# Patient Record
Sex: FEMALE | Race: WHITE | HISPANIC OR LATINO
[De-identification: names, ages, dates, MRNs, and addresses within clinical notes are randomized per-mention and may not be internally consistent; named-entity substitution may affect disease eponyms.]

---

## 2017-07-19 ENCOUNTER — CHARTING TRANS (OUTPATIENT)
Dept: OTHER | Age: 37
End: 2017-07-19

## 2017-07-22 ENCOUNTER — CHARTING TRANS (OUTPATIENT)
Dept: OTHER | Age: 37
End: 2017-07-22

## 2019-07-14 ENCOUNTER — WALK IN (OUTPATIENT)
Dept: URGENT CARE | Age: 39
End: 2019-07-14

## 2019-07-14 DIAGNOSIS — Z11.1 SCREENING-PULMONARY TB: Primary | ICD-10-CM

## 2019-07-14 PROCEDURE — 86580 TB INTRADERMAL TEST: CPT | Performed by: NURSE PRACTITIONER

## 2019-07-16 ENCOUNTER — WALK IN (OUTPATIENT)
Dept: URGENT CARE | Age: 39
End: 2019-07-16

## 2019-07-16 DIAGNOSIS — Z11.1 ENCOUNTER FOR PPD SKIN TEST READING: Primary | ICD-10-CM

## 2019-07-16 LAB
INDURATION: 0 MM (ref 0–?)
SKIN TEST RESULT: NEGATIVE

## 2019-07-16 PROCEDURE — X1094 NO CHARGE VISIT: HCPCS | Performed by: NURSE PRACTITIONER

## 2019-08-09 ENCOUNTER — APPOINTMENT (OUTPATIENT)
Dept: OTHER | Facility: HOSPITAL | Age: 39
End: 2019-08-09
Attending: PREVENTIVE MEDICINE

## 2019-09-24 ENCOUNTER — WALK IN (OUTPATIENT)
Dept: URGENT CARE | Age: 39
End: 2019-09-24

## 2019-09-24 DIAGNOSIS — Z23 NEED FOR IMMUNIZATION AGAINST INFLUENZA: Primary | ICD-10-CM

## 2019-09-24 PROCEDURE — X0946 INFLUENZA QUADRIVALENT SPLIT PRES FREE 0.5 ML VACC, IM (FLULAVAL,FLUARIX,FLUZONE): HCPCS | Performed by: NURSE PRACTITIONER

## 2020-03-04 ENCOUNTER — HOSPITAL ENCOUNTER (OUTPATIENT)
Age: 40
Discharge: ANOTHER HOSPITAL | End: 2020-03-04
Attending: EMERGENCY MEDICINE
Payer: COMMERCIAL

## 2020-03-04 ENCOUNTER — HOSPITAL ENCOUNTER (EMERGENCY)
Facility: HOSPITAL | Age: 40
Discharge: HOME | End: 2020-03-04
Attending: EMERGENCY MEDICINE
Payer: COMMERCIAL

## 2020-03-04 ENCOUNTER — APPOINTMENT (OUTPATIENT)
Dept: ULTRASOUND IMAGING | Facility: HOSPITAL | Age: 40
End: 2020-03-04
Attending: PHYSICIAN ASSISTANT
Payer: COMMERCIAL

## 2020-03-04 VITALS
DIASTOLIC BLOOD PRESSURE: 89 MMHG | TEMPERATURE: 98 F | SYSTOLIC BLOOD PRESSURE: 128 MMHG | OXYGEN SATURATION: 98 % | HEART RATE: 85 BPM | RESPIRATION RATE: 14 BRPM

## 2020-03-04 VITALS
TEMPERATURE: 98 F | OXYGEN SATURATION: 97 % | HEART RATE: 82 BPM | RESPIRATION RATE: 18 BRPM | SYSTOLIC BLOOD PRESSURE: 115 MMHG | DIASTOLIC BLOOD PRESSURE: 76 MMHG

## 2020-03-04 DIAGNOSIS — L03.115 CELLULITIS OF RIGHT LOWER EXTREMITY: ICD-10-CM

## 2020-03-04 DIAGNOSIS — R20.2 PARESTHESIAS: Primary | ICD-10-CM

## 2020-03-04 DIAGNOSIS — M79.661 RIGHT CALF PAIN: ICD-10-CM

## 2020-03-04 LAB
ALBUMIN SERPL-MCNC: 3.3 G/DL (ref 3.4–5)
ALBUMIN/GLOB SERPL: 0.7 {RATIO} (ref 1–2)
ALP LIVER SERPL-CCNC: 67 U/L (ref 37–98)
ALT SERPL-CCNC: 25 U/L (ref 13–56)
ANION GAP SERPL CALC-SCNC: 4 MMOL/L (ref 0–18)
AST SERPL-CCNC: 19 U/L (ref 15–37)
BASOPHILS # BLD AUTO: 0 X10(3) UL (ref 0–0.2)
BASOPHILS NFR BLD AUTO: 0 %
BILIRUB SERPL-MCNC: 0.2 MG/DL (ref 0.1–2)
BUN BLD-MCNC: 9 MG/DL (ref 7–18)
BUN/CREAT SERPL: 11.7 (ref 10–20)
CALCIUM BLD-MCNC: 8.7 MG/DL (ref 8.5–10.1)
CHLORIDE SERPL-SCNC: 107 MMOL/L (ref 98–112)
CO2 SERPL-SCNC: 27 MMOL/L (ref 21–32)
CREAT BLD-MCNC: 0.77 MG/DL (ref 0.55–1.02)
DEPRECATED RDW RBC AUTO: 40.7 FL (ref 35.1–46.3)
EOSINOPHIL # BLD AUTO: 0.05 X10(3) UL (ref 0–0.7)
EOSINOPHIL NFR BLD AUTO: 1.6 %
ERYTHROCYTE [DISTWIDTH] IN BLOOD BY AUTOMATED COUNT: 13.2 % (ref 11–15)
GLOBULIN PLAS-MCNC: 4.9 G/DL (ref 2.8–4.4)
GLUCOSE BLD-MCNC: 75 MG/DL (ref 70–99)
HCT VFR BLD AUTO: 32.8 % (ref 35–48)
HGB BLD-MCNC: 11.1 G/DL (ref 12–16)
IMM GRANULOCYTES # BLD AUTO: 0.01 X10(3) UL (ref 0–1)
IMM GRANULOCYTES NFR BLD: 0.3 %
LYMPHOCYTES # BLD AUTO: 0.93 X10(3) UL (ref 1–4)
LYMPHOCYTES NFR BLD AUTO: 30.4 %
M PROTEIN MFR SERPL ELPH: 8.2 G/DL (ref 6.4–8.2)
MCH RBC QN AUTO: 29.1 PG (ref 26–34)
MCHC RBC AUTO-ENTMCNC: 33.8 G/DL (ref 31–37)
MCV RBC AUTO: 85.9 FL (ref 80–100)
MONOCYTES # BLD AUTO: 0.28 X10(3) UL (ref 0.1–1)
MONOCYTES NFR BLD AUTO: 9.2 %
NEUTROPHILS # BLD AUTO: 1.79 X10 (3) UL (ref 1.5–7.7)
NEUTROPHILS # BLD AUTO: 1.79 X10(3) UL (ref 1.5–7.7)
NEUTROPHILS NFR BLD AUTO: 58.5 %
OSMOLALITY SERPL CALC.SUM OF ELEC: 283 MOSM/KG (ref 275–295)
PLATELET # BLD AUTO: 303 10(3)UL (ref 150–450)
POTASSIUM SERPL-SCNC: 3.6 MMOL/L (ref 3.5–5.1)
RBC # BLD AUTO: 3.82 X10(6)UL (ref 3.8–5.3)
SODIUM SERPL-SCNC: 138 MMOL/L (ref 136–145)
WBC # BLD AUTO: 3.1 X10(3) UL (ref 4–11)

## 2020-03-04 PROCEDURE — 99212 OFFICE O/P EST SF 10 MIN: CPT

## 2020-03-04 PROCEDURE — 93971 EXTREMITY STUDY: CPT | Performed by: PHYSICIAN ASSISTANT

## 2020-03-04 PROCEDURE — 36415 COLL VENOUS BLD VENIPUNCTURE: CPT

## 2020-03-04 PROCEDURE — 99284 EMERGENCY DEPT VISIT MOD MDM: CPT

## 2020-03-04 PROCEDURE — 85025 COMPLETE CBC W/AUTO DIFF WBC: CPT | Performed by: PHYSICIAN ASSISTANT

## 2020-03-04 PROCEDURE — 80053 COMPREHEN METABOLIC PANEL: CPT | Performed by: PHYSICIAN ASSISTANT

## 2020-03-04 PROCEDURE — 99213 OFFICE O/P EST LOW 20 MIN: CPT

## 2020-03-04 RX ORDER — TRIAMCINOLONE ACETONIDE 0.25 MG/G
CREAM TOPICAL 2 TIMES DAILY
COMMUNITY
End: 2020-08-15

## 2020-03-04 RX ORDER — ZOLPIDEM TARTRATE 10 MG/1
10 TABLET ORAL NIGHTLY PRN
COMMUNITY

## 2020-03-04 RX ORDER — CEPHALEXIN 500 MG/1
500 CAPSULE ORAL 3 TIMES DAILY
COMMUNITY
End: 2020-08-15

## 2020-03-04 RX ORDER — ASCORBIC ACID 500 MG
500 TABLET ORAL DAILY
COMMUNITY

## 2020-03-04 RX ORDER — CLOTRIMAZOLE 1 %
CREAM (GRAM) TOPICAL 2 TIMES DAILY
COMMUNITY
End: 2020-08-15

## 2020-03-04 RX ORDER — OMEPRAZOLE 20 MG/1
20 CAPSULE, DELAYED RELEASE ORAL
COMMUNITY
End: 2020-08-15

## 2020-03-05 NOTE — ED PROVIDER NOTES
Patient Seen in: [facility] Emergency Department      History   Patient presents with:  Numbness Weakness  Pain    Stated Complaint: calf pain/weakness from IC    HPI    40-year-old female who comes in today from [facility] immediate care for rule o Constitutional and vital signs reviewed. All other systems reviewed and negative except as noted above.     Physical Exam     ED Triage Vitals   BP 03/04/20 1939 138/84   Pulse 03/04/20 1939 78   Resp 03/04/20 1939 18   Temp 03/04/20 1939 97.9 °F (36.6 ---------                               -----------         ------                     CBC W/ DIFFERENTIAL[[id number]]          Abnormal            Final result                 Please view results for these tests on the individual orders.          Us Venous The patient is in good condition throughout her visit today and remains so upon discharge.  I discuss the plan of care with the patient, who expresses understanding.  All questions and concerns are addressed to the patient's satisfaction prior to discharge

## 2020-03-05 NOTE — ED PROVIDER NOTES
Patient Seen in: [facility]      History   Patient presents with:  Cellulitis    Stated Complaint: right foot infection     HPI    Patient presents with right leg numbness and right ankle infection.   The patient states that [03/04/20 1843]   /89   Pulse 85   Resp 14   Temp 98 °F (36.7 °C)   Temp src Oral   SpO2 98 %   O2 Device None (Room air)       Current:/89   Pulse 85   Temp 98 °F (36.7 °C) (Oral)   Resp 14   LMP 02/16/2020 (Exact Date)   SpO2 98%         Phys

## 2020-03-05 NOTE — ED INITIAL ASSESSMENT (HPI)
The ptient is here for evaluation of right ankle redness, swelling, numbness to the right ankle  She states she has vasculitis and she had a spot to the right ankle that started to itch and has now gone to what it is today.   She did have some clear drainag

## 2020-07-03 ENCOUNTER — TELEPHONE (OUTPATIENT)
Dept: SCHEDULING | Age: 40
End: 2020-07-03

## 2020-07-08 ENCOUNTER — WALK IN (OUTPATIENT)
Dept: URGENT CARE | Age: 40
End: 2020-07-08

## 2020-07-08 VITALS — TEMPERATURE: 98.7 F

## 2020-07-08 DIAGNOSIS — Z11.1 SCREENING-PULMONARY TB: Primary | ICD-10-CM

## 2020-07-08 PROCEDURE — 86580 TB INTRADERMAL TEST: CPT | Performed by: OBSTETRICS & GYNECOLOGY

## 2020-07-09 ENCOUNTER — TELEPHONE (OUTPATIENT)
Dept: SCHEDULING | Age: 40
End: 2020-07-09

## 2020-07-10 ENCOUNTER — WALK IN (OUTPATIENT)
Dept: URGENT CARE | Age: 40
End: 2020-07-10

## 2020-07-10 VITALS — TEMPERATURE: 99.2 F

## 2020-07-10 DIAGNOSIS — Z11.1 ENCOUNTER FOR PPD SKIN TEST READING: Primary | ICD-10-CM

## 2020-07-10 PROCEDURE — X1094 NO CHARGE VISIT: HCPCS | Performed by: NURSE PRACTITIONER

## 2020-08-15 ENCOUNTER — HOSPITAL ENCOUNTER (OUTPATIENT)
Age: 40
Discharge: HOME | End: 2020-08-15
Payer: COMMERCIAL

## 2020-08-15 VITALS
OXYGEN SATURATION: 99 % | SYSTOLIC BLOOD PRESSURE: 125 MMHG | TEMPERATURE: 97 F | HEIGHT: 64 IN | RESPIRATION RATE: 18 BRPM | DIASTOLIC BLOOD PRESSURE: 88 MMHG | WEIGHT: 140 LBS | HEART RATE: 87 BPM | BODY MASS INDEX: 23.9 KG/M2

## 2020-08-15 DIAGNOSIS — J02.9 ACUTE VIRAL PHARYNGITIS: ICD-10-CM

## 2020-08-15 DIAGNOSIS — J02.9 SORE THROAT: Primary | ICD-10-CM

## 2020-08-15 PROCEDURE — U0003 INFECTIOUS AGENT DETECTION BY NUCLEIC ACID (DNA OR RNA); SEVERE ACUTE RESPIRATORY SYNDROME CORONAVIRUS 2 (SARS-COV-2) (CORONAVIRUS DISEASE [COVID-19]), AMPLIFIED PROBE TECHNIQUE, MAKING USE OF HIGH THROUGHPUT TECHNOLOGIES AS DESCRIBED BY CMS-2020-01-R: HCPCS | Performed by: NURSE PRACTITIONER

## 2020-08-15 PROCEDURE — 99202 OFFICE O/P NEW SF 15 MIN: CPT | Performed by: NURSE PRACTITIONER

## 2020-08-15 NOTE — ED PROVIDER NOTES
Patient Seen in: [facility]      History   Patient presents with:  Testing    Stated Complaint: covid test     42-year-old female presents the immediate care with scratchy throat, concerns for COVID-19.   Patient states her Head: Normocephalic. Nose: Nose normal.      Mouth/Throat:      Mouth: Mucous membranes are moist.   Eyes:      Extraocular Movements: Extraocular movements intact. Cardiovascular:      Rate and Rhythm: Normal rate and regular rhythm.    Pulmonary:

## 2020-08-15 NOTE — ED INITIAL ASSESSMENT (HPI)
Pt c/o mild sore throat \"just feels a little scratchy\", pt would like to get tested for COVID-19, brought daughter in with a sore throat to get tested for COVID-19 also. Denies other s/s.

## 2020-08-17 ENCOUNTER — PATIENT MESSAGE (OUTPATIENT)
Dept: URGENT CARE | Age: 40
End: 2020-08-17

## 2020-08-17 DIAGNOSIS — D63.1 ANEMIA DUE TO CHRONIC KIDNEY DISEASE, UNSPECIFIED CKD STAGE: ICD-10-CM

## 2020-08-17 DIAGNOSIS — E87.6 HYPOKALEMIA: ICD-10-CM

## 2020-08-17 DIAGNOSIS — N18.9 ANEMIA DUE TO CHRONIC KIDNEY DISEASE, UNSPECIFIED CKD STAGE: ICD-10-CM

## 2020-08-17 DIAGNOSIS — I50.20 HFREF (HEART FAILURE WITH REDUCED EJECTION FRACTION) (HCC): ICD-10-CM

## 2020-08-17 DIAGNOSIS — N18.2 CKD (CHRONIC KIDNEY DISEASE) STAGE 2, GFR 60-89 ML/MIN: Primary | ICD-10-CM

## 2020-08-17 LAB — SARS-COV-2 RNA RESP QL NAA+PROBE: NOT DETECTED

## 2020-09-22 ENCOUNTER — HOSPITAL ENCOUNTER (OUTPATIENT)
Age: 40
Discharge: HOME | End: 2020-09-22
Payer: COMMERCIAL

## 2020-09-22 VITALS
OXYGEN SATURATION: 98 % | DIASTOLIC BLOOD PRESSURE: 87 MMHG | TEMPERATURE: 98 F | HEART RATE: 85 BPM | WEIGHT: 136 LBS | RESPIRATION RATE: 20 BRPM | SYSTOLIC BLOOD PRESSURE: 134 MMHG | BODY MASS INDEX: 23 KG/M2

## 2020-09-22 DIAGNOSIS — H69.82 EUSTACHIAN TUBE DYSFUNCTION, LEFT: Primary | ICD-10-CM

## 2020-09-22 PROCEDURE — 99214 OFFICE O/P EST MOD 30 MIN: CPT | Performed by: PHYSICIAN ASSISTANT

## 2020-09-22 RX ORDER — FLUTICASONE PROPIONATE 50 MCG
2 SPRAY, SUSPENSION (ML) NASAL DAILY
Qty: 16 G | Refills: 0 | Status: SHIPPED | OUTPATIENT
Start: 2020-09-22 | End: 2020-10-22

## 2020-09-22 NOTE — ED PROVIDER NOTES
Patient Seen in: [address]      History   Patient presents with:  Ear Problem Pain    Stated Complaint: LEFT EAR PAIN    HPI    29-year-old female with history of Sjogren's and lupus who comes in today complaining of stabb well   Neck: Supple; no anterior or posterior cervical adenopathy, no neck rigidity or meningeal signs  Lungs: Clear to auscultation bilaterally, respirations unlabored. No wheezing, rales or rhonchi. Heart: NSR, S1, S2 present.  No murmurs, rubs or collins

## 2020-11-30 ENCOUNTER — HOSPITAL ENCOUNTER (EMERGENCY)
Age: 40
Discharge: HOME | End: 2020-11-30
Attending: EMERGENCY MEDICINE

## 2020-11-30 ENCOUNTER — APPOINTMENT (OUTPATIENT)
Dept: GENERAL RADIOLOGY | Age: 40
End: 2020-11-30
Attending: EMERGENCY MEDICINE

## 2020-11-30 VITALS
OXYGEN SATURATION: 100 % | SYSTOLIC BLOOD PRESSURE: 149 MMHG | DIASTOLIC BLOOD PRESSURE: 95 MMHG | HEART RATE: 86 BPM | RESPIRATION RATE: 16 BRPM

## 2020-11-30 DIAGNOSIS — R55 VASOVAGAL EPISODE: ICD-10-CM

## 2020-11-30 DIAGNOSIS — R42 LIGHTHEADEDNESS: Primary | ICD-10-CM

## 2020-11-30 LAB
ALBUMIN SERPL-MCNC: 3.7 G/DL (ref 3.6–5.1)
ALBUMIN/GLOB SERPL: 0.7 {RATIO} (ref 1–2.4)
ALP SERPL-CCNC: 72 UNITS/L (ref 45–117)
ALT SERPL-CCNC: 18 UNITS/L
ANION GAP SERPL CALC-SCNC: 10 MMOL/L (ref 10–20)
AST SERPL-CCNC: 14 UNITS/L
ATRIAL RATE (BPM): 82
BASOPHILS # BLD: 0 K/MCL (ref 0–0.3)
BASOPHILS NFR BLD: 0 %
BILIRUB SERPL-MCNC: 0.2 MG/DL (ref 0.2–1)
BUN SERPL-MCNC: 13 MG/DL (ref 6–20)
BUN/CREAT SERPL: 21 (ref 7–25)
CALCIUM SERPL-MCNC: 9.1 MG/DL (ref 8.4–10.2)
CHLORIDE SERPL-SCNC: 106 MMOL/L (ref 98–107)
CO2 SERPL-SCNC: 26 MMOL/L (ref 21–32)
CREAT SERPL-MCNC: 0.62 MG/DL (ref 0.51–0.95)
DIFFERENTIAL METHOD BLD: ABNORMAL
EOSINOPHIL # BLD: 0 K/MCL (ref 0.1–0.5)
EOSINOPHIL NFR BLD: 1 %
ERYTHROCYTE [DISTWIDTH] IN BLOOD: 12.8 % (ref 11–15)
GLOBULIN SER-MCNC: 5 G/DL (ref 2–4)
GLUCOSE BLDC GLUCOMTR-MCNC: 85 MG/DL (ref 70–99)
GLUCOSE SERPL-MCNC: 96 MG/DL (ref 65–99)
HCT VFR BLD CALC: 35.5 % (ref 36–46.5)
HGB BLD-MCNC: 11.8 G/DL (ref 12–15.5)
IMM GRANULOCYTES # BLD AUTO: 0 K/MCL (ref 0–0.2)
IMM GRANULOCYTES NFR BLD: 0 %
LYMPHOCYTES # BLD: 0.6 K/MCL (ref 1–4.8)
LYMPHOCYTES NFR BLD: 9 %
MCH RBC QN AUTO: 29.3 PG (ref 26–34)
MCHC RBC AUTO-ENTMCNC: 33.2 G/DL (ref 32–36.5)
MCV RBC AUTO: 88.1 FL (ref 78–100)
MONOCYTES # BLD: 0.4 K/MCL (ref 0.3–0.9)
MONOCYTES NFR BLD: 6 %
NEUTROPHILS # BLD: 5.5 K/MCL (ref 1.8–7.7)
NEUTROPHILS NFR BLD: 84 %
NRBC BLD MANUAL-RTO: 0 /100 WBC
P AXIS (DEGREES): 64
PLATELET # BLD: 324 K/MCL (ref 140–450)
POTASSIUM SERPL-SCNC: 4 MMOL/L (ref 3.4–5.1)
PR-INTERVAL (MSEC): 172
PROT SERPL-MCNC: 8.7 G/DL (ref 6.4–8.2)
QRS-INTERVAL (MSEC): 84
QT-INTERVAL (MSEC): 410
QTC: 479
R AXIS (DEGREES): 25
RBC # BLD: 4.03 MIL/MCL (ref 4–5.2)
REPORT TEXT: NORMAL
SODIUM SERPL-SCNC: 138 MMOL/L (ref 135–145)
T AXIS (DEGREES): 12
TROPONIN I SERPL HS-MCNC: <0.02 NG/ML
TROPONIN I SERPL HS-MCNC: <0.02 NG/ML
VENTRICULAR RATE EKG/MIN (BPM): 82
WBC # BLD: 6.5 K/MCL (ref 4.2–11)

## 2020-11-30 PROCEDURE — 84484 ASSAY OF TROPONIN QUANT: CPT

## 2020-11-30 PROCEDURE — 99284 EMERGENCY DEPT VISIT MOD MDM: CPT

## 2020-11-30 PROCEDURE — 85025 COMPLETE CBC W/AUTO DIFF WBC: CPT

## 2020-11-30 PROCEDURE — 80053 COMPREHEN METABOLIC PANEL: CPT

## 2020-11-30 PROCEDURE — 93005 ELECTROCARDIOGRAM TRACING: CPT | Performed by: EMERGENCY MEDICINE

## 2020-11-30 PROCEDURE — 71046 X-RAY EXAM CHEST 2 VIEWS: CPT

## 2020-11-30 PROCEDURE — 96360 HYDRATION IV INFUSION INIT: CPT

## 2020-11-30 PROCEDURE — 10002807 HB RX 258: Performed by: STUDENT IN AN ORGANIZED HEALTH CARE EDUCATION/TRAINING PROGRAM

## 2020-11-30 PROCEDURE — 82962 GLUCOSE BLOOD TEST: CPT

## 2020-11-30 PROCEDURE — 93010 ELECTROCARDIOGRAM REPORT: CPT | Performed by: INTERNAL MEDICINE

## 2020-11-30 PROCEDURE — 99284 EMERGENCY DEPT VISIT MOD MDM: CPT | Performed by: STUDENT IN AN ORGANIZED HEALTH CARE EDUCATION/TRAINING PROGRAM

## 2020-11-30 RX ADMIN — SODIUM CHLORIDE 1000 ML: 9 INJECTION, SOLUTION INTRAVENOUS at 21:15

## 2020-11-30 ASSESSMENT — ENCOUNTER SYMPTOMS
EYE PAIN: 0
DIAPHORESIS: 1
POLYDIPSIA: 0
WEAKNESS: 0
DIARRHEA: 0
HEADACHES: 0
DIZZINESS: 0
CONFUSION: 0
FEVER: 0
EYE REDNESS: 0
COUGH: 0
NAUSEA: 0
ABDOMINAL PAIN: 0
LIGHT-HEADEDNESS: 1
CHILLS: 0
NUMBNESS: 0
SHORTNESS OF BREATH: 0
VOMITING: 0

## 2020-11-30 ASSESSMENT — PAIN SCALES - GENERAL: PAINLEVEL_OUTOF10: 5

## 2021-01-29 ENCOUNTER — EXTERNAL RECORD (OUTPATIENT)
Dept: HEALTH INFORMATION MANAGEMENT | Facility: OTHER | Age: 41
End: 2021-01-29

## 2021-07-27 ENCOUNTER — WALK IN (OUTPATIENT)
Dept: URGENT CARE | Age: 41
End: 2021-07-27

## 2021-07-27 DIAGNOSIS — Z11.1 SCREENING-PULMONARY TB: Primary | ICD-10-CM

## 2021-07-27 PROCEDURE — 86580 TB INTRADERMAL TEST: CPT | Performed by: OBSTETRICS & GYNECOLOGY

## 2021-07-30 ENCOUNTER — WALK IN (OUTPATIENT)
Dept: URGENT CARE | Age: 41
End: 2021-07-30

## 2021-07-30 DIAGNOSIS — Z11.1 ENCOUNTER FOR PPD SKIN TEST READING: Primary | ICD-10-CM

## 2021-07-30 LAB
INDURATION: 0 MM (ref 0–?)
SKIN TEST RESULT: NEGATIVE

## 2021-07-30 PROCEDURE — X1094 NO CHARGE VISIT: HCPCS | Performed by: OBSTETRICS & GYNECOLOGY

## 2021-10-01 ENCOUNTER — ORDER TRANSCRIPTION (OUTPATIENT)
Dept: ADMINISTRATIVE | Facility: HOSPITAL | Age: 41
End: 2021-10-01

## 2021-10-01 DIAGNOSIS — G56.22 CUBITAL TUNNEL SYNDROME ON LEFT: ICD-10-CM

## 2021-10-01 DIAGNOSIS — M25.532 ARTHRALGIA OF LEFT FOREARM: Primary | ICD-10-CM

## 2021-12-07 ENCOUNTER — OFFICE VISIT (OUTPATIENT)
Dept: ELECTROPHYSIOLOGY | Facility: HOSPITAL | Age: 41
End: 2021-12-07
Attending: SPECIALIST
Payer: COMMERCIAL

## 2021-12-07 DIAGNOSIS — M25.532 ARTHRALGIA OF LEFT FOREARM: ICD-10-CM

## 2021-12-07 DIAGNOSIS — G56.22 CUBITAL TUNNEL SYNDROME ON LEFT: ICD-10-CM

## 2021-12-07 PROCEDURE — 95911 NRV CNDJ TEST 9-10 STUDIES: CPT | Performed by: OTHER

## 2021-12-07 PROCEDURE — 95886 MUSC TEST DONE W/N TEST COMP: CPT | Performed by: OTHER

## 2021-12-07 NOTE — PROCEDURES
[facility]  Nerve Conduction & EMG Report                      Abdulkadir Kawasaki, [address]   EMG department   [address] [facility], [address] [place] [address] Rd  [phone]          Patient name: Bushra Zayas  Date of birth: [place]

## 2021-12-16 ENCOUNTER — HOSPITAL ENCOUNTER (OUTPATIENT)
Age: 41
Discharge: HOME | End: 2021-12-16
Payer: COMMERCIAL

## 2021-12-16 VITALS
SYSTOLIC BLOOD PRESSURE: 116 MMHG | RESPIRATION RATE: 16 BRPM | BODY MASS INDEX: 23.05 KG/M2 | OXYGEN SATURATION: 98 % | TEMPERATURE: 99 F | WEIGHT: 135 LBS | HEART RATE: 97 BPM | DIASTOLIC BLOOD PRESSURE: 74 MMHG | HEIGHT: 64 IN

## 2021-12-16 DIAGNOSIS — Z20.822 ENCOUNTER FOR LABORATORY TESTING FOR COVID-19 VIRUS: ICD-10-CM

## 2021-12-16 DIAGNOSIS — Z20.822 LAB TEST NEGATIVE FOR COVID-19 VIRUS: Primary | ICD-10-CM

## 2021-12-16 PROCEDURE — U0002 COVID-19 LAB TEST NON-CDC: HCPCS | Performed by: PHYSICIAN ASSISTANT

## 2021-12-16 PROCEDURE — 99212 OFFICE O/P EST SF 10 MIN: CPT | Performed by: PHYSICIAN ASSISTANT

## 2021-12-16 RX ORDER — FERROUS SULFATE 325(65) MG
325 TABLET ORAL AS DIRECTED
COMMUNITY

## 2021-12-16 RX ORDER — ALPRAZOLAM 0.25 MG/1
0.25 TABLET ORAL
COMMUNITY
Start: 2021-03-30

## 2021-12-16 NOTE — ED PROVIDER NOTES
Patient Seen in: [address]      History   Patient presents with:  Covid-19 Test    Stated Complaint: covid test     Subjective:   HPI    51-year-old female presents for Covid testing. Daughter was exposed to Covid.   Both of them ar Skin:     General: Skin is warm and dry. Capillary Refill: Capillary refill takes less than 2 seconds. Neurological:      Mental Status: She is alert.    Psychiatric:         Mood and Affect: Mood normal.             ED Course     Labs Reviewed   R

## 2022-05-27 ENCOUNTER — HOSPITAL ENCOUNTER (EMERGENCY)
Facility: HOSPITAL | Age: 42
Discharge: HOME | End: 2022-05-27
Attending: EMERGENCY MEDICINE
Payer: COMMERCIAL

## 2022-05-27 ENCOUNTER — APPOINTMENT (OUTPATIENT)
Dept: GENERAL RADIOLOGY | Facility: HOSPITAL | Age: 42
End: 2022-05-27
Attending: EMERGENCY MEDICINE
Payer: COMMERCIAL

## 2022-05-27 VITALS
SYSTOLIC BLOOD PRESSURE: 130 MMHG | HEART RATE: 97 BPM | OXYGEN SATURATION: 100 % | DIASTOLIC BLOOD PRESSURE: 86 MMHG | RESPIRATION RATE: 21 BRPM

## 2022-05-27 DIAGNOSIS — U07.1 COVID-19: Primary | ICD-10-CM

## 2022-05-27 LAB
ALBUMIN SERPL-MCNC: 2.9 G/DL (ref 3.4–5)
ALBUMIN/GLOB SERPL: 0.6 {RATIO} (ref 1–2)
ALP LIVER SERPL-CCNC: 70 U/L
ALT SERPL-CCNC: 16 U/L
ANION GAP SERPL CALC-SCNC: 7 MMOL/L (ref 0–18)
AST SERPL-CCNC: 15 U/L (ref 15–37)
ATRIAL RATE: 81 BPM
BASOPHILS # BLD AUTO: 0 X10(3) UL (ref 0–0.2)
BASOPHILS NFR BLD AUTO: 0 %
BILIRUB SERPL-MCNC: 0.2 MG/DL (ref 0.1–2)
BILIRUB UR QL STRIP.AUTO: NEGATIVE
BUN BLD-MCNC: 12 MG/DL (ref 7–18)
CALCIUM BLD-MCNC: 8.6 MG/DL (ref 8.5–10.1)
CHLORIDE SERPL-SCNC: 109 MMOL/L (ref 98–112)
CLARITY UR REFRACT.AUTO: CLEAR
CO2 SERPL-SCNC: 22 MMOL/L (ref 21–32)
CREAT BLD-MCNC: 0.78 MG/DL
EOSINOPHIL # BLD AUTO: 0.08 X10(3) UL (ref 0–0.7)
EOSINOPHIL NFR BLD AUTO: 1.6 %
ERYTHROCYTE [DISTWIDTH] IN BLOOD BY AUTOMATED COUNT: 13.4 %
GLOBULIN PLAS-MCNC: 4.7 G/DL (ref 2.8–4.4)
GLUCOSE BLD-MCNC: 87 MG/DL (ref 70–99)
GLUCOSE UR STRIP.AUTO-MCNC: NEGATIVE MG/DL
HCT VFR BLD AUTO: 30.5 %
HGB BLD-MCNC: 10.1 G/DL
IMM GRANULOCYTES # BLD AUTO: 0.02 X10(3) UL (ref 0–1)
IMM GRANULOCYTES NFR BLD: 0.4 %
KETONES UR STRIP.AUTO-MCNC: NEGATIVE MG/DL
LEUKOCYTE ESTERASE UR QL STRIP.AUTO: NEGATIVE
LYMPHOCYTES # BLD AUTO: 0.69 X10(3) UL (ref 1–4)
LYMPHOCYTES NFR BLD AUTO: 13.7 %
MCH RBC QN AUTO: 28.4 PG (ref 26–34)
MCHC RBC AUTO-ENTMCNC: 33.1 G/DL (ref 31–37)
MCV RBC AUTO: 85.7 FL
MONOCYTES # BLD AUTO: 0.51 X10(3) UL (ref 0.1–1)
MONOCYTES NFR BLD AUTO: 10.1 %
NEUTROPHILS # BLD AUTO: 3.75 X10 (3) UL (ref 1.5–7.7)
NEUTROPHILS # BLD AUTO: 3.75 X10(3) UL (ref 1.5–7.7)
NEUTROPHILS NFR BLD AUTO: 74.2 %
NITRITE UR QL STRIP.AUTO: NEGATIVE
OSMOLALITY SERPL CALC.SUM OF ELEC: 285 MOSM/KG (ref 275–295)
P AXIS: 54 DEGREES
P-R INTERVAL: 164 MS
PH UR STRIP.AUTO: 7 [PH] (ref 5–8)
PLATELET # BLD AUTO: 302 10(3)UL (ref 150–450)
POTASSIUM SERPL-SCNC: 3.6 MMOL/L (ref 3.5–5.1)
PROT SERPL-MCNC: 7.6 G/DL (ref 6.4–8.2)
PROT UR STRIP.AUTO-MCNC: NEGATIVE MG/DL
Q-T INTERVAL: 392 MS
QRS DURATION: 96 MS
QTC CALCULATION (BEZET): 455 MS
R AXIS: 32 DEGREES
RBC # BLD AUTO: 3.56 X10(6)UL
RBC UR QL AUTO: NEGATIVE
SARS-COV-2 RNA RESP QL NAA+PROBE: DETECTED
SODIUM SERPL-SCNC: 138 MMOL/L (ref 136–145)
SP GR UR STRIP.AUTO: 1.01 (ref 1–1.03)
T AXIS: 32 DEGREES
TROPONIN I HIGH SENSITIVITY: <3 NG/L
UROBILINOGEN UR STRIP.AUTO-MCNC: <2 MG/DL
VENTRICULAR RATE: 81 BPM
WBC # BLD AUTO: 5.1 X10(3) UL (ref 4–11)

## 2022-05-27 PROCEDURE — 99284 EMERGENCY DEPT VISIT MOD MDM: CPT

## 2022-05-27 PROCEDURE — 93005 ELECTROCARDIOGRAM TRACING: CPT

## 2022-05-27 PROCEDURE — 93010 ELECTROCARDIOGRAM REPORT: CPT

## 2022-05-27 PROCEDURE — 84484 ASSAY OF TROPONIN QUANT: CPT | Performed by: EMERGENCY MEDICINE

## 2022-05-27 PROCEDURE — 96360 HYDRATION IV INFUSION INIT: CPT

## 2022-05-27 PROCEDURE — 85025 COMPLETE CBC W/AUTO DIFF WBC: CPT | Performed by: EMERGENCY MEDICINE

## 2022-05-27 PROCEDURE — 81003 URINALYSIS AUTO W/O SCOPE: CPT | Performed by: EMERGENCY MEDICINE

## 2022-05-27 PROCEDURE — 80053 COMPREHEN METABOLIC PANEL: CPT | Performed by: EMERGENCY MEDICINE

## 2022-05-27 RX ORDER — BEBTELOVIMAB 87.5 MG/ML
175 INJECTION, SOLUTION INTRAVENOUS ONCE
Status: COMPLETED | OUTPATIENT
Start: 2022-05-27 | End: 2022-05-27

## 2022-05-27 NOTE — ED INITIAL ASSESSMENT (HPI)
Went to [address] [place] [address] today for a COVID test, and mentioned that last Sunday she had a near syncopal episode. They did a CXR and EKG along with orthostatic bps. The CXR and orthostatics were negative but she was found to have an abnormal EKG that showed an incomplete R BBB. She was referred here to the ER.   Denies CP or SOB at this time

## 2022-07-30 ENCOUNTER — WALK IN (OUTPATIENT)
Dept: URGENT CARE | Age: 42
End: 2022-07-30

## 2022-07-30 DIAGNOSIS — Z11.1 SCREENING-PULMONARY TB: Primary | ICD-10-CM

## 2022-07-30 PROCEDURE — 86580 TB INTRADERMAL TEST: CPT | Performed by: NURSE PRACTITIONER

## 2022-08-01 ENCOUNTER — WALK IN (OUTPATIENT)
Dept: URGENT CARE | Age: 42
End: 2022-08-01

## 2022-08-01 DIAGNOSIS — Z11.1 ENCOUNTER FOR PPD SKIN TEST READING: Primary | ICD-10-CM

## 2022-08-01 LAB
INDURATION: 0 MM (ref 0–?)
SKIN TEST RESULT: NEGATIVE

## 2023-01-20 PROCEDURE — 88348 ELECTRON MICROSCOPY DX: CPT | Performed by: CLINICAL MEDICAL LABORATORY

## 2023-01-24 ENCOUNTER — LAB REQUISITION (OUTPATIENT)
Dept: LAB | Age: 43
End: 2023-01-24

## 2023-02-03 LAB
ASR DISCLAIMER: NORMAL
CASE RPRT: NORMAL
CLINICAL INFO: NORMAL
PATH REPORT.FINAL DX SPEC: NORMAL
PATH REPORT.GROSS SPEC: NORMAL

## 2023-04-03 ENCOUNTER — EXTERNAL RECORD (OUTPATIENT)
Dept: HEALTH INFORMATION MANAGEMENT | Facility: OTHER | Age: 43
End: 2023-04-03

## 2023-04-03 LAB — LAB RESULT: NORMAL

## 2023-04-05 ENCOUNTER — EXTERNAL RECORD (OUTPATIENT)
Dept: HEALTH INFORMATION MANAGEMENT | Facility: OTHER | Age: 43
End: 2023-04-05

## 2023-04-11 DIAGNOSIS — C50.412 MALIGNANT NEOPLASM OF UPPER-OUTER QUADRANT OF LEFT BREAST IN FEMALE, ESTROGEN RECEPTOR NEGATIVE (CMD): Primary | ICD-10-CM

## 2023-04-11 DIAGNOSIS — Z17.1 MALIGNANT NEOPLASM OF UPPER-OUTER QUADRANT OF LEFT BREAST IN FEMALE, ESTROGEN RECEPTOR NEGATIVE (CMD): Primary | ICD-10-CM

## 2023-04-13 ENCOUNTER — EXTERNAL RECORD (OUTPATIENT)
Dept: SURGERY | Age: 43
End: 2023-04-13

## 2023-04-13 ENCOUNTER — TELEPHONE (OUTPATIENT)
Dept: SURGERY | Age: 43
End: 2023-04-13

## 2023-04-13 ENCOUNTER — CLINICAL DOCUMENTATION (OUTPATIENT)
Dept: SURGERY | Age: 43
End: 2023-04-13

## 2023-04-13 DIAGNOSIS — C50.912 INFILTRATING DUCTAL CARCINOMA OF LEFT BREAST (CMD): Primary | ICD-10-CM

## 2023-04-18 ENCOUNTER — TELEPHONE (OUTPATIENT)
Dept: SURGERY | Age: 43
End: 2023-04-18

## 2023-04-24 ENCOUNTER — EXTERNAL RECORD (OUTPATIENT)
Dept: SURGERY | Age: 43
End: 2023-04-24

## 2023-05-04 ENCOUNTER — GENETICS ENCOUNTER (OUTPATIENT)
Dept: GENETICS | Facility: HOSPITAL | Age: 43
End: 2023-05-04
Attending: GENETIC COUNSELOR, MS
Payer: COMMERCIAL

## 2023-05-04 ENCOUNTER — NURSE ONLY (OUTPATIENT)
Dept: HEMATOLOGY/ONCOLOGY | Facility: HOSPITAL | Age: 43
End: 2023-05-04
Attending: GENETIC COUNSELOR, MS
Payer: COMMERCIAL

## 2023-05-04 DIAGNOSIS — C50.912 MALIGNANT NEOPLASM OF LEFT BREAST IN FEMALE, ESTROGEN RECEPTOR NEGATIVE, UNSPECIFIED SITE OF BREAST (HCC): Primary | ICD-10-CM

## 2023-05-04 DIAGNOSIS — Z17.1 MALIGNANT NEOPLASM OF LEFT BREAST IN FEMALE, ESTROGEN RECEPTOR NEGATIVE, UNSPECIFIED SITE OF BREAST (HCC): Primary | ICD-10-CM

## 2023-05-04 PROCEDURE — 96040 HC GENETIC COUNSELING EA 30 MIN: CPT | Performed by: GENETIC COUNSELOR, MS

## 2023-05-04 PROCEDURE — 36415 COLL VENOUS BLD VENIPUNCTURE: CPT

## 2023-05-23 ENCOUNTER — GENETICS ENCOUNTER (OUTPATIENT)
Dept: HEMATOLOGY/ONCOLOGY | Facility: HOSPITAL | Age: 43
End: 2023-05-23

## 2023-05-23 DIAGNOSIS — Z13.71 BRCA GENE MUTATION NEGATIVE IN FEMALE: Primary | ICD-10-CM

## 2023-05-23 NOTE — PROGRESS NOTES
Patient Name: Lacie Aiken  YOB: 1980    Referring Provider:  Zonia Keys MD; Lavetta Cranker, MD        Reason for Referral:  Ms. Yogesh Lara had genetic testing performed on 5/4/2023 because of a diagnosis of triple negative breast cancer at age 41y. Genetic Testing Result:  Negative. No pathogenic variant was found in the following 84 genes on the [facility] BRAC1/2 panel and multi-cancer + RNA panel: AIP, ALK, APC*, SAL*, AXIN2, BAP1, BARD1, BLM, BMPR1A, BRCA1, BRCA2, BRIP1, CASR, CDC73, CDH1, CDK4, CDKN1B, CDKN1C, CDKN2A (p14ARF), CDKN2A ([id number]), CEBPA, CHEK2, CTNNA1, DICER1*, DIS3L2, EGFR, EPCAM*, FH*, FLCN, GATA2, GPC3*, GREM1*, HOXB13, HRAS, KIT, MAX*, MEN1*, MET*, MITF*, MLH1*, MSH2*, MSH3*, MSH6*, MUTYH, NBN, NF1*, NF2, NTHL1, PALB2, PDGFRA, PHOX2B*, PMS2*, POLD1*, POLE, POT1, UKVHM5U, PTCH1, PTEN*, RAD50, RAD51C, RAD51D, RB1*, RECQL4*, RET, RUNX1, SDHA*, SDHAF2, SDHB, SDHC*, SDHD, SMAD4, SMARCA4, SMARCB1, SMARCE1, STK11, SUFU, TERC, TERT, RBSF245, TP53, TSC1*, TSC2, VHL, WRN*, WT1. Please refer to the report from [facility] for additional testing information. These results were discussed with Ms. Yogesh Lara via telephone on 5/23/2023. Summary and Plan:   These results indicate it is unlikely that Ms. Yogesh Lara has a pathogenic variant (harmful genetic mutation) in any of the genes listed above. No pathogenic variants associated with hereditary cancer syndromes were identified. The etiology Ms. Nunezs personal history of cancer remains genetically unexplained. Medical management and surveillance for Ms. Yogesh Lara and other family members should be based on their personal and family history. All medical management decisions should be made with a physician. The limitations of the testing were discussed with Ms. Yogesh Lara including the chance that a pathogenic variant in a gene other than those included in this analysis might be the cause of cancer in Ms. Yogesh Lara or in relatives. I encouraged Ms.  Yogesh Lara to share the genetic test results with her children and other relatives so that they may discuss the implications of this information with their health providers. Ms. Kamaraangely Farr is also encouraged to contact me on an annual basis to learn if there have been any updates in genetic testing that would apply or if there are changes in the personal and/or family history. Please do not hesitate to contact my office if you have any questions or concerns, [phone].      Amanuel Archer MS, CGC

## 2023-09-08 ENCOUNTER — WALK IN (OUTPATIENT)
Dept: URGENT CARE | Age: 43
End: 2023-09-08

## 2023-09-08 DIAGNOSIS — Z11.1 SCREENING EXAMINATION FOR PULMONARY TUBERCULOSIS: Primary | ICD-10-CM

## 2023-09-08 PROCEDURE — 86580 TB INTRADERMAL TEST: CPT | Performed by: NURSE PRACTITIONER

## 2023-09-10 ENCOUNTER — WALK IN (OUTPATIENT)
Dept: URGENT CARE | Age: 43
End: 2023-09-10

## 2023-09-10 DIAGNOSIS — Z11.1 ENCOUNTER FOR PPD SKIN TEST READING: Primary | ICD-10-CM

## 2023-09-10 LAB
INDURATION: 0 MM (ref 0–?)
SKIN TEST RESULT: NEGATIVE

## 2023-09-30 ENCOUNTER — IMMUNIZATION (OUTPATIENT)
Dept: LAB | Age: 43
End: 2023-09-30
Attending: EMERGENCY MEDICINE
Payer: COMMERCIAL

## 2023-09-30 DIAGNOSIS — Z23 NEED FOR VACCINATION: Primary | ICD-10-CM

## 2023-09-30 PROCEDURE — 90686 IIV4 VACC NO PRSV 0.5 ML IM: CPT

## 2023-09-30 PROCEDURE — 90471 IMMUNIZATION ADMIN: CPT

## 2023-11-13 ENCOUNTER — HOSPITAL ENCOUNTER (EMERGENCY)
Facility: HOSPITAL | Age: 43
Discharge: HOME | End: 2023-11-14
Attending: EMERGENCY MEDICINE
Payer: COMMERCIAL

## 2023-11-13 ENCOUNTER — APPOINTMENT (OUTPATIENT)
Dept: CT IMAGING | Facility: HOSPITAL | Age: 43
End: 2023-11-13
Attending: EMERGENCY MEDICINE
Payer: COMMERCIAL

## 2023-11-13 ENCOUNTER — APPOINTMENT (OUTPATIENT)
Dept: GENERAL RADIOLOGY | Facility: HOSPITAL | Age: 43
End: 2023-11-13
Payer: COMMERCIAL

## 2023-11-13 DIAGNOSIS — R07.9 CHEST PAIN, UNSPECIFIED TYPE: Primary | ICD-10-CM

## 2023-11-13 LAB
ALBUMIN SERPL-MCNC: 3.3 G/DL (ref 3.4–5)
ALBUMIN/GLOB SERPL: 0.8 {RATIO} (ref 1–2)
ALP LIVER SERPL-CCNC: 105 U/L
ALT SERPL-CCNC: 32 U/L
ANION GAP SERPL CALC-SCNC: 6 MMOL/L (ref 0–18)
AST SERPL-CCNC: 25 U/L (ref 15–37)
ATRIAL RATE: 102 BPM
BASOPHILS # BLD AUTO: 0.01 X10(3) UL (ref 0–0.2)
BASOPHILS NFR BLD AUTO: 0.2 %
BILIRUB SERPL-MCNC: 0.4 MG/DL (ref 0.1–2)
BUN BLD-MCNC: 15 MG/DL (ref 9–23)
CALCIUM BLD-MCNC: 9.3 MG/DL (ref 8.5–10.1)
CHLORIDE SERPL-SCNC: 108 MMOL/L (ref 98–112)
CO2 SERPL-SCNC: 24 MMOL/L (ref 21–32)
CREAT BLD-MCNC: 0.84 MG/DL
EGFRCR SERPLBLD CKD-EPI 2021: 88 ML/MIN/1.73M2 (ref 60–?)
EOSINOPHIL # BLD AUTO: 0.06 X10(3) UL (ref 0–0.7)
EOSINOPHIL NFR BLD AUTO: 1.3 %
ERYTHROCYTE [DISTWIDTH] IN BLOOD BY AUTOMATED COUNT: 12.7 %
GLOBULIN PLAS-MCNC: 4.2 G/DL (ref 2.8–4.4)
GLUCOSE BLD-MCNC: 105 MG/DL (ref 70–99)
HCT VFR BLD AUTO: 29 %
HGB BLD-MCNC: 9.6 G/DL
IMM GRANULOCYTES # BLD AUTO: 0.01 X10(3) UL (ref 0–1)
IMM GRANULOCYTES NFR BLD: 0.2 %
LYMPHOCYTES # BLD AUTO: 0.67 X10(3) UL (ref 1–4)
LYMPHOCYTES NFR BLD AUTO: 14.5 %
MCH RBC QN AUTO: 29.1 PG (ref 26–34)
MCHC RBC AUTO-ENTMCNC: 33.1 G/DL (ref 31–37)
MCV RBC AUTO: 87.9 FL
MONOCYTES # BLD AUTO: 0.5 X10(3) UL (ref 0.1–1)
MONOCYTES NFR BLD AUTO: 10.8 %
NEUTROPHILS # BLD AUTO: 3.36 X10 (3) UL (ref 1.5–7.7)
NEUTROPHILS # BLD AUTO: 3.36 X10(3) UL (ref 1.5–7.7)
NEUTROPHILS NFR BLD AUTO: 73 %
OSMOLALITY SERPL CALC.SUM OF ELEC: 287 MOSM/KG (ref 275–295)
P AXIS: 72 DEGREES
P-R INTERVAL: 162 MS
PLATELET # BLD AUTO: 241 10(3)UL (ref 150–450)
POTASSIUM SERPL-SCNC: 3.4 MMOL/L (ref 3.5–5.1)
PROT SERPL-MCNC: 7.5 G/DL (ref 6.4–8.2)
Q-T INTERVAL: 384 MS
QRS DURATION: 86 MS
QTC CALCULATION (BEZET): 500 MS
R AXIS: 49 DEGREES
RBC # BLD AUTO: 3.3 X10(6)UL
SODIUM SERPL-SCNC: 138 MMOL/L (ref 136–145)
T AXIS: 51 DEGREES
TROPONIN I SERPL HS-MCNC: 26 NG/L
VENTRICULAR RATE: 102 BPM
WBC # BLD AUTO: 4.6 X10(3) UL (ref 4–11)

## 2023-11-13 PROCEDURE — 80053 COMPREHEN METABOLIC PANEL: CPT

## 2023-11-13 PROCEDURE — 80053 COMPREHEN METABOLIC PANEL: CPT | Performed by: EMERGENCY MEDICINE

## 2023-11-13 PROCEDURE — 71045 X-RAY EXAM CHEST 1 VIEW: CPT

## 2023-11-13 PROCEDURE — 84484 ASSAY OF TROPONIN QUANT: CPT | Performed by: EMERGENCY MEDICINE

## 2023-11-13 PROCEDURE — 84484 ASSAY OF TROPONIN QUANT: CPT

## 2023-11-13 PROCEDURE — 85025 COMPLETE CBC W/AUTO DIFF WBC: CPT

## 2023-11-13 PROCEDURE — 99285 EMERGENCY DEPT VISIT HI MDM: CPT

## 2023-11-13 PROCEDURE — 71260 CT THORAX DX C+: CPT | Performed by: EMERGENCY MEDICINE

## 2023-11-13 PROCEDURE — 93005 ELECTROCARDIOGRAM TRACING: CPT

## 2023-11-13 PROCEDURE — 93010 ELECTROCARDIOGRAM REPORT: CPT

## 2023-11-13 PROCEDURE — 85025 COMPLETE CBC W/AUTO DIFF WBC: CPT | Performed by: EMERGENCY MEDICINE

## 2023-11-13 PROCEDURE — 99284 EMERGENCY DEPT VISIT MOD MDM: CPT

## 2023-11-13 PROCEDURE — 96374 THER/PROPH/DIAG INJ IV PUSH: CPT

## 2023-11-13 RX ORDER — POTASSIUM CHLORIDE 1500 MG/1
1 TABLET, EXTENDED RELEASE ORAL DAILY
COMMUNITY
Start: 2023-08-24

## 2023-11-13 RX ORDER — HYDROCODONE BITARTRATE AND ACETAMINOPHEN 5; 325 MG/1; MG/1
1 TABLET ORAL EVERY 8 HOURS PRN
COMMUNITY
Start: 2023-09-27

## 2023-11-13 RX ORDER — POTASSIUM CHLORIDE 20 MEQ/1
40 TABLET, EXTENDED RELEASE ORAL ONCE
Status: COMPLETED | OUTPATIENT
Start: 2023-11-13 | End: 2023-11-14

## 2023-11-13 RX ORDER — LIDOCAINE AND PRILOCAINE 25; 25 MG/G; MG/G
CREAM TOPICAL
COMMUNITY
Start: 2023-05-12

## 2023-11-13 RX ORDER — MORPHINE SULFATE 4 MG/ML
4 INJECTION, SOLUTION INTRAMUSCULAR; INTRAVENOUS ONCE
Status: COMPLETED | OUTPATIENT
Start: 2023-11-13 | End: 2023-11-13

## 2023-11-13 RX ORDER — ONDANSETRON HYDROCHLORIDE 8 MG/1
8 TABLET, FILM COATED ORAL AS NEEDED
COMMUNITY
Start: 2023-06-13

## 2023-11-13 RX ORDER — DULOXETIN HYDROCHLORIDE 30 MG/1
30 CAPSULE, DELAYED RELEASE ORAL DAILY
COMMUNITY
Start: 2023-09-21

## 2023-11-14 VITALS
WEIGHT: 120 LBS | SYSTOLIC BLOOD PRESSURE: 127 MMHG | DIASTOLIC BLOOD PRESSURE: 88 MMHG | HEART RATE: 92 BPM | TEMPERATURE: 98 F | OXYGEN SATURATION: 99 % | RESPIRATION RATE: 17 BRPM | HEIGHT: 64 IN | BODY MASS INDEX: 20.49 KG/M2

## 2023-11-14 NOTE — DISCHARGE INSTRUCTIONS
Follow-up with your primary care doctor or oncologist within the next week for reassessment. Return for new or worsening pain, difficulty breathing or any other concerning symptoms.

## 2023-11-14 NOTE — ED QUICK NOTES
Patient has a history of breast cancer, is supposed to have lumpectomy on Thursday. Last chemotherapy was October 5th. Patient states on Friday she developed some left scapular pain. Is able to pinpoint the area that is painful. Tried some Icy Hot without relief. States the pain has since spread to her left upper chest, above her left breast and down her left arm. Not painful to move. No shortness of breath. Patient states she has been told Amy Boles has an inflamed lymph node\" in left upper breast area.

## 2023-11-14 NOTE — ED QUICK NOTES
Rounding Completed. Report from [facility]. Pt resting in bed. States pain down to 2/10. She denies FLORES    Plan of Care reviewed. Waiting for dispo. Elimination needs assessed. Provided updates. Bed is locked and in lowest position. Call light within reach.

## 2023-11-14 NOTE — ED INITIAL ASSESSMENT (HPI)
Pateint with left arm pain, left shoulder and left upper back pain. Started Friday evening.  Took Norco at 5 am.

## 2023-12-13 ENCOUNTER — APPOINTMENT (OUTPATIENT)
Dept: GENERAL RADIOLOGY | Facility: HOSPITAL | Age: 43
DRG: 987 | End: 2023-12-13
Attending: EMERGENCY MEDICINE
Payer: COMMERCIAL

## 2023-12-13 ENCOUNTER — HOSPITAL ENCOUNTER (INPATIENT)
Facility: HOSPITAL | Age: 43
LOS: 20 days | Discharge: HOME HEALTH | DRG: 987 | End: 2024-01-02
Attending: EMERGENCY MEDICINE | Admitting: INTERNAL MEDICINE
Payer: COMMERCIAL

## 2023-12-13 DIAGNOSIS — D64.9 ANEMIA, UNSPECIFIED TYPE: ICD-10-CM

## 2023-12-13 DIAGNOSIS — J18.9 COMMUNITY ACQUIRED PNEUMONIA, UNSPECIFIED LATERALITY: Primary | ICD-10-CM

## 2023-12-13 DIAGNOSIS — E87.1 HYPONATREMIA: ICD-10-CM

## 2023-12-13 DIAGNOSIS — K81.9 CHOLECYSTITIS: ICD-10-CM

## 2023-12-13 DIAGNOSIS — R53.1 WEAKNESS GENERALIZED: ICD-10-CM

## 2023-12-13 DIAGNOSIS — R79.89 ELEVATED LIVER FUNCTION TESTS: ICD-10-CM

## 2023-12-13 PROBLEM — E87.20 METABOLIC ACIDOSIS: Status: ACTIVE | Noted: 2023-12-13

## 2023-12-13 LAB
ALBUMIN SERPL-MCNC: 3.1 G/DL (ref 3.4–5)
ALBUMIN/GLOB SERPL: 0.9 {RATIO} (ref 1–2)
ALP LIVER SERPL-CCNC: 97 U/L
ALT SERPL-CCNC: 36 U/L
ANION GAP SERPL CALC-SCNC: 16 MMOL/L (ref 0–18)
ANTIBODY SCREEN: NEGATIVE
AST SERPL-CCNC: 40 U/L (ref 15–37)
BASOPHILS # BLD AUTO: 0 X10(3) UL (ref 0–0.2)
BASOPHILS NFR BLD AUTO: 0 %
BILIRUB SERPL-MCNC: 0.9 MG/DL (ref 0.1–2)
BUN BLD-MCNC: 28 MG/DL (ref 9–23)
CALCIUM BLD-MCNC: 8.2 MG/DL (ref 8.5–10.1)
CHLORIDE SERPL-SCNC: 89 MMOL/L (ref 98–112)
CO2 SERPL-SCNC: 15 MMOL/L (ref 21–32)
CREAT BLD-MCNC: 1.36 MG/DL
EGFRCR SERPLBLD CKD-EPI 2021: 50 ML/MIN/1.73M2 (ref 60–?)
EOSINOPHIL # BLD AUTO: 0 X10(3) UL (ref 0–0.7)
EOSINOPHIL NFR BLD AUTO: 0 %
ERYTHROCYTE [DISTWIDTH] IN BLOOD BY AUTOMATED COUNT: 12.8 %
GLOBULIN PLAS-MCNC: 3.5 G/DL (ref 2.8–4.4)
GLUCOSE BLD-MCNC: 106 MG/DL (ref 70–99)
HCT VFR BLD AUTO: 22.8 %
HGB BLD-MCNC: 7.9 G/DL
IMM GRANULOCYTES # BLD AUTO: 0.05 X10(3) UL (ref 0–1)
IMM GRANULOCYTES NFR BLD: 0.8 %
LACTATE SERPL-SCNC: 1.5 MMOL/L (ref 0.4–2)
LIPASE SERPL-CCNC: 15 U/L (ref 13–75)
LYMPHOCYTES # BLD AUTO: 0.35 X10(3) UL (ref 1–4)
LYMPHOCYTES NFR BLD AUTO: 5.6 %
MCH RBC QN AUTO: 29 PG (ref 26–34)
MCHC RBC AUTO-ENTMCNC: 34.6 G/DL (ref 31–37)
MCV RBC AUTO: 83.8 FL
MONOCYTES # BLD AUTO: 0.33 X10(3) UL (ref 0.1–1)
MONOCYTES NFR BLD AUTO: 5.3 %
NEUTROPHILS # BLD AUTO: 5.52 X10 (3) UL (ref 1.5–7.7)
NEUTROPHILS # BLD AUTO: 5.52 X10(3) UL (ref 1.5–7.7)
NEUTROPHILS NFR BLD AUTO: 88.3 %
OSMOLALITY SERPL CALC.SUM OF ELEC: 256 MOSM/KG (ref 275–295)
PLATELET # BLD AUTO: 192 10(3)UL (ref 150–450)
POTASSIUM SERPL-SCNC: 3.9 MMOL/L (ref 3.5–5.1)
PROCALCITONIN SERPL-MCNC: 0.13 NG/ML (ref ?–0.16)
PROT SERPL-MCNC: 6.6 G/DL (ref 6.4–8.2)
RBC # BLD AUTO: 2.72 X10(6)UL
RH BLOOD TYPE: POSITIVE
SODIUM SERPL-SCNC: 120 MMOL/L (ref 136–145)
WBC # BLD AUTO: 6.3 X10(3) UL (ref 4–11)

## 2023-12-13 PROCEDURE — 71045 X-RAY EXAM CHEST 1 VIEW: CPT | Performed by: EMERGENCY MEDICINE

## 2023-12-13 PROCEDURE — 30233N1 TRANSFUSION OF NONAUTOLOGOUS RED BLOOD CELLS INTO PERIPHERAL VEIN, PERCUTANEOUS APPROACH: ICD-10-PCS | Performed by: INTERNAL MEDICINE

## 2023-12-13 PROCEDURE — 99223 1ST HOSP IP/OBS HIGH 75: CPT | Performed by: INTERNAL MEDICINE

## 2023-12-13 RX ORDER — POTASSIUM CITRATE 15 MEQ/1
1 TABLET, EXTENDED RELEASE ORAL 3 TIMES DAILY
COMMUNITY

## 2023-12-13 RX ORDER — ACETAMINOPHEN 500 MG
1000 TABLET ORAL EVERY 8 HOURS PRN
Status: DISCONTINUED | OUTPATIENT
Start: 2023-12-13 | End: 2023-12-19

## 2023-12-13 RX ORDER — HYDROMORPHONE HYDROCHLORIDE 1 MG/ML
0.2 INJECTION, SOLUTION INTRAMUSCULAR; INTRAVENOUS; SUBCUTANEOUS EVERY 2 HOUR PRN
Status: DISCONTINUED | OUTPATIENT
Start: 2023-12-13 | End: 2023-12-28

## 2023-12-13 RX ORDER — SENNOSIDES 8.6 MG
17.2 TABLET ORAL NIGHTLY PRN
Status: DISCONTINUED | OUTPATIENT
Start: 2023-12-13 | End: 2024-01-02

## 2023-12-13 RX ORDER — HYDROMORPHONE HYDROCHLORIDE 1 MG/ML
0.5 INJECTION, SOLUTION INTRAMUSCULAR; INTRAVENOUS; SUBCUTANEOUS EVERY 30 MIN PRN
Status: DISCONTINUED | OUTPATIENT
Start: 2023-12-13 | End: 2023-12-19

## 2023-12-13 RX ORDER — DULOXETIN HYDROCHLORIDE 30 MG/1
30 CAPSULE, DELAYED RELEASE ORAL DAILY
Status: DISCONTINUED | OUTPATIENT
Start: 2023-12-14 | End: 2023-12-19

## 2023-12-13 RX ORDER — BISACODYL 10 MG
10 SUPPOSITORY, RECTAL RECTAL
Status: DISCONTINUED | OUTPATIENT
Start: 2023-12-13 | End: 2024-01-02

## 2023-12-13 RX ORDER — HYDROMORPHONE HYDROCHLORIDE 1 MG/ML
0.5 INJECTION, SOLUTION INTRAMUSCULAR; INTRAVENOUS; SUBCUTANEOUS EVERY 30 MIN PRN
Status: DISCONTINUED | OUTPATIENT
Start: 2023-12-13 | End: 2023-12-13

## 2023-12-13 RX ORDER — HYDROMORPHONE HYDROCHLORIDE 1 MG/ML
0.4 INJECTION, SOLUTION INTRAMUSCULAR; INTRAVENOUS; SUBCUTANEOUS EVERY 2 HOUR PRN
Status: DISCONTINUED | OUTPATIENT
Start: 2023-12-13 | End: 2023-12-19

## 2023-12-13 RX ORDER — MELATONIN
3 NIGHTLY PRN
Status: DISCONTINUED | OUTPATIENT
Start: 2023-12-13 | End: 2024-01-02

## 2023-12-13 RX ORDER — ENEMA 19; 7 G/133ML; G/133ML
1 ENEMA RECTAL ONCE AS NEEDED
Status: DISCONTINUED | OUTPATIENT
Start: 2023-12-13 | End: 2024-01-02

## 2023-12-13 RX ORDER — HEPARIN SODIUM 5000 [USP'U]/ML
5000 INJECTION, SOLUTION INTRAVENOUS; SUBCUTANEOUS EVERY 8 HOURS SCHEDULED
Status: DISCONTINUED | OUTPATIENT
Start: 2023-12-13 | End: 2023-12-14

## 2023-12-13 RX ORDER — ONDANSETRON 2 MG/ML
4 INJECTION INTRAMUSCULAR; INTRAVENOUS EVERY 6 HOURS PRN
Status: DISCONTINUED | OUTPATIENT
Start: 2023-12-13 | End: 2023-12-13

## 2023-12-13 RX ORDER — HYDROMORPHONE HYDROCHLORIDE 1 MG/ML
0.8 INJECTION, SOLUTION INTRAMUSCULAR; INTRAVENOUS; SUBCUTANEOUS EVERY 2 HOUR PRN
Status: DISCONTINUED | OUTPATIENT
Start: 2023-12-13 | End: 2023-12-19

## 2023-12-13 RX ORDER — POLYETHYLENE GLYCOL 3350 17 G/17G
17 POWDER, FOR SOLUTION ORAL DAILY PRN
Status: DISCONTINUED | OUTPATIENT
Start: 2023-12-13 | End: 2024-01-02

## 2023-12-13 RX ORDER — SODIUM CHLORIDE 9 MG/ML
INJECTION, SOLUTION INTRAVENOUS CONTINUOUS
Status: DISCONTINUED | OUTPATIENT
Start: 2023-12-13 | End: 2023-12-14

## 2023-12-13 RX ORDER — PROCHLORPERAZINE EDISYLATE 5 MG/ML
5 INJECTION INTRAMUSCULAR; INTRAVENOUS EVERY 8 HOURS PRN
Status: DISCONTINUED | OUTPATIENT
Start: 2023-12-13 | End: 2024-01-02

## 2023-12-13 RX ORDER — ONDANSETRON 2 MG/ML
4 INJECTION INTRAMUSCULAR; INTRAVENOUS ONCE
Status: COMPLETED | OUTPATIENT
Start: 2023-12-13 | End: 2023-12-13

## 2023-12-13 RX ORDER — ONDANSETRON 2 MG/ML
4 INJECTION INTRAMUSCULAR; INTRAVENOUS EVERY 4 HOURS PRN
Status: DISCONTINUED | OUTPATIENT
Start: 2023-12-13 | End: 2023-12-13

## 2023-12-13 RX ORDER — METOCLOPRAMIDE HYDROCHLORIDE 5 MG/ML
10 INJECTION INTRAMUSCULAR; INTRAVENOUS ONCE
Status: COMPLETED | OUTPATIENT
Start: 2023-12-13 | End: 2023-12-13

## 2023-12-14 ENCOUNTER — APPOINTMENT (OUTPATIENT)
Dept: CT IMAGING | Facility: HOSPITAL | Age: 43
DRG: 987 | End: 2023-12-14
Attending: INTERNAL MEDICINE
Payer: COMMERCIAL

## 2023-12-14 PROBLEM — C50.919 MALIGNANT NEOPLASM OF FEMALE BREAST (HCC): Status: ACTIVE | Noted: 2023-12-14

## 2023-12-14 PROBLEM — M35.00 SJOGREN'S SYNDROME (HCC): Status: ACTIVE | Noted: 2023-12-14

## 2023-12-14 PROBLEM — J90 BILATERAL PLEURAL EFFUSION: Status: ACTIVE | Noted: 2023-12-14

## 2023-12-14 PROBLEM — M32.9 SLE (SYSTEMIC LUPUS ERYTHEMATOSUS RELATED SYNDROME) (HCC): Status: ACTIVE | Noted: 2023-12-14

## 2023-12-14 PROBLEM — G62.9 NEUROPATHY: Status: ACTIVE | Noted: 2023-12-14

## 2023-12-14 LAB
ADENOVIRUS PCR:: NOT DETECTED
ANION GAP SERPL CALC-SCNC: 12 MMOL/L (ref 0–18)
ANION GAP SERPL CALC-SCNC: 13 MMOL/L (ref 0–18)
ANION GAP SERPL CALC-SCNC: 15 MMOL/L (ref 0–18)
ANION GAP SERPL CALC-SCNC: 16 MMOL/L (ref 0–18)
ATRIAL RATE: 112 BPM
B PARAPERT DNA SPEC QL NAA+PROBE: NOT DETECTED
B PERT DNA SPEC QL NAA+PROBE: NOT DETECTED
BASOPHILS # BLD AUTO: 0.01 X10(3) UL (ref 0–0.2)
BASOPHILS NFR BLD AUTO: 0.1 %
BILIRUB UR QL STRIP.AUTO: NEGATIVE
BUN BLD-MCNC: 28 MG/DL (ref 9–23)
BUN BLD-MCNC: 29 MG/DL (ref 9–23)
BUN BLD-MCNC: 30 MG/DL (ref 9–23)
BUN BLD-MCNC: 30 MG/DL (ref 9–23)
C PNEUM DNA SPEC QL NAA+PROBE: NOT DETECTED
CALCIUM BLD-MCNC: 7.6 MG/DL (ref 8.5–10.1)
CALCIUM BLD-MCNC: 7.6 MG/DL (ref 8.5–10.1)
CALCIUM BLD-MCNC: 7.8 MG/DL (ref 8.5–10.1)
CALCIUM BLD-MCNC: 8 MG/DL (ref 8.5–10.1)
CHLORIDE SERPL-SCNC: 90 MMOL/L (ref 98–112)
CHLORIDE SERPL-SCNC: 92 MMOL/L (ref 98–112)
CLARITY UR REFRACT.AUTO: CLEAR
CO2 SERPL-SCNC: 11 MMOL/L (ref 21–32)
CO2 SERPL-SCNC: 12 MMOL/L (ref 21–32)
CO2 SERPL-SCNC: 16 MMOL/L (ref 21–32)
CO2 SERPL-SCNC: 17 MMOL/L (ref 21–32)
CORONAVIRUS 229E PCR:: NOT DETECTED
CORONAVIRUS HKU1 PCR:: NOT DETECTED
CORONAVIRUS NL63 PCR:: NOT DETECTED
CORONAVIRUS OC43 PCR:: NOT DETECTED
CREAT BLD-MCNC: 1.2 MG/DL
CREAT BLD-MCNC: 1.21 MG/DL
CREAT BLD-MCNC: 1.22 MG/DL
CREAT BLD-MCNC: 1.34 MG/DL
CRP SERPL-MCNC: 7.96 MG/DL (ref ?–0.3)
EGFRCR SERPLBLD CKD-EPI 2021: 50 ML/MIN/1.73M2 (ref 60–?)
EGFRCR SERPLBLD CKD-EPI 2021: 56 ML/MIN/1.73M2 (ref 60–?)
EGFRCR SERPLBLD CKD-EPI 2021: 57 ML/MIN/1.73M2 (ref 60–?)
EGFRCR SERPLBLD CKD-EPI 2021: 58 ML/MIN/1.73M2 (ref 60–?)
EOSINOPHIL # BLD AUTO: 0 X10(3) UL (ref 0–0.7)
EOSINOPHIL NFR BLD AUTO: 0 %
ERYTHROCYTE [DISTWIDTH] IN BLOOD BY AUTOMATED COUNT: 12.7 %
ERYTHROCYTE [SEDIMENTATION RATE] IN BLOOD: 23 MM/HR
FLUAV RNA SPEC QL NAA+PROBE: NOT DETECTED
FLUBV RNA SPEC QL NAA+PROBE: NOT DETECTED
GLUCOSE BLD-MCNC: 114 MG/DL (ref 70–99)
GLUCOSE BLD-MCNC: 124 MG/DL (ref 70–99)
GLUCOSE BLD-MCNC: 148 MG/DL (ref 70–99)
GLUCOSE BLD-MCNC: 99 MG/DL (ref 70–99)
GLUCOSE UR STRIP.AUTO-MCNC: NORMAL MG/DL
HCT VFR BLD AUTO: 24.6 %
HGB BLD-MCNC: 8.5 G/DL
IMM GRANULOCYTES # BLD AUTO: 0.08 X10(3) UL (ref 0–1)
IMM GRANULOCYTES NFR BLD: 1 %
KETONES UR STRIP.AUTO-MCNC: NEGATIVE MG/DL
LEUKOCYTE ESTERASE UR QL STRIP.AUTO: 250
LYMPHOCYTES # BLD AUTO: 0.55 X10(3) UL (ref 1–4)
LYMPHOCYTES NFR BLD AUTO: 6.7 %
MCH RBC QN AUTO: 28.9 PG (ref 26–34)
MCHC RBC AUTO-ENTMCNC: 34.6 G/DL (ref 31–37)
MCV RBC AUTO: 83.7 FL
METAPNEUMOVIRUS PCR:: NOT DETECTED
MONOCYTES # BLD AUTO: 0.46 X10(3) UL (ref 0.1–1)
MONOCYTES NFR BLD AUTO: 5.6 %
MRSA DNA SPEC QL NAA+PROBE: POSITIVE
MYCOPLASMA PNEUMONIA PCR:: NOT DETECTED
NEUTROPHILS # BLD AUTO: 7.17 X10 (3) UL (ref 1.5–7.7)
NEUTROPHILS # BLD AUTO: 7.17 X10(3) UL (ref 1.5–7.7)
NEUTROPHILS NFR BLD AUTO: 86.6 %
NITRITE UR QL STRIP.AUTO: NEGATIVE
OSMOLALITY SERPL CALC.SUM OF ELEC: 254 MOSM/KG (ref 275–295)
OSMOLALITY SERPL CALC.SUM OF ELEC: 256 MOSM/KG (ref 275–295)
OSMOLALITY SERPL CALC.SUM OF ELEC: 256 MOSM/KG (ref 275–295)
OSMOLALITY SERPL CALC.SUM OF ELEC: 259 MOSM/KG (ref 275–295)
P AXIS: 59 DEGREES
P-R INTERVAL: 164 MS
PARAINFLUENZA 1 PCR:: NOT DETECTED
PARAINFLUENZA 2 PCR:: NOT DETECTED
PARAINFLUENZA 3 PCR:: NOT DETECTED
PARAINFLUENZA 4 PCR:: NOT DETECTED
PH UR STRIP.AUTO: 6.5 [PH] (ref 5–8)
PLATELET # BLD AUTO: 188 10(3)UL (ref 150–450)
POTASSIUM SERPL-SCNC: 3.1 MMOL/L (ref 3.5–5.1)
POTASSIUM SERPL-SCNC: 3.4 MMOL/L (ref 3.5–5.1)
POTASSIUM SERPL-SCNC: 3.6 MMOL/L (ref 3.5–5.1)
POTASSIUM SERPL-SCNC: 4.2 MMOL/L (ref 3.5–5.1)
PROT UR STRIP.AUTO-MCNC: 100 MG/DL
Q-T INTERVAL: 374 MS
QRS DURATION: 82 MS
QTC CALCULATION (BEZET): 510 MS
R AXIS: 24 DEGREES
RBC # BLD AUTO: 2.94 X10(6)UL
RBC #/AREA URNS AUTO: >10 /HPF
RHINOVIRUS/ENTERO PCR:: NOT DETECTED
RSV RNA SPEC QL NAA+PROBE: NOT DETECTED
SARS-COV-2 RNA NPH QL NAA+NON-PROBE: NOT DETECTED
SODIUM SERPL-SCNC: 119 MMOL/L (ref 136–145)
SODIUM SERPL-SCNC: 119 MMOL/L (ref 136–145)
SODIUM SERPL-SCNC: 120 MMOL/L (ref 136–145)
SODIUM SERPL-SCNC: 120 MMOL/L (ref 136–145)
SP GR UR STRIP.AUTO: >1.03 (ref 1–1.03)
T AXIS: 28 DEGREES
URATE SERPL-MCNC: 6.5 MG/DL
UROBILINOGEN UR STRIP.AUTO-MCNC: NORMAL MG/DL
VENTRICULAR RATE: 112 BPM
WBC # BLD AUTO: 8.3 X10(3) UL (ref 4–11)

## 2023-12-14 PROCEDURE — 99223 1ST HOSP IP/OBS HIGH 75: CPT | Performed by: INTERNAL MEDICINE

## 2023-12-14 PROCEDURE — 99254 IP/OBS CNSLTJ NEW/EST MOD 60: CPT | Performed by: INTERNAL MEDICINE

## 2023-12-14 PROCEDURE — 99233 SBSQ HOSP IP/OBS HIGH 50: CPT | Performed by: INTERNAL MEDICINE

## 2023-12-14 PROCEDURE — 71275 CT ANGIOGRAPHY CHEST: CPT | Performed by: INTERNAL MEDICINE

## 2023-12-14 RX ORDER — POTASSIUM CHLORIDE 20 MEQ/1
40 TABLET, EXTENDED RELEASE ORAL ONCE
Status: DISCONTINUED | OUTPATIENT
Start: 2023-12-14 | End: 2023-12-14

## 2023-12-14 RX ORDER — ALPRAZOLAM 0.25 MG/1
0.25 TABLET ORAL 3 TIMES DAILY PRN
Status: DISCONTINUED | OUTPATIENT
Start: 2023-12-14 | End: 2024-01-02

## 2023-12-14 RX ORDER — METOPROLOL TARTRATE 1 MG/ML
5 INJECTION, SOLUTION INTRAVENOUS EVERY 6 HOURS PRN
Status: DISCONTINUED | OUTPATIENT
Start: 2023-12-14 | End: 2024-01-02

## 2023-12-14 RX ORDER — LORAZEPAM 2 MG/ML
1 INJECTION INTRAMUSCULAR ONCE
Status: COMPLETED | OUTPATIENT
Start: 2023-12-14 | End: 2023-12-14

## 2023-12-14 RX ORDER — FUROSEMIDE 10 MG/ML
20 INJECTION INTRAMUSCULAR; INTRAVENOUS ONCE
Status: COMPLETED | OUTPATIENT
Start: 2023-12-14 | End: 2023-12-14

## 2023-12-14 RX ORDER — PANTOPRAZOLE SODIUM 40 MG/1
40 TABLET, DELAYED RELEASE ORAL
Status: DISCONTINUED | OUTPATIENT
Start: 2023-12-14 | End: 2024-01-02

## 2023-12-14 RX ORDER — HALOPERIDOL 5 MG/ML
2 INJECTION INTRAMUSCULAR ONCE
Status: COMPLETED | OUTPATIENT
Start: 2023-12-14 | End: 2023-12-14

## 2023-12-14 RX ORDER — LORAZEPAM 0.5 MG/1
0.5 TABLET ORAL ONCE
Status: COMPLETED | OUTPATIENT
Start: 2023-12-14 | End: 2023-12-14

## 2023-12-14 RX ORDER — IPRATROPIUM BROMIDE AND ALBUTEROL SULFATE 2.5; .5 MG/3ML; MG/3ML
3 SOLUTION RESPIRATORY (INHALATION) EVERY 4 HOURS PRN
Status: DISCONTINUED | OUTPATIENT
Start: 2023-12-14 | End: 2024-01-02

## 2023-12-14 RX ORDER — IOHEXOL 350 MG/ML
100 INJECTION, SOLUTION INTRAVENOUS
Status: COMPLETED | OUTPATIENT
Start: 2023-12-14 | End: 2023-12-14

## 2023-12-14 RX ORDER — LORAZEPAM 2 MG/ML
0.5 INJECTION INTRAMUSCULAR EVERY 6 HOURS PRN
Status: DISCONTINUED | OUTPATIENT
Start: 2023-12-14 | End: 2024-01-02

## 2023-12-14 NOTE — PROGRESS NOTES
[facility]     Hospitalist Progress Note     Alina Aceves Patient Status:  Inpatient    1980 MRN [id number]   Location [facility] 4NW-A Attending Tosha Waite MD   Hosp Day # 1 PCP HOLLY IBARRA     Chief Complaint: Intractable nausea vomiting, diarrhea, generalized weakness.  Recent pneumonia.     Subjective:     Patient complains of shortness of breath.  Afebrile.  Tachycardic.  Denies any chest pain.  Complains of anxiety.  Last episode of nausea vomiting yesterday according to patient.  Last episode of diarrhea 2 days back according to patient.    Objective:    Review of Systems:   A comprehensive review of systems was completed; pertinent positive and negatives stated in subjective.    Vital signs:  Temp:  [96.9 °F (36.1 °C)-98.1 °F (36.7 °C)] 97.4 °F (36.3 °C)  Pulse:  [108-130] 124  Resp:  [22-34] 24  BP: (138-152)/() 146/107  SpO2:  [90 %-99 %] 94 %    Physical Exam:    BP (!) 146/107 (BP Location: Left arm)   Pulse (!) 124   Temp 97.4 °F (36.3 °C) (Oral)   Resp 24   Ht 5' 4\" (1.626 m)   Wt 124 lb (56.2 kg)   LMP 2023 (Approximate)   SpO2 94%   BMI 21.28 kg/m²     General: No acute distress  Respiratory: Bronchial breath sounds on the right, decreased breath sounds at bases bilateral  Cardiovascular: S1, S2, regular rate and rhythm, tachycardic  Abdomen: Soft, Non-tender, non-distended, positive bowel sounds  Neuro: Awake alert, no new focal deficits.   Extremities: no pedal edema or calf tenderness      Diagnostic Data:    Labs:  Recent Labs   Lab 23  0631   WBC 6.3 8.3   HGB 7.9* 8.5*   MCV 83.8 83.7   .0 188.0       Recent Labs   Lab 23  0631 23  1032   * 99 114*   BUN 28* 29* 28*   CREATSERUM 1.36* 1.21* 1.34*   CA 8.2* 7.6* 7.6*   ALB 3.1*  --   --    * 120* 119*   K 3.9 3.4* 4.2   CL 89* 92* 92*   CO2 15.0* 16.0* 11.0*   ALKPHO 97  --   --    AST 40*  --   --    ALT 36  --   --    BILT 0.9  --    --    TP 6.6  --   --        Estimated Creatinine Clearance: 46.7 mL/min (A) (based on SCr of 1.34 mg/dL (H)).    Microbiology    No results found for this visit on 12/13/23.  MRSA PCR nares positive on 12/14/2023    Imaging: Reviewed in Epic.  Chest x-ray done on 12/13/2023 with dense patchy airspace consolidation opacities present within the lungs suspicious for areas of pneumonia  CTA chest done on 12/14/2023 early a.m. shows moderate to large bilateral pleural effusion along with marked consolidation scattered throughout the lungs suggestive of pulmonary edema and fluid overload.  No evidence of pulm embolus.  Moderate large left axillary and left breast fluid collections noted.  Minimal gas in the left most central breast fluid collection, possibility of infection is a consideration.  Sequelae of seroma chronic hematoma is a consideration as well.    Medications:    potassium chloride  40 mEq Intravenous Once    cefTRIAXone  1 g Intravenous Q24H    DULoxetine  30 mg Oral Daily    doxycycline  100 mg Intravenous Q12H       Assessment & Plan:      # Multifocal pneumonia with bilateral large bilateral pleural effusion with tachycardia, tachypnea, also rule out malignancy due to patient's locally advanced stage IIIb breast cancer   Pulmonary and ID on consult   May need thoracentesis   IV antibiotics-currently on IV Rocephin and IV doxycycline   Status post IV Lasix x 1 dose for the bilateral pleural effusion   MRSA nares came back positive,   ID on consult    # Acute hypoxic respiratory failure due to above   -On oxygen via nasal cannula   -Pulmonary on consult    # Locally advanced stage IIIb breast cancer status post outpatient neoadjuvant chemo and history of left breast lumpectomy and lymph node dissection on 11/16/2023   Oncology, ID and pulmonary on consult    # Hyponatremia due to pneumonia and also hypovolemic due to nausea vomiting and diarrhea at home and may also due to malignancy, rule out SIADH also  due to malignancy   Nephrology consult, on bicarb drip per nephrology   Follow BMP    # Hypokalemia   Being replaced with electrolyte protocol    # Metabolic acidosis, acute kidney injury   On IV fluids bicarb drip and IV antibiotics   Lactic acid normal on 12/13/2023 at 1.5   Nephrology consult appreciated   On chart review patient's creatinine was 0.84 on 11/13/2023, 1.36 on 12/13/2023 on admission.    # History of lupus, history of Sjogren's disease  -Takes azathioprine at home which was held at this time due to multifocal pneumonia    # Gastritis  # Nausea vomiting and diarrhea at home possibly due to effect of chemo  # Anxiety  -Nausea vomiting and diarrhea improved.  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  Patient's daughter also reported that in 2015 patient had a similar episode where patient had fluid buildup in her lungs but that was reportedly due to lupus at that time, will also consult rheumatologist    PARISAREGULO Daughter   [phone]     Discussed with RN, will have low threshold to transfer to ICU if continues to deteriorate    Tosha Waite MD    Supplementary Documentation:     Quality:  DVT Mechanical Prophylaxis:     Early ambuation  DVT Pharmacologic Prophylaxis   Medication   None                Code Status: Prior  Peacock: No urinary catheter in place  Peacock Duration (in days):   Central line:    CHRISTELLE:     Discharge is dependent on: Clinical progress  At this point Ms. Aceves is expected to be discharge to: To be decided    The 21st Century Cures Act  makes medical notes like these available to patients in the interest of transparency. Please be advised this is a medical document. Medical documents are intended to carry relevant information, facts as evident, and the clinical opinion of the practitioner. The medical note is intended as peer to peer communication and may appear blunt or direct. It is written in medical language and may contain abbreviations or verbiage that are unfamiliar.

## 2023-12-14 NOTE — PLAN OF CARE
Assumed pt care at 2300.  Pt is A&Ox4, following commands.   Pt on 4 L nasal canula, VSS.  ST on tele  Pt denies pain.  Pt max assist.   Pt on regular diet. Tolerating diet.   R forearm proximal saline locked  Rt forearm distal NS at 50 ml/hr  Patient resting comfortably, all needs are met at this time  Bed in lowest position, call light in reach.

## 2023-12-14 NOTE — CDS QUERY
CLINICAL DOCUMENTATION CLARIFICATION      Dear Dr. Waite   Clinical information (provided below) indicates serum creatinine level changes and documentation of REJI. Based on your judgment and the clinical indicators below, please clarify the following:   (   ) Acute Kidney Injury is ruled out   (  * ) Acute Kidney Injury is confirmed (Please provide additional information supportive of the diagnosis):_______ refer to my note and nephrology note from today _______________________________________  (   ) Other: _________________________________________________  _________________________________________________________________________________  Risk Factors  history of lupus, Sjogren's, connective tissue disease diagnosed with invasive ductal breast cancer who is undergoing chemotherapy  Clinical indicators:   ER note - diagnosed with invasive ductal breast cancer earlier this year, underwent neoadjuvant chemo May through October, left mastectomy lymph node November 16, awaiting  restart of chemo late December. Now with loose stools, nausea, vomiting, diagnosed with pneumonia 2 days ago, unable to hold on antibiotics, worsening neuropathies, feeling weak and comes in for help.  H&P - REJI. NAGMA. IVF   Treatment:   sepsis bolus in ED, NS at 50/hr       The current consensus-based authoritative criteria for acute kidney injury (REJI) comes from the National Kidney Foundation KDIGO conference definition.   KDIGO defines REJI (applicable to both adult or pediatric patients) as any of the following:    Increase in creatinine level to ? 1.5x baseline (historical or measured), which is known or presumed to have occurred within the prior 7 days; or  Increase (not a decrease) in creatinine of ? 0.3 mg/dL comparing two separate levels, the second done within 48 hours or less of the first; or  Urine output < 0.5 ml/kg/hr for 6 hours    The KDIGO criteria apply to patients with and without CKD.     When baseline creatinine is unknown,  KDIGO advises: “The lowest SCr [Creatinine level] obtained during a hospitalization is usually equal to or greater than the baseline. This SCr should be used to diagnose (and stage) REJI.”    For questions regarding this query, please contact Clinical Documentation :  Sonam Staton RN/ [phone] or email [email or web address]  Thank you

## 2023-12-14 NOTE — CONSULTS
[facility]  Report of Consultation    Alina Aceves Patient Status:  Inpatient    1980 MRN [id number]   Location [facility] 4NW-A Attending Tosha Waite MD   Hosp Day # 1 PCP HOLLY IBARRA     Reason for Consultation:  Hyponatremia      History of Present Illness:  Alina Aceves is a a(n) 43 year old female with hx of breast Ca, lupus, who presented to hospital with n/v/d/weakness.  Symptoms ongoing since past Monday  Denies any f/c  +sob   + cough  Not eating much    On admit labs noted to have Na 120; CO2 15; Cr 1.36  Has been saline @ 50/hr    History:  Past Medical History:   Diagnosis Date    Breast cancer (HCC)     Gastritis     Lupus (HCC)     Sjogren's disease (HCC)      Past Surgical History:   Procedure Laterality Date    BREAST SURGERY Left      DELIVERY ONLY       History reviewed. No pertinent family history.   reports that she quit smoking about 13 years ago. Her smoking use included cigarettes. She has never used smokeless tobacco. She reports that she does not currently use alcohol. She reports that she does not use drugs.    Allergies:  Allergies   Allergen Reactions    Bactrim [Sulfamethoxazole W/Trimethoprim] RASH       Current Medications:    Current Facility-Administered Medications:     potassium chloride 40 mEq in 250mL sodium chloride 0.9% IVPB premix, 40 mEq, Intravenous, Once    HYDROmorphone (Dilaudid) 1 MG/ML injection 0.5 mg, 0.5 mg, Intravenous, Q30 Min PRN    cefTRIAXone (Rocephin) 1 g in D5W 100 mL IVPB-ADD, 1 g, Intravenous, Q24H    DULoxetine (Cymbalta) DR cap 30 mg, 30 mg, Oral, Daily    acetaminophen (Tylenol Extra Strength) tab 1,000 mg, 1,000 mg, Oral, Q8H PRN    melatonin tab 3 mg, 3 mg, Oral, Nightly PRN    sodium chloride 0.9% infusion, , Intravenous, Continuous    heparin (Porcine) 5000 UNIT/ML injection 5,000 Units, 5,000 Units, Subcutaneous, Q8H KHALIDA    prochlorperazine (Compazine) 10 MG/2ML injection 5 mg, 5 mg, Intravenous, Q8H PRN     polyethylene glycol (PEG 3350) (Miralax) 17 g oral packet 17 g, 17 g, Oral, Daily PRN    sennosides (Senokot) tab 17.2 mg, 17.2 mg, Oral, Nightly PRN    bisacodyl (Dulcolax) 10 MG rectal suppository 10 mg, 10 mg, Rectal, Daily PRN    fleet enema (Fleet) 7-19 GM/118ML rectal enema 133 mL, 1 enema, Rectal, Once PRN    HYDROmorphone (Dilaudid) 1 MG/ML injection 0.2 mg, 0.2 mg, Intravenous, Q2H PRN **OR** HYDROmorphone (Dilaudid) 1 MG/ML injection 0.4 mg, 0.4 mg, Intravenous, Q2H PRN **OR** HYDROmorphone (Dilaudid) 1 MG/ML injection 0.8 mg, 0.8 mg, Intravenous, Q2H PRN    HYDROmorphone (Dilaudid) 1 MG/ML injection 0.5 mg, 0.5 mg, Intravenous, Q30 Min PRN    doxycycline hyclate (Vibramycin) 100 mg in sodium chloride 0.9% 100 mL IVPB, 100 mg, Intravenous, Q12H  Home Medications:  No current outpatient medications on file.       Review of Systems:  See HPI; A total of 12 systems reviewed and otherwise unremarkable.    Physical Exam:  Vital signs: Blood pressure (!) 150/103, pulse 118, temperature 96.9 °F (36.1 °C), temperature source Temporal, resp. rate 24, height 5' 4\" (1.626 m), weight 124 lb (56.2 kg), last menstrual period 08/28/2023, SpO2 96%, currently breastfeeding.  General: No acute distress. Alert and oriented x 3.  HEENT: Moist mucous membranes. EOM-I. PERRL  Neck: No lymphadenopathy.  No JVD. No carotid bruits.  Respiratory: decreased breath sounds B  Cardiovascular: S1, S2.  Regular rate and rhythm.  No murmurs. Equal pulses   Abdomen: Soft, nontender, nondistended.  Positive bowel sounds. No rebound tenderness   Neurologic: No focal neurological deficits.   Musculoskeletal: Full range of motion of all extremities. No edema   Integument: No lesions. No erythema.  Psychiatric: Appropriate mood and affect.    Laboratory:  Lab Results   Component Value Date    WBC 8.3 12/14/2023    HGB 8.5 12/14/2023    HCT 24.6 12/14/2023    .0 12/14/2023    CREATSERUM 1.21 12/14/2023    BUN 29 12/14/2023      12/14/2023    K 3.4 12/14/2023    CL 92 12/14/2023    CO2 16.0 12/14/2023    GLU 99 12/14/2023    CA 7.6 12/14/2023    ALB 3.1 12/13/2023    ALKPHO 97 12/13/2023    BILT 0.9 12/13/2023    TP 6.6 12/13/2023    AST 40 12/13/2023    ALT 36 12/13/2023    LIP 15 12/13/2023     Lab Results   Component Value Date    COLORUR Light-Yellow 12/14/2023    CLARITY Clear 12/14/2023    SPECGRAVITY >1.030 12/14/2023    GLUUR Normal 12/14/2023    BILUR Negative 12/14/2023    KETUR Negative 12/14/2023    BLOODURINE 3+ 12/14/2023    PHURINE 6.5 12/14/2023    PROUR 100 12/14/2023    UROBILINOGEN Normal 12/14/2023    NITRITE Negative 12/14/2023    LEUUR 250 12/14/2023       BUN (mg/dL)   Date Value   12/14/2023 29 (H)   12/13/2023 28 (H)   11/13/2023 15     Creatinine (mg/dL)   Date Value   12/14/2023 1.21 (H)   12/13/2023 1.36 (H)   11/13/2023 0.84         Imaging:  Reviewed  CTA - moderate to large bilateral pleural effusions ; no PE; mod to large L axillary and L breast fluid collections     Impression:  REJI- related to pre-renal azotemia most likely due to decreased renal perfusion.   - monitor labs; check urine studies  - on saline currently  Hyponatremia- patient appears volume replete for the most part. She does have poor solute intake over the past few days as noted. Concern for SIADH given her hx of breast cancer +/-  PNA. Additionally, patient is on SSRI  - fluid restrict  - encourage solute intake  - will give dose of lasix IV to help excrete free water; will consider tolvaptan pending further lab review   - check bmp q 6 hrs  Breast Ca  N/V/D- unclear etiology- possibly related to the chemo; symptomatic management  Acidosis- pt reports loose stools; will adjust fluids   Hx of SLE/Sjogrens      Thank you for allowing me to participate in this patient's care.  Please feel free to call me with any questions or concerns.    Darryn Potter MD  12/14/2023

## 2023-12-14 NOTE — ED INITIAL ASSESSMENT (HPI)
Patient went to UC Monday, dx with pnu started on abx. Having increased n/v, weakness, and worsening neuropathy in bilateral legs. -fever, +diarrhea since Saturday. Hx of breast CA, last round of chemo Oct 5.

## 2023-12-14 NOTE — CONSULTS
HISTORY OF PRESENT ILLNESS     Alina Aceves is a 43 year old female with a history of breast cancer and SLE who started feeling sick around  with chills, headache, sore throat, cough, dyspnea. She presented to [facility] urgent [facility] on  with these symptoms; she tested negative for COVID, flu, and RSV but was diagnosed with left lower lobe pneumonia and was discharged home on po augmentin and azithromycin. She continued to feel ill so came to the ER last night. CTA chest was done and was negative for PE but showed bilateral consolidation and bilateral effusions. She was admitted and we've been consulted to assist in her care.     PAST MEDICAL HISTORY     Past Medical History:   Diagnosis Date    Breast cancer (HCC)     Gastritis     Lupus (HCC)     Sjogren's disease (HCC)        PAST SURGICAL HISTORY      Past Surgical History:   Procedure Laterality Date    BREAST SURGERY Left      DELIVERY ONLY         HOME MEDICATIONS      Prior to Admission Medications   Prescriptions Last Dose Informant Patient Reported? Taking?   DULoxetine 30 MG Oral Cap DR Particles Past Week  Yes Yes   Sig: Take 1 capsule (30 mg total) by mouth daily.   HYDROcodone-acetaminophen 5-325 MG Oral Tab Not Taking  Yes No   Sig: Take 1 tablet by mouth every 8 (eight) hours as needed.   Patient not taking: Reported on 2023   Potassium Citrate ER 15 MEQ (1620 MG) Oral Tab CR Past Week  Yes Yes   Sig: Take 1 tablet by mouth in the morning, at noon, and at bedtime.   Prenatal Vit-DSS-Fe Cbn-FA (PRENATAL AD OR)   Yes No   Sig: Take 1 tablet by mouth daily.   azaTHIOprine (IMURAN OR)   Yes No   Sig: Take by mouth.   Patient not taking: No sig reported   lidocaine-prilocaine 2.5-2.5 % External Cream More than a month  Yes No   Sig: APPLY SMALL AMOUNT OVER PORT PRIOR TO ACCESS   ondansetron (ZOFRAN) 8 MG tablet Past Month  Yes Yes   Sig: Take 1 tablet (8 mg total) by mouth as needed.   zolpidem 10 MG Oral Tab Past Week  Yes Yes    Sig: Take 1 tablet (10 mg total) by mouth nightly as needed for Sleep.      Facility-Administered Medications: None       CURRENT MEDICATIONS       potassium chloride  40 mEq Intravenous Once    cefTRIAXone  1 g Intravenous Q24H    DULoxetine  30 mg Oral Daily    heparin  5,000 Units Subcutaneous Q8H KHALIDA    doxycycline  100 mg Intravenous Q12H       PRN meds:  HYDROmorphone, acetaminophen, melatonin, prochlorperazine, polyethylene glycol (PEG 3350), sennosides, bisacodyl, fleet enema, HYDROmorphone **OR** HYDROmorphone **OR** HYDROmorphone, HYDROmorphone    Infusions:   sodium chloride 50 mL/hr at 23 0148         ALLERGIES      Allergies   Allergen Reactions    Bactrim [Sulfamethoxazole W/Trimethoprim] RASH       SOCIAL HISTORY        Social History     Socioeconomic History    Marital status:      Spouse name: Not on file    Number of children: Not on file    Years of education: Not on file    Highest education level: Not on file   Occupational History    Not on file   Tobacco Use    Smoking status: Former     Types: Cigarettes     Quit date:      Years since quittin.9    Smokeless tobacco: Never    Tobacco comments:     social smoker   Vaping Use    Vaping Use: Never used   Substance and Sexual Activity    Alcohol use: Not Currently     Comment: occassional    Drug use: Never    Sexual activity: Not on file   Other Topics Concern    Not on file   Social History Narrative    Not on file     Social Determinants of Health     Financial Resource Strain: Not on file   Food Insecurity: No Food Insecurity (2023)    Food Insecurity     Food Insecurity: Never true   Transportation Needs: No Transportation Needs (2023)    Transportation Needs     Lack of Transportation: No   Physical Activity: Not on file   Stress: Not on file   Social Connections: Not on file   Housing Stability: Medium Risk (2023)    Housing Stability     Housing Instability: Yes     Housing Instability  Emergency: Not on file       FAMILY HISTORY      History reviewed. No pertinent family history.    REVIEW OF SYSTEMS      10 pt ROS negative except what is mentioned in HPI    OBJECTIVE      Vitals:    12/14/23 0108 12/14/23 0114 12/14/23 0600 12/14/23 0640   BP: (!) 147/112 (!) 143/98 (!) 150/103    BP Location: Right arm Right arm Right arm    Pulse: 120 (!) 124 118    Resp: 22 22 24    Temp: 97.8 °F (36.6 °C)   96.9 °F (36.1 °C)   TempSrc: Oral   Temporal   SpO2: 93% 90% 96%    Weight:       Height:         O2: 4LPM    Gen - Alert, oriented x 3, in no apparent distress  HEENT - head normocephalic, atraumatic. Eyes-no scleral icterus or injection  Neck - supple, no palpable lymphadenopathy.   Lungs - diminished breath sounds b/l lung bases.   CV - tachycardic, no murmurs  Abdomen - soft, nontender to palpation. No organomegaly appreciated   Extremities - No cyanosis, clubbing, edema appreciated.      Labs:    Recent Labs   Lab 12/13/23 1937 12/14/23  0631   WBC 6.3 8.3   HGB 7.9* 8.5*   .0 188.0     Recent Labs   Lab 12/13/23 1938 12/13/23 2010 12/14/23  0631   *  --  120*   K 3.9  --  3.4*   CL 89*  --  92*   CO2 15.0*  --  16.0*   BUN 28*  --  29*   CREATSERUM 1.36*  --  1.21*   *  --  99   ANIONGAP 16  --  12   ALB 3.1*  --   --    CA 8.2*  --  7.6*   ALKPHO 97  --   --    AST 40*  --   --    ALT 36  --   --    BILT 0.9  --   --    TP 6.6  --   --    LIP 15  --   --    LACTI  --  1.5  --      Recent Labs   Lab 12/13/23 1938   PCT 0.13     Recent Labs   Lab 12/14/23  0032   COVID19 Not Detected   INFAPCR Not Detected   INFBPCR Not Detected       Urinalysis:  Recent Labs   Lab 12/14/23  0358   COLORUR Light-Yellow   CLARITY Clear   SPECGRAVITY >1.030*   PHURINE 6.5   PROUR 100*   KETUR Negative   GLUUR Normal   BILUR Negative   BLOODURINE 3+*   NITRITE Negative   UROBILINOGEN Normal   LEUUR 250*   EPIUR Few*   WBCUR 6-10*   BACUR Rare*        Imaging reviewed.    ASSESSMENT AND PLAN      Acute hypoxemic respiratory failure - secondary to pneumonia vs pneumonitis as well as bilateral pleural effusions. CTA chest was negative for PE but shows bilateral infiltrates and bilateral effusions  -continue supplemental oxygen, wean as able  -antibiotics as below  -workup of effusions as below  Pneumonia - viral respiratory panel was negative. PCT was negative, but cannot r/o atypical pathogens  -continue empiric antibiotics : ceftriaxone, doxycycline  -follow up cultures  -check strep and legionella urine ag   -if no improvement, could consider bronchoscopy and/or empiric steroids  -will need follow up imaging in 4-6 weeks to ensure improvement in imaging abnormaltieis.  Pleural effusions - bilateral, ddx includes parapneumonic effusions, CHF, effusions related to SLE, and malignant pleural effusions  -ultrasound guided thoracentesis for diagnostic/therapeutic purposes - send for culture, cytology, cell count/diff, LDH, protein, glucose, cholesterol  SLE  -per hospitalist and outpatient rheumatology  Hyponatremia, non-anion gap acidosis  -renal consulted  Breast cancer  -per heme/onc  Proph   -hold subcutaneous heparin for thoracentesis.  Dispo   -full code  -inpatient     We will continue to follow with you       Johan Zamora M.D.  Pulmonary/Critical Care

## 2023-12-14 NOTE — ED PROVIDER NOTES
Patient Seen in: [facility] Emergency Department      History     Chief Complaint   Patient presents with    Nausea/Vomiting/Diarrhea     Stated Complaint: has pneumonia, has been vomiting unable to keep medications or fluids down    Subjective:   HPI    Patient with history of lupus, Sjogren's, connective tissue disease diagnosed with invasive ductal breast cancer earlier this year, underwent neoadjuvant chemo May through October, left mastectomy lymph node , awaiting restart of chemo late December.  Now over the past few days started to feel nauseated, started to have vomiting, weakness, has neuropathy in her legs ever since chemo, but this seems to be worse over the last few days as well.  Worse vomiting in the last 2 days, had loose stools over the weekend.  She was diagnosed with pneumonia 2 days ago but has not been able to hold down the antibiotics.  Patient's blood pressure has also been somewhat labile here, she says that when she does not sleep her blood pressure tends to be high.  It generally normalizes on its own.  She is also in significant pain from the neuropathy          Objective:   Past Medical History:   Diagnosis Date    BRCA gene mutation negative in female 2023    Negative 84 gene hereditary cancer panel, report in media tab    Breast cancer (HCC)     Connective tissue disease (HCC)     Gastritis     Lupus (HCC)     Sjogren's disease (HCC)     Vasculitis (HCC)               Past Surgical History:   Procedure Laterality Date    BREAST SURGERY Left      DELIVERY ONLY                  Social History     Socioeconomic History    Marital status:    Tobacco Use    Smoking status: Former     Types: Cigarettes     Quit date:      Years since quittin.9    Smokeless tobacco: Never    Tobacco comments:     social smoker   Vaping Use    Vaping Use: Never used   Substance and Sexual Activity    Alcohol use: Not Currently     Comment: occassional    Drug use:  Never              Review of Systems    Positive for stated complaint: has pneumonia, has been vomiting unable to keep medications or fluids down  Other systems are as noted in HPI.  Constitutional and vital signs reviewed.      All other systems reviewed and negative except as noted above.    Physical Exam     ED Triage Vitals [12/13/23 1823]   BP (!) 148/95   Pulse 119   Resp (!) 30   Temp 98.1 °F (36.7 °C)   Temp src Oral   SpO2 91 %   O2 Device None (Room air)       Current:BP (!) 149/110   Pulse 114   Temp 97.3 °F (36.3 °C) (Temporal)   Resp 22   Ht 162.6 cm (5' 4\")   Wt 56.7 kg   LMP 08/28/2023 (Approximate)   SpO2 95%   BMI 21.46 kg/m²         Physical Exam    General: Well-developed, well-nourished, comfortable, no acute distress except tired appearing  Head and neck: Normocephalic, atraumatic, oropharynx dry; patient has a facial rash that she has had over the past several weeks ever since chemo  Cardiovascular: Regular rate and rhythm, normal S1 and S2, no obvious murmurs, rubs, or gallops   Lungs: Clear to auscultation bilaterally with equal breath sounds, no wheezing, no rhonchi   Abdomen: Positive bowel sounds,  soft, no tenderness, no distension, no rebound, no guarding   Extremities: No cyanosis, no clubbing, no edema   Musculoskeletal: No tenderness, swelling, deformity   Skin: Left axilla a seroma in place, no sign of infection  Neuro: Grossly intact to patient's baseline  ED Course     Labs Reviewed   COMP METABOLIC PANEL (14) - Abnormal; Notable for the following components:       Result Value    Glucose 106 (*)     Sodium 120 (*)     Chloride 89 (*)     CO2 15.0 (*)     BUN 28 (*)     Creatinine 1.36 (*)     Calcium, Total 8.2 (*)     Calculated Osmolality 256 (*)     eGFR-Cr 50 (*)     AST 40 (*)     Albumin 3.1 (*)     A/G Ratio 0.9 (*)     All other components within normal limits   CBC W/ DIFFERENTIAL - Abnormal; Notable for the following components:    RBC 2.72 (*)     HGB 7.9 (*)      HCT 22.8 (*)     Lymphocyte Absolute 0.35 (*)     All other components within normal limits   LIPASE - Normal   LACTIC ACID, PLASMA - Normal   CBC WITH DIFFERENTIAL WITH PLATELET    Narrative:     The following orders were created for panel order CBC With Differential With Platelet.  Procedure                               Abnormality         Status                     ---------                               -----------         ------                     CBC W/ DIFFERENTIAL[[id number]]          Abnormal            Final result                 Please view results for these tests on the individual orders.   URINALYSIS WITH CULTURE REFLEX   PROCALCITONIN   ABORH (BLOOD TYPE)   PREPARE RBC   ANTIBODY SCREEN   RAINBOW DRAW LAVENDER   RAINBOW DRAW LIGHT GREEN   RAINBOW DRAW BLUE   BLOOD CULTURE   BLOOD CULTURE     EKG    Rate, intervals and axes as noted on EKG Report.  Rate: 112  Rhythm: Sinus Rhythm  Reading: Sinus tachycardia, no acute process         Differential diagnosis includes pneumonia, sepsis, dehydration, electrolyte abnormality, among other processes        Chest x-ray images reviewed by myself show bilateral infiltrates.  Radiology read concurs, mentions dense patchy airspace opacities bilaterally suspicious for pneumonia         MDM      Patient with history of lupus, Sjogren's, connective tissue disease diagnosed with invasive ductal breast cancer earlier this year, underwent neoadjuvant chemo May through October, left mastectomy lymph node November 16, awaiting restart of chemo late December.  Now with loose stools, nausea, vomiting, diagnosed with pneumonia 2 days ago, unable to hold on antibiotics, worsening neuropathies, feeling weak and comes in for help.  Chest x-ray confirms bilateral pneumonia.  Patient given IV Rocephin and initially Zithromax ordered but then switched to Doxy because of patient's QT  Blood work shows sodium of 120, chloride 89, CO2 15, BUN/creatinine 28 and 1.36, GFR 50 down  from 88 in mid November.  Patient's hemoglobin has also dropped to 7.9 from 9.6 in mid November.  Type and screen done, 1 unit ordered for type and cross and should be started shortly.  Patient given saline bolus to help with dehydration and hyponatremia.  Her lactic acid is normal.  Blood cultures sent.  Patient's D-dimer is still pending.  Patient was given IV fluids, Dilaudid, antibiotics, after which heart rate has decreased from 120s 130s range down to the 110s.  Blood pressure down to 140s over 90s.    Patient will be admitted.  Case discussed with Dr. Carroll    Admission disposition: 12/13/2023  9:04 PM                                           Medical Decision Making      Disposition and Plan     Clinical Impression:  1. Community acquired pneumonia, unspecified laterality    2. Hyponatremia    3. Weakness generalized    4. Anemia, unspecified type         Disposition:  Admit  12/13/2023  9:04 pm    Follow-up:  No follow-up provider specified.        Medications Prescribed:  Current Discharge Medication List                            Hospital Problems       Present on Admission  Date Reviewed: 12/16/2021            ICD-10-CM Noted POA    * (Principal) Community acquired pneumonia, unspecified laterality J18.9 12/13/2023 Unknown    Anemia D64.9 12/13/2023 Yes    Metabolic acidosis E87.20 12/13/2023 Yes

## 2023-12-14 NOTE — PHYSICAL THERAPY NOTE
PHYSICAL THERAPY EVALUATION - INPATIENT     Room Number: 431/431-A  Evaluation Date: 12/14/2023  Type of Evaluation: Initial  Physician Order: PT Eval and Treat    Presenting Problem: PNA  Co-Morbidities : breast cancer who is undergoing chemotherapy  Reason for Therapy: Mobility Dysfunction and Discharge Planning    History related to current admission: Patient is a 43 year old female admitted on 12/13/2023 : Presented with SOB and possible PNA, fall noted 12/14 described as BLE buckling       ASSESSMENT   In this PT evaluation, the patient presents with the following impairments 1) HR elevated in 130s at rest did not significantly elevate during therapy and remained ~ 134 throughout session, 2) Reporting SOB however O2 sats remained >90% on 4L .3) Significant decline in activity tolerance and strength - pt normally indep with ADLs and ambulation - requiring min - mod A for all functional mobility and only able to tolerate 3ft x2 with RW.   These impairments and comorbidities manifest themselves as functional limitations in independent bed mobility, transfers, and gait.  The patient is below baseline and would benefit from skilled inpatient PT to address the above deficits to assist patient in returning to prior to level of function.   Functional outcome measures completed include [facility].  The AM-PAC '6-Clicks' Inpatient Basic Mobility Short Form was completed and this patient is demonstrating a Approx Degree of Impairment: 54.16%  degree of impairment in mobility.  Secure messaged RN - recommend to commode and chair for mobility with RW and gait belt   DISCHARGE RECOMMENDATIONS  PT Discharge Recommendations: Undetermined    PLAN  PT Treatment Plan: Bed mobility;Body mechanics;Coordination;Endurance;Energy conservation;Patient education;Family education;Gait training;Neuromuscular re-educate;Range of motion;Strengthening;Stoop training;Stair training;Transfer training;Balance training  Rehab Potential :  Good  Frequency (Obs): 5x/week  Number of Visits to Meet Established Goals: 5      CURRENT GOALS    Goal #1 Patient is able to demonstrate supine - sit EOB @ level: supervision     Goal #2 Patient is able to demonstrate transfers EOB to/from Chair/Wheelchair at assistance level: supervision     Goal #3 Patient is able to ambulate 150 feet with assist device: walker - rolling at assistance level: supervision     Goal #4    Goal #5    Goal #6    Goal Comments: Goals established on 2023    HOME SITUATION  Type of Home: House   Home Layout: Two level                Lives With: Daughter (8 and 20(in college))  Drives: Yes  Patient Owned Equipment: None       Prior Level of Jack: normally indep with ADLs and ambulation - no additional falls in the past 6 months aside from one in house     SUBJECTIVE  \"This is all new\" - referring to the weakness      OBJECTIVE  Precautions: Bed/chair alarm (Chronic R foot drop per pt)  Fall Risk: High fall risk    WEIGHT BEARING RESTRICTION  Weight Bearing Restriction: None                PAIN ASSESSMENT  Ratin          COGNITION  Overall Cognitive Status:  WFL - within functional limits    RANGE OF MOTION AND STRENGTH ASSESSMENT  Upper extremity ROM and strength are within functional limits     Lower extremity ROM is within functional limits     Lower extremity strength is within functional limits       BALANCE  Static Sitting: Good  Dynamic Sitting: Good  Static Standing: Fair -  Dynamic Standing: Poor +    ADDITIONAL TESTS                                    ACTIVITY TOLERANCE                         O2 WALK       NEUROLOGICAL FINDINGS                        AM-PAC '6-Clicks' INPATIENT SHORT FORM - BASIC MOBILITY  How much difficulty does the patient currently have...  Patient Difficulty: Turning over in bed (including adjusting bedclothes, sheets and blankets)?: None   Patient Difficulty: Sitting down on and standing up from a chair with arms (e.g., wheelchair,  bedside commode, etc.): A Little   Patient Difficulty: Moving from lying on back to sitting on the side of the bed?: A Little   How much help from another person does the patient currently need...   Help from Another: Moving to and from a bed to a chair (including a wheelchair)?: A Little   Help from Another: Need to walk in hospital room?: A Lot   Help from Another: Climbing 3-5 steps with a railing?: Total       AM-PAC Score:  Raw Score: 16   Approx Degree of Impairment: 54.16%   Standardized Score (AM-PAC Scale): 40.78   CMS Modifier (G-Code): CK    FUNCTIONAL ABILITY STATUS  Gait Assessment   Functional Mobility/Gait Assessment  Gait Assistance: Minimum assistance  Distance (ft): 3,3  Assistive Device: Rolling walker  Pattern: Shuffle    Skilled Therapy Provided   Transfer Mobility:  Sit to stand: min A performed x2 trials    Stand to sit: min A   Gait = min A seated rest break between sets - demonstrating fatiguing weakness of BLE with lateral stepping on 2nd trial, heavily relying on UE on RW for offloading    Exercise/Education Provided:  Body mechanics  Functional activity tolerated  Gait training  Transfer training    Patient End of Session: Up in chair;With  staff;Needs met;Call light within reach;RN aware of session/findings;All patient questions and concerns addressed      Patient Evaluation Complexity Level:  History Moderate - 1 or 2 personal factors and/or co-morbidities   Examination of body systems Moderate - addressing a total of 3 or more elements   Clinical Presentation Moderate - Evolving   Clinical Decision Making Moderate - Evolving       PT Session Time: 25 minutes  Gait Training:  minutes  Therapeutic Activity: 15 minutes  Neuromuscular Re-education:  minutes  Therapeutic Exercise:  minutes

## 2023-12-14 NOTE — PROGRESS NOTES
Significant Event - Fall Note    Date/Time of Fall: December 14, 2023 at 0110    Fall huddle completed: Yes    Description of patient fall:      Patient fell from: Standing     Activity when fall occurred: Ambulating with assistance or assistive devices     Where did fall occur: Patient room     Was the fall assisted: Assisted to floor    Who witnessed the fall: Staff    Patient narrative of fall: Patient wanted to try to walk to the bathroom.     Staff narrative of fall: I held onto patient, we took a step and she said her knees felt weak, she then went to her knees. Patient did not hit head, did not hit anything, patient knees clean dry and intact    Name of Provider notified of fall: dr. Juarez    Family notification: Patient declined    Factors contributing to fall:     Physical: Weakness/fatigue     Psychological: Alert     Environmental: Poor lighting  None   Medications received in the past 8 hours:   Medication(s) Administered in past 8 Hours from 12/13/2023 1722 to 12/14/2023 0122       Date/Time Order Dose Route Action Action by Comments    12/13/2023 2059 CST cefTRIAXone (Rocephin) 1 g in D5W 100 mL IVPB-ADD 1 g Intravenous New Bag Sunil Cárdenas RN --    12/13/2023 2114 CST doxycycline hyclate (Vibramycin) 100 mg in sodium chloride 0.9% 100 mL IVPB 100 mg Intravenous New Bag Sunil Cárdenas RN --    12/14/2023 0009 CST HYDROmorphone (Dilaudid) 1 MG/ML injection 0.2 mg 0.2 mg Intravenous Given Vianney Campo RN --    12/13/2023 1953 CST HYDROmorphone (Dilaudid) 1 MG/ML injection 0.5 mg 0.5 mg Intravenous Given Sunil Cárdenas RN --    12/13/2023 2111 CST HYDROmorphone (Dilaudid) 1 MG/ML injection 0.5 mg 0.5 mg Intravenous Given Sunil Cárdenas RN --    12/13/2023 2257 CST metoclopramide (Reglan) 5 mg/mL injection 10 mg 10 mg Intravenous Given Sunil Cárdenas RN --    12/13/2023 1942 CST ondansetron (Zofran) 4 MG/2ML injection 4 mg 4 mg Intravenous Given Sunil Cárdenas RN --    12/13/2023 4431 CST  prochlorperazine (Compazine) 10 MG/2ML injection 5 mg 5 mg Intravenous Given Vianney Campo, RN --    12/13/2023 2049 CST sodium chloride 0.9 % IV bolus 1,701 mL 1,701 mL Intravenous New Sunil Walls RN --    12/13/2023 2353 CST sodium chloride 0.9% infusion -- Intravenous New Vianney Doll, RN --            Was patient identified as high fall risk prior to fall:    No                  What interventions were in place prior to fall: Bed in lowest position, call light in reach    Interventions post fall:  Bed alarm,Call light within reach    Additional comments: She will be done with walking.All precautions are in place: bed alarm, call light in reach, non skid socks and telling the patient to call when she needs to get up.

## 2023-12-14 NOTE — CONSULTS
[facility] HEMATOLOGY ONCOLOGY  Report of Consultation    Alina Aceves Patient Status:  Inpatient    1980 MRN [id number]   Location [facility] 4NW-A Attending Tosha Waite MD   Hosp Day # 1 PCP HOLLY IBARRA     ADMIT DATE AND TIME: 2023  7:15 PM    ADMIT DIAGNOSIS: Hyponatremia [E87.1]  Weakness generalized [R53.1]  Anemia, unspecified type [D64.9]  Community acquired pneumonia, unspecified laterality [J18.9]          REASON FOR CONSULTATION:     Breast cancer  Pneumonia  Effusion      HISTORY OF PRESENTING ILLNESS     Alina Aceves is a 43 year old female with history of locally advanced breast cancer.  Diagnosed in   Pathology showed triple negative stage IIIb invasive ductal carcinoma of the left breast.  She was given neoadjuvant dose dense AC with Taxol between April and 2023.  She had left mastectomy in 2023 pathology showed residual T1 a N0.  She she was recently recommended to start oral capecitabine adjuvant chemotherapy.  She has not started it yet.    She was seen in urgent care for shortness of breath and was diagnosed with pneumonia.  She was given oral antibiotics however her symptoms progressed and she was seen in the ER and was admitted.  CT showed worsening lung infiltrate and bilateral pleural effusions.       PERTINENT HISTORY:     History:  Past Medical History:   Diagnosis Date    BRCA gene mutation negative in female 2023    Negative 84 gene hereditary cancer panel, report in media tab    Breast cancer (HCC)     Connective tissue disease (HCC)     Gastritis     Lupus (HCC)     Sjogren's disease (HCC)     Vasculitis (HCC)      Past Surgical History:   Procedure Laterality Date    BREAST SURGERY Left      DELIVERY ONLY       History reviewed. No pertinent family history.    SOCIAL HX   reports that she quit smoking about 13 years ago. Her smoking use included cigarettes. She has never used smokeless tobacco. She  reports that she does not currently use alcohol. She reports that she does not use drugs.    Allergies:  Allergies   Allergen Reactions    Bactrim [Sulfamethoxazole W/Trimethoprim] RASH       Medications:    Pre-Admission Meds:  Current Outpatient Medications   Medication Instructions    azaTHIOprine (IMURAN OR) Take by mouth.    DULoxetine (CYMBALTA) 30 mg, Oral, Daily    HYDROcodone-acetaminophen 5-325 MG Oral Tab 1 tablet, Every 8 hours PRN    lidocaine-prilocaine 2.5-2.5 % External Cream APPLY SMALL AMOUNT OVER PORT PRIOR TO ACCESS    ondansetron (ZOFRAN) 8 mg, Oral, As needed    Potassium Citrate ER 15 MEQ (1620 MG) Oral Tab CR 1 tablet, Oral, 3 times daily    Prenatal Vit-DSS-Fe Cbn-FA (PRENATAL AD OR) 1 tablet, Oral, Daily    zolpidem (AMBIEN) 10 mg, Oral, Nightly PRN       Current Inpatient Meds:   cefTRIAXone  1 g Intravenous Q24H    DULoxetine  30 mg Oral Daily    heparin  5,000 Units Subcutaneous Q8H KHALIDA    doxycycline  100 mg Intravenous Q12H       Infusion   sodium chloride 50 mL/hr at 12/14/23 0148       PRN  HYDROmorphone, acetaminophen, melatonin, prochlorperazine, polyethylene glycol (PEG 3350), sennosides, bisacodyl, fleet enema, HYDROmorphone **OR** HYDROmorphone **OR** HYDROmorphone, HYDROmorphone    Review of Systems:  A 12-point review of systems was done with pertinent positives and negatives per the HPI.  Unable to lie down flat      Physical Exam:   Blood pressure (!) 150/103, pulse 118, temperature 96.9 °F (36.1 °C), temperature source Temporal, resp. rate 24, height 5' 4\" (1.626 m), weight 124 lb (56.2 kg), last menstrual period 08/28/2023, SpO2 96%, currently breastfeeding.    Performance Status: 2   General: Patient is alert and oriented x 3, dyspnea on lying down.  Appears weak   HEENT: No Icterus. Oropharynx is dry   Neck:  No palpable lymphadenopathy. Neck is supple.   Chest: Clear to auscultation.   Heart: Regular rate and rhythm.    Abdomen: Soft, non tender with good bowel  sounds.   Extremities: Pedal pulses are present. No edema.   Neurological: Grossly intact.    Lymphatics: There is no palpable lymphadenopathy           DIAGNOSTIC WORK UP:     Laboratory Data:      Recent Results (from the past 24 hour(s))   RAINBOW DRAW LAVENDER    Collection Time: 12/13/23  7:37 PM   Result Value Ref Range    Hold Lavender Auto Resulted    CBC W/ DIFFERENTIAL    Collection Time: 12/13/23  7:37 PM   Result Value Ref Range    WBC 6.3 4.0 - 11.0 x10(3) uL    RBC 2.72 (L) 3.80 - 5.30 x10(6)uL    HGB 7.9 (L) 12.0 - 16.0 g/dL    HCT 22.8 (L) 35.0 - 48.0 %    .0 150.0 - 450.0 10(3)uL    MCV 83.8 80.0 - 100.0 fL    MCH 29.0 26.0 - 34.0 pg    MCHC 34.6 31.0 - 37.0 g/dL    RDW 12.8 %    Neutrophil Absolute Prelim 5.52 1.50 - 7.70 x10 (3) uL    Neutrophil Absolute 5.52 1.50 - 7.70 x10(3) uL    Lymphocyte Absolute 0.35 (L) 1.00 - 4.00 x10(3) uL    Monocyte Absolute 0.33 0.10 - 1.00 x10(3) uL    Eosinophil Absolute 0.00 0.00 - 0.70 x10(3) uL    Basophil Absolute 0.00 0.00 - 0.20 x10(3) uL    Immature Granulocyte Absolute 0.05 0.00 - 1.00 x10(3) uL    Neutrophil % 88.3 %    Lymphocyte % 5.6 %    Monocyte % 5.3 %    Eosinophil % 0.0 %    Basophil % 0.0 %    Immature Granulocyte % 0.8 %   RAINBOW DRAW LIGHT GREEN    Collection Time: 12/13/23  7:38 PM   Result Value Ref Range    Hold Lt Green Auto Resulted    RAINBOW DRAW BLUE    Collection Time: 12/13/23  7:38 PM   Result Value Ref Range    Hold Blue Auto Resulted    Comp Metabolic Panel (14)    Collection Time: 12/13/23  7:38 PM   Result Value Ref Range    Glucose 106 (H) 70 - 99 mg/dL    Sodium 120 (LL) 136 - 145 mmol/L    Potassium 3.9 3.5 - 5.1 mmol/L    Chloride 89 (L) 98 - 112 mmol/L    CO2 15.0 (L) 21.0 - 32.0 mmol/L    Anion Gap 16 0 - 18 mmol/L    BUN 28 (H) 9 - 23 mg/dL    Creatinine 1.36 (H) 0.55 - 1.02 mg/dL    Calcium, Total 8.2 (L) 8.5 - 10.1 mg/dL    Calculated Osmolality 256 (L) 275 - 295 mOsm/kg    eGFR-Cr 50 (L) >=60 mL/min/1.73m2    AST  40 (H) 15 - 37 U/L    ALT 36 13 - 56 U/L    Alkaline Phosphatase 97 37 - 98 U/L    Bilirubin, Total 0.9 0.1 - 2.0 mg/dL    Total Protein 6.6 6.4 - 8.2 g/dL    Albumin 3.1 (L) 3.4 - 5.0 g/dL    Globulin  3.5 2.8 - 4.4 g/dL    A/G Ratio 0.9 (L) 1.0 - 2.0   Lipase    Collection Time: 12/13/23  7:38 PM   Result Value Ref Range    Lipase 15 13 - 75 U/L   Procalcitonin    Collection Time: 12/13/23  7:38 PM   Result Value Ref Range    Procalcitonin 0.13 <0.16 ng/mL   Lactic Acid, Plasma    Collection Time: 12/13/23  8:10 PM   Result Value Ref Range    Lactic Acid 1.5 0.4 - 2.0 mmol/L   EKG    Collection Time: 12/13/23  8:22 PM   Result Value Ref Range    Ventricular rate 112 BPM    Atrial rate 112 BPM    P-R Interval 164 ms    QRS Duration 82 ms    Q-T Interval 374 ms    QTC Calculation (Bezet) 510 ms    P Axis 59 degrees    R Axis 24 degrees    T Axis 28 degrees   ABORH (Blood Type)    Collection Time: 12/13/23  8:30 PM   Result Value Ref Range    ABO BLOOD TYPE A     RH BLOOD TYPE Positive    Antibody Screen    Collection Time: 12/13/23  8:30 PM   Result Value Ref Range    Antibody Screen Negative    Prepare RBC Once    Collection Time: 12/13/23  9:58 PM   Result Value Ref Range    Blood Product [id number]     Unit Number [id number]     UNIT ABO A     UNIT RH POS     Product Status Issued     Expiration Date 940370120850     Blood Type Barcode 6200     Unit Volume 284 ml   Emergency MRSA Screen by PCR    Collection Time: 12/14/23 12:26 AM   Result Value Ref Range    MRSA Screen By PCR Positive (A) Negative   $$$$Respiratory Flu Expanded Panel + Covid-19$$$$    Collection Time: 12/14/23 12:32 AM    Specimen: Nasopharyngeal swab; Other   Result Value Ref Range    SARS-CoV-2 PCR: Not Detected Not Detected    Adenovirus PCR: Not Detected Not Detected    Coronavirus 229E PCR: Not Detected Not Detected    Coronavirus Hku1 PCR: Not Detected Not Detected    Coronavirus Nl63 PCR: Not Detected Not Detected    Coronavirus Oc43  PCR: Not Detected Not Detected    Metapneumovirus PCR: Not Detected Not Detected    Rhinovirus/Entero PCR: Not Detected Not Detected    Influenza A PCR: Not Detected Not Detected    Influenza B PCR: Not Detected Not Detected    Parainfluenza 1 PCR: Not Detected Not Detected    Parainfluenza 2 PCR Not Detected Not Detected    Parainfluenza 3 PCR Not Detected Not Detected    Parainfluenza 4 PCR Not Detected Not Detected    Resp Syncytial Virus PCR Not Detected Not Detected    Bordetella Pertussis PCR Not Detected Not Detected    Bordetella Parapertussis PCR Not Detected Not Detected    Chlamydia pneumonia PCR: Not Detected Not Detected    Mycoplasma pneumonia PCR: Not Detected Not Detected   Urinalysis with Culture Reflex    Collection Time: 12/14/23  3:58 AM    Specimen: Urine, clean catch   Result Value Ref Range    Urine Color Light-Yellow Yellow    Clarity Urine Clear Clear    Spec Gravity >1.030 (H) 1.005 - 1.030    Glucose Urine Normal Normal mg/dL    Bilirubin Urine Negative Negative    Ketones Urine Negative Negative mg/dL    Blood Urine 3+ (A) Negative    pH Urine 6.5 5.0 - 8.0    Protein Urine 100 (A) Negative mg/dL    Urobilinogen Urine Normal Normal mg/dL    Nitrite Urine Negative Negative    Leukocyte Esterase Urine 250 (A) Negative    WBC Urine 6-10 (A) 0 - 5 /HPF    RBC Urine >10 (A) 0 - 2 /HPF    Bacteria Urine Rare (A) None Seen /HPF    Squamous Epi. Cells Few (A) None Seen /HPF    Renal Tubular Epithelial Cells None Seen None Seen /HPF    Transitional Cells None Seen None Seen /HPF    Yeast Urine None Seen None Seen /HPF   Basic Metabolic Panel (8)    Collection Time: 12/14/23  6:31 AM   Result Value Ref Range    Glucose 99 70 - 99 mg/dL    Sodium 120 (LL) 136 - 145 mmol/L    Potassium 3.4 (L) 3.5 - 5.1 mmol/L    Chloride 92 (L) 98 - 112 mmol/L    CO2 16.0 (L) 21.0 - 32.0 mmol/L    Anion Gap 12 0 - 18 mmol/L    BUN 29 (H) 9 - 23 mg/dL    Creatinine 1.21 (H) 0.55 - 1.02 mg/dL    Calcium, Total 7.6 (L)  8.5 - 10.1 mg/dL    Calculated Osmolality 256 (L) 275 - 295 mOsm/kg    eGFR-Cr 57 (L) >=60 mL/min/1.73m2   CBC W/ DIFFERENTIAL    Collection Time: 12/14/23  6:31 AM   Result Value Ref Range    WBC 8.3 4.0 - 11.0 x10(3) uL    RBC 2.94 (L) 3.80 - 5.30 x10(6)uL    HGB 8.5 (L) 12.0 - 16.0 g/dL    HCT 24.6 (L) 35.0 - 48.0 %    .0 150.0 - 450.0 10(3)uL    MCV 83.7 80.0 - 100.0 fL    MCH 28.9 26.0 - 34.0 pg    MCHC 34.6 31.0 - 37.0 g/dL    RDW 12.7 %    Neutrophil Absolute Prelim 7.17 1.50 - 7.70 x10 (3) uL    Neutrophil Absolute 7.17 1.50 - 7.70 x10(3) uL    Lymphocyte Absolute 0.55 (L) 1.00 - 4.00 x10(3) uL    Monocyte Absolute 0.46 0.10 - 1.00 x10(3) uL    Eosinophil Absolute 0.00 0.00 - 0.70 x10(3) uL    Basophil Absolute 0.01 0.00 - 0.20 x10(3) uL    Immature Granulocyte Absolute 0.08 0.00 - 1.00 x10(3) uL    Neutrophil % 86.6 %    Lymphocyte % 6.7 %    Monocyte % 5.6 %    Eosinophil % 0.0 %    Basophil % 0.1 %    Immature Granulocyte % 1.0 %       Recent Labs   Lab 12/13/23  1937 12/14/23  0631   RBC 2.72* 2.94*   HGB 7.9* 8.5*   HCT 22.8* 24.6*   MCV 83.8 83.7   MCH 29.0 28.9   MCHC 34.6 34.6   RDW 12.8 12.7   NEPRELIM 5.52 7.17   WBC 6.3 8.3   .0 188.0       CULTURE RESULTS  No results found for this visit on 12/13/23.      Imaging:    CT ANGIOGRAPHY, CHEST (CPT=71275)    Result Date: 12/14/2023  CONCLUSION:   1. Moderate to large bilateral pleural effusions along with marked solid a shin lungs are suggestive pulmonary edema and fluid overload.  2. No evidence of pulmonary embolus.  3. Moderate to large left axillary and left breast fluid collections are noted.  Minimal gas in the left more central breast fluid collection is noted.  Possibility of infection is of consideration.  Sequelae of seroma/chronic hematoma is of consideration as well.  Agree with preliminary radiology report from [facility].    LOCATION:  [place]   Dictated by (CST): Gustavo Yuan MD on 12/14/2023 at 6:49 AM      Finalized by (CST): Gustavo Yuan MD on 12/14/2023 at 6:52 AM       XR CHEST AP PORTABLE  (CPT=71045)    Result Date: 12/13/2023  CONCLUSION:  Dense patchy airspace opacities are present within the lungs suspicious for areas of pneumonia.  Follow-up is recommended to ensure resolution.  If clinical symptoms persist then consider CT.   LOCATION:  [place] [id number]      Dictated by (CST): Cedrick Tovar MD on 12/13/2023 at 8:13 PM     Finalized by (CST): Cedrick Tovar MD on 12/13/2023 at 8:13 PM          PROBLEM LIST:     Principal Problem:    Community acquired pneumonia, unspecified laterality  Active Problems:    Hyponatremia    Anemia    Metabolic acidosis    Weakness generalized    Anemia, unspecified type          ASSESSMENT AND PLAN:     Alina Aceves is a 43 year old female with stage IIIb breast cancer status postmastectomy now with bilateral pneumonia and pleural effusions.    Breast cancer  Locally advanced stage IIIb triple negative breast cancer  Status post no adjuvant chemotherapy followed by mastectomy in November 2023  Residual T1a disease and has been recommended to start oral capecitabine  No treatment while inpatient    Pneumonia/effusion  Reviewed CT scan she had bilateral lung consolidation/infiltrate along with bilateral effusion  Not related to underlying breast cancer  Would need pulmonary consult for bronchoscopy and thoracentesis  Would also involve infectious disease as she is post chemo and immunocompromised    Anemia  Moderate anemia secondary to previous chemotherapy  Will monitor hemoglobin and transfuse for hemoglobin less than 7    We will follow      Thank you for allowing me to participate in the care of your patient.    Sree Leavitt MD      This note was prepared using Dragon [employer] voice recognition dictation software. As a result errors may occur. When identified these errors have been corrected. While every attempt is made to correct errors during dictation discrepancies may still  exist. Please call me to clarify any errors.

## 2023-12-14 NOTE — PAYOR COMM NOTE
--------------  ADMISSION REVIEW     Payor: [employer]  Subscriber #:  [id number]  Authorization Number: [id number]    Admit date: 12/13/23  Admit time: 11:14 PM       REVIEW DOCUMENTATION:     ED Provider Notes          History   Stated Complaint: has pneumonia, has been vomiting unable to keep medications or fluids down    Subjective:   HPI    Patient with history of lupus, Sjogren's, connective tissue disease diagnosed with invasive ductal breast cancer earlier this year, underwent neoadjuvant chemo May through October, left mastectomy lymph node November 16, awaiting restart of chemo late December.  Now over the past few days started to feel nauseated, started to have vomiting, weakness, has neuropathy in her legs ever since chemo, but this seems to be worse over the last few days as well.  Worse vomiting in the last 2 days, had loose stools over the weekend.  She was diagnosed with pneumonia 2 days ago but has not been able to hold down the antibiotics.  Patient's blood pressure has also been somewhat labile here, she says that when she does not sleep her blood pressure tends to be high.  It generally normalizes on its own.  She is also in significant pain from the neuropathy      Physical Exam     ED Triage Vitals [12/13/23 1823]   BP (!) 148/95   Pulse 119   Resp (!) 30   Temp 98.1 °F (36.7 °C)   Temp src Oral   SpO2 91 %   O2 Device None (Room air)       Current:BP (!) 149/110   Pulse 114   Temp 97.3 °F (36.3 °C) (Temporal)   Resp 22   Ht 162.6 cm (5' 4\")   Wt 56.7 kg   LMP 08/28/2023 (Approximate)   SpO2 95%   BMI 21.46 kg/m²         Physical Exam    General: Well-developed, well-nourished, comfortable, no acute distress except tired appearing  Head and neck: Normocephalic, atraumatic, oropharynx dry; patient has a facial rash that she has had over the past several weeks ever since chemo  Cardiovascular: Regular rate and rhythm, normal S1 and S2, no obvious murmurs, rubs, or gallops   Lungs:  Clear to auscultation bilaterally with equal breath sounds, no wheezing, no rhonchi   Abdomen: Positive bowel sounds,  soft, no tenderness, no distension, no rebound, no guarding   Extremities: No cyanosis, no clubbing, no edema   Musculoskeletal: No tenderness, swelling, deformity   Skin: Left axilla a seroma in place, no sign of infection  Neuro: Grossly intact to patient's baseline  ED Course     Labs Reviewed   COMP METABOLIC PANEL (14) - Abnormal; Notable for the following components:       Result Value    Glucose 106 (*)     Sodium 120 (*)     Chloride 89 (*)     CO2 15.0 (*)     BUN 28 (*)     Creatinine 1.36 (*)     Calcium, Total 8.2 (*)     Calculated Osmolality 256 (*)     eGFR-Cr 50 (*)     AST 40 (*)     Albumin 3.1 (*)     A/G Ratio 0.9 (*)     All other components within normal limits   CBC W/ DIFFERENTIAL - Abnormal; Notable for the following components:    RBC 2.72 (*)     HGB 7.9 (*)     HCT 22.8 (*)     Lymphocyte Absolute 0.35 (*)     All other components within normal limits   LIPASE - Normal   LACTIC ACID, PLASMA - Normal   CBC WITH DIFFERENTIAL WITH PLATELET   EKG    Rate, intervals and axes as noted on EKG Report.  Rate: 112  Rhythm: Sinus Rhythm  Reading: Sinus tachycardia, no acute process         Differential diagnosis includes pneumonia, sepsis, dehydration, electrolyte abnormality, among other processes  Chest x-ray images reviewed by myself show bilateral infiltrates.  Radiology read concurs, mentions dense patchy airspace opacities bilaterally suspicious for pneumonia        MDM      Patient with history of lupus, Sjogren's, connective tissue disease diagnosed with invasive ductal breast cancer earlier this year, underwent neoadjuvant chemo May through October, left mastectomy lymph node November 16, awaiting restart of chemo late December.  Now with loose stools, nausea, vomiting, diagnosed with pneumonia 2 days ago, unable to hold on antibiotics, worsening neuropathies, feeling weak  and comes in for help.  Chest x-ray confirms bilateral pneumonia.  Patient given IV Rocephin and initially Zithromax ordered but then switched to Doxy because of patient's QT  Blood work shows sodium of 120, chloride 89, CO2 15, BUN/creatinine 28 and 1.36, GFR 50 down from 88 in mid November.  Patient's hemoglobin has also dropped to 7.9 from 9.6 in mid November.  Type and screen done, 1 unit ordered for type and cross and should be started shortly.  Patient given saline bolus to help with dehydration and hyponatremia.  Her lactic acid is normal.  Blood cultures sent.  Patient's D-dimer is still pending.  Patient was given IV fluids, Dilaudid, antibiotics, after which heart rate has decreased from 120s 130s range down to the 110s.  Blood pressure down to 140s over 90s.    Patient will be admitted.  Case discussed with Dr. Carroll    Admission disposition: 2023  9:04 PM      Disposition and Plan     Clinical Impression:  1. Community acquired pneumonia, unspecified laterality    2. Hyponatremia    3. Weakness generalized    4. Anemia, unspecified type         Disposition:  Admit  2023  9:04 pm       H&P - H&P Note            [facility]  [facility]     Alina Aceves Patient Status:  Emergency    1980 MRN [id number]   Location [facility] EM[facility]PARTMENT Attending Jennifer Edmonds MD   Hosp Day # 0 PCP HOLLY IBARRA     Chief Complaint: N/V, Weakness    Subjective:    History of Present Illness:     Alina Aceves is a 43 year old female with past medical history of breast cancer who is undergoing chemotherapy    Patient has been feeling ill since Monday, was at urgent care, diagnosed with pneumonia, she is feeling short of breath, is denying pain with breathing but endorses trouble catching her breath.  Has been nauseous with emesis and not eating much.  No significant abdominal pain.  No reported fevers.  Feels generally weak.    Results:    Labs:      Labs Last 24  Hours:    Recent Labs   Lab 12/13/23 1937   RBC 2.72*   HGB 7.9*   HCT 22.8*   MCV 83.8   MCH 29.0   MCHC 34.6   RDW 12.8   NEPRELIM 5.52   WBC 6.3   .0       Recent Labs   Lab 12/13/23 1938   *   BUN 28*   CREATSERUM 1.36*   EGFRCR 50*   CA 8.2*   ALB 3.1*   *   K 3.9   CL 89*   CO2 15.0*   ALKPHO 97   AST 40*   ALT 36   BILT 0.9   TP 6.6     Assessment & Plan:      #Shortness of breath, possible PNA  -check procal  -Stat CTA chest  -Empiric Antibx     #Nausea and emesis, dehydration, presumably chemo SE  #REJI  #NAGMA  -IVF  -antiemetics     #Hyponatremia  -Did receive sepsis bolus in ED, repeat BMP   -NS at 50/hr for now     #Invasive ductal carcinoma of the left breast on chemotherapy  -Oncology consult    #Neuropathy - chemo SE  -Cymbalta     #Normocytic Anemia  -no reported bleeding  -monitor     #SLE  #Sjogren's Syndrome       Deep Fine, DO    12/14 PULMONARY:        HISTORY OF PRESENT ILLNESS      Alina Aceves is a 43 year old female with a history of breast cancer and SLE who started feeling sick around 12/9 with chills, headache, sore throat, cough, dyspnea. She presented to [facility] urgent care on 12/11 with these symptoms; she tested negative for COVID, flu, and RSV but was diagnosed with left lower lobe pneumonia and was discharged home on po augmentin and azithromycin. She continued to feel ill so came to the ER last night. CTA chest was done and was negative for PE but showed bilateral consolidation and bilateral effusions. She was admitted and we've been consulted to assist in her care.         ASSESSMENT AND PLAN      Acute hypoxemic respiratory failure - secondary to pneumonia vs pneumonitis as well as bilateral pleural effusions. CTA chest was negative for PE but shows bilateral infiltrates and bilateral effusions  -continue supplemental oxygen, wean as able  -antibiotics as below  -workup of effusions as below  Pneumonia - viral respiratory panel was negative. PCT was  negative, but cannot r/o atypical pathogens  -continue empiric antibiotics : ceftriaxone, doxycycline  -follow up cultures  -check strep and legionella urine ag   -if no improvement, could consider bronchoscopy and/or empiric steroids  -will need follow up imaging in 4-6 weeks to ensure improvement in imaging abnormaltieis.  Pleural effusions - bilateral, ddx includes parapneumonic effusions, CHF, effusions related to SLE, and malignant pleural effusions  -ultrasound guided thoracentesis for diagnostic/therapeutic purposes - send for culture, cytology, cell count/diff, LDH, protein, glucose, cholesterol  SLE  -per hospitalist and outpatient rheumatology  Hyponatremia, non-anion gap acidosis  -renal consulted  Breast cancer  -per heme/onc  Proph   -hold subcutaneous heparin for thoracentesis.  Dispo   -full code  -inpatient     We will continue to follow with you       ONCOLOGY:    ASSESSMENT AND PLAN:      Alina Aceves is a 43 year old female with stage IIIb breast cancer status postmastectomy now with bilateral pneumonia and pleural effusions.     Breast cancer  Locally advanced stage IIIb triple negative breast cancer  Status post no adjuvant chemotherapy followed by mastectomy in November 2023  Residual T1a disease and has been recommended to start oral capecitabine  No treatment while inpatient     Pneumonia/effusion  Reviewed CT scan she had bilateral lung consolidation/infiltrate along with bilateral effusion  Not related to underlying breast cancer  Would need pulmonary consult for bronchoscopy and thoracentesis  Would also involve infectious disease as she is post chemo and immunocompromised     Anemia  Moderate anemia secondary to previous chemotherapy  Will monitor hemoglobin and transfuse for hemoglobin less than 7     We will follow        Thank you for allowing me to participate in the care of your patient.     Sree Leavitt MD           MEDICATIONS ADMINISTERED IN LAST 1 DAY:  Transfuse RBC        Date Action Dose Route User    12/13/2023 2205 New Bag (none) Intravenous Sunil Cárdenas RN          ALPRAZolam (Xanax) tab 0.25 mg       Date Action Dose Route User    12/14/2023 1256 Given 0.25 mg Oral Amber Trinidad RN          cefTRIAXone (Rocephin) 1 g in D5W 100 mL IVPB-ADD       Date Action Dose Route User    12/13/2023 2059 New Bag 1 g Intravenous Sunil Cárdenas RN          doxycycline hyclate (Vibramycin) 100 mg in sodium chloride 0.9% 100 mL IVPB       Date Action Dose Route User    12/13/2023 2114 New Bag 100 mg Intravenous Sunil Cárdenas RN          doxycycline hyclate (Vibramycin) 100 mg in sodium chloride 0.9% 100 mL IVPB       Date Action Dose Route User    12/14/2023 0942 New Bag 100 mg Intravenous Amber Trinidad RN          DULoxetine (Cymbalta) DR cap 30 mg       Date Action Dose Route User    12/14/2023 0943 Given 30 mg Oral Amber Trinidad RN          furosemide (Lasix) 10 mg/mL injection 20 mg       Date Action Dose Route User    12/14/2023 1138 Given 20 mg Intravenous Amber Trinidad RN          haloperidol lactate (Haldol) 5 MG/ML injection 2 mg       Date Action Dose Route User    12/14/2023 0450 Given 2 mg Intravenous Vianney Campo RN          heparin (Porcine) 5000 UNIT/ML injection 5,000 Units       Date Action Dose Route User    12/14/2023 0458 Given 5,000 Units Subcutaneous (Right Lower Abdomen) Vianney Campo RN          HYDROmorphone (Dilaudid) 1 MG/ML injection 0.5 mg       Date Action Dose Route User    12/13/2023 2111 Given 0.5 mg Intravenous Sunil Cárdenas RN    12/13/2023 1953 Given 0.5 mg Intravenous Sunil Cárdenas RN          HYDROmorphone (Dilaudid) 1 MG/ML injection 0.2 mg       Date Action Dose Route User    12/14/2023 0009 Given 0.2 mg Intravenous Vianney Campo RN          iohexol (Omnipaque) 350 MG/ML injection via power injector 100 mL       Date Action Dose Route User    12/14/2023 0132 Given 100 mL Intravenous Carranza, Crystal          LORazepam  (Ativan) tab 0.5 mg       Date Action Dose Route User    12/14/2023 0151 Given 0.5 mg Oral Vianney Campo RN          LORazepam (Ativan) 2 mg/mL injection 1 mg       Date Action Dose Route User    12/14/2023 0212 Given 1 mg Intravenous Vianney Campo RN          melatonin tab 3 mg       Date Action Dose Route User    12/14/2023 0206 Given 3 mg Oral Vianney Campo RN          metoclopramide (Reglan) 5 mg/mL injection 10 mg       Date Action Dose Route User    12/13/2023 2257 Given 10 mg Intravenous Sunil Cárdenas RN          ondansetron (Zofran) 4 MG/2ML injection 4 mg       Date Action Dose Route User    12/13/2023 1942 Given 4 mg Intravenous Sunil Cárdenas RN          prochlorperazine (Compazine) 10 MG/2ML injection 5 mg       Date Action Dose Route User    12/13/2023 2350 Given 5 mg Intravenous Vianney Campo RN          sodium bicarbonate 75 mEq in dextrose 5%-sodium chloride 0.45% 1,075 mL infusion       Date Action Dose Route User    12/14/2023 1139 New Bag 75 mEq Intravenous Amber Trinidad RN          sodium chloride 0.9% infusion       Date Action Dose Route User    12/14/2023 0148 New Bag (none) Intravenous Vianney Campo RN    12/13/2023 2353 New Bag (none) Intravenous Vianney Campo RN          sodium chloride 0.9 % IV bolus 1,701 mL       Date Action Dose Route User    12/13/2023 2049 New Bag 1,701 mL Intravenous Sunil Cárdenas RN            Vitals (last day)       Date/Time Temp Pulse Resp BP SpO2 Weight O2 Device O2 Flow Rate (L/min) [facility]    12/14/23 1248 97.4 °F (36.3 °C) 124 24 146/107 94 % -- -- -- LV    12/14/23 1138 -- 130 -- -- -- -- -- -- AM    12/14/23 0640 96.9 °F (36.1 °C) -- -- -- -- -- -- -- AK    12/14/23 0600 -- 118 24 150/103 96 % -- Nasal cannula 4 L/min AK    12/14/23 0114 -- 124 22 143/98 90 % -- Nasal cannula 3 L/min KS    12/14/23 0108 97.8 °F (36.6 °C) 120 22 147/112 93 % -- Nasal cannula 3 L/min KS    12/14/23 0035 -- -- -- -- 92 % -- Nasal cannula 3  L/min TK    12/14/23 0001 97.7 °F (36.5 °C) 109 22 138/94 92 % -- Nasal cannula 3 L/min KS    12/13/23 2326 -- -- -- -- -- 124 lb -- -- SG    12/13/23 2221 97.3 °F (36.3 °C) 114 22 149/110 95 % -- Nasal cannula 3 L/min JENNIFER    12/13/23 2205 97.6 °F (36.4 °C) -- -- -- -- -- -- -- JENNIFER    12/13/23 2200 -- 110 32 141/106 93 % -- -- 3 L/min JENNIFER    12/13/23 2130 -- 109 22 146/103 99 % -- Nasal cannula 3 L/min JENNIFER    12/13/23 2123 97.6 °F (36.4 °C) -- -- -- -- -- -- -- JENNIFER    12/13/23 2030 -- 108 30 145/109 92 % -- Nasal cannula 3 L/min JENNIFER    12/13/23 1956 -- -- -- -- -- -- Nasal cannula 2 L/min JENNIFER    12/13/23 1930 -- 115 34 152/111 96 % -- Nasal cannula 2 L/min JENNIFER    12/13/23 1924 -- 120 24 152/11 -- -- -- -- JENNIFER    12/13/23 1823 98.1 °F (36.7 °C) 119 30 148/95 91 % 125 lb None (Room air) -- ET          Product Unit Status Volume Start End            Transfuse RBC       23  [id number]  [id number] Stopped 350 mL 12/13/23 2205 12/14/23 0108

## 2023-12-14 NOTE — H&P
[facility]     Alina Aceves Patient Status:  Emergency    1980 MRN [id number]   Location [facility] EMERGENCY DEPARTMENT Attending Jennifer Edmonds MD   Hosp Day # 0 PCP HOLLY IBARRA     Chief Complaint: N/V, Weakness    Subjective:    History of Present Illness:     Alina Aceves is a 43 year old female with past medical history of breast cancer who is undergoing chemotherapy    Patient has been feeling ill since Monday, was at urgent care, diagnosed with pneumonia, she is feeling short of breath, is denying pain with breathing but endorses trouble catching her breath.  Has been nauseous with emesis and not eating much.  No significant abdominal pain.  No reported fevers.  Feels generally weak.    History/Other:    Past Medical History:  Past Medical History:   Diagnosis Date    BRCA gene mutation negative in female 2023    Negative 84 gene hereditary cancer panel, report in media tab    Breast cancer (HCC)     Connective tissue disease (HCC)     Gastritis     Lupus (HCC)     Sjogren's disease (HCC)     Vasculitis (HCC)      Past Surgical History:   Past Surgical History:   Procedure Laterality Date    BREAST SURGERY Left      DELIVERY ONLY        Family History:   No family history on file.  Social History:    reports that she quit smoking about 13 years ago. Her smoking use included cigarettes. She has never used smokeless tobacco. She reports that she does not currently use alcohol. She reports that she does not use drugs.     Allergies:   Allergies   Allergen Reactions    Bactrim [Sulfamethoxazole W/Trimethoprim] RASH       Medications:    Current Facility-Administered Medications on File Prior to Encounter   Medication Dose Route Frequency Provider Last Rate Last Admin    [COMPLETED] morphINE PF 4 MG/ML injection 4 mg  4 mg Intravenous Once Jesus Manuel Hatfield MD   4 mg at 23 8440    [COMPLETED] iopamidol 76% (ISOVUE-370) injection for  power injector  100 mL Intravenous ONCE PRN Jesus Manuel Hatfield MD   100 mL at 11/13/23 7457    [COMPLETED] potassium chloride (K-Dur) tab 40 mEq  40 mEq Oral Once Jesus Manuel Hatfield MD   40 mEq at 11/14/23 0010     Current Outpatient Medications on File Prior to Encounter   Medication Sig Dispense Refill    DULoxetine 30 MG Oral Cap DR Particles Take 1 capsule (30 mg total) by mouth daily.      HYDROcodone-acetaminophen 5-325 MG Oral Tab Take 1 tablet by mouth every 8 (eight) hours as needed.      lidocaine-prilocaine 2.5-2.5 % External Cream APPLY SMALL AMOUNT OVER PORT PRIOR TO ACCESS      ondansetron (ZOFRAN) 8 MG tablet Take 1 tablet (8 mg total) by mouth as needed.      Potassium Chloride ER 20 MEQ Oral Tab CR Take 1 tablet by mouth daily.      azaTHIOprine (IMURAN OR) Take by mouth. (Patient not taking: No sig reported)      Prenatal Vit-DSS-Fe Cbn-FA (PRENATAL AD OR) Take 1 tablet by mouth daily.      Etonogestrel-Ethinyl Estradiol ([facility]) Place vaginally.      zolpidem 10 MG Oral Tab Take 10 mg by mouth nightly as needed for Sleep.         Review of Systems:   A comprehensive review of systems was completed.    Pertinent positives and negatives noted in the HPI.    Objective:   Physical Exam:    BP (!) 146/103   Pulse 109   Temp 97.6 °F (36.4 °C) (Temporal)   Resp 22   Ht 5' 4\" (1.626 m)   Wt 125 lb (56.7 kg)   LMP 08/28/2023 (Approximate)   SpO2 99%   BMI 21.46 kg/m²   General: No acute distress, Alert  Respiratory: No rhonchi, no wheezes, shallow   Cardiovascular: S1, S2. Regular rate and rhythm  Abdomen: Soft, Non-tender, non-distended, positive bowel sounds  Neuro: No new focal deficits  Extremities: No edema      Results:    Labs:      Labs Last 24 Hours:    Recent Labs   Lab 12/13/23 1937   RBC 2.72*   HGB 7.9*   HCT 22.8*   MCV 83.8   MCH 29.0   MCHC 34.6   RDW 12.8   NEPRELIM 5.52   WBC 6.3   .0       Recent Labs   Lab 12/13/23 1938   *   BUN 28*    CREATSERUM 1.36*   EGFRCR 50*   CA 8.2*   ALB 3.1*   *   K 3.9   CL 89*   CO2 15.0*   ALKPHO 97   AST 40*   ALT 36   BILT 0.9   TP 6.6       No results found for: \"PT\", \"INR\"    No results for input(s): \"TROP\", \"TROPHS\", \"CK\" in the last 168 hours.    No results for input(s): \"TROP\", \"PBNP\" in the last 168 hours.    No results for input(s): \"PCT\" in the last 168 hours.    Imaging: Imaging data reviewed in Epic.    Assessment & Plan:      #Shortness of breath, possible PNA  -check procal  -Stat CTA chest  -Empiric Antibx     #Nausea and emesis, dehydration, presumably chemo SE  #REJI  #NAGMA  -IVF  -antiemetics     #Hyponatremia  -Did receive sepsis bolus in ED, repeat BMP   -NS at 50/hr for now     #Invasive ductal carcinoma of the left breast on chemotherapy  -Oncology consult    #Neuropathy - chemo SE  -Cymbalta     #Normocytic Anemia  -no reported bleeding  -monitor     #SLE  #Sjogren's Syndrome         Plan of care discussed with Dr. Kendal Fine, DO    Supplementary Documentation:     The 21st Century Cures Act makes medical notes like these available to patients in the interest of transparency. Please be advised this is a medical document. Medical documents are intended to carry relevant information, facts as evident, and the clinical opinion of the practitioner. The medical note is intended as peer to peer communication and may appear blunt or direct. It is written in medical language and may contain abbreviations or verbiage that are unfamiliar.

## 2023-12-14 NOTE — PROGRESS NOTES
NURSING ADMISSION NOTE      Patient admitted via Cart  Oriented to room.  Safety precautions initiated.  Bed in low position.  Call light in reach.

## 2023-12-14 NOTE — CONSULTS
Infectious Disease Initial Consultation      Date of admission: 12/13/2023  7:15 PM     Date of service: 12/14/23 11:51 AM    Consult requested by: Tosha Waite MD     Reason for consult: Pneumonia    Chief complaint: Acute hypoxic respiratory failure    History of present illness: Alina Aceves is a 43 year old female with history of stage IIIb breast cancer, status postmastectomy on oral capecitabine, who presents here with acute hypoxic respiratory failure in the setting of pneumonia.  Patient reports feeling ill since Monday.  Went to immediate care and was diagnosed with pneumonia.  At that point, she was feeling out of breath but without any chest pain.  She also reported some nausea with vomiting.  No abdominal pain.  No reported fevers.    In the emergency room, she was afebrile, tachypneic, on requiring oxygen.  Labs revealed hyponatremia with a sodium 120, and a bicarb of 15.  Creatinine was 1.36.  LFTs were unremarkable.  CBC was unremarkable.  UA with 6-10 white blood cells.  CT angiogram done of the chest did not show any evidence of PEs.  However, it was consistent with volume overload large bilateral pleural effusions was also moderate to large left axillary left breast fluid collections.  Minimal gas in the left more central breast fluid collection.  Postoperative changes versus an infection. RVP was negative.  MRSA screen was positive.  2 sets of blood cultures obtained, negative to date.  C. difficile is currently pending.  Legionella and strep pneumo antigens are pending.  Patient is currently on ceftriaxone and doxycycline for the possibility of overlying pneumonia.  That being said, procalcitonin was negative.    Review of systems:  All other components of the review of systems are negative, except those described in the history of present illness.     Past Medical History:   Diagnosis Date    Breast cancer (HCC)     Gastritis     Lupus (HCC)     Sjogren's disease (HCC)      Past Surgical  History:   Procedure Laterality Date    BREAST SURGERY Left      DELIVERY ONLY       Social History     Socioeconomic History    Marital status:    Tobacco Use    Smoking status: Former     Types: Cigarettes     Quit date: 2010     Years since quittin.9    Smokeless tobacco: Never    Tobacco comments:     social smoker   Vaping Use    Vaping Use: Never used   Substance and Sexual Activity    Alcohol use: Not Currently     Comment: occassional    Drug use: Never     Social Determinants of Health     Food Insecurity: No Food Insecurity (2023)    Food Insecurity     Food Insecurity: Never true   Transportation Needs: No Transportation Needs (2023)    Transportation Needs     Lack of Transportation: No   Housing Stability: Medium Risk (2023)    Housing Stability     Housing Instability: Yes     History reviewed. No pertinent family history.  Reviewed, see above    Medications:    potassium chloride    sodium bicarbonate in D5 0.45% NS infusion    HYDROmorphone    cefTRIAXone    DULoxetine    acetaminophen    melatonin    prochlorperazine    polyethylene glycol (PEG 3350)    sennosides    bisacodyl    fleet enema    HYDROmorphone **OR** HYDROmorphone **OR** HYDROmorphone    HYDROmorphone    doxycycline     Allergies:  Allergies   Allergen Reactions    Bactrim [Sulfamethoxazole W/Trimethoprim] RASH       Physical Exam:  Vitals:    23 1138   BP:    Pulse: (!) 130   Resp:    Temp:      Vitals signs and nursing note reviewed.   Constitutional:       Appearance: Normal appearance.   HENT:      Head: Normocephalic and atraumatic.      Mouth/Throat: Normal dentition     Mouth: Mucous membranes are moist.   Neck:      Musculoskeletal: Neck supple.   Cardiovascular:      Rate and Rhythm: Normal rate.      Heart sounds: Normal heart sounds. No murmur. No friction rub. No gallop.    Pulmonary:      Effort: Pulmonary effort is normal.  In respiratory distress.      Breath sounds:  Diminished breath sounds. No stridor. No wheezing, rhonchi or rales.   Chest:      Chest wall: No tenderness.   Abdominal:      General: Abdomen is flat. There is no distension.      Palpations: Abdomen is soft. There is no mass.      Tenderness: There is no tenderness. There is no guarding or rebound.      Hernia: No hernia is present.   Musculoskeletal:      Right lower leg: No edema.      Left lower leg: No edema.   Skin:     General: Skin is warm and dry.   Neurological:      General: No focal deficit present.      Mental Status: Alert and oriented to person, place, and time.     Laboratory data:  I have independently reviewed all lab results; including old microbiological results.  Lab Results   Component Value Date    WBC 8.3 12/14/2023    HGB 8.5 12/14/2023    HCT 24.6 12/14/2023    .0 12/14/2023    CREATSERUM 1.34 12/14/2023    BUN 28 12/14/2023     12/14/2023    K 4.2 12/14/2023    CL 92 12/14/2023    CO2 11.0 12/14/2023     12/14/2023    CA 7.6 12/14/2023    ALB 3.1 12/13/2023    ALKPHO 97 12/13/2023    BILT 0.9 12/13/2023    TP 6.6 12/13/2023    AST 40 12/13/2023    ALT 36 12/13/2023    LIP 15 12/13/2023    ESRML 23 12/14/2023    CRP 7.96 12/14/2023        Recent Labs   Lab 12/14/23  0631   RBC 2.94*   HGB 8.5*   HCT 24.6*   MCV 83.7   MCH 28.9   MCHC 34.6   RDW 12.7   NEPRELIM 7.17   WBC 8.3   .0       Microbiology data:  No results found for this visit on 12/13/23.      Radiology:  I have reviewed all imaging data available independently.   CT chest:  Pulmonary edema with bilateral large pleural effusions    Impression:  Alina Aceves is a 43 year old female with     Acute hypoxic respiratory failure  CT chest is consistent with acute pulmonary edema with pleural effusions rather than pneumonia.    There is also concern for atypical infection  History of breast cancer  Was on chemotherapy until September 2023  Immunosuppressed  History of SLE  Not on any  immunosuppression    Recommendations:     Her presentation is not consistent with a typical bacterial infection.  Atypical infections could be playing a role in her infection; however, I think most of her presentation is related to volume overload and this large pleural effusion  Follow-up on urine strep pneumo and Legionella antigens  Continue ceftriaxone and doxycycline for now  I agree with drainage of her pleural fluid, send for pH, cell count with differential, Gram stain with culture, LDH and protein, fungal stains with cultures, AFB stains and cultures  Continue to monitor daily labs for antibiotic toxicity  Further recommendations will depend on the above work-up and clinical progress     The plan of care was discussed with the primary hospital team, Tosha Waite MD     Recommendations were also discussed with the patient; all questions were answered.     Thank you for this consultation. Please don't hesitate to call the ID team for questions or any acute changes in patient's clinical condition.    Please note that this report has been produced using speech recognition software and may contain errors related to that system including, but not limited to, errors in grammar, punctuation, and spelling, as well as words and phrases that possibly may have been recognized inappropriately.  If there are any questions or concerns, contact the dictating provider for clarification.    The 21st Century Cures Act makes medical notes like these available to patients in the interest of transparency. Please be advised this is a medical document. Medical documents are intended to carry relevant information, facts as evident, and the clinical opinion of the practitioner. The medical note is intended as peer to peer communication and may appear blunt or direct. It is written in medical language and may contain abbreviations or verbiage that are unfamiliar.     Chelsi Ramon MD  [facility]. Tel: [phone].  Fax: [phone].     Alina Aceves : 1980 MRN: [id number] CSN: [id number]

## 2023-12-14 NOTE — ED QUICK NOTES
Orders for admission, patient is aware of plan and ready to go upstairs. Any questions, please call ED RN Sunil at extension [phone].     Patient Covid vaccination status: Fully vaccinated     COVID Test Ordered in ED: None    COVID Suspicion at Admission: N/A    Running Infusions:    sodium chloride Stopped (12/13/23 9372)    Blood at 100    Mental Status/LOC at time of transport: a/0/4    Other pertinent information:   CIWA score: N/A   NIH score:  N/A

## 2023-12-14 NOTE — IMAGING NOTE
Order received for inpt thoracentesis R side.  Call to inpt RN.  Advised pt will need to be NPO 6 hours prior to procedure, PT/INR within 24 hours of procedure.  Per Dr. DELVIN Rosa, if pt respiratory status stable, would prefer to wait for thoracentesis until sodium level corrected.  Requested pulmonology to discuss with Dr. Rosa if need to proceed today, and to call rad RN ([phone]) after discussion with care team.      1323: tentative time for procedure 12/18 at 1300. NPO 6 hours, PT/INR in am.

## 2023-12-14 NOTE — OCCUPATIONAL THERAPY NOTE
OCCUPATIONAL THERAPY EVALUATION - INPATIENT     Room Number: 431/431-A  Evaluation Date: 12/14/2023  Type of Evaluation: Initial  Presenting Problem: PNA    Physician Order: IP Consult to Occupational Therapy  Reason for Therapy: ADL/IADL Dysfunction and Discharge Planning    History: Patient is a 43 year old female admitted on 12/13/2023 with Presenting Problem: PNA. Co-Morbidities : breast cancer who is undergoing chemotherapy      ASSESSMENT   Patient presents with the following performance deficits: increased weakness, impaired activity tolerance, BUE neuropathy. These deficits impact the patient’s ability to participate in ADL, transfers, instrumental activities of daily living, rest and sleep, leisure and social participation.     The patient is functioning below her previous functional level and would benefit from skilled inpatient OT to address the above deficits, maximizing patient’s ability to return safely to her prior level of function.    The AM-PAC ' '6-Clicks' Inpatient Daily Activity Short Form was completed and this patient is demonstrating an Approx Degree of Impairment: 32.79% in activities of daily living. Research supports that patients with this level of impairment often benefit from HHOT.       OT Discharge Recommendations: Home with home health PT/OT  OT Device Recommendations: Reacher;Shower chair    WEIGHT BEARING RESTRICTION  Weight Bearing Restriction: None                Recommendations for nursing staff:   Transfers: 1p min A c. FWW  Toileting location:  commode    EVALUATION SESSION:  Patient Start of Session: Seated EOB with PT  FUNCTIONAL TRANSFER ASSESSMENT  Sit to Stand: Edge of Bed    BED MOBILITY     BALANCE ASSESSMENT  Static Sitting: Modified Independent  Sitting Bilateral: Modified Independent  Static Standing: Supervision  Standing Bilateral: Contact Guard Assist (2/2 weakness)    FUNCTIONAL ADL ASSESSMENT  UB Dressing Seated: Not Tested (simulated BUE movement for UB  dressing, pt reports slight difficulty d/t neuropathy BUE but can manage with extra time)  LB Dressing Seated: Modified Independent (seated on recliner with legs elevated to adjust socks)      ACTIVITY TOLERANCE: Impaired-pt able to transfer EOB>chair  Pulse: (!) 130  Heart Rate Source: Monitor                   O2 SATURATIONS       COGNITION  Overall Cognitive Status:  WFL - within functional limits    Upper Extremity   ROM: within functional limits   Strength: within functional limits   Coordination  Gross motor: WFL  Fine motor: WFL  Sensation: Light touch:  impaired dx neuropathy BUE, R<L    EDUCATION PROVIDED  Patient : Role of Occupational Therapy; Plan of Care; Discharge Recommendations; Adaptive Equipment Recommendations; DME Recommendations; Functional Transfer Techniques; Posture/Positioning; Energy Conservation; Compensatory ADL Techniques; Proper Body Mechanics  Patient's Response to Education: Verbalized Understanding; Returned Demonstration    Equipment used: FWW  Demonstrates functional use, Would benefit from additional trial      Therapist comments: Pt presented to therapy seated EOB working with PT to transfer to chair. Chair was brought around the bed to transfer from L side of bed approx 5 ft c. FWW. HR remained in the 130s for majority of session, slowly lowering with rest. Pt reported feeling winded but no pain. Educated pt on energy conservation and breathing techniques. Pt reported she was typically able to shower standing. Recommended DME for energy conservation. Pt reports BUE neuropathy with the IV lines in RUE makes using her arms difficult. Educated on adaptive equipment.     Patient End of Session: Up in chair;With  staff;Needs met;Call light within reach;All patient questions and concerns addressed;Alarm set    OCCUPATIONAL PROFILE    HOME SITUATION  Type of Home: House  Home Layout: Two level  Lives With: Daughter (8 and 20(in college))    Toilet and Equipment: Standard height  toilet  Shower/Tub and Equipment: Walk-in shower  Other Equipment: None       Hand Dominance: Right  Drives: Yes       Prior Level of Function: IND with I/ADLs    SUBJECTIVE   \"Just tired\"    PAIN ASSESSMENT  Ratin  Location: None reported or observed       OBJECTIVE  Precautions: Bed/chair alarm (Chronic R foot drop per pt)  Fall Risk: High fall risk      ASSESSMENTS    AM-PAC ‘6-Clicks’ Inpatient Daily Activity Short Form  -   Putting on and taking off regular lower body clothing?: A Little  -   Bathing (including washing, rinsing, drying)?: A Little  -   Toileting, which includes using toilet, bedpan or urinal? : None  -   Putting on and taking off regular upper body clothing?: A Little  -   Taking care of personal grooming such as brushing teeth?: None  -   Eating meals?: None    AM-PAC Score:  Score: 21  Approx Degree of Impairment: 32.79%  Standardized Score (AM-PAC Scale): 44.27    ADDITIONAL TESTS     NEUROLOGICAL FINDINGS      COGNITION ASSESSMENTS       PLAN  OT Treatment Plan: Balance activities;Energy conservation/work simplification techniques;ADL training;IADL training;Functional transfer training;UE strengthening/ROM;Endurance training;Patient/Family education;Patient/Family training;Equipment eval/education;Fine motor coordination activities;Compensatory technique education;Continued evaluation  Rehab Potential : Good  Frequency: 3-5x/week  Number of Visits to Meet Established Goals: 5    ADL Goals   Patient will perform upper body dressing:  with modified independent and with adaptive equipment PRN  Patient will perform toileting: with supervision and with bedside commode    Functional Transfer Goals  Patient will perform all functional transfers:  with supervision and with good judgement/safety    UE Exercise Program Goal  Patient will be modified independent with bilateral AROM HEP (home exercise program).    Additional Goals  Pt will implement energy conservation techniques during functional  mobility and transfers 3/3 times PRN    Patient Evaluation Complexity Level:   Occupational Profile/Medical History LOW - Brief history including review of medical or therapy records    Specific performance deficits impacting engagement in ADL/IADL LOW  1 - 3 performance deficits    Client Assessment/Performance Deficits LOW - No comorbidities nor modifications of tasks    Clinical Decision Making LOW - Analysis of occupational profile, problem-focused assessments, limited treatment options    Overall Complexity LOW     OT Session Time: 20 minutes  Self-Care Home Management: 15 minutes  Therapeutic Activity: 0 minutes  Neuromuscular Re-education: 0 minutes  Therapeutic Exercise: 0 minutes  Cognitive Skills: 0 minutes  Sensory Integrative: 0 minutes  Orthotic Management and Trainin minutes  Can add/delete any of these

## 2023-12-15 ENCOUNTER — APPOINTMENT (OUTPATIENT)
Dept: ULTRASOUND IMAGING | Facility: HOSPITAL | Age: 43
DRG: 987 | End: 2023-12-15
Attending: INTERNAL MEDICINE
Payer: COMMERCIAL

## 2023-12-15 ENCOUNTER — APPOINTMENT (OUTPATIENT)
Dept: GENERAL RADIOLOGY | Facility: HOSPITAL | Age: 43
DRG: 987 | End: 2023-12-15
Attending: INTERNAL MEDICINE
Payer: COMMERCIAL

## 2023-12-15 LAB
ANION GAP SERPL CALC-SCNC: 12 MMOL/L (ref 0–18)
ANION GAP SERPL CALC-SCNC: 13 MMOL/L (ref 0–18)
ANION GAP SERPL CALC-SCNC: 14 MMOL/L (ref 0–18)
ANION GAP SERPL CALC-SCNC: 14 MMOL/L (ref 0–18)
BASOPHILS NFR PLR: 0 %
BLOOD TYPE BARCODE: 6200
BUN BLD-MCNC: 31 MG/DL (ref 9–23)
BUN BLD-MCNC: 33 MG/DL (ref 9–23)
BUN BLD-MCNC: 34 MG/DL (ref 9–23)
BUN BLD-MCNC: 38 MG/DL (ref 9–23)
C3 SERPL-MCNC: 36.1 MG/DL (ref 90–180)
C4 SERPL-MCNC: 2.8 MG/DL (ref 10–40)
CALCIUM BLD-MCNC: 7.7 MG/DL (ref 8.5–10.1)
CALCIUM BLD-MCNC: 7.8 MG/DL (ref 8.5–10.1)
CALCIUM BLD-MCNC: 7.8 MG/DL (ref 8.5–10.1)
CALCIUM BLD-MCNC: 7.9 MG/DL (ref 8.5–10.1)
CHLORIDE SERPL-SCNC: 89 MMOL/L (ref 98–112)
CHLORIDE SERPL-SCNC: 90 MMOL/L (ref 98–112)
CHLORIDE SERPL-SCNC: 90 MMOL/L (ref 98–112)
CHLORIDE SERPL-SCNC: 91 MMOL/L (ref 98–112)
CHOLEST PLR-MCNC: <50 MG/DL
CO2 SERPL-SCNC: 16 MMOL/L (ref 21–32)
CO2 SERPL-SCNC: 16 MMOL/L (ref 21–32)
CO2 SERPL-SCNC: 18 MMOL/L (ref 21–32)
CO2 SERPL-SCNC: 18 MMOL/L (ref 21–32)
COLOR FLD: YELLOW
CREAT BLD-MCNC: 1.15 MG/DL
CREAT BLD-MCNC: 1.19 MG/DL
CREAT BLD-MCNC: 1.19 MG/DL
CREAT BLD-MCNC: 1.23 MG/DL
CREAT UR-SCNC: 82.4 MG/DL
EGFRCR SERPLBLD CKD-EPI 2021: 56 ML/MIN/1.73M2 (ref 60–?)
EGFRCR SERPLBLD CKD-EPI 2021: 58 ML/MIN/1.73M2 (ref 60–?)
EGFRCR SERPLBLD CKD-EPI 2021: 58 ML/MIN/1.73M2 (ref 60–?)
EGFRCR SERPLBLD CKD-EPI 2021: 61 ML/MIN/1.73M2 (ref 60–?)
EOSINOPHIL NFR PLR: 0 %
FOLATE SERPL-MCNC: 13.4 NG/ML (ref 8.7–?)
GLUCOSE BLD-MCNC: 118 MG/DL (ref 70–99)
GLUCOSE BLD-MCNC: 120 MG/DL (ref 70–99)
GLUCOSE BLD-MCNC: 142 MG/DL (ref 70–99)
GLUCOSE BLD-MCNC: 144 MG/DL (ref 70–99)
GLUCOSE PLR-MCNC: 122 MG/DL
INR BLD: 1.27 (ref 0.8–1.2)
L PNEUMO AG UR QL: NEGATIVE
LDH FLD L TO P-CCNC: 132 U/L
LYMPHOCYTES NFR PLR: 4 %
MONOS+MACROS NFR PLR: 32 %
NEUTROPHILS NFR PLR: 64 %
OSMOLALITY SERPL CALC.SUM OF ELEC: 259 MOSM/KG (ref 275–295)
OSMOLALITY SERPL CALC.SUM OF ELEC: 260 MOSM/KG (ref 275–295)
OSMOLALITY UR: 371 MOSM/KG (ref 300–1300)
POTASSIUM SERPL-SCNC: 3.4 MMOL/L (ref 3.5–5.1)
POTASSIUM SERPL-SCNC: 3.4 MMOL/L (ref 3.5–5.1)
POTASSIUM SERPL-SCNC: 3.5 MMOL/L (ref 3.5–5.1)
POTASSIUM SERPL-SCNC: 3.6 MMOL/L (ref 3.5–5.1)
POTASSIUM SERPL-SCNC: 4.1 MMOL/L (ref 3.5–5.1)
PROT PLR-MCNC: 2.2 G/DL
PROT UR-MCNC: 241.1 MG/DL
PROT/CREAT UR-RTO: 2.93
PROTHROMBIN TIME: 16 SECONDS (ref 11.6–14.8)
RBC PLEURAL FLUID: <3000 /MM3
SODIUM SERPL-SCNC: 10 MMOL/L
SODIUM SERPL-SCNC: 120 MMOL/L (ref 136–145)
SODIUM SERPL-SCNC: 121 MMOL/L (ref 136–145)
STREP PNEUMO ANTIGEN, URINE: NEGATIVE
TOTAL CELLS COUNTED FLD: 100
TSI SER-ACNC: 0.63 MIU/ML (ref 0.36–3.74)
UNIT VOLUME: 284 ML
VIT B12 SERPL-MCNC: 1931 PG/ML (ref 193–986)
WBC # PLR: 815 /MM3

## 2023-12-15 PROCEDURE — 32555 ASPIRATE PLEURA W/ IMAGING: CPT | Performed by: INTERNAL MEDICINE

## 2023-12-15 PROCEDURE — 99233 SBSQ HOSP IP/OBS HIGH 50: CPT | Performed by: INTERNAL MEDICINE

## 2023-12-15 PROCEDURE — 0W9B3ZX DRAINAGE OF LEFT PLEURAL CAVITY, PERCUTANEOUS APPROACH, DIAGNOSTIC: ICD-10-PCS | Performed by: INTERNAL MEDICINE

## 2023-12-15 RX ORDER — SODIUM CHLORIDE 1 G/1
2 TABLET ORAL
Status: DISCONTINUED | OUTPATIENT
Start: 2023-12-16 | End: 2023-12-19

## 2023-12-15 RX ORDER — FUROSEMIDE 10 MG/ML
40 INJECTION INTRAMUSCULAR; INTRAVENOUS ONCE
Status: COMPLETED | OUTPATIENT
Start: 2023-12-15 | End: 2023-12-15

## 2023-12-15 RX ORDER — TOLVAPTAN 15 MG/1
15 TABLET ORAL ONCE
Status: COMPLETED | OUTPATIENT
Start: 2023-12-15 | End: 2023-12-15

## 2023-12-15 RX ORDER — TOLVAPTAN 30 MG/1
30 TABLET ORAL ONCE
Status: COMPLETED | OUTPATIENT
Start: 2023-12-15 | End: 2023-12-15

## 2023-12-15 RX ORDER — SODIUM CHLORIDE 1 G/1
1 TABLET ORAL
Status: DISCONTINUED | OUTPATIENT
Start: 2023-12-15 | End: 2023-12-15

## 2023-12-15 NOTE — PROGRESS NOTES
[facility] HEMATOLOGY ONCOLOGY  Report of Consultation- PROGRESS NOTE    Alina Aceves Patient Status:  Inpatient    1980 MRN [id number]   Location [facility] 4NW-A Attending Tosha Waite MD   Hosp Day # 2 PCP HOLLY IBARRA     ADMIT DATE AND TIME: 2023  7:15 PM    ADMIT DIAGNOSIS: Hyponatremia [E87.1]  Weakness generalized [R53.1]  Anemia, unspecified type [D64.9]  Community acquired pneumonia, unspecified laterality [J18.9]          REASON FOR CONSULTATION:     Breast cancer  Pneumonia  Effusion      HISTORY OF PRESENTING ILLNESS     Alina Aceves is a 43 year old female with history of locally advanced breast cancer.  Diagnosed in   Pathology showed triple negative stage IIIb invasive ductal carcinoma of the left breast.  She was given neoadjuvant dose dense AC with Taxol between April and 2023.  She had left mastectomy in 2023 pathology showed residual T1 a N0.  She she was recently recommended to start oral capecitabine adjuvant chemotherapy.  She has not started it yet.    She was seen in urgent care for shortness of breath and was diagnosed with pneumonia.  She was given oral antibiotics however her symptoms progressed and she was seen in the ER and was admitted.  CT showed worsening lung infiltrate and bilateral pleural effusions.    HOSPITAL COURSE   ID and Pulm Consult appreciated   She feels well       PERTINENT HISTORY:     History:  Past Medical History:   Diagnosis Date    Breast cancer (HCC)     Gastritis     Lupus (HCC)     Sjogren's disease (HCC)      Past Surgical History:   Procedure Laterality Date    BREAST SURGERY Left      DELIVERY ONLY       History reviewed. No pertinent family history.    SOCIAL HX   reports that she quit smoking about 13 years ago. Her smoking use included cigarettes. She has never used smokeless tobacco. She reports that she does not currently use alcohol. She reports that she does not use  drugs.    Allergies:  Allergies   Allergen Reactions    Bactrim [Sulfamethoxazole W/Trimethoprim] RASH       Medications:    Pre-Admission Meds:  Current Outpatient Medications   Medication Instructions    azaTHIOprine (IMURAN OR) Take by mouth.    DULoxetine (CYMBALTA) 30 mg, Oral, Daily    HYDROcodone-acetaminophen 5-325 MG Oral Tab 1 tablet, Every 8 hours PRN    lidocaine-prilocaine 2.5-2.5 % External Cream APPLY SMALL AMOUNT OVER PORT PRIOR TO ACCESS    ondansetron (ZOFRAN) 8 mg, Oral, As needed    Potassium Citrate ER 15 MEQ (1620 MG) Oral Tab CR 1 tablet, Oral, 3 times daily    Prenatal Vit-DSS-Fe Cbn-FA (PRENATAL AD OR) 1 tablet, Oral, Daily    zolpidem (AMBIEN) 10 mg, Oral, Nightly PRN       Current Inpatient Meds:   tolvaptan  30 mg Oral Once    potassium chloride  40 mEq Intravenous Once    pantoprazole  40 mg Intravenous QAM AC    Or    pantoprazole  40 mg Oral QAM AC    cefTRIAXone  1 g Intravenous Q24H    DULoxetine  30 mg Oral Daily    doxycycline  100 mg Intravenous Q12H       Infusion   sodium bicarbonate in D5 0.45% NS infusion 75 mEq (12/14/23 1139)       PRN  ALPRAZolam, LORazepam, metoprolol, ipratropium-albuterol, HYDROmorphone, acetaminophen, melatonin, prochlorperazine, polyethylene glycol (PEG 3350), sennosides, bisacodyl, fleet enema, HYDROmorphone **OR** HYDROmorphone **OR** HYDROmorphone, HYDROmorphone    Review of Systems:  A 12-point review of systems was done with pertinent positives and negatives per the HPI.  Unable to lie down flat      Physical Exam:   Blood pressure (!) 142/93, pulse 110, temperature 96.8 °F (36 °C), temperature source Axillary, resp. rate 24, height 5' 4\" (1.626 m), weight 124 lb (56.2 kg), last menstrual period 08/28/2023, SpO2 95%, currently breastfeeding.    Performance Status: 2   General: Patient is alert and oriented x 3, dyspnea on lying down.  Appears better this am   HEENT: No Icterus. Oropharynx is dry   Neck:  No palpable lymphadenopathy. Neck is  supple.   Chest: Clear to auscultation.   Heart: Regular rate and rhythm.    Abdomen: Soft, non tender with good bowel sounds.   Extremities: Pedal pulses are present. No edema.   Neurological: Grossly intact.    Lymphatics: There is no palpable lymphadenopathy           DIAGNOSTIC WORK UP:     Laboratory Data:      Recent Results (from the past 24 hour(s))   Basic Metabolic Panel (8)    Collection Time: 12/14/23 10:32 AM   Result Value Ref Range    Glucose 114 (H) 70 - 99 mg/dL    Sodium 119 (LL) 136 - 145 mmol/L    Potassium 4.2 3.5 - 5.1 mmol/L    Chloride 92 (L) 98 - 112 mmol/L    CO2 11.0 (L) 21.0 - 32.0 mmol/L    Anion Gap 16 0 - 18 mmol/L    BUN 28 (H) 9 - 23 mg/dL    Creatinine 1.34 (H) 0.55 - 1.02 mg/dL    Calcium, Total 7.6 (L) 8.5 - 10.1 mg/dL    Calculated Osmolality 254 (L) 275 - 295 mOsm/kg    eGFR-Cr 50 (L) >=60 mL/min/1.73m2   Basic Metabolic Panel (8)    Collection Time: 12/14/23 12:02 PM   Result Value Ref Range    Glucose 124 (H) 70 - 99 mg/dL    Sodium 119 (LL) 136 - 145 mmol/L    Potassium 3.1 (L) 3.5 - 5.1 mmol/L    Chloride 92 (L) 98 - 112 mmol/L    CO2 12.0 (L) 21.0 - 32.0 mmol/L    Anion Gap 15 0 - 18 mmol/L    BUN 30 (H) 9 - 23 mg/dL    Creatinine 1.20 (H) 0.55 - 1.02 mg/dL    Calcium, Total 7.8 (L) 8.5 - 10.1 mg/dL    Calculated Osmolality 256 (L) 275 - 295 mOsm/kg    eGFR-Cr 58 (L) >=60 mL/min/1.73m2   Basic Metabolic Panel (8)    Collection Time: 12/14/23  6:27 PM   Result Value Ref Range    Glucose 148 (H) 70 - 99 mg/dL    Sodium 120 (LL) 136 - 145 mmol/L    Potassium 3.6 3.5 - 5.1 mmol/L    Chloride 90 (L) 98 - 112 mmol/L    CO2 17.0 (L) 21.0 - 32.0 mmol/L    Anion Gap 13 0 - 18 mmol/L    BUN 30 (H) 9 - 23 mg/dL    Creatinine 1.22 (H) 0.55 - 1.02 mg/dL    Calcium, Total 8.0 (L) 8.5 - 10.1 mg/dL    Calculated Osmolality 259 (L) 275 - 295 mOsm/kg    eGFR-Cr 56 (L) >=60 mL/min/1.73m2   Prepare RBC Once    Collection Time: 12/15/23 12:30 AM   Result Value Ref Range    Blood Product  [id number]     Unit Number [id number]     UNIT ABO A     UNIT RH POS     Product Status Presumed Transfused     Expiration Date [id number]     Blood Type Barcode 6200     Unit Volume 284 ml   Basic Metabolic Panel (8)    Collection Time: 12/15/23 12:37 AM   Result Value Ref Range    Glucose 144 (H) 70 - 99 mg/dL    Sodium 120 (LL) 136 - 145 mmol/L    Potassium 3.6 3.5 - 5.1 mmol/L    Chloride 89 (L) 98 - 112 mmol/L    CO2 18.0 (L) 21.0 - 32.0 mmol/L    Anion Gap 13 0 - 18 mmol/L    BUN 31 (H) 9 - 23 mg/dL    Creatinine 1.19 (H) 0.55 - 1.02 mg/dL    Calcium, Total 7.7 (L) 8.5 - 10.1 mg/dL    Calculated Osmolality 259 (L) 275 - 295 mOsm/kg    eGFR-Cr 58 (L) >=60 mL/min/1.73m2   Sodium, Urine, Random    Collection Time: 12/15/23  4:14 AM   Result Value Ref Range    Na Urine Random 10 mmol/L   Protein/Creatinine Ratio, Urine Random    Collection Time: 12/15/23  4:14 AM   Result Value Ref Range    Total Protein Urine Random 241.1 mg/dL    Creatinine Ur Random 82.40 mg/dL    Urine Protein/Creatinine Ratio, Random 2.93    Complement C4, Serum    Collection Time: 12/15/23  5:13 AM   Result Value Ref Range    Complement C4 2.8 (L) 10.0 - 40.0 mg/dL   Complement C3, Serum    Collection Time: 12/15/23  5:13 AM   Result Value Ref Range    Complement C3 36.1 (L) 90.0 - 180.0 mg/dL   TSH W Reflex To Free T4    Collection Time: 12/15/23  5:13 AM   Result Value Ref Range    TSH 0.631 0.358 - 3.740 mIU/mL   Potassium    Collection Time: 12/15/23  5:13 AM   Result Value Ref Range    Potassium 3.4 (L) 3.5 - 5.1 mmol/L   Basic Metabolic Panel (8)    Collection Time: 12/15/23  5:13 AM   Result Value Ref Range    Glucose 142 (H) 70 - 99 mg/dL    Sodium 120 (LL) 136 - 145 mmol/L    Potassium 3.4 (L) 3.5 - 5.1 mmol/L    Chloride 90 (L) 98 - 112 mmol/L    CO2 18.0 (L) 21.0 - 32.0 mmol/L    Anion Gap 12 0 - 18 mmol/L    BUN 34 (H) 9 - 23 mg/dL    Creatinine 1.15 (H) 0.55 - 1.02 mg/dL    Calcium, Total 7.8 (L) 8.5 - 10.1 mg/dL     Calculated Osmolality 260 (L) 275 - 295 mOsm/kg    eGFR-Cr 61 >=60 mL/min/1.73m2   Prothrombin Time (PT)    Collection Time: 12/15/23  5:13 AM   Result Value Ref Range    PT 16.0 (H) 11.6 - 14.8 seconds    INR 1.27 (H) 0.80 - 1.20   Vitamin B12    Collection Time: 12/15/23  5:14 AM   Result Value Ref Range    Vitamin B12 1,931 (H) 193 - 986 pg/mL   Folic Acid Serum (Folate)    Collection Time: 12/15/23  5:14 AM   Result Value Ref Range    Folate (Folic Acid) 13.4 >=8.7 ng/mL       Recent Labs   Lab 12/13/23 1937 12/14/23  0631   RBC 2.72* 2.94*   HGB 7.9* 8.5*   HCT 22.8* 24.6*   MCV 83.8 83.7   MCH 29.0 28.9   MCHC 34.6 34.6   RDW 12.8 12.7   NEPRELIM 5.52 7.17   WBC 6.3 8.3   .0 188.0       CULTURE RESULTS  Hospital Encounter on 12/13/23   1. Urine Culture, Routine     Status: None    Collection Time: 12/14/23  3:58 AM    Specimen: Urine, clean catch   Result Value Ref Range    Urine Culture No Growth at 18-24 hrs. N/A   2. Blood Culture     Status: None (Preliminary result)    Collection Time: 12/13/23  8:10 PM    Specimen: Blood,peripheral   Result Value Ref Range    Blood Culture Result No Growth 1 Day N/A         Imaging:    CT ANGIOGRAPHY, CHEST (CPT=71275)    Result Date: 12/14/2023  CONCLUSION:   1. Moderate to large bilateral pleural effusions along with marked solid a shin lungs are suggestive pulmonary edema and fluid overload.  2. No evidence of pulmonary embolus.  3. Moderate to large left axillary and left breast fluid collections are noted.  Minimal gas in the left more central breast fluid collection is noted.  Possibility of infection is of consideration.  Sequelae of seroma/chronic hematoma is of consideration as well.  Agree with preliminary radiology report from [facility].    LOCATION:  [place]   Dictated by (CST): Gustavo Yuan MD on 12/14/2023 at 6:49 AM     Finalized by (CST): Gustavo Yuan MD on 12/14/2023 at 6:52 AM       XR CHEST AP PORTABLE  (CPT=71045)    Result  Date: 12/13/2023  CONCLUSION:  Dense patchy airspace opacities are present within the lungs suspicious for areas of pneumonia.  Follow-up is recommended to ensure resolution.  If clinical symptoms persist then consider CT.   LOCATION:  [id number]      Dictated by (CST): Cedrick Tovar MD on 12/13/2023 at 8:13 PM     Finalized by (CST): Cedrick Tovar MD on 12/13/2023 at 8:13 PM          PROBLEM LIST:     Principal Problem:    Community acquired pneumonia, unspecified laterality  Active Problems:    Hyponatremia    Anemia    Metabolic acidosis    Weakness generalized    Anemia, unspecified type    Malignant neoplasm of female breast (HCC)    Bilateral pleural effusion    SLE (systemic lupus erythematosus related syndrome) (HCC)    Neuropathy    Sjogren's syndrome (HCC)          ASSESSMENT AND PLAN:     Alina Aceves is a 43 year old female with stage IIIb breast cancer status postmastectomy now with bilateral pneumonia and pleural effusions.    Breast cancer  Locally advanced stage IIIb triple negative breast cancer  Status post no adjuvant chemotherapy followed by mastectomy in November 2023  Residual T1a disease and has been recommended to start oral capecitabine  No treatment while inpatient    Pneumonia/effusion  Reviewed CT scan she had bilateral lung consolidation/infiltrate along with bilateral effusion  Not related to underlying breast cancer  Pulmonary consult for bronchoscopy and thoracentesis - appreciated  Thora today   ID consult noted. No bacterial PNA  Monitor     Anemia  Moderate anemia secondary to previous chemotherapy  Will monitor hemoglobin and transfuse for hemoglobin less than 7    We will follow      Thank you for allowing me to participate in the care of your patient.    Sree Leavitt MD      This note was prepared using Dragon Medical voice recognition dictation software. As a result errors may occur. When identified these errors have been corrected. While every attempt is made to correct errors  during dictation discrepancies may still exist. Please call me to clarify any errors.

## 2023-12-15 NOTE — CONSULTS
Rheumatology Inpatient Consult  Note  =====================================================================================================      Date of admission: 12/13/2023  ?  PCP  HOLLY IBARRA  Fax: [phone]  Phone: [phone]    =====================================================================================================  HPI    Alina Aceves is a 43 year old female     Reason for consult: Lupus    History of stage IIIb breast cancer s/p mastectomy s/p chemo.  Presents with worsening shortness of breath in the setting of pneumonia.  Recently diagnosed with pneumonia at urgent care.  Given antibiotics.    CT chest during this hospitalization indicated bilateral large pleural effusions and left axillary and left breast fluid collection.  Sodium low at 120.  Started on ceftriaxone and doxycycline.    In terms of her UCTD with Sjogren's features versus SLE  --Relevant Clinical Manifestations: Malar rash, lower extremity rash (biopsy consistent with leukocytoclastic vasculitis), Raynaud's, dry eyes/mouth, pleurisy (around 2016, treated with ibuprofen), interstitial nephritis  --Serologies/Laboratory findings: Leukopenia, positive NURIA, positive SSA  --Prior medications: Hydroxychloroquine (no improvement).  Azathioprine stopped due to recurrent cellulitis    Patient notes significant neuropathy that has been worsening in the past few days.  Now above the knee.  In the bilateral lower extremities  -Neuropathy noted immediately after Taxol in July 2023.  Also with dropfoot.  To begin PT but did not start yet.  Taking Cymbalta which has been helping since then.      14 point ROS negative except noted above    Medications:   potassium chloride  40 mEq Intravenous Once    pantoprazole  40 mg Intravenous QAM AC    Or    pantoprazole  40 mg Oral QAM AC    cefTRIAXone  1 g Intravenous Q24H    DULoxetine  30 mg Oral Daily    doxycycline  100 mg Intravenous Q12H       Current Outpatient Medications    Medication Instructions    azaTHIOprine (IMURAN OR) Take by mouth.    DULoxetine (CYMBALTA) 30 mg, Oral, Daily    HYDROcodone-acetaminophen 5-325 MG Oral Tab 1 tablet, Every 8 hours PRN    lidocaine-prilocaine 2.5-2.5 % External Cream APPLY SMALL AMOUNT OVER PORT PRIOR TO ACCESS    ondansetron (ZOFRAN) 8 mg, Oral, As needed    Potassium Citrate ER 15 MEQ (1620 MG) Oral Tab CR 1 tablet, Oral, 3 times daily    Prenatal Vit-DSS-Fe Cbn-FA (PRENATAL AD OR) 1 tablet, Oral, Daily    zolpidem (AMBIEN) 10 mg, Oral, Nightly PRN     Past Medical History:  Past Medical History:   Diagnosis Date    Breast cancer (HCC)     Gastritis     Lupus (HCC)     Sjogren's disease (HCC)      Past Surgical History:  Past Surgical History:   Procedure Laterality Date    BREAST SURGERY Left      DELIVERY ONLY       Family History:  History reviewed. No pertinent family history.  Social History:  Social History     Socioeconomic History    Marital status:    Tobacco Use    Smoking status: Former     Types: Cigarettes     Quit date:      Years since quittin.9    Smokeless tobacco: Never    Tobacco comments:     social smoker   Vaping Use    Vaping Use: Never used   Substance and Sexual Activity    Alcohol use: Not Currently     Comment: occassional    Drug use: Never     Social Determinants of Health     Food Insecurity: No Food Insecurity (2023)    Food Insecurity     Food Insecurity: Never true   Transportation Needs: No Transportation Needs (2023)    Transportation Needs     Lack of Transportation: No   Housing Stability: Medium Risk (2023)    Housing Stability     Housing Instability: Yes     ?  Allergies:  Allergies   Allergen Reactions    Bactrim [Sulfamethoxazole W/Trimethoprim] RASH         Objective    Vitals:    23 1138 23 1248 23 1719 23 1942   BP:  (!) 146/107 (!) 145/105 (!) 145/102   BP Location:  Left arm Left arm Left arm   Pulse: (!) 130 (!) 124 120 (!) 125    Resp:  24 22 24   Temp:  97.4 °F (36.3 °C) 97.3 °F (36.3 °C) 96.8 °F (36 °C)   TempSrc:  Oral Temporal Temporal   SpO2:  94% 97% 95%   Weight:       Height:           GEN: NAD, well-nourished.   HEENT: Head: NCAT. Face: No lesions. Eyes: Conjunctiva clear. Sclera are anicteric. PERRLA. EOMs are full. Ears: The right and left ear canals are clear.  Nose: No external or internal nasal deformities. Nasal septum is midline. Mouth: The lips are within normal limits.  No oral ulcers Tongue is midline with no lesions. The oral cavity is clear.   Neck: Supple. No neck masses. No thyromegaly. No LAD, parotid or submandicular gland palpated.   CV: Tachycardic, no mrg, S1/S2  PULM: Decreased breath sounds bilaterally.  Bibasilar crackles.  Abd: Soft  Extremities: No cyanosis, edema or deformities.   Neurologic: Strength, CN2-12 grossly intact     -Bilateral foot drop, 0/5 dorsiflexion on the right  -Decrease sensation from toes to above thighs    Psych: normal affect.   Skin: Diffuse nonscarring alopecia  MSK: 28 joint count performed. No evidence of synovitis in mcp, pip, dip, wrist, elbows, shoulders, hips, knees, ankles, mtp unless otherwise noted. Full ROM of elbows, wrists, knees.    Hands- No ulceration/lesions noted. No swelling in IPJs, no TTP or synovitis noted in joints of hand   Wrists- No pain with wrist flexion and extension. No swelling, erythema, or increased warmth.   Elbows- No swelling, erythema, or increased warmth.   Shoulders- FROM, abduction ~180 degrees bilaterally.    Knees- No swelling, erythema, or increased warmth. AROM flexion/extension ~0-180 degrees.    No valgus/varus laxities appreciated.   Ankles/Feet- No swelling, erythema, or increased warmth.      ?  Labs:  Lab Results   Component Value Date    WBC 8.3 12/14/2023    RBC 2.94 (L) 12/14/2023    HGB 8.5 (L) 12/14/2023    HCT 24.6 (L) 12/14/2023    .0 12/14/2023    MCV 83.7 12/14/2023    MCH 28.9 12/14/2023    MCHC 34.6 12/14/2023     RDW 12.7 12/14/2023    NEPRELIM 7.17 12/14/2023    NEPERCENT 86.6 12/14/2023    LYPERCENT 6.7 12/14/2023    MOPERCENT 5.6 12/14/2023    EOPERCENT 0.0 12/14/2023    BAPERCENT 0.1 12/14/2023    NE 7.17 12/14/2023    LYMABS 0.55 (L) 12/14/2023    MOABSO 0.46 12/14/2023    EOABSO 0.00 12/14/2023    BAABSO 0.01 12/14/2023     Lab Results   Component Value Date     (H) 12/14/2023    BUN 30 (H) 12/14/2023    BUNCREA 11.7 03/04/2020    CREATSERUM 1.22 (H) 12/14/2023    ANIONGAP 13 12/14/2023     11/15/2015    GFRNAA 95 05/27/2022    GFRAA 109 05/27/2022    CA 8.0 (L) 12/14/2023    OSMOCALC 259 (L) 12/14/2023    ALKPHO 97 12/13/2023    AST 40 (H) 12/13/2023    ALT 36 12/13/2023    BILT 0.9 12/13/2023    TP 6.6 12/13/2023    ALB 3.1 (L) 12/13/2023    GLOBULIN 3.5 12/13/2023     (LL) 12/14/2023    K 3.6 12/14/2023    CL 90 (L) 12/14/2023    CO2 17.0 (L) 12/14/2023         No results found for: \"ANATI\", \"NURIA\", \"ANAS\", \"ANASCRN\", \"ANASCRNRFLX\", \"BEST\"  No results found for: \"SSA\", \"SSAUR\", \"ANTISSA\", \"SSA52\", \"SSA60\", \"SSADD\", \"SSB\", \"ANTISSB\"  No results found for: \"DSDNA\", \"ANTIDSDNA\", \"SMUD\", \"ANTISM\", \"SM\", \"RNP\", \"ANTIRNP\", \"SMITHRNP\"  No results found for: \"SCL70\", \"SCL\", \"EKEXGCA07\"  No results found for: \"C3\", \"C4\"  No results found for: \"DRVVT\", \"LAINT\", \"PTTLUPUS\", \"LUPUSINTERP\", \"LA\", \"C4DF1QZLAY\", \"T6FN7HUZIG\", \"U7RTFLLQNQ\", \"A4VCAIJHPE\"  No results found for: \"CARDIOLIPIGG\", \"CARDIOLIPIGM\", \"CARDIOLIPIGA\", \"CARDIOIGA\", \"CARLIP\"      Additional Labs:    Radiology:    Radiology review:  CT ANGIOGRAPHY, CHEST (CPT=71275)    Result Date: 12/14/2023  CONCLUSION:   1. Moderate to large bilateral pleural effusions along with marked solid a shin lungs are suggestive pulmonary edema and fluid overload.  2. No evidence of pulmonary embolus.  3. Moderate to large left axillary and left breast fluid collections are noted.  Minimal gas in the left more central breast fluid collection is noted.  Possibility of  infection is of consideration.  Sequelae of seroma/chronic hematoma is of consideration as well.  Agree with preliminary radiology report from [facility].    LOCATION:  [place]   Dictated by (CST): Gustavo Yuan MD on 12/14/2023 at 6:49 AM     Finalized by (CST): Gustavo Yuan MD on 12/14/2023 at 6:52 AM       XR CHEST AP PORTABLE  (CPT=71045)    Result Date: 12/13/2023  CONCLUSION:  Dense patchy airspace opacities are present within the lungs suspicious for areas of pneumonia.  Follow-up is recommended to ensure resolution.  If clinical symptoms persist then consider CT.   LOCATION:  [id number]      Dictated by (CST): Cedrick Tovar MD on 12/13/2023 at 8:13 PM     Finalized by (CST): Cedrick Tovar MD on 12/13/2023 at 8:13 PM      =====================================================================================================  Assessment and Plan    Assessment:  1. Community acquired pneumonia, unspecified laterality    2. Hyponatremia    3. Weakness generalized    4. Anemia, unspecified type      #Acute hypoxemic respiratory failure: Bilateral pulmonary infiltrates and bilateral pleural effusions noted  -Differential remains broad.  -Patient with a history of pleurisy in the past, however no other evidence of active SLE/Sjogren's otherwise    #Breast cancer s/p mastectomy s/p chemotherapy  -Patient reports bilateral peripheral neuropathy with right foot drop  #UCTD with Sjogren's features versus SLE  --Relevant Clinical Manifestations: Malar rash, lower extremity rash (biopsy consistent with leukocytoclastic vasculitis), Raynaud's, dry eyes/mouth, pleurisy (around 2016, treated with ibuprofen), interstitial nephritis (2023)  --Serologies/Laboratory findings: Leukopenia, positive NURIA, positive SSA, low C3/C4  --Prior medications: Hydroxychloroquine (no improvement).  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  -Responded to short course of  prednisone in early 2023.    #Hyponatremia:  -per renal, suspect SIADH  ?  Plan:  -Obtain SLE/Sjogren's/neuropathy labs and urine test  -Given lack of definitive evidence of active systemic autoimmune disease:  no role for any immunomodulatory therapy for her underlying autoimmune disease at this juncture.  -Pending thoracentesis to be completed tomorrow.    No follow-ups on file.      The above plan of care, diagnosis, orders, and follow-up were discussed with the patient and primary team. Questions related to this recommended plan of care were answered.    This report was performed utilizing speech recognition software technology. Despite proofreading, speech recognition errors could escape detection. If a word or phrase is confusing or out of context, please do not hesitate to call for   clarification.       Kind regards      Rakesh Alberto MD  [facility]

## 2023-12-15 NOTE — PROGRESS NOTES
Infectious Disease Progress Note      Date of admission: 12/13/2023  7:15 PM     Reason for consult: Pneumonia    Subjective: Feels about the same.  Continues to be out of breath.  No nausea or vomiting.  No diarrhea.  No shortness of breath.  No cough or sputum production.    The rest of the systems were reviewed and found to be negative except was mentioned above    Interval events: This is a 43-year-old female patient with history of stage IIIb breast cancer, status post mastectomy and capecitabine, currently not on any therapy.  She also has a history of lupus, not on any therapy, presents here with acute hypoxic respiratory failure with large pleural effusions noted on her CT along with severe pulmonary edema, question of atypical pneumonitis.    Medications:    sodium chloride    mupirocin    potassium chloride    sodium bicarbonate in D5 0.45% NS infusion    ALPRAZolam    pantoprazole **OR** pantoprazole    LORazepam    metoprolol    ipratropium-albuterol    HYDROmorphone    cefTRIAXone    DULoxetine    acetaminophen    melatonin    prochlorperazine    polyethylene glycol (PEG 3350)    sennosides    bisacodyl    fleet enema    HYDROmorphone **OR** HYDROmorphone **OR** HYDROmorphone    HYDROmorphone    doxycycline     Allergies:  Allergies   Allergen Reactions    Bactrim [Sulfamethoxazole W/Trimethoprim] RASH       Physical Exam:  Vitals:    12/15/23 1246   BP: (!) 144/104   Pulse: (!) 123   Resp: 20   Temp: 96.6 °F (35.9 °C)     Vitals signs and nursing note reviewed.   Constitutional:       Appearance: Normal appearance.   HENT:      Head: Normocephalic and atraumatic.      Mouth: Mucous membranes are moist.   Neck:      Musculoskeletal: Neck supple.   Cardiovascular:      Rate and Rhythm: Normal rate.   Pulmonary:      Effort: Pulmonary effort is normal.  Moderate respiratory distress.   Abdominal:      General: Abdomen is flat. There is no distension.      Palpations: Abdomen is soft. There is no mass.       Tenderness: There is no tenderness. There is no guarding or rebound.      Hernia: No hernia is present.   Musculoskeletal:      Right lower leg: No edema.      Left lower leg: No edema.   Skin:     General: Skin is warm and dry.   Neurological:      General: No focal deficit present.      Mental Status: Alert and oriented to person, place, and time.       Laboratory data:  I have reviewed all the lab results independently.  Lab Results   Component Value Date    CREATSERUM 1.19 12/15/2023    BUN 33 12/15/2023     12/15/2023    K 3.5 12/15/2023    CL 91 12/15/2023    CO2 16.0 12/15/2023     12/15/2023    CA 7.9 12/15/2023    INR 1.27 12/15/2023    TSH 0.631 12/15/2023    B12 1,931 12/15/2023      Recent Labs   Lab 12/14/23  0631   RBC 2.94*   HGB 8.5*   HCT 24.6*   MCV 83.7   MCH 28.9   MCHC 34.6   RDW 12.7   NEPRELIM 7.17   WBC 8.3   .0      Microbiology data:  Hospital Encounter on 12/13/23   1. Urine Culture, Routine     Status: None    Collection Time: 12/14/23  3:58 AM    Specimen: Urine, clean catch   Result Value Ref Range    Urine Culture No Growth at 18-24 hrs. N/A   2. Blood Culture     Status: None (Preliminary result)    Collection Time: 12/13/23  8:10 PM    Specimen: Blood,peripheral   Result Value Ref Range    Blood Culture Result No Growth 1 Day N/A      Impression:  Alina Aceves is a 43 year old female with    Acute hypoxic respiratory failure  CT chest is consistent with acute pulmonary edema with pleural effusions rather than pneumonia.    There is also concern for atypical infection  Currently on ceftriaxone and doxycycline  Hyponatremia with pulmonary edema  Management as per renal medicine  History of breast cancer  Was on chemotherapy until September 2023  Immunosuppressed  History of SLE  Not on any immunosuppression    Recommendations:    Agree with draining her pleural fluid, send for pH, cell count with differential, Gram stain and culture, LDH, protein, fungal  stains with cultures, AFB stains and cultures  Continue ceftriaxone and doxycycline for now  If the patient fails to improve, the patient would require bronchoscopy  Management of hyponatremia and volume as per renal medicine  Continue to monitor daily labs for antibiotic toxicities  Further recommendations will depend on the above work-up and clinical progress     The plan of care was discussed with the primary hospital team, Tosha Waite MD     Recommendations were also discussed with the patient; all questions were answered.     Thank you for this consultation. Please don't hesitate to call the ID team for questions or any acute changes in patient's clinical condition.    Please note that this report has been produced using speech recognition software and may contain errors related to that system including, but not limited to, errors in grammar, punctuation, and spelling, as well as words and phrases that possibly may have been recognized inappropriately.  If there are any questions or concerns, contact the dictating provider for clarification.    The  [employer] C[employer] Act makes medical notes like these available to patients in the interest of transparency. Please be advised this is a medical document. Medical documents are intended to carry relevant information, facts as evident, and the clinical opinion of the practitioner. The medical note is intended as peer to peer communication and may appear blunt or direct. It is written in medical language and may contain abbreviations or verbiage that are unfamiliar.     Chelsi Ramon MD  [facility]. Tel: [phone]. Fax: [phone].     Alina Aceves : 1980 MRN: [id number] [facility]: [id number]

## 2023-12-15 NOTE — OCCUPATIONAL THERAPY NOTE
OCCUPATIONAL THERAPY TREATMENT NOTE - INPATIENT     Room Number: 431/431-A  Session: 1   Number of Visits to Meet Established Goals: 5    Presenting Problem: PNA    History: Patient is a 43 year old female admitted on 12/13/2023 with Presenting Problem: PNA. Co-Morbidities : breast cancer who is undergoing chemotherapy     ASSESSMENT   Patient presents with the following performance deficits: endurance, safety, pain, activity tolerance, mobility, self-care. These deficits impact the patient’s ability to participate in ADL, transfers, instrumental activities of daily living, rest and sleep, leisure and social participation. Pt with fatigue and weakness limiting function at this time    The patient is functioning below her previous functional level and would benefit from skilled inpatient OT to address the above deficits, maximizing patient’s ability to return safely to her prior level of function.    OT Discharge Recommendations: Home with home health PT/OT  OT Device Recommendations: Reacher;Shower chair    WEIGHT BEARING RESTRICTION  Weight Bearing Restriction: None                Recommendations for nursing staff:   Transfers: Min A  Toileting location: commode    TREATMENT SESSION:  Patient Start of Session: supine in bed for session  FUNCTIONAL TRANSFER ASSESSMENT  Sit to Stand: Chair; Edge of Bed  Edge of Bed: Minimal Assist  Chair: Minimal Assist    BED MOBILITY  Rolling: Minimal Assist  Supine to Sit : Minimal Assist    BALANCE ASSESSMENT  Static Sitting: Contact Guard Assist  Sitting Bilateral: Contact Guard Assist  Static Standing: Minimal Assist  Standing Bilateral: Minimal Assist (to amb in room adn into hallway with frequent rest breaks)    FUNCTIONAL ADL ASSESSMENT  UB Dressing Seated: Not Tested  LB Dressing Seated: Supervision (to monique socks)      ACTIVITY TOLERANCE: vitals stable but required frequent rest breaks, did require 3L O2 with activity                         O2 SATURATIONS       EDUCATION  PROVIDED  Patient : Role of Occupational Therapy; Plan of Care; Discharge Recommendations; Adaptive Equipment Recommendations; DME Recommendations; Functional Transfer Techniques; Posture/Positioning; Energy Conservation; Compensatory ADL Techniques; Proper Body Mechanics  Patient's Response to Education: Verbalized Understanding; Returned Demonstration      Equipment used: RW  Demonstrates functional use, Would benefit from additional trial      Exercises:    Exercises Repetitions Comments   Scapular elevation     Scapular retraction     Shoulder rolls     Shoulder flexion     Shoulder abduction     Shoulder internal/external rotation     Forward punch     Elbow flexion     Elbow extension     Forearm pronation/supination     Wrist flexion/extension     Gross grasp/fist pumps     Ankle pumps     Knee extension     Marching       Therapist comments: Pt required frequent rest breaks. Pt educated on pursed lip breathing and therex for pulm function to assist with deep breaths and opening lungs.    Patient End of Session: Up in chair;Needs met;Call light within reach;All patient questions and concerns addressed;SCDs in place;Alarm set    SUBJECTIVE  Pt stated, \"I am doing well.\"    PAIN ASSESSMENT  Ratin  Location: no pain at this time        OBJECTIVE  Precautions: Bed/chair alarm (Chronic R foot drop per pt)    AM-PAC ‘6-Clicks’ Inpatient Daily Activity Short Form  -   Putting on and taking off regular lower body clothing?: A Little  -   Bathing (including washing, rinsing, drying)?: A Little  -   Toileting, which includes using toilet, bedpan or urinal? : A Little  -   Putting on and taking off regular upper body clothing?: A Little  -   Taking care of personal grooming such as brushing teeth?: None  -   Eating meals?: None    AM-PAC Score:  Score: 20  Approx Degree of Impairment: 38.32%  Standardized Score (AM-PAC Scale): 42.03    PLAN  OT Treatment Plan: Balance activities;Energy conservation/work  simplification techniques;ADL training;IADL training;Functional transfer training;UE strengthening/ROM;Endurance training;Patient/Family education;Patient/Family training;Equipment eval/education;Fine motor coordination activities;Compensatory technique education;Continued evaluation  Rehab Potential : Good  Frequency: 3-5x/week    OT Goals:   ADL Goals   Patient will perform upper body dressing:  with modified independent and with adaptive equipment PRN  Patient will perform toileting: with supervision and with bedside commode     Functional Transfer Goals  Patient will perform all functional transfers:  with supervision and with good judgement/safety     UE Exercise Program Goal  Patient will be modified independent with bilateral AROM HEP (home exercise program).     Additional Goals  Pt will implement energy conservation techniques during functional mobility and transfers 3/3 times PRN    OT Session Time: 30 minutes  Self-Care Home Management: 15 minutes  Therapeutic Activity: 10 minutes  Neuromuscular Re-education: 0 minutes  Therapeutic Exercise: 0 minutes  Cognitive Skills: 0 minutes  Sensory Integrative: 0 minutes  Orthotic Management and Trainin minutes  Can add/delete any of these

## 2023-12-15 NOTE — PROGRESS NOTES
Pulmonary Progress Note        NAME: Alina Aceves - ROOM: 431/431-A - MRN: [id number] - Age: 43 year old - : 1980        Last 24hrs: No events overnight, feels a little better today, wants to go home ASAP, didn't sleep well last night    OBJECTIVE:  Vitals:    23 1942 12/15/23 0040 12/15/23 0214 12/15/23 0417   BP: (!) 145/102 (!) 146/108  (!) 142/93   BP Location: Left arm Left arm     Pulse: (!) 125 117 112 110   Resp:    Temp: 96.8 °F (36 °C) 96.8 °F (36 °C)  96.8 °F (36 °C)   TempSrc: Temporal Temporal  Axillary   SpO2: 95% 98% 99% 95%   Weight:       Height:           Oxygen Therapy  SpO2: 95 %  O2 Device: Nasal cannula  O2 Flow Rate (L/min): 3 L/min  Pulse Oximetry Type: Continuous  Oximetry Probe Site Changed: No                No intake or output data in the 24 hours ending 12/15/23 0859    Scheduled Medication:   tolvaptan  30 mg Oral Once    potassium chloride  40 mEq Intravenous Once    potassium chloride  40 mEq Intravenous Once    pantoprazole  40 mg Intravenous QAM AC    Or    pantoprazole  40 mg Oral QAM AC    cefTRIAXone  1 g Intravenous Q24H    DULoxetine  30 mg Oral Daily    doxycycline  100 mg Intravenous Q12H     Continuous Infusing Medication:   sodium bicarbonate in D5 0.45% NS infusion 75 mEq (23 1139)       Lungs: rales posterior - upper lung fields los, diminished BS in los bases  Heart: tachy s1 s2  Abdomen: soft, non-tender; bowel sounds normal; no masses,  no organomegaly  Extremities: extremities normal, atraumatic, no cyanosis or edema    Labs reviewed as noted below      Imaging: ct chest reviewed- los infiltrates noted more anteriorly, los effusions- moderate in size    ASSESSMENT/PLAN:    Acute hypoxemic respiratory failure - secondary to pneumonia vs pneumonitis as well as bilateral pleural effusions.  -continue supplemental oxygen, wean as able, down to 2L today  Pneumonia - viral respiratory panel was negative. PCT was negative, but cannot r/o  atypical pathogens  -continue empiric antibiotics : ceftriaxone, doxycycline (12/13-   -follow up cultures  -check strep and legionella urine ag- in process  -if no improvement, could consider bronchoscopy and/or empiric steroids  -will need follow up imaging in 4-6 weeks to ensure improvement in imaging abnormaltieis.  Pleural effusions - bilateral, ddx includes parapneumonic effusions, CHF, effusions related to SLE, and malignant pleural effusions  -ultrasound guided thoracentesis for diagnostic/therapeutic purposes - send for culture, cytology, cell count/diff, LDH, protein, glucose, cholesterol  -diuresis as tolerated per renal in the interim  SLE  -per hospitalist and outpatient rheumatology  Hyponatremia, non-anion gap acidosis  -renal following  Breast cancer  -per heme/onc  Proph   -hold subcutaneous heparin for thoracentesis this afternoon  Dispo   -full code  -inpatient       Jake Thorpe  [facility]  Pulmonary and Critical Care

## 2023-12-15 NOTE — PROGRESS NOTES
Pt A&Ox4 on 3L nasal canula, SOB with exertion. Complaints of pain to her leg bilaterally. Prn pain medication given as needed. Has high anxiety, prn meds given as needed. Tolerating medications well per mar. Pt was complaining of wheezing, paged pulm got an order for prn nebs, resp saw pt and administered. Pt very weak, and at great risk of falls, fall precautions in place. Call light within reach, frequent checks made, needs met.

## 2023-12-15 NOTE — PLAN OF CARE
Pt AOx4 on 3L nasal canula. C/o SOB with exertion, generalized weakness. PRN pain, nausea and anxiety medications administered and tolerated without complication. PRN nebs administered by RT. BP and HR elevated, IV metoprolol administered. Pt changed from NPO to regular diet but has poor appetite. Consult placed to dietician. Pt and family updated on an in agreement with POC.  Call light within reach, fall and safety precautions in place.     Thoracentesis completed at bedside at 1430 today. Specimens sent to lab. Radiologist suspects a small pneumothorax. Pulmonologist informed, order additional scan and increase O2 to 6L. Nasal cannula changed to high flow.      Problem: PAIN - ADULT  Goal: Verbalizes/displays adequate comfort level or patient's stated pain goal  Description: INTERVENTIONS:  - Encourage pt to monitor pain and request assistance  - Assess pain using appropriate pain scale  - Administer analgesics based on type and severity of pain and evaluate response  - Implement non-pharmacological measures as appropriate and evaluate response  - Consider cultural and social influences on pain and pain management  - Manage/alleviate anxiety  - Utilize distraction and/or relaxation techniques  - Monitor for opioid side effects  - Notify MD/LIP if interventions unsuccessful or patient reports new pain  - Anticipate increased pain with activity and pre-medicate as appropriate  Outcome: Progressing     Problem: SAFETY ADULT - FALL  Goal: Free from fall injury  Description: INTERVENTIONS:  - Assess pt frequently for physical needs  - Identify cognitive and physical deficits and behaviors that affect risk of falls.  - [place] fall precautions as indicated by assessment.  - Educate pt/family on patient safety including physical limitations  - Instruct pt to call for assistance with activity based on assessment  - Modify environment to reduce risk of injury  - Provide assistive devices as appropriate  - Consider OT/PT  consult to assist with strengthening/mobility  - Encourage toileting schedule  Outcome: Progressing     Problem: RESPIRATORY - ADULT  Goal: Achieves optimal ventilation and oxygenation  Description: INTERVENTIONS:  - Assess for changes in respiratory status  - Assess for changes in mentation and behavior  - Position to facilitate oxygenation and minimize respiratory effort  - Oxygen supplementation based on oxygen saturation or ABGs  - Provide Smoking Cessation handout, if applicable  - Encourage broncho-pulmonary hygiene including cough, deep breathe, Incentive Spirometry  - Assess the need for suctioning and perform as needed  - Assess and instruct to report SOB or any respiratory difficulty  - Respiratory Therapy support as indicated  - Manage/alleviate anxiety  - Monitor for signs/symptoms of CO2 retention  Outcome: Not Progressing

## 2023-12-15 NOTE — PROCEDURES
Procedure note - Thoracentesis    Procedure:  Thoracentesis  Indication:  Dyspnea, pleural effusion  Performed by:  myself    Description of procedure: After informed consent obtained, area prepped and draped in sterile fashion.  Local anesthetic with 1 % lidocaine was applied. Ultrasound marking was used for guidance.  left pleural space was accessed with the needle in the appropriate rib space and pleural fluid was obtained.  Drainage catheter was advanced into the space over the needle.   Left side was chosen over right side given patient positioning.    Findings: 700 cc of pleural fluid was removed which was Clear in nature.  Catheter was then withdrawn and a bandage applied to insertion site.     EBL:   <5cc  Complications:  none    A chest xray was ordered to rule out a complication. Pleural fluid was sent for studies including cell count, protein, LDH, culture, and cytology.

## 2023-12-15 NOTE — PROGRESS NOTES
[facility]     Hospitalist Progress Note     Alina Aceves Patient Status:  Inpatient    1980 MRN [id number]   Location [facility] 4NW-A Attending Tosha Waite MD   Hosp Day # 2 PCP HOLLY IBARRA     Chief Complaint: Intractable nausea vomiting, diarrhea, generalized weakness.  Recent pneumonia.     Subjective:     Patient seen with patient's daughter at bedside.  Shortness of breath and tachycardia persist.  No more nausea vomiting or diarrhea according to patient.  No pain or redness or tenderness or swelling and the recent left breast lumpectomy of left axillary lymph node resection site according to patient-had this done in November according to patient.    Objective:    Review of Systems:   A comprehensive review of systems was completed; pertinent positive and negatives stated in subjective.    Vital signs:  Temp:  [96.6 °F (35.9 °C)-97.6 °F (36.4 °C)] 96.6 °F (35.9 °C)  Pulse:  [110-125] 123  Resp:  [18-24] 20  BP: (128-146)/() 144/104  SpO2:  [95 %-99 %] 96 %    Physical Exam:    BP (!) 144/104 (BP Location: Left arm)   Pulse (!) 123   Temp 96.6 °F (35.9 °C) (Axillary)   Resp 20   Ht 5' 4\" (1.626 m)   Wt 124 lb (56.2 kg)   LMP 2023 (Approximate)   SpO2 96%   BMI 21.28 kg/m²     General: No acute distress  Respiratory: Bronchial breath sounds on the right, decreased breath sounds at bases bilateral  Cardiovascular: S1, S2, regular rate and rhythm, tachycardic  Abdomen: Soft, Non-tender, non-distended, positive bowel sounds  Neuro: Awake alert, no new focal deficits.   Extremities: no pedal edema or calf tenderness  No pain or redness or tenderness or swelling and the recent left breast lumpectomy of left axillary lymph node resection site according to patient-had this done in November according to patient.  Incision looks healed with no erythema or drainage or tenderness or fluctuance on palpation    Diagnostic Data:    Labs:  Recent Labs   Lab 23  12/14/23  0631 12/15/23  0513   WBC 6.3 8.3  --    HGB 7.9* 8.5*  --    MCV 83.8 83.7  --    .0 188.0  --    INR  --   --  1.27*       Recent Labs   Lab 12/13/23 1938 12/14/23  0631 12/15/23  0037 12/15/23  0513 12/15/23  1159   *   < > 144* 142* 120*   BUN 28*   < > 31* 34* 33*   CREATSERUM 1.36*   < > 1.19* 1.15* 1.19*   CA 8.2*   < > 7.7* 7.8* 7.9*   ALB 3.1*  --   --   --   --    *   < > 120* 120* 121*   K 3.9   < > 3.6 3.4*  3.4* 3.5   CL 89*   < > 89* 90* 91*   CO2 15.0*   < > 18.0* 18.0* 16.0*   ALKPHO 97  --   --   --   --    AST 40*  --   --   --   --    ALT 36  --   --   --   --    BILT 0.9  --   --   --   --    TP 6.6  --   --   --   --     < > = values in this interval not displayed.       Estimated Creatinine Clearance: 52.6 mL/min (A) (based on SCr of 1.19 mg/dL (H)).    Microbiology    Hospital Encounter on 12/13/23   1. Urine Culture, Routine     Status: None    Collection Time: 12/14/23  3:58 AM    Specimen: Urine, clean catch   Result Value Ref Range    Urine Culture No Growth at 18-24 hrs. N/A   2. Blood Culture     Status: None (Preliminary result)    Collection Time: 12/13/23  8:10 PM    Specimen: Blood,peripheral   Result Value Ref Range    Blood Culture Result No Growth 1 Day N/A     MRSA PCR nares positive on 12/14/2023    Imaging: Reviewed in Epic.  Chest x-ray done on 12/13/2023 with dense patchy airspace consolidation opacities present within the lungs suspicious for areas of pneumonia  CTA chest done on 12/14/2023 early a.m. shows moderate to large bilateral pleural effusion along with marked consolidation scattered throughout the lungs suggestive of pulmonary edema and fluid overload.  No evidence of pulm embolus.  Moderate large left axillary and left breast fluid collections noted.  Minimal gas in the left most central breast fluid collection, possibility of infection is a consideration.  Sequelae of seroma chronic hematoma is a consideration as  well.    Medications:    potassium chloride  40 mEq Intravenous Once    sodium chloride  1 g Oral TID CC    potassium chloride  40 mEq Intravenous Once    pantoprazole  40 mg Intravenous QAM AC    Or    pantoprazole  40 mg Oral QAM AC    cefTRIAXone  1 g Intravenous Q24H    DULoxetine  30 mg Oral Daily    doxycycline  100 mg Intravenous Q12H       Assessment & Plan:      # Multifocal pneumonia with bilateral large bilateral pleural effusion with tachycardia, tachypnea, also rule out malignancy due to patient's locally advanced stage IIIb breast cancer   Pulmonary and ID on consult   May need thoracentesis, pulmonary following   IV antibiotics-currently on IV Rocephin and IV doxycycline   Status post IV Lasix x 1 dose for the bilateral pleural effusion   MRSA nares came back positive, will give Bactroban application twice daily for 5 days.  Seen by ID on consult who advised to continue IV Rocephin and IV doxycycline at this time    # Acute hypoxic respiratory failure due to above   -On oxygen via nasal cannula   -Pulmonary on consult    # Locally advanced stage IIIb breast cancer status post outpatient neoadjuvant chemo and history of left breast lumpectomy and left lymph node resection on 11/16/2023   Oncology, ID and pulmonary on consult    # Hyponatremia due to pneumonia and also hypovolemic due to nausea vomiting and diarrhea at home and may also due to malignancy, rule out SIADH also due to malignancy   Nephrology consult, on bicarb drip per nephrology.  Status post tolvaptan   Follow BMP    # Hypokalemia   Being replaced with electrolyte protocol    # Metabolic acidosis, acute kidney injury   On IV fluids bicarb drip and IV antibiotics   Lactic acid normal on 12/13/2023 at 1.5   Nephrology consult appreciated   On chart review patient's creatinine was 0.84 on 11/13/2023, 1.36 on 12/13/2023 on admission.    # History of lupus, history of Sjogren's disease  -Takes azathioprine at home which was held at this time  due to multifocal pneumonia    # Gastritis  # Nausea vomiting and diarrhea at home possibly due to effect of chemo  # Anxiety  -Nausea vomiting and diarrhea improved.  -Will give IV PPI  -Xanax as needed  -IV Zofran as needed nausea vomiting    # Anemia due to malignancy and chemo   Follow CBC   Hematology oncology following    Discussed with patient's daughter at bedside.  Discussed with RN, pulmonary plans thoracentesis today at bedside    Tosha Waite MD    Supplementary Documentation:     Quality:  DVT Mechanical Prophylaxis:     Early ambuation  DVT Pharmacologic Prophylaxis   Medication   None                Code Status: Prior  Peacock: No urinary catheter in place  Peacock Duration (in days):   Central line:    CHRISTELLE:     Discharge is dependent on: Clinical progress  At this point Ms. Aceves is expected to be discharge to: To be decided    The 21st Century Cures Act makes medical notes like these available to patients in the interest of transparency. Please be advised this is a medical document. Medical documents are intended to carry relevant information, facts as evident, and the clinical opinion of the practitioner. The medical note is intended as peer to peer communication and may appear blunt or direct. It is written in medical language and may contain abbreviations or verbiage that are unfamiliar.

## 2023-12-15 NOTE — PROGRESS NOTES
[facility]  Progress Note    Alina Aceves Patient Status:  Inpatient    1980 MRN [id number]   [facility] 4NW-A Attending Tosha Waite MD   Hosp Day # 2 PCP HOLLY IBARRA     No acute events  Feels Ok      Current Facility-Administered Medications:     potassium chloride 40 mEq in 250mL sodium chloride 0.9% IVPB premix, 40 mEq, Intravenous, Once    potassium chloride 40 mEq in 250mL sodium chloride 0.9% IVPB premix, 40 mEq, Intravenous, Once    sodium bicarbonate 75 mEq in dextrose 5%-sodium chloride 0.45% 1,075 mL infusion, 75 mEq, Intravenous, Continuous    ALPRAZolam (Xanax) tab 0.25 mg, 0.25 mg, Oral, TID PRN    pantoprazole (Protonix) 40 mg in sodium chloride 0.9% PF 10 mL IV push, 40 mg, Intravenous, QAM AC **OR** pantoprazole (Protonix) DR tab 40 mg, 40 mg, Oral, QAM AC    LORazepam (Ativan) 2 mg/mL injection 0.5 mg, 0.5 mg, Intravenous, Q6H PRN    metoprolol (Lopressor) 5 mg/5mL injection 5 mg, 5 mg, Intravenous, Q6H PRN    ipratropium-albuterol (Duoneb) 0.5-2.5 (3) MG/3ML inhalation solution 3 mL, 3 mL, Nebulization, Q4H PRN    HYDROmorphone (Dilaudid) 1 MG/ML injection 0.5 mg, 0.5 mg, Intravenous, Q30 Min PRN    cefTRIAXone (Rocephin) 1 g in D5W 100 mL IVPB-ADD, 1 g, Intravenous, Q24H    DULoxetine (Cymbalta) DR cap 30 mg, 30 mg, Oral, Daily    acetaminophen (Tylenol Extra Strength) tab 1,000 mg, 1,000 mg, Oral, Q8H PRN    melatonin tab 3 mg, 3 mg, Oral, Nightly PRN    prochlorperazine (Compazine) 10 MG/2ML injection 5 mg, 5 mg, Intravenous, Q8H PRN    polyethylene glycol (PEG 3350) (Miralax) 17 g oral packet 17 g, 17 g, Oral, Daily PRN    sennosides (Senokot) tab 17.2 mg, 17.2 mg, Oral, Nightly PRN    bisacodyl (Dulcolax) 10 MG rectal suppository 10 mg, 10 mg, Rectal, Daily PRN    fleet enema (Fleet) 7-19 GM/118ML rectal enema 133 mL, 1 enema, Rectal, Once PRN    HYDROmorphone (Dilaudid) 1 MG/ML injection 0.2 mg, 0.2 mg, Intravenous, Q2H PRN **OR** HYDROmorphone (Dilaudid) 1  MG/ML injection 0.4 mg, 0.4 mg, Intravenous, Q2H PRN **OR** HYDROmorphone (Dilaudid) 1 MG/ML injection 0.8 mg, 0.8 mg, Intravenous, Q2H PRN    HYDROmorphone (Dilaudid) 1 MG/ML injection 0.5 mg, 0.5 mg, Intravenous, Q30 Min PRN    doxycycline hyclate (Vibramycin) 100 mg in sodium chloride 0.9% 100 mL IVPB, 100 mg, Intravenous, Q12H         Physical Exam:  Vital signs: Blood pressure (!) 131/105, pulse 114, temperature 97.6 °F (36.4 °C), temperature source Oral, resp. rate 18, height 5' 4\" (1.626 m), weight 124 lb (56.2 kg), last menstrual period 08/28/2023, SpO2 98%, currently breastfeeding.  General: No acute distress. Alert and oriented x 3.  HEENT: Moist mucous membranes. EOM-I. PERRL  Neck: No lymphadenopathy.  No JVD. No carotid bruits.  Respiratory: decreased breath sounds B  Cardiovascular: S1, S2.  Regular rate and rhythm.  No murmurs. Equal pulses   Abdomen: Soft, nontender, nondistended.  Positive bowel sounds. No rebound tenderness   Neurologic: No focal neurological deficits.   Musculoskeletal: Full range of motion of all extremities. No edema   Integument: No lesions. No erythema.  Psychiatric: Appropriate mood and affect.     Recent Labs     12/13/23  1937 12/14/23  0631 12/15/23  0513   WBC 6.3 8.3  --    HGB 7.9* 8.5*  --    MCV 83.8 83.7  --    .0 188.0  --    INR  --   --  1.27*       Recent Labs     12/14/23  1032 12/14/23  1202 12/14/23  1827 12/15/23  0037 12/15/23  0513   * 119* 120* 120* 120*   K 4.2 3.1* 3.6 3.6 3.4*  3.4*   CL 92* 92* 90* 89* 90*   CO2 11.0* 12.0* 17.0* 18.0* 18.0*   BUN 28* 30* 30* 31* 34*   CREATSERUM 1.34* 1.20* 1.22* 1.19* 1.15*   CA 7.6* 7.8* 8.0* 7.7* 7.8*       Recent Labs     12/13/23 1938   ALT 36   AST 40*   ALB 3.1*       Urine osm 371  Urine na 10  Uric acid 6.5        Imaging:  Reviewed  CTA - moderate to large bilateral pleural effusions ; no PE; mod to large L axillary and L breast fluid collections     Impression:  REJI- related to pre-renal  azotemia most likely due to decreased renal perfusion.   - monitor labs;improving   Hyponatremia- patient appears volume replete for the most part. She does have poor solute intake over the past few days as noted. Concern for SIADH given her hx of breast cancer +/-  PNA. Additionally, patient is on SSRI  Urine chem c/w with combination of low solute/SIADH  - fluid restrict; s/p tolvaptan - await labs this afternoon- on gentle isotonic fluids   - encourage solute intake  - check bmp q 6 hrs  - sodium correction has been resistant - will continue further titration of tx based on afternoon labs today   Breast Ca  Effusion- per pulm; on O2; plan for  tap  N/V/D- unclear etiology- possibly related to the chemo; symptomatic management  Acidosis- pt reports loose stools; improving  Hypokelamia- replace per protocol  Hx of SLE/Sjogrens      Thank you for allowing me to participate in this patient's care.  Please feel free to call me with any questions or concerns.    Darryn Potter MD  12/15/2023

## 2023-12-15 NOTE — IMAGING NOTE
Per Dr. Lundberg US thoracentesis can be done when sodium level corrected. Call to Gladis SEGOVIA to notify.

## 2023-12-16 ENCOUNTER — APPOINTMENT (OUTPATIENT)
Dept: GENERAL RADIOLOGY | Facility: HOSPITAL | Age: 43
DRG: 987 | End: 2023-12-16
Attending: INTERNAL MEDICINE
Payer: COMMERCIAL

## 2023-12-16 ENCOUNTER — APPOINTMENT (OUTPATIENT)
Dept: ULTRASOUND IMAGING | Facility: HOSPITAL | Age: 43
DRG: 987 | End: 2023-12-16
Attending: INTERNAL MEDICINE
Payer: COMMERCIAL

## 2023-12-16 LAB
ANION GAP SERPL CALC-SCNC: 10 MMOL/L (ref 0–18)
ANION GAP SERPL CALC-SCNC: 11 MMOL/L (ref 0–18)
ANION GAP SERPL CALC-SCNC: 13 MMOL/L (ref 0–18)
ANION GAP SERPL CALC-SCNC: 15 MMOL/L (ref 0–18)
BUN BLD-MCNC: 35 MG/DL (ref 9–23)
BUN BLD-MCNC: 35 MG/DL (ref 9–23)
BUN BLD-MCNC: 36 MG/DL (ref 9–23)
BUN BLD-MCNC: 38 MG/DL (ref 9–23)
C DIFF TOX B STL QL: NEGATIVE
CALCIUM BLD-MCNC: 7.5 MG/DL (ref 8.5–10.1)
CALCIUM BLD-MCNC: 7.6 MG/DL (ref 8.5–10.1)
CALCIUM BLD-MCNC: 7.7 MG/DL (ref 8.5–10.1)
CALCIUM BLD-MCNC: 7.9 MG/DL (ref 8.5–10.1)
CHLORIDE SERPL-SCNC: 91 MMOL/L (ref 98–112)
CHLORIDE SERPL-SCNC: 92 MMOL/L (ref 98–112)
CHLORIDE SERPL-SCNC: 92 MMOL/L (ref 98–112)
CHLORIDE SERPL-SCNC: 93 MMOL/L (ref 98–112)
CO2 SERPL-SCNC: 13 MMOL/L (ref 21–32)
CO2 SERPL-SCNC: 20 MMOL/L (ref 21–32)
CO2 SERPL-SCNC: 20 MMOL/L (ref 21–32)
CO2 SERPL-SCNC: 21 MMOL/L (ref 21–32)
CREAT BLD-MCNC: 1.07 MG/DL
CREAT BLD-MCNC: 1.07 MG/DL
CREAT BLD-MCNC: 1.08 MG/DL
CREAT BLD-MCNC: 1.09 MG/DL
EGFRCR SERPLBLD CKD-EPI 2021: 65 ML/MIN/1.73M2 (ref 60–?)
EGFRCR SERPLBLD CKD-EPI 2021: 65 ML/MIN/1.73M2 (ref 60–?)
EGFRCR SERPLBLD CKD-EPI 2021: 66 ML/MIN/1.73M2 (ref 60–?)
EGFRCR SERPLBLD CKD-EPI 2021: 66 ML/MIN/1.73M2 (ref 60–?)
GLUCOSE BLD-MCNC: 102 MG/DL (ref 70–99)
GLUCOSE BLD-MCNC: 117 MG/DL (ref 70–99)
GLUCOSE BLD-MCNC: 121 MG/DL (ref 70–99)
GLUCOSE BLD-MCNC: 127 MG/DL (ref 70–99)
LDH SERPL L TO P-CCNC: 469 U/L
OSMOLALITY SERPL CALC.SUM OF ELEC: 260 MOSM/KG (ref 275–295)
OSMOLALITY SERPL CALC.SUM OF ELEC: 264 MOSM/KG (ref 275–295)
OSMOLALITY SERPL CALC.SUM OF ELEC: 265 MOSM/KG (ref 275–295)
OSMOLALITY SERPL CALC.SUM OF ELEC: 271 MOSM/KG (ref 275–295)
POTASSIUM SERPL-SCNC: 3.1 MMOL/L (ref 3.5–5.1)
POTASSIUM SERPL-SCNC: 3.5 MMOL/L (ref 3.5–5.1)
POTASSIUM SERPL-SCNC: 3.5 MMOL/L (ref 3.5–5.1)
POTASSIUM SERPL-SCNC: 3.7 MMOL/L (ref 3.5–5.1)
POTASSIUM SERPL-SCNC: 3.7 MMOL/L (ref 3.5–5.1)
PROT SERPL-MCNC: 5.5 G/DL (ref 6.4–8.2)
SODIUM SERPL-SCNC: 120 MMOL/L (ref 136–145)
SODIUM SERPL-SCNC: 122 MMOL/L (ref 136–145)
SODIUM SERPL-SCNC: 123 MMOL/L (ref 136–145)
SODIUM SERPL-SCNC: 126 MMOL/L (ref 136–145)

## 2023-12-16 PROCEDURE — 32555 ASPIRATE PLEURA W/ IMAGING: CPT | Performed by: INTERNAL MEDICINE

## 2023-12-16 PROCEDURE — 0W993ZX DRAINAGE OF RIGHT PLEURAL CAVITY, PERCUTANEOUS APPROACH, DIAGNOSTIC: ICD-10-PCS | Performed by: INTERNAL MEDICINE

## 2023-12-16 PROCEDURE — 99232 SBSQ HOSP IP/OBS MODERATE 35: CPT | Performed by: INTERNAL MEDICINE

## 2023-12-16 PROCEDURE — 99233 SBSQ HOSP IP/OBS HIGH 50: CPT | Performed by: INTERNAL MEDICINE

## 2023-12-16 RX ORDER — TOLVAPTAN 15 MG/1
15 TABLET ORAL ONCE
Qty: 1 TABLET | Refills: 0 | Status: COMPLETED | OUTPATIENT
Start: 2023-12-16 | End: 2023-12-16

## 2023-12-16 RX ORDER — ENOXAPARIN SODIUM 100 MG/ML
30 INJECTION SUBCUTANEOUS DAILY
Status: DISCONTINUED | OUTPATIENT
Start: 2023-12-16 | End: 2023-12-16

## 2023-12-16 RX ORDER — TOLVAPTAN 15 MG/1
15 TABLET ORAL ONCE
Status: COMPLETED | OUTPATIENT
Start: 2023-12-16 | End: 2023-12-16

## 2023-12-16 RX ORDER — POTASSIUM CHLORIDE 20 MEQ/1
40 TABLET, EXTENDED RELEASE ORAL EVERY 4 HOURS
Status: COMPLETED | OUTPATIENT
Start: 2023-12-16 | End: 2023-12-16

## 2023-12-16 NOTE — PHYSICAL THERAPY NOTE
PHYSICAL THERAPY TREATMENT NOTE - INPATIENT    Room Number: 431/431-A       Session: 2    Number of Visits to Meet Established Goals: 5    Presenting Problem: PNA  Co-Morbidities : breast cancer who is undergoing chemotherapy  ASSESSMENT     Pt reported increased neuropathy in her hands today - difficulty manipulating toliet paper.  Pt able to take a few steps to bs commode, then up to bs recliner.  Pt reported she plans to dc home today - discussed having additional assist or bedside commode d/t distance from chair to bathroom.     At this time, Pt. presents with decreased balance, impaired strength, difficulty with gait/transfers resulting in downgrade of overall functional mobility.  Due to above deficits, Pt will benefit from continued IP PT, so that patient may achieve highest functional independence/return to baseline.          DISCHARGE RECOMMENDATIONS  PT Discharge Recommendations: Home with home health PT/OT;Intermittent Supervision (RW and w/c)     PLAN  PT Treatment Plan: Bed mobility;Body mechanics;Coordination;Endurance;Energy conservation;Patient education;Family education;Gait training;Neuromuscular re-educate;Range of motion;Strengthening;Stoop training;Stair training;Transfer training;Balance training  Rehab Potential : Good  Frequency (Obs): 5x/week    CURRENT GOALS     Goal #1 Patient is able to demonstrate supine - sit EOB @ level: supervision      Goal #2 Patient is able to demonstrate transfers EOB to/from Chair/Wheelchair at assistance level: supervision      Goal #3 Patient is able to ambulate 150 feet with assist device: walker - rolling at assistance level: supervision      Goal #4     Goal #5     Goal #6     Goal Comments: Goals established on 12/14/2023 12/16/2023 all goals ongoing      SUBJECTIVE  \"I plan to leave today, i just need to be home\"     OBJECTIVE  Precautions: Bed/chair alarm (Chronic R foot drop per pt)    WEIGHT BEARING RESTRICTION  Weight Bearing Restriction:  None                PAIN ASSESSMENT   Ratin          BALANCE                                                                                                                       Static Sitting: Good  Dynamic Sitting: Good           Static Standing: Fair -  Dynamic Standing: Fair -    ACTIVITY TOLERANCE                         O2 WALK         AM-PAC '6-Clicks' INPATIENT SHORT FORM - BASIC MOBILITY  How much difficulty does the patient currently have...  Patient Difficulty: Turning over in bed (including adjusting bedclothes, sheets and blankets)?: None   Patient Difficulty: Sitting down on and standing up from a chair with arms (e.g., wheelchair, bedside commode, etc.): A Little   Patient Difficulty: Moving from lying on back to sitting on the side of the bed?: A Little   How much help from another person does the patient currently need...   Help from Another: Moving to and from a bed to a chair (including a wheelchair)?: A Little   Help from Another: Need to walk in hospital room?: A Little   Help from Another: Climbing 3-5 steps with a railing?: A Lot       AM-PAC Score:  Raw Score: 18   Approx Degree of Impairment: 46.58%   Standardized Score (AM-PAC Scale): 43.63   CMS Modifier (G-Code): CK    FUNCTIONAL ABILITY STATUS  Gait Assessment   Functional Mobility/Gait Assessment  Gait Assistance: Minimum assistance  Distance (ft): 3 x 2  Assistive Device: Rolling walker  Pattern: Shuffle    Skilled Therapy Provided        THERAPEUTIC EXERCISES  Lower Extremity Alternating marching  Ankle pumps  LAQ     Upper Extremity      Position Sitting     Repetitions   10   Sets   1        Skilled Therapy Provided      Transfer Mobility:  Sit to stand: Mod A (1st trial), and Min A (2nd and 3rd trial) - inc knee flexion on L  Stand to sit: CGA   Gait = min A with RW, required therapeutic seated rest breaks between gait trials, cont to heavily rely in UE for navigation, discussed EC strategies including pursed lip breathing      Therapist's Comments: remains on 2lpm NC    Exercise/Education Provided:  Body mechanics  Energy conservation  Functional activity tolerated  Gait training  Transfer training    Patient End of Session: Up in chair;Needs met;Call light within reach;RN aware of session/findings;All patient questions and concerns addressed;Alarm set        PT Session Time: 15 minutes  Gait Trainin minutes  Therapeutic Activity: 15 minutes  Neuromuscular Re-education:  minutes  Therapeutic Exercise:  minutes

## 2023-12-16 NOTE — PROGRESS NOTES
Infectious Disease Progress Note      Date of admission: 12/13/2023  7:15 PM     Reason for consult: Pneumonia    Subjective: Feels slightly better today.  Shortness of breath has improved since the pleural tap on the left side.  Now is planned for a right pleural tap.  Patient wants to go home right after the pleural tap.    The rest of the systems were reviewed and found to be negative except was mentioned above    Interval events: This is a 43-year-old female patient with history of stage IIIb breast cancer, status post mastectomy and capecitabine, currently not on any therapy.  She also has a history of lupus, not on any therapy, presents here with acute hypoxic respiratory failure with large pleural effusions noted on her CT along with severe pulmonary edema, question of atypical pneumonitis.  Status post pleural tap on the left on 12/15.  Fluid appears to be transudative    Medications:    sodium bicarbonate in D5W infusion    mupirocin    sodium chloride    potassium chloride    ALPRAZolam    pantoprazole **OR** pantoprazole    LORazepam    metoprolol    ipratropium-albuterol    HYDROmorphone    cefTRIAXone    [Held by provider] DULoxetine    acetaminophen    melatonin    prochlorperazine    polyethylene glycol (PEG 3350)    sennosides    bisacodyl    fleet enema    HYDROmorphone **OR** HYDROmorphone **OR** HYDROmorphone    HYDROmorphone    doxycycline     Allergies:  Allergies   Allergen Reactions    Bactrim [Sulfamethoxazole W/Trimethoprim] RASH       Physical Exam:  Vitals:    12/16/23 0831   BP: (!) 131/94   Pulse: 110   Resp: 22   Temp: 97.5 °F (36.4 °C)     Vitals signs and nursing note reviewed.   Constitutional:       Appearance: Normal appearance.   HENT:      Head: Normocephalic and atraumatic.      Mouth: Mucous membranes are moist.   Neck:      Musculoskeletal: Neck supple.   Cardiovascular:      Rate and Rhythm: Normal rate.   Pulmonary:      Effort: Pulmonary effort is normal.  Moderate  respiratory distress.   Abdominal:      General: Abdomen is flat. There is no distension.      Palpations: Abdomen is soft. There is no mass.      Tenderness: There is no tenderness. There is no guarding or rebound.      Hernia: No hernia is present.   Musculoskeletal:      Right lower leg: No edema.      Left lower leg: No edema.   Skin:     General: Skin is warm and dry.   Neurological:      General: No focal deficit present.      Mental Status: Alert and oriented to person, place, and time.       Laboratory data:  I have reviewed all the lab results independently.  Lab Results   Component Value Date    CREATSERUM 1.08 12/16/2023    BUN 35 12/16/2023     12/16/2023    K 3.5 12/16/2023    K 3.5 12/16/2023    CL 91 12/16/2023    CO2 20.0 12/16/2023     12/16/2023    CA 7.7 12/16/2023    TP 5.5 12/16/2023      Recent Labs   Lab 12/14/23  0631   RBC 2.94*   HGB 8.5*   HCT 24.6*   MCV 83.7   MCH 28.9   MCHC 34.6   RDW 12.7   NEPRELIM 7.17   WBC 8.3   .0      Microbiology data:  Hospital Encounter on 12/13/23   1. Body Fluid Cult Aerobic and Anaerobic     Status: None (Preliminary result)    Collection Time: 12/15/23  3:26 PM    Specimen: Pleural Fluid, Right; Body fluid, unspecified   Result Value Ref Range    Body Fld Smear 4+ Neutrophils seen N/A    Body Fld Smear No organisms seen N/A    Body Fld Smear This is a cytocentrifuged smear. N/A   2. Urine Culture, Routine     Status: None    Collection Time: 12/14/23  3:58 AM    Specimen: Urine, clean catch   Result Value Ref Range    Urine Culture No Growth at 18-24 hrs. N/A   3. Blood Culture     Status: None (Preliminary result)    Collection Time: 12/13/23  8:10 PM    Specimen: Blood,peripheral   Result Value Ref Range    Blood Culture Result No Growth 2 Days N/A      Impression:  Alina Aceves is a 43 year old female with    Acute hypoxic respiratory failure  CT chest is consistent with acute pulmonary edema with pleural effusions rather than  pneumonia.    There is also concern for atypical infection  Currently on ceftriaxone and doxycycline  Hyponatremia with pulmonary edema  Management as per renal medicine  History of breast cancer  Was on chemotherapy until September 2023  Immunosuppressed  History of SLE  Not on any immunosuppression    Recommendations:    Follow-up on pleural fluid studies  I discussed in detail the risks of going home today including worsening respiratory failure and death.  The patient understands and insists on going home.  Further care as per primary team  I do not see any indication for antibiotics at discharge at this point given lack of evidence of an infection  Continue to monitor daily labs for antibiotic toxicities  Further recommendations will depend on the above work-up and clinical progress     The plan of care was discussed with the primary hospital team, Tosha Waite MD     Recommendations were also discussed with the patient; all questions were answered.     Thank you for this consultation. Please don't hesitate to call the ID team for questions or any acute changes in patient's clinical condition.    Please note that this report has been produced using speech recognition software and may contain errors related to that system including, but not limited to, errors in grammar, punctuation, and spelling, as well as words and phrases that possibly may have been recognized inappropriately.  If there are any questions or concerns, contact the dictating provider for clarification.    The 21st Century Cures Act makes medical notes like these available to patients in the interest of transparency. Please be advised this is a medical document. Medical documents are intended to carry relevant information, facts as evident, and the clinical opinion of the practitioner. The medical note is intended as peer to peer communication and may appear blunt or direct. It is written in medical language and may contain abbreviations or  verbiage that are unfamiliar.     MD [facility]. Tel: [phone]. Fax: [phone].     Alina Aceves : 1980 MRN: [id number] CSN: [id number]

## 2023-12-16 NOTE — PLAN OF CARE
Patient alert and oriented x4. VSS - HR tachy. Afebrile. 6L HF nasal cannula satting in the high 90s-100. C/o generalized pain + nausea. PRN dilaudid and compazine administered. Medications administered per MAR. Potassium replaced per nephrology. 0000 Na - 120. Safety precautions continued. Call light within reach.  0655: HR sustaining above 120. PRN metoprolol administered  Problem: SAFETY ADULT - FALL  Goal: Free from fall injury  Description: INTERVENTIONS:  - Assess pt frequently for physical needs  - Identify cognitive and physical deficits and behaviors that affect risk of falls.  - [place] fall precautions as indicated by assessment.  - Educate pt/family on patient safety including physical limitations  - Instruct pt to call for assistance with activity based on assessment  - Modify environment to reduce risk of injury  - Provide assistive devices as appropriate  - Consider OT/PT consult to assist with strengthening/mobility  - Encourage toileting schedule  Outcome: Progressing     Problem: PAIN - ADULT  Goal: Verbalizes/displays adequate comfort level or patient's stated pain goal  Description: INTERVENTIONS:  - Encourage pt to monitor pain and request assistance  - Assess pain using appropriate pain scale  - Administer analgesics based on type and severity of pain and evaluate response  - Implement non-pharmacological measures as appropriate and evaluate response  - Consider cultural and social influences on pain and pain management  - Manage/alleviate anxiety  - Utilize distraction and/or relaxation techniques  - Monitor for opioid side effects  - Notify MD/LIP if interventions unsuccessful or patient reports new pain  - Anticipate increased pain with activity and pre-medicate as appropriate  Outcome: Not Progressing     Problem: RESPIRATORY - ADULT  Goal: Achieves optimal ventilation and oxygenation  Description: INTERVENTIONS:  - Assess for changes in respiratory status  - Assess for changes in  mentation and behavior  - Position to facilitate oxygenation and minimize respiratory effort  - Oxygen supplementation based on oxygen saturation or ABGs  - Provide Smoking Cessation handout, if applicable  - Encourage broncho-pulmonary hygiene including cough, deep breathe, Incentive Spirometry  - Assess the need for suctioning and perform as needed  - Assess and instruct to report SOB or any respiratory difficulty  - Respiratory Therapy support as indicated  - Manage/alleviate anxiety  - Monitor for signs/symptoms of CO2 retention  Outcome: Not Progressing

## 2023-12-16 NOTE — PROGRESS NOTES
[facility]  Progress Note    Alina Aceves Patient Status:  Inpatient    1980 MRN [id number]   formerly [facility] 4NW-A Attending Tosha Waite MD   Hosp Day # 3 PCP HOLLY IBARRA     No acute events  Not eating much  + nausea; able to tolerate pills however  Denies any cp  S/p thoracentesis      Current Facility-Administered Medications:     sodium bicarbonate 150 mEq in dextrose 5% 1,000 mL infusion, 150 mEq, Intravenous, Continuous    mupirocin (Bactroban) 2% nasal ointment 1 Application, 1 Application, Nasal, Q12H    sodium chloride tab 2 g, 2 g, Oral, TID CC    potassium chloride 40 mEq in 250mL sodium chloride 0.9% IVPB premix, 40 mEq, Intravenous, Once    ALPRAZolam (Xanax) tab 0.25 mg, 0.25 mg, Oral, TID PRN    pantoprazole (Protonix) 40 mg in sodium chloride 0.9% PF 10 mL IV push, 40 mg, Intravenous, QAM AC **OR** pantoprazole (Protonix) DR tab 40 mg, 40 mg, Oral, QAM AC    LORazepam (Ativan) 2 mg/mL injection 0.5 mg, 0.5 mg, Intravenous, Q6H PRN    metoprolol (Lopressor) 5 mg/5mL injection 5 mg, 5 mg, Intravenous, Q6H PRN    ipratropium-albuterol (Duoneb) 0.5-2.5 (3) MG/3ML inhalation solution 3 mL, 3 mL, Nebulization, Q4H PRN    HYDROmorphone (Dilaudid) 1 MG/ML injection 0.5 mg, 0.5 mg, Intravenous, Q30 Min PRN    cefTRIAXone (Rocephin) 1 g in D5W 100 mL IVPB-ADD, 1 g, Intravenous, Q24H    [Held by provider] DULoxetine (Cymbalta) DR cap 30 mg, 30 mg, Oral, Daily    acetaminophen (Tylenol Extra Strength) tab 1,000 mg, 1,000 mg, Oral, Q8H PRN    melatonin tab 3 mg, 3 mg, Oral, Nightly PRN    prochlorperazine (Compazine) 10 MG/2ML injection 5 mg, 5 mg, Intravenous, Q8H PRN    polyethylene glycol (PEG 3350) (Miralax) 17 g oral packet 17 g, 17 g, Oral, Daily PRN    sennosides (Senokot) tab 17.2 mg, 17.2 mg, Oral, Nightly PRN    bisacodyl (Dulcolax) 10 MG rectal suppository 10 mg, 10 mg, Rectal, Daily PRN    fleet enema (Fleet) 7-19 GM/118ML rectal enema 133 mL, 1 enema, Rectal, Once PRN     HYDROmorphone (Dilaudid) 1 MG/ML injection 0.2 mg, 0.2 mg, Intravenous, Q2H PRN **OR** HYDROmorphone (Dilaudid) 1 MG/ML injection 0.4 mg, 0.4 mg, Intravenous, Q2H PRN **OR** HYDROmorphone (Dilaudid) 1 MG/ML injection 0.8 mg, 0.8 mg, Intravenous, Q2H PRN    HYDROmorphone (Dilaudid) 1 MG/ML injection 0.5 mg, 0.5 mg, Intravenous, Q30 Min PRN    doxycycline hyclate (Vibramycin) 100 mg in sodium chloride 0.9% 100 mL IVPB, 100 mg, Intravenous, Q12H         Physical Exam:  Vital signs: Blood pressure 117/83, pulse 112, temperature 97.5 °F (36.4 °C), temperature source Oral, resp. rate 20, height 5' 4\" (1.626 m), weight 124 lb (56.2 kg), last menstrual period 08/28/2023, SpO2 99%, currently breastfeeding.  General: No acute distress. Alert and oriented x 3.  HEENT: Moist mucous membranes. EOM-I. PERRL  Neck: No lymphadenopathy.  No JVD. No carotid bruits.  Respiratory: decreased breath sounds B  Cardiovascular: S1, S2.  Regular rate and rhythm.  No murmurs. Equal pulses   Abdomen: Soft, nontender, nondistended.  Positive bowel sounds. No rebound tenderness   Neurologic: No focal neurological deficits.   Musculoskeletal: Full range of motion of all extremities. No edema   Integument: No lesions. No erythema.  Psychiatric: Appropriate mood and affect.      Recent Labs     12/13/23  1937 12/14/23  0631 12/15/23  0513   WBC 6.3 8.3  --    HGB 7.9* 8.5*  --    MCV 83.8 83.7  --    .0 188.0  --    INR  --   --  1.27*       Recent Labs     12/15/23  1159 12/15/23  1800 12/16/23  0058 12/16/23  0650 12/16/23  1137   * 120* 120* 122* 123*   K 3.5 4.1 3.1* 3.5  3.5 3.7   CL 91* 90* 92* 91* 92*   CO2 16.0* 16.0* 13.0* 20.0* 21.0   BUN 33* 38* 38* 35* 35*   CREATSERUM 1.19* 1.23* 1.07* 1.08* 1.07*   CA 7.9* 7.8* 7.6* 7.7* 7.5*       Recent Labs     12/13/23 1938 12/16/23  0650   ALT 36  --    AST 40*  --    ALB 3.1*  --    LDH  --  469*           Urine osm 371  Urine na 10  Uric acid 6.5        Imaging:  Reviewed  CTA -  moderate to large bilateral pleural effusions ; no PE; mod to large L axillary and L breast fluid collections     Impression:  REJI- related to pre-renal azotemia most likely due to decreased renal perfusion.   - monitor labs;improving   Hyponatremia- patient appears volume replete for the most part. She does have poor solute intake over the past few days as noted. Concern for SIADH given her hx of breast cancer +/-  PNA. Additionally, patient is on SSRI  Urine chem c/w with combination of low solute/SIADH  Na slowly improving  - continue salt tabs/prn loop diuretic/tolvaptan/fluid restriction  - check bmp q 6 hrs  Breast Ca  Effusion- per pulm; on O2; s/p thoracentesis today   N/V/D- unclear etiology- possibly related to the chemo; symptomatic management  Acidosis- pt reports loose stools; improving  Hypokelamia- replace per protocol  Hx of SLE/Sjogrens      Thank you for allowing me to participate in this patient's care.  Please feel free to call me with any questions or concerns.    Darryn Potter MD  12/16/2023

## 2023-12-16 NOTE — PROGRESS NOTES
[facility]     Hospitalist Progress Note     Alina Aceves Patient Status:  Inpatient    1980 MRN [id number]   Location [facility] 4NW-A Attending Tosha Waite MD   Hosp Day # 3 PCP HOLLY IBARRA     Chief Complaint: Intractable nausea vomiting, diarrhea, generalized weakness.  Recent pneumonia.     Subjective:     Had left thoracentesis yesterday and had 700 cc of clear pleural fluid removed, had right thoracentesis today morning and drained 1450 cc of clear pleural fluid removed.  Shortness of breath starting to improve since this, patient looks more comfortable today    Objective:    Review of Systems:   A comprehensive review of systems was completed; pertinent positive and negatives stated in subjective.    Vital signs:  Temp:  [96.4 °F (35.8 °C)-98 °F (36.7 °C)] 98 °F (36.7 °C)  Pulse:  [110-121] 121  Resp:  [18-22] 22  BP: (117-135)/(83-98) 131/96  SpO2:  [98 %-100 %] 98 %    Physical Exam:    BP (!) 131/96 (BP Location: Left arm)   Pulse (!) 121   Temp 98 °F (36.7 °C) (Oral)   Resp 22   Ht 5' 4\" (1.626 m)   Wt 124 lb (56.2 kg)   LMP 2023 (Approximate)   SpO2 98%   BMI 21.28 kg/m²     General: No acute distress  Respiratory: Clear to auscultation bilateral except decreased breath sounds at bases  Cardiovascular: S1, S2, regular rate and rhythm, tachycardic  Abdomen: Soft, Non-tender, non-distended, positive bowel sounds  Neuro: Awake alert, no new focal deficits.   Extremities: no pedal edema or calf tenderness  No pain or redness or tenderness or swelling and the recent left breast lumpectomy of left axillary lymph node resection site according to patient-had this done in November according to patient.  Incision looks healed with no erythema or drainage or tenderness or fluctuance on palpation    Diagnostic Data:    Labs:  Recent Labs   Lab 23  0631 12/15/23  0513   WBC 6.3 8.3  --    HGB 7.9* 8.5*  --    MCV 83.8 83.7  --    .0 188.0  --    INR   --   --  1.27*       Recent Labs   Lab 12/13/23  1938 12/14/23  0631 12/16/23  0058 12/16/23  0650 12/16/23  1137   *   < > 117* 127* 121*   BUN 28*   < > 38* 35* 35*   CREATSERUM 1.36*   < > 1.07* 1.08* 1.07*   CA 8.2*   < > 7.6* 7.7* 7.5*   ALB 3.1*  --   --   --   --    *   < > 120* 122* 123*   K 3.9   < > 3.1* 3.5  3.5 3.7   CL 89*   < > 92* 91* 92*   CO2 15.0*   < > 13.0* 20.0* 21.0   ALKPHO 97  --   --   --   --    AST 40*  --   --   --   --    ALT 36  --   --   --   --    BILT 0.9  --   --   --   --    TP 6.6  --   --  5.5*  --     < > = values in this interval not displayed.       Estimated Creatinine Clearance: 58.5 mL/min (A) (based on SCr of 1.07 mg/dL (H)).    Microbiology    Hospital Encounter on 12/13/23   1. Body Fluid Cult Aerobic and Anaerobic     Status: None (Preliminary result)    Collection Time: 12/15/23  3:26 PM    Specimen: Pleural Fluid, Right; Body fluid, unspecified   Result Value Ref Range    Body Fld Smear 4+ Neutrophils seen N/A    Body Fld Smear No organisms seen N/A    Body Fld Smear This is a cytocentrifuged smear. N/A   2. Urine Culture, Routine     Status: None    Collection Time: 12/14/23  3:58 AM    Specimen: Urine, clean catch   Result Value Ref Range    Urine Culture No Growth at 18-24 hrs. N/A   3. Blood Culture     Status: None (Preliminary result)    Collection Time: 12/13/23  8:10 PM    Specimen: Blood,peripheral   Result Value Ref Range    Blood Culture Result No Growth 2 Days N/A     MRSA PCR nares positive on 12/14/2023    Imaging: Reviewed in Epic.  Chest x-ray done on 12/13/2023 with dense patchy airspace consolidation opacities present within the lungs suspicious for areas of pneumonia  CTA chest done on 12/14/2023 early a.m. shows moderate to large bilateral pleural effusion along with marked consolidation scattered throughout the lungs suggestive of pulmonary edema and fluid overload.  No evidence of pulm embolus.  Moderate large left axillary and left  breast fluid collections noted.  Minimal gas in the left most central breast fluid collection, possibility of infection is a consideration.  Sequelae of seroma chronic hematoma is a consideration as well.    Medications:    tolvaptan  15 mg Oral Once    mupirocin  1 Application Nasal Q12H    sodium chloride  2 g Oral TID CC    potassium chloride  40 mEq Intravenous Once    pantoprazole  40 mg Intravenous QAM AC    Or    pantoprazole  40 mg Oral QAM AC    cefTRIAXone  1 g Intravenous Q24H    [Held by provider] DULoxetine  30 mg Oral Daily    doxycycline  100 mg Intravenous Q12H       Assessment & Plan:      # Multifocal pneumonia with bilateral large bilateral pleural effusion with tachycardia, tachypnea, also rule out malignancy due to patient's locally advanced stage IIIb breast cancer   Pulmonary and ID on consult   Status post left thoracentesis on 12/15/2023 and right thoracentesis on 12/16/2023, pulmonary following   IV antibiotics-currently on IV Rocephin and IV doxycycline   Status post IV Lasix x 1 dose for the bilateral pleural effusion   MRSA nares came back positive, will give Bactroban application twice daily for 5 days.  Seen by ID on consult who advised to continue IV Rocephin and IV doxycycline at this time    # Acute hypoxic respiratory failure due to above   -On oxygen via nasal cannula   -Pulmonary on consult    # Locally advanced stage IIIb breast cancer status post outpatient neoadjuvant chemo and history of left breast lumpectomy and left lymph node resection on 11/16/2023   Oncology, ID and pulmonary on consult    # Hyponatremia due to pneumonia and also hypovolemic due to nausea vomiting and diarrhea at home and may also due to malignancy, rule out SIADH also due to malignancy   Nephrology consult, on bicarb drip per nephrology.  Status post tolvaptan   Sodium slowly improving   Follow BMP    # Hypokalemia   Being replaced with electrolyte protocol    # Metabolic acidosis, acute kidney  injury   On IV fluids bicarb drip and IV antibiotics   Lactic acid normal on 12/13/2023 at 1.5   Nephrology consult appreciated   On chart review patient's creatinine was 0.84 on 11/13/2023, 1.36 on 12/13/2023 on admission.    # History of lupus, history of Sjogren's disease  -Takes azathioprine at home which was held at this time due to multifocal pneumonia    # Gastritis  # Nausea vomiting and diarrhea at home possibly due to effect of chemo  # Anxiety  -Nausea vomiting and diarrhea improved.  -Will give IV PPI  -Xanax as needed  -IV Zofran as needed nausea vomiting    # Anemia due to malignancy and chemo   Follow CBC   Hematology oncology following    # Small left apical pneumothorax on 12/15/2023 status post left thoracentesis  -Pulmonary following, on conservative management, repeat chest x-ray done on 12/15/2023 showed left pneumothorax resolved        Tosha Waite MD    Supplementary Documentation:     Quality:  DVT Mechanical Prophylaxis:     Early ambuation  DVT Pharmacologic Prophylaxis   Medication   None                Code Status: Prior  Peacock: External urinary catheter in place  Peacock Duration (in days):   Central line:    CHRISTELLE:     Discharge is dependent on: Clinical progress  At this point Ms. Aceves is expected to be discharge to: To be decided    The 21st Century Cures Act makes medical notes like these available to patients in the interest of transparency. Please be advised this is a medical document. Medical documents are intended to carry relevant information, facts as evident, and the clinical opinion of the practitioner. The medical note is intended as peer to peer communication and may appear blunt or direct. It is written in medical language and may contain abbreviations or verbiage that are unfamiliar.

## 2023-12-16 NOTE — PROGRESS NOTES
[facility]  Hematology Oncology Progress Note  2023    Alina Aceves Patient Status:  Inpatient    1980 MRN [id number]   Location [facility] 4NW-A Attending Tosha Waite MD   Hosp Day # 3 PCP HOLLY IBARRA     Subjective:  L thoracentesis done yesterday with 700 mL clear fluid removed.  Post-procedure CXR with small 9 mm L apical pneumothorax.  No L pleural effusion.  PTX resolved on CXR 3 hours later after increasing O2 to HiFlo 6 L.  R thora done this AM, 1450 mL removed.    Pt states sob is better, O2 weaned down to 2L, wants to go home due to anxiety but understands this would be AMA per pulmonary.    Meds:    sodium bicarbonate in D5W infusion    mupirocin    sodium chloride    potassium chloride    ALPRAZolam    pantoprazole **OR** pantoprazole    LORazepam    metoprolol    ipratropium-albuterol    HYDROmorphone    cefTRIAXone    [Held by provider] DULoxetine    acetaminophen    melatonin    prochlorperazine    polyethylene glycol (PEG 3350)    sennosides    bisacodyl    fleet enema    HYDROmorphone **OR** HYDROmorphone **OR** HYDROmorphone    HYDROmorphone    doxycycline    Objective:  Blood pressure (!) 131/94, pulse 110, temperature 97.5 °F (36.4 °C), temperature source Oral, resp. rate 22, height 5' 4\" (1.626 m), weight 124 lb (56.2 kg), last menstrual period 2023, SpO2 99%, currently breastfeeding. On HiFlo 6L NC, up from 3L --> now back to 2L  Gen: NAD, alert  CV: RRR, no m/r/g  Lungs: dec BS bilaterally in based  Abd: Normoactive bs, soft, nt/nd  Extrem: WWP, no edema  Skin: No acute changes    Labs:     Recent Labs   Lab 23  1937 23  0631   RBC 2.72* 2.94*   HGB 7.9* 8.5*   HCT 22.8* 24.6*   MCV 83.8 83.7   MCH 29.0 28.9   MCHC 34.6 34.6   RDW 12.8 12.7   NEPRELIM 5.52 7.17   WBC 6.3 8.3   .0 188.0         Recent Labs   Lab 23  1938 23  0631 12/15/23  1800 23  0058 23  0650   *   < > 118* 117* 127*   BUN 28*   < > 38* 38*  35*   CREATSERUM 1.36*   < > 1.23* 1.07* 1.08*   EGFRCR 50*   < > 56* 66 65   CA 8.2*   < > 7.8* 7.6* 7.7*   ALB 3.1*  --   --   --   --    *   < > 120* 120* 122*   K 3.9   < > 4.1 3.1* 3.5  3.5   CL 89*   < > 90* 92* 91*   CO2 15.0*   < > 16.0* 13.0* 20.0*   ALKPHO 97  --   --   --   --    AST 40*  --   --   --   --    ALT 36  --   --   --   --    BILT 0.9  --   --   --   --    TP 6.6  --   --   --  5.5*    < > = values in this interval not displayed.       Imaging:  Reviewed    Assessment and Plan:    Alina Aceves is a 43 year old female with stage IIIb breast cancer status postmastectomy now with bilateral pneumonia and pleural effusions.     Breast cancer  Locally advanced stage IIIb triple negative breast cancer  Status post no adjuvant chemotherapy followed by mastectomy in November 2023  Residual T1a disease and has been recommended to start oral capecitabine  No treatment while inpatient     Pneumonia/effusion  Reviewed CT scan she had bilateral lung consolidation/infiltrate along with bilateral effusion  Not related to underlying breast cancer    Pulmonary consult - LEFT thoracentesis done on 12/15 with 700 mL removed, await studies on pleural fluid.  Tiny pneumothorax on initial CXR, resolved on repeat.  Fluid appears transudative.  RIGHT thora done this AM 12/15 with 1400 mL removed.  Wean O2 as tolerated.    ID consult and recs noted. No bacterial PNA, feels CT chest findings more consistent with acute pulmonary edema with pleural effusions rather than pneumonia.  Remains on ceftriaxone and doxycycline.  ID has recommended bronchoscopy if pt fails to improve.     Anemia  Moderate anemia secondary to previous chemotherapy  Will monitor hemoglobin and transfuse for hemoglobin less than 7.  Check CBC in AM.    Hyponatremia, likely due to SIADH.  Renal following.  Na only a little better at 122.       We will follow.    Please do not hesitate to contact me with any specific questions or  concerns.    Dianna Begum MD  [facility]  Department of Oncology and Hematology

## 2023-12-16 NOTE — DIETARY MALNUTRITION NOTE
[facility]    NUTRITION ASSESSMENT    Pt meets moderate malnutrition criteria at this time.    CRITERIA FOR MALNUTRITION DIAGNOSIS:  Criteria for non-severe malnutrition diagnosis: acute illness/injury related to energy intake less than 75% for greater than 7 days, body fat mild depletion, muscle mass mild depletion, and fluid accumulation mild      NUTRITION INTERVENTION:    RD nutrition Care Plan- Initiated ONS (oral nutritional supplements), Ordered multivitamin, and See RD nutrition assessment for additional recommendations  Meal and Snacks - Monitor and encourage adequate PO intake.  Medical Food Supplements -Chocolate Ensure Plus High Protein BID. Rationale/use for oral supplements discussed.   Vitamin and Mineral Supplements - adding Multivitamin with minerals      PATIENT STATUS: 12/16/23 43 year old female admit d/t pneumonia. Pt with breast cancer, undergoing chemotherapy. Pt presented d/t nausea with minimal PO intake. Pt with pleural effusion, underwent thoracentesis today with 1450mL fluid removal.   Spoke with Nephrology, Na trending up slowly, recommended 1500mL fluid restriction. Receiving 2gNaCl tablets.  Discussed with pt fluid restriction and reasoning, pt states understanding.   Pt seen d/t consult for poor PO intake. Pt agreeable to taking Chocolate Ensure BID to supplement PO intake.         ANTHROPOMETRICS:  Ht: 162.6 cm (5' 4\")  Wt: 56.2 kg (124 lb).   BMI: Body mass index is 21.28 kg/m².  IBW: 54.5 kg      WEIGHT HISTORY:   Weight loss: No    Wt Readings from Last 10 Encounters:   12/13/23 56.2 kg (124 lb)   11/13/23 54.4 kg (120 lb)   12/16/21 61.2 kg (135 lb)   09/22/20 61.7 kg (136 lb)   08/15/20 63.5 kg (140 lb)   11/14/15 61.7 kg (136 lb)        NUTRITION:  Diet:       Procedures    Regular/General diet Fluid Restriction: 1500 ml; Is Patient on Accuchecks? No      Food Allergies: No  Cultural/Ethnic/Nondenominational Preferences Addressed: Yes    Percent Meals Eaten (last 3 days)        Date/Time Percent Meals Eaten (%)    12/15/23 0905 0 %    12/15/23 1246 20 %            GI system review: WNL Last BM: 12/15  Skin and wounds: WNL    NUTRITION RELATED PHYSICAL FINDINGS:     1. Body Fat/Muscle Mass: mild depletion body fat Orbital fat pad and Buccal fat pad and mild muscle depletion Temple region Unable to observe rest of body d/t pt falling asleep with blankets covering      2. Fluid Accumulation:  Non-pitting edema to bilateral lower extremities      NUTRITION PRESCRIPTION: 56.2kg  Calories: 8741-0087 calories/day (30-35 kcal/kg)  Protein: 67-84 grams protein/day (1.2-1.5 grams protein per kg)  Fluid: ~1 ml/kcal or per MD discretion    NUTRITION DIAGNOSIS/PROBLEM:  Malnutrition related to decreased intake as evidenced by documented/reported insufficient oral intake, documented/reported unintentional weight loss, loss of fat mass, and loss of muscle mass      MONITOR AND EVALUATE/NUTRITION GOALS:  PO intake of 75% of meals TID - New  PO intake of 75% of oral nutrition supplement/s - New  Weight stable within 1 to 2 lbs during admission - New      MEDICATIONS:  NaCl 2g    LABS:  Na 123    Pt is at High nutrition risk      Garry Dhillon, MS, RDN, LDN  Clinical Dietitian  [id number]

## 2023-12-16 NOTE — PROGRESS NOTES
Pulmonary Progress Note        NAME: Alina Aceves - ROOM: 431/431-A - MRN: [id number] - Age: 43 year old - : 1980        Last 24hrs: No events overnight, some question of post procedural pneumothorax but resolved/was not seen on repeat film, pt would still like to go home    OBJECTIVE:  Vitals:    12/15/23 2045 12/15/23 2115 12/15/23 2315 23 0345   BP: (!) 129/92  (!) 135/98 (!) 134/95   BP Location: Left arm  Left arm Left arm   Pulse: 111 117 118 113   Resp: 18  20 18   Temp: 97.5 °F (36.4 °C)  97.5 °F (36.4 °C) 97.1 °F (36.2 °C)   TempSrc: Oral  Oral Oral   SpO2: 100% 100% 100% 100%   Weight:       Height:           Oxygen Therapy  SpO2: 100 %  O2 Device: Nasal cannula  O2 Flow Rate (L/min): 3 L/min  Pulse Oximetry Type: Continuous  Oximetry Probe Site Changed: No  Pulse Ox Probe Location: Left hand                  Intake/Output Summary (Last 24 hours) at 2023 0822  Last data filed at 2023 0604  Gross per 24 hour   Intake 3634 ml   Output --   Net 3634 ml       Scheduled Medication:   mupirocin  1 Application Nasal Q12H    sodium chloride  2 g Oral TID CC    potassium chloride  40 mEq Intravenous Once    pantoprazole  40 mg Intravenous QAM AC    Or    pantoprazole  40 mg Oral QAM AC    cefTRIAXone  1 g Intravenous Q24H    [Held by provider] DULoxetine  30 mg Oral Daily    doxycycline  100 mg Intravenous Q12H     Continuous Infusing Medication:   sodium bicarbonate in D5W infusion 150 mEq (23 0254)       Lungs: rales posterior - anteriorly and in left base, diminished BS in right base  Heart: RRR no m/r/g s1 s2  Abdomen: soft, non-tender; bowel sounds normal; no masses,  no organomegaly  Extremities: extremities normal, atraumatic, no cyanosis or edema    Labs reviewed as noted below      Imaging: chest x-ray reviewed- improved aeration of left base    ASSESSMENT/PLAN:    Acute hypoxemic respiratory failure - secondary to pneumonia vs pneumonitis as well as bilateral pleural  effusions.  -continue supplemental oxygen, wean as able, down to 2L today  Pneumonia - viral respiratory panel was negative. PCT was negative, but cannot r/o atypical pathogens  -continue empiric antibiotics : ceftriaxone, doxycycline (12/13-   -follow up cultures  -strep and legionella urine ag- negative  -if no improvement, could consider bronchoscopy and/or empiric steroids  -will need follow up imaging in 4-6 weeks to ensure improvement in imaging abnormaltieis.  Pleural effusions - bilateral, ddx includes parapneumonic effusions, CHF, effusions related to SLE, and malignant pleural effusions  -s/p left sided thoracentesis, check serum LDH today but effusion appears to be transudative  -await culture and cytology  -right sided tap today  SLE  -per hospitalist and outpatient rheumatology  Hyponatremia, non-anion gap acidosis  -renal following  Breast cancer  -per heme/onc  Proph   -lmwh  Dispo   -full code  -inpatient   -dsiscussed w/ pt that she would need to sign out AMA if she left      [facility]  Pulmonary and Critical Care

## 2023-12-16 NOTE — PROCEDURES
Procedure note - Thoracentesis    Procedure:  Thoracentesis  Indication:  Dyspnea, pleural effusion  Performed by:  myself    Description of procedure: After informed consent obtained, area prepped and draped in sterile fashion.  Local anesthetic with 1 % lidocaine was applied. Ultrasound marking was used for guidance.  right pleural space was accessed with the needle in the appropriate rib space and pleural fluid was obtained.  Drainage catheter was advanced into the space over the needle.     Findings: 1450 cc of pleural fluid was removed which was Clear in nature.  Catheter was then withdrawn and a bandage applied to insertion site.     EBL:   <5cc  Complications:  none    A chest xray was ordered to rule out a complication. Pleural fluid was sent for studies including cytology.

## 2023-12-16 NOTE — PROGRESS NOTES
Rheumatology Inpatient Progress Note    Alina Aceves Patient Status:  Inpatient    1980 MRN [id number]   [facility] 4NW-A Attending Tosha Waite MD   Hosp Day # 3 PCP HOLLY SPICER:    Completed bilateral thoracentesis in the past 24 hours.  Transudate effusion noted.    Breathing still an issue.  Current Facility-Administered Medications   Medication Dose Route Frequency    sodium bicarbonate 150 mEq in dextrose 5% 1,000 mL infusion  150 mEq Intravenous Continuous    tolvaptan (Samsca) tab 15 mg  15 mg Oral Once    mupirocin (Bactroban) 2% nasal ointment 1 Application  1 Application Nasal Q12H    sodium chloride tab 2 g  2 g Oral TID CC    potassium chloride 40 mEq in 250mL sodium chloride 0.9% IVPB premix  40 mEq Intravenous Once    ALPRAZolam (Xanax) tab 0.25 mg  0.25 mg Oral TID PRN    pantoprazole (Protonix) 40 mg in sodium chloride 0.9% PF 10 mL IV push  40 mg Intravenous QAM AC    Or    pantoprazole (Protonix) DR tab 40 mg  40 mg Oral QAM AC    LORazepam (Ativan) 2 mg/mL injection 0.5 mg  0.5 mg Intravenous Q6H PRN    metoprolol (Lopressor) 5 mg/5mL injection 5 mg  5 mg Intravenous Q6H PRN    ipratropium-albuterol (Duoneb) 0.5-2.5 (3) MG/3ML inhalation solution 3 mL  3 mL Nebulization Q4H PRN    HYDROmorphone (Dilaudid) 1 MG/ML injection 0.5 mg  0.5 mg Intravenous Q30 Min PRN    cefTRIAXone (Rocephin) 1 g in D5W 100 mL IVPB-ADD  1 g Intravenous Q24H    [Held by provider] DULoxetine (Cymbalta) DR cap 30 mg  30 mg Oral Daily    acetaminophen (Tylenol Extra Strength) tab 1,000 mg  1,000 mg Oral Q8H PRN    melatonin tab 3 mg  3 mg Oral Nightly PRN    prochlorperazine (Compazine) 10 MG/2ML injection 5 mg  5 mg Intravenous Q8H PRN    polyethylene glycol (PEG 3350) (Miralax) 17 g oral packet 17 g  17 g Oral Daily PRN    sennosides (Senokot) tab 17.2 mg  17.2 mg Oral Nightly PRN    bisacodyl (Dulcolax) 10 MG rectal suppository 10 mg  10 mg Rectal Daily PRN    fleet enema (Fleet) 7-19  GM/118ML rectal enema 133 mL  1 enema Rectal Once PRN    HYDROmorphone (Dilaudid) 1 MG/ML injection 0.2 mg  0.2 mg Intravenous Q2H PRN    Or    HYDROmorphone (Dilaudid) 1 MG/ML injection 0.4 mg  0.4 mg Intravenous Q2H PRN    Or    HYDROmorphone (Dilaudid) 1 MG/ML injection 0.8 mg  0.8 mg Intravenous Q2H PRN    HYDROmorphone (Dilaudid) 1 MG/ML injection 0.5 mg  0.5 mg Intravenous Q30 Min PRN    doxycycline hyclate (Vibramycin) 100 mg in sodium chloride 0.9% 100 mL IVPB  100 mg Intravenous Q12H     ?    O:  Vitals:    12/16/23 0831 12/16/23 1145 12/16/23 1518 12/16/23 1530   BP: (!) 131/94 117/83 (!) 131/96    BP Location: Left arm Left arm Left arm    Pulse: 110 112 (!) 121    Resp: 22 20 22    Temp: 97.5 °F (36.4 °C) 97.5 °F (36.4 °C) (!) 96.4 °F (35.8 °C) 98 °F (36.7 °C)   TempSrc: Oral Oral Axillary Oral   SpO2: 99% 99% 98%    Weight:       Height:           GEN: NAD, well-nourished.   HEENT: Head: NCAT. Face: No lesions. Eyes: Conjunctiva clear. Sclera are anicteric. PERRLA. EOMs are full.   CV: Tachycardic  PULM: Bibasilar crackles, decreased breath sounds at the bases  Extremities: No cyanosis, edema or deformities.   Neurologic: Strength, CN2-12 grossly intact   Psych: normal affect.   Skin: No lesions or rashes.  MSK: No synovitis noted.      Labs:    12/2023  UPCr: 2.93     Latest Reference Range & Units 12/15/23 05:13   COMPLEMENT C3 90.0 - 180.0 mg/dL 36.1 (L)   COMPLEMENT C4 10.0 - 40.0 mg/dL 2.8 (L)   (L): Data is abnormally low      Radiology:    ==============================================================================================================  A/P:    #Acute hypoxemic respiratory failure: Bilateral pulmonary infiltrates and bilateral pleural effusions noted  -transduative effusion noted on b/l thoracentesis, pointing away from SLE/Sjogren's pleurisy       #Breast cancer s/p mastectomy s/p chemotherapy  -Patient reports bilateral peripheral neuropathy with right foot drop  #UCTD with  Sjogren's features versus SLE  --Relevant Clinical Manifestations: Malar rash, lower extremity rash (biopsy consistent with leukocytoclastic vasculitis), Raynaud's, dry eyes/mouth, pleurisy (around 2016, treated with ibuprofen), interstitial nephritis (2023)  --Serologies/Laboratory findings: Leukopenia, positive NURIA, positive SSA, low C3/C4  --Prior medications: Hydroxychloroquine (no improvement).  Azathioprine stopped due to recurrent cellulitis     #Interstitial nephritis, FSGS: Due to Sjogren's.  -Responded to short course of prednisone in early 2023.     #Hyponatremia:  -per renal, suspect SIADH  ?  Plan:  -TTE given transudative effusion and pulmonary edema.  -Given recurrence of significant proteinuria (UPCR 2.93), lower C3/C4, will discuss with nephrology regarding disease activity of biopsy-proven interstitial nephritis  -Given lack of definitive evidence of active systemic autoimmune disease:  no role for any immunomodulatory therapy for her any extra-renal autoimmune disease at this juncture.      Rakesh Alberto MD  [facility]  12/16/2023

## 2023-12-16 NOTE — PLAN OF CARE
Pt Aox4. Tachycardic. Metoprolol PRN administered.  Pt anxious in the am. Xanax 0.25 mg administered. Relief achieved.   Pt complained of being cold. Low axillary temp recorded. Sepsis protocol fired up. MD notified. Orders received. Additional blankets and hot packs provided. Pt temp. Improved.   Pt up to the bedside commode. Very week legs. Needs 1 assist.  Reported legs pain. Dilaudid 0.4 mg PRN administered.  Poor appetite.    R bedside thoracentesis done bedside. 1450 ml fluid removed.   Will continue plan of care.       Problem: SAFETY ADULT - FALL  Goal: Free from fall injury  Description: INTERVENTIONS:  - Assess pt frequently for physical needs  - Identify cognitive and physical deficits and behaviors that affect risk of falls.  - [place] fall precautions as indicated by assessment.  - Educate pt/family on patient safety including physical limitations  - Instruct pt to call for assistance with activity based on assessment  - Modify environment to reduce risk of injury  - Provide assistive devices as appropriate  - Consider OT/PT consult to assist with strengthening/mobility  - Encourage toileting schedule  Outcome: Progressing     Problem: RESPIRATORY - ADULT  Goal: Achieves optimal ventilation and oxygenation  Description: INTERVENTIONS:  - Assess for changes in respiratory status  - Assess for changes in mentation and behavior  - Position to facilitate oxygenation and minimize respiratory effort  - Oxygen supplementation based on oxygen saturation or ABGs  - Provide Smoking Cessation handout, if applicable  - Encourage broncho-pulmonary hygiene including cough, deep breathe, Incentive Spirometry  - Assess the need for suctioning and perform as needed  - Assess and instruct to report SOB or any respiratory difficulty  - Respiratory Therapy support as indicated  - Manage/alleviate anxiety  - Monitor for signs/symptoms of CO2 retention  Outcome: Progressing

## 2023-12-17 ENCOUNTER — APPOINTMENT (OUTPATIENT)
Dept: CV DIAGNOSTICS | Facility: HOSPITAL | Age: 43
DRG: 987 | End: 2023-12-17
Attending: INTERNAL MEDICINE
Payer: COMMERCIAL

## 2023-12-17 PROBLEM — R80.1 PERSISTENT PROTEINURIA: Status: ACTIVE | Noted: 2023-12-17

## 2023-12-17 LAB
ANION GAP SERPL CALC-SCNC: 13 MMOL/L (ref 0–18)
ANION GAP SERPL CALC-SCNC: 14 MMOL/L (ref 0–18)
ANION GAP SERPL CALC-SCNC: 15 MMOL/L (ref 0–18)
BASOPHILS # BLD AUTO: 0 X10(3) UL (ref 0–0.2)
BASOPHILS NFR BLD AUTO: 0 %
BUN BLD-MCNC: 36 MG/DL (ref 9–23)
BUN BLD-MCNC: 37 MG/DL (ref 9–23)
BUN BLD-MCNC: 38 MG/DL (ref 9–23)
CALCIUM BLD-MCNC: 7.7 MG/DL (ref 8.5–10.1)
CALCIUM BLD-MCNC: 7.8 MG/DL (ref 8.5–10.1)
CALCIUM BLD-MCNC: 7.9 MG/DL (ref 8.5–10.1)
CHLORIDE SERPL-SCNC: 91 MMOL/L (ref 98–112)
CHLORIDE SERPL-SCNC: 91 MMOL/L (ref 98–112)
CHLORIDE SERPL-SCNC: 92 MMOL/L (ref 98–112)
CHOLEST SERPL-MCNC: 121 MG/DL (ref ?–200)
CO2 SERPL-SCNC: 21 MMOL/L (ref 21–32)
CREAT BLD-MCNC: 1.13 MG/DL
CREAT BLD-MCNC: 1.16 MG/DL
CREAT BLD-MCNC: 1.19 MG/DL
EGFRCR SERPLBLD CKD-EPI 2021: 58 ML/MIN/1.73M2 (ref 60–?)
EGFRCR SERPLBLD CKD-EPI 2021: 60 ML/MIN/1.73M2 (ref 60–?)
EGFRCR SERPLBLD CKD-EPI 2021: 62 ML/MIN/1.73M2 (ref 60–?)
EOSINOPHIL # BLD AUTO: 0 X10(3) UL (ref 0–0.7)
EOSINOPHIL NFR BLD AUTO: 0 %
ERYTHROCYTE [DISTWIDTH] IN BLOOD BY AUTOMATED COUNT: 12.9 %
GLUCOSE BLD-MCNC: 106 MG/DL (ref 70–99)
GLUCOSE BLD-MCNC: 109 MG/DL (ref 70–99)
GLUCOSE BLD-MCNC: 121 MG/DL (ref 70–99)
HBV SURFACE AG SER-ACNC: <0.1 [IU]/L
HBV SURFACE AG SERPL QL IA: NONREACTIVE
HCT VFR BLD AUTO: 22.3 %
HCV AB SERPL QL IA: NONREACTIVE
HDLC SERPL-MCNC: 36 MG/DL (ref 40–59)
HGB BLD-MCNC: 7.9 G/DL
IMM GRANULOCYTES # BLD AUTO: 0.13 X10(3) UL (ref 0–1)
IMM GRANULOCYTES NFR BLD: 1.6 %
LDLC SERPL CALC-MCNC: 71 MG/DL (ref ?–100)
LYMPHOCYTES # BLD AUTO: 0.42 X10(3) UL (ref 1–4)
LYMPHOCYTES NFR BLD AUTO: 5.1 %
MCH RBC QN AUTO: 28.8 PG (ref 26–34)
MCHC RBC AUTO-ENTMCNC: 35.4 G/DL (ref 31–37)
MCV RBC AUTO: 81.4 FL
MONOCYTES # BLD AUTO: 0.53 X10(3) UL (ref 0.1–1)
MONOCYTES NFR BLD AUTO: 6.4 %
NEUTROPHILS # BLD AUTO: 7.2 X10 (3) UL (ref 1.5–7.7)
NEUTROPHILS # BLD AUTO: 7.2 X10(3) UL (ref 1.5–7.7)
NEUTROPHILS NFR BLD AUTO: 86.9 %
NONHDLC SERPL-MCNC: 85 MG/DL (ref ?–130)
NT-PROBNP SERPL-MCNC: ABNORMAL PG/ML (ref ?–125)
OSMOLALITY SERPL CALC.SUM OF ELEC: 271 MOSM/KG (ref 275–295)
OSMOLALITY SERPL CALC.SUM OF ELEC: 271 MOSM/KG (ref 275–295)
OSMOLALITY SERPL CALC.SUM OF ELEC: 274 MOSM/KG (ref 275–295)
PLATELET # BLD AUTO: 157 10(3)UL (ref 150–450)
POTASSIUM SERPL-SCNC: 3.3 MMOL/L (ref 3.5–5.1)
POTASSIUM SERPL-SCNC: 3.4 MMOL/L (ref 3.5–5.1)
POTASSIUM SERPL-SCNC: 3.4 MMOL/L (ref 3.5–5.1)
POTASSIUM SERPL-SCNC: 3.5 MMOL/L (ref 3.5–5.1)
RBC # BLD AUTO: 2.74 X10(6)UL
SODIUM SERPL-SCNC: 126 MMOL/L (ref 136–145)
SODIUM SERPL-SCNC: 126 MMOL/L (ref 136–145)
SODIUM SERPL-SCNC: 127 MMOL/L (ref 136–145)
TRIGL SERPL-MCNC: 66 MG/DL (ref 30–149)
TROPONIN I SERPL HS-MCNC: 81 NG/L
TSI SER-ACNC: 0.59 MIU/ML (ref 0.36–3.74)
VLDLC SERPL CALC-MCNC: 10 MG/DL (ref 0–30)
WBC # BLD AUTO: 8.3 X10(3) UL (ref 4–11)

## 2023-12-17 PROCEDURE — 93306 TTE W/DOPPLER COMPLETE: CPT | Performed by: INTERNAL MEDICINE

## 2023-12-17 PROCEDURE — 99232 SBSQ HOSP IP/OBS MODERATE 35: CPT | Performed by: INTERNAL MEDICINE

## 2023-12-17 PROCEDURE — 99233 SBSQ HOSP IP/OBS HIGH 50: CPT | Performed by: INTERNAL MEDICINE

## 2023-12-17 RX ORDER — POTASSIUM CHLORIDE 1.5 G/1.58G
40 POWDER, FOR SOLUTION ORAL EVERY 4 HOURS
Status: DISCONTINUED | OUTPATIENT
Start: 2023-12-17 | End: 2023-12-17

## 2023-12-17 RX ORDER — POTASSIUM CHLORIDE 20 MEQ/1
40 TABLET, EXTENDED RELEASE ORAL EVERY 4 HOURS
Status: COMPLETED | OUTPATIENT
Start: 2023-12-17 | End: 2023-12-17

## 2023-12-17 RX ORDER — FUROSEMIDE 10 MG/ML
40 INJECTION INTRAMUSCULAR; INTRAVENOUS ONCE
Status: COMPLETED | OUTPATIENT
Start: 2023-12-17 | End: 2023-12-17

## 2023-12-17 RX ORDER — FUROSEMIDE 10 MG/ML
40 INJECTION INTRAMUSCULAR; INTRAVENOUS
Status: DISCONTINUED | OUTPATIENT
Start: 2023-12-18 | End: 2023-12-18

## 2023-12-17 NOTE — PROGRESS NOTES
[facility]     Hospitalist Progress Note     Alina Aceves Patient Status:  Inpatient    1980 MRN [id number]   Location [facility] 4NW-A Attending Tosha Waite MD   Hosp Day # 4 PCP HOLLY IBARRA     Chief Complaint: Intractable nausea vomiting, diarrhea, generalized weakness.  Recent pneumonia.     Subjective:     Complains of shortness of breath.  Denies any chest pain.  Hypoxic requiring 3 L of oxygen by nasal cannula.  Sinus tachycardia persists.  Patient afebrile.  2D echo done today morning, results pending    Objective:    Review of Systems:   A comprehensive review of systems was completed; pertinent positive and negatives stated in subjective.    Vital signs:  Temp:  [96.4 °F (35.8 °C)-98 °F (36.7 °C)] 97.6 °F (36.4 °C)  Pulse:  [110-121] 117  Resp:  [18-28] 28  BP: (117-134)/(83-96) 125/88  SpO2:  [98 %-100 %] 98 %    Physical Exam:    /88 (BP Location: Left arm)   Pulse (!) 121   Temp 98 °F (36.7 °C) (Oral)   Resp 18   Ht 5' 4\" (1.626 m)   Wt 124 lb (56.2 kg)   LMP 2023 (Approximate)   SpO2 94%   BMI 21.28 kg/m²   On 3 L of oxygen by nasal cannula  General: No acute distress  Respiratory: Clear to auscultation bilateral except decreased breath sounds at bases  Cardiovascular: S1, S2, regular rate and rhythm, tachycardic  Abdomen: Soft, Non-tender, non-distended, positive bowel sounds  Neuro: Awake alert, no new focal deficits.   Extremities: no pedal edema or calf tenderness  No pain or redness or tenderness or swelling and the recent left breast lumpectomy of left axillary lymph node resection site according to patient-had this done in November according to patient.  Incision looks healed with no erythema or drainage or tenderness or fluctuance on palpation    Diagnostic Data:    Labs:  Recent Labs   Lab 23  0631 12/15/23  0513 23  0617   WBC 6.3 8.3  --  8.3   HGB 7.9* 8.5*  --  7.9*   MCV 83.8 83.7  --  81.4   .0 188.0  --  157.0    INR  --   --  1.27*  --        Recent Labs   Lab 12/13/23  1938 12/14/23  0631 12/16/23  0650 12/16/23  1137 12/16/23  1820 12/17/23  0025 12/17/23  0617   *   < > 127*   < > 102* 106* 109*   BUN 28*   < > 35*   < > 36* 37* 36*   CREATSERUM 1.36*   < > 1.08*   < > 1.09* 1.19* 1.13*   CA 8.2*   < > 7.7*   < > 7.9* 7.7* 7.8*   ALB 3.1*  --   --   --   --   --   --    *   < > 122*   < > 126* 126* 126*   K 3.9   < > 3.5  3.5   < > 3.7 3.5 3.3*   CL 89*   < > 91*   < > 93* 92* 91*   CO2 15.0*   < > 20.0*   < > 20.0* 21.0 21.0   ALKPHO 97  --   --   --   --   --   --    AST 40*  --   --   --   --   --   --    ALT 36  --   --   --   --   --   --    BILT 0.9  --   --   --   --   --   --    TP 6.6  --  5.5*  --   --   --   --     < > = values in this interval not displayed.       Estimated Creatinine Clearance: 57.5 mL/min (A) (based on SCr of 1.09 mg/dL (H)).    Microbiology    Hospital Encounter on 12/13/23   1. Body Fluid Cult Aerobic and Anaerobic     Status: None (Preliminary result)    Collection Time: 12/15/23  3:26 PM    Specimen: Pleural Fluid, Right; Body fluid, unspecified   Result Value Ref Range    Body Fluid Culture Result No Growth 1 Day N/A    Body Fld Smear 4+ Neutrophils seen N/A    Body Fld Smear No organisms seen N/A    Body Fld Smear This is a cytocentrifuged smear. N/A   2. Urine Culture, Routine     Status: None    Collection Time: 12/14/23  3:58 AM    Specimen: Urine, clean catch   Result Value Ref Range    Urine Culture No Growth at 18-24 hrs. N/A   3. Blood Culture     Status: None (Preliminary result)    Collection Time: 12/13/23  8:10 PM    Specimen: Blood,peripheral   Result Value Ref Range    Blood Culture Result No Growth 3 Days N/A     MRSA PCR nares positive on 12/14/2023    Imaging: Reviewed in Epic.  Chest x-ray done on 12/13/2023 with dense patchy airspace consolidation opacities present within the lungs suspicious for areas of pneumonia  CTA chest done on 12/14/2023 early  a.m. shows moderate to large bilateral pleural effusion along with marked consolidation scattered throughout the lungs suggestive of pulmonary edema and fluid overload.  No evidence of pulm embolus.  Moderate large left axillary and left breast fluid collections noted.  Minimal gas in the left most central breast fluid collection, possibility of infection is a consideration.  Sequelae of seroma chronic hematoma is a consideration as well.    Medications:    potassium chloride  40 mEq Oral Q4H    mupirocin  1 Application Nasal Q12H    sodium chloride  2 g Oral TID CC    potassium chloride  40 mEq Intravenous Once    pantoprazole  40 mg Intravenous QAM AC    Or    pantoprazole  40 mg Oral QAM AC    cefTRIAXone  1 g Intravenous Q24H    [Held by provider] DULoxetine  30 mg Oral Daily    doxycycline  100 mg Intravenous Q12H       Assessment & Plan:      # Multifocal pneumonia with bilateral large bilateral pleural effusion with tachycardia, tachypnea, also rule out malignancy due to patient's locally advanced stage IIIb breast cancer   Pulmonary and ID on consult   Status post left thoracentesis on 12/15/2023 and right thoracentesis on 12/16/2023, pulmonary following   IV antibiotics-currently on IV Rocephin and IV doxycycline   Status post IV Lasix x 1 dose for the bilateral pleural effusion   MRSA nares came back positive, will give Bactroban application twice daily for 5 days.  Seen by ID on consult who advised to continue IV Rocephin and IV doxycycline at this time   Pulmonary plans bronc this week if respiratory status allows    # Acute hypoxic respiratory failure due to above   -On oxygen via nasal cannula   -Pulmonary on consult   -2D echo done on 12/17/2023, results pending    # Locally advanced stage IIIb breast cancer status post outpatient neoadjuvant chemo and history of left breast lumpectomy and left lymph node resection on 11/16/2023   Oncology, ID and pulmonary on consult    # Hyponatremia due to  pneumonia and also hypovolemic due to nausea vomiting and diarrhea at home and may also due to malignancy, rule out SIADH also due to malignancy   Nephrology consult, on bicarb drip per nephrology.  Status post tolvaptan   Sodium slowly improving   Follow BMP    # Hypokalemia   Being replaced with electrolyte protocol    # Metabolic acidosis, acute kidney injury   On IV fluids bicarb drip and IV antibiotics   Lactic acid normal on 12/13/2023 at 1.5   Nephrology consult appreciated   On chart review patient's creatinine was 0.84 on 11/13/2023, 1.36 on 12/13/2023 on admission.    # History of lupus, history of Sjogren's disease  -Takes azathioprine at home which was held at this time due to multifocal pneumonia    # Gastritis  # Nausea vomiting and diarrhea at home possibly due to effect of chemo  # Anxiety  -Nausea vomiting and diarrhea improved.  -Will give IV PPI  -Xanax as needed  -IV Zofran as needed nausea vomiting    # Anemia due to malignancy and chemo   Follow CBC   Hematology oncology following    # Small left apical pneumothorax on 12/15/2023 status post left thoracentesis  -Pulmonary following, on conservative management, repeat chest x-ray done on 12/15/2023 showed left pneumothorax resolved    # Moderate malnutrition  -Dietitian following    Tosha Waite MD    Supplementary Documentation:     Quality:  DVT Mechanical Prophylaxis:     Early ambuation  DVT Pharmacologic Prophylaxis   Medication   None                Code Status: Prior  Peacock: External urinary catheter in place  Peacock Duration (in days):   Central line:    CHRISTELLE:     Discharge is dependent on: Clinical progress  At this point Ms. Aceves is expected to be discharge to: To be decided    The 21st Century Cures Act makes medical notes like these available to patients in the interest of transparency. Please be advised this is a medical document. Medical documents are intended to carry relevant information, facts as evident, and the clinical  opinion of the practitioner. The medical note is intended as peer to peer communication and may appear blunt or direct. It is written in medical language and may contain abbreviations or verbiage that are unfamiliar.

## 2023-12-17 NOTE — PROGRESS NOTES
Pulmonary Progress Note        NAME: Alina Aceves - ROOM: 431/431-A - MRN: [id number] - Age: 43 year old - : 1980        Last 24hrs: No events overnight, needing more O2 today, wants to know when she can go home    OBJECTIVE:  Vitals:    23 0104 23 0414 23 0722   BP: 125/88 118/87 (!) 139/95 128/88   BP Location: Left arm Left arm Left arm Left arm   Pulse: 117 114 (!) 125 120   Resp:    18   Temp: 97.6 °F (36.4 °C) 98.9 °F (37.2 °C) 97.4 °F (36.3 °C) 98 °F (36.7 °C)   TempSrc: Oral Axillary Oral Oral   SpO2: 98% 97% 94% 93%   Weight:       Height:           Oxygen Therapy  SpO2: 93 %  O2 Device: High flow nasal cannula  O2 Flow Rate (L/min): 3 L/min  Pulse Oximetry Type: Continuous  Oximetry Probe Site Changed: No  Pulse Ox Probe Location: Left hand                  Intake/Output Summary (Last 24 hours) at 2023 0849  Last data filed at 2023 0722  Gross per 24 hour   Intake 2742 ml   Output 351 ml   Net 2391 ml       Scheduled Medication:   mupirocin  1 Application Nasal Q12H    sodium chloride  2 g Oral TID CC    potassium chloride  40 mEq Intravenous Once    pantoprazole  40 mg Intravenous QAM AC    Or    pantoprazole  40 mg Oral QAM AC    cefTRIAXone  1 g Intravenous Q24H    [Held by provider] DULoxetine  30 mg Oral Daily    doxycycline  100 mg Intravenous Q12H     Continuous Infusing Medication:   sodium bicarbonate in D5W infusion 150 mEq (23 8656)       Lungs: Rales in anterior lung fields los, rales in posterior right base  Heart: Tachy no m/r/g s1 s2  Abdomen: soft, non-tender; bowel sounds normal; no masses,  no organomegaly  Extremities: extremities normal, atraumatic, no cyanosis or edema    Labs reviewed as noted below      Imaging: chest x-ray reviewed- improved aeration of right base, ?slight recurrence of left sided pleural effusion    ASSESSMENT/PLAN:    Acute hypoxemic respiratory failure - secondary to pneumonia vs pneumonitis as well as  bilateral pleural effusions.  -continue supplemental oxygen, on 3L this AM w/ sats in the low 90s  Pneumonia - viral respiratory panel was negative. PCT was negative, but cannot r/o atypical pathogens  -continue empiric antibiotics : ceftriaxone, doxycycline (12/13-   -follow up cultures  -strep and legionella urine ag- negative  -would consider bronch this week if resp status allows  Pleural effusions - bilateral, transudative  -L side- 700 removed on 12/15  -R side- 1400 removed on 12/16  -await culture and cytology  SLE  -per hospitalist and rheumatology  Hyponatremia, non-anion gap acidosis  -renal following  Breast cancer  -per heme/onc  Proph   -lmwh  Dispo   -full code  -inpatient   -will follow      Jake Thorpe  [facility]  Pulmonary and Critical Care

## 2023-12-17 NOTE — PHYSICAL THERAPY NOTE
Attempted to see pt for PT treatment. RN enrrique noted. Pt sleeping, arouses to voice, falls back to sleep while this writer talking to her. Noted  while sleeping, O2 sats 98%, increased respirations. JULIETTE Elliott notified. Will continue to follow.

## 2023-12-17 NOTE — PROGRESS NOTES
[facility]  Progress Note    Alina Aceves Patient Status:  Inpatient    1980 MRN [id number]   [facility] 4NW-A Attending Tosha Waite MD   Hosp Day # 4 PCP HOLLY IBARRA     No acute events  Not eating much still  Drowsy this am  Denies any cp; breathing has improved since the tap        Current Facility-Administered Medications:     dextromethorphan-guaiFENesin (Robitussin-DM)  mg/5mL oral solution 5 mL, 5 mL, Oral, Q6H PRN    sodium bicarbonate 150 mEq in dextrose 5% 1,000 mL infusion, 150 mEq, Intravenous, Continuous    mupirocin (Bactroban) 2% nasal ointment 1 Application, 1 Application, Nasal, Q12H    sodium chloride tab 2 g, 2 g, Oral, TID CC    potassium chloride 40 mEq in 250mL sodium chloride 0.9% IVPB premix, 40 mEq, Intravenous, Once    ALPRAZolam (Xanax) tab 0.25 mg, 0.25 mg, Oral, TID PRN    pantoprazole (Protonix) 40 mg in sodium chloride 0.9% PF 10 mL IV push, 40 mg, Intravenous, QAM AC **OR** pantoprazole (Protonix) DR tab 40 mg, 40 mg, Oral, QAM AC    LORazepam (Ativan) 2 mg/mL injection 0.5 mg, 0.5 mg, Intravenous, Q6H PRN    metoprolol (Lopressor) 5 mg/5mL injection 5 mg, 5 mg, Intravenous, Q6H PRN    ipratropium-albuterol (Duoneb) 0.5-2.5 (3) MG/3ML inhalation solution 3 mL, 3 mL, Nebulization, Q4H PRN    HYDROmorphone (Dilaudid) 1 MG/ML injection 0.5 mg, 0.5 mg, Intravenous, Q30 Min PRN    cefTRIAXone (Rocephin) 1 g in D5W 100 mL IVPB-ADD, 1 g, Intravenous, Q24H    [Held by provider] DULoxetine (Cymbalta) DR cap 30 mg, 30 mg, Oral, Daily    acetaminophen (Tylenol Extra Strength) tab 1,000 mg, 1,000 mg, Oral, Q8H PRN    melatonin tab 3 mg, 3 mg, Oral, Nightly PRN    prochlorperazine (Compazine) 10 MG/2ML injection 5 mg, 5 mg, Intravenous, Q8H PRN    polyethylene glycol (PEG 3350) (Miralax) 17 g oral packet 17 g, 17 g, Oral, Daily PRN    sennosides (Senokot) tab 17.2 mg, 17.2 mg, Oral, Nightly PRN    bisacodyl (Dulcolax) 10 MG rectal suppository 10 mg, 10 mg,  Rectal, Daily PRN    fleet enema (Fleet) 7-19 GM/118ML rectal enema 133 mL, 1 enema, Rectal, Once PRN    HYDROmorphone (Dilaudid) 1 MG/ML injection 0.2 mg, 0.2 mg, Intravenous, Q2H PRN **OR** HYDROmorphone (Dilaudid) 1 MG/ML injection 0.4 mg, 0.4 mg, Intravenous, Q2H PRN **OR** HYDROmorphone (Dilaudid) 1 MG/ML injection 0.8 mg, 0.8 mg, Intravenous, Q2H PRN    HYDROmorphone (Dilaudid) 1 MG/ML injection 0.5 mg, 0.5 mg, Intravenous, Q30 Min PRN    doxycycline hyclate (Vibramycin) 100 mg in sodium chloride 0.9% 100 mL IVPB, 100 mg, Intravenous, Q12H         Physical Exam:  Vital signs: Blood pressure (!) 131/93, pulse 120, temperature 99.6 °F (37.6 °C), temperature source Oral, resp. rate 20, height 5' 4\" (1.626 m), weight 124 lb (56.2 kg), last menstrual period 08/28/2023, SpO2 98%, currently breastfeeding.  General: No acute distress. Alert and oriented x 3.  HEENT: Moist mucous membranes. EOM-I. PERRL  Neck: No lymphadenopathy.  No JVD. No carotid bruits.  Respiratory: decreased breath sounds B  Cardiovascular: S1, S2.  Regular rate and rhythm.  No murmurs. Equal pulses   Abdomen: Soft, nontender, nondistended.  Positive bowel sounds. No rebound tenderness   Neurologic: No focal neurological deficits.   Musculoskeletal: Full range of motion of all extremities. No edema   Integument: No lesions. No erythema.  Psychiatric: Appropriate mood and affect.      Recent Labs     12/15/23  0513 12/17/23  0617   WBC  --  8.3   HGB  --  7.9*   MCV  --  81.4   PLT  --  157.0   INR 1.27*  --        Recent Labs     12/16/23  1137 12/16/23  1820 12/17/23  0025 12/17/23  0617 12/17/23  1155   * 126* 126* 126* 127*   K 3.7 3.7 3.5 3.3* 3.4*   CL 92* 93* 92* 91* 91*   CO2 21.0 20.0* 21.0 21.0 21.0   BUN 35* 36* 37* 36* 38*   CREATSERUM 1.07* 1.09* 1.19* 1.13* 1.16*   CA 7.5* 7.9* 7.7* 7.8* 7.9*       Recent Labs     12/16/23  0650   *         Urine osm 371  Urine na 10  Uric acid 6.5    Kidney bx Jan 2023- [facility]  LM 21  glom, 2 mildly enlarged, 1 w segmental sclerosis, sevral foci of lymphocytic infiltrates ~ 30% cortex, no eos  IF focal dense lymphocytic infiltrates, fine granular + IgG, IgA,IgM, LC and c3 in the interstitium (neg in glom, neg c1q)  EM no deposits, fp effacement       Imaging:  Reviewed  CTA - moderate to large bilateral pleural effusions ; no PE; mod to large L axillary and L breast fluid collections     Impression:  REJI- related to pre-renal azotemia most likely due to decreased renal perfusion.   - monitor labs;improving   Proteinuria- pt w/ hx of interstitial nephritis - treated w/ steorids in past - @ [facility] (Dr Tejeda); kidney biopsy jan 2023- interstitial nephritis-  Spot ratio ~ 2.9 g/g; low complements  - check serologies  - may need to consider kidney biopsy for further evaluation - discussed w/ patient- for now holding on ordering given tenuous pulmonary status   Hyponatremia- patient appears volume replete for the most part. She does have poor solute intake over the past few days as noted. Concern for SIADH given her hx of breast cancer +/-  PNA. Additionally, patient is on SSRI (now held)  Urine chem c/w with combination of low solute/SIADH  Na   improving  - continue salt tabs/prn loop diuretic/tolvaptan/fluid restriction  - check bmp q 12 hrs  Breast Ca  Effusion- per pulm; on O2; s/p thoracentesis ; await cytology; transudative    N/V/D- unclear etiology- possibly related to the chemo; symptomatic management  Acidosis- pt reports loose stools; improved; HLIV bicarb   Hypokelamia- replace per protocol  Hx of SLE/Sjogrens- appreciate rheum eval; discussed w/ Dr. Alberto      Thank you for allowing me to participate in this patient's care.  Please feel free to call me with any questions or concerns.    Darryn Potter MD  12/17/2023

## 2023-12-17 NOTE — PLAN OF CARE
Pt Aox4. Afebrile. Tachycardic. Metoprolol PRN administered x2. Temporary decrease in HR. Pt saturating 99% on 3L High flow NC. Labored breathing noted. Pt could not find comfortable position to rest.   Echo done today. MD notified. Cardiology consulted per order. EKG done.  Pt did not get up today. Bed pan and external catheter used for elimination.   No appetite.   Family visited.   Will continue plan of care.     Problem: PAIN - ADULT  Goal: Verbalizes/displays adequate comfort level or patient's stated pain goal  Description: INTERVENTIONS:  - Encourage pt to monitor pain and request assistance  - Assess pain using appropriate pain scale  - Administer analgesics based on type and severity of pain and evaluate response  - Implement non-pharmacological measures as appropriate and evaluate response  - Consider cultural and social influences on pain and pain management  - Manage/alleviate anxiety  - Utilize distraction and/or relaxation techniques  - Monitor for opioid side effects  - Notify MD/LIP if interventions unsuccessful or patient reports new pain  - Anticipate increased pain with activity and pre-medicate as appropriate  Outcome: Progressing     Problem: SAFETY ADULT - FALL  Goal: Free from fall injury  Description: INTERVENTIONS:  - Assess pt frequently for physical needs  - Identify cognitive and physical deficits and behaviors that affect risk of falls.  - [place] fall precautions as indicated by assessment.  - Educate pt/family on patient safety including physical limitations  - Instruct pt to call for assistance with activity based on assessment  - Modify environment to reduce risk of injury  - Provide assistive devices as appropriate  - Consider OT/PT consult to assist with strengthening/mobility  - Encourage toileting schedule  Outcome: Progressing     Problem: RESPIRATORY - ADULT  Goal: Achieves optimal ventilation and oxygenation  Description: INTERVENTIONS:  - Assess for changes in respiratory  status  - Assess for changes in mentation and behavior  - Position to facilitate oxygenation and minimize respiratory effort  - Oxygen supplementation based on oxygen saturation or ABGs  - Provide Smoking Cessation handout, if applicable  - Encourage broncho-pulmonary hygiene including cough, deep breathe, Incentive Spirometry  - Assess the need for suctioning and perform as needed  - Assess and instruct to report SOB or any respiratory difficulty  - Respiratory Therapy support as indicated  - Manage/alleviate anxiety  - Monitor for signs/symptoms of CO2 retention  Outcome: Progressing

## 2023-12-17 NOTE — PROGRESS NOTES
Rheumatology Inpatient Progress Note    Alina Aceves Patient Status:  Inpatient    1980 MRN [id number]   Location [facility] 4NW-A Attending Tosha Waite MD   Hosp Day # 4 PCP HOLLY IBARRA       S:    Feels a bit better after thoracentesis.  Still dyspneic however.  Quite tired today.  No new symptoms.  Denies any pain or rashes  ?    O:  Vitals:    23 0414 23 0722 23 0935 23 1317   BP: (!) 139/95 128/88  (!) 131/93   BP Location: Left arm Left arm  Left arm   Pulse: (!) 125 120 (!) 121 120   Resp:  18  20   Temp: 97.4 °F (36.3 °C) 98 °F (36.7 °C)  99.6 °F (37.6 °C)   TempSrc: Oral Oral  Oral   SpO2: 94% 93% 94% 98%   Weight:       Height:         GEN: NAD, well-nourished.   HEENT: Head: NCAT. Face: No lesions. Eyes: Conjunctiva clear.   PULM: Bibasilar crackles, slightly diminished breath sounds at the bases  CV: Tachycardic  Extremities: No cyanosis, edema or deformities.   Neurologic: Strength, CN2-12 grossly intact   Skin: No lesions or rashes.  MSK: 28 joint count performed. No evidence of synovitis in mcp, pip, dip, wrist, elbows, shoulders, hips, knees, ankles, mtp unless otherwise noted. Full ROM of elbows, wrists, knees.       Labs:    2023  UPCr: 2.93     Latest Reference Range & Units 12/15/23 05:13   COMPLEMENT C3 90.0 - 180.0 mg/dL 36.1 (L)   COMPLEMENT C4 10.0 - 40.0 mg/dL 2.8 (L)   (L): Data is abnormally low      Radiology:    ==============================================================================================================  A/P:    #Acute hypoxemic respiratory failure: Bilateral pulmonary infiltrates and bilateral pleural effusions noted  -transduative effusion noted on b/l thoracentesis, pointing away from SLE/Sjogren's pleurisy       #Breast cancer s/p mastectomy s/p chemotherapy  -Patient reports bilateral peripheral neuropathy with right foot drop due to Taxol in summer 2023    #UCTD with Sjogren's features versus SLE  --Relevant Clinical  Manifestations: Malar rash, lower extremity rash (biopsy consistent with leukocytoclastic vasculitis), Raynaud's, dry eyes/mouth, pleurisy (around 2016, treated with ibuprofen), interstitial nephritis (2023)  --Serologies/Laboratory findings: Leukopenia, positive NURIA, positive SSA, low C3/C4  --Prior medications: Hydroxychloroquine (no improvement).  Azathioprine stopped due to recurrent cellulitis in the distant past.      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  -Responded to short course of prednisone in early 2023.     #Hyponatremia:  -per renal, suspect SIADH  ?  Plan:  -TTE pending given transudative effusion and pulmonary edema.  -Given recurrence of significant proteinuria (UPCR 2.93), lower C3/C4, in context of biopsy-proven interstitial nephritis (1/2023 at [facility]); discussed with renal, clinical picture is not consistent with Sjogren's interstitial nephritis.    -Given lack of definitive evidence of active systemic autoimmune disease:  no role for any immunomodulatory therapy for her any extra-renal autoimmune disease at this juncture.  -consider outpatient EMG/NCS for further evaluation of the patient's right lower extremity foot drop.  Though Taxol may be the cause of her motor weakness, given her underlying Sjogren's/SLE, would evaluate for other etiologies.     carbon copy: Dr. Badillo (outpatient rheum)    Rakesh Alberto MD  [facility]  12/17/2023

## 2023-12-17 NOTE — PLAN OF CARE
Patient alert and oriented x4. VSS - HR tachy. PRN metoprolol administered. 3L O2 satting in the 90s. C/o leg pain, nausea, and cough. PRN medications given. Medications administered per MAR. IVF infusing. 0000 Na - 126. Safety precautions continued. Call light within reach.  Problem: SAFETY ADULT - FALL  Goal: Free from fall injury  Description: INTERVENTIONS:  - Assess pt frequently for physical needs  - Identify cognitive and physical deficits and behaviors that affect risk of falls.  - [place] fall precautions as indicated by assessment.  - Educate pt/family on patient safety including physical limitations  - Instruct pt to call for assistance with activity based on assessment  - Modify environment to reduce risk of injury  - Provide assistive devices as appropriate  - Consider OT/PT consult to assist with strengthening/mobility  - Encourage toileting schedule  Outcome: Progressing     Problem: RESPIRATORY - ADULT  Goal: Achieves optimal ventilation and oxygenation  Description: INTERVENTIONS:  - Assess for changes in respiratory status  - Assess for changes in mentation and behavior  - Position to facilitate oxygenation and minimize respiratory effort  - Oxygen supplementation based on oxygen saturation or ABGs  - Provide Smoking Cessation handout, if applicable  - Encourage broncho-pulmonary hygiene including cough, deep breathe, Incentive Spirometry  - Assess the need for suctioning and perform as needed  - Assess and instruct to report SOB or any respiratory difficulty  - Respiratory Therapy support as indicated  - Manage/alleviate anxiety  - Monitor for signs/symptoms of CO2 retention  Outcome: Progressing     Problem: PAIN - ADULT  Goal: Verbalizes/displays adequate comfort level or patient's stated pain goal  Description: INTERVENTIONS:  - Encourage pt to monitor pain and request assistance  - Assess pain using appropriate pain scale  - Administer analgesics based on type and severity of pain and  evaluate response  - Implement non-pharmacological measures as appropriate and evaluate response  - Consider cultural and social influences on pain and pain management  - Manage/alleviate anxiety  - Utilize distraction and/or relaxation techniques  - Monitor for opioid side effects  - Notify MD/LIP if interventions unsuccessful or patient reports new pain  - Anticipate increased pain with activity and pre-medicate as appropriate  Outcome: Not Progressing

## 2023-12-17 NOTE — PROGRESS NOTES
Infectious Disease Progress Note      Date of admission: 12/13/2023  7:15 PM     Reason for consult: Pneumonia    Subjective: Feels slightly better today.  Shortness of breath has improved since the pleural tap on the left side.  She also had her right side tapped on 12/16.  Fluid consistent with a transudative effusion.    The rest of the systems were reviewed and found to be negative except was mentioned above    Interval events: This is a 43-year-old female patient with history of stage IIIb breast cancer, status post mastectomy and capecitabine, currently not on any therapy.  She also has a history of lupus, not on any therapy, presents here with acute hypoxic respiratory failure with large pleural effusions noted on her CT along with severe pulmonary edema, question of atypical pneumonitis.  Status post pleural tap on the left on 12/15 and on the right on 12/16.  Fluid appears to be transudative    Medications:    dextromethorphan-guaiFENesin    potassium chloride    sodium bicarbonate in D5W infusion    mupirocin    sodium chloride    potassium chloride    ALPRAZolam    pantoprazole **OR** pantoprazole    LORazepam    metoprolol    ipratropium-albuterol    HYDROmorphone    cefTRIAXone    [Held by provider] DULoxetine    acetaminophen    melatonin    prochlorperazine    polyethylene glycol (PEG 3350)    sennosides    bisacodyl    fleet enema    HYDROmorphone **OR** HYDROmorphone **OR** HYDROmorphone    HYDROmorphone    doxycycline     Allergies:  Allergies   Allergen Reactions    Bactrim [Sulfamethoxazole W/Trimethoprim] RASH       Physical Exam:  Vitals:    12/17/23 0935   BP:    Pulse: (!) 121   Resp:    Temp:      Vitals signs and nursing note reviewed.   Constitutional:       Appearance: Normal appearance.   HENT:      Head: Normocephalic and atraumatic.      Mouth: Mucous membranes are moist.   Neck:      Musculoskeletal: Neck supple.   Cardiovascular:      Rate and Rhythm: Normal rate.   Pulmonary:       Effort: Pulmonary effort is normal.  Moderate respiratory distress.   Abdominal:      General: Abdomen is flat. There is no distension.      Palpations: Abdomen is soft. There is no mass.      Tenderness: There is no tenderness. There is no guarding or rebound.      Hernia: No hernia is present.   Musculoskeletal:      Right lower leg: No edema.      Left lower leg: No edema.   Skin:     General: Skin is warm and dry.   Neurological:      General: No focal deficit present.      Mental Status: Alert and oriented to person, place, and time.       Laboratory data:  I have reviewed all the lab results independently.  Lab Results   Component Value Date    WBC 8.3 12/17/2023    HGB 7.9 12/17/2023    HCT 22.3 12/17/2023    .0 12/17/2023    CREATSERUM 1.13 12/17/2023    BUN 36 12/17/2023     12/17/2023    K 3.3 12/17/2023    CL 91 12/17/2023    CO2 21.0 12/17/2023     12/17/2023    CA 7.8 12/17/2023    TSH 0.589 12/17/2023      Recent Labs   Lab 12/17/23  0617   RBC 2.74*   HGB 7.9*   HCT 22.3*   MCV 81.4   MCH 28.8   MCHC 35.4   RDW 12.9   NEPRELIM 7.20   WBC 8.3   .0      Microbiology data:  Hospital Encounter on 12/13/23   1. Body Fluid Cult Aerobic and Anaerobic     Status: None (Preliminary result)    Collection Time: 12/15/23  3:26 PM    Specimen: Pleural Fluid, Right; Body fluid, unspecified   Result Value Ref Range    Body Fluid Culture Result No Growth 1 Day N/A    Body Fld Smear 4+ Neutrophils seen N/A    Body Fld Smear No organisms seen N/A    Body Fld Smear This is a cytocentrifuged smear. N/A   2. Urine Culture, Routine     Status: None    Collection Time: 12/14/23  3:58 AM    Specimen: Urine, clean catch   Result Value Ref Range    Urine Culture No Growth at 18-24 hrs. N/A   3. Blood Culture     Status: None (Preliminary result)    Collection Time: 12/13/23  8:10 PM    Specimen: Blood,peripheral   Result Value Ref Range    Blood Culture Result No Growth 3 Days N/A       Impression:  Alina Aceves is a 43 year old female with    Acute hypoxic respiratory failure  CT chest is consistent with acute pulmonary edema with pleural effusions rather than pneumonia.    There is also concern for atypical infection  Currently on ceftriaxone and doxycycline day 4 of 5  Hyponatremia with pulmonary edema  Management as per renal medicine  History of breast cancer  Was on chemotherapy until September 2023  Immunosuppressed  History of SLE  Not on any immunosuppression    Recommendations:    Follow-up on pleural fluid studies  Finish 5 days of antibiotics through tomorrow and monitor unless her pleural fluid cultures come back positive  Supportive care as per the primary team  Continue to monitor daily labs for antibiotic toxicities  Further recommendations will depend on the above work-up and clinical progress     The plan of care was discussed with the primary hospital team, Tosha Waite MD     Recommendations were also discussed with the patient; all questions were answered.     Thank you for this consultation. Please don't hesitate to call the ID team for questions or any acute changes in patient's clinical condition.    Please note that this report has been produced using speech recognition software and may contain errors related to that system including, but not limited to, errors in grammar, punctuation, and spelling, as well as words and phrases that possibly may have been recognized inappropriately.  If there are any questions or concerns, contact the dictating provider for clarification.    The 21st Century Cures Act makes medical notes like these available to patients in the interest of transparency. Please be advised this is a medical document. Medical documents are intended to carry relevant information, facts as evident, and the clinical opinion of the practitioner. The medical note is intended as peer to peer communication and may appear blunt or direct. It is written in  medical language and may contain abbreviations or verbiage that are unfamiliar.     Chelsi Ramon MD  [facility]. Tel: [phone]. Fax: [phone].     Alina Hernandezala : 1980 MRN: [id number] CSN: [id number]

## 2023-12-18 ENCOUNTER — APPOINTMENT (OUTPATIENT)
Dept: GENERAL RADIOLOGY | Facility: HOSPITAL | Age: 43
DRG: 987 | End: 2023-12-18
Attending: INTERNAL MEDICINE
Payer: COMMERCIAL

## 2023-12-18 PROBLEM — N17.9 AKI (ACUTE KIDNEY INJURY) (HCC): Status: ACTIVE | Noted: 2023-12-18

## 2023-12-18 PROBLEM — E22.2 SIADH (SYNDROME OF INAPPROPRIATE ADH PRODUCTION) (HCC): Status: ACTIVE | Noted: 2023-12-18

## 2023-12-18 PROBLEM — I42.9 CARDIOMYOPATHY (HCC): Status: ACTIVE | Noted: 2023-12-18

## 2023-12-18 PROBLEM — N17.9 AKI (ACUTE KIDNEY INJURY): Status: ACTIVE | Noted: 2023-12-18

## 2023-12-18 LAB
ANION GAP SERPL CALC-SCNC: 16 MMOL/L (ref 0–18)
ARTERIAL PATENCY WRIST A: POSITIVE
ATRIAL RATE: 124 BPM
ATRIAL RATE: 131 BPM
BASE EXCESS BLDA CALC-SCNC: -2 MMOL/L (ref ?–2)
BODY TEMPERATURE: 98.6 F
BUN BLD-MCNC: 46 MG/DL (ref 9–23)
CA-I BLD-SCNC: 1.11 MMOL/L (ref 0.95–1.32)
CALCIUM BLD-MCNC: 8.1 MG/DL (ref 8.5–10.1)
CENTROMERE IGG SER-ACNC: <0.4 U/ML
CHLORIDE SERPL-SCNC: 93 MMOL/L (ref 98–112)
CO2 SERPL-SCNC: 19 MMOL/L (ref 21–32)
COHGB MFR BLD: 1.7 % SAT (ref 0–3)
CREAT BLD-MCNC: 1.5 MG/DL
DSDNA IGG SERPL IA-ACNC: <0.6 IU/ML
EGFRCR SERPLBLD CKD-EPI 2021: 44 ML/MIN/1.73M2 (ref 60–?)
ENA AB SER QL IA: 27 UG/L
ENA AB SER QL IA: POSITIVE
ENA JO1 AB SER IA-ACNC: <0.3 U/ML
ENA RNP IGG SER IA-ACNC: 1.3 U/ML
ENA SCL70 IGG SER IA-ACNC: <0.6 U/ML
ENA SM IGG SER IA-ACNC: <0.7 U/ML
ENA SS-A IGG SER IA-ACNC: 205 U/ML
ENA SS-B IGG SER IA-ACNC: <0.4 U/ML
GLUCOSE BLD-MCNC: 125 MG/DL (ref 70–99)
GLUCOSE BLD-MCNC: 69 MG/DL (ref 70–99)
GLUCOSE BLD-MCNC: 84 MG/DL (ref 70–99)
HCO3 BLDA-SCNC: 23.4 MEQ/L (ref 21–27)
HGB BLD-MCNC: 9.2 G/DL
IGA SERPL-MCNC: 352 MG/DL (ref 70–312)
IGM SERPL-MCNC: 165 MG/DL (ref 43–279)
IMMUNOGLOBULIN PNL SER-MCNC: 518 MG/DL (ref 791–1643)
LACTATE BLD-SCNC: 5.6 MMOL/L (ref 0.5–2)
METHGB MFR BLD: 0.2 % SAT (ref 0.4–1.5)
OSMOLALITY SERPL CALC.SUM OF ELEC: 276 MOSM/KG (ref 275–295)
OXYHGB MFR BLDA: 96.7 % (ref 92–100)
P AXIS: 61 DEGREES
P AXIS: 86 DEGREES
P-R INTERVAL: 134 MS
P-R INTERVAL: 152 MS
PCO2 BLDA: 32 MM HG (ref 35–45)
PH BLDA: 7.44 [PH] (ref 7.35–7.45)
PO2 BLDA: 90 MM HG (ref 80–100)
POTASSIUM BLD-SCNC: 4.6 MMOL/L (ref 3.6–5.1)
POTASSIUM SERPL-SCNC: 4 MMOL/L (ref 3.5–5.1)
POTASSIUM SERPL-SCNC: 4.3 MMOL/L (ref 3.5–5.1)
Q-T INTERVAL: 320 MS
Q-T INTERVAL: 326 MS
QRS DURATION: 82 MS
QRS DURATION: 84 MS
QTC CALCULATION (BEZET): 468 MS
QTC CALCULATION (BEZET): 472 MS
R AXIS: 126 DEGREES
R AXIS: 58 DEGREES
RHEUMATOID FACT SERPL-ACNC: 122 IU/ML (ref ?–15)
SODIUM BLD-SCNC: 124 MMOL/L (ref 135–145)
SODIUM SERPL-SCNC: 128 MMOL/L (ref 136–145)
T AXIS: 153 DEGREES
T AXIS: 32 DEGREES
TROPONIN I SERPL HS-MCNC: 66 NG/L
U1 SNRNP IGG SER IA-ACNC: 1.1 U/ML
VENTRICULAR RATE: 124 BPM
VENTRICULAR RATE: 131 BPM

## 2023-12-18 PROCEDURE — 99233 SBSQ HOSP IP/OBS HIGH 50: CPT | Performed by: INTERNAL MEDICINE

## 2023-12-18 PROCEDURE — 71045 X-RAY EXAM CHEST 1 VIEW: CPT | Performed by: INTERNAL MEDICINE

## 2023-12-18 RX ORDER — FUROSEMIDE 20 MG/1
20 TABLET ORAL DAILY
Status: DISCONTINUED | OUTPATIENT
Start: 2023-12-19 | End: 2023-12-19

## 2023-12-18 RX ORDER — FUROSEMIDE 20 MG/1
20 TABLET ORAL DAILY
Status: DISCONTINUED | OUTPATIENT
Start: 2023-12-18 | End: 2023-12-18

## 2023-12-18 RX ORDER — TOLVAPTAN 15 MG/1
15 TABLET ORAL ONCE
Status: COMPLETED | OUTPATIENT
Start: 2023-12-18 | End: 2023-12-18

## 2023-12-18 RX ORDER — FUROSEMIDE 10 MG/ML
20 INJECTION INTRAMUSCULAR; INTRAVENOUS ONCE
Status: DISCONTINUED | OUTPATIENT
Start: 2023-12-18 | End: 2023-12-21

## 2023-12-18 NOTE — PROGRESS NOTES
[facility]    Alina Aceves Patient Status:  Inpatient    1980 MRN [id number]   Location [facility] 8NE-A Attending Tosha Waite MD   Hosp Day # 5 PCP HOLLY IBARRA     SUBJECTIVE: Pt lethargic this am.  Still with some SOB.      OBJECTIVE:  BP (!) 140/107 (BP Location: Right arm)   Pulse (!) 124   Temp 97.7 °F (36.5 °C) (Oral)   Resp 22   Ht 5' 4\" (1.626 m)   Wt 150 lb 3.2 oz (68.1 kg)   LMP 2023 (Approximate)   SpO2 100%   Breastfeeding No   BMI 25.78 kg/m²   O2 requirement: 2L     I/O last 3 completed shifts:  In: 2933 [P.O.:500; I.V.:2433]  Out: 803 [Urine:803]  I/O this shift:  In: 480 [P.O.:480]  Out: 0      Current Medications:   Current Facility-Administered Medications:     [START ON 2023] furosemide (Lasix) tab 20 mg, 20 mg, Oral, Daily    dextromethorphan-guaiFENesin (Robitussin-DM)  mg/5mL oral solution 5 mL, 5 mL, Oral, Q6H PRN    mupirocin (Bactroban) 2% nasal ointment 1 Application, 1 Application, Nasal, Q12H    sodium chloride tab 2 g, 2 g, Oral, TID CC    potassium chloride 40 mEq in 250mL sodium chloride 0.9% IVPB premix, 40 mEq, Intravenous, Once    ALPRAZolam (Xanax) tab 0.25 mg, 0.25 mg, Oral, TID PRN    pantoprazole (Protonix) 40 mg in sodium chloride 0.9% PF 10 mL IV push, 40 mg, Intravenous, QAM AC **OR** pantoprazole (Protonix) DR tab 40 mg, 40 mg, Oral, QAM AC    LORazepam (Ativan) 2 mg/mL injection 0.5 mg, 0.5 mg, Intravenous, Q6H PRN    metoprolol (Lopressor) 5 mg/5mL injection 5 mg, 5 mg, Intravenous, Q6H PRN    ipratropium-albuterol (Duoneb) 0.5-2.5 (3) MG/3ML inhalation solution 3 mL, 3 mL, Nebulization, Q4H PRN    HYDROmorphone (Dilaudid) 1 MG/ML injection 0.5 mg, 0.5 mg, Intravenous, Q30 Min PRN    cefTRIAXone (Rocephin) 1 g in D5W 100 mL IVPB-ADD, 1 g, Intravenous, Q24H    DULoxetine (Cymbalta) DR cap 30 mg, 30 mg, Oral, Daily    acetaminophen (Tylenol Extra Strength) tab 1,000 mg, 1,000 mg, Oral, Q8H PRN    melatonin tab 3 mg, 3 mg,  Oral, Nightly PRN    prochlorperazine (Compazine) 10 MG/2ML injection 5 mg, 5 mg, Intravenous, Q8H PRN    polyethylene glycol (PEG 3350) (Miralax) 17 g oral packet 17 g, 17 g, Oral, Daily PRN    sennosides (Senokot) tab 17.2 mg, 17.2 mg, Oral, Nightly PRN    bisacodyl (Dulcolax) 10 MG rectal suppository 10 mg, 10 mg, Rectal, Daily PRN    fleet enema (Fleet) 7-19 GM/118ML rectal enema 133 mL, 1 enema, Rectal, Once PRN    HYDROmorphone (Dilaudid) 1 MG/ML injection 0.2 mg, 0.2 mg, Intravenous, Q2H PRN **OR** HYDROmorphone (Dilaudid) 1 MG/ML injection 0.4 mg, 0.4 mg, Intravenous, Q2H PRN **OR** HYDROmorphone (Dilaudid) 1 MG/ML injection 0.8 mg, 0.8 mg, Intravenous, Q2H PRN    HYDROmorphone (Dilaudid) 1 MG/ML injection 0.5 mg, 0.5 mg, Intravenous, Q30 Min PRN    doxycycline hyclate (Vibramycin) 100 mg in sodium chloride 0.9% 100 mL IVPB, 100 mg, Intravenous, Q12H      Physical Exam:                          General: lethargic                          HEENT: oropharynx clear without erythema or exudates, moist mucous membranes                          Lungs: Clear to auscultation bilaterally, no wheezes or crackles                           Chest wall: No tenderness or deformity.                          Heart: Regular rate and rhythm, normal S1S2                          Abdomen: soft, non-tender, non-distended, positive BS.                          Extremity: No clubbing or cyanosis. + edema                          Skin: No rashes or lesions.          Lab Results   Component Value Date     12/18/2023    K 4.3 12/18/2023    CL 93 12/18/2023    CO2 19.0 12/18/2023    BUN 46 12/18/2023    CREATSERUM 1.50 12/18/2023    GLU 69 12/18/2023    CA 8.1 12/18/2023     Lab Results   Component Value Date    INR 1.27 (H) 12/15/2023          Imaging: I have independently visualized all relevant chest imaging in PACS.  I agree with the radiology interpretation except where noted.       ASSESSMENT/PLAN:  Acute hypoxemic  respiratory failure - secondary to pneumonia vs pneumonitis as well as systolic heart failure with bilateral pleural effusions.  -continue supplemental oxygen, on 3L this AM w/ sats in the low 90s  -echo with EF: %  PASP: 55mmHg  Lethargy:   -will check stat ABG to ensure she is not hypercapneic  Pneumonia - viral respiratory panel was negative. PCT was negative, but cannot r/o atypical pathogens  -continue empiric antibiotics : ceftriaxone, doxycycline (12/13-   -follow up cultures  -strep and legionella urine ag- negative  -recommend proceeding with bronch with BAL under MAC.  Pt with significant pulmonary hypertension on most recent echo and would be at high risk for bleeding complications with biopsy, thus will do BAL only.  The rationale for performing the bronchoscopy, including risks and benefits were explained to the patient and questions were answered.  The risks of drug reactions, bleeding, collapsed lung, fevers, and hoarseness were discussed, along with other less common complications including death.  The patients understands the procedure and agrees to proceed.  Bronchoscopy scheduled for tomorrow, Tuesday 12/19 at 11am.  NPO p MN  Systolic heart failure: EF: 20-25%  -cards to eval   Pleural effusions - bilateral, transudative  -L side- 700 removed on 12/15  -R side- 1400 removed on 12/16  -await culture and cytology  SLE  -per hospitalist and rheumatology  Hyponatremia, non-anion gap acidosis  -renal following  Breast cancer  -per heme/onc  Proph   -lmwh  Dispo   -full code  -inpatient   -will follow    Ashley Tapia MD  12/18/2023  10:48 AM

## 2023-12-18 NOTE — PHYSICAL THERAPY NOTE
Chart review completed, RN approved PT session. Attempted to see pt for PT treatment, however pt very lethargic, responded 2/3 times of calling her name. Pt able to arouse, but unable to answer question before falling asleep. Pt not appropriate for PT at this time. Will attempt to see pt as appropriate. RN notified. [place]

## 2023-12-18 NOTE — PAYOR COMM NOTE
--------------  CONTINUED STAY REVIEW    Payor: [employer]  Subscriber #:  [id number]  Authorization Number: [id number]    Admit date: 12/13/23  Admit time: 11:14 PM        12/17         Feels a bit better after thoracentesis.  Still dyspneic however.  Quite tired today.  No new symptoms.  Denies any pain or rashes  ?       #Acute hypoxemic respiratory failure: Bilateral pulmonary infiltrates and bilateral pleural effusions noted  -transduative effusion noted on b/l thoracentesis, pointing away from SLE/Sjogren's pleurisy        #Breast cancer s/p mastectomy s/p chemotherapy  -Patient reports bilateral peripheral neuropathy with right foot drop due to Taxol in summer 2023     #UCTD with Sjogren's features versus SLE  --Relevant Clinical Manifestations: Malar rash, lower extremity rash (biopsy consistent with leukocytoclastic vasculitis), Raynaud's, dry eyes/mouth, pleurisy (around 2016, treated with ibuprofen), interstitial nephritis (2023)  --Serologies/Laboratory findings: Leukopenia, positive NURIA, positive SSA, low C3/C4  --Prior medications: Hydroxychloroquine (no improvement).  Azathioprine stopped due to recurrent cellulitis in the distant past.      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  -Responded to short course of prednisone in early 2023.     #Hyponatremia:  -per renal, suspect SIADH  ?  Plan:  -TTE pending given transudative effusion and pulmonary edema.  -Given recurrence of significant proteinuria (UPCR 2.93), lower C3/C4, in context of biopsy-proven interstitial nephritis (1/2023 at [facility]);       12/17  Chief Complaint: Intractable nausea vomiting, diarrhea, generalized weakness.  Recent pneumonia.         Assessment & Plan:   # Multifocal pneumonia with bilateral large bilateral pleural effusion with tachycardia, tachypnea, also rule out malignancy due to patient's locally advanced stage IIIb breast cancer                 Pulmonary and ID on consult                 Status post left  thoracentesis on 12/15/2023 and right thoracentesis on 12/16/2023, pulmonary following                 IV antibiotics-currently on IV Rocephin and IV doxycycline                 Status post IV Lasix x 1 dose for the bilateral pleural effusion                 MRSA nares came back positive, will give Bactroban application twice daily for 5 days.  Seen by ID on consult who advised to continue IV Rocephin and IV doxycycline at this time                 Pulmonary plans bronc this week if respiratory status allows     # Acute hypoxic respiratory failure due to above                 -On oxygen via nasal cannula                 -Pulmonary on consult                 -2D echo done on 12/17/2023, results pending     # Locally advanced stage IIIb breast cancer status post outpatient neoadjuvant chemo and history of left breast lumpectomy and left lymph node resection on 11/16/2023                 Oncology, ID and pulmonary on consult     # Hyponatremia due to pneumonia and also hypovolemic due to nausea vomiting and diarrhea at home and may also due to malignancy, rule out SIADH also due to malignancy                 Nephrology consult, on bicarb drip per nephrology.  Status post tolvaptan                 Sodium slowly improving                 Follow BMP     # Hypokalemia                 Being replaced with electrolyte protocol     # Metabolic acidosis, acute kidney injury      # History of lupus, history of Sjogren's disease  -Takes azathioprine at home which was held at this time due to multifocal pneumonia     # Gastritis  # Nausea vomiting and diarrhea at home possibly due to effect of chemo    -Nausea vomiting and diarrhea improved.  -Will give IV PPI  -Xanax as needed  -IV Zofran as needed nausea vomiting     # Anemia due to malignancy and chemo                 Follow CBC                 Hematology oncology following     # Small left apical pneumothorax on 12/15/2023 status post left thoracentesis  -Pulmonary following,  on conservative management, repeat chest x-ray done on 12/15/2023 showed left pneumothorax resolved      MEDICATIONS ADMINISTERED IN LAST 1 DAY:           cefTRIAXone (Rocephin) 1 g in D5W 100 mL IVPB-ADD       Date Action Dose Route User    12/17/2023 2021 New Bag 1 g Intravenous Jazmine Dorsey RN          dextromethorphan-guaiFENesin (Robitussin-DM)  mg/5mL oral solution 5 mL       Date Action Dose Route User    12/18/2023 0644 Given 5 mL Oral Marycarmen Clayton RN    12/17/2023 1611 Given 5 mL Oral Lexi Brewster RN          doxycycline hyclate (Vibramycin) 100 mg in sodium chloride 0.9% 100 mL IVPB       Date Action Dose Route User    12/18/2023 1046 New Bag 100 mg Intravenous Jose Liu RN    12/17/2023 2021 New Bag 100 mg Intravenous Jazmine Dorsey RN                 furosemide (Lasix) 10 mg/mL injection 40 mg       Date Action Dose Route User    12/17/2023 1517 Given 40 mg Intravenous Lexi Brewster RN          furosemide (Lasix) 10 mg/mL injection 40 mg       Date Action Dose Route User    12/17/2023 2043 Given 40 mg Intravenous Jazmine Dorsey RN          furosemide (Lasix) 10 mg/mL injection 40 mg       Date Action Dose Route User    12/18/2023 0842 Given 40 mg Intravenous Jose Liu RN          HYDROmorphone (Dilaudid) 1 MG/ML injection 0.2 mg       Date Action Dose Route User    12/17/2023 2254 Given 0.2 mg Intravenous Jazmine Dorsey RN    12/17/2023 2046 Given 0.2 mg Intravenous Jazmine Dorsey RN          HYDROmorphone (Dilaudid) 1 MG/ML injection 0.4 mg       Date Action Dose Route User    12/18/2023 0638 Given 0.4 mg Intravenous Marycarmen Clayton RN    12/18/2023 0125 Given 0.4 mg Intravenous Marycarmen Clayton RN             12/18/2023 0119 Given 3 mg Oral Marycarmen Clayton RN          metoprolol (Lopressor) 5 mg/5mL injection 5 mg       Date Action Dose Route User    12/18/2023 1042 Given 5 mg Intravenous Jose Liu, JULIETTE    12/17/2023 1601 Given 5 mg Intravenous Lexi Brewster, RN           mupirocin (Bactroban) 2% nasal ointment 1 Application       Date Action Dose Route User    12/18/2023 0633 Given 1 Application Nasal (Bilateral Nares) Marycarmen Clayton RN    12/17/2023 1710 Given 1 Application Nasal (Bilateral Nares) Lexi Brewster RN          pantoprazole (Protonix) DR tab 40 mg       Date Action Dose Route User    12/18/2023 0633 Given 40 mg Oral Marycarmen Clayton RN          potassium chloride (K-Dur) tab 40 mEq       Date Action Dose Route User    12/17/2023 2043 Given 40 mEq Oral Jazmine Dorsey RN    12/17/2023 1728 Given 40 mEq Oral Lexi Brewster RN          sodium chloride tab 2 g       Date Action Dose Route User    12/18/2023 1304 Given 2 g Oral Jose Liu RN    12/18/2023 0842 Given 2 g Oral Jose Liu RN    12/17/2023 1601 Given 2 g Oral Lexi Brewster RN          tolvaptan (Samsca) tab 15 mg       Date Action Dose Route User    12/18/2023 1046 Given 15 mg Oral Jose Liu RN            Vitals (last day)       Date/Time Temp Pulse Resp BP SpO2 Weight O2 Device O2 Flow Rate (L/min) Who    12/18/23 1306 96.9 °F (36.1 °C) 115 20 124/93 -- -- Nasal cannula 2 L/min NG    12/18/23 0733 97.7 °F (36.5 °C) 124 22 140/107 100 % -- Nasal cannula 2 L/min NG    12/18/23 0355 98.1 °F (36.7 °C) 117 21 134/104 100 % -- Nasal cannula 2 L/min LM    12/18/23 0019 97.8 °F (36.6 °C) -- 21 132/95 -- 150 lb 3.2 oz High flow nasal cannula 3 L/min SE    12/17/23 2012 97.8 °F (36.6 °C) 128 20 136/97 97 % -- -- --     12/17/23 1742 97.7 °F (36.5 °C) 128 22 137/86 99 % -- -- --     12/17/23 1735 -- 131 -- -- -- -- -- -- [facility]    12/17/23 1540 -- 138 -- -- 98 % -- High flow nasal cannula 3 L/min [facility]    12/17/23 1317 99.6 °F (37.6 °C) 120 20 131/93 98 % -- -- --     12/17/23 0935 -- 121 -- -- 94 % -- -- -- [facility]    12/17/23 0722 98 °F (36.7 °C) 120 18 128/88 93 % -- -- --     12/17/23 0414 -- -- -- -- -- -- -- 3 L/min     12/17/23 0414 97.4 °F (36.3 °C) 125 -- 139/95 94 % -- High flow  nasal cannula -- TH    12/17/23 0104 -- -- -- -- -- -- -- 3 L/min SV    12/17/23 0104 98.9 °F (37.2 °C) 114 -- 118/87 97 % -- High flow nasal cannula -- TH          CIWA Scores (since admission)       None          Blood Transfusion Record       Product Unit Status Volume Start End            Transfuse RBC       23 [id number]  [id number] Stopped 350 mL 12/13/23 2205 12/14/23 0108                Procedures:      Plan:

## 2023-12-18 NOTE — PLAN OF CARE
Pt received tachy, HR high 130s, chest pain, SOB. Cardiology contacted, new orders carried out. MD updated about EKG, troponin 81, pro BNP 75,229. Received orders to transfer to tele floor. RN called & report given. Other VSS on 3L. Meds per MAR.     Fall risk protocol followed, call light within reach.

## 2023-12-18 NOTE — PROGRESS NOTES
[facility]  Hematology Oncology Progress Note  2023    Alina Aceves Patient Status:  Inpatient    1980 MRN [id number]   Location [facility] 4NW-A Attending Tosha Waite MD   Hosp Day # 5 PCP HOLLY IBARRA     Subjective:  L thoracentesis done Friday las week with 700 mL clear fluid removed.  Post-procedure CXR with small 9 mm L apical pneumothorax.  No L pleural effusion.  PTX resolved on CXR 3 hours later after increasing O2 to HiFlo 6 L.  R thora done Saturday, 1450 mL removed.    Cytology remains pending    Meds:    dextromethorphan-guaiFENesin    furosemide    mupirocin    sodium chloride    potassium chloride    ALPRAZolam    pantoprazole **OR** pantoprazole    LORazepam    metoprolol    ipratropium-albuterol    HYDROmorphone    cefTRIAXone    DULoxetine    acetaminophen    melatonin    prochlorperazine    polyethylene glycol (PEG 3350)    sennosides    bisacodyl    fleet enema    HYDROmorphone **OR** HYDROmorphone **OR** HYDROmorphone    HYDROmorphone    doxycycline    Objective:  Blood pressure (!) 140/107, pulse (!) 124, temperature 97.7 °F (36.5 °C), temperature source Oral, resp. rate 22, height 5' 4\" (1.626 m), weight 150 lb 3.2 oz (68.1 kg), last menstrual period 2023, SpO2 100%, not currently breastfeeding. On HiFlo 6L NC, up from 3L --> now back to 2L  Gen: NAD, alert  CV: RRR, no m/r/g  Lungs: dec BS bilaterally in based  Abd: Normoactive bs, soft, nt/nd  Extrem: WWP, no edema  Skin: No acute changes    Labs:     Recent Labs   Lab 23  1937 23  0631 23  0617   RBC 2.72* 2.94* 2.74*   HGB 7.9* 8.5* 7.9*   HCT 22.8* 24.6* 22.3*   MCV 83.8 83.7 81.4   MCH 29.0 28.9 28.8   MCHC 34.6 34.6 35.4   RDW 12.8 12.7 12.9   NEPRELIM 5.52 7.17 7.20   WBC 6.3 8.3 8.3   .0 188.0 157.0         Recent Labs   Lab 23  1938 23  0631 23  0650 23  1137 23  0617 23  1155 23  1538 23  0025 23  0616   *   < >  127*   < > 109* 121*  --   --  69*   BUN 28*   < > 35*   < > 36* 38*  --   --  46*   CREATSERUM 1.36*   < > 1.08*   < > 1.13* 1.16*  --   --  1.50*   EGFRCR 50*   < > 65   < > 62 60  --   --  44*   CA 8.2*   < > 7.7*   < > 7.8* 7.9*  --   --  8.1*   ALB 3.1*  --   --   --   --   --   --   --   --    *   < > 122*   < > 126* 127*  --   --  128*   K 3.9   < > 3.5  3.5   < > 3.3* 3.4* 3.4* 4.0 4.3   CL 89*   < > 91*   < > 91* 91*  --   --  93*   CO2 15.0*   < > 20.0*   < > 21.0 21.0  --   --  19.0*   ALKPHO 97  --   --   --   --   --   --   --   --    AST 40*  --   --   --   --   --   --   --   --    ALT 36  --   --   --   --   --   --   --   --    BILT 0.9  --   --   --   --   --   --   --   --    TP 6.6  --  5.5*  --   --   --   --   --   --     < > = values in this interval not displayed.       Imaging:  Reviewed    Assessment and Plan:    Alina Aceves is a 43 year old female with stage IIIb breast cancer status postmastectomy now with bilateral pneumonia and pleural effusions.     Breast cancer  Locally advanced stage IIIb triple negative breast cancer  Status post no adjuvant chemotherapy followed by mastectomy in November 2023  Residual T1a disease and has been recommended to start oral capecitabine  No treatment while inpatient     Pneumonia/effusion  Reviewed CT scan she had bilateral lung consolidation/infiltrate along with bilateral effusion  Not related to underlying breast cancer    Pulmonary consult - LEFT thoracentesis done on 12/15 with 700 mL removed, await studies on pleural fluid.  Tiny pneumothorax on initial CXR, resolved on repeat.  Fluid appears transudative.  RIGHT thora done Saturday 12/15 with 1400 mL removed.  Wean O2 as tolerated.    ID consult and recs noted. No bacterial PNA, feels CT chest findings more consistent with acute pulmonary edema with pleural effusions rather than pneumonia.  Remains on ceftriaxone and doxycycline.  ID has recommended bronchoscopy if pt fails to  improve.     Anemia  Moderate anemia secondary to previous chemotherapy  Will monitor hemoglobin and transfuse for hemoglobin less than 7.  Last hemoglobin 8 grams.     Hyponatremia, likely due to SIADH.  Renal following.  Na only a little better at 122.       We will follow.    Please do not hesitate to contact me with any specific questions or concerns.    Michael Alexander MD  [facility]  Department of Oncology and Hematology

## 2023-12-18 NOTE — PROGRESS NOTES
[facility]     Hospitalist Progress Note     Alina Aceves Patient Status:  Inpatient    1980 MRN [id number]   Location [facility] 4NW-A Attending Tosha Waite MD   Hosp Day # 5 PCP HOLLY IBARRA     Chief Complaint: Intractable nausea vomiting, diarrhea, generalized weakness.  Recent pneumonia.     Subjective:      shortness of breath persist.  Denies any chest pain.  Hypoxic requiring 2 L of oxygen by nasal cannula.  Sinus tachycardia persists.  Patient afebrile.      Objective:    Review of Systems:   A comprehensive review of systems was completed; pertinent positive and negatives stated in subjective.    Vital signs:  Temp:  [96.9 °F (36.1 °C)-98.1 °F (36.7 °C)] 96.9 °F (36.1 °C)  Pulse:  [111-131] 111  Resp:  [20-22] 20  BP: (124-140)/() 124/93  SpO2:  [78 %-100 %] 78 %    Physical Exam:    BP (!) 124/93 (BP Location: Right arm)   Pulse 111   Temp 96.9 °F (36.1 °C) (Oral)   Resp 20   Ht 64\"   Wt 150 lb 3.2 oz (68.1 kg)   LMP 2023 (Approximate)   SpO2 (!) 78%   Breastfeeding No   BMI 25.78 kg/m²   On 2 L of oxygen by nasal cannula  General: No acute distress  Respiratory: Clear to auscultation bilateral except decreased breath sounds at bases  Cardiovascular: S1, S2, regular rate and rhythm, tachycardic  Abdomen: Soft, Non-tender, non-distended, positive bowel sounds  Neuro: Awake alert, no new focal deficits.   Extremities: no pedal edema or calf tenderness  No pain or redness or tenderness or swelling and the recent left breast lumpectomy of left axillary lymph node resection site according to patient-had this done in November according to patient.  Incision looks healed with no erythema or drainage or tenderness or fluctuance on palpation    Diagnostic Data:    Labs:  Recent Labs   Lab 23  0631 12/15/23  0513 23  0617   WBC 6.3 8.3  --  8.3   HGB 7.9* 8.5*  --  7.9*   MCV 83.8 83.7  --  81.4   .0 188.0  --  157.0   INR  --   --  1.27*   --        Recent Labs   Lab 12/13/23  1938 12/14/23  0631 12/16/23  0650 12/16/23  1137 12/17/23  0617 12/17/23  1155 12/17/23  1538 12/18/23  0025 12/18/23  0616   *   < > 127*   < > 109* 121*  --   --  69*   BUN 28*   < > 35*   < > 36* 38*  --   --  46*   CREATSERUM 1.36*   < > 1.08*   < > 1.13* 1.16*  --   --  1.50*   CA 8.2*   < > 7.7*   < > 7.8* 7.9*  --   --  8.1*   ALB 3.1*  --   --   --   --   --   --   --   --    *   < > 122*   < > 126* 127*  --   --  128*   K 3.9   < > 3.5  3.5   < > 3.3* 3.4* 3.4* 4.0 4.3   CL 89*   < > 91*   < > 91* 91*  --   --  93*   CO2 15.0*   < > 20.0*   < > 21.0 21.0  --   --  19.0*   ALKPHO 97  --   --   --   --   --   --   --   --    AST 40*  --   --   --   --   --   --   --   --    ALT 36  --   --   --   --   --   --   --   --    BILT 0.9  --   --   --   --   --   --   --   --    TP 6.6  --  5.5*  --   --   --   --   --   --     < > = values in this interval not displayed.       Estimated Creatinine Clearance: 41.8 mL/min (A) (based on SCr of 1.5 mg/dL (H)).    Microbiology    Hospital Encounter on 12/13/23   1. Body Fluid Cult Aerobic and Anaerobic     Status: None (Preliminary result)    Collection Time: 12/15/23  3:26 PM    Specimen: Pleural Fluid, Right; Body fluid, unspecified   Result Value Ref Range    Body Fluid Culture Result No Growth 2 Days N/A    Body Fld Smear 4+ Neutrophils seen N/A    Body Fld Smear No organisms seen N/A    Body Fld Smear This is a cytocentrifuged smear. N/A   2. Urine Culture, Routine     Status: None    Collection Time: 12/14/23  3:58 AM    Specimen: Urine, clean catch   Result Value Ref Range    Urine Culture No Growth at 18-24 hrs. N/A   3. Blood Culture     Status: None (Preliminary result)    Collection Time: 12/13/23  8:10 PM    Specimen: Blood,peripheral   Result Value Ref Range    Blood Culture Result No Growth 4 Days N/A     MRSA PCR nares positive on 12/14/2023    Imaging: Reviewed in Epic.  Chest x-ray done on 12/13/2023  with dense patchy airspace consolidation opacities present within the lungs suspicious for areas of pneumonia  CTA chest done on 12/14/2023 early a.m. shows moderate to large bilateral pleural effusion along with marked consolidation scattered throughout the lungs suggestive of pulmonary edema and fluid overload.  No evidence of pulm embolus.  Moderate large left axillary and left breast fluid collections noted.  Minimal gas in the left most central breast fluid collection, possibility of infection is a consideration.  Sequelae of seroma chronic hematoma is a consideration as well.    Medications:    [START ON 12/19/2023] furosemide  20 mg Oral Daily    metoprolol tartrate  25 mg Oral 2x Daily(Beta Blocker)    [Held by provider] furosemide  20 mg Intravenous Once    mupirocin  1 Application Nasal Q12H    sodium chloride  2 g Oral TID CC    potassium chloride  40 mEq Intravenous Once    pantoprazole  40 mg Intravenous QAM AC    Or    pantoprazole  40 mg Oral QAM AC    cefTRIAXone  1 g Intravenous Q24H    DULoxetine  30 mg Oral Daily    doxycycline  100 mg Intravenous Q12H       Assessment & Plan:      # Multifocal pneumonia with bilateral large bilateral pleural effusion with tachycardia, tachypnea, also rule out malignancy due to patient's locally advanced stage IIIb breast cancer  #new onset cardiomyopathy  #bilateral pleural effusion due to cardiomyopathy and acute systolic heart failure   Pulmonary and ID on consult   Status post left thoracentesis on 12/15/2023 and right thoracentesis on 12/16/2023, pulmonary following   IV antibiotics-currently on IV Rocephin and IV doxycycline   Status post IV Lasix x 1 dose for the bilateral pleural effusion   MRSA nares came back positive, will give Bactroban application twice daily for 5 days.  Seen by ID on consult who advised to continue IV Rocephin and IV doxycycline at this time   Pulmonary plans bronc this week if respiratory status allows  2D echo done on 12/17/2023,  showed EF 20-25%, severe diffuse hypokinesis - Transferred to tele and cardiology on consult.  CHF protocol    # Acute hypoxic respiratory failure due to above   -On oxygen via nasal cannula   -Pulmonary on consult   -2D echo done on 12/17/2023, showed EF 20-25%, severe diffuse hypokinesis - Transferred to tele and cardiology on consult.    # Locally advanced stage IIIb breast cancer status post outpatient neoadjuvant chemo and history of left breast lumpectomy and left lymph node resection on 11/16/2023   Oncology, ID and pulmonary on consult    # Hyponatremia due to pneumonia and also hypovolemic due to nausea vomiting and diarrhea at home and may also due to malignancy, rule out SIADH also due to malignancy   Nephrology consult, on bicarb drip per nephrology.  Status post tolvaptan   Sodium slowly improving   Follow BMP    # Hypokalemia   Being replaced with electrolyte protocol    # Metabolic acidosis, acute kidney injury   On IV fluids bicarb drip and IV antibiotics   Lactic acid normal on 12/13/2023 at 1.5   Nephrology consult appreciated   On chart review patient's creatinine was 0.84 on 11/13/2023, 1.36 on 12/13/2023 on admission.    # History of lupus, history of Sjogren's disease  -Takes azathioprine at home which was held at this time due to multifocal pneumonia    # Gastritis  # Nausea vomiting and diarrhea at home possibly due to effect of chemo  # Anxiety  -Nausea vomiting and diarrhea improved.  -Will give IV PPI  -Xanax as needed  -IV Zofran as needed nausea vomiting    # Anemia due to malignancy and chemo   Follow CBC   Hematology oncology following    # Small left apical pneumothorax on 12/15/2023 status post left thoracentesis  -Pulmonary following, on conservative management, repeat chest x-ray done on 12/15/2023 showed left pneumothorax resolved    # Moderate malnutrition  -Dietitian following    Tosha Waite MD    Supplementary Documentation:     Quality:  DVT Mechanical Prophylaxis:     Early  ambuation  DVT Pharmacologic Prophylaxis   Medication   None                Code Status: Prior  Peacock: External urinary catheter in place  Peacock Duration (in days):   Central line:    CHRISTELLE:     Discharge is dependent on: Clinical progress  At this point Ms. Aceves is expected to be discharge to: To be decided    The 21st Century Cures Act makes medical notes like these available to patients in the interest of transparency. Please be advised this is a medical document. Medical documents are intended to carry relevant information, facts as evident, and the clinical opinion of the practitioner. The medical note is intended as peer to peer communication and may appear blunt or direct. It is written in medical language and may contain abbreviations or verbiage that are unfamiliar.

## 2023-12-18 NOTE — PROGRESS NOTES
[facility]     Nephrology Progress Note    Alina Aceves Patient Status:  Inpatient    1980 MRN [id number]   Location [facility] 8NE-A Attending Tosha Waite MD   Hosp Day # 5 PCP HOLLY IBARRA       SUBJECTIVE:  Stable this AM, c/o very dry mouth        Physical Exam:   BP (!) 140/107 (BP Location: Right arm)   Pulse (!) 124   Temp 97.7 °F (36.5 °C) (Oral)   Resp 22   Ht 5' 4\" (1.626 m)   Wt 150 lb 3.2 oz (68.1 kg)   LMP 2023 (Approximate)   SpO2 100%   Breastfeeding No   BMI 25.78 kg/m²   Temp (24hrs), Av.1 °F (36.7 °C), Min:97.7 °F (36.5 °C), Max:99.6 °F (37.6 °C)       Intake/Output Summary (Last 24 hours) at 2023 0948  Last data filed at 2023 0733  Gross per 24 hour   Intake 1677 ml   Output 450 ml   Net 1227 ml     Last 3 Weights   23 0019 150 lb 3.2 oz (68.1 kg)   23 2326 124 lb (56.2 kg)   23 1823 125 lb (56.7 kg)   23 1816 120 lb (54.4 kg)   21 1537 135 lb (61.2 kg)   20 1405 136 lb (61.7 kg)   08/15/20 1020 140 lb (63.5 kg)     General: Alert and oriented in no apparent distress.  HEENT: No scleral icterus, MMM  Neck: Supple, no KALYN or thyromegaly  Cardiac: Regular rate and rhythm, S1, S2 normal, no murmur or rub  Lungs: Clear without wheezes, rales, rhonchi.    Abdomen: Soft, non-tender. + bowel sounds, no palpable organomegaly  Extremities:1+ BLE edema.  Neurologic:  moving all extremities  Skin: Warm and dry, no rash        Labs:     Recent Labs   Lab 23  1937 23  0631 12/15/23  0513 23  0617   WBC 6.3 8.3  --  8.3   HGB 7.9* 8.5*  --  7.9*   MCV 83.8 83.7  --  81.4   .0 188.0  --  157.0   INR  --   --  1.27*  --        Recent Labs   Lab 23  1820 23  0025 23  0617 23  1155 23  1538 23  0025 23  0616   * 126* 126* 127*  --   --  128*   K 3.7 3.5 3.3* 3.4* 3.4* 4.0 4.3   CL 93* 92* 91* 91*  --   --  93*   CO2 20.0* 21.0 21.0 21.0  --   --  19.0*    BUN 36* 37* 36* 38*  --   --  46*   CREATSERUM 1.09* 1.19* 1.13* 1.16*  --   --  1.50*   CA 7.9* 7.7* 7.8* 7.9*  --   --  8.1*   * 106* 109* 121*  --   --  69*       Recent Labs   Lab 12/13/23  1938 12/16/23  0650   ALT 36  --    AST 40*  --    ALB 3.1*  --    LDH  --  469*       Recent Labs   Lab 12/18/23  0729   PGLU 84       Meds:   Current Facility-Administered Medications   Medication Dose Route Frequency    dextromethorphan-guaiFENesin (Robitussin-DM)  mg/5mL oral solution 5 mL  5 mL Oral Q6H PRN    furosemide (Lasix) 10 mg/mL injection 40 mg  40 mg Intravenous BID (Diuretic)    mupirocin (Bactroban) 2% nasal ointment 1 Application  1 Application Nasal Q12H    sodium chloride tab 2 g  2 g Oral TID CC    potassium chloride 40 mEq in 250mL sodium chloride 0.9% IVPB premix  40 mEq Intravenous Once    ALPRAZolam (Xanax) tab 0.25 mg  0.25 mg Oral TID PRN    pantoprazole (Protonix) 40 mg in sodium chloride 0.9% PF 10 mL IV push  40 mg Intravenous QAM AC    Or    pantoprazole (Protonix) DR tab 40 mg  40 mg Oral QAM AC    LORazepam (Ativan) 2 mg/mL injection 0.5 mg  0.5 mg Intravenous Q6H PRN    metoprolol (Lopressor) 5 mg/5mL injection 5 mg  5 mg Intravenous Q6H PRN    ipratropium-albuterol (Duoneb) 0.5-2.5 (3) MG/3ML inhalation solution 3 mL  3 mL Nebulization Q4H PRN    HYDROmorphone (Dilaudid) 1 MG/ML injection 0.5 mg  0.5 mg Intravenous Q30 Min PRN    cefTRIAXone (Rocephin) 1 g in D5W 100 mL IVPB-ADD  1 g Intravenous Q24H    DULoxetine (Cymbalta) DR cap 30 mg  30 mg Oral Daily    acetaminophen (Tylenol Extra Strength) tab 1,000 mg  1,000 mg Oral Q8H PRN    melatonin tab 3 mg  3 mg Oral Nightly PRN    prochlorperazine (Compazine) 10 MG/2ML injection 5 mg  5 mg Intravenous Q8H PRN    polyethylene glycol (PEG 3350) (Miralax) 17 g oral packet 17 g  17 g Oral Daily PRN    sennosides (Senokot) tab 17.2 mg  17.2 mg Oral Nightly PRN    bisacodyl (Dulcolax) 10 MG rectal suppository 10 mg  10 mg Rectal Daily  PRN    fleet enema (Fleet) 7-19 GM/118ML rectal enema 133 mL  1 enema Rectal Once PRN    HYDROmorphone (Dilaudid) 1 MG/ML injection 0.2 mg  0.2 mg Intravenous Q2H PRN    Or    HYDROmorphone (Dilaudid) 1 MG/ML injection 0.4 mg  0.4 mg Intravenous Q2H PRN    Or    HYDROmorphone (Dilaudid) 1 MG/ML injection 0.8 mg  0.8 mg Intravenous Q2H PRN    HYDROmorphone (Dilaudid) 1 MG/ML injection 0.5 mg  0.5 mg Intravenous Q30 Min PRN    doxycycline hyclate (Vibramycin) 100 mg in sodium chloride 0.9% 100 mL IVPB  100 mg Intravenous Q12H         Impression/Plan:        Impression:  REJI- related to pre-renal azotemia, had been stable but creat up today with ongoing bid IV lasix.  Stop IV and transition to oral  Proteinuria- pt w/ hx of interstitial nephritis - treated w/ steorids in past - @ [facility] (Dr Tejeda); kidney biopsy jan 2023- interstitial nephritis-  Spot ratio ~ 2.9 g/g; low complements  - check serologies  - may need to consider kidney biopsy for further evaluation - discussed w/ patient- for now holding on ordering given tenuous pulmonary status   Hyponatremia- patient appears volume replete for the most part. She does have poor solute intake over the past few days as noted. Concern for SIADH given her hx of breast cancer +/-  PNA. Additionally, patient is on SSRI (now held)  Urine chem c/w with combination of low solute/SIADH  Na   improving  - continue salt tabs/po loop diuretic/tolvaptan/fluid restriction but will lessen fluid restriction  Breast Ca  Effusion- per pulm; on O2; s/p thoracentesis ; await cytology; transudative    N/V/D- unclear etiology- possibly related to the chemo; symptomatic management  Hypokelamia- replace per protocol  Hx of SLE/Sjogrens- appreciate rheum eval; discussed w/ Dr. Alberto        Questions/concerns were discussed with patient and/or family by bedside.          Francisco Quarles MD  12/18/2023  9:48 AM

## 2023-12-18 NOTE — CONSULTS
[facility]  Report of Consultation    Alina Aceves Patient Status:  Inpatient    1980 MRN [id number]   Location [facility] 8NE-A Attending Tosha Waite MD   Hosp Day # 5 PCP HOLLY IBARRA     Reason for Consultation:   Tachycardia, palpitations    History of Present Illness:   Alina Aceves is a(n) 43 year old female with SLE and palpitations who I saw in clinic 2 weeks ago.    She has Stage III breast CA.  She has been undergoing chemotherapy, EF  was 50%..    She was admitted with SOB 23.  She was diagnosed with PNA and pleural effusions.  She also had hyponatremia with a sodium 120.  She was seen by Pulmonology, ID, Hematology, and Nephrology.    Yesterday she was tachycardic with HR 130s.  She was SOB.  Cardiology consultation was obtained.    She is currently drowsy and suggests she's not SOB, has no palpitations, no VALDES, and no CP.  She is very lethargic though.  ABG has been ordered.  Other etiologies are also being evaluated.  However, no more salient history could be obtained.    Past Medical History:   Past Medical History:   Diagnosis Date    Breast cancer (HCC)     Gastritis     Lupus (HCC)     Sjogren's disease (HCC)        Social History:   Smoking:  None  Alcohol:  None    Family History:   No family history of premature arthrosclerotic heart disease     Medications:   Scheduled:    [START ON 2023] furosemide  20 mg Oral Daily    mupirocin  1 Application Nasal Q12H    sodium chloride  2 g Oral TID CC    potassium chloride  40 mEq Intravenous Once    pantoprazole  40 mg Intravenous QAM AC    Or    pantoprazole  40 mg Oral QAM AC    cefTRIAXone  1 g Intravenous Q24H    DULoxetine  30 mg Oral Daily    doxycycline  100 mg Intravenous Q12H       Continuous Infusion:       PRN Medications:   dextromethorphan-guaiFENesin, ALPRAZolam, LORazepam, metoprolol, ipratropium-albuterol, HYDROmorphone, acetaminophen, melatonin, prochlorperazine, polyethylene  glycol (PEG 3350), sennosides, bisacodyl, fleet enema, HYDROmorphone **OR** HYDROmorphone **OR** HYDROmorphone, HYDROmorphone    Outpatient Medications:   Current Facility-Administered Medications on File Prior to Encounter   Medication Dose Route Frequency Provider Last Rate Last Admin    [COMPLETED] morphINE PF 4 MG/ML injection 4 mg  4 mg Intravenous Once Jesus Manuel Hatfield MD   4 mg at 11/13/23 2255    [COMPLETED] iopamidol 76% (ISOVUE-370) injection for power injector  100 mL Intravenous ONCE PRN Jesus Manuel Hatfield MD   100 mL at 11/13/23 2247    [COMPLETED] potassium chloride (K-Dur) tab 40 mEq  40 mEq Oral Once Jesus Manuel Hatfield MD   40 mEq at 11/14/23 0010     Current Outpatient Medications on File Prior to Encounter   Medication Sig Dispense Refill    Potassium Citrate ER 15 MEQ (1620 MG) Oral Tab CR Take 1 tablet by mouth in the morning, at noon, and at bedtime.      DULoxetine 30 MG Oral Cap DR Particles Take 1 capsule (30 mg total) by mouth daily.      ondansetron (ZOFRAN) 8 MG tablet Take 1 tablet (8 mg total) by mouth as needed.      zolpidem 10 MG Oral Tab Take 1 tablet (10 mg total) by mouth nightly as needed for Sleep.      HYDROcodone-acetaminophen 5-325 MG Oral Tab Take 1 tablet by mouth every 8 (eight) hours as needed. (Patient not taking: Reported on 12/13/2023)      lidocaine-prilocaine 2.5-2.5 % External Cream APPLY SMALL AMOUNT OVER PORT PRIOR TO ACCESS      azaTHIOprine (IMURAN OR) Take by mouth. (Patient not taking: No sig reported)      Prenatal Vit-DSS-Fe Cbn-FA (PRENATAL AD OR) Take 1 tablet by mouth daily.         Allergies:   Allergies   Allergen Reactions    Bactrim [Sulfamethoxazole W/Trimethoprim] RASH       Review of Systems:   No fevers, chills, change in weight or bowel habits.  Ten point review of systems is otherwise negative or unremarkable.    Physical Exam:   Vitals:    12/18/23 0733   BP: (!) 140/107   Pulse: (!) 124   Resp: 22   Temp: 97.7 °F (36.5  °C)     Wt Readings from Last 3 Encounters:   12/18/23 150 lb 3.2 oz (68.1 kg)   11/13/23 120 lb (54.4 kg)   12/16/21 135 lb (61.2 kg)           General: Well developed, well nourished female.  Pt is in no acute distress.  HEENT:   Normocephalic.  Atraumatic.  Eyes with no scleral icterus.  Neck: Supple.  No JVD.  Carotids 2+ and equal in symmetric fashion.  No bruits are noted.  Cardiac: Regular rate and rhythm.   There is a normal S1 and S2.  No S3 or S4.  No murmurs, rubs, or gallops.  PMI is non-displaced with a normal apical impulse.  Lungs: Clear to ascultation bilaterally.  No focal rales, rhonchi, or wheezes.  Good air movement is noted throughout all lung fields.  Abdomen: Soft.  Non-distended.  Non-tender.  Bowel sounds are present and normoactive.  No guarding or rebound.   Extremities: Extremities do not demonstrate any evidence of peripheral edema.   No cyanosis or clubbing of the digits is appreciated.  Femoral, Dorsalis Pedis, and Posterior Tibialis  pulses are 2+ and equal in a symmetric fashion.  Neurologic: Alert and oriented, normal affect.  No gross deficit appreciated.  Integument:  No visible rashes are appreciated.      Laboratories and Data:   Labs:    Recent Labs   Lab 12/13/23  1938 12/14/23  0631 12/16/23  0650 12/16/23  1137 12/17/23  0617 12/17/23  1155 12/17/23  1538 12/18/23  0025 12/18/23  0616   *   < > 127*   < > 109* 121*  --   --  69*   BUN 28*   < > 35*   < > 36* 38*  --   --  46*   CREATSERUM 1.36*   < > 1.08*   < > 1.13* 1.16*  --   --  1.50*   CA 8.2*   < > 7.7*   < > 7.8* 7.9*  --   --  8.1*   ALB 3.1*  --   --   --   --   --   --   --   --    *   < > 122*   < > 126* 127*  --   --  128*   K 3.9   < > 3.5  3.5   < > 3.3* 3.4* 3.4* 4.0 4.3   CL 89*   < > 91*   < > 91* 91*  --   --  93*   CO2 15.0*   < > 20.0*   < > 21.0 21.0  --   --  19.0*   ALKPHO 97  --   --   --   --   --   --   --   --    AST 40*  --   --   --   --   --   --   --   --    ALT 36  --   --   --    --   --   --   --   --    BILT 0.9  --   --   --   --   --   --   --   --    TP 6.6  --  5.5*  --   --   --   --   --   --     < > = values in this interval not displayed.       Recent Labs   Lab 12/13/23  1937 12/14/23  0631 12/17/23  0617   RBC 2.72* 2.94* 2.74*   HGB 7.9* 8.5* 7.9*   HCT 22.8* 24.6* 22.3*   MCV 83.8 83.7 81.4   MCH 29.0 28.9 28.8   MCHC 34.6 34.6 35.4   RDW 12.8 12.7 12.9   NEPRELIM 5.52 7.17 7.20   WBC 6.3 8.3 8.3   .0 188.0 157.0       Recent Labs   Lab 12/15/23  0513   PTP 16.0*   INR 1.27*       No results for input(s): \"TROP\", \"CK\" in the last 168 hours.    Diagnostics:   Tele: Sinus tachycardia.  EKG: Sinus tachycardia, non-specific ST/T changes  Echo: Unremarkable by 5/23 echo (EF low normal, mild MR).    12/17/23  Conclusions:     1. Left ventricle: The cavity size was normal. Wall thickness was normal.      Systolic function was markedly reduced. The estimated ejection fraction      was 20-25%, by visual assessment. There was severe diffuse hypokinesis.      Technical limitations of the study preclude regional wall motion      analysis. Unable to assess LV diastolic function due to heart rhythm.   2. Right ventricle: The cavity size was increased. Systolic function was      reduced. The RV free wall longitudinal strain was -16.50%. The tricuspid      annular plane systolic excursion (TAPSE) is 1.56cm.   3. Left atrium: The left atrial volume was moderately increased.   4. Right atrium: The atrium was moderately dilated.   5. Mitral valve: The annulus was dilated. The leaflets were non-specifically      thickened. There was moderate regurgitation, with multiple jets.   6. Tricuspid valve: The annulus is dilated. There was severe regurgitation.   7. Pulmonary arteries: Systolic pressure was moderately to markedly      increased, at least 55mm Hg. Systolic pressure may be underestimated due      to wide tricuspid regurgitation. Estimated pulmonary artery diastolic      pressure was  34mm Hg.   8. Pericardium, extracardiac: A trivial pericardial effusion was identified.      There was a left pleural effusion.           Assessment:  1. New onset severe cardiomyopathy  2. PNA  3. Hypoxic resp failure  4. Sinus tachycaria  5. Palpitations  6. Breast CA, stage IIIb  7. Hyponatremia      Plan:  Cardiomyopathy: Will eventually need R/L cath.  CM could be due to severe, current illness (stress cardiomyopathy).  However reasonable to presume it's due to chemotherapy.  Her EF was mildly depressed 5/23.  Needs GDMT but will be difficult in the setting of systemic illness.  Will start with BB given tachycardia.  BP tolerable.  Diuresis likely needed.  Will need to discuss with renal as Cr up today after lasix yesterday (Cr 1.5) and hyponatremia persisting. But given BNP 75k likely needs diuresis.  Tachycardia: ST.  Likely secondary to infection and medical illness.  Can do metoprolol 25mg BID given BP.  Echo.  Resp/PNA: ID, pulm.  Will check CXR,. Would gently diurese if ok with renal given Cr and Na.  Hyponatremia: Improved.  Renal.  BP: BB as above.    Leonel Villalba MD  12/18/2023  11:42 AM

## 2023-12-18 NOTE — PROGRESS NOTES
[facility] Infectious Disease  Progress Note    Alina Aceves Patient Status:  Inpatient    1980 MRN [id number]   Location [facility] 8NE-A Attending Tosha Waite MD   Hosp Day # 5 PCP HOLLY IBARRA     Subjective:  Patient seen and examined in bed. Reports ongoing generalized fatigue and malaise. Currently on RA. Denies F/C. Otherwise no new complaints.     Objective:  Blood pressure (!) 140/107, pulse (!) 124, temperature 97.7 °F (36.5 °C), temperature source Oral, resp. rate 22, height 5' 4\" (1.626 m), weight 150 lb 3.2 oz (68.1 kg), last menstrual period 2023, SpO2 100%, not currently breastfeeding.    Intake/Output:    Intake/Output Summary (Last 24 hours) at 2023 1142  Last data filed at 2023 0733  Gross per 24 hour   Intake 1677 ml   Output 450 ml   Net 1227 ml       Physical Exam:  General: Drowsy but arousable in NAD.  HEENT:  Oropharynx clear, trachea ML.  Heart: RRR S1S2 no murmurs.  Lungs: Essentially CTA b/l, no rhonchi, rales, wheezes.  Abdomen: Soft, NT/ND.  BS present.  No organomegaly.  Extremity: +1 BLE edema.  Neurological: No focal deficits.  Derm:  Warm and dry.    Lab Data Review:  Lab Results   Component Value Date    CREATSERUM 1.50 2023    BUN 46 2023     2023    K 4.3 2023    CL 93 2023    CO2 19.0 2023    GLU 69 2023    CA 8.1 2023        Cultures:  Hospital Encounter on 23   1. Body Fluid Cult Aerobic and Anaerobic     Status: None (Preliminary result)    Collection Time: 12/15/23  3:26 PM    Specimen: Pleural Fluid, Right; Body fluid, unspecified   Result Value Ref Range    Body Fluid Culture Result No Growth 2 Days N/A    Body Fld Smear 4+ Neutrophils seen N/A    Body Fld Smear No organisms seen N/A    Body Fld Smear This is a cytocentrifuged smear. N/A   2. Urine Culture, Routine     Status: None    Collection Time: 23  3:58 AM    Specimen: Urine, clean catch    Result Value Ref Range    Urine Culture No Growth at 18-24 hrs. N/A   3. Blood Culture     Status: None (Preliminary result)    Collection Time: 12/13/23  8:10 PM    Specimen: Blood,peripheral   Result Value Ref Range    Blood Culture Result No Growth 4 Days N/A     Radiology:  XR CHEST AP PORTABLE  (CPT=71045)    Result Date: 12/16/2023  CONCLUSION:  The patient is status post right-sided thoracentesis.  There is marked decrease in the right effusion with marked improved aeration of the right lower lobe.  There is no pneumothorax.  A right Port-A-Cath identified unchanged in position, tip in the SVC. There is diffuse decreased ground-glass opacities in the central aspect of the right lung in the interval compared to 12/15/2023.  There is some unfavorable progression of extensive ground-glass opacities in the left perihilar region extending into the left upper lobe laterally and the left retrocardiac region however.  There is increase in the size of the left pleural effusion with slight increased elevation of the left hemidiaphragm.  Surgical clips project over the inferolateral left chest wall unchanged. The heart size is upper limits of normal.  No definite CHF.   LOCATION:  [facility]      Dictated by (CST): Sandip Dias MD on 12/16/2023 at 12:59 PM     Finalized by (CST): Sandip Dias MD on 12/16/2023 at 1:01 PM       US THORACENTESIS GUIDED RIGHT (CPT=32555)    Result Date: 12/16/2023  CONCLUSION:  Ultrasound-guided thoracentesis was provided and performed by Dr. Cano performed without complication.  PLEASE SEE HIS REPORT FOR FURTHER EVALUATION.   LOCATION:  [facility]    Dictated by (CST): Sandip Dias MD on 12/16/2023 at 11:43 AM     Finalized by (CST): Sandip Dias MD on 12/16/2023 at 11:44 AM       XR CHEST AP PORTABLE  (CPT=71045)    Result Date: 12/15/2023  PROCEDURE:  XR CHEST AP PORTABLE  (CPT=71045)  TECHNIQUE:  AP chest radiograph was obtained.  COMPARISON:  [facility] , XR, XR  CHEST AP PORTABLE  (CPT=71045), 12/15/2023, 3:49 PM.  INDICATIONS:  Re eval pneumothorax  PATIENT STATED HISTORY: (As transcribed by Technologist)  Patient offered no additional history at this time.    FINDINGS:   Right-sided Port-A-Cath tip in the SVC.  Marked right perihilar consolidation is again noted.  Moderate left perihilar consolidation is again noted.  Previously noted left pneumothorax is no longer identified.    LOCATION:  [facility]      Dictated by (CST): Gustavo Yuan MD on 12/15/2023 at 7:09 PM     Finalized by (CST): Gustavo Yuan MD on 12/15/2023 at 7:10 PM       XR CHEST AP PORTABLE  (CPT=71045)    Result Date: 12/15/2023  CONCLUSION:  1. Small 9 mm left apical pneumothorax status post left thoracentesis.    LOCATION:  [facility]      Dictated by (CST): Christi Ashraf MD on 12/15/2023 at 4:23 PM     Finalized by (CST): Christi Ashraf MD on 12/15/2023 at 4:29 PM       US THORACENTESIS GUIDED RIGHT (CPT=32555)    Result Date: 12/15/2023  CONCLUSION:  Ultrasound guidance provided for thoracentesis.   LOCATION:  [facility]    Dictated by (CST): Christi Ashraf MD on 12/15/2023 at 2:48 PM     Finalized by (CST): Christi Ashraf MD on 12/15/2023 at 2:49 PM        Assessment and Plan:  Acute hypoxic respiratory failure  - CT c/w acute pulmonary edema with pleural effusions rather than pneumonia.  - Concern for possible atypical infection.   - Pleural fluid culture negative to date.  - Pulmonology following.   - IV Rocephin + doxycycline, ongoing, D#5/5    Hyponatremia with pulmonary edema  - Nephrology following.     H/o breast cancer  - Was on chemotherapy until September 2023.  - Immunosuppressed.    H/o SLE  - Not on any immunosuppression.    5.  Recs  - Continue IV Rocephin + doxycycline through today to complete 5-day treatment course.  - F/u WBC and fever curve.  - Supportive care per primary team.   - Discussed plan of care with nursing.   - Discussed plan of care with patient. All questions addressed and understanding  verbalized.  - Further recommendations pending clinical course.     Please note that this report has been produced using speech recognition software and may contain errors related to that system including, but not limited to, errors in grammar, punctuation, and spelling, as well as words and phrases that possibly may have been recognized inappropriately.  If there are any questions or concerns, contact the dictating provider for clarification.     The 21st Century Cures Act makes medical notes like these available to patients in the interest of transparency. Please be advised this is a medical document. Medical documents are intended to carry relevant information, facts as evident, and the clinical opinion of the practitioner. The medical note is intended as peer to peer communication and may appear blunt or direct. It is written in medical language and may contain abbreviations or verbiage that are unfamiliar.     If you have any questions or concerns please call [facility] Infectious Disease at [phone].     Johan Seymour, APRN    12/18/2023  11:42 AM

## 2023-12-18 NOTE — PLAN OF CARE
Assumed care of this patient at 0730.Patient AXOX3,lethargic.Sinus tach on tele.On O2 2L/NC.IV diuretics given,transition to oral  in am.Remains on IV antibiotics.Received orders for  ABG,CXR .Abnormal ABG Labs relayed to hospitalist,pulmonologist and ID.No new orders.Patient with poor appetite.Isolation precautions maintained.Call light within reach.Plan of care updated.  1600: Patient sleepy and lethargic,able to arouse.Patient with no urine output.bladder scanned and intermittent straight cath.Notified MD.  Problem: PAIN - ADULT  Goal: Verbalizes/displays adequate comfort level or patient's stated pain goal  Description: INTERVENTIONS:  - Encourage pt to monitor pain and request assistance  - Assess pain using appropriate pain scale  - Administer analgesics based on type and severity of pain and evaluate response  - Implement non-pharmacological measures as appropriate and evaluate response  - Consider cultural and social influences on pain and pain management  - Manage/alleviate anxiety  - Utilize distraction and/or relaxation techniques  - Monitor for opioid side effects  - Notify MD/LIP if interventions unsuccessful or patient reports new pain  - Anticipate increased pain with activity and pre-medicate as appropriate  Outcome: Progressing     Problem: SAFETY ADULT - FALL  Goal: Free from fall injury  Description: INTERVENTIONS:  - Assess pt frequently for physical needs  - Identify cognitive and physical deficits and behaviors that affect risk of falls.  - [place] fall precautions as indicated by assessment.  - Educate pt/family on patient safety including physical limitations  - Instruct pt to call for assistance with activity based on assessment  - Modify environment to reduce risk of injury  - Provide assistive devices as appropriate  - Consider OT/PT consult to assist with strengthening/mobility  - Encourage toileting schedule  Outcome: Progressing     Problem: RESPIRATORY - ADULT  Goal: Achieves optimal  ventilation and oxygenation  Description: INTERVENTIONS:  - Assess for changes in respiratory status  - Assess for changes in mentation and behavior  - Position to facilitate oxygenation and minimize respiratory effort  - Oxygen supplementation based on oxygen saturation or ABGs  - Provide Smoking Cessation handout, if applicable  - Encourage broncho-pulmonary hygiene including cough, deep breathe, Incentive Spirometry  - Assess the need for suctioning and perform as needed  - Assess and instruct to report SOB or any respiratory difficulty  - Respiratory Therapy support as indicated  - Manage/alleviate anxiety  - Monitor for signs/symptoms of CO2 retention  Outcome: Progressing     Problem: METABOLIC/FLUID AND ELECTROLYTES - ADULT  Goal: Electrolytes maintained within normal limits  Description: INTERVENTIONS:  - Monitor labs and rhythm and assess patient for signs and symptoms of electrolyte imbalances  - Administer electrolyte replacement as ordered  - Monitor response to electrolyte replacements, including rhythm and repeat lab results as appropriate  - Fluid restriction as ordered  - Instruct patient on fluid and nutrition restrictions as appropriate  Outcome: Progressing  Goal: Hemodynamic stability and optimal renal function maintained  Description: INTERVENTIONS:  - Monitor labs and assess for signs and symptoms of volume excess or deficit  - Monitor intake, output and patient weight  - Monitor urine specific gravity, serum osmolarity and serum sodium as indicated or ordered  - Monitor response to interventions for patient's volume status, including labs, urine output, blood pressure (other measures as available)  - Encourage oral intake as appropriate  - Instruct patient on fluid and nutrition restrictions as appropriate  Outcome: Progressing

## 2023-12-18 NOTE — PLAN OF CARE
AOx4, drowsy, transferred from med-onc for sob and chest pain. Weak, left arm prec. Has non productive cough, lungs w/ crackles bilat. Sinus tachy on tele. Potassium was replaced tonight for K3.4. Has purewick. On fluid restriction 1500ml/day. Regular diet- poor appetite.     Problem: PAIN - ADULT  Goal: Verbalizes/displays adequate comfort level or patient's stated pain goal  Description: INTERVENTIONS:  - Encourage pt to monitor pain and request assistance  - Assess pain using appropriate pain scale  - Administer analgesics based on type and severity of pain and evaluate response  - Implement non-pharmacological measures as appropriate and evaluate response  - Consider cultural and social influences on pain and pain management  - Manage/alleviate anxiety  - Utilize distraction and/or relaxation techniques  - Monitor for opioid side effects  - Notify MD/LIP if interventions unsuccessful or patient reports new pain  - Anticipate increased pain with activity and pre-medicate as appropriate  12/18/2023 0102 by Marycarmen Clayton RN  Outcome: Progressing  12/18/2023 0101 by Marycarmen Clayton RN  Outcome: Progressing     Problem: SAFETY ADULT - FALL  Goal: Free from fall injury  Description: INTERVENTIONS:  - Assess pt frequently for physical needs  - Identify cognitive and physical deficits and behaviors that affect risk of falls.  - [place] fall precautions as indicated by assessment.  - Educate pt/family on patient safety including physical limitations  - Instruct pt to call for assistance with activity based on assessment  - Modify environment to reduce risk of injury  - Provide assistive devices as appropriate  - Consider OT/PT consult to assist with strengthening/mobility  - Encourage toileting schedule  12/18/2023 0102 by Marycarmen Clayton RN  Outcome: Progressing  12/18/2023 0101 by Marycarmen Clayton RN  Outcome: Progressing     Problem: RESPIRATORY - ADULT  Goal: Achieves optimal ventilation and  oxygenation  Description: INTERVENTIONS:  - Assess for changes in respiratory status  - Assess for changes in mentation and behavior  - Position to facilitate oxygenation and minimize respiratory effort  - Oxygen supplementation based on oxygen saturation or ABGs  - Provide Smoking Cessation handout, if applicable  - Encourage broncho-pulmonary hygiene including cough, deep breathe, Incentive Spirometry  - Assess the need for suctioning and perform as needed  - Assess and instruct to report SOB or any respiratory difficulty  - Respiratory Therapy support as indicated  - Manage/alleviate anxiety  - Monitor for signs/symptoms of CO2 retention  12/18/2023 0102 by Marycarmen Clayton RN  Outcome: Progressing  12/18/2023 0101 by Marycarmen Clayton RN  Outcome: Progressing     Problem: METABOLIC/FLUID AND ELECTROLYTES - ADULT  Goal: Electrolytes maintained within normal limits  Description: INTERVENTIONS:  - Monitor labs and rhythm and assess patient for signs and symptoms of electrolyte imbalances  - Administer electrolyte replacement as ordered  - Monitor response to electrolyte replacements, including rhythm and repeat lab results as appropriate  - Fluid restriction as ordered  - Instruct patient on fluid and nutrition restrictions as appropriate  Outcome: Progressing  Goal: Hemodynamic stability and optimal renal function maintained  Description: INTERVENTIONS:  - Monitor labs and assess for signs and symptoms of volume excess or deficit  - Monitor intake, output and patient weight  - Monitor urine specific gravity, serum osmolarity and serum sodium as indicated or ordered  - Monitor response to interventions for patient's volume status, including labs, urine output, blood pressure (other measures as available)  - Encourage oral intake as appropriate  - Instruct patient on fluid and nutrition restrictions as appropriate  Outcome: Progressing

## 2023-12-18 NOTE — PLAN OF CARE
Notified of consult for [facility] for abnormal echocardiogram and fluid overload by primary RN. [facility]. [facility] to be notified of consult.

## 2023-12-19 ENCOUNTER — ANESTHESIA (OUTPATIENT)
Dept: ENDOSCOPY | Facility: HOSPITAL | Age: 43
End: 2023-12-19
Payer: COMMERCIAL

## 2023-12-19 ENCOUNTER — APPOINTMENT (OUTPATIENT)
Dept: GENERAL RADIOLOGY | Facility: HOSPITAL | Age: 43
DRG: 987 | End: 2023-12-19
Attending: INTERNAL MEDICINE
Payer: COMMERCIAL

## 2023-12-19 ENCOUNTER — ANESTHESIA EVENT (OUTPATIENT)
Dept: ENDOSCOPY | Facility: HOSPITAL | Age: 43
End: 2023-12-19
Payer: COMMERCIAL

## 2023-12-19 ENCOUNTER — TELEPHONE (OUTPATIENT)
Dept: CASE MANAGEMENT | Facility: HOSPITAL | Age: 43
End: 2023-12-19

## 2023-12-19 ENCOUNTER — APPOINTMENT (OUTPATIENT)
Dept: ULTRASOUND IMAGING | Facility: HOSPITAL | Age: 43
DRG: 987 | End: 2023-12-19
Attending: INTERNAL MEDICINE
Payer: COMMERCIAL

## 2023-12-19 PROBLEM — R79.89 ELEVATED LIVER FUNCTION TESTS: Status: ACTIVE | Noted: 2023-12-19

## 2023-12-19 LAB
ALBUMIN SERPL-MCNC: 2.3 G/DL (ref 3.4–5)
ALBUMIN SERPL-MCNC: 2.7 G/DL (ref 3.4–5)
ALBUMIN/GLOB SERPL: 0.9 {RATIO} (ref 1–2)
ALBUMIN/GLOB SERPL: 1.2 {RATIO} (ref 1–2)
ALP LIVER SERPL-CCNC: 283 U/L
ALP LIVER SERPL-CCNC: 336 U/L
ALT SERPL-CCNC: 1093 U/L
ALT SERPL-CCNC: 1296 U/L
ANION GAP SERPL CALC-SCNC: 13 MMOL/L (ref 0–18)
ANION GAP SERPL CALC-SCNC: 9 MMOL/L (ref 0–18)
ANTIBODY SCREEN: NEGATIVE
APAP SERPL-MCNC: 5 UG/ML (ref 10–30)
ARTERIAL PATENCY WRIST A: POSITIVE
AST SERPL-CCNC: 1216 U/L (ref 15–37)
AST SERPL-CCNC: 946 U/L (ref 15–37)
ATRIAL RATE: 133 BPM
BASE EXCESS BLDA CALC-SCNC: -3.9 MMOL/L (ref ?–2)
BASOPHILS NFR BRONCH: 0 %
BILIRUB SERPL-MCNC: 1.8 MG/DL (ref 0.1–2)
BILIRUB SERPL-MCNC: 2.4 MG/DL (ref 0.1–2)
BODY TEMPERATURE: 98.6 F
BUN BLD-MCNC: 65 MG/DL (ref 9–23)
BUN BLD-MCNC: 74 MG/DL (ref 9–23)
C3 SERPL-MCNC: 16.5 MG/DL (ref 90–180)
C4 SERPL-MCNC: 4 MG/DL (ref 10–40)
CA-I BLD-SCNC: 1.12 MMOL/L (ref 0.95–1.32)
CALCIUM BLD-MCNC: 7.7 MG/DL (ref 8.5–10.1)
CALCIUM BLD-MCNC: 7.8 MG/DL (ref 8.5–10.1)
CERULOPLASMIN SERPL-MCNC: 31.4 MG/DL (ref 20–60)
CHLORIDE SERPL-SCNC: 98 MMOL/L (ref 98–112)
CHLORIDE SERPL-SCNC: 98 MMOL/L (ref 98–112)
CO2 SERPL-SCNC: 22 MMOL/L (ref 21–32)
CO2 SERPL-SCNC: 23 MMOL/L (ref 21–32)
COHGB MFR BLD: 1.7 % SAT (ref 0–3)
CREAT BLD-MCNC: 1.83 MG/DL
CREAT BLD-MCNC: 1.84 MG/DL
EGFRCR SERPLBLD CKD-EPI 2021: 34 ML/MIN/1.73M2 (ref 60–?)
EGFRCR SERPLBLD CKD-EPI 2021: 35 ML/MIN/1.73M2 (ref 60–?)
EOSINOPHIL NFR BRONCH: 0 %
ERYTHROCYTE [DISTWIDTH] IN BLOOD BY AUTOMATED COUNT: 14 %
EXPIRATORY PRESSURE: 5 CM H2O
FIO2: 35 %
GLOBULIN PLAS-MCNC: 2.3 G/DL (ref 2.8–4.4)
GLOBULIN PLAS-MCNC: 2.5 G/DL (ref 2.8–4.4)
GLUCOSE BLD-MCNC: 72 MG/DL (ref 70–99)
GLUCOSE BLD-MCNC: 77 MG/DL (ref 70–99)
GLUCOSE BLD-MCNC: 98 MG/DL (ref 70–99)
HAV IGM SER QL: NONREACTIVE
HBV CORE IGM SER QL: NONREACTIVE
HBV SURFACE AG SERPL QL IA: NONREACTIVE
HCO3 BLDA-SCNC: 21.8 MEQ/L (ref 21–27)
HCT VFR BLD AUTO: 30 %
HCV AB SERPL QL IA: NONREACTIVE
HGB BLD-MCNC: 10.2 G/DL
HGB BLD-MCNC: 10.7 G/DL
INR BLD: 2.28 (ref 0.8–1.2)
INR BLD: 2.36 (ref 0.8–1.2)
INSP PRESSURE: 12 CM H2O
KAPPA LC FREE SER-MCNC: 20.67 MG/DL (ref 0.33–1.94)
KAPPA LC FREE/LAMBDA FREE SER NEPH: 6.22 {RATIO} (ref 0.26–1.65)
LACTATE BLD-SCNC: 3 MMOL/L (ref 0.5–2)
LAMBDA LC FREE SERPL-MCNC: 3.32 MG/DL (ref 0.57–2.63)
LYMPHOCYTES NFR BRONCH: 16 %
MCH RBC QN AUTO: 28.7 PG (ref 26–34)
MCHC RBC AUTO-ENTMCNC: 34 G/DL (ref 31–37)
MCV RBC AUTO: 84.5 FL
METHGB MFR BLD: 0.1 % SAT (ref 0.4–1.5)
MONOS+MACROS NFR BRONCH: 10 %
NEUTROPHILS NFR BRONCH: 74 %
OSMOLALITY SERPL CALC.SUM OF ELEC: 289 MOSM/KG (ref 275–295)
OSMOLALITY SERPL CALC.SUM OF ELEC: 297 MOSM/KG (ref 275–295)
OXYHGB MFR BLDA: 94.7 % (ref 92–100)
P AXIS: 29 DEGREES
P-R INTERVAL: 154 MS
PCO2 BLDA: 35 MM HG (ref 35–45)
PH BLDA: 7.38 [PH] (ref 7.35–7.45)
PLATELET # BLD AUTO: 98 10(3)UL (ref 150–450)
PO2 BLDA: 78 MM HG (ref 80–100)
POTASSIUM BLD-SCNC: 4 MMOL/L (ref 3.6–5.1)
POTASSIUM SERPL-SCNC: 3.9 MMOL/L (ref 3.5–5.1)
POTASSIUM SERPL-SCNC: 4.3 MMOL/L (ref 3.5–5.1)
PROT SERPL-MCNC: 4.8 G/DL (ref 6.4–8.2)
PROT SERPL-MCNC: 5 G/DL (ref 6.4–8.2)
PROTHROMBIN TIME: 25.4 SECONDS (ref 11.6–14.8)
PROTHROMBIN TIME: 26.1 SECONDS (ref 11.6–14.8)
Q-T INTERVAL: 288 MS
QRS DURATION: 78 MS
QTC CALCULATION (BEZET): 428 MS
R AXIS: 45 DEGREES
RBC # BLD AUTO: 3.55 X10(6)UL
RH BLOOD TYPE: POSITIVE
SODIUM BLD-SCNC: 127 MMOL/L (ref 135–145)
SODIUM SERPL-SCNC: 130 MMOL/L (ref 136–145)
SODIUM SERPL-SCNC: 133 MMOL/L (ref 136–145)
T AXIS: 3 DEGREES
TOTAL CELLS COUNTED FLD: 100
TROPONIN I SERPL HS-MCNC: 70 NG/L
VENT RATE: 16 /MIN
VENTRICULAR RATE: 133 BPM
WBC # BLD AUTO: 12.8 X10(3) UL (ref 4–11)

## 2023-12-19 PROCEDURE — 76700 US EXAM ABDOM COMPLETE: CPT | Performed by: INTERNAL MEDICINE

## 2023-12-19 PROCEDURE — 99233 SBSQ HOSP IP/OBS HIGH 50: CPT | Performed by: INTERNAL MEDICINE

## 2023-12-19 PROCEDURE — 0B9C8ZX DRAINAGE OF RIGHT UPPER LUNG LOBE, VIA NATURAL OR ARTIFICIAL OPENING ENDOSCOPIC, DIAGNOSTIC: ICD-10-PCS | Performed by: INTERNAL MEDICINE

## 2023-12-19 PROCEDURE — 71045 X-RAY EXAM CHEST 1 VIEW: CPT | Performed by: INTERNAL MEDICINE

## 2023-12-19 RX ORDER — SODIUM CHLORIDE, SODIUM LACTATE, POTASSIUM CHLORIDE, CALCIUM CHLORIDE 600; 310; 30; 20 MG/100ML; MG/100ML; MG/100ML; MG/100ML
INJECTION, SOLUTION INTRAVENOUS CONTINUOUS PRN
Status: DISCONTINUED | OUTPATIENT
Start: 2023-12-19 | End: 2023-12-19 | Stop reason: SURG

## 2023-12-19 RX ORDER — ALBUMIN (HUMAN) 12.5 G/50ML
25 SOLUTION INTRAVENOUS EVERY 12 HOURS
Qty: 300 ML | Refills: 0 | Status: COMPLETED | OUTPATIENT
Start: 2023-12-19 | End: 2023-12-20

## 2023-12-19 RX ORDER — BUMETANIDE 0.25 MG/ML
2 INJECTION INTRAMUSCULAR; INTRAVENOUS EVERY 12 HOURS
Qty: 30 ML | Refills: 0 | Status: DISCONTINUED | OUTPATIENT
Start: 2023-12-19 | End: 2023-12-19

## 2023-12-19 RX ORDER — SODIUM CHLORIDE 1 G/1
1 TABLET ORAL
Status: DISCONTINUED | OUTPATIENT
Start: 2023-12-19 | End: 2023-12-19

## 2023-12-19 RX ORDER — FUROSEMIDE 10 MG/ML
40 INJECTION INTRAMUSCULAR; INTRAVENOUS ONCE
Status: COMPLETED | OUTPATIENT
Start: 2023-12-19 | End: 2023-12-19

## 2023-12-19 RX ORDER — LIDOCAINE HYDROCHLORIDE 20 MG/ML
INJECTION, SOLUTION INFILTRATION; PERINEURAL
Status: DISCONTINUED | OUTPATIENT
Start: 2023-12-19 | End: 2023-12-19 | Stop reason: HOSPADM

## 2023-12-19 RX ORDER — DEXMEDETOMIDINE HYDROCHLORIDE 4 UG/ML
INJECTION, SOLUTION INTRAVENOUS CONTINUOUS
Status: DISCONTINUED | OUTPATIENT
Start: 2023-12-19 | End: 2023-12-21

## 2023-12-19 RX ORDER — FUROSEMIDE 10 MG/ML
INJECTION INTRAMUSCULAR; INTRAVENOUS
Status: COMPLETED
Start: 2023-12-19 | End: 2023-12-19

## 2023-12-19 RX ORDER — MIDAZOLAM HYDROCHLORIDE 1 MG/ML
INJECTION INTRAMUSCULAR; INTRAVENOUS AS NEEDED
Status: DISCONTINUED | OUTPATIENT
Start: 2023-12-19 | End: 2023-12-19 | Stop reason: SURG

## 2023-12-19 RX ADMIN — MIDAZOLAM HYDROCHLORIDE 1 MG: 1 INJECTION INTRAMUSCULAR; INTRAVENOUS at 11:02:00

## 2023-12-19 RX ADMIN — SODIUM CHLORIDE, SODIUM LACTATE, POTASSIUM CHLORIDE, CALCIUM CHLORIDE: 600; 310; 30; 20 INJECTION, SOLUTION INTRAVENOUS at 11:02:00

## 2023-12-19 NOTE — PROGRESS NOTES
[facility]     Hospitalist Progress Note     Alina Aceves Patient Status:  Inpatient    1980 MRN [id number]   Location [facility] 4NW-A Attending Tosha Waite MD   Hosp Day # 6 PCP HOLLY IBARRA     Chief Complaint: Intractable nausea vomiting, diarrhea, generalized weakness.  Recent pneumonia.     Subjective:      shortness of breath persist.  Denies any chest pain.  Hypoxic requiring 2 L of oxygen by nasal cannula.  Sinus tachycardia persists.  Patient afebrile.  Lethargic, arousable and answers questions appropriately      Objective:    Review of Systems:   A comprehensive review of systems was completed; pertinent positive and negatives stated in subjective.    Vital signs:  Temp:  [96.9 °F (36.1 °C)-98 °F (36.7 °C)] 97.9 °F (36.6 °C)  Pulse:  [101-115] 101  Resp:  [18-20] 18  BP: (123-135)/() 135/104  SpO2:  [78 %-100 %] 96 %    Physical Exam:    BP (!) 135/104 (BP Location: Right arm)   Pulse 101   Temp 97.9 °F (36.6 °C) (Oral)   Resp 18   Ht 5' 4\" (1.626 m)   Wt 150 lb 9.2 oz (68.3 kg)   LMP 2023 (Approximate)   SpO2 96%   Breastfeeding No   BMI 25.85 kg/m²   On 2 L of oxygen by nasal cannula  General: No acute distress  Respiratory: Clear to auscultation bilateral except decreased breath sounds at bases  Cardiovascular: S1, S2, regular rate and rhythm, tachycardic  Abdomen: Soft, Non-tender, non-distended, positive bowel sounds  Neuro: Awake alert, lethargic, no new focal deficits.   Extremities: no pedal edema or calf tenderness  No pain or redness or tenderness or swelling and the recent left breast lumpectomy of left axillary lymph node resection site according to patient-had this done in November according to patient.  Incision looks healed with no erythema or drainage or tenderness or fluctuance on palpation    Diagnostic Data:    Labs:  Recent Labs   Lab 23  1937 23  0631 12/15/23  0513 23  0617   WBC 6.3 8.3  --  8.3   HGB 7.9* 8.5*  --  7.9*    MCV 83.8 83.7  --  81.4   .0 188.0  --  157.0   INR  --   --  1.27*  --        Recent Labs   Lab 12/13/23  1938 12/14/23  0631 12/16/23  0650 12/16/23  1137 12/17/23  1155 12/17/23  1538 12/18/23  0025 12/18/23  0616 12/19/23  0514   *   < > 127*   < > 121*  --   --  69* 98   BUN 28*   < > 35*   < > 38*  --   --  46* 65*   CREATSERUM 1.36*   < > 1.08*   < > 1.16*  --   --  1.50* 1.83*   CA 8.2*   < > 7.7*   < > 7.9*  --   --  8.1* 7.7*   ALB 3.1*  --   --   --   --   --   --   --  2.3*   *   < > 122*   < > 127*  --   --  128* 130*   K 3.9   < > 3.5  3.5   < > 3.4*   < > 4.0 4.3 4.3   CL 89*   < > 91*   < > 91*  --   --  93* 98   CO2 15.0*   < > 20.0*   < > 21.0  --   --  19.0* 23.0   ALKPHO 97  --   --   --   --   --   --   --  336*   AST 40*  --   --   --   --   --   --   --  1,216*   ALT 36  --   --   --   --   --   --   --  1,296*   BILT 0.9  --   --   --   --   --   --   --  1.8   TP 6.6  --  5.5*  --   --   --   --   --  4.8*    < > = values in this interval not displayed.       Estimated Creatinine Clearance: 34.2 mL/min (A) (based on SCr of 1.83 mg/dL (H)).    Microbiology    Hospital Encounter on 12/13/23   1. Body Fluid Cult Aerobic and Anaerobic     Status: None (Preliminary result)    Collection Time: 12/15/23  3:26 PM    Specimen: Pleural Fluid, Right; Body fluid, unspecified   Result Value Ref Range    Body Fluid Culture Result No Growth 3 Days N/A    Body Fld Smear 4+ Neutrophils seen N/A    Body Fld Smear No organisms seen N/A    Body Fld Smear This is a cytocentrifuged smear. N/A   2. Urine Culture, Routine     Status: None    Collection Time: 12/14/23  3:58 AM    Specimen: Urine, clean catch   Result Value Ref Range    Urine Culture No Growth at 18-24 hrs. N/A   3. Blood Culture     Status: None    Collection Time: 12/13/23  8:10 PM    Specimen: Blood,peripheral   Result Value Ref Range    Blood Culture Result No Growth 5 Days N/A     MRSA PCR nares positive on  12/14/2023    Imaging: Reviewed in Epic.  Chest x-ray done on 12/13/2023 with dense patchy airspace consolidation opacities present within the lungs suspicious for areas of pneumonia  CTA chest done on 12/14/2023 early a.m. shows moderate to large bilateral pleural effusion along with marked consolidation scattered throughout the lungs suggestive of pulmonary edema and fluid overload.  No evidence of pulm embolus.  Moderate large left axillary and left breast fluid collections noted.  Minimal gas in the left most central breast fluid collection, possibility of infection is a consideration.  Sequelae of seroma chronic hematoma is a consideration as well.    Medications:    sodium chloride  1 g Oral TID CC    furosemide  20 mg Oral Daily    metoprolol tartrate  25 mg Oral 2x Daily(Beta Blocker)    [Held by provider] furosemide  20 mg Intravenous Once    mupirocin  1 Application Nasal Q12H    potassium chloride  40 mEq Intravenous Once    pantoprazole  40 mg Intravenous QAM AC    Or    pantoprazole  40 mg Oral QAM AC    DULoxetine  30 mg Oral Daily       Assessment & Plan:      # Multifocal pneumonia with bilateral large bilateral pleural effusion with tachycardia, tachypnea, also rule out malignancy due to patient's locally advanced stage IIIb breast cancer  #new onset cardiomyopathy  #bilateral pleural effusion due to cardiomyopathy and acute systolic heart failure   Pulmonary and ID on consult   Status post left thoracentesis on 12/15/2023 and right thoracentesis on 12/16/2023, pulmonary following   IV antibiotics-currently on IV Rocephin and IV doxycycline   Status post IV Lasix x 1 dose for the bilateral pleural effusion   MRSA nares came back positive, will give Bactroban application twice daily for 5 days.  Seen by ID on consult who advised to continue IV Rocephin and IV doxycycline at this time   Pulmonary plans bronc this week if respiratory status allows  2D echo done on 12/17/2023, showed EF 20-25%, severe  diffuse hypokinesis - Transferred to tele and cardiology on consult.  CHF protocol    # Acute hypoxic respiratory failure due to above   -On oxygen via nasal cannula   -Pulmonary on consult   -2D echo done on 12/17/2023, showed EF 20-25%, severe diffuse hypokinesis - Transferred to tele and cardiology on consult.    # Locally advanced stage IIIb breast cancer status post outpatient neoadjuvant chemo and history of left breast lumpectomy and left lymph node resection on 11/16/2023   Oncology, ID and pulmonary on consult    # Hyponatremia due to pneumonia and also hypovolemic due to nausea vomiting and diarrhea at home and may also due to malignancy, rule out SIADH also due to malignancy   Nephrology consult, on bicarb drip per nephrology.  Status post tolvaptan   Sodium slowly improving   Follow BMP    # Hypokalemia   Being replaced with electrolyte protocol    # Metabolic acidosis, acute kidney injury   On IV fluids bicarb drip and IV antibiotics   Lactic acid normal on 12/13/2023 at 1.5   Nephrology consult appreciated   On chart review patient's creatinine was 0.84 on 11/13/2023, 1.36 on 12/13/2023 on admission.    # History of lupus, history of Sjogren's disease  -Takes azathioprine at home which was held at this time due to multifocal pneumonia    # Gastritis  # Nausea vomiting and diarrhea at home possibly due to effect of chemo  # Anxiety  -Nausea vomiting and diarrhea improved.  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  -Check INR  -Will also check Tylenol level  -Check ultrasound of the abdomen including liver    Discussed with patient, discussed with RN.  Discussed with nephrology Dr. Potter.  Also discussed with pulmonary Dr. Dhillon and updated regarding the elevated liver function test.  Consulted GI and informed and discussed with GI Dr. Vamsi Antoine regarding the consult.  Also reached out to cardiology service with Dr. Villalba through [employer] and left message for the on-call APN with duly    Dr. Noe Perkins will follow from morning tomorrow    Tosha Waite MD    Supplementary Documentation:     Quality:  DVT Mechanical Prophylaxis:     Early ambuation  DVT Pharmacologic Prophylaxis   Medication   None                Code Status: Prior  Peacock: External urinary catheter in place  Peacock Duration (in days):   Central line:    CHRISTELLE:     Discharge is dependent on: Clinical progress  At this point Ms. Aceves is expected to be discharge to: To be decided    The 21st Century Cures Act makes medical notes like these available to patients in the interest of transparency. Please be advised this is a medical document. Medical documents are intended to carry relevant information, facts as evident, and the clinical opinion of the practitioner. The medical note is intended as peer to peer communication and may appear blunt or direct. It is written in medical language and may contain abbreviations or verbiage that are unfamiliar.

## 2023-12-19 NOTE — CM/SW NOTE
Dilshad SEGOVIA case manager with [employer] calling to offer discharge planning assistance. If needed please call back at [phone].

## 2023-12-19 NOTE — CONSULTS
Heart Failure discharge instructions added to the patients AVS  Will follow    Paula Berry MSN,RN  Heart Failure Navigator

## 2023-12-19 NOTE — PHYSICAL THERAPY NOTE
Attempted to see pt for PT but transport present to take pt down for a procedure. Will follow up later.

## 2023-12-19 NOTE — PLAN OF CARE
Assumed care of patient at 0730.Patient AXOX3,lethargic.NSR on tele.Patient remains NPO for Bronchoscopy.Plan for US abdomen today.  1600:Patient was transferred to CNICU for elevated HR ,SOB,symptomatic and flash pulmonary edema.Dr Dhillon was called  to bedside and patient transferred to ICU per order.Family and hospitalist notified of transfer.

## 2023-12-19 NOTE — PROGRESS NOTES
[facility]     Nephrology Progress Note    Alina Aceves Patient Status:  Inpatient    1980 MRN [id number]   [facility] 8NE-A Attending Tosha Waite MD   Hosp Day # 6 PCP HOLLY IBARRA       SUBJECTIVE:    Somnolent  No complains  Feels tired  Denies any nausea  No appetite      Current Facility-Administered Medications:     furosemide (Lasix) tab 20 mg, 20 mg, Oral, Daily    metoprolol tartrate (Lopressor) tab 25 mg, 25 mg, Oral, 2x Daily(Beta Blocker)    [Held by provider] furosemide (Lasix) 10 mg/mL injection 20 mg, 20 mg, Intravenous, Once    dextromethorphan-guaiFENesin (Robitussin-DM)  mg/5mL oral solution 5 mL, 5 mL, Oral, Q6H PRN    mupirocin (Bactroban) 2% nasal ointment 1 Application, 1 Application, Nasal, Q12H    sodium chloride tab 2 g, 2 g, Oral, TID CC    potassium chloride 40 mEq in 250mL sodium chloride 0.9% IVPB premix, 40 mEq, Intravenous, Once    ALPRAZolam (Xanax) tab 0.25 mg, 0.25 mg, Oral, TID PRN    pantoprazole (Protonix) 40 mg in sodium chloride 0.9% PF 10 mL IV push, 40 mg, Intravenous, QAM AC **OR** pantoprazole (Protonix) DR tab 40 mg, 40 mg, Oral, QAM AC    LORazepam (Ativan) 2 mg/mL injection 0.5 mg, 0.5 mg, Intravenous, Q6H PRN    metoprolol (Lopressor) 5 mg/5mL injection 5 mg, 5 mg, Intravenous, Q6H PRN    ipratropium-albuterol (Duoneb) 0.5-2.5 (3) MG/3ML inhalation solution 3 mL, 3 mL, Nebulization, Q4H PRN    HYDROmorphone (Dilaudid) 1 MG/ML injection 0.5 mg, 0.5 mg, Intravenous, Q30 Min PRN    DULoxetine (Cymbalta) DR cap 30 mg, 30 mg, Oral, Daily    acetaminophen (Tylenol Extra Strength) tab 1,000 mg, 1,000 mg, Oral, Q8H PRN    melatonin tab 3 mg, 3 mg, Oral, Nightly PRN    prochlorperazine (Compazine) 10 MG/2ML injection 5 mg, 5 mg, Intravenous, Q8H PRN    polyethylene glycol (PEG 3350) (Miralax) 17 g oral packet 17 g, 17 g, Oral, Daily PRN    sennosides (Senokot) tab 17.2 mg, 17.2 mg, Oral, Nightly PRN    bisacodyl (Dulcolax) 10 MG rectal  suppository 10 mg, 10 mg, Rectal, Daily PRN    fleet enema (Fleet) 7-19 GM/118ML rectal enema 133 mL, 1 enema, Rectal, Once PRN    HYDROmorphone (Dilaudid) 1 MG/ML injection 0.2 mg, 0.2 mg, Intravenous, Q2H PRN **OR** HYDROmorphone (Dilaudid) 1 MG/ML injection 0.4 mg, 0.4 mg, Intravenous, Q2H PRN **OR** HYDROmorphone (Dilaudid) 1 MG/ML injection 0.8 mg, 0.8 mg, Intravenous, Q2H PRN    HYDROmorphone (Dilaudid) 1 MG/ML injection 0.5 mg, 0.5 mg, Intravenous, Q30 Min PRN      Physical Exam:   BP (!) 128/95 (BP Location: Right arm)   Pulse 101   Temp 97.6 °F (36.4 °C) (Oral)   Resp 18   Ht 5' 4\" (1.626 m)   Wt 150 lb 9.2 oz (68.3 kg)   LMP 2023 (Approximate)   SpO2 97%   Breastfeeding No   BMI 25.85 kg/m²   Temp (24hrs), Av.5 °F (36.4 °C), Min:96.9 °F (36.1 °C), Max:98 °F (36.7 °C)       Intake/Output Summary (Last 24 hours) at 2023 0810  Last data filed at 2023 0555  Gross per 24 hour   Intake 50 ml   Output 1400 ml   Net -1350 ml     Last 3 Weights   23 0538 150 lb 9.2 oz (68.3 kg)   23 0019 150 lb 3.2 oz (68.1 kg)   23 2326 124 lb (56.2 kg)   23 1823 125 lb (56.7 kg)   23 1816 120 lb (54.4 kg)   21 1537 135 lb (61.2 kg)   20 1405 136 lb (61.7 kg)   08/15/20 1020 140 lb (63.5 kg)     General: Alert and oriented in no apparent distress.  HEENT: No scleral icterus, MMM  Neck: Supple, no KALYN or thyromegaly  Cardiac: Regular rate and rhythm, S1, S2 normal, no murmur or rub  Lungs: Clear without wheezes, rales, rhonchi.    Abdomen: Soft, non-tender. + bowel sounds, no palpable organomegaly  Extremities:1+ BLE edema.  Neurologic:  moving all extremities  Skin: Warm and dry, no rash    Recent Labs     23  0617   WBC 8.3   HGB 7.9*   MCV 81.4   .0       Recent Labs     23  0025 23  0617 23  1155 23  1538 23  0025 23  0616 23  0514   * 126* 127*  --   --  128* 130*   K 3.5 3.3* 3.4* 3.4* 4.0 4.3  4.3   CL 92* 91* 91*  --   --  93* 98   CO2 21.0 21.0 21.0  --   --  19.0* 23.0   BUN 37* 36* 38*  --   --  46* 65*   CREATSERUM 1.19* 1.13* 1.16*  --   --  1.50* 1.83*   CA 7.7* 7.8* 7.9*  --   --  8.1* 7.7*       Recent Labs     12/19/23  0514   ALT 1,296*   AST 1,216*   ALB 2.3*         Impression/Plan:        Impression:  REJI- related to pre-renal azotemia  - monitor labs  - on oral lasix for help w/ hyponatremia  Proteinuria- pt w/ hx of interstitial nephritis - treated w/ steorids in past - @ [facility] (Dr Tejeda); kidney biopsy jan 2023- interstitial nephritis-  Spot ratio ~ 2.9 g/g; low complements; +NURIA->+ anti-SSA ab  - may need to consider kidney biopsy for further evaluation - discussed w/ patient- for now holding on ordering given tenuous pulmonary status   Hyponatremia- patient appears volume replete for the most part. She does have poor solute intake over the past few days as noted. Concern for SIADH given her hx of breast cancer +/-  PNA. Additionally, patient is on SSRI (now held)  Urine chem c/w with combination of low solute/SIADH  Na   improving  - continue salt tabs (adjusted to 1 g tid)/po loop diuretic/gentle fluid restriction - pt did get tolvaptan 12/18- hold given worsening LFTs  Worsening LFTs- ? Due to ischemia/congestion- consider GI evaluation   Breast Ca  Effusion- per pulm; on O2; s/p thoracentesis ; await cytology; transudative    N/V/D- unclear etiology- possibly related to the chemo; symptomatic management  Hypokelamia- replace prn per protocol  Hx of SLE/Sjogrens- appreciate rheum eval         Questions/concerns were discussed with patient and/or family by bedside.    Darryn Potter  12/19/2023

## 2023-12-19 NOTE — PROGRESS NOTES
[facility]    Alina Aceves Patient Status:  Inpatient    1980 MRN [id number]   Location [facility] 8NE-A Attending Tosha Waite MD   Hosp Day # 6 PCP HOLLY IBARRA     SUBJECTIVE: Pt remains lethargic.      OBJECTIVE:  BP (!) 135/104 (BP Location: Right arm)   Pulse 101   Temp 97.9 °F (36.6 °C) (Oral)   Resp 18   Ht 5' 4\" (1.626 m)   Wt 150 lb 9.2 oz (68.3 kg)   LMP 2023 (Approximate)   SpO2 96%   Breastfeeding No   BMI 25.85 kg/m²   O2 requirement: 2L     I/O last 3 completed shifts:  In: 530 [P.O.:530]  Out: 1400 [Urine:1400]  No intake/output data recorded.     Current Medications:   Current Facility-Administered Medications:     sodium chloride tab 1 g, 1 g, Oral, TID CC    furosemide (Lasix) tab 20 mg, 20 mg, Oral, Daily    metoprolol tartrate (Lopressor) tab 25 mg, 25 mg, Oral, 2x Daily(Beta Blocker)    [Held by provider] furosemide (Lasix) 10 mg/mL injection 20 mg, 20 mg, Intravenous, Once    dextromethorphan-guaiFENesin (Robitussin-DM)  mg/5mL oral solution 5 mL, 5 mL, Oral, Q6H PRN    mupirocin (Bactroban) 2% nasal ointment 1 Application, 1 Application, Nasal, Q12H    potassium chloride 40 mEq in 250mL sodium chloride 0.9% IVPB premix, 40 mEq, Intravenous, Once    ALPRAZolam (Xanax) tab 0.25 mg, 0.25 mg, Oral, TID PRN    pantoprazole (Protonix) 40 mg in sodium chloride 0.9% PF 10 mL IV push, 40 mg, Intravenous, QAM AC **OR** pantoprazole (Protonix) DR tab 40 mg, 40 mg, Oral, QAM AC    LORazepam (Ativan) 2 mg/mL injection 0.5 mg, 0.5 mg, Intravenous, Q6H PRN    metoprolol (Lopressor) 5 mg/5mL injection 5 mg, 5 mg, Intravenous, Q6H PRN    ipratropium-albuterol (Duoneb) 0.5-2.5 (3) MG/3ML inhalation solution 3 mL, 3 mL, Nebulization, Q4H PRN    DULoxetine (Cymbalta) DR cap 30 mg, 30 mg, Oral, Daily    melatonin tab 3 mg, 3 mg, Oral, Nightly PRN    prochlorperazine (Compazine) 10 MG/2ML injection 5 mg, 5 mg, Intravenous, Q8H PRN    polyethylene glycol (PEG 3350)  (Miralax) 17 g oral packet 17 g, 17 g, Oral, Daily PRN    sennosides (Senokot) tab 17.2 mg, 17.2 mg, Oral, Nightly PRN    bisacodyl (Dulcolax) 10 MG rectal suppository 10 mg, 10 mg, Rectal, Daily PRN    fleet enema (Fleet) 7-19 GM/118ML rectal enema 133 mL, 1 enema, Rectal, Once PRN    HYDROmorphone (Dilaudid) 1 MG/ML injection 0.2 mg, 0.2 mg, Intravenous, Q2H PRN **OR** [DISCONTINUED] HYDROmorphone (Dilaudid) 1 MG/ML injection 0.4 mg, 0.4 mg, Intravenous, Q2H PRN **OR** [DISCONTINUED] HYDROmorphone (Dilaudid) 1 MG/ML injection 0.8 mg, 0.8 mg, Intravenous, Q2H PRN      Physical Exam:                          General: lethargic                          HEENT: oropharynx clear without erythema or exudates, moist mucous membranes                          Lungs: Clear to auscultation bilaterally, no wheezes or crackles                           Chest wall: No tenderness or deformity.                          Heart: Regular rate and rhythm, normal S1S2                          Abdomen: soft, non-tender, non-distended, positive BS.                          Extremity: No clubbing or cyanosis. + edema                          Skin: No rashes or lesions.          Lab Results   Component Value Date     12/19/2023    K 4.3 12/19/2023    CL 98 12/19/2023    CO2 23.0 12/19/2023    BUN 65 12/19/2023    CREATSERUM 1.83 12/19/2023    GLU 98 12/19/2023    CA 7.7 12/19/2023    ALKPHO 336 12/19/2023    ALT 1,296 12/19/2023    AST 1,216 12/19/2023    BILT 1.8 12/19/2023    ALB 2.3 12/19/2023    TP 4.8 12/19/2023     Lab Results   Component Value Date    INR 1.27 (H) 12/15/2023          Imaging: I have independently visualized all relevant chest imaging in PACS.  I agree with the radiology interpretation except where noted.       ASSESSMENT/PLAN:  Acute hypoxemic respiratory failure - secondary to pneumonia vs pneumonitis as well as systolic heart failure with bilateral pleural effusions.  -continue supplemental oxygen, on 2L this  AM   -echo with EF: %  PASP: 55mmHg  Lethargy:   -ABG without hypercapnea  Pneumonia - viral respiratory panel was negative. PCT was negative, but cannot r/o atypical pathogens  -continue empiric antibiotics : ceftriaxone, doxycycline (12/13-   -follow up cultures  -strep and legionella urine ag- negative  -recommend proceeding with bronch with BAL under MAC.  Pt with significant pulmonary hypertension on most recent echo and would be at high risk for bleeding complications with biopsy, thus will do BAL only.  The rationale for performing the bronchoscopy, including risks and benefits were explained to the patient and questions were answered.  The risks of drug reactions, bleeding, collapsed lung, fevers, and hoarseness were discussed, along with other less common complications including death.  The patients understands the procedure and agrees to proceed.  Bronchoscopy scheduled for today at 11am.   Systolic heart failure: EF: 20-25%  -cards eval appreciated  Transaminitis: suspect shock liver vs congestive hepatopathy  -check lactate   -trend LFTs  -GI consulted  -abdominal US  Pleural effusions - bilateral, transudative  -L side- 700 removed on 12/15  -R side- 1400 removed on 12/16  -await culture and cytology  SLE  -per hospitalist and rheumatology  -check ANCA  Hyponatremia, non-anion gap acidosis  -renal following  Breast cancer  -per heme/onc  Proph   -lmwh  Dispo   -full code  -inpatient   -will follow    Ashley Tapia MD    ADDENDUM:  I was called by RN that pt became tachycardic with HR to the 140's and started having hemoptysis described as blood tinged sputum.  I came to bedside, pt tachypneic, tachycardic.  Coarse rhonchi throughout, which was new from earlier in the day.  A 12 lead EKG obtained with sinus tachycardia noted.  Stat CXR, ABG, troponin, repeat labs ordered.  Suspect flash pulmonary edema, IV lasix 40mg IVP given, pt started on bipap.  Will transfer to the ICU for further care.     Critical care time: 35 minutes    Rylie ALMEIDA

## 2023-12-19 NOTE — ANESTHESIA POSTPROCEDURE EVALUATION
[facility] Patient Status:  Inpatient   Age/Gender 37year old female MRN [id number]   Location [address] [address] Attending Chey Bauitsta MD   [facility] Day # 6 PCP HOLLY IBARRA       Anesthesia Post-op Note    BRONCHOSCOPY WITH Bronchoalveolar lavage    Procedure Summary       Date: 12/19/23 Room / Location: [facility] ENDOSCOPY 03 / [facility] ENDOSCOPY    Anesthesia Start: 6393 Anesthesia Stop: 5042    Procedure: BRONCHOSCOPY WITH Bronchoalveolar lavage Diagnosis: (sob)    Surgeons: Gorge Aguilar MD Anesthesiologist: Rupa Lombardi MD    Anesthesia Type: MAC ASA Status: 4            Anesthesia Type: MAC    Vitals Value Taken Time   /102 12/19/23 1117   Temp  12/19/23 1119   Pulse 106 12/19/23 1118   Resp 16 12/19/23 1118   SpO2 91 % 12/19/23 1118   Vitals shown include unfiled device data. Patient Location: Endoscopy    Anesthesia Type: MAC    Airway Patency: patent    Postop Pain Control: adequate    Mental Status: preanesthetic baseline    Nausea/Vomiting: none    Cardiopulmonary/Hydration status: stable euvolemic    Complications: no apparent anesthesia related complications    Postop vital signs: stable    Dental Exam: Unchanged from Preop    Patient to be discharged from PACU when criteria met.

## 2023-12-19 NOTE — PROGRESS NOTES
[facility]     Progress Note     Alina Aceves Patient Status:  Inpatient    1980 MRN [id number]   Location [facility] 8NE-A Attending Tosha Waite MD   Hosp Day # 6 PCP HOLLY IBARRA     Chief Complaint: history of Sjogrens    Subjective:     S: Patient continues to have some on and off shortness of breath nonproductive cough    Review of Systems:     Constitutional: Negative for activity change, chills, diaphoresis, +fatigue, no fever and unexpected weight change.    HENT: Negative for congestion, hearing loss and mouth sores.    Eyes: Negative for pain, redness and visual disturbance.    Respiratory: Negative for apnea, cough, chest tightness, shortness of breath, wheezing Cardiovascular: Negative for chest pain, palpitations and leg swelling.    Gastrointestinal: Negative for abdominal distention, abdominal pain, blood in stool, constipation, diarrhea and nausea.    Endo: Negative.    Genitourinary: Negative for decreased urine volume, difficulty urinating, dysuria, and frequency.     Musculoskeletal: Negative for arthralgias, gait problem and joint swelling.    Skin: Negative. Negative for color change, pallor and rash. ? raynauds or digital ulcerations.    Allergic/Immunologic: Negative.    Neurological: Negative. Negative for dizziness, tremors, seizures, syncope,  speech difficulty, +weakness right lower extremity, no light-headedness, numbness and headaches.    Hematological: Does not bruise/bleed easily.    Psychiatric/Behavioral: Negative depression, confusion, decreased concentration, self-injury, +sleep disturbance and suicidal ideas. The patient is not nervous/anxious.    Objective:   Vital signs:  Temp:  [97.4 °F (36.3 °C)-98 °F (36.7 °C)] 97.4 °F (36.3 °C)  Pulse:  [] 109  Resp:  [18-22] 20  BP: (123-139)/() 139/106  SpO2:  [69 %-100 %] 69 %    Wt Readings from Last 3 Encounters:   23 150 lb 9.2 oz (68.3 kg)   23 120 lb (54.4 kg)   21 135 lb (61.2 kg)        Intake/Output:    Intake/Output Summary (Last 24 hours) at 12/19/2023 1525  Last data filed at 12/19/2023 1106  Gross per 24 hour   Intake 80 ml   Output 1400 ml   Net -1320 ml         Physical Exam:      Constitutional: Is oriented to person, place, and time. No distress.    HEENT: Normocephalic, no jvd; no lad; EOMI; good oral pooling; no oral or nasal ulcers.    Eyes: Conjunctivae and EOM are normal. Pupils are equal, round, and reactive to light.    Neck: Normal range of motion. No thyromegaly present.    Cardiovascular: RRR, no murmurs.    Lungs: Scattered crackles decreased breath sounds at the bases    Abdominal: Soft. Nontender; nondistended; BS+.    Musculoskeletal:        Right shoulder: Exhibits normal range of motion on abduction and internal rotation, no tenderness, no bony tenderness, no deformity, no laceration, no pain and no spasm.        Left shoulder: Exhibits normal range of motion on abduction and internal and external rotation.  no tenderness, no bony tenderness, no swelling, no effusion, no deformity, no pain, no spasm and normal strength.        Right elbow: Exhibits normal range of motion, no swelling, no effusion and no deformity. No tenderness found. No medial epicondyle, no lateral epicondyle and no olecranon process tenderness noted. There are no contractures or tophi or nodules.        Left elbow: Exhibits normal range of motion, no swelling, no effusion and no deformity. No tenderness found. No medial epicondyle, no lateral epicondyle and no olecranon process tenderness noted.  There are no contractures or tophi or nodules        Right wrist: Exhibits normal range of motion, no tenderness, no bony tenderness, no swelling, no effusion and no crepitus. Flexion and extension intact without limitation.        Left wrist:  Exhibits normal range of motion, no tenderness, no bony tenderness, no swelling, no effusion, no crepitus and no deformity. Flexion and extension intact without  limitation.        Right hip: Exhibits normal range of motion, normal strength, no tenderness, no bony tenderness, no swelling and no crepitus.        Right hand: No synovitis of MCP,PIP or DIP joints; no Bouchards or Heberden nodules noted;  strength: 100%.        Left hand: No synovitis of MCP,PIP or DIP joints; no Bouchards or Heberden nodules noted;  strength: 100%.        Left hip: Exhibits normal range of motion, normal strength, no tenderness, no bony tenderness, no swelling and no crepitus.        Right Hip: Normal without LROM or TTP        Right knee: Exhibits normal range of motion, no swelling, no effusion, no ecchymosis, no deformity and no erythema. No tenderness found. No medial joint line, no lateral joint line, no MCL and no LCL tenderness noted. No crepitation found on flexion and extension normal.        Left knee: Exhibits normal range of motion, no swelling, no effusion, no ecchymosis and no erythema. No tenderness found. No medial joint line, no lateral joint line and no patellar tendon tenderness noted. No crepitation found on flexion and flexion intact.        Right ankle: Exhibits normal range of motion, no swelling, no deformity and no laceration. No tenderness. No lateral malleolus and no medial malleolus tenderness found. Achilles tendon normal. Achilles tendon exhibits no pain, no defect and normal Maradiaga's test results.        Left ankle: Exhibits normal range of motion, no swelling, and no deformity. No tenderness. No lateral malleolus and no medial malleolus tenderness found.        Cervical back: Exhibits normal range of motion, no tenderness, no bony tenderness, no swelling, no pain and no spasm.        Thoracic back: Exhibits normal range of motion, no tenderness, no bony tenderness and no spasm.        Lumbar back: Exhibits normal range of motion, no tenderness, no bony tenderness, no pain and no spasm.        Right foot: No tenderness, no bony tenderness, no crepitus  and no laceration. There is no synovitis or tenderness of the MTP joints to palpation.        Left foot: No tenderness, no bony tenderness and no crepitus. There is no synovitis or tenderness of the MTP joints to palpation.    Lymphadenopathy: No noted lympadenopathy.    Neurological: Is alert and oriented. No focal motor or sensory abnormalities.    Skin: See media                  Psychiatric: Normal behavior    Results:   Diagnostic Data:      Labs:    Selected labs - last 24 hours:  Endo  Lytes  Renal   Glu 98  Na 130 Ca 7.7  BUN 65   POC Gluc  72  K 4.3 PO4 -  Cr 1.83   A1c -  Cl 98 Mg -  eGFR 35   TSH -  CO2 23.0         LFT  CBC  Other   AST 1,216  WBC -  PTT - Procal -   ALT 1,296  Hb -  INR 2.28 CRP -   APk 336  Hct -  Trop - D dim -   T los 1.8  PLT -  pBNP -  BNP -  - Ferritin  -   Prot 4.8    CK  - Lactate  -   Alb 2.3    LDL  - COVID  -       Imaging:   XR CHEST AP PORTABLE  (CPT=71045)    Result Date: 12/18/2023  CONCLUSION:  Interval worsening of right lung airspace opacities.  Stable to minimally improved left lung airspace opacities.   LOCATION:  [facility]      Dictated by (CST): Moe Guzman MD on 12/18/2023 at 12:00 PM     Finalized by (CST): Moe Guzman MD on 12/18/2023 at 12:01 PM       XR CHEST AP PORTABLE  (CPT=71045)    Result Date: 12/16/2023  CONCLUSION:  The patient is status post right-sided thoracentesis.  There is marked decrease in the right effusion with marked improved aeration of the right lower lobe.  There is no pneumothorax.  A right Port-A-Cath identified unchanged in position, tip in the SVC. There is diffuse decreased ground-glass opacities in the central aspect of the right lung in the interval compared to 12/15/2023.  There is some unfavorable progression of extensive ground-glass opacities in the left perihilar region extending into the left upper lobe laterally and the left retrocardiac region however.  There is increase in the size of the left pleural effusion with  slight increased elevation of the left hemidiaphragm.  Surgical clips project over the inferolateral left chest wall unchanged. The heart size is upper limits of normal.  No definite CHF.   LOCATION:  [facility]      Dictated by (CST): Sandip Dias MD on 12/16/2023 at 12:59 PM     Finalized by (CST): Sandip Dias MD on 12/16/2023 at 1:01 PM       US THORACENTESIS GUIDED RIGHT (CPT=32555)    Result Date: 12/16/2023  CONCLUSION:  Ultrasound-guided thoracentesis was provided and performed by Dr. Cano performed without complication.  PLEASE SEE HIS REPORT FOR FURTHER EVALUATION.   LOCATION:  [facility]    Dictated by (CST): Sandip Dias MD on 12/16/2023 at 11:43 AM     Finalized by (CST): Sandip Dias MD on 12/16/2023 at 11:44 AM       XR CHEST AP PORTABLE  (CPT=71045)    Result Date: 12/15/2023  PROCEDURE:  XR CHEST AP PORTABLE  (CPT=71045)  TECHNIQUE:  AP chest radiograph was obtained.  COMPARISON:  [facility] , XR, XR CHEST AP PORTABLE  (CPT=71045), 12/15/2023, 3:49 PM.  INDICATIONS:  Re eval pneumothorax  PATIENT STATED HISTORY: (As transcribed by Technologist)  Patient offered no additional history at this time.    FINDINGS:   Right-sided Port-A-Cath tip in the SVC.  Marked right perihilar consolidation is again noted.  Moderate left perihilar consolidation is again noted.  Previously noted left pneumothorax is no longer identified.    LOCATION:  [facility]      Dictated by (CST): Gustavo Yuan MD on 12/15/2023 at 7:09 PM     Finalized by (CST): Gustavo Yuan MD on 12/15/2023 at 7:10 PM       XR CHEST AP PORTABLE  (CPT=71045)    Result Date: 12/15/2023  CONCLUSION:  1. Small 9 mm left apical pneumothorax status post left thoracentesis.    LOCATION:  [facility]      Dictated by (CST): Christi Ashraf MD on 12/15/2023 at 4:23 PM     Finalized by (CST): Christi Ashraf MD on 12/15/2023 at 4:29 PM       US THORACENTESIS GUIDED RIGHT (CPT=32555)    Result Date: 12/15/2023  CONCLUSION:  Ultrasound guidance  provided for thoracentesis.   LOCATION:  [facility]    Dictated by (CST): Christi Ashraf MD on 12/15/2023 at 2:48 PM     Finalized by (CST): Christi Ashraf MD on 12/15/2023 at 2:49 PM       CT ANGIOGRAPHY, CHEST (CPT=71275)    Result Date: 12/14/2023  CONCLUSION:   1. Moderate to large bilateral pleural effusions along with marked solid a shin lungs are suggestive pulmonary edema and fluid overload.  2. No evidence of pulmonary embolus.  3. Moderate to large left axillary and left breast fluid collections are noted.  Minimal gas in the left more central breast fluid collection is noted.  Possibility of infection is of consideration.  Sequelae of seroma/chronic hematoma is of consideration as well.  Agree with preliminary radiology report from [facility].    LOCATION:  [facility]   Dictated by (CST): Gustavo Yuan MD on 12/14/2023 at 6:49 AM     Finalized by (CST): Gustavo Yuan MD on 12/14/2023 at 6:52 AM       XR CHEST AP PORTABLE  (CPT=71045)    Result Date: 12/13/2023  CONCLUSION:  Dense patchy airspace opacities are present within the lungs suspicious for areas of pneumonia.  Follow-up is recommended to ensure resolution.  If clinical symptoms persist then consider CT.   LOCATION:  [id number]      Dictated by (CST): Cderick Tovar MD on 12/13/2023 at 8:13 PM     Finalized by (CST): Cedrick Tovar MD on 12/13/2023 at 8:13 PM       CT CHEST PE AORTA (IV ONLY) (CPT=71260)    Result Date: 11/13/2023  CONCLUSION:  1. No evidence of pulmonary embolism. 2. No acute findings within the chest.   LOCATION:  [facility]   Dictated by (CST): Moe Guzman MD on 11/13/2023 at 11:34 PM     Finalized by (CST): Moe Guzman MD on 11/13/2023 at 11:39 PM       XR CHEST AP PORTABLE  (CPT=71045)    Result Date: 11/13/2023  CONCLUSION:  Normal heart size and pulmonary vascularity.  Port-A-Cath tip SVC.  No focal consolidation, effusion or pneumothorax.   LOCATION:  [id number]      Dictated by (CST): Corina Epperson MD on 11/13/2023 at 8:18 PM      Finalized by (CST): Corina Epperson MD on 11/13/2023 at 8:18 PM         Current Medications:  Current Facility-Administered Medications   Medication Dose Route Frequency    sodium chloride tab 1 g  1 g Oral TID CC    bumetanide (Bumex) 0.25 MG/ML injection 2 mg  2 mg Intravenous Q12H    albumin human (Albuminar) 25% injection 25 g  25 g Intravenous Q12H    furosemide (Lasix) 10 mg/mL injection 40 mg  40 mg Intravenous Once    furosemide (Lasix) 10 mg/mL injection        metoprolol tartrate (Lopressor) tab 25 mg  25 mg Oral 2x Daily(Beta Blocker)    [Held by provider] furosemide (Lasix) 10 mg/mL injection 20 mg  20 mg Intravenous Once    dextromethorphan-guaiFENesin (Robitussin-DM)  mg/5mL oral solution 5 mL  5 mL Oral Q6H PRN    mupirocin (Bactroban) 2% nasal ointment 1 Application  1 Application Nasal Q12H    potassium chloride 40 mEq in 250mL sodium chloride 0.9% IVPB premix  40 mEq Intravenous Once    ALPRAZolam (Xanax) tab 0.25 mg  0.25 mg Oral TID PRN    pantoprazole (Protonix) 40 mg in sodium chloride 0.9% PF 10 mL IV push  40 mg Intravenous QAM AC    Or    pantoprazole (Protonix) DR tab 40 mg  40 mg Oral QAM AC    LORazepam (Ativan) 2 mg/mL injection 0.5 mg  0.5 mg Intravenous Q6H PRN    metoprolol (Lopressor) 5 mg/5mL injection 5 mg  5 mg Intravenous Q6H PRN    ipratropium-albuterol (Duoneb) 0.5-2.5 (3) MG/3ML inhalation solution 3 mL  3 mL Nebulization Q4H PRN    DULoxetine (Cymbalta) DR cap 30 mg  30 mg Oral Daily    melatonin tab 3 mg  3 mg Oral Nightly PRN    prochlorperazine (Compazine) 10 MG/2ML injection 5 mg  5 mg Intravenous Q8H PRN    polyethylene glycol (PEG 3350) (Miralax) 17 g oral packet 17 g  17 g Oral Daily PRN    sennosides (Senokot) tab 17.2 mg  17.2 mg Oral Nightly PRN    bisacodyl (Dulcolax) 10 MG rectal suppository 10 mg  10 mg Rectal Daily PRN    fleet enema (Fleet) 7-19 GM/118ML rectal enema 133 mL  1 enema Rectal Once PRN    HYDROmorphone (Dilaudid) 1 MG/ML injection 0.2 mg  0.2  mg Intravenous Q2H PRN     Medications Prior to Admission   Medication Sig    Potassium Citrate ER 15 MEQ (1620 MG) Oral Tab CR Take 1 tablet by mouth in the morning, at noon, and at bedtime.    DULoxetine 30 MG Oral Cap DR Particles Take 1 capsule (30 mg total) by mouth daily.    ondansetron (ZOFRAN) 8 MG tablet Take 1 tablet (8 mg total) by mouth as needed.    zolpidem 10 MG Oral Tab Take 1 tablet (10 mg total) by mouth nightly as needed for Sleep.    HYDROcodone-acetaminophen 5-325 MG Oral Tab Take 1 tablet by mouth every 8 (eight) hours as needed. (Patient not taking: Reported on 12/13/2023)    lidocaine-prilocaine 2.5-2.5 % External Cream APPLY SMALL AMOUNT OVER PORT PRIOR TO ACCESS    azaTHIOprine (IMURAN OR) Take by mouth. (Patient not taking: No sig reported)    Prenatal Vit-DSS-Fe Cbn-FA (PRENATAL AD OR) Take 1 tablet by mouth daily.       Assessment & Plan:   ASSESSMENT / PLAN:     Acute respiratory failure with bilateral pulmonary infiltrates transudative effusions pointing away from lupus Sjogren's    -Community acquired pneumonia, unspecified laterality    Bronchoscopy done final cultures pending    On antibiotics and followed closely by ID pulmonology     # Metastatic breast cancer s/p mastectomy s/p chemotherapy  -Patient reports bilateral peripheral neuropathy with right foot drop due to Taxol in summer 2023    This appears to be chronic consider addressing this with boot to avoid injury     #UCTD with Sjogren's features versus SLE?  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  Azathioprine stopped due to recurrent cellulitis in the  distant past.      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    -Responded to short course of prednisone in early 2023.     #Hyponatremia:  -per renal, suspect SIADH    Severe cardiomyopathy low ejection fraction followed by cardiology     Despite suspicion being low could consider trial of low-dose steroids but will hold for now because of infection and metastatic cancer  ?  Plan:  -TTE pending given transudative effusion and pulmonary edema.    -Given recurrence of significant proteinuria (UPCR 2.93), lower C3/C4, in context of biopsy-proven interstitial nephritis (1/2023 at [facility]); discussed with renal, clinical picture is not consistent with Sjogren's interstitial nephritis.     -Given lack of definitive evidence of active systemic autoimmune disease:  no role for any immunomodulatory therapy for her any extra-renal autoimmune disease at this juncture.    Defer to nephrology whether or not another renal biopsy is needed.  Patient's prognosis guarded.  Kidney function appears to be stable at this time.  Will repeat double-stranded KIMBERLY and complements continue monitor renal function    -consider outpatient EMG/NCS for further evaluation of the patient's right lower extremity foot drop.  Though Taxol may be the cause of her motor weakness, given her underlying Sjogren's/SLE, would evaluate for other etiologies.      Will continue to monitor superficially and periodically come in to see the patient.    At this time we will hold off on any immunotherapy.  Prognosis guarded     Quality:  DVT Mechanical Prophylaxis:     Early ambuation  DVT Pharmacologic Prophylaxis   Medication   None                Code Status: Prior  Peacock: External urinary catheter in place  Peacock Duration (in days):   Central line:    CHRISTELLE:     Education and counseling provided to patient on medications and diagnosis. Instructed patient to call my office or seek medical attention immediately if symptoms worsen.    Return to clinic:      Carol  MD Bryce    Supplementary Documentation:               Dietitian Malnutrition Assessment        Evaluation for Malnutrition: Criteria for non-severe malnutrition diagnosis-         acute illness/injury related to Energy intake less than 75% for greater than 7 days., Body fat mild depletion., Muscle mass mild depletion., Fluid accumulation mild.         RD Malnutrition Care Plan: Intiated ONS (oral nutritional supplements)., Ordered multivitamin., See RD nutrition assessment for additional recommendations.    Body Fat/Muscle Mass:          Physician Assessment    Patient has a diagnosis of metastatic breast cancer Sjogren syndrome

## 2023-12-19 NOTE — OPERATIVE REPORT
Bronchoscopy Procedure Report     Preop diagnosis:       abnormal CT  Postop diagnosis:      abnormal CT  Procedure performed: Bronchoscopy, Diagnostic  Bronchoalveolar lavage, BAL     Sedation used:          MAC, topical lidocaine     Description of procedure: Informed consent was obtained. Oxygen applied via facemask.  Bronchoscope was inserted via oral route.  Normal vocal cord movement was noted. Trachea appeared normal. Julianne appeared sharp.  Mucous membranes appeared friable. There were thin dark brown secretions noted.      Left and right lung were inspected to the subsegmental level.  No lesions seen     BAL of RUL completed.  100cc of sterile saline instilled, 12cc of blood tinged fluid aspirated back     Samples obtained:                   BAL of RUL     Post procedure CXR necessary? no  Follow up:       cultures     Patient tolerated the procedure well and was transferred to the recovery area. EBL was zero.

## 2023-12-19 NOTE — DIETARY MALNUTRITION NOTE
[facility]    NUTRITION ASSESSMENT    Pt meets moderate malnutrition criteria at this time.    CRITERIA FOR MALNUTRITION DIAGNOSIS:  Criteria for non-severe malnutrition diagnosis: acute illness/injury related to energy intake less than 75% for greater than 7 days, body fat mild depletion, muscle mass mild depletion, and fluid accumulation mild      NUTRITION INTERVENTION:    RD nutrition Care Plan- Initiated ONS (oral nutritional supplements), Ordered multivitamin, and See RD nutrition assessment for additional recommendations  Meal and Snacks - Monitor and encourage adequate PO intake.  Medical Food Supplements -Chocolate Ensure Plus High Protein BID. Rationale/use for oral supplements discussed.   Vitamin and Mineral Supplements - adding Multivitamin with minerals      PATIENT STATUS:   12/19- Back from bronch. Pt with dry cough.  NPO - will monitor for diet advancement. Minimal intake recorded. RD added Ensure +HP BID to support needs.  On 4L via NC. +BM 12/16. Skin intact.   12/16/23 43 year old female admit d/t pneumonia. Pt with breast cancer, undergoing chemotherapy. Pt presented d/t nausea with minimal PO intake. Pt with pleural effusion, underwent thoracentesis today with 1450mL fluid removal.   Spoke with Nephrology, Na trending up slowly, recommended 1500mL fluid restriction. Receiving 2gNaCl tablets.  Discussed with pt fluid restriction and reasoning, pt states understanding.   Pt seen d/t consult for poor PO intake. Pt agreeable to taking Chocolate Ensure BID to supplement PO intake.         ANTHROPOMETRICS:  Ht: 162.6 cm (5' 4\")  Wt: 68.3 kg (150 lb 9.2 oz).   BMI: Body mass index is 25.85 kg/m².  IBW: 54.5 kg      WEIGHT HISTORY:   Weight loss: No    Wt Readings from Last 10 Encounters:   12/19/23 68.3 kg (150 lb 9.2 oz)   11/13/23 54.4 kg (120 lb)   12/16/21 61.2 kg (135 lb)   09/22/20 61.7 kg (136 lb)   08/15/20 63.5 kg (140 lb)   11/14/15 61.7 kg (136 lb)        NUTRITION:  Diet:        Procedures    NPO      Food Allergies: No  Cultural/Ethnic/Yarsanism Preferences Addressed: Yes    Percent Meals Eaten (last 3 days)       Date/Time Percent Meals Eaten (%)    12/16/23 1246 50 %    12/18/23 0733 0 %    12/18/23 1306 0 %    12/18/23 1553 100 %    12/18/23 1554 0 %    12/18/23 1646 0 %    12/18/23 2100 5 %            GI system review: WNL Last BM: 12/16  Skin and wounds: WNL    NUTRITION RELATED PHYSICAL FINDINGS:     1. Body Fat/Muscle Mass: mild depletion body fat Orbital fat pad and Buccal fat pad and mild muscle depletion Temple region Unable to observe rest of body d/t pt falling asleep with blankets covering      2. Fluid Accumulation:  1+ edema to bilateral lower extremities  per MD    NUTRITION PRESCRIPTION: 56.2kg (12/13/23)  Calories: 8430-9141 calories/day (30-35 kcal/kg)  Protein: 67-84 grams protein/day (1.2-1.5 grams protein per kg)  Fluid: ~1 ml/kcal or per MD discretion    NUTRITION DIAGNOSIS/PROBLEM:  Malnutrition related to decreased intake as evidenced by documented/reported insufficient oral intake, documented/reported unintentional weight loss, loss of fat mass, and loss of muscle mass      MONITOR AND EVALUATE/NUTRITION GOALS:  PO intake of 75% of meals TID - New  PO intake of 75% of oral nutrition supplement/s - New  Weight stable within 1 to 2 lbs during admission - New      MEDICATIONS:  NaCl 1g TID, Kcl 40mEq, protonix, bumex, lasix    LABS:  Na 123    Pt is at High nutrition risk    Mary Cade RD, LDN  Clinical Nutrition  Pager [phone]  [phone]

## 2023-12-19 NOTE — DISCHARGE INSTRUCTIONS
Get repeat CMP/CBC/mag in 1 week      Sometimes managing your health at home requires assistance.  The [facility] team has recognized your preference to use [facility].  They can be reached by phone at [phone].  The fax number for your reference is [phone].  A representative from the home health agency will contact you or your family to schedule your first visit.    Going Home Instructions    In this section you will find the tools which will guide you through the first few days after you leave the hospital. Continued use of these tools will help you develop the skills necessary to keep your heart failure under control.       Home Care Instructions Following Heart Failure - the most important things to do every day include:     Weigh yourself  Take your medicines as prescribed  Limit your sodium (salt) and fluid intake  Know when to call your cardiologist, primary doctor, or nurse  Know when to seek emergency care    Things for You to Remember:   1. See your doctor or healthcare provider.  It is important that you attend this appointment to make sure your symptoms are under control.     2. Your recommended sodium intake is 2515-7698 mg daily    3. Limit your fluid intake to no more than 2 liters or 64 ounces per day    4. Some exercise and activity is important to help keep your heart functioning and strong. Unless instructed not to exercise, you may walk at a slow to moderate pace for 10-15 minutes 2-3 days per week to start. Pace your activity to prevent shortness of breath or fatigue. Stop exercise if you develop chest pain, lightheadedness, or significant shortness of breath.       Call Your Cardiologist If:   You gain 2 pounds overnight or 3-4 pounds in 3-5 days  You have more difficulty breathing  You are getting more tired with normal activity  You are more short of breath lying down, or awaken at night short of breath  You have swelling of your feet or legs  You urinate  less often during the day and more often at night  You have cramps in your legs  You have blurred vision or see yellowish-green halos around objects of lights    Go to the Emergency Room If:   You have pain or tightness in your chest  You are extremely short of breath  You are coughing up pink-frothy mucus  You are traveling and develop symptoms of worsening heart failure        Cholecystectomy  Dr. Dejah Min    MEDICATIONS  For post-operative pain control, the medications are usually tramadol.  This is a narcotic and is best taken by starting with one tablet and repeating every four to six hours as needed.  If the patient does not feel they need the narcotics they shouldn’t take them.  If the pain is severe the patient may take up to two pills every six hours.  The patient can take tylenol 1g three times a day and ibuprofen 400-600mg in between up to 4 times a day as needed for pain as well.  The patient can take zofran 4mg every 6 hours as needed for nausea. The patient can also take miralax or colace as needed for constipation. Please ask your surgeon before resuming blood thinners such as aspirin, Plavix or Coumadin.  All other home medications may be resumed as scheduled.    DIET  The patient may resume a general diet immediately.  This is not a good time to eat excessively.  The patient should eat in moderation and stick with foods the patient feels are easy to digest.  Avoid high fat foods in the immediate post-operative time period as this may still cause some problems.  The patient may eat anything in small amounts but foods rich in dairy products, fatty foods or fried foods may cause an upset stomach for up to six weeks after surgery.  There should be no alcohol consumption in the immediate recovery time period or within six hours of taking narcotics.    WOUND CARE  The dressing is usually a dissolvable glue which protects the wound. The patient can take a shower starting the night of surgery, but  please, no baths or soaking. Please do not put any creams or ointments on the surgical incisions. If the patient does have a top dressing with clear tape and gauze, this dressing may be removed in 2 days. Do not remove the steri-strips or butterfly tapes that are white and adherent to the skin.  The steri-strips will eventually peel up at the ends and at this point they may be removed.  This is usually seven to ten days after surgery.  When showering, soap can get on the wounds but do not scrub over the wounds.  No hair dye or chemicals of any kind should get in the wounds.  Avoid tub baths, swimming or sitting in a hot tub for two weeks.  If visible sutures or staples are present they will be removed in the office by the surgeon or nurse.  Most wounds will be closed with dissolving suture underneath the skin.  These sutures will dissolve on their own.  If a drain is present make sure the patient receives drain care instructions from the nurse prior to discharge.  Most patients will not have a drain.    ACTIVITY  Every day the patient should be up, showered and dressed.  Each day the patient should be up and around the house.  The patient should not lie in bed and should not stay in pajamas.  We count on the patient being up, coughing, walking and deep breathing to avoid pneumonia and blood clots in the legs.  Once a day the patient should get out of the house and go shopping, go to the mall, the [employer] store, the movies or a restaurant.  The patient may ride in a car but should not drive the car for at least one week.  Patients should be off narcotics for at least 8 hours prior to being a .  The average time off work is 10 to 14 days; most adults will be seeing the surgeon prior to returning to work.  Students will return to school within 1-5 days after discharge from the hospital but will be off gym, sports, and indoors for recess for one month.  Patients may go up and down stairs and lift up to five  pounds but no bending, pushing or pulling.  Nothing called work or exercise until the follow up visit.  No ‘stair-master’, power walking, jogging or workouts until the follow up visit.  Patients should seek further activity limits at the time of their appointment.    APPOINTMENT  Please call our office for an appointment within five to ten days of discharge.  Any fever greater than 100.5, chills, nausea, vomiting, or severe diarrhea please call our office.  If the wound turns red, hot, swollen, becomes increasingly painful, or drains pus call us immediately at [phone].  For life threatening emergencies call [phone].  For non-emergent care please call our office after 8:30 a.m. Monday through Friday.  The number listed above is our office number; our phone automatically switches to our answering service if we are not there.  Please do not use the emergency room for non-urgent care.    Thank you for entrusting us with your care.  [facility]--General Surgery      Durable medical equipment has been ordered for you through [employer]. Please contact the phone number below for any questions.     [employer]  P:[phone]  F:[phone]

## 2023-12-19 NOTE — PROGRESS NOTES
[facility] Infectious Disease  Progress Note    Alina Aceves Patient Status:  Inpatient    1980 MRN [id number]   Location [facility] Attending Tosha Waite MD   Hosp Day # 6 PCP HOLLY IBARRA     Subjective:  Patient seen/examined.  Clinical course reviewed in detail.  Up in bedside cart to head to bronchoscopy.  Dry cough with ongoing rales.  Some supplemental O2 at present.  No F/C.  No CP.      Objective:  Blood pressure (!) 135/104, pulse 101, temperature 97.9 °F (36.6 °C), temperature source Oral, resp. rate 18, height 5' 4\" (1.626 m), weight 150 lb 9.2 oz (68.3 kg), last menstrual period 2023, SpO2 96%, not currently breastfeeding.    Intake/Output:    Intake/Output Summary (Last 24 hours) at 2023 1037  Last data filed at 2023 0555  Gross per 24 hour   Intake 50 ml   Output 1400 ml   Net -1350 ml     Physical Exam:  General: Awake, alert, non-tox, NAD.  HEENT:  Oropharynx clear, trachea ML.  Heart: RRR S1S2 no murmurs.  Lungs: Scattered rales.  Decreased at bases.  Abdomen: Soft, NT/ND.  BS present.  No organomegaly.  Extremity: No edema.  Neurological: No focal deficits.  Derm:  Warm, dry, free from rashes.    Lab Data Review:  Lab Results   Component Value Date    CREATSERUM 1.83 2023    BUN 65 2023     2023    K 4.3 2023    CL 98 2023    CO2 23.0 2023    GLU 98 2023    CA 7.7 2023    ALB 2.3 2023    ALKPHO 336 2023    BILT 1.8 2023    TP 4.8 2023    AST 1,216 2023    ALT 1,296 2023      Cultures:  Blood cultures negative  Urine culture negative  C.diff negative  RVP negative  Strep pneumo negative  Pleural fluid negative  MRSA screen positive    Radiology:  CONCLUSION:  Interval worsening of right lung airspace opacities.  Stable to minimally improved left lung airspace opacities.       Antibiotics Reviewed:  Ceftriaxone/doxycyline x 5  days complete 12/18/23    Assessment and Plan:    Acute hypoxic respiratory failure in this patient with several medical issues  - CT with acute pulmonary edema and pleural effusions > infection presentation   - Possible atypical infection process considered  - RVP negative, cultures negative to date  - Going for bronch today  - s/p empiric ceftriaxone/doxycyline x 5 days through 12/8/23    2.  Hyponatremia with pulmonary edema  - Nephrology following    3.  H/o breast cancer  - Was on chemotherapy until 9/2023  - Immunosuppressed patient    4.  SLE  - No on any immunosuppressive Rx for this  - Some rashes noted    5.  Disposition - inpatient.  Observing off antibiotics at the moment.  Plan for bronch today and will f/u results along with you.  Supportive care ongoing.  Trending temps and WBCs.  Will follow.  D/w patient.    Sharon Archibald DO, [facility] Infectious Disease  [phone]    12/19/2023  10:37 AM

## 2023-12-20 PROBLEM — I50.20 HFREF (HEART FAILURE WITH REDUCED EJECTION FRACTION) (HCC): Status: ACTIVE | Noted: 2023-12-20

## 2023-12-20 PROBLEM — M32.8 OTHER FORMS OF SYSTEMIC LUPUS ERYTHEMATOSUS (HCC): Status: ACTIVE | Noted: 2023-12-20

## 2023-12-20 LAB
ACTIN SMOOTH MUSCLE AB: 2 UNITS
ALBUMIN SERPL-MCNC: 2.2 G/DL (ref 3.4–5)
ALBUMIN SERPL-MCNC: 2.6 G/DL (ref 3.4–5)
ALBUMIN/GLOB SERPL: 1 {RATIO} (ref 1–2)
ALBUMIN/GLOB SERPL: 1.3 {RATIO} (ref 1–2)
ALP LIVER SERPL-CCNC: 171 U/L
ALP LIVER SERPL-CCNC: 189 U/L
ALT SERPL-CCNC: 793 U/L
ALT SERPL-CCNC: 949 U/L
ANION GAP SERPL CALC-SCNC: 11 MMOL/L (ref 0–18)
ANION GAP SERPL CALC-SCNC: 13 MMOL/L (ref 0–18)
AST SERPL-CCNC: 725 U/L (ref 15–37)
AST SERPL-CCNC: 973 U/L (ref 15–37)
BILIRUB SERPL-MCNC: 2.3 MG/DL (ref 0.1–2)
BILIRUB SERPL-MCNC: 2.6 MG/DL (ref 0.1–2)
BUN BLD-MCNC: 73 MG/DL (ref 9–23)
BUN BLD-MCNC: 84 MG/DL (ref 9–23)
CALCIUM BLD-MCNC: 6.7 MG/DL (ref 8.5–10.1)
CALCIUM BLD-MCNC: 7.7 MG/DL (ref 8.5–10.1)
CHLORIDE SERPL-SCNC: 100 MMOL/L (ref 98–112)
CHLORIDE SERPL-SCNC: 101 MMOL/L (ref 98–112)
CO2 SERPL-SCNC: 21 MMOL/L (ref 21–32)
CO2 SERPL-SCNC: 25 MMOL/L (ref 21–32)
CREAT BLD-MCNC: 1.55 MG/DL
CREAT BLD-MCNC: 1.65 MG/DL
DSDNA IGG SERPL IA-ACNC: <0.6 IU/ML
EGFRCR SERPLBLD CKD-EPI 2021: 39 ML/MIN/1.73M2 (ref 60–?)
EGFRCR SERPLBLD CKD-EPI 2021: 42 ML/MIN/1.73M2 (ref 60–?)
GLOBULIN PLAS-MCNC: 2 G/DL (ref 2.8–4.4)
GLOBULIN PLAS-MCNC: 2.1 G/DL (ref 2.8–4.4)
GLUCOSE BLD-MCNC: 130 MG/DL (ref 70–99)
GLUCOSE BLD-MCNC: 165 MG/DL (ref 70–99)
INR BLD: 2.21 (ref 0.8–1.2)
M TB CMPLX RRNA SPEC QL PROBE: NOT DETECTED
M2 MITOCHONDRIAL AB: <20 UNITS
MAGNESIUM SERPL-MCNC: 2.1 MG/DL (ref 1.6–2.6)
NON GYNE INTERPRETATION: NEGATIVE
NON GYNE INTERPRETATION: NEGATIVE
OSMOLALITY SERPL CALC.SUM OF ELEC: 305 MOSM/KG (ref 275–295)
OSMOLALITY SERPL CALC.SUM OF ELEC: 309 MOSM/KG (ref 275–295)
POTASSIUM SERPL-SCNC: 2.7 MMOL/L (ref 3.5–5.1)
POTASSIUM SERPL-SCNC: 3.1 MMOL/L (ref 3.5–5.1)
POTASSIUM SERPL-SCNC: 3.2 MMOL/L (ref 3.5–5.1)
PROT SERPL-MCNC: 4.3 G/DL (ref 6.4–8.2)
PROT SERPL-MCNC: 4.6 G/DL (ref 6.4–8.2)
PROTHROMBIN TIME: 24.8 SECONDS (ref 11.6–14.8)
SODIUM SERPL-SCNC: 135 MMOL/L (ref 136–145)
SODIUM SERPL-SCNC: 136 MMOL/L (ref 136–145)

## 2023-12-20 PROCEDURE — 99233 SBSQ HOSP IP/OBS HIGH 50: CPT | Performed by: HOSPITALIST

## 2023-12-20 PROCEDURE — 99291 CRITICAL CARE FIRST HOUR: CPT | Performed by: INTERNAL MEDICINE

## 2023-12-20 RX ORDER — METHYLPREDNISOLONE SODIUM SUCCINATE 40 MG/ML
40 INJECTION, POWDER, LYOPHILIZED, FOR SOLUTION INTRAMUSCULAR; INTRAVENOUS DAILY
Status: DISCONTINUED | OUTPATIENT
Start: 2023-12-20 | End: 2023-12-25

## 2023-12-20 RX ORDER — POTASSIUM CHLORIDE 14.9 MG/ML
20 INJECTION INTRAVENOUS ONCE
Status: COMPLETED | OUTPATIENT
Start: 2023-12-20 | End: 2023-12-20

## 2023-12-20 RX ORDER — POTASSIUM CHLORIDE 1.5 G/1.58G
40 POWDER, FOR SOLUTION ORAL ONCE
Qty: 2 PACKET | Refills: 0 | Status: COMPLETED | OUTPATIENT
Start: 2023-12-20 | End: 2023-12-20

## 2023-12-20 RX ORDER — POTASSIUM CHLORIDE 20 MEQ/1
40 TABLET, EXTENDED RELEASE ORAL EVERY 4 HOURS
Status: DISCONTINUED | OUTPATIENT
Start: 2023-12-20 | End: 2023-12-20

## 2023-12-20 RX ORDER — POTASSIUM CHLORIDE 1.5 G/1.58G
40 POWDER, FOR SOLUTION ORAL EVERY 4 HOURS
Status: ACTIVE | OUTPATIENT
Start: 2023-12-20 | End: 2023-12-20

## 2023-12-20 NOTE — PROGRESS NOTES
[facility]  Report of Consultation    Alina Aceves Patient Status:  Inpatient    1980 MRN [id number]   Location [facility] 8NE-A Attending Tosha Waite MD   Hosp Day # 6 PCP HOLLY IBARRA     Reason for Consultation:   Tachycardia, palpitations    History of Present Illness:   Alina Aceves is a(n) 43 year old female with SLE and palpitations who I saw in clinic 2 weeks ago.    She has Stage III breast CA.  She has been undergoing chemotherapy, EF  was 50%. It is now 20% range.    She was admitted with SOB 23.  She was diagnosed with PNA and pleural effusions.  She also had hyponatremia with a sodium 120.  She was seen by Pulmonology, ID, Hematology, and Nephrology.    Subjective:  -No events overnight  -Lethargic  -Somewhat SOB  -Xfered to ICU for status and BiPAP  -IV diuresis initiated    Past Medical History:   Past Medical History:   Diagnosis Date    Breast cancer (HCC)     Gastritis     Lupus (HCC)     Sjogren's disease (HCC)        Social History:   Smoking:  None  Alcohol:  None    Family History:   No family history of premature arthrosclerotic heart disease     Medications:   Scheduled:    sodium chloride  1 g Oral TID CC    albumin human  25 g Intravenous Q12H    furosemide        Acetylcysteine  150 mg/kg Intravenous Once    Followed by    Acetylcysteine  50 mg/kg Intravenous Once    Followed by    Acetylcysteine  100 mg/kg Intravenous Once    [START ON 2023] phytonadione (Aqua-Mephton) 10 mg in sodium chloride 0.9% 50 mL IVPB  10 mg Intravenous Daily    metoprolol tartrate  25 mg Oral 2x Daily(Beta Blocker)    [Held by provider] furosemide  20 mg Intravenous Once    mupirocin  1 Application Nasal Q12H    potassium chloride  40 mEq Intravenous Once    pantoprazole  40 mg Intravenous QAM AC    Or    pantoprazole  40 mg Oral QAM AC    DULoxetine  30 mg Oral Daily       Continuous Infusion:    dexmedetomidine 0.2 mcg/kg/hr (23 1813)    bumetanide  (Bumex) 12.5 mg in 50 mL infusion 0.5 mg/hr (12/19/23 1712)       PRN Medications:   furosemide, dextromethorphan-guaiFENesin, ALPRAZolam, LORazepam, metoprolol, ipratropium-albuterol, melatonin, prochlorperazine, polyethylene glycol (PEG 3350), sennosides, bisacodyl, fleet enema, HYDROmorphone **OR** [DISCONTINUED] HYDROmorphone **OR** [DISCONTINUED] HYDROmorphone    Outpatient Medications:   Current Facility-Administered Medications on File Prior to Encounter   Medication Dose Route Frequency Provider Last Rate Last Admin    [COMPLETED] morphINE PF 4 MG/ML injection 4 mg  4 mg Intravenous Once Jesus Manuel Hatfield MD   4 mg at 11/13/23 2255    [COMPLETED] iopamidol 76% (ISOVUE-370) injection for power injector  100 mL Intravenous ONCE PRN Jesus Manuel Hatfield MD   100 mL at 11/13/23 2247    [COMPLETED] potassium chloride (K-Dur) tab 40 mEq  40 mEq Oral Once Jesus Manuel Hatfield MD   40 mEq at 11/14/23 0010     Current Outpatient Medications on File Prior to Encounter   Medication Sig Dispense Refill    Potassium Citrate ER 15 MEQ (1620 MG) Oral Tab CR Take 1 tablet by mouth in the morning, at noon, and at bedtime.      DULoxetine 30 MG Oral Cap DR Particles Take 1 capsule (30 mg total) by mouth daily.      ondansetron (ZOFRAN) 8 MG tablet Take 1 tablet (8 mg total) by mouth as needed.      zolpidem 10 MG Oral Tab Take 1 tablet (10 mg total) by mouth nightly as needed for Sleep.      HYDROcodone-acetaminophen 5-325 MG Oral Tab Take 1 tablet by mouth every 8 (eight) hours as needed. (Patient not taking: Reported on 12/13/2023)      lidocaine-prilocaine 2.5-2.5 % External Cream APPLY SMALL AMOUNT OVER PORT PRIOR TO ACCESS      azaTHIOprine (IMURAN OR) Take by mouth. (Patient not taking: No sig reported)      Prenatal Vit-DSS-Fe Cbn-FA (PRENATAL AD OR) Take 1 tablet by mouth daily.         Allergies:   Allergies   Allergen Reactions    Bactrim [Sulfamethoxazole W/Trimethoprim] RASH       Review of  Systems:   No fevers, chills, change in weight or bowel habits.  Ten point review of systems is otherwise negative or unremarkable.    Physical Exam:   Vitals:    12/19/23 1800   BP: 110/80   Pulse: 103   Resp: 24   Temp:      Wt Readings from Last 3 Encounters:   12/19/23 150 lb 9.2 oz (68.3 kg)   11/13/23 120 lb (54.4 kg)   12/16/21 135 lb (61.2 kg)           General: Well developed, well nourished female.  Pt is in no acute distress.  HEENT:   Normocephalic.  Atraumatic.  Eyes with no scleral icterus.  Neck: Supple.  No JVD.  Carotids 2+ and equal in symmetric fashion.  No bruits are noted.  Cardiac: Regular rate and rhythm.   There is a normal S1 and S2.  No S3 or S4.  No murmurs, rubs, or gallops.  PMI is non-displaced with a normal apical impulse.  Lungs: Clear to ascultation bilaterally.  No focal rales, rhonchi, or wheezes.  Good air movement is noted throughout all lung fields.  Abdomen: Soft.  Non-distended.  Non-tender.  Bowel sounds are present and normoactive.  No guarding or rebound.   Extremities: Extremities do not demonstrate any evidence of peripheral edema.   No cyanosis or clubbing of the digits is appreciated.  Femoral, Dorsalis Pedis, and Posterior Tibialis  pulses are 2+ and equal in a symmetric fashion.  Neurologic: Alert and oriented, normal affect.  No gross deficit appreciated.  Integument:  No visible rashes are appreciated.      Laboratories and Data:   Labs:    Recent Labs   Lab 12/13/23  1938 12/14/23  0631 12/16/23  0650 12/16/23  1137 12/17/23  1155 12/17/23  1538 12/18/23  0025 12/18/23  0616 12/19/23  0514   *   < > 127*   < > 121*  --   --  69* 98   BUN 28*   < > 35*   < > 38*  --   --  46* 65*   CREATSERUM 1.36*   < > 1.08*   < > 1.16*  --   --  1.50* 1.83*   CA 8.2*   < > 7.7*   < > 7.9*  --   --  8.1* 7.7*   ALB 3.1*  --   --   --   --   --   --   --  2.3*   *   < > 122*   < > 127*  --   --  128* 130*   K 3.9   < > 3.5  3.5   < > 3.4*   < > 4.0 4.3 4.3   CL 89*    < > 91*   < > 91*  --   --  93* 98   CO2 15.0*   < > 20.0*   < > 21.0  --   --  19.0* 23.0   ALKPHO 97  --   --   --   --   --   --   --  336*   AST 40*  --   --   --   --   --   --   --  1,216*   ALT 36  --   --   --   --   --   --   --  1,296*   BILT 0.9  --   --   --   --   --   --   --  1.8   TP 6.6  --  5.5*  --   --   --   --   --  4.8*    < > = values in this interval not displayed.       Recent Labs   Lab 12/13/23  1937 12/14/23  0631 12/17/23  0617   RBC 2.72* 2.94* 2.74*   HGB 7.9* 8.5* 7.9*   HCT 22.8* 24.6* 22.3*   MCV 83.8 83.7 81.4   MCH 29.0 28.9 28.8   MCHC 34.6 34.6 35.4   RDW 12.8 12.7 12.9   NEPRELIM 5.52 7.17 7.20   WBC 6.3 8.3 8.3   .0 188.0 157.0       Recent Labs   Lab 12/15/23  0513 12/19/23  1312 12/19/23  1728   PTP 16.0* 25.4* 26.1*   INR 1.27* 2.28* 2.36*       No results for input(s): \"TROP\", \"CK\" in the last 168 hours.    Diagnostics:   Tele: Sinus tachycardia.  EKG: Sinus tachycardia, non-specific ST/T changes  Echo: Unremarkable by 5/23 echo (EF low normal, mild MR).    12/17/23  Conclusions:     1. Left ventricle: The cavity size was normal. Wall thickness was normal.      Systolic function was markedly reduced. The estimated ejection fraction      was 20-25%, by visual assessment. There was severe diffuse hypokinesis.      Technical limitations of the study preclude regional wall motion      analysis. Unable to assess LV diastolic function due to heart rhythm.   2. Right ventricle: The cavity size was increased. Systolic function was      reduced. The RV free wall longitudinal strain was -16.50%. The tricuspid      annular plane systolic excursion (TAPSE) is 1.56cm.   3. Left atrium: The left atrial volume was moderately increased.   4. Right atrium: The atrium was moderately dilated.   5. Mitral valve: The annulus was dilated. The leaflets were non-specifically      thickened. There was moderate regurgitation, with multiple jets.   6. Tricuspid valve: The annulus is dilated.  There was severe regurgitation.   7. Pulmonary arteries: Systolic pressure was moderately to markedly      increased, at least 55mm Hg. Systolic pressure may be underestimated due      to wide tricuspid regurgitation. Estimated pulmonary artery diastolic      pressure was 34mm Hg.   8. Pericardium, extracardiac: A trivial pericardial effusion was identified.      There was a left pleural effusion.           Assessment:  1. New onset severe cardiomyopathy  2. PNA  3. Hypoxic resp failure  4. Sinus tachycaria  5. Palpitations  6. Breast CA, stage IIIb  7. Hyponatremia      Plan:  Cardiomyopathy: Needs RHC and left if possible.  If Cr up more tomorrow, will do RHC to establish filling pressures.  Presumably they are high although volume status can be deceiving.  IV diuresis overnight. CM could be due to severe, current illness (stress cardiomyopathy).  However reasonable to presume it's due to chemotherapy.  Her EF was mildly depressed 5/23.  Needs GDMT but will be difficult in the setting of systemic illness.  Will start with BB given tachycardia.  BP tolerable.    Elevated LFTS: Likely hepatic congestion.  Diuresis.  Primary team.  Tachycardia: ST.  Likely secondary to infection and medical illness.  Can do metoprolol 25mg BID given BP.  Echo.  Resp/PNA: ID, pulm.  Will check CXR,. Would gently diurese if ok with renal given Cr and Na.  Hyponatremia: Improved.  Renal.  BP: BB as above.  Metastatic breast CA: Per primary and oncology.    Leonel Villalba MD  12/18/2023  11:42 AM

## 2023-12-20 NOTE — PLAN OF CARE
Assumed pt care this evening. Rec'd on Bi-PAP w/ bumex & precedex gtts infusing. Precedex titrated off. Tolerating Bi-PAP well overnight. Transitioned to 5L 02 per NC @ 0300. No c/o dyspnea. Maintaining 02 saturations. Diuresing well through sinha. Pt updated on POC.    Problem: PAIN - ADULT  Goal: Verbalizes/displays adequate comfort level or patient's stated pain goal  Description: INTERVENTIONS:  - Encourage pt to monitor pain and request assistance  - Assess pain using appropriate pain scale  - Administer analgesics based on type and severity of pain and evaluate response  - Implement non-pharmacological measures as appropriate and evaluate response  - Consider cultural and social influences on pain and pain management  - Manage/alleviate anxiety  - Utilize distraction and/or relaxation techniques  - Monitor for opioid side effects  - Notify MD/LIP if interventions unsuccessful or patient reports new pain  - Anticipate increased pain with activity and pre-medicate as appropriate  Outcome: Progressing     Problem: SAFETY ADULT - FALL  Goal: Free from fall injury  Description: INTERVENTIONS:  - Assess pt frequently for physical needs  - Identify cognitive and physical deficits and behaviors that affect risk of falls.  - [place] fall precautions as indicated by assessment.  - Educate pt/family on patient safety including physical limitations  - Instruct pt to call for assistance with activity based on assessment  - Modify environment to reduce risk of injury  - Provide assistive devices as appropriate  - Consider OT/PT consult to assist with strengthening/mobility  - Encourage toileting schedule  Outcome: Progressing     Problem: RESPIRATORY - ADULT  Goal: Achieves optimal ventilation and oxygenation  Description: INTERVENTIONS:  - Assess for changes in respiratory status  - Assess for changes in mentation and behavior  - Position to facilitate oxygenation and minimize respiratory effort  - Oxygen supplementation  based on oxygen saturation or ABGs  - Provide Smoking Cessation handout, if applicable  - Encourage broncho-pulmonary hygiene including cough, deep breathe, Incentive Spirometry  - Assess the need for suctioning and perform as needed  - Assess and instruct to report SOB or any respiratory difficulty  - Respiratory Therapy support as indicated  - Manage/alleviate anxiety  - Monitor for signs/symptoms of CO2 retention  Outcome: Progressing     Problem: METABOLIC/FLUID AND ELECTROLYTES - ADULT  Goal: Electrolytes maintained within normal limits  Description: INTERVENTIONS:  - Monitor labs and rhythm and assess patient for signs and symptoms of electrolyte imbalances  - Administer electrolyte replacement as ordered  - Monitor response to electrolyte replacements, including rhythm and repeat lab results as appropriate  - Fluid restriction as ordered  - Instruct patient on fluid and nutrition restrictions as appropriate  Outcome: Progressing  Goal: Hemodynamic stability and optimal renal function maintained  Description: INTERVENTIONS:  - Monitor labs and assess for signs and symptoms of volume excess or deficit  - Monitor intake, output and patient weight  - Monitor urine specific gravity, serum osmolarity and serum sodium as indicated or ordered  - Monitor response to interventions for patient's volume status, including labs, urine output, blood pressure (other measures as available)  - Encourage oral intake as appropriate  - Instruct patient on fluid and nutrition restrictions as appropriate  Outcome: Progressing

## 2023-12-20 NOTE — PROGRESS NOTES
[facility] Infectious Disease  Progress Note    Alina Aceves Patient Status:  Inpatient    1980 MRN [id number]   Location [facility] 6NE-A Attending Noe Perkins MD   Hosp Day # 7 PCP HOLLY IBARRA     Subjective:  Patient seen and examined sitting up in bed. S/p bronch and BAL on  with blood tinged fluid removed. Reports feeling \"a little better\" today. Denies F/C. Denies cough or SOB.      Objective:  Blood pressure 123/87, pulse 96, temperature 98.2 °F (36.8 °C), temperature source Temporal, resp. rate 21, height 5' 4\" (1.626 m), weight 150 lb 9.2 oz (68.3 kg), last menstrual period 2023, SpO2 99%, not currently breastfeeding.    Intake/Output:    Intake/Output Summary (Last 24 hours) at 2023 0859  Last data filed at 2023 0700  Gross per 24 hour   Intake 1288.5 ml   Output 965 ml   Net 323.5 ml       Physical Exam:  General: Awake, alert, non-tox, NAD.  HEENT:  Oropharynx clear, trachea ML.  Heart: RRR S1S2 no murmurs.  Lungs: Essentially CTA b/l, no rhonchi, rales, wheezes. Bases diminished bilaterally.  Abdomen: Soft, NT/ND.  BS present.  No guarding or rebound tenderness.  Extremity: +trace BLE edema.  Neurological: No focal deficits.  Derm:  Warm and dry.    Lab Data Review:  Lab Results   Component Value Date    WBC 12.8 2023    HGB 10.2 2023    HCT 30.0 2023    PLT 98.0 2023    CREATSERUM 1.65 2023    BUN 73 2023     2023    K 3.2 2023     2023    CO2 21.0 2023     2023    CA 6.7 2023    ALB 2.2 2023    ALKPHO 171 2023    BILT 2.3 2023    TP 4.3 2023     2023     2023    INR 2.21 2023    MG 2.1 2023        Cultures:  Hospital Encounter on 23   1. Body Fluid Cult Aerobic and Anaerobic     Status: None (Preliminary result)    Collection Time: 12/15/23  3:26 PM    Specimen: Pleural  Fluid, Right; Body fluid, unspecified   Result Value Ref Range    Body Fluid Culture Result No Growth 4 Days N/A    Body Fld Smear 4+ Neutrophils seen N/A    Body Fld Smear No organisms seen N/A    Body Fld Smear This is a cytocentrifuged smear. N/A   2. Urine Culture, Routine     Status: None    Collection Time: 12/14/23  3:58 AM    Specimen: Urine, clean catch   Result Value Ref Range    Urine Culture No Growth at 18-24 hrs. N/A   3. Blood Culture     Status: None    Collection Time: 12/13/23  8:10 PM    Specimen: Blood,peripheral   Result Value Ref Range    Blood Culture Result No Growth 5 Days N/A       Radiology:  US ABDOMEN COMPLETE (CPT=76700)    Result Date: 12/19/2023  CONCLUSION:  1. Cholelithiasis with mild gallbladder wall thickening and small amount of pericholecystic fluid.  Findings may represent acute cholecystitis.  Correlation with clinical symptoms is recommended.  2. Bilateral pleural effusions   LOCATION:  [facility]    Dictated by (CST): Sofi Laguna MD on 12/19/2023 at 7:37 PM     Finalized by (CST): Sofi Laguna MD on 12/19/2023 at 7:40 PM       XR CHEST AP PORTABLE  (CPT=71045)    Result Date: 12/19/2023  CONCLUSION:  Extensive airspace infiltrates bilaterally with interval worsening at the right base compared to the previous exam.  Differential considerations include pneumonia, pulmonary hemorrhage, pulmonary edema and ARDS.   LOCATION:  [facility]      Dictated by (CST): Juan Fernandez MD on 12/19/2023 at 4:39 PM     Finalized by (CST): Juan Fernandez MD on 12/19/2023 at 4:41 PM       XR CHEST AP PORTABLE  (CPT=71045)    Result Date: 12/18/2023  CONCLUSION:  Interval worsening of right lung airspace opacities.  Stable to minimally improved left lung airspace opacities.   LOCATION:  [facility]      Dictated by (CST): Moe Guzman MD on 12/18/2023 at 12:00 PM     Finalized by (CST): Moe Guzman MD on 12/18/2023 at 12:01 PM          Assessment and Plan:  1.  Acute hypoxic respiratory  failure  - CT c/w acute pulmonary edema with pleural effusions rather than pneumonia.  - Concern for possible atypical infection.   - Pleural fluid culture, 12/15, negative to date.  - Cytology negative for malignancy, 12/16.  - RVP negative.  - S/p bronch and BAL, 12/19.  - Cultures and cytology pending.  - Bronchial wash with alveolar macrophages, reactive bronchial cells and acute inflammation.   - Pulmonology following.   - S/p 5-day course of IV Rocephin + PO doxycycline, EOT 12/18/23    2.  Hyponatremia with pulmonary edema  - Nephrology following.     3.  H/o breast cancer  - Was on chemotherapy until September 2023.  - Immunosuppressed.  - Heme/Onc following    4.  H/o SLE  - Not on any immunosuppression.    5.  Elevated LFTs  - CMV PCR in process.  - Ab US with possible acute cholecystitis.  - LFT trend improving.   - As per GI    5.  Recs  - Will continue to observe off antibiotic therapy at this time.   - Further w/u for possible cholecystitis as per GI. If it is felt this is d/t an active process we re-start abx therapy.   - F/u WBC and fever curve.  - F/u pending cultures and serologies.  - Supportive care per primary team.   - Discussed plan of care with nursing.   - Discussed plan of care with patient. All questions addressed and understanding verbalized.  - Further recommendations pending clinical course.     Discussed case with ID attending/collaborating physician, Dr. Sharon Archibald, who is in agreement with the above plan of care.    Discussed with pulmonology, Dr. Beto Lai.     Please note that this report has been produced using speech recognition software and may contain errors related to that system including, but not limited to, errors in grammar, punctuation, and spelling, as well as words and phrases that possibly may have been recognized inappropriately.  If there are any questions or concerns, contact the dictating provider for clarification.     The 21st Century Cures Act makes medical  notes like these available to patients in the interest of transparency. Please be advised this is a medical document. Medical documents are intended to carry relevant information, facts as evident, and the clinical opinion of the practitioner. The medical note is intended as peer to peer communication and may appear blunt or direct. It is written in medical language and may contain abbreviations or verbiage that are unfamiliar.     If you have any questions or concerns please call [facility] Infectious Disease at [phone].     CHAD Marshall    12/20/2023  8:58 AM

## 2023-12-20 NOTE — PAYOR COMM NOTE
--------------   CONTINUED STAY REVIEW    Payor: [employer]  Subscriber #:  [id number]  Authorization Number: [id number]    REMAINS IN ICU        PULM:    S: Feeling OK. A little better than yesterday. Bronched yesterday.     Scheduled Medications:      potassium chloride  40 mEq Oral Q4H    albumin human  25 g Intravenous Q12H    Acetylcysteine  100 mg/kg Intravenous Once    phytonadione (Aqua-Mephton) 10 mg in sodium chloride 0.9% 50 mL IVPB  10 mg Intravenous Daily    metoprolol tartrate  25 mg Oral 2x Daily(Beta Blocker)    [Held by provider] furosemide  20 mg Intravenous Once    potassium chloride  40 mEq Intravenous Once    pantoprazole  40 mg Intravenous QAM AC     Or    pantoprazole  40 mg Oral QAM AC     Infusing Medications:      dexmedetomidine Stopped (23)    bumetanide (Bumex) 12.5 mg in 50 mL infusion 0.5 mg/hr (23)     OBJECTIVE:  /87   Pulse 96   Temp 98.2 °F (36.8 °C) (Temporal)   Resp 21   Ht 162.6 cm (5' 4\")   Wt 150 lb 9.2 oz (68.3 kg)   LMP 2023 (Approximate)   SpO2 99%   Breastfeeding No   BMI 25.85 kg/m²    Temp (24hrs), Av.7 °F (37.1 °C), Min:97.4 °F (36.3 °C), Max:100.3 °F(37.9 °C)    FiO2 (%):  [35 %] 35 %      O2: 3 LNC  General: NAD.   Neuro: Alert, no focal deficits.    HEENT: PERRL  Neck : No LAD  CV: RRR, nl S1, S2, no S4, S3 or murmur.   Lungs: Coarse bilaterally.   Abd: Nontender, non distended.   Ext: No edema.   Skin: No rashes.      Lab 23  0514 23  1728 23  0331   GLU 98 77 165*   BUN 65* 74* 73*   CREATSERUM 1.83* 1.84* 1.65*   CA 7.7* 7.8* 6.7*   * 133* 135*   K 4.3 3.9 3.2*   CL 98 98 101   CO2 23.0 22.0 21.0   AST 1,216* 946* 725*   ALT 1,296* 1,093* 793*   ALB 2.3* 2.7* 2.2*      Lab 23  1312 23  1728 23  0800   INR 2.28* 2.36* 2.21*       Chest images personally reviewed.   Abd ultrasound :  1. Cholelithiasis with mild gallbladder wall thickening and small amount of  pericholecystic fluid.  Findings may represent acute cholecystitis.  Correlation with clinical symptoms is recommended.    2. Bilateral pleural effusions       Bronch 12/19:  All Micro is pending.      Assessment/Plan   Acute hypoxemic respiratory failure - secondary to pneumonia vs pneumonitis as well as systolic heart failure with bilateral pleural effusions.  -continue supplemental oxygen, on 2L this AM   -echo with EF: %  PASP: 55mmHg  Pneumonia - viral respiratory panel was negative. PCT was negative, but cannot r/o atypical pathogens  -continue empiric antibiotics : ceftriaxone, doxycycline (12/13-   -follow up cultures  -strep and legionella urine ag- negative  - s/p bronch 12/19 cultures and cytology pending.   Systolic heart failure: EF: 20-25%  -cards eval appreciated  Transaminitis: suspect shock liver vs congestive hepatopathy  -lactate slightly elevated 12/19.   -trend LFTs  -GI consulted and following.   -abdominal US with cholecystitis. Per GI.   Pleural effusions - bilateral, transudative  -L side- 700 removed on 12/15  -R side- 1400 removed on 12/16  -await culture  - Cytology is negative.   SLE  -per hospitalist and rheumatology  -check ANCA  - Analgesia per rheum.   Hyponatremia, non-anion gap acidosis  -renal following  Breast cancer  -per heme/onc  Proph   -lmwh  Dispo   -full code  -inpatient   -will follow      HOSPITALIST:      Assessment & Plan:   # Multifocal pneumonia with bilateral large bilateral pleural effusion with tachycardia, tachypnea, also rule out malignancy due to patient's locally advanced stage IIIb breast cancer  -s/p bronch; studies pending  -s/p rocephin/doxy; off abx now     #new onset cardiomyopathy-bumex drip; EF 20-25%, severe diffuse hypokinesis     #bilateral pleural effusion due to cardiomyopathy and acute systolic heart failure                 Status post left thoracentesis on 12/15/2023 and right thoracentesis on 12/16/2023, pulmonary following                  MRSA nares came back positive, s/p Bactroban application twice daily for 5 days.                     # Acute hypoxic respiratory failure due to above-off abx; ongoing bumex                   # Locally advanced stage IIIb breast cancer status post outpatient neoadjuvant chemo and history of left breast lumpectomy and left lymph node resection on 11/16/2023     # Hyponatremia due to pneumonia and also hypovolemic due to nausea vomiting and diarrhea, siadh, ssri (off ssri now)  -per nephrology     # Hypokalemia-per protocol     # Metabolic acidosis, acute kidney injury; hx interstitial nephritis; might need repeat biopsy at some point; continue bumex                   # History of lupus, history of Sjogren's disease  -Takes azathioprine at home which was held at this time due to multifocal pneumonia     #elevated LFTs, elevated INR; clinically doubt cholecystitis; IV NAC per GI; PPI; possibly from CHF hepatic congestion; s/p vit K     # Gastritis  # Nausea vomiting and diarrhea at home possibly due to effect of chemo  # Anxiety  -Will give IV PPI     # Anemia and thrombocytopenia due to malignancy and chemo;                  Follow CBC     # Small left apical pneumothorax on 12/15/2023 status post left thoracentesis  -Pulmonary following, on conservative management, repeat chest x-ray done on 12/15/2023 showed left pneumothorax resolved     # Moderate malnutrition  -Dietitian following      RHEUM:    ASSESSMENT / PLAN:   Acute respiratory failure with bilateral pulmonary infiltrates transudative effusions pointing away from lupus Sjogren's      -Community acquired pneumonia, unspecified laterality     Bronchoscopy done final cultures pending     On antibiotics and followed closely by ID pulmonology     # Metastatic breast cancer s/p mastectomy s/p chemotherapy  -Patient reports bilateral peripheral neuropathy with right foot drop due to Taxol in summer 2023     This appears to be chronic consider addressing this with  boot to avoid injury     #UCTD with Sjogren's features versus SLE?  Consider malar rash Raynaud's see media     Lower complements worsening thrombocytopenia double-stranded KIMBERLY normal     Slight worsening renal function.  Likely multifactorial     The setting being off Imuran and hydroxychloroquine in the past consider low-dose steroids since patient's prognosis is guarded and to cover any sort of mild immune mediated etiology for her symptoms as a trial.  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  Azathioprine stopped due to recurrent cellulitis in the distant past.      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     -Responded to short course of prednisone in early 2023.     #Hyponatremia:  -per renal, suspect SIADH     Severe cardiomyopathy low ejection fraction followed by cardiology      Despite suspicion being low could consider trial of low-dose steroids but will hold for now because of infection and metastatic cancer     Abdominal pain ultrasound concerns for acute cholecystitis abnormal LFTs.  Defer to primary service GI to consider surgery consult.  Patient continues to have pain;   ?  Plan:  -TTE pending given transudative effusion and pulmonary edema.     -Given recurrence of significant proteinuria (UPCR 2.93), lower C3/C4, in context of biopsy-proven interstitial nephritis (1/2023 at [facility]); discussed with renal, clinical picture is not consistent with Sjogren's interstitial nephritis.     -Given lack of definitive evidence of active systemic autoimmune disease:  no role for any immunomodulatory therapy for her any extra-renal  autoimmune disease at this juncture.     Defer to nephrology whether or not another renal biopsy is needed.  Patient's prognosis guarded.  Kidney function appears to be stable at this time.  Complements are lower but likely multifactorial.  Double-stranded KIMBERLY normal     Consider low-dose methylprednisolone 40 mg IV daily to see if this will help in any of her symptoms to cover any low-grade immune mediated etiology; risk versus benefits discussed.     Risk of steroids discussed patient was okay with this plan.  As long as the other consultants are okay with this trial     Despite this not likely helping I am okay with trying this to see if it helps in any way over the next week     Significant abnormal LFTs seen by GI ultrasound shows concerns of acute cholecystitis patient does have GI symptoms and pain but they are stating likely unrelated     -consider outpatient EMG/NCS for further evaluation of the patient's right lower extremity foot drop.  Though Taxol may be the cause of her motor weakness, given her underlying Sjogren's/SLE, would evaluate for other etiologies.      Will continue to monitor superficially and periodically come in to see the patient.     MEDICATIONS ADMINISTERED IN LAST 1 DAY:  acetylcysteine (Acetadote) 10.2 g in dextrose 5% 200 mL infusion (Loading Dose)       Date Action Dose Route User    12/19/2023 1948 New Bag 10.2 g Intravenous Dago Porter RN          acetylcysteine (Acetadote) 3.4 g in dextrose 5% 500 mL infusion (Second Dose)       Date Action Dose Route User    12/19/2023 2056 New Bag 3.4 g Intravenous Dago Porter RN          acetylcysteine (Acetadote) 6.8 g in dextrose 5% 1,000 mL infusion (Third Dose)       Date Action Dose Route User    12/20/2023 0101 New Bag 6.8 g Intravenous Dago Porter RN          albumin human (Albuminar) 25% injection 25 g       Date Action Dose Route User    12/20/2023 1553 New Bag 25 g Intravenous Kitty Angel RN    12/20/2023 0155  New Bag 25 g Intravenous Dago Porter RN          bumetanide (Bumex) 12.5 mg in 50 mL infusion       Date Action Dose Route User    12/20/2023 0835 New Bag 0.5 mg/hr Intravenous Santiago Young RN    12/20/2023 0400 Rate/Dose Verify 0.5 mg/hr Intravenous Dago Porter RN    12/20/2023 0000 Rate/Dose Verify 0.5 mg/hr Intravenous Dago Porter RN    12/19/2023 2000 Rate/Dose Verify 0.5 mg/hr Intravenous Dago Porter RN    12/19/2023 1712 New Bag 0.5 mg/hr Intravenous Sandip Benjamin RN          dexmedeTOMIDine in sodium chloride 0.9% (Precedex) 400 mcg/100mL infusion premix       Date Action Dose Route User    12/19/2023 1732 Rate/Dose Change 0.2 mcg/kg/hr × 56.2 kg (Dosing Weight) Intravenous Sandip Benjamin RN    12/19/2023 1718 Rate/Dose Change 0.4 mcg/kg/hr × 56.2 kg (Dosing Weight) Intravenous Sandip Benjamin RN          HYDROmorphone (Dilaudid) 1 MG/ML injection 0.2 mg       Date Action Dose Route User    12/20/2023 1054 Given 0.2 mg Intravenous Kitty Angel RN          metoprolol tartrate (Lopressor) tab 25 mg       Date Action Dose Route User    12/20/2023 0556 Given 25 mg Oral Dago Porter RN          pantoprazole (Protonix) DR tab 40 mg       Date Action Dose Route User    12/20/2023 0556 Given 40 mg Oral Dago Porter RN          phytonadione (Aqua-Mephton) 10 mg in sodium chloride 0.9% 50 mL IVPB       Date Action Dose Route User    12/19/2023 1715 New Bag 10 mg Intravenous Sandip Benjamin RN          phytonadione (Aqua-Mephton) 10 mg in sodium chloride 0.9% 50 mL IVPB       Date Action Dose Route User    12/20/2023 0835 New Bag 10 mg Intravenous Santiago Young RN          potassium chloride (Klor-Con) 20 MEQ oral powder 40 mEq       Date Action Dose Route User    12/20/2023 0651 Given 40 mEq Oral Dago Porter, JULIETTE          potassium chloride (Klor-Con) 20 MEQ oral powder 40 mEq       Date Action Dose Route User    12/20/2023 1156 Given 40 mEq Oral Kitty Angel, RN           potassium chloride 40 mEq in 250mL sodium chloride 0.9% IVPB premix       Date Action Dose Route User    12/20/2023 1632 New Bag 40 mEq Intravenous Kitty Angel, RN            Vitals (last day)       Date/Time Temp Pulse Resp BP SpO2 Weight O2 Device O2 Flow Rate (L/min) [facility]    12/20/23 1400 -- 106 24 138/93 98 % -- -- -- MT    12/20/23 1300 -- 103 22 134/90 96 % -- -- -- MT    12/20/23 1200 97.7 °F (36.5 °C) 107 19 134/89 96 % -- Nasal cannula 5 L/min MT    12/20/23 1100 -- 102 22 124/85 96 % -- -- -- MT    12/20/23 1054 -- 103 21 124/84 97 % -- -- -- MT    12/20/23 1000 -- 106 24 118/85 100 % -- Nasal cannula 5 L/min MT    12/20/23 1000 98.1 °F (36.7 °C) -- -- -- -- -- -- -- FABRIZIO    12/20/23 0900 -- 107 26 117/81 98 % -- -- -- PS    12/20/23 0800 98.4 °F (36.9 °C) 99 23 118/80 98 % -- Nasal cannula 5 L/min PS    12/20/23 0700 -- 96 21 123/87 99 % -- -- -- TT    12/20/23 0600 -- 103 20 130/82 99 % -- -- -- TT    12/20/23 0500 -- 107 23 131/92 93 % -- -- -- TT    12/20/23 0400 98.2 °F (36.8 °C) 101 22 125/83 100 % -- Nasal cannula 3 L/min TT    12/20/23 0300 -- 101 22 124/79 94 % -- Nasal cannula 5 L/min TT    12/20/23 0200 -- 98 26 125/89 100 % -- -- -- TT    12/20/23 0100 -- 94 21 119/84 100 % -- Bi-PAP -- TT    12/20/23 0039 -- 92 23 -- 100 % -- Bi-PAP -- TK    12/20/23 0000 -- 100 24 115/84 100 % -- -- -- TT    12/19/23 2300 98 °F (36.7 °C) 93 24 111/74 100 % -- Bi-PAP -- TT    12/19/23 2200 -- 95 23 103/76 100 % -- -- -- TT    12/19/23 2100 -- 101 19 116/92 100 % -- -- -- TT

## 2023-12-20 NOTE — PROGRESS NOTES
[facility] Pul[facility], Critical Care and Sleep    Alina Aceves Patient Status:  Inpatient    1980 MRN [id number]   [facility] 6NE-A Attending Noe Perkins MD   Hosp Day # 7 PCP [facility]     Date of Admission: 2023  7:15 PM    Admission Diagnosis: Hyponatremia [E87.1]  Weakness generalized [R53.1]  Anemia, unspecified type [D64.9]  Community acquired pneumonia, unspecified laterality [J18.9]    S: Feeling OK. A little better than yesterday. Bronched yesterday.     Scheduled Medications:     potassium chloride  40 mEq Oral Q4H    albumin human  25 g Intravenous Q12H    Acetylcysteine  100 mg/kg Intravenous Once    phytonadione (Aqua-[place]ep[place]) 10 mg in sodium chloride 0.9% 50 mL IVPB  10 mg Intravenous Daily    metoprolol tartrate  25 mg Oral 2x Daily(Beta Blocker)    [Held by provider] furosemide  20 mg Intravenous Once    potassium chloride  40 mEq Intravenous Once    pantoprazole  40 mg Intravenous QAM AC    Or    pantoprazole  40 mg Oral QAM AC       Infusing Medications:     dexmedetomidine Stopped (23)    bumetanide (Bumex) 12.5 mg in 50 mL infusion 0.5 mg/hr (23 0835)       PRN Medications:  dextromethorphan-guaiFENesin, ALPRAZolam, LORazepam, metoprolol, ipratropium-albuterol, melatonin, prochlorperazine, polyethylene glycol (PEG 3350), sennosides, bisacodyl, fleet enema, HYDROmorphone **OR** [DISCONTINUED] HYDROmorphone **OR** [DISCONTINUED] HYDROmorphone    OBJECTIVE:  /87   Pulse 96   Temp 98.2 °F (36.8 °C) (Temporal)   Resp 21   Ht 162.6 cm (5' 4\")   Wt 150 lb 9.2 oz (68.3 kg)   LMP 2023 (Approximate)   SpO2 99%   Breastfeeding No   BMI 25.85 kg/m²    Temp (24hrs), Av.7 °F (37.1 °C), Min:97.4 °F (36.3 °C), Max:100.3 °F (37.9 °C)      FiO2 (%):  [35 %] 35 %      Wt Readings from Last 3 Encounters:   23 150 lb 9.2 oz (68.3 kg)   23 120 lb (54.4 kg)   21 135 lb (61.2 kg)       I/O last 3 completed shifts:  In: 141.5  [P.O.:50; I.V.:41.5; IV PIGGYBACK:50]  Out: 965 [Urine:965]  I/O this shift:  In: 1197 [I.V.:26; IV PIGGYBACK:1171]  Out: -      O2: 3 LNC  General: NAD.   Neuro: Alert, no focal deficits.    HEENT: PERRL  Neck : No LAD  CV: RRR, nl S1, S2, no S4, S3 or murmur.   Lungs: Coarse bilaterally.   Abd: Nontender, non distended.   Ext: No edema.   Skin: No rashes.     Recent Labs   Lab 12/14/23  0631 12/17/23  0617 12/19/23  1728   WBC 8.3 8.3 12.8*   HGB 8.5* 7.9* 10.2*   HCT 24.6* 22.3* 30.0*   .0 157.0 98.0*     Recent Labs   Lab 12/19/23  0514 12/19/23  1728 12/20/23  0331   GLU 98 77 165*   BUN 65* 74* 73*   CREATSERUM 1.83* 1.84* 1.65*   CA 7.7* 7.8* 6.7*   * 133* 135*   K 4.3 3.9 3.2*   CL 98 98 101   CO2 23.0 22.0 21.0   AST 1,216* 946* 725*   ALT 1,296* 1,093* 793*   ALB 2.3* 2.7* 2.2*     Recent Labs   Lab 12/19/23  1312 12/19/23  1728 12/20/23  0800   INR 2.28* 2.36* 2.21*     Recent Labs   Lab 12/19/23  1618   ABGPHT 7.38   PTHHRJ6Z 35   UNUJN5Z 78*   ABGHCO3 21.8   SITE Left Radial   DEV Bi-PAP   THGB 10.7*       COVID-19 Lab Results    COVID-19  Lab Results   Component Value Date    COVID19 Not Detected 12/14/2023    COVID19 Detected (A) 05/27/2022    COVID19 Not Detected 12/16/2021       Pro-Calcitonin  Recent Labs   Lab 12/13/23  1938   PCT 0.13       Cardiac  Recent Labs   Lab 12/17/23  2030   PBNP 75,229*       Creatinine Kinase  No results for input(s): \"CK\" in the last 168 hours.    Inflammatory Markers  Recent Labs   Lab 12/14/23  0631 12/16/23  0650   CRP 7.96*  --    LDH  --  469*       Imaging: I have independently visualized all relevant chest imaging in PACS.  I agree with the radiology interpretation except where noted.    Chest images personally reviewed.   Abd ultrasound 12/19:  1. Cholelithiasis with mild gallbladder wall thickening and small amount of pericholecystic fluid.  Findings may represent acute cholecystitis.  Correlation with clinical symptoms is recommended.    2.  Bilateral pleural effusions       Bronch 12/19:  All Micro is pending.       Assessment/Plan   Acute hypoxemic respiratory failure - secondary to pneumonia vs pneumonitis as well as systolic heart failure with bilateral pleural effusions.  -continue supplemental oxygen, on 2L this AM   -echo with EF: %  PASP: 55mmHg  Pneumonia - viral respiratory panel was negative. PCT was negative, but cannot r/o atypical pathogens  -continue empiric antibiotics : ceftriaxone, doxycycline (12/13-   -follow up cultures  -strep and legionella urine ag- negative  - s/p bronch 12/19 cultures and cytology pending.   Systolic heart failure: EF: 20-25%  -cards eval appreciated  Transaminitis: suspect shock liver vs congestive hepatopathy  -lactate slightly elevated 12/19.   -trend LFTs  -GI consulted and following.   -abdominal US with cholecystitis. Per GI.   Pleural effusions - bilateral, transudative  -L side- 700 removed on 12/15  -R side- 1400 removed on 12/16  -await culture  - Cytology is negative.   SLE  -per hospitalist and rheumatology  -check ANCA  - Analgesia per rheum.   Hyponatremia, non-anion gap acidosis  -renal following  Breast cancer  -per heme/onc  Proph   -lmwh  Dispo   -full code  -inpatient   -will follow  Critical Care Time greater than: 35 minutes    My best regards,         Beto Lai MD  [facility], Critical Care [facility] Sleep Medicine

## 2023-12-20 NOTE — PHYSICAL THERAPY NOTE
IP PT attempted, pt eating lunch, requesting to finish eating. Pt more alert than previous attempts. Will re-attempt as schedule permits.

## 2023-12-20 NOTE — PROGRESS NOTES
[facility]     Progress Note     Alina Aceves Patient Status:  Inpatient    1980 MRN [id number]   Location [facility] 4SW-A Attending Noe Perkins MD   Hosp Day # 7 PCP HOLLY IBARRA     Chief Complaint:     Subjective:     S: Patient feels a little better has having some mild pain and swelling lower extremities is sitting up eating food    Continues to have some abdominal pain continue to have abnormal LFTs    Review of Systems:     Constitutional: Negative for activity change, chills, diaphoresis, +fatigue, no fever and unexpected weight change.     HENT: Negative for congestion, hearing loss and mouth sores.     Eyes: Negative for pain, redness and visual disturbance.     Respiratory: Negative for apnea, cough, chest tightness, shortness of breath, wheezing Cardiovascular: Negative for chest pain, palpitations and leg swelling.     Gastrointestinal: Negative for abdominal distention, abdominal pain, blood in stool, constipation, diarrhea and nausea.     Endo: Negative.     Genitourinary: Negative for decreased urine volume, difficulty urinating, dysuria, and frequency.      Musculoskeletal: + arthralgias, gait problem and joint swelling.     Skin: Negative. Negative for color change, pallor and rash. ? raynauds or digital ulcerations.     Allergic/Immunologic: Negative.     Neurological: Negative. Negative for dizziness, tremors, seizures, syncope,  speech difficulty, +weakness right lower extremity, no light-headedness, numbness and headaches.     Hematological: Does not bruise/bleed easily.     Psychiatric/Behavioral: Negative depression, confusion, decreased concentration, self-injury, +sleep disturbance and suicidal ideas. The patient is not nervous/anxious.         Objective:   Vital signs:  Temp:  [97.7 °F (36.5 °C)-100.3 °F (37.9 °C)] 97.7 °F (36.5 °C)  Pulse:  [] 106  Resp:  [19-39] 24  BP: (103-147)/() 138/93  SpO2:  [93 %-100 %] 98 %  FiO2 (%):  [35 %] 35 %    Wt  Readings from Last 3 Encounters:   12/19/23 150 lb 9.2 oz (68.3 kg)   11/13/23 120 lb (54.4 kg)   12/16/21 135 lb (61.2 kg)       Intake/Output:    Intake/Output Summary (Last 24 hours) at 12/20/2023 1551  Last data filed at 12/20/2023 1200  Gross per 24 hour   Intake 1258.5 ml   Output 2140 ml   Net -881.5 ml         Physical Exam:    Constitutional: Is oriented to person, place, and time. No distress.     HEENT: Normocephalic, no jvd; no lad; EOMI; good oral pooling; no oral or nasal ulcers.     Eyes: Conjunctivae and EOM are normal. Pupils are equal, round, and reactive to light.     Neck: Normal range of motion. No thyromegaly present.     Cardiovascular: RRR, no murmurs.     Lungs: Scattered crackles decreased breath sounds at the bases     Abdominal: Soft. Nontender; nondistended; BS+.     Musculoskeletal:         Right shoulder: Exhibits normal range of motion on abduction and internal rotation, no tenderness, no bony tenderness, no deformity, no laceration, no pain and no spasm.         Left shoulder: Exhibits normal range of motion on abduction and internal and external rotation.  no tenderness, no bony tenderness, no swelling, no effusion, no deformity, no pain, no spasm and normal strength.         Right elbow: Exhibits normal range of motion, no swelling, no effusion and no deformity. No tenderness found. No medial epicondyle, no lateral epicondyle and no olecranon process tenderness noted. There are no contractures or tophi or nodules.         Left elbow: Exhibits normal range of motion, no swelling, no effusion and no deformity. No tenderness found. No medial epicondyle, no lateral epicondyle and no olecranon process tenderness noted.  There are no contractures or tophi or nodules         Right wrist: Exhibits normal range of motion, no tenderness, no bony tenderness, no swelling, no effusion and no crepitus. Flexion and extension intact without limitation.         Left wrist:  Exhibits normal range of  motion, no tenderness, no bony tenderness, no swelling, no effusion, no crepitus and no deformity. Flexion and extension intact without limitation.         Right hip: Exhibits normal range of motion, normal strength, no tenderness, no bony tenderness, no swelling and no crepitus.         Right hand: No synovitis of MCP,PIP or DIP joints; no Bouchards or Heberden nodules noted;  strength: 100%.         Left hand: No synovitis of MCP,PIP or DIP joints; no Bouchards or Heberden nodules noted;  strength: 100%.         Right knee: Exhibits normal range of motion, no swelling, no effusion, no ecchymosis, no deformity and no erythema. No tenderness found. No medial joint line, no lateral joint line, no MCL and no LCL tenderness noted. No crepitation found on flexion and extension normal.         Left knee: Exhibits normal range of motion, no swelling, no effusion, no ecchymosis and no erythema. No tenderness found. No medial joint line, no lateral joint line and no patellar tendon tenderness noted. No crepitation found on flexion and flexion intact.         Right ankle: Exhibits normal range of motion, no swelling, no deformity and no laceration. No tenderness. No lateral malleolus and no medial malleolus tenderness found. Achilles tendon normal. Achilles tendon exhibits no pain, no defect and normal Maradiaga's test results.         Left ankle: Exhibits normal range of motion, no swelling, and no deformity. No tenderness. No lateral malleolus and no medial malleolus tenderness found.         Cervical back: Exhibits normal range of motion, no tenderness, no bony tenderness, no swelling, no pain and no spasm.         Thoracic back: Exhibits normal range of motion, no tenderness, no bony tenderness and no spasm.         Lumbar back: Exhibits normal range of motion, no tenderness, no bony tenderness, no pain and no spasm.         Right foot: No tenderness, no bony tenderness, no crepitus and no laceration. There is no  synovitis or tenderness of the MTP joints to palpation.  Mild tenderness and mild swelling MTP joint         Left foot: No tenderness, no bony tenderness and no crepitus. There is no synovitis or tenderness of the MTP joints to palpation.  Mild swelling MTP joint and soft tissue swelling     Lymphadenopathy: No noted lympadenopathy.     Neurological: Is alert and oriented. No focal motor or sensory abnormalities.     Skin: See media noted Malar rash    Results:   Diagnostic Data:      Labs:    Selected labs - last 24 hours:  Endo  Lytes  Renal   Glu 130  Na 136 Ca 7.7  BUN 84   POC Gluc  -  K 2.7 PO4 -  Cr 1.55   A1c -  Cl 100 Mg 2.1  eGFR 42   TSH -  CO2 25.0         LFT  CBC  Other     WBC 12.8  PTT - Procal -     Hb 10.2  INR 2.21 CRP -   APk 189  Hct 30.0  Trop - D dim -   T los 2.6  PLT 98.0  pBNP -  BNP -  - Ferritin  -   Prot 4.6    CK  - Lactate  -   Alb 2.6    LDL  - COVID  -       Imaging:   US ABDOMEN COMPLETE (CPT=76700)    Result Date: 12/19/2023  CONCLUSION:  1. Cholelithiasis with mild gallbladder wall thickening and small amount of pericholecystic fluid.  Findings may represent acute cholecystitis.  Correlation with clinical symptoms is recommended.  2. Bilateral pleural effusions   LOCATION:  [facility]    Dictated by (CST): Sofi Laguna MD on 12/19/2023 at 7:37 PM     Finalized by (CST): Sofi Laguna MD on 12/19/2023 at 7:40 PM       XR CHEST AP PORTABLE  (CPT=71045)    Result Date: 12/19/2023  CONCLUSION:  Extensive airspace infiltrates bilaterally with interval worsening at the right base compared to the previous exam.  Differential considerations include pneumonia, pulmonary hemorrhage, pulmonary edema and ARDS.   LOCATION:  [facility]      Dictated by (CST): Juan Fernandez MD on 12/19/2023 at 4:39 PM     Finalized by (CST): Juan Fernandez MD on 12/19/2023 at 4:41 PM       XR CHEST AP PORTABLE  (CPT=71045)    Result Date: 12/18/2023  CONCLUSION:  Interval worsening of right  lung airspace opacities.  Stable to minimally improved left lung airspace opacities.   LOCATION:  [facility]      Dictated by (CST): Moe Guzman MD on 12/18/2023 at 12:00 PM     Finalized by (CST): Moe Guzman MD on 12/18/2023 at 12:01 PM       XR CHEST AP PORTABLE  (CPT=71045)    Result Date: 12/16/2023  CONCLUSION:  The patient is status post right-sided thoracentesis.  There is marked decrease in the right effusion with marked improved aeration of the right lower lobe.  There is no pneumothorax.  A right Port-A-Cath identified unchanged in position, tip in the SVC. There is diffuse decreased ground-glass opacities in the central aspect of the right lung in the interval compared to 12/15/2023.  There is some unfavorable progression of extensive ground-glass opacities in the left perihilar region extending into the left upper lobe laterally and the left retrocardiac region however.  There is increase in the size of the left pleural effusion with slight increased elevation of the left hemidiaphragm.  Surgical clips project over the inferolateral left chest wall unchanged. The heart size is upper limits of normal.  No definite CHF.   LOCATION:  [facility]      Dictated by (CST): Sandip Dias MD on 12/16/2023 at 12:59 PM     Finalized by (CST): Sandip Dias MD on 12/16/2023 at 1:01 PM       US THORACENTESIS GUIDED RIGHT (CPT=32555)    Result Date: 12/16/2023  CONCLUSION:  Ultrasound-guided thoracentesis was provided and performed by Dr. Cano performed without complication.  PLEASE SEE HIS REPORT FOR FURTHER EVALUATION.   LOCATION:  [facility]    Dictated by (CST): Sandip Dias MD on 12/16/2023 at 11:43 AM     Finalized by (CST): Sandip Dias MD on 12/16/2023 at 11:44 AM       XR CHEST AP PORTABLE  (CPT=71045)    Result Date: 12/15/2023  PROCEDURE:  XR CHEST AP PORTABLE  (CPT=71045)  TECHNIQUE:  AP chest radiograph was obtained.  COMPARISON:  [facility] , XR, XR CHEST AP PORTABLE  (CPT=71045),  12/15/2023, 3:49 PM.  INDICATIONS:  Re eval pneumothorax  PATIENT STATED HISTORY: (As transcribed by Technologist)  Patient offered no additional history at this time.    FINDINGS:   Right-sided Port-A-Cath tip in the SVC.  Marked right perihilar consolidation is again noted.  Moderate left perihilar consolidation is again noted.  Previously noted left pneumothorax is no longer identified.    LOCATION:  [facility]      Dictated by (CST): Gustavo Yuan MD on 12/15/2023 at 7:09 PM     Finalized by (CST): Gustavo Yuan MD on 12/15/2023 at 7:10 PM       XR CHEST AP PORTABLE  (CPT=71045)    Result Date: 12/15/2023  CONCLUSION:  1. Small 9 mm left apical pneumothorax status post left thoracentesis.    LOCATION:  [facility]      Dictated by (CST): Christi Ashraf MD on 12/15/2023 at 4:23 PM     Finalized by (CST): Christi Ashraf MD on 12/15/2023 at 4:29 PM       US THORACENTESIS GUIDED RIGHT (CPT=32555)    Result Date: 12/15/2023  CONCLUSION:  Ultrasound guidance provided for thoracentesis.   LOCATION:  [facility]    Dictated by (CST): Christi Ashraf MD on 12/15/2023 at 2:48 PM     Finalized by (CST): Christi Ashraf MD on 12/15/2023 at 2:49 PM       CT ANGIOGRAPHY, CHEST (CPT=71275)    Result Date: 12/14/2023  CONCLUSION:   1. Moderate to large bilateral pleural effusions along with marked solid a shin lungs are suggestive pulmonary edema and fluid overload.  2. No evidence of pulmonary embolus.  3. Moderate to large left axillary and left breast fluid collections are noted.  Minimal gas in the left more central breast fluid collection is noted.  Possibility of infection is of consideration.  Sequelae of seroma/chronic hematoma is of consideration as well.  Agree with preliminary radiology report from [facility].    LOCATION:  [facility]   Dictated by (CST): Gustavo Yuan MD on 12/14/2023 at 6:49 AM     Finalized by (CST): Gustavo Yuan MD on 12/14/2023 at 6:52 AM       XR CHEST AP PORTABLE  (CPT=71045)    Result Date:  12/13/2023  CONCLUSION:  Dense patchy airspace opacities are present within the lungs suspicious for areas of pneumonia.  Follow-up is recommended to ensure resolution.  If clinical symptoms persist then consider CT.   LOCATION:  [id number]      Dictated by (CST): Cedrick Tovar MD on 12/13/2023 at 8:13 PM     Finalized by (CST): Cedrick Tovar MD on 12/13/2023 at 8:13 PM       CT CHEST PE AORTA (IV ONLY) (CPT=71260)    Result Date: 11/13/2023  CONCLUSION:  1. No evidence of pulmonary embolism. 2. No acute findings within the chest.   LOCATION:  [facility]   Dictated by (CST): Moe Guzman MD on 11/13/2023 at 11:34 PM     Finalized by (CST): Moe Guzman MD on 11/13/2023 at 11:39 PM       XR CHEST AP PORTABLE  (CPT=71045)    Result Date: 11/13/2023  CONCLUSION:  Normal heart size and pulmonary vascularity.  Port-A-Cath tip SVC.  No focal consolidation, effusion or pneumothorax.   LOCATION:  [id number]      Dictated by (CST): Corina Epperson MD on 11/13/2023 at 8:18 PM     Finalized by (CST): Corina Epperson MD on 11/13/2023 at 8:18 PM         Current Medications:  Current Facility-Administered Medications   Medication Dose Route Frequency    potassium chloride 40 mEq in 250mL sodium chloride 0.9% IVPB premix  40 mEq Intravenous Once    Followed by    potassium chloride 20 mEq/100mL IVPB premix 20 mEq  20 mEq Intravenous Once    albumin human (Albuminar) 25% injection 25 g  25 g Intravenous Q12H    dexmedeTOMIDine in sodium chloride 0.9% (Precedex) 400 mcg/100mL infusion premix  0.2-1.5 mcg/kg/hr (Dosing Weight) Intravenous Continuous    bumetanide (Bumex) 12.5 mg in 50 mL infusion  0.5 mg/hr Intravenous Continuous    acetylcysteine (Acetadote) 6.8 g in dextrose 5% 1,000 mL infusion (Third Dose)  100 mg/kg Intravenous Once    phytonadione (Aqua-Mephton) 10 mg in sodium chloride 0.9% 50 mL IVPB  10 mg Intravenous Daily    metoprolol tartrate (Lopressor) tab 25 mg  25 mg Oral 2x Daily(Beta Blocker)    [Held by provider] furosemide  (Lasix) 10 mg/mL injection 20 mg  20 mg Intravenous Once    dextromethorphan-guaiFENesin (Robitussin-DM)  mg/5mL oral solution 5 mL  5 mL Oral Q6H PRN    ALPRAZolam (Xanax) tab 0.25 mg  0.25 mg Oral TID PRN    pantoprazole (Protonix) 40 mg in sodium chloride 0.9% PF 10 mL IV push  40 mg Intravenous QAM AC    Or    pantoprazole (Protonix) DR tab 40 mg  40 mg Oral QAM AC    LORazepam (Ativan) 2 mg/mL injection 0.5 mg  0.5 mg Intravenous Q6H PRN    metoprolol (Lopressor) 5 mg/5mL injection 5 mg  5 mg Intravenous Q6H PRN    ipratropium-albuterol (Duoneb) 0.5-2.5 (3) MG/3ML inhalation solution 3 mL  3 mL Nebulization Q4H PRN    melatonin tab 3 mg  3 mg Oral Nightly PRN    prochlorperazine (Compazine) 10 MG/2ML injection 5 mg  5 mg Intravenous Q8H PRN    polyethylene glycol (PEG 3350) (Miralax) 17 g oral packet 17 g  17 g Oral Daily PRN    sennosides (Senokot) tab 17.2 mg  17.2 mg Oral Nightly PRN    bisacodyl (Dulcolax) 10 MG rectal suppository 10 mg  10 mg Rectal Daily PRN    fleet enema (Fleet) 7-19 GM/118ML rectal enema 133 mL  1 enema Rectal Once PRN    HYDROmorphone (Dilaudid) 1 MG/ML injection 0.2 mg  0.2 mg Intravenous Q2H PRN     Medications Prior to Admission   Medication Sig    Potassium Citrate ER 15 MEQ (1620 MG) Oral Tab CR Take 1 tablet by mouth in the morning, at noon, and at bedtime.    DULoxetine 30 MG Oral Cap DR Particles Take 1 capsule (30 mg total) by mouth daily.    ondansetron (ZOFRAN) 8 MG tablet Take 1 tablet (8 mg total) by mouth as needed.    zolpidem 10 MG Oral Tab Take 1 tablet (10 mg total) by mouth nightly as needed for Sleep.    HYDROcodone-acetaminophen 5-325 MG Oral Tab Take 1 tablet by mouth every 8 (eight) hours as needed. (Patient not taking: Reported on 12/13/2023)    lidocaine-prilocaine 2.5-2.5 % External Cream APPLY SMALL AMOUNT OVER PORT PRIOR TO ACCESS    azaTHIOprine (IMURAN OR) Take by mouth. (Patient not taking: No sig reported)    Prenatal Vit-DSS-Fe Cbn-FA (PRENATAL  AD OR) Take 1 tablet by mouth daily.       Assessment & Plan:   ASSESSMENT / PLAN:   Acute respiratory failure with bilateral pulmonary infiltrates transudative effusions pointing away from lupus Sjogren's      -Community acquired pneumonia, unspecified laterality     Bronchoscopy done final cultures pending     On antibiotics and followed closely by ID pulmonology     # Metastatic breast cancer s/p mastectomy s/p chemotherapy  -Patient reports bilateral peripheral neuropathy with right foot drop due to Taxol in summer 2023     This appears to be chronic consider addressing this with boot to avoid injury     #UCTD with Sjogren's features versus SLE?  Consider malar rash Raynaud's see media    Lower complements worsening thrombocytopenia double-stranded KIMBERLY normal    Slight worsening renal function.  Likely multifactorial    The setting being off Imuran and hydroxychloroquine in the past consider low-dose steroids since patient's prognosis is guarded and to cover any sort of mild immune mediated etiology for her symptoms as a trial.  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  Azathioprine stopped due to recurrent cellulitis in the distant past.      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     -Responded to short course of prednisone in early 2023.     #Hyponatremia:  -per renal, suspect SIADH     Severe cardiomyopathy low ejection fraction followed by cardiology      Despite suspicion being low could consider trial of low-dose steroids but will hold for now because of infection and metastatic cancer    Abdominal pain ultrasound concerns for  acute cholecystitis abnormal LFTs.  Defer to primary service GI to consider surgery consult.  Patient continues to have pain;   ?  Plan:  -TTE pending given transudative effusion and pulmonary edema.     -Given recurrence of significant proteinuria (UPCR 2.93), lower C3/C4, in context of biopsy-proven interstitial nephritis (1/2023 at [facility]); discussed with renal, clinical picture is not consistent with Sjogren's interstitial nephritis.     -Given lack of definitive evidence of active systemic autoimmune disease:  no role for any immunomodulatory therapy for her any extra-renal autoimmune disease at this juncture.     Defer to nephrology whether or not another renal biopsy is needed.  Patient's prognosis guarded.  Kidney function appears to be stable at this time.  Complements are lower but likely multifactorial.  Double-stranded KIMBERLY normal    Consider low-dose methylprednisolone 40 mg IV daily to see if this will help in any of her symptoms to cover any low-grade immune mediated etiology; risk versus benefits discussed.    Risk of steroids discussed patient was okay with this plan.  As long as the other consultants are okay with this trial    Despite this not likely helping I am okay with trying this to see if it helps in any way over the next week    Significant abnormal LFTs seen by GI ultrasound shows concerns of acute cholecystitis patient does have GI symptoms and pain but they are stating likely unrelated     -consider outpatient EMG/NCS for further evaluation of the patient's right lower extremity foot drop.  Though Taxol may be the cause of her motor weakness, given her underlying Sjogren's/SLE, would evaluate for other etiologies.      Will continue to monitor superficially and periodically come in to see the patient.     Quality:  DVT Mechanical Prophylaxis:   SCDs, Early ambuation  DVT Pharmacologic Prophylaxis   Medication   None                Code Status: Full Code  Peacock: Peacock catheter in  place  Peacock Duration (in days): 2  Central line:    CHRISTELLE:     Education and counseling provided to patient on medications and diagnosis. Instructed patient to call my office or seek medical attention immediately if symptoms worsen.    Return to clinic: Continue monitor    Carol Wu MD    Supplementary Documentation:               Dietitian Malnutrition Assessment        Evaluation for Malnutrition: Criteria for non-severe malnutrition diagnosis-         acute illness/injury related to Energy intake less than 75% for greater than 7 days., Body fat mild depletion., Muscle mass mild depletion., Fluid accumulation mild.         RD Malnutrition Care Plan: Intiated ONS (oral nutritional supplements)., Ordered multivitamin., See RD nutrition assessment for additional recommendations.    Body Fat/Muscle Mass:

## 2023-12-20 NOTE — PLAN OF CARE
Pt received aox4. VSS, NSR/ST on monitor. 5 L NC, wean as tolerated. Bumex and acetylcysteine gtt running. Peacock in place. No complaints of dyspnea this AM. Transfer orders to ICU received, belongings gathered, and report given at bedside to Kitty SEGOVIA.

## 2023-12-20 NOTE — PLAN OF CARE
Received patient from 8T ~1550. Pt on Bipap. ABG drawn. Pt placed on precedex gtt, bumex gtt and sinha placed for strict I&O. Per Nephro, notify on call provider if urine output decreases. Pt has difficult IV access. IV team placed an extended PIV in RUE.  Acetylcysteine ordered. Spoke with pharmacy about limited IV access and compatibility. Pt resting comfortably on minimal sedation. Will attempted to wean pt off precedex to be able to infuse acetylcysteine in dedicated line. See doc flow sheet for vital signs and assessments.

## 2023-12-20 NOTE — PROGRESS NOTES
[facility]  Hematology Oncology Progress Note  2023    Alina Aceves Patient Status:  Inpatient    1980 MRN [id number]   Formerly [facility] 4NW-A Attending Tosha Waite MD   Hosp Day # 7 PCP HOLLY IBARRA     Subjective:  Events noted.  Cytology and cell counts reviewed.  C/w inflammatory changes and no evidence of malignancy.    Scr and hemoglobin improved.    Bronch and BAL done yesterday per Dr. Tapia.  No lesions noted, blood tinged fluid removed.      Meds:    potassium chloride    albumin human    dexmedetomidine    bumetanide (Bumex) 12.5 mg in 50 mL infusion    [COMPLETED] Acetylcysteine **FOLLOWED BY** [COMPLETED] Acetylcysteine **FOLLOWED BY** Acetylcysteine    phytonadione (Aqua-Mephton) 10 mg in sodium chloride 0.9% 50 mL IVPB    metoprolol tartrate    [Held by provider] furosemide    dextromethorphan-guaiFENesin    potassium chloride    ALPRAZolam    pantoprazole **OR** pantoprazole    LORazepam    metoprolol    ipratropium-albuterol    melatonin    prochlorperazine    polyethylene glycol (PEG 3350)    sennosides    bisacodyl    fleet enema    HYDROmorphone **OR** [DISCONTINUED] HYDROmorphone **OR** [DISCONTINUED] HYDROmorphone    Objective:  Blood pressure 130/82, pulse 103, temperature 98.2 °F (36.8 °C), temperature source Temporal, resp. rate 20, height 5' 4\" (1.626 m), weight 150 lb 9.2 oz (68.3 kg), last menstrual period 2023, SpO2 99%, not currently breastfeeding. On HiFlo 6L NC, up from 3L --> now back to 2L  Gen: NAD, alert  CV: RRR, no m/r/g  Lungs: dec BS bilaterally in based  Abd: Normoactive bs, soft, nt/nd  Extrem: WWP, no edema  Skin: No acute changes    Labs:     Recent Labs   Lab 23  1937 23  0631 23  0617 23  1728   RBC 2.72* 2.94* 2.74* 3.55*   HGB 7.9* 8.5* 7.9* 10.2*   HCT 22.8* 24.6* 22.3* 30.0*   MCV 83.8 83.7 81.4 84.5   MCH 29.0 28.9 28.8 28.7   MCHC 34.6 34.6 35.4 34.0   RDW 12.8 12.7 12.9 14.0   NEPRELIM 5.52 7.17 7.20  --     WBC 6.3 8.3 8.3 12.8*   .0 188.0 157.0 98.0*         Recent Labs   Lab 12/19/23  0514 12/19/23  1728 12/20/23  0331   GLU 98 77 165*   BUN 65* 74* 73*   CREATSERUM 1.83* 1.84* 1.65*   EGFRCR 35* 34* 39*   CA 7.7* 7.8* 6.7*   ALB 2.3* 2.7* 2.2*   * 133* 135*   K 4.3 3.9 3.2*   CL 98 98 101   CO2 23.0 22.0 21.0   ALKPHO 336* 283* 171*   AST 1,216* 946* 725*   ALT 1,296* 1,093* 793*   BILT 1.8 2.4* 2.3*   TP 4.8* 5.0* 4.3*       Imaging:  Reviewed    Assessment and Plan:  Alina Aceves is a 43 year old female with stage IIIb breast cancer status postmastectomy now with bilateral pneumonia and pleural effusions.     Breast cancer  Locally advanced stage IIIb triple negative breast cancer  Status post neoadjuvant chemotherapy followed by mastectomy in November 2023  Residual T1a disease and has been recommended to start oral capecitabine  No treatment while inpatient     Pneumonia/effusion  Reviewed CT scan she had bilateral lung consolidation/infiltrate along with bilateral effusion  Not related to underlying breast cancer  Data set to date c/w with transudates and no evidence of malignancy     Anemia  Moderate anemia secondary to previous chemotherapy  Will monitor hemoglobin and transfuse for hemoglobin less than 7.  Last hemoglobin 10 grams.    Hyponatremia, likely due to SIADH.  Renal following.  Na improved at 135      We will follow.    Please do not hesitate to contact me with any specific questions or concerns.    Michael Alexander MD  [facility]  Department of Oncology and Hematology

## 2023-12-20 NOTE — PROGRESS NOTES
[facility]  Report of Consultation    Alina Aceves Patient Status:  Inpatient    1980 MRN [id number]   Location [facility] 8NE-A Attending Tosha Waite MD   Hosp Day # 7 PCP HOLLY IBARRA     Reason for Consultation:   Tachycardia, palpitations    History of Present Illness:   Alina Aceves is a(n) 43 year old female with SLE and palpitations who I saw in clinic 2 weeks ago.    She has Stage III breast CA.  She has been undergoing chemotherapy, EF  was 50%. It is now 20% range.    She was admitted with SOB 23.  She was diagnosed with PNA and pleural effusions.  She also had hyponatremia with a sodium 120.  She was seen by Pulmonology, ID, Hematology, and Nephrology.    Subjective:  -No events overnight  -Awake, alert  -Very mild SOB today  -IV drip diuresis      Past Medical History:   Past Medical History:   Diagnosis Date    Breast cancer (HCC)     Gastritis     Lupus (HCC)     Sjogren's disease (HCC)        Social History:   Smoking:  None  Alcohol:  None    Family History:   No family history of premature arthrosclerotic heart disease     Medications:   Scheduled:    potassium chloride  40 mEq Intravenous Once    Followed by    potassium chloride  20 mEq Intravenous Once    methylPREDNISolone  40 mg Intravenous Daily    phytonadione (Aqua-Mephton) 10 mg in sodium chloride 0.9% 50 mL IVPB  10 mg Intravenous Daily    metoprolol tartrate  25 mg Oral 2x Daily(Beta Blocker)    [Held by provider] furosemide  20 mg Intravenous Once    pantoprazole  40 mg Intravenous QAM AC    Or    pantoprazole  40 mg Oral QAM AC       Continuous Infusion:    dexmedetomidine Stopped (23)    bumetanide (Bumex) 12.5 mg in 50 mL infusion 0.5 mg/hr (23 2035)       PRN Medications:   dextromethorphan-guaiFENesin, ALPRAZolam, LORazepam, metoprolol, ipratropium-albuterol, melatonin, prochlorperazine, polyethylene glycol (PEG 3350), sennosides, bisacodyl, fleet enema,  HYDROmorphone **OR** [DISCONTINUED] HYDROmorphone **OR** [DISCONTINUED] HYDROmorphone    Outpatient Medications:   Current Facility-Administered Medications on File Prior to Encounter   Medication Dose Route Frequency Provider Last Rate Last Admin    [COMPLETED] morphINE PF 4 MG/ML injection 4 mg  4 mg Intravenous Once Jesus Manuel Hatfield MD   4 mg at 11/13/23 2255    [COMPLETED] iopamidol 76% (ISOVUE-370) injection for power injector  100 mL Intravenous ONCE PRN Jesus Manuel Hatfield MD   100 mL at 11/13/23 2247    [COMPLETED] potassium chloride (K-Dur) tab 40 mEq  40 mEq Oral Once Jesus Manuel Hatfield MD   40 mEq at 11/14/23 0010     Current Outpatient Medications on File Prior to Encounter   Medication Sig Dispense Refill    Potassium Citrate ER 15 MEQ (1620 MG) Oral Tab CR Take 1 tablet by mouth in the morning, at noon, and at bedtime.      DULoxetine 30 MG Oral Cap DR Particles Take 1 capsule (30 mg total) by mouth daily.      ondansetron (ZOFRAN) 8 MG tablet Take 1 tablet (8 mg total) by mouth as needed.      zolpidem 10 MG Oral Tab Take 1 tablet (10 mg total) by mouth nightly as needed for Sleep.      HYDROcodone-acetaminophen 5-325 MG Oral Tab Take 1 tablet by mouth every 8 (eight) hours as needed. (Patient not taking: Reported on 12/13/2023)      lidocaine-prilocaine 2.5-2.5 % External Cream APPLY SMALL AMOUNT OVER PORT PRIOR TO ACCESS      azaTHIOprine (IMURAN OR) Take by mouth. (Patient not taking: No sig reported)      Prenatal Vit-DSS-Fe Cbn-FA (PRENATAL AD OR) Take 1 tablet by mouth daily.         Allergies:   Allergies   Allergen Reactions    Bactrim [Sulfamethoxazole W/Trimethoprim] RASH       Review of Systems:   No fevers, chills, change in weight or bowel habits.  Ten point review of systems is otherwise negative or unremarkable.    Physical Exam:   Vitals:    12/20/23 1733   BP:    Pulse: 108   Resp: 26   Temp:      Wt Readings from Last 3 Encounters:   12/19/23 150 lb 9.2 oz  (68.3 kg)   11/13/23 120 lb (54.4 kg)   12/16/21 135 lb (61.2 kg)           General: Well developed, well nourished female.  Pt is in no acute distress.  HEENT:   Normocephalic.  Atraumatic.  Eyes with no scleral icterus.  Neck: Supple.  No JVD.  Carotids 2+ and equal in symmetric fashion.  No bruits are noted.  Cardiac: Regular rate and rhythm.   There is a normal S1 and S2.  No S3 or S4.  No murmurs, rubs, or gallops.  PMI is non-displaced with a normal apical impulse.  Lungs: Clear to ascultation bilaterally.  No focal rales, rhonchi, or wheezes.  Good air movement is noted throughout all lung fields.  Abdomen: Soft.  Non-distended.  Non-tender.  Bowel sounds are present and normoactive.  No guarding or rebound.   Extremities: Extremities do not demonstrate any evidence of peripheral edema.   No cyanosis or clubbing of the digits is appreciated.  Femoral, Dorsalis Pedis, and Posterior Tibialis  pulses are 2+ and equal in a symmetric fashion.  Neurologic: Alert and oriented, normal affect.  No gross deficit appreciated.  Integument:  No visible rashes are appreciated.      Laboratories and Data:   Labs:    Recent Labs   Lab 12/19/23  1728 12/20/23  0331 12/20/23  0940 12/20/23  1455   GLU 77 165* 130*  --    BUN 74* 73* 84*  --    CREATSERUM 1.84* 1.65* 1.55*  --    CA 7.8* 6.7* 7.7*  --    ALB 2.7* 2.2* 2.6*  --    * 135* 136  --    K 3.9 3.2* 3.1* 2.7*   CL 98 101 100  --    CO2 22.0 21.0 25.0  --    ALKPHO 283* 171* 189*  --    * 725* 973*  --    ALT 1,093* 793* 949*  --    BILT 2.4* 2.3* 2.6*  --    TP 5.0* 4.3* 4.6*  --        Recent Labs   Lab 12/13/23  1937 12/14/23  0631 12/17/23  0617 12/19/23  1728   RBC 2.72* 2.94* 2.74* 3.55*   HGB 7.9* 8.5* 7.9* 10.2*   HCT 22.8* 24.6* 22.3* 30.0*   MCV 83.8 83.7 81.4 84.5   MCH 29.0 28.9 28.8 28.7   MCHC 34.6 34.6 35.4 34.0   RDW 12.8 12.7 12.9 14.0   NEPRELIM 5.52 7.17 7.20  --    WBC 6.3 8.3 8.3 12.8*   .0 188.0 157.0 98.0*       Recent Labs    Lab 12/19/23  1312 12/19/23  1728 12/20/23  0800   PTP 25.4* 26.1* 24.8*   INR 2.28* 2.36* 2.21*       No results for input(s): \"TROP\", \"CK\" in the last 168 hours.    Diagnostics:   Tele: Sinus tachycardia.  EKG: Sinus tachycardia, non-specific ST/T changes  Echo: Unremarkable by 5/23 echo (EF low normal, mild MR).    12/17/23  Conclusions:     1. Left ventricle: The cavity size was normal. Wall thickness was normal.      Systolic function was markedly reduced. The estimated ejection fraction      was 20-25%, by visual assessment. There was severe diffuse hypokinesis.      Technical limitations of the study preclude regional wall motion      analysis. Unable to assess LV diastolic function due to heart rhythm.   2. Right ventricle: The cavity size was increased. Systolic function was      reduced. The RV free wall longitudinal strain was -16.50%. The tricuspid      annular plane systolic excursion (TAPSE) is 1.56cm.   3. Left atrium: The left atrial volume was moderately increased.   4. Right atrium: The atrium was moderately dilated.   5. Mitral valve: The annulus was dilated. The leaflets were non-specifically      thickened. There was moderate regurgitation, with multiple jets.   6. Tricuspid valve: The annulus is dilated. There was severe regurgitation.   7. Pulmonary arteries: Systolic pressure was moderately to markedly      increased, at least 55mm Hg. Systolic pressure may be underestimated due      to wide tricuspid regurgitation. Estimated pulmonary artery diastolic      pressure was 34mm Hg.   8. Pericardium, extracardiac: A trivial pericardial effusion was identified.      There was a left pleural effusion.           Assessment:  1. New onset severe cardiomyopathy  2. PNA  3. Hypoxic resp failure  4. Sinus tachycaria  5. Palpitations  6. Breast CA, stage IIIb  7. Hyponatremia      Plan:  Cardiomyopathy: IV diuresis.  Breathing improved.  Cr improved.  Follow BMP.  Needs cath but can wait for now given  clinical stability.  Elevated LFTS: Likely hepatic congestion (severe TR from ventricular dysfunction and pulm HTN).  Diuresis.  Primary team.  Tachycardia: ST.  Likely secondary to infection and medical illness.  Can do metoprolol 25mg BID given BP.  Improving.  Resp/PNA: ID, pulm.  Antibiotics.  Hyponatremia: Improved.  Renal.  BP: BB as above.  Metastatic breast CA: Per primary and oncology.    Leonel Villalba MD  12/18/2023  11:42 AM

## 2023-12-20 NOTE — PROGRESS NOTES
[facility]     Nephrology Progress Note    Alina Aceves Patient Status:  Inpatient    1980 MRN [id number]   Location [facility] 6NE-A Attending Noe Perkins MD   Hosp Day # 7 PCP HOLLY IBARRA       SUBJECTIVE:  Stable this AM      Physical Exam:   /87   Pulse 96   Temp 98.2 °F (36.8 °C) (Temporal)   Resp 21   Ht 5' 4\" (1.626 m)   Wt 150 lb 9.2 oz (68.3 kg)   LMP 2023 (Approximate)   SpO2 99%   Breastfeeding No   BMI 25.85 kg/m²   Temp (24hrs), Av.7 °F (37.1 °C), Min:97.4 °F (36.3 °C), Max:100.3 °F (37.9 °C)       Intake/Output Summary (Last 24 hours) at 2023 0856  Last data filed at 2023 0700  Gross per 24 hour   Intake 1288.5 ml   Output 965 ml   Net 323.5 ml     Last 3 Weights   23 0538 150 lb 9.2 oz (68.3 kg)   23 0019 150 lb 3.2 oz (68.1 kg)   23 2326 124 lb (56.2 kg)   23 1823 125 lb (56.7 kg)   23 1816 120 lb (54.4 kg)   21 1537 135 lb (61.2 kg)   20 1405 136 lb (61.7 kg)   08/15/20 1020 140 lb (63.5 kg)     General: Alert and oriented in no apparent distress.  HEENT: No scleral icterus, MMM  Neck: Supple, no KALYN or thyromegaly  Cardiac: Regular rate and rhythm, S1, S2 normal, no murmur or rub  Lungs: Clear without wheezes, rales, rhonchi.    Abdomen: Soft, non-tender. + bowel sounds, no palpable organomegaly  Extremities: 2+ BLE edema.  Neurologic: moving all extremities  Skin: Warm and dry, no rash        Labs:     Recent Labs   Lab 23  1937 23  0631 12/15/23  0513 23  0617 23  1312 23  1728 23  0800   WBC 6.3 8.3  --  8.3  --  12.8*  --    HGB 7.9* 8.5*  --  7.9*  --  10.2*  --    MCV 83.8 83.7  --  81.4  --  84.5  --    .0 188.0  --  157.0  --  98.0*  --    INR  --   --  1.27*  --  2.28* 2.36* 2.21*       Recent Labs   Lab 23  1155 23  1538 23  0025 23  0616 23  0514 23  1728 23  0331   *  --   --  128* 130* 133* 135*    K 3.4*   < > 4.0 4.3 4.3 3.9 3.2*   CL 91*  --   --  93* 98 98 101   CO2 21.0  --   --  19.0* 23.0 22.0 21.0   BUN 38*  --   --  46* 65* 74* 73*   CREATSERUM 1.16*  --   --  1.50* 1.83* 1.84* 1.65*   CA 7.9*  --   --  8.1* 7.7* 7.8* 6.7*   MG  --   --   --   --   --   --  2.1   *  --   --  69* 98 77 165*    < > = values in this interval not displayed.       Recent Labs   Lab 12/13/23  1938 12/16/23  0650 12/19/23  0514 12/19/23  1728 12/20/23  0331   ALT 36  --  1,296* 1,093* 793*   AST 40*  --  1,216* 946* 725*   ALB 3.1*  --  2.3* 2.7* 2.2*   LDH  --  469*  --   --   --        Recent Labs   Lab 12/18/23  0729 12/18/23  1106 12/19/23  1514   PGLU 84 125* 72       Meds:   Current Facility-Administered Medications   Medication Dose Route Frequency    potassium chloride (Klor-Con) 20 MEQ oral powder 40 mEq  40 mEq Oral Q4H    albumin human (Albuminar) 25% injection 25 g  25 g Intravenous Q12H    dexmedeTOMIDine in sodium chloride 0.9% (Precedex) 400 mcg/100mL infusion premix  0.2-1.5 mcg/kg/hr (Dosing Weight) Intravenous Continuous    bumetanide (Bumex) 12.5 mg in 50 mL infusion  0.5 mg/hr Intravenous Continuous    acetylcysteine (Acetadote) 6.8 g in dextrose 5% 1,000 mL infusion (Third Dose)  100 mg/kg Intravenous Once    phytonadione (Aqua-Mephton) 10 mg in sodium chloride 0.9% 50 mL IVPB  10 mg Intravenous Daily    metoprolol tartrate (Lopressor) tab 25 mg  25 mg Oral 2x Daily(Beta Blocker)    [Held by provider] furosemide (Lasix) 10 mg/mL injection 20 mg  20 mg Intravenous Once    dextromethorphan-guaiFENesin (Robitussin-DM)  mg/5mL oral solution 5 mL  5 mL Oral Q6H PRN    potassium chloride 40 mEq in 250mL sodium chloride 0.9% IVPB premix  40 mEq Intravenous Once    ALPRAZolam (Xanax) tab 0.25 mg  0.25 mg Oral TID PRN    pantoprazole (Protonix) 40 mg in sodium chloride 0.9% PF 10 mL IV push  40 mg Intravenous QAM AC    Or    pantoprazole (Protonix) DR tab 40 mg  40 mg Oral QAM AC    LORazepam  (Ativan) 2 mg/mL injection 0.5 mg  0.5 mg Intravenous Q6H PRN    metoprolol (Lopressor) 5 mg/5mL injection 5 mg  5 mg Intravenous Q6H PRN    ipratropium-albuterol (Duoneb) 0.5-2.5 (3) MG/3ML inhalation solution 3 mL  3 mL Nebulization Q4H PRN    melatonin tab 3 mg  3 mg Oral Nightly PRN    prochlorperazine (Compazine) 10 MG/2ML injection 5 mg  5 mg Intravenous Q8H PRN    polyethylene glycol (PEG 3350) (Miralax) 17 g oral packet 17 g  17 g Oral Daily PRN    sennosides (Senokot) tab 17.2 mg  17.2 mg Oral Nightly PRN    bisacodyl (Dulcolax) 10 MG rectal suppository 10 mg  10 mg Rectal Daily PRN    fleet enema (Fleet) 7-19 GM/118ML rectal enema 133 mL  1 enema Rectal Once PRN    HYDROmorphone (Dilaudid) 1 MG/ML injection 0.2 mg  0.2 mg Intravenous Q2H PRN         Impression/Plan:         REJI- in setting of severe CM.  Follow closely with diuresis, creat better today  Proteinuria- pt w/ hx of interstitial nephritis - treated w/ steorids in past - @ [facility] (Dr Tejeda); kidney biopsy jan 2023- interstitial nephritis-  Spot ratio ~ 2.9 g/g; low complements; +NURIA->+ anti-SSA ab  - may need to consider kidney biopsy for further evaluation - discussed w/ patient- for now holding on ordering given tenuous pulmonary status   Hyponatremia- appears multifactorial.  She does have poor solute intake over the past few days as noted. Concern for SIADH given her hx of breast cancer +/-  PNA although likely volume component as well with severe CM.   Additionally, patient is on SSRI (now held)  Cont aggressive diuresis, na sig better.  Cont to follow  Worsening LFTs- ? Due to ischemia/congestion  Breast Ca- oncology following  Effusion- per pulm; on O2; s/p thoracentesis ;no malignancy on cytology  Hypoxia- effusion/CHF.  S/p bronch.  Per pulm  N/V/D- unclear etiology- possibly related to the chemo; symptomatic management  Hypokelamia- replace per protocol  Hx of SLE/Sjogrens- appreciate rheum eval           Questions/concerns were  discussed with patient and/or family by bedside.      Cctime 35 minutes      Francisco Quarles MD  12/20/2023  8:56 AM

## 2023-12-20 NOTE — PROGRESS NOTES
[facility]     Hospitalist Progress Note     Alina Aceves Patient Status:  Inpatient    1980 MRN [id number]   [facility] 4NW-A Attending Tosha Waite MD   Hosp Day # 7 PCP [facility] IBARRA     Chief Complaint: Intractable nausea vomiting, diarrhea, generalized weakness.  Recent pneumonia.     Subjective:     Feels about the same today; mild cough; ongoing dyspnea and swelling      Objective:    Review of Systems:   A comprehensive review of systems was completed; pertinent positive and negatives stated in subjective.    Vital signs:  Temp:  [97.4 °F (36.3 °C)-100.3 °F (37.9 °C)] 98.2 °F (36.8 °C)  Pulse:  [] 96  Resp:  [19-39] 21  BP: (103-147)/() 123/87  SpO2:  [69 %-100 %] 99 %  FiO2 (%):  [35 %] 35 %    Physical Exam:    /87   Pulse 96   Temp 98.2 °F (36.8 °C) (Temporal)   Resp 21   Ht 5' 4\" (1.626 m)   Wt 150 lb 9.2 oz (68.3 kg)   LMP 2023 (Approximate)   SpO2 99%   Breastfeeding No   BMI 25.85 kg/m²     General: No acute distress  Respiratory: Clear to auscultation bilateral except decreased breath sounds at bases  Cardiovascular: S1, S2, regular rate and rhythm  Abdomen: Soft, Non-tender, non-distended  Neuro: Awake alert, lethargic, no new focal deficits.   Extremities: 2+LE edema    Diagnostic Data:    Labs:  Recent Labs   Lab 23  1937 23  0631 12/15/23  0513 23  0617 23  1312 23  1728 23  0800   WBC 6.3 8.3  --  8.3  --  12.8*  --    HGB 7.9* 8.5*  --  7.9*  --  10.2*  --    MCV 83.8 83.7  --  81.4  --  84.5  --    .0 188.0  --  157.0  --  98.0*  --    INR  --   --  1.27*  --  2.28* 2.36* 2.21*       Recent Labs   Lab 23  0514 23  1728 23  0331   GLU 98 77 165*   BUN 65* 74* 73*   CREATSERUM 1.83* 1.84* 1.65*   CA 7.7* 7.8* 6.7*   ALB 2.3* 2.7* 2.2*   * 133* 135*   K 4.3 3.9 3.2*   CL 98 98 101   CO2 23.0 22.0 21.0   ALKPHO 336* 283* 171*   AST 1,216* 946* 725*   ALT 1,296* 1,093*  793*   BILT 1.8 2.4* 2.3*   TP 4.8* 5.0* 4.3*       Estimated Creatinine Clearance: 38 mL/min (A) (based on SCr of 1.65 mg/dL (H)).    Microbiology    Hospital Encounter on 12/13/23   1. Body Fluid Cult Aerobic and Anaerobic     Status: None (Preliminary result)    Collection Time: 12/15/23  3:26 PM    Specimen: Pleural Fluid, Right; Body fluid, unspecified   Result Value Ref Range    Body Fluid Culture Result No Growth 4 Days N/A    Body Fld Smear 4+ Neutrophils seen N/A    Body Fld Smear No organisms seen N/A    Body Fld Smear This is a cytocentrifuged smear. N/A   2. Urine Culture, Routine     Status: None    Collection Time: 12/14/23  3:58 AM    Specimen: Urine, clean catch   Result Value Ref Range    Urine Culture No Growth at 18-24 hrs. N/A   3. Blood Culture     Status: None    Collection Time: 12/13/23  8:10 PM    Specimen: Blood,peripheral   Result Value Ref Range    Blood Culture Result No Growth 5 Days N/A     MRSA PCR nares positive on 12/14/2023    Imaging: Reviewed in Epic.  Chest x-ray done on 12/13/2023 with dense patchy airspace consolidation opacities present within the lungs suspicious for areas of pneumonia  CTA chest done on 12/14/2023 early a.m. shows moderate to large bilateral pleural effusion along with marked consolidation scattered throughout the lungs suggestive of pulmonary edema and fluid overload.  No evidence of pulm embolus.  Moderate large left axillary and left breast fluid collections noted.  Minimal gas in the left most central breast fluid collection, possibility of infection is a consideration.  Sequelae of seroma chronic hematoma is a consideration as well.    Medications:    potassium chloride  40 mEq Oral Q4H    albumin human  25 g Intravenous Q12H    Acetylcysteine  100 mg/kg Intravenous Once    phytonadione (Aqua-Mephton) 10 mg in sodium chloride 0.9% 50 mL IVPB  10 mg Intravenous Daily    metoprolol tartrate  25 mg Oral 2x Daily(Beta Blocker)    [Held by provider]  furosemide  20 mg Intravenous Once    potassium chloride  40 mEq Intravenous Once    pantoprazole  40 mg Intravenous QAM AC    Or    pantoprazole  40 mg Oral QAM AC       Assessment & Plan:      # Multifocal pneumonia with bilateral large bilateral pleural effusion with tachycardia, tachypnea, also rule out malignancy due to patient's locally advanced stage IIIb breast cancer  -s/p bronch; studies pending  -s/p rocephin/doxy; off abx now    #new onset cardiomyopathy-bumex drip; EF 20-25%, severe diffuse hypokinesis    #bilateral pleural effusion due to cardiomyopathy and acute systolic heart failure   Status post left thoracentesis on 12/15/2023 and right thoracentesis on 12/16/2023, pulmonary following   MRSA nares came back positive, s/p Bactroban application twice daily for 5 days.       # Acute hypoxic respiratory failure due to above-off abx; ongoing bumex     # Locally advanced stage IIIb breast cancer status post outpatient neoadjuvant chemo and history of left breast lumpectomy and left lymph node resection on 11/16/2023    # Hyponatremia due to pneumonia and also hypovolemic due to nausea vomiting and diarrhea, siadh, ssri (off ssri now)  -per nephrology    # Hypokalemia-per protocol    # Metabolic acidosis, acute kidney injury; hx interstitial nephritis; might need repeat biopsy at some point; continue bumex     # History of lupus, history of Sjogren's disease  -Takes azathioprine at home which was held at this time due to multifocal pneumonia    #elevated LFTs, elevated INR; clinically doubt cholecystitis; IV NAC per GI; PPI; possibly from CHF hepatic congestion; s/p vit K    # Gastritis  # Nausea vomiting and diarrhea at home possibly due to effect of chemo  # Anxiety  -Will give IV PPI    # Anemia and thrombocytopenia due to malignancy and chemo;    Follow CBC    # Small left apical pneumothorax on 12/15/2023 status post left thoracentesis  -Pulmonary following, on conservative management, repeat chest  x-ray done on 12/15/2023 showed left pneumothorax resolved    # Moderate malnutrition  -Dietitian following    Noe Perkins MD  [facility]  Internal Medicine Hospitalist      Supplementary Documentation:     Quality:  DVT Mechanical Prophylaxis:   SCDs, Early ambuation  DVT Pharmacologic Prophylaxis   Medication   None                Code Status: Prior  Peacock: Peacock catheter in place  Peacock Duration (in days):   Central line:    CHRISTELLE:     Discharge is dependent on: Clinical progress  At this point Ms. Aceves is expected to be discharge to: To be decided    The 21st Century Cures Act makes medical notes like these available to patients in the interest of transparency. Please be advised this is a medical document. Medical documents are intended to carry relevant information, facts as evident, and the clinical opinion of the practitioner. The medical note is intended as peer to peer communication and may appear blunt or direct. It is written in medical language and may contain abbreviations or verbiage that are unfamiliar.

## 2023-12-20 NOTE — CONSULTS
Consultation Note        Alina Aceves Patient Status:  Inpatient    1980 MRN [id number]   [facility] 6NE-A Attending Noe Perkins MD   Hosp Day # 6 PCP [facility] IBARRA       Reason for Consultation   Abnormal LFTs       History of Present Illness   Ms Aceves is a 43 year old female with a history of stage IIIb breast cancer and lupus who presented on  with shortness of breath, found to have pneumonia, pleural effusions (transudative), and HFrEF (EF 20-25%) for whom GI is consulted for abnormal LFTs. She was admitted with unremarkable LFTs, however LFTs from today show a predominate hepatocellular injury, AST/ALT 1200s/1200s, that improved mildly on re-check.INR was 2.3, up from 1.3 earlier this admission. She is intermittently confused and could not provide much history, but she denied a history of liver disease. She is in the cardiac ICU being diuresed on IV diuretics.         PMH/MEDS/ALLERGIES/SH/FH:     Past Medical History:   Diagnosis Date    Breast cancer (HCC)     Gastritis     Lupus (HCC)     Sjogren's disease (HCC)       Past Surgical History:   Procedure Laterality Date    BREAST SURGERY Left      DELIVERY ONLY          No recently discontinued medications to reconcile   No current facility-administered medications on file prior to encounter.     Current Outpatient Medications on File Prior to Encounter   Medication Sig Dispense Refill    Potassium Citrate ER 15 MEQ (1620 MG) Oral Tab CR Take 1 tablet by mouth in the morning, at noon, and at bedtime.      DULoxetine 30 MG Oral Cap DR Particles Take 1 capsule (30 mg total) by mouth daily.      ondansetron (ZOFRAN) 8 MG tablet Take 1 tablet (8 mg total) by mouth as needed.      zolpidem 10 MG Oral Tab Take 1 tablet (10 mg total) by mouth nightly as needed for Sleep.      HYDROcodone-acetaminophen 5-325 MG Oral Tab Take 1 tablet by mouth every 8 (eight) hours as needed. (Patient not taking: Reported on  2023)      lidocaine-prilocaine 2.5-2.5 % External Cream APPLY SMALL AMOUNT OVER PORT PRIOR TO ACCESS      azaTHIOprine (IMURAN OR) Take by mouth. (Patient not taking: No sig reported)      Prenatal Vit-DSS-Fe Cbn-FA (PRENATAL AD OR) Take 1 tablet by mouth daily.         Current Allergies:   Allergies   Allergen Reactions    Bactrim [Sulfamethoxazole W/Trimethoprim] RASH       Social History     Occupational History    Not on file   Tobacco Use    Smoking status: Former     Types: Cigarettes     Quit date:      Years since quittin.9    Smokeless tobacco: Never    Tobacco comments:     social smoker   Vaping Use    Vaping Use: Never used   Substance and Sexual Activity    Alcohol use: Not Currently     Comment: occassional    Drug use: Never    Sexual activity: Not on file             History reviewed. No pertinent family history.           MEDICATIONS      sodium chloride tab 1 g  1 g Oral TID CC    albumin human (Albuminar) 25% injection 25 g  25 g Intravenous Q12H    [COMPLETED] furosemide (Lasix) 10 mg/mL injection 40 mg  40 mg Intravenous Once    furosemide (Lasix) 10 mg/mL injection        dexmedeTOMIDine in sodium chloride 0.9% (Precedex) 400 mcg/100mL infusion premix  0.2-1.5 mcg/kg/hr (Dosing Weight) Intravenous Continuous    bumetanide (Bumex) 12.5 mg in 50 mL infusion  0.5 mg/hr Intravenous Continuous    [COMPLETED] phytonadione (Aqua-Mephton) 10 mg in sodium chloride 0.9% 50 mL IVPB  10 mg Intravenous Once    acetylcysteine (Acetadote) 10.2 g in dextrose 5% 200 mL infusion (Loading Dose)  150 mg/kg Intravenous Once    Followed by    acetylcysteine (Acetadote) 3.4 g in dextrose 5% 500 mL infusion (Second Dose)  50 mg/kg Intravenous Once    Followed by    acetylcysteine (Acetadote) 6.8 g in dextrose 5% 1,000 mL infusion (Third Dose)  100 mg/kg Intravenous Once    [START ON 2023] phytonadione (Aqua-Mephton) 10 mg in sodium chloride 0.9% 50 mL IVPB  10 mg Intravenous Daily    [COMPLETED]  tolvaptan (Samsca) tab 15 mg  15 mg Oral Once    metoprolol tartrate (Lopressor) tab 25 mg  25 mg Oral 2x Daily(Beta Blocker)    [Held by provider] furosemide (Lasix) 10 mg/mL injection 20 mg  20 mg Intravenous Once    dextromethorphan-guaiFENesin (Robitussin-DM)  mg/5mL oral solution 5 mL  5 mL Oral Q6H PRN    [COMPLETED] potassium chloride (K-Dur) tab 40 mEq  40 mEq Oral Q4H    [COMPLETED] Perflutren Lipid Microsphere (DEFINITY) 6.52 MG/ML injection 1.5 mL  1.5 mL Intravenous ONCE PRN    [COMPLETED] furosemide (Lasix) 10 mg/mL injection 40 mg  40 mg Intravenous Once    [COMPLETED] potassium chloride (K-Dur) tab 40 mEq  40 mEq Oral q4h    [COMPLETED] furosemide (Lasix) 10 mg/mL injection 40 mg  40 mg Intravenous Once    [COMPLETED] potassium chloride (K-Dur) tab 40 mEq  40 mEq Oral q4h    [COMPLETED] tolvaptan (Samsca) tab 15 mg  15 mg Oral Once    [COMPLETED] tolvaptan (Samsca) tab 15 mg  15 mg Oral Once    [COMPLETED] tolvaptan (Samsca) tab 15 mg  15 mg Oral Once    [COMPLETED] tolvaptan (Samsca) tab 30 mg  30 mg Oral Once    [COMPLETED] potassium chloride 40 mEq in 250mL sodium chloride 0.9% IVPB premix  40 mEq Intravenous Once    mupirocin (Bactroban) 2% nasal ointment 1 Application  1 Application Nasal Q12H    [COMPLETED] furosemide (Lasix) 10 mg/mL injection 40 mg  40 mg Intravenous Once    [COMPLETED] LORazepam (Ativan) tab 0.5 mg  0.5 mg Oral Once    [COMPLETED] iohexol (Omnipaque) 350 MG/ML injection via power injector 100 mL  100 mL Intravenous ONCE PRN    [COMPLETED] LORazepam (Ativan) 2 mg/mL injection 1 mg  1 mg Intravenous Once    [COMPLETED] haloperidol lactate (Haldol) 5 MG/ML injection 2 mg  2 mg Intravenous Once    potassium chloride 40 mEq in 250mL sodium chloride 0.9% IVPB premix  40 mEq Intravenous Once    [COMPLETED] furosemide (Lasix) 10 mg/mL injection 20 mg  20 mg Intravenous Once    ALPRAZolam (Xanax) tab 0.25 mg  0.25 mg Oral TID PRN    pantoprazole (Protonix) 40 mg in sodium  chloride 0.9% PF 10 mL IV push  40 mg Intravenous QAM AC    Or    pantoprazole (Protonix) DR tab 40 mg  40 mg Oral QAM AC    LORazepam (Ativan) 2 mg/mL injection 0.5 mg  0.5 mg Intravenous Q6H PRN    metoprolol (Lopressor) 5 mg/5mL injection 5 mg  5 mg Intravenous Q6H PRN    ipratropium-albuterol (Duoneb) 0.5-2.5 (3) MG/3ML inhalation solution 3 mL  3 mL Nebulization Q4H PRN    [COMPLETED] ondansetron (Zofran) 4 MG/2ML injection 4 mg  4 mg Intravenous Once    [COMPLETED] cefTRIAXone (Rocephin) 1 g in D5W 100 mL IVPB-ADD  1 g Intravenous Once    [] sodium chloride 0.9 % IV bolus 1,701 mL  30 mL/kg Intravenous Continuous    [COMPLETED] doxycycline hyclate (Vibramycin) 100 mg in sodium chloride 0.9% 100 mL IVPB  100 mg Intravenous Once    [COMPLETED] cefTRIAXone (Rocephin) 1 g in D5W 100 mL IVPB-ADD  1 g Intravenous Q24H    DULoxetine (Cymbalta) DR cap 30 mg  30 mg Oral Daily    melatonin tab 3 mg  3 mg Oral Nightly PRN    prochlorperazine (Compazine) 10 MG/2ML injection 5 mg  5 mg Intravenous Q8H PRN    polyethylene glycol (PEG 3350) (Miralax) 17 g oral packet 17 g  17 g Oral Daily PRN    sennosides (Senokot) tab 17.2 mg  17.2 mg Oral Nightly PRN    bisacodyl (Dulcolax) 10 MG rectal suppository 10 mg  10 mg Rectal Daily PRN    fleet enema (Fleet) 7-19 GM/118ML rectal enema 133 mL  1 enema Rectal Once PRN    HYDROmorphone (Dilaudid) 1 MG/ML injection 0.2 mg  0.2 mg Intravenous Q2H PRN    [COMPLETED] metoclopramide (Reglan) 5 mg/mL injection 10 mg  10 mg Intravenous Once    [COMPLETED] doxycycline hyclate (Vibramycin) 100 mg in sodium chloride 0.9% 100 mL IVPB  100 mg Intravenous Q12H              ROS:     A comprehensive 14 point review of systems was completed.  Pertinent positives and negatives noted in the the HPI.          Physical Exam     Vital signs:  /84   Pulse 96   Temp 100.3 °F (37.9 °C) (Temporal)   Resp 22   Ht 5' 4\" (1.626 m)   Wt 150 lb 9.2 oz (68.3 kg)   LMP 2023 (Approximate)    SpO2 100%   Breastfeeding No   BMI 25.85 kg/m²         Physical Exam        General: Appears alert, oriented x 3 and in no acute distress.  HEENT: Normal. No scleral icterus.   NECK: Supple. No neck vein distention. Thyroid not enlarged. No lymphadenopathy.  CV: S1 and S2 normal. No murmurs or gallops.  LUNGS: Clear to percussion and auscultation.  ABDOMEN: Soft and non-distended. Non-tender. No masses. Bowel sounds are present.  BACK: No CVA tenderness.  EXTREMITIES: No edema, cyanosis or clubbing.  SKIN: Warm and dry.         IMAGING/LABS       Labs:   Lab Results   Component Value Date    WBC 12.8 12/19/2023    HGB 10.2 12/19/2023    HCT 30.0 12/19/2023    PLT 98.0 12/19/2023    CREATSERUM 1.84 12/19/2023    BUN 74 12/19/2023     12/19/2023    K 3.9 12/19/2023    CL 98 12/19/2023    CO2 22.0 12/19/2023    GLU 77 12/19/2023    CA 7.8 12/19/2023    ALB 2.7 12/19/2023    ALKPHO 283 12/19/2023    BILT 2.4 12/19/2023     12/19/2023    ALT 1,093 12/19/2023    INR 2.36 12/19/2023    PTP 26.1 12/19/2023     Recent Labs   Lab 12/18/23  0616 12/19/23  0514 12/19/23  1728   GLU 69* 98 77   BUN 46* 65* 74*   CREATSERUM 1.50* 1.83* 1.84*   CA 8.1* 7.7* 7.8*   * 130* 133*   K 4.3 4.3 3.9   CL 93* 98 98   CO2 19.0* 23.0 22.0     Recent Labs   Lab 12/17/23  0617 12/19/23  1728   RBC 2.74* 3.55*   HGB 7.9* 10.2*   HCT 22.3* 30.0*   MCV 81.4 84.5   MCH 28.8 28.7   MCHC 35.4 34.0   RDW 12.9 14.0   NEPRELIM 7.20  --    WBC 8.3 12.8*   .0 98.0*       Recent Labs   Lab 12/13/23  1938 12/19/23  0514 12/19/23  1728   ALT 36 1,296* 1,093*   AST 40* 1,216* 946*       Imaging:   XR CHEST AP PORTABLE  (CPT=71045)  Narrative: PROCEDURE:  XR CHEST AP PORTABLE  (CPT=71045)     TECHNIQUE:  AP chest radiograph was obtained.     COMPARISON:  [facility] , XR, XR CHEST AP PORTABLE  (CPT=71045), 12/18/2023, 11:34 AM.     INDICATIONS:  sob     PATIENT STATED HISTORY: (As transcribed by Technologist)  Patient offered no  additional history at this time.          FINDINGS:  CT compatible right subclavian chest port catheter noted with the tip in the central SVC.  Extensive alveolar infiltrates are noted bilaterally throughout the upper, mid lower lung zones.  Infiltrates are again slightly worse on the right   compared to the left and there has been slight worsening in the infiltrates in the right lower lung field compared to the previous exam.     Surgical clips are seen projecting over the left lower chest.  There is no pneumothorax.  No large pleural effusion is seen.  Heart size remains enlarged.                   Impression: CONCLUSION:  Extensive airspace infiltrates bilaterally with interval worsening at the right base compared to the previous exam.  Differential considerations include pneumonia, pulmonary hemorrhage, pulmonary edema and ARDS.        LOCATION:  [facility]                 Dictated by (CST): Juan Fernandez MD on 12/19/2023 at 4:39 PM       Finalized by (CST): Juna Fernandez MD on 12/19/2023 at 4:41 PM               IMPRESSION:   Ms Aceves is a 43 year old female with a history of stage IIIb breast cancer and lupus who presented on 12/13 with shortness of breath, found to have pneumonia, pleural effusions (transudative), and HFrEF (EF 20-25%) for whom GI is consulted for abnormal LFTs. These are most likely secondary to congestive hepatopathy, although Ddx includes ischemic hepatopathy, BCS or PVT, autoimmune hepatitis, viral infection, DILI, or less likely AMY; although she has an elevated INR, this could be multifactorial in nature, and her LFTs have improved with diuresis. Acute hepatitis and tylenol levels unremarkable.             PLAN:   -daily LFTs  -f/u NURIA, ASMA, AMA, EBV/HSV/CMV studies  -Abd US with doppler  -IV vitamin K 10mg x3 days  -start IV NAC for now given elevated INR and acute liver injury  -continued CHF management per cards and primary team    GI will continue to follow     Vamsi Antoine,  MD  7:35 PM  12/19/2023  [facility]  [phone]

## 2023-12-21 ENCOUNTER — APPOINTMENT (OUTPATIENT)
Dept: ULTRASOUND IMAGING | Facility: HOSPITAL | Age: 43
DRG: 987 | End: 2023-12-21
Attending: PHYSICIAN ASSISTANT
Payer: COMMERCIAL

## 2023-12-21 PROBLEM — R29.898 WEAKNESS OF BOTH LOWER EXTREMITIES: Status: ACTIVE | Noted: 2023-12-21

## 2023-12-21 LAB
ALBUMIN SERPL ELPH-MCNC: 2.94 G/DL (ref 3.75–5.21)
ALBUMIN SERPL-MCNC: 3 G/DL (ref 3.4–5)
ALBUMIN/GLOB SERPL: 1.3 {RATIO} (ref 1–2)
ALBUMIN/GLOB SERPL: 1.4 {RATIO} (ref 1–2)
ALP LIVER SERPL-CCNC: 200 U/L
ALPHA1 GLOB SERPL ELPH-MCNC: 0.51 G/DL (ref 0.19–0.46)
ALPHA2 GLOB SERPL ELPH-MCNC: 0.49 G/DL (ref 0.48–1.05)
ALT SERPL-CCNC: 1127 U/L
ANION GAP SERPL CALC-SCNC: 9 MMOL/L (ref 0–18)
ANTI-MPO ANTIBODIES: <0.2 UNITS
ANTI-PR3 ANTIBODIES: <0.2 UNITS
AST SERPL-CCNC: 918 U/L (ref 15–37)
B-GLOBULIN SERPL ELPH-MCNC: 0.81 G/DL (ref 0.68–1.23)
BASOPHILS # BLD AUTO: 0.01 X10(3) UL (ref 0–0.2)
BASOPHILS # BLD: 0 X10(3) UL (ref 0–0.2)
BASOPHILS NFR BLD AUTO: 0.1 %
BASOPHILS NFR BLD: 0 %
BILIRUB SERPL-MCNC: 2.3 MG/DL (ref 0.1–2)
BUN BLD-MCNC: 76 MG/DL (ref 9–23)
C1Q BIND IMMUNE COMPLEX: <1.2 UG EQ/ML
CALCIUM BLD-MCNC: 8.1 MG/DL (ref 8.5–10.1)
CENTROMERE IGG SER-ACNC: <0.4 U/ML
CHLORIDE SERPL-SCNC: 104 MMOL/L (ref 98–112)
CO2 SERPL-SCNC: 25 MMOL/L (ref 21–32)
CREAT BLD-MCNC: 1.17 MG/DL
DSDNA IGG SERPL IA-ACNC: <0.6 IU/ML
EGFRCR SERPLBLD CKD-EPI 2021: 59 ML/MIN/1.73M2 (ref 60–?)
ENA AB SER QL IA: 31 UG/L
ENA AB SER QL IA: POSITIVE
ENA JO1 AB SER IA-ACNC: <0.3 U/ML
ENA RNP IGG SER IA-ACNC: 1.6 U/ML
ENA SCL70 IGG SER IA-ACNC: <0.6 U/ML
ENA SM IGG SER IA-ACNC: 1 U/ML
ENA SS-A IGG SER IA-ACNC: >240 U/ML
ENA SS-B IGG SER IA-ACNC: <0.4 U/ML
EOSINOPHIL # BLD AUTO: 0 X10(3) UL (ref 0–0.7)
EOSINOPHIL # BLD: 0.13 X10(3) UL (ref 0–0.7)
EOSINOPHIL NFR BLD AUTO: 0 %
EOSINOPHIL NFR BLD: 1 %
ERYTHROCYTE [DISTWIDTH] IN BLOOD BY AUTOMATED COUNT: 14 %
ERYTHROCYTE [DISTWIDTH] IN BLOOD BY AUTOMATED COUNT: 14.2 %
GAMMA GLOB SERPL ELPH-MCNC: 0.44 G/DL (ref 0.62–1.7)
GLOBULIN PLAS-MCNC: 2.2 G/DL (ref 2.8–4.4)
GLUCOSE BLD-MCNC: 140 MG/DL (ref 70–99)
HCT VFR BLD AUTO: 27.2 %
HCT VFR BLD AUTO: 31.4 %
HGB BLD-MCNC: 10.2 G/DL
HGB BLD-MCNC: 8.9 G/DL
IMM GRANULOCYTES # BLD AUTO: 0.31 X10(3) UL (ref 0–1)
IMM GRANULOCYTES NFR BLD: 4.3 %
INR BLD: 1.48 (ref 0.8–1.2)
LYMPHOCYTES # BLD AUTO: 0.24 X10(3) UL (ref 1–4)
LYMPHOCYTES NFR BLD AUTO: 3.3 %
LYMPHOCYTES NFR BLD: 0.39 X10(3) UL (ref 1–4)
LYMPHOCYTES NFR BLD: 3 %
M PROTEIN 1 SERPL ELPH-MCNC: 0.09 G/DL (ref ?–0)
MCH RBC QN AUTO: 28.2 PG (ref 26–34)
MCH RBC QN AUTO: 29.1 PG (ref 26–34)
MCHC RBC AUTO-ENTMCNC: 32.5 G/DL (ref 31–37)
MCHC RBC AUTO-ENTMCNC: 32.7 G/DL (ref 31–37)
MCV RBC AUTO: 86.1 FL
MCV RBC AUTO: 89.5 FL
MONOCYTES # BLD AUTO: 0.34 X10(3) UL (ref 0.1–1)
MONOCYTES # BLD: 0.52 X10(3) UL (ref 0.1–1)
MONOCYTES NFR BLD AUTO: 4.7 %
MONOCYTES NFR BLD: 4 %
NEUTROPHILS # BLD AUTO: 11.46 X10 (3) UL (ref 1.5–7.7)
NEUTROPHILS # BLD AUTO: 6.33 X10 (3) UL (ref 1.5–7.7)
NEUTROPHILS # BLD AUTO: 6.33 X10(3) UL (ref 1.5–7.7)
NEUTROPHILS NFR BLD AUTO: 87.6 %
NEUTROPHILS NFR BLD: 84 %
NEUTS BAND NFR BLD: 8 %
NEUTS HYPERSEG # BLD: 11.96 X10(3) UL (ref 1.5–7.7)
NRBC BLD MANUAL-RTO: 14 %
OSMOLALITY SERPL CALC.SUM OF ELEC: 311 MOSM/KG (ref 275–295)
PLATELET # BLD AUTO: 200 10(3)UL (ref 150–450)
PLATELET # BLD AUTO: 79 10(3)UL (ref 150–450)
PLATELET MORPHOLOGY: NORMAL
PLATELET MORPHOLOGY: NORMAL
POTASSIUM SERPL-SCNC: 2.9 MMOL/L (ref 3.5–5.1)
POTASSIUM SERPL-SCNC: 2.9 MMOL/L (ref 3.5–5.1)
POTASSIUM SERPL-SCNC: 3.1 MMOL/L (ref 3.5–5.1)
PROT SERPL-MCNC: 5.2 G/DL (ref 5.7–8.2)
PROT SERPL-MCNC: 5.2 G/DL (ref 6.4–8.2)
PROTHROMBIN TIME: 18 SECONDS (ref 11.6–14.8)
RBC # BLD AUTO: 3.16 X10(6)UL
RBC # BLD AUTO: 3.51 X10(6)UL
SODIUM SERPL-SCNC: 138 MMOL/L (ref 136–145)
TOTAL CELLS COUNTED BLD: 100
U1 SNRNP IGG SER IA-ACNC: 1.5 U/ML
WBC # BLD AUTO: 13 X10(3) UL (ref 4–11)
WBC # BLD AUTO: 7.2 X10(3) UL (ref 4–11)

## 2023-12-21 PROCEDURE — 93970 EXTREMITY STUDY: CPT | Performed by: PHYSICIAN ASSISTANT

## 2023-12-21 PROCEDURE — 99291 CRITICAL CARE FIRST HOUR: CPT | Performed by: INTERNAL MEDICINE

## 2023-12-21 PROCEDURE — 99233 SBSQ HOSP IP/OBS HIGH 50: CPT | Performed by: INTERNAL MEDICINE

## 2023-12-21 PROCEDURE — 99255 IP/OBS CONSLTJ NEW/EST HI 80: CPT | Performed by: INTERNAL MEDICINE

## 2023-12-21 PROCEDURE — 99232 SBSQ HOSP IP/OBS MODERATE 35: CPT | Performed by: HOSPITALIST

## 2023-12-21 RX ORDER — CALCIUM CARBONATE 500 MG/1
1000 TABLET, CHEWABLE ORAL EVERY 6 HOURS PRN
Status: DISCONTINUED | OUTPATIENT
Start: 2023-12-21 | End: 2024-01-02

## 2023-12-21 RX ORDER — METOPROLOL TARTRATE 50 MG/1
50 TABLET, FILM COATED ORAL
Status: DISCONTINUED | OUTPATIENT
Start: 2023-12-21 | End: 2023-12-27

## 2023-12-21 RX ORDER — POTASSIUM CHLORIDE 14.9 MG/ML
20 INJECTION INTRAVENOUS ONCE
Status: COMPLETED | OUTPATIENT
Start: 2023-12-21 | End: 2023-12-22

## 2023-12-21 RX ORDER — MULTIPLE VITAMINS W/ MINERALS TAB 9MG-400MCG
1 TAB ORAL DAILY
Status: DISCONTINUED | OUTPATIENT
Start: 2023-12-22 | End: 2024-01-02

## 2023-12-21 RX ORDER — DULOXETIN HYDROCHLORIDE 30 MG/1
30 CAPSULE, DELAYED RELEASE ORAL DAILY
Status: DISCONTINUED | OUTPATIENT
Start: 2023-12-21 | End: 2024-01-02

## 2023-12-21 RX ORDER — POTASSIUM CITRATE 5 MEQ/1
20 TABLET, EXTENDED RELEASE ORAL
Status: DISCONTINUED | OUTPATIENT
Start: 2023-12-21 | End: 2023-12-24

## 2023-12-21 RX ORDER — HYDROCODONE BITARTRATE AND ACETAMINOPHEN 5; 325 MG/1; MG/1
1 TABLET ORAL EVERY 6 HOURS PRN
Status: DISCONTINUED | OUTPATIENT
Start: 2023-12-21 | End: 2023-12-29

## 2023-12-21 RX ORDER — POTASSIUM CHLORIDE 14.9 MG/ML
20 INJECTION INTRAVENOUS ONCE
Status: COMPLETED | OUTPATIENT
Start: 2023-12-21 | End: 2023-12-21

## 2023-12-21 NOTE — CONSULTS
[facility]  JUAN[facility] Neurology Preliminary Note    Alina Aceves Patient Status:  Inpatient    1980 MRN [id number]   Location [facility] 4SW-A Attending Noe Perkins MD   Hosp Day # 8 PCP HO[facility] IBARRA     REASON FOR EVALUATION: Lower extremities weakness      HISTORY OF PRESENT ILLNESS: Alina Aceves is a 43 year old female with medical history significant for breast cancer,s/p chemo and left mastectomy 23), developed LEs neuropathy with chemo,  lupus, Sjorgen's disease, vasculitis, and recently diagnosed new onset severe cardiomyopathy (most recent EF 20-25%), who was admitted on 23 with not feeling well for a few days, short of breath and was diagnosed with pneumonia. During her stay she was found to have pleural effusions, underwent bronchoscopy with bronchial lavage, as well as had severe hyponatremia and was lethargic. Doing better now, sodium has been corrected, she is now alert and oriented x 4.   She was on Cymbalta for her neuropathy which was stopped on  by nephrology. He LE weakness has been worsening as she developed some swelling to BLE and was not able to move them et al. Right foot drop she developed soon after the initial Taxol chemo was started back in March, but she was able to walk and drive. Now both legs are weak with right worse than left. She reports unable to feel much and also not able to wiggle toes or move them. She has been bed bound while in ICU, a total lift was used to help with transfers. Has been followed by multiple services including oncology, nephrology, pulmonology, GI, infectious disease and cardiology.     Currently, the patient is sitting up in bed. Awake, alert and oriented x 4. Denies headache, no weakness to upper extremities. No vision changes, no headache. No speech difficulties. Reports legs feel heavy, unable to move them. Also painful.             PAST MEDICAL HISTORY:  Past Medical History:   Diagnosis Date    Breast cancer (HCC)      Gastritis     Lupus (HCC)     Sjogren's disease (HCC)        PAST SURGICAL HISTORY:  Past Surgical History:   Procedure Laterality Date    BREAST SURGERY Left      DELIVERY ONLY         FAMILY HISTORY:  family history is not on file.    SOCIAL HISTORY:   reports that she quit smoking about 13 years ago. Her smoking use included cigarettes. She has never used smokeless tobacco. She reports that she does not currently use alcohol. She reports that she does not use drugs.    ALLERGIES:  Allergies   Allergen Reactions    Bactrim [Sulfamethoxazole W/Trimethoprim] RASH       MEDICATIONS:  No current outpatient medications on file.     Current Facility-Administered Medications   Medication Dose Route Frequency    potassium citrate (Urocit-K) tab 20 mEq  20 mEq Oral TID CC    HYDROcodone-acetaminophen (Norco) 5-325 MG per tab 1 tablet  1 tablet Oral Q6H PRN    metoprolol tartrate (Lopressor) tab 50 mg  50 mg Oral 2x Daily(Beta Blocker)    methylPREDNISolone sodium succinate (Solu-MEDROL) injection 40 mg  40 mg Intravenous Daily    bumetanide (Bumex) 12.5 mg in 50 mL infusion  0.5 mg/hr Intravenous Continuous    dextromethorphan-guaiFENesin (Robitussin-DM)  mg/5mL oral solution 5 mL  5 mL Oral Q6H PRN    ALPRAZolam (Xanax) tab 0.25 mg  0.25 mg Oral TID PRN    pantoprazole (Protonix) 40 mg in sodium chloride 0.9% PF 10 mL IV push  40 mg Intravenous QAM AC    Or    pantoprazole (Protonix) DR tab 40 mg  40 mg Oral QAM AC    LORazepam (Ativan) 2 mg/mL injection 0.5 mg  0.5 mg Intravenous Q6H PRN    metoprolol (Lopressor) 5 mg/5mL injection 5 mg  5 mg Intravenous Q6H PRN    ipratropium-albuterol (Duoneb) 0.5-2.5 (3) MG/3ML inhalation solution 3 mL  3 mL Nebulization Q4H PRN    melatonin tab 3 mg  3 mg Oral Nightly PRN    prochlorperazine (Compazine) 10 MG/2ML injection 5 mg  5 mg Intravenous Q8H PRN    polyethylene glycol (PEG 3350) (Miralax) 17 g oral packet 17 g  17 g Oral Daily PRN    sennosides (Senokot) tab  17.2 mg  17.2 mg Oral Nightly PRN    bisacodyl (Dulcolax) 10 MG rectal suppository 10 mg  10 mg Rectal Daily PRN    fleet enema (Fleet) 7-19 GM/118ML rectal enema 133 mL  1 enema Rectal Once PRN    HYDROmorphone (Dilaudid) 1 MG/ML injection 0.2 mg  0.2 mg Intravenous Q2H PRN       REVIEW OF SYSTEMS:  A 10-point system was reviewed.  Pertinent positives and negatives are noted in HPI.      PHYSICAL EXAMINATION:  VITAL SIGNS: BP (!) 146/106 (BP Location: Right arm)   Pulse 113   Temp 97.3 °F (36.3 °C) (Temporal)   Resp 22   Ht 64\"   Wt 147 lb 0.8 oz (66.7 kg)   LMP 08/28/2023 (Approximate)   SpO2 98%   Breastfeeding No   BMI 25.24 kg/m²   GENERAL:  Patient is a 43 year old female in no acute distress.  PSYCH: Pleasant, cooperative, alert   HEENT:  Normocephalic, atraumatic  NECK: Symmetrical, atraumatic  LUNGS: Regular rate, no accessory muscle use  HEART: Regular rate and rhythm.  ABD: Soft, non tender  SKIN: Warm, dry, no rashes  EXTREMITIES: Symmetrical, no clubbing, some bruising noted to knees    NEUROLOGICAL:   Mental status: Oriented to person, place, situation and time   Speech: Fluent, no obvious aphasia or dysarthria  Memory and comprehension: Intact   Cranial Nerves: VFF, PERRL 3mm brisk, EOMI, no nystagmus, facial sensation intact, face symmetric, tongue midline, shoulder shrug equal, remainder CN grossly intact  Motor: No drift, no focal arm weakness. Lower extremities weakness, right >left. Motor strength 5 out of 5 in UEs,0-1 to left LE, 0/5 RLE. Unable to lift lower legs off the bed, able to contract thigh muscles  and around knees.   Sensory: sharp/dull sensation impaired to Right leg from knee down, left leg from ankle down. Unable to feel vibration to RLE below knee. Left LE - abl to feel vibration from mid leg up. Reports some calf pain bilaterally when knees supported for reflexes check.   DTRs: 2+ in UE bilaterally, 1+ to LLE and 0 to R LE  Coordination: FTN intact  Gait:  Deferred        DIAGNOSTIC DATA:  Labs:  Recent Labs   Lab 12/17/23  0617 12/19/23  1728 12/21/23  0521   RBC 2.74* 3.51*  3.55* 3.16*   HGB 7.9* 10.2*  10.2* 8.9*   HCT 22.3* 31.4*  30.0* 27.2*   MCV 81.4 89.5  84.5 86.1   MCH 28.8 29.1  28.7 28.2   MCHC 35.4 32.5  34.0 32.7   RDW 12.9 14.0  14.0 14.2   NEPRELIM 7.20 11.46* 6.33   WBC 8.3 13.0*  12.8* 7.2   .0 200.0  98.0* 79.0*       Recent Labs   Lab 12/20/23  0331 12/20/23  0940 12/20/23  1455 12/21/23  0146 12/21/23  0521   * 130*  --   --  140*   BUN 73* 84*  --   --  76*   CREATSERUM 1.65* 1.55*  --   --  1.17*   EGFRCR 39* 42*  --   --  59*   CA 6.7* 7.7*  --   --  8.1*   * 136  --   --  138   K 3.2* 3.1* 2.7* 2.9* 2.9*    100  --   --  104   CO2 21.0 25.0  --   --  25.0           IMAGING:  US ABDOMEN COMPLETE (CPT=76700)    Result Date: 12/19/2023  CONCLUSION:  1. Cholelithiasis with mild gallbladder wall thickening and small amount of pericholecystic fluid.  Findings may represent acute cholecystitis.  Correlation with clinical symptoms is recommended.  2. Bilateral pleural effusions   LOCATION:  [facility]    Dictated by (CST): Sofi Laguna MD on 12/19/2023 at 7:37 PM     Finalized by (CST): Sofi Laguna MD on 12/19/2023 at 7:40 PM       XR CHEST AP PORTABLE  (CPT=71045)    Result Date: 12/19/2023  CONCLUSION:  Extensive airspace infiltrates bilaterally with interval worsening at the right base compared to the previous exam.  Differential considerations include pneumonia, pulmonary hemorrhage, pulmonary edema and ARDS.   LOCATION:  [facility]      Dictated by (CST): Juan Fernandez MD on 12/19/2023 at 4:39 PM     Finalized by (CST): Juan Fernandez MD on 12/19/2023 at 4:41 PM                 Patient Active Problem List   Diagnosis    Breast abscess    Hypokalemia    Hyponatremia    Arthralgia of left forearm    BRCA gene mutation negative in female    Anemia    Metabolic acidosis    Community acquired pneumonia,  unspecified laterality    Weakness generalized    Anemia, unspecified type    Malignant neoplasm of female breast (HCC)    Bilateral pleural effusion    SLE (systemic lupus erythematosus related syndrome) (HCC)    Neuropathy    Sjogren's syndrome (HCC)    Persistent proteinuria    REJI (acute kidney injury) (HCC)    SIADH (syndrome of inappropriate ADH production) (HCC)    Cardiomyopathy (HCC)    Elevated liver function tests    HFrEF (heart failure with reduced ejection fraction) (HCC)    Other forms of systemic lupus erythematosus (HCC)    Weakness of both lower extremities        Assessment & Plan    A 43 year old female with:    Worsening of bilateral low extremities weakness  - has been going on for a few weeks, now worsening. 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  Recovering from pneumonia, pleural effusions, breast cancer surgery. Concern for worsening peripheral neuropathy vs  radiculopathy? Sensory and motor exam abnormal, bilateral edema and more pain, concern for DVT. Autoimmune conditions are associated with sensory neuropathy, patient has been off her maintenance therapies.   Bilateral US of LEs - no evidence of DVT.   MRI lumbar spine w/wo - pending  B12 level recently checked, noted  Cymbalta was on hold per Renal, ok to resume now. Reordered at home dose 30 mg daily.   May have Norco PRN legs pain    Discussed with patient and nursing. Discussed with dr. Dorsey, to see and follow with further recommendations.    Is this a shared or split note between Advanced Practice Provider and Physician? Yes       Violetta BONILLA  [facility]  12/21/2023, 12:44 PM   [place] # [id number]

## 2023-12-21 NOTE — PROGRESS NOTES
[facility]     Nephrology Progress Note    Alina Aceves Patient Status:  Inpatient    1980 MRN [id number]   [facility] 6NE-A Attending Noe Perkins MD   Hosp Day # 8 PCP [facility] IBARRA       SUBJECTIVE:  No acute events, UOP excellent      Physical Exam:   BP (!) 141/108   Pulse 104   Temp 97.3 °F (36.3 °C) (Temporal)   Resp 18   Ht 5' 4\" (1.626 m)   Wt 147 lb 0.8 oz (66.7 kg)   LMP 2023 (Approximate)   SpO2 100%   Breastfeeding No   BMI 25.24 kg/m²   Temp (24hrs), Av.8 °F (36.6 °C), Min:97.3 °F (36.3 °C), Max:98.4 °F (36.9 °C)       Intake/Output Summary (Last 24 hours) at 2023 0720  Last data filed at 2023 0647  Gross per 24 hour   Intake 1433.6 ml   Output 4840 ml   Net -3406.4 ml     Last 3 Weights   23 0000 147 lb 0.8 oz (66.7 kg)   23 0538 150 lb 9.2 oz (68.3 kg)   23 0019 150 lb 3.2 oz (68.1 kg)   23 2326 124 lb (56.2 kg)   23 1823 125 lb (56.7 kg)   23 1816 120 lb (54.4 kg)   21 1537 135 lb (61.2 kg)   20 1405 136 lb (61.7 kg)   08/15/20 1020 140 lb (63.5 kg)     General: Alert and oriented in no apparent distress.  HEENT: No scleral icterus, MMM  Neck: Supple, no KALYN or thyromegaly  Cardiac: Regular rate and rhythm, S1, S2 normal, no murmur or rub  Lungs: Clear without wheezes, rales, rhonchi.    Abdomen: Soft, non-tender. + bowel sounds, no palpable organomegaly  Extremities: 2+ BLE edema.  Neurologic: moving all extremities  Skin: Warm and dry, no rash        Labs:     Recent Labs   Lab 12/15/23  0513 23  0617 23  1312 23  1728 23  0800 23  0521   WBC  --  8.3  --  13.0*  12.8*  --  7.2   HGB  --  7.9*  --  10.2*  10.2*  --  8.9*   MCV  --  81.4  --  89.5  84.5  --  86.1   PLT  --  157.0  --  200.0  98.0*  --   --    BAND  --   --   --  8  --   --    INR 1.27*  --  2.28* 2.36* 2.21* 1.48*       Recent Labs   Lab 23  0514 23  1728 23  0331  12/20/23  0940 12/20/23  1455 12/21/23  0146 12/21/23  0521   * 133* 135* 136  --   --  138   K 4.3 3.9 3.2* 3.1* 2.7* 2.9* 2.9*   CL 98 98 101 100  --   --  104   CO2 23.0 22.0 21.0 25.0  --   --  25.0   BUN 65* 74* 73* 84*  --   --  76*   CREATSERUM 1.83* 1.84* 1.65* 1.55*  --   --  1.17*   CA 7.7* 7.8* 6.7* 7.7*  --   --  8.1*   MG  --   --  2.1  --   --   --   --    GLU 98 77 165* 130*  --   --  140*       Recent Labs   Lab 12/16/23  0650 12/19/23  0514 12/19/23  1728 12/20/23  0331 12/20/23  0940 12/21/23  0521   ALT  --  1,296* 1,093* 793* 949* 1,127*   AST  --  1,216* 946* 725* 973* 918*   ALB  --  2.3* 2.7* 2.2* 2.6* 3.0*   *  --   --   --   --   --        Recent Labs   Lab 12/18/23  0729 12/18/23  1106 12/19/23  1514   PGLU 84 125* 72       Meds:           Impression/Plan:         REJI- in setting of severe CM with cardiorenal physiology..  Follow closely with diuresis, creat siog better today with ongoing excellent diuresis.  Reviewing wts it would appear she is still 20# higher than earlier this admit, cont diuresis  Proteinuria- pt w/ hx of interstitial nephritis - treated w/ steorids in past - @ [facility] (Dr Tejeda); kidney biopsy jan 2023- interstitial nephritis-  Spot ratio ~ 2.9 g/g; low complements; +NURIA->+ anti-SSA ab  - may need to consider kidney biopsy for further evaluation although for now holding on ordering given overall clinical picture  Hyponatremia- would appear primarily due to hypervolemia.  Now resolved, cont loop diuretic.  Concern for poss component related to SIADH as well and SSRI has been held  Cont aggressive diuresis  Worsening LFTs- ? Due to ischemia/congestion although again sig higher today.  GI following  Breast Ca- oncology following  Effusion- per pulm; on O2; s/p thoracentesis ;no malignancy on cytology  Hypoxia- effusion/CHF.  S/p bronch.  Per pulm  Hypokelamia- replace per protocol and will start standing dose as well. Pt states she was on potassium citrate in  the past with good results  Hx of SLE/Sjogrens- appreciate rheum eval, on steroids          Questions/concerns were discussed with patient and/or family by bedside.      Cctime 35 minutes      Francisco Quarles MD  12/21/2023  7:20 AM

## 2023-12-21 NOTE — PROGRESS NOTES
[facility]  Hematology Oncology Progress Note  2023    Alina Aceves Patient Status:  Inpatient    1980 MRN [id number]   Location [facility] 4NW-A Attending Tosha Waite MD   Hosp Day # 8 PCP [facility] IBARRA     Subjective:  Events noted.  Cytology and cell counts reviewed.  C/w inflammatory changes and no evidence of malignancy.    CBC yesterday showed hemoglobin 10, platelet count 200.    We noted plan to start low dose steroids per Rheumatology          Meds:    potassium chloride **FOLLOWED BY** potassium chloride    methylPREDNISolone    dexmedetomidine    bumetanide (Bumex) 12.5 mg in 50 mL infusion    phytonadione (Aqua-[place]ep[place]) 10 mg in sodium chloride 0.9% 50 mL IVPB    metoprolol tartrate    [Held by provider] furosemide    dextromethorphan-guaiFENesin    ALPRAZolam    pantoprazole **OR** pantoprazole    LORazepam    metoprolol    ipratropium-albuterol    melatonin    prochlorperazine    polyethylene glycol (PEG 3350)    sennosides    bisacodyl    fleet enema    HYDROmorphone **OR** [DISCONTINUED] HYDROmorphone **OR** [DISCONTINUED] HYDROmorphone    Objective:  Blood pressure (!) 141/108, pulse 104, temperature 97.3 °F (36.3 °C), temperature source Temporal, resp. rate 18, height 5' 4\" (1.626 m), weight 147 lb 0.8 oz (66.7 kg), last menstrual period 2023, SpO2 100%, not currently breastfeeding. On HiFlo 6L NC, up from 3L --> now back to 2L  Gen: NAD, alert  CV: RRR, no m/r/g  Lungs: dec BS bilaterally in based  Abd: Normoactive bs, soft, nt/nd  Extrem: WWP, no edema  Skin: No acute changes    Labs:     Recent Labs   Lab 23  0617 23  1728 23  0521   RBC 2.74* 3.51*  3.55* 3.16*   HGB 7.9* 10.2*  10.2* 8.9*   HCT 22.3* 31.4*  30.0* 27.2*   MCV 81.4 89.5  84.5 86.1   MCH 28.8 29.1  28.7 28.2   MCHC 35.4 32.5  34.0 32.7   RDW 12.9 14.0  14.0 14.2   NEPRELIM 7.20 11.46* 6.33   WBC 8.3 13.0*  12.8* 7.2   .0 200.0  98.0*  --          Recent Labs   Lab  12/20/23  0331 12/20/23  0940 12/20/23  1455 12/21/23  0146 12/21/23  0521   * 130*  --   --  140*   BUN 73* 84*  --   --  76*   CREATSERUM 1.65* 1.55*  --   --  1.17*   EGFRCR 39* 42*  --   --  59*   CA 6.7* 7.7*  --   --  8.1*   ALB 2.2* 2.6*  --   --  3.0*   * 136  --   --  138   K 3.2* 3.1* 2.7* 2.9* 2.9*    100  --   --  104   CO2 21.0 25.0  --   --  25.0   ALKPHO 171* 189*  --   --  200*   * 973*  --   --  918*   * 949*  --   --  1,127*   BILT 2.3* 2.6*  --   --  2.3*   TP 4.3* 4.6*  --   --  5.2*       Imaging:  Reviewed    Assessment and Plan:  Alina Aceves is a 43 year old female with stage IIIb breast cancer status postmastectomy now with bilateral pneumonia and pleural effusions.     Breast cancer  Locally advanced stage IIIb triple negative breast cancer  Status post neoadjuvant chemotherapy followed by mastectomy in November 2023  Residual T1a disease and has been recommended to start oral capecitabine  No treatment while inpatient     Pneumonia/effusion  Reviewed CT scan she had bilateral lung consolidation/infiltrate along with bilateral effusion  Not related to underlying breast cancer  Data set to date c/w with transudates and no evidence of malignancy     Anemia  Moderate anemia secondary to previous chemotherapy  Will monitor hemoglobin and transfuse for hemoglobin less than 7.  Last hemoglobin 9 grams.    She had thrombocytopenia with platelet count reported at 98 yesterday.  Repeat last night with platelet count of 200.  Prelim CBC this morning--platelet count is not reported.    LFTs noted, GI consultation and plans noted.  Vit K ongoing for INR  Abdominal ultrasound showing cholelithiasis noted.    Hyponatremia, likely due to SIADH.  Renal following.  Na improved at 138      We will follow.    Please do not hesitate to contact me with any specific questions or concerns.    Michael Alexander MD  [facility]  Department of Oncology and Hematology

## 2023-12-21 NOTE — PROGRESS NOTES
Daily Pulmonary/ICU/Critical Care Progress Note          SUBJECTIVE:  Alina Aceves was seen on 12/21/23.  Feels better.   Legs feel weak.   Weaned to 1 LNC.      ALLERGIES:  Allergies   Allergen Reactions    Bactrim [Sulfamethoxazole W/Trimethoprim] RASH         MEDS:  Home Medications:  Outpatient Medications Marked as Taking for the 12/13/23 encounter (Hospital Encounter)   Medication Sig Dispense Refill    Potassium Citrate ER 15 MEQ (1620 MG) Oral Tab CR Take 1 tablet by mouth in the morning, at noon, and at bedtime.      DULoxetine 30 MG Oral Cap DR Particles Take 1 capsule (30 mg total) by mouth daily.      ondansetron (ZOFRAN) 8 MG tablet Take 1 tablet (8 mg total) by mouth as needed.      zolpidem 10 MG Oral Tab Take 1 tablet (10 mg total) by mouth nightly as needed for Sleep.       Scheduled Medication:   potassium citrate  20 mEq Oral TID CC    metoprolol tartrate  50 mg Oral 2x Daily(Beta Blocker)    methylPREDNISolone  40 mg Intravenous Daily    pantoprazole  40 mg Intravenous QAM AC    Or    pantoprazole  40 mg Oral QAM AC     Continuous Infusing Medication:   bumetanide (Bumex) 12.5 mg in 50 mL infusion 0.5 mg/hr (12/21/23 0647)     PRN Medications:  HYDROcodone-acetaminophen, dextromethorphan-guaiFENesin, ALPRAZolam, LORazepam, metoprolol, ipratropium-albuterol, melatonin, prochlorperazine, polyethylene glycol (PEG 3350), sennosides, bisacodyl, fleet enema, HYDROmorphone **OR** [DISCONTINUED] HYDROmorphone **OR** [DISCONTINUED] HYDROmorphone       PHYSICAL EXAM:  BP (!) 146/106 (BP Location: Right arm)   Pulse 113   Temp 97.3 °F (36.3 °C) (Temporal)   Resp 22   Ht 5' 4\" (1.626 m)   Wt 147 lb 0.8 oz (66.7 kg)   LMP 08/28/2023 (Approximate)   SpO2 98%   Breastfeeding No   BMI 25.24 kg/m²          CONSTITUTIONAL: alert, oriented, no apparent distress  HEENT: atraumatic normocephalic  MOUTH: mucous membranes are moist. No OP exudates  NECK/THROAT: no JVD. Trachea midline. No obvious  thyromegaly  LUNG: no wheezing, + faint crackles. Chest symmetric with respiratory motion  HEART: regular rate and rhythm, no obvious murmers or gallops noted  ABD: soft non tender. + bowel sounds. No organomegaly noted  EXT: no clubbing, cyanosis, or edema noted. Pulses intact grossly  NEURO/MUSCULOSKELETAL: legs weak, unable to wiggle toes. Gross sensation is intact  SKIN: warm, dry. No obvious lesions noted  LYMPH: no obvious LAD      IMAGES:   CXR  Impression   CONCLUSION:  Extensive airspace infiltrates bilaterally with interval worsening at the right base compared to the previous exam.  Differential considerations include pneumonia, pulmonary hemorrhage, pulmonary edema and ARDS.         LABS:  Recent Labs   Lab 12/17/23  0617 12/19/23  1728 12/21/23  0521   RBC 2.74* 3.51*  3.55* 3.16*   HGB 7.9* 10.2*  10.2* 8.9*   HCT 22.3* 31.4*  30.0* 27.2*   MCV 81.4 89.5  84.5 86.1   MCH 28.8 29.1  28.7 28.2   MCHC 35.4 32.5  34.0 32.7   RDW 12.9 14.0  14.0 14.2   NEPRELIM 7.20 11.46* 6.33   WBC 8.3 13.0*  12.8* 7.2   .0 200.0  98.0* 79.0*       Recent Labs   Lab 12/20/23  0331 12/20/23  0940 12/20/23  1455 12/21/23  0146 12/21/23  0521   * 130*  --   --  140*   BUN 73* 84*  --   --  76*   CREATSERUM 1.65* 1.55*  --   --  1.17*   EGFRCR 39* 42*  --   --  59*   CA 6.7* 7.7*  --   --  8.1*   ALB 2.2* 2.6*  --   --  3.0*   * 136  --   --  138   K 3.2* 3.1* 2.7* 2.9* 2.9*    100  --   --  104   CO2 21.0 25.0  --   --  25.0   ALKPHO 171* 189*  --   --  200*   * 973*  --   --  918*   * 949*  --   --  1,127*   BILT 2.3* 2.6*  --   --  2.3*   TP 4.3* 4.6*  --   --  5.2*       ASSESSMENT/PLAN:  Acute hypoxemic respiratory failure - secondary to pneumonia vs pneumonitis as well as systolic heart failure with bilateral pleural effusions.  -continue supplemental oxygen, on 2L this AM   -ECHO with EF: %  PASP: 55mmHg  Pneumonia - viral respiratory panel was negative. PCT was  negative, but cannot r/o atypical pathogens  -completed empiric antibiotics : ceftriaxone, doxycycline (12/13-12/18)   -follow up cultures  -strep and legionella urine ag- negative  -s/p bronch 12/19 cultures and cytology pending.   Systolic heart failure: EF: 20-25%  -cards eval appreciated  Transaminitis: suspect shock liver vs congestive hepatopathy  -lactate slightly elevated 12/19.   -trend LFTs  -GI consulted and following.   -abdominal US with cholecystitis. Per GI.   Pleural effusions - bilateral, transudative  -L side- 700 removed on 12/15  -R side- 1400 removed on 12/16  -await culture  -cytology is negative.   LE weakness        -pt reports has been going on for a couple of weeks. May need neuro consult  SLE  -per hospitalist and rheumatology  -checking ANCA  - Analgesia per rheum.   Hyponatremia, non-anion gap acidosis  -renal following  Breast cancer  -per heme/onc  Proph   -lmwh  Dispo   -full code  -inpatient   -will follow    Thank you for the opportunity to care for Alinaemma Freedman DO, MPH  Pulmonary Critical Care Medicine

## 2023-12-21 NOTE — PHYSICAL THERAPY NOTE
PHYSICAL THERAPY EVALUATION - INPATIENT     Room Number: 466/466-A  Evaluation Date: 12/21/2023  Type of Evaluation: Re-evaluation  Physician Order: PT Eval and Treat    Presenting Problem: PNA  Co-Morbidities : breast cancer who is undergoing chemotherapy  Reason for Therapy: Mobility Dysfunction and Discharge Planning    History related to current admission: Patient is a 43 year old female admitted on 12/13/2023 : Presented with SOB and possible PNA, fall noted 12/14 described as BLE buckling    Pt with transfer to CNICU on 12/19 with lateral transfer to ICU on 12/20.      Pt seen for re-eval this date as unable to see for PT since 12/16 and notable increased weakness in BLEs at this time.  Pt was able to demo LE exercises- hip flexion, ankle pumps, LAQ during PT session on 12/16.      ASSESSMENT   In this PT evaluation, the patient presents with the following impairments decreased activity tolerance, balance, strength.  These impairments and comorbidities manifest themselves as functional limitations in independent bed mobility, transfers, and gait.  The patient is below baseline and would benefit from skilled inpatient PT to address the above deficits to assist patient in returning to prior to level of function.   Functional outcome measures completed include [facility].  The AM-PAC '6-Clicks' Inpatient Basic Mobility Short Form was completed and this patient is demonstrating a Approx Degree of Impairment: 92.36%  degree of impairment in mobility.    DISCHARGE RECOMMENDATIONS  PT Discharge Recommendations: Undetermined    PLAN  PT Treatment Plan: Bed mobility;Body mechanics;Coordination;Endurance;Energy conservation;Patient education;Family education;Gait training;Neuromuscular re-educate;Range of motion;Strengthening;Stoop training;Stair training;Transfer training;Balance training  Rehab Potential : Fair  Frequency (Obs): 5x/week  Number of Visits to Meet Established Goals: 5      CURRENT GOALS    Goal #1  Patient is able to demonstrate supine - sit EOB @ level: supervision     Goal #2 Patient is able to demonstrate transfers Sit to/from Stand at assistance level: minimum assistance     Goal #3 Patient is able to ambulate 10 feet with assist device: walker - rolling at assistance level: minimum assistance     Goal #4    Goal #5    Goal #6    Goal Comments: Goals established on 12/21/2023    HOME SITUATION  Type of Home: House   Home Layout: Two level                Lives With: Daughter (8 and 20(in college))  Drives: Yes  Patient Owned Equipment: None       Prior Level of [place]: Pt was previously ind.  Pt last seen for PT on 12/16- able to move from bed to chair with min A.      SUBJECTIVE  \"I usually have a high pain tolerance.\"        OBJECTIVE  Precautions: Bed/chair alarm (Chronic R foot drop per pt)  Fall Risk: High fall risk    WEIGHT BEARING RESTRICTION  Weight Bearing Restriction: None                PAIN ASSESSMENT  Rating: Unable to rate  Location: L BLEs- L>RLE  Management Techniques: Activity promotion;Body mechanics;Repositioning;Relaxation    COGNITION  Following Commands:  follows all commands and directions without difficulty    RANGE OF MOTION AND STRENGTH ASSESSMENT  See OT note for UE assessment      Lower extremity ROM is within functional limits     Lower extremity strength: grossly 0/5.  Appears to have trace B hip external rotation, however no active hip flexion, knee flex/ext, or DF/PF.  AAROM performed.      BALANCE  Static Sitting: Fair +  Dynamic Sitting: Fair +  Static Standing: Dependent  Dynamic Standing: Dependent    ADDITIONAL TESTS                                    ACTIVITY TOLERANCE                         O2 WALK       NEUROLOGICAL FINDINGS                        AM-PAC '6-Clicks' INPATIENT SHORT FORM - BASIC MOBILITY  How much difficulty does the patient currently have...  Patient Difficulty: Turning over in bed (including adjusting bedclothes, sheets and blankets)?: A Lot    Patient Difficulty: Sitting down on and standing up from a chair with arms (e.g., wheelchair, bedside commode, etc.): Unable   Patient Difficulty: Moving from lying on back to sitting on the side of the bed?: Unable   How much help from another person does the patient currently need...   Help from Another: Moving to and from a bed to a chair (including a wheelchair)?: Total   Help from Another: Need to walk in hospital room?: Total   Help from Another: Climbing 3-5 steps with a railing?: Total       AM-PAC Score:  Raw Score: 7   Approx Degree of Impairment: 92.36%   Standardized Score (AM-PAC Scale): 26.42   CMS Modifier (G-Code): CM    FUNCTIONAL ABILITY STATUS  Gait Assessment   Functional Mobility/Gait Assessment  Gait Assistance: Not tested  Distance (ft): 0  Assistive Device: Rolling walker  Pattern: Shuffle    Skilled Therapy Provided     Bed Mobility:  Rolling: max A  Supine to sit: max A x2   Sit to supine: max A x2     Transfer Mobility:  Sit to stand: dependent;  attempted to RW at eob- unable to clear bottom from bed.     Wood lift transfer completed dependently.    Therapist's Comments: Pt encouraged to be OOB.  D/w RN re: change in status, neuro cx ordered while in session by MD.    Exercise/Education Provided:  Bed mobility  Body mechanics  Functional activity tolerated  ROM  Strengthening    Patient End of Session: Up in chair;Needs met;Call light within reach;RN aware of session/findings;All patient questions and concerns addressed      Patient Evaluation Complexity Level:  History High - 3 or more personal factors and/or co-morbidities   Examination of body systems High - addressing a total of 4 or more elements   Clinical Presentation High - Unstable   Clinical Decision Making High - Unstable       PT Session Time: 40 minutes    Therapeutic Activity: 15 minutes

## 2023-12-21 NOTE — PLAN OF CARE
Assumed care of patient for night shift. Patient resting in bed, complaining of pain in lower extremities and buttocks. Assisted with repositioning throughout night, dilaudid given as needed. Assisted with ROM exercises, incredibly weak in lower extrem. Saturating well on 2L NC. Activity intolerance, HR to 140-150 when turning and moving. Maintains below 110 when not moving. Replacing K per protocol. Per patient, she usually takes potassium citrate at home, pt questioning if she may respond better to that. Tearful about missing 8 year old daughter.

## 2023-12-21 NOTE — PROGRESS NOTES
Received pt this morning from CNICU around 1000. AOx4, lethargic with delayed responses but became more alert later in the day. Received on 5L, weaned to 2L NC. Bumex gtt infusing, sinha in place draining clear yellow urine. Neuropathy in BLE. K+ 2.7 on recheck, replacement ordered and renal notified of critical result. See flowsheets and MAR for further data.

## 2023-12-21 NOTE — PROGRESS NOTES
[facility]  Report of Consultation    Alina Aceves Patient Status:  Inpatient    1980 MRN [id number]   Location [facility] 8NE-A Attending Tosha Waite MD   Hosp Day # 8 PCP HOLLY IBARRA     Reason for Consultation:   Tachycardia, palpitations    History of Present Illness:   Alina Acvees is a(n) 43 year old female with SLE and palpitations who I saw in clinic 2 weeks ago.    She has Stage III breast CA.  She has been undergoing chemotherapy, EF  was 50%. It is now 20% range.    She was admitted with SOB 23.  She was diagnosed with PNA and pleural effusions.  She also had hyponatremia with a sodium 120.  She was seen by Pulmonology, ID, Hematology, and Nephrology.    Subjective:  -No events overnight  -Awake, alert  -No SOB today, on 1L NC  -IV drip diuresis continues  -Edema improved sig per patient      Past Medical History:   Past Medical History:   Diagnosis Date    Breast cancer (HCC)     Gastritis     Lupus (HCC)     Sjogren's disease (HCC)        Social History:   Smoking:  None  Alcohol:  None    Family History:   No family history of premature arthrosclerotic heart disease     Medications:   Scheduled:    potassium citrate  20 mEq Oral TID CC    methylPREDNISolone  40 mg Intravenous Daily    metoprolol tartrate  25 mg Oral 2x Daily(Beta Blocker)    pantoprazole  40 mg Intravenous QAM AC    Or    pantoprazole  40 mg Oral QAM AC       Continuous Infusion:    bumetanide (Bumex) 12.5 mg in 50 mL infusion 0.5 mg/hr (23 0647)       PRN Medications:   HYDROcodone-acetaminophen, dextromethorphan-guaiFENesin, ALPRAZolam, LORazepam, metoprolol, ipratropium-albuterol, melatonin, prochlorperazine, polyethylene glycol (PEG 3350), sennosides, bisacodyl, fleet enema, HYDROmorphone **OR** [DISCONTINUED] HYDROmorphone **OR** [DISCONTINUED] HYDROmorphone    Outpatient Medications:   Current Facility-Administered Medications on File Prior to Encounter   Medication Dose  Route Frequency Provider Last Rate Last Admin    [COMPLETED] morphINE PF 4 MG/ML injection 4 mg  4 mg Intravenous Once Jesus Manuel Hatfield MD   4 mg at 11/13/23 3665    [COMPLETED] iopamidol 76% (ISOVUE-370) injection for power injector  100 mL Intravenous ONCE PRN Jesus Manuel Hatfield MD   100 mL at 11/13/23 4167    [COMPLETED] potassium chloride (K-Dur) tab 40 mEq  40 mEq Oral Once Jesus Manuel Hatfield MD   40 mEq at 11/14/23 0010     Current Outpatient Medications on File Prior to Encounter   Medication Sig Dispense Refill    Potassium Citrate ER 15 MEQ (1620 MG) Oral Tab CR Take 1 tablet by mouth in the morning, at noon, and at bedtime.      DULoxetine 30 MG Oral Cap DR Particles Take 1 capsule (30 mg total) by mouth daily.      ondansetron (ZOFRAN) 8 MG tablet Take 1 tablet (8 mg total) by mouth as needed.      zolpidem 10 MG Oral Tab Take 1 tablet (10 mg total) by mouth nightly as needed for Sleep.      HYDROcodone-acetaminophen 5-325 MG Oral Tab Take 1 tablet by mouth every 8 (eight) hours as needed. (Patient not taking: Reported on 12/13/2023)      lidocaine-prilocaine 2.5-2.5 % External Cream APPLY SMALL AMOUNT OVER PORT PRIOR TO ACCESS      azaTHIOprine (IMURAN OR) Take by mouth. (Patient not taking: No sig reported)      Prenatal Vit-DSS-Fe Cbn-FA (PRENATAL AD OR) Take 1 tablet by mouth daily.         Allergies:   Allergies   Allergen Reactions    Bactrim [Sulfamethoxazole W/Trimethoprim] RASH       Review of Systems:   No fevers, chills, change in weight or bowel habits.  Ten point review of systems is otherwise negative or unremarkable.    Physical Exam:   Vitals:    12/21/23 1200   BP: (!) 146/106   Pulse: 113   Resp: 22   Temp: 97.3 °F (36.3 °C)     Wt Readings from Last 3 Encounters:   12/21/23 147 lb 0.8 oz (66.7 kg)   11/13/23 120 lb (54.4 kg)   12/16/21 135 lb (61.2 kg)           General: Well developed, well nourished female.  Pt is in no acute distress.  HEENT:    Normocephalic.  Atraumatic.  Eyes with no scleral icterus.  Neck: Supple.  No JVD.  Carotids 2+ and equal in symmetric fashion.  No bruits are noted.  Cardiac: Regular rate and rhythm.   There is a normal S1 and S2.  No S3 or S4.  No murmurs, rubs, or gallops.  PMI is non-displaced with a normal apical impulse.  Lungs: Clear to ascultation bilaterally.  No focal rales, rhonchi, or wheezes.  Good air movement is noted throughout all lung fields.  Abdomen: Soft.  Non-distended.  Non-tender.  Bowel sounds are present and normoactive.  No guarding or rebound.   Extremities: Extremities do demonstrate 2+ peripheral edema.   No cyanosis or clubbing of the digits is appreciated.  Femoral, Dorsalis Pedis, and Posterior Tibialis  pulses are 2+ and equal in a symmetric fashion.  Neurologic: Alert and oriented, normal affect.  No gross deficit appreciated.  Integument:  No visible rashes are appreciated.      Laboratories and Data:   Labs:    Recent Labs   Lab 12/20/23  0331 12/20/23  0940 12/20/23  1455 12/21/23  0146 12/21/23  0521   * 130*  --   --  140*   BUN 73* 84*  --   --  76*   CREATSERUM 1.65* 1.55*  --   --  1.17*   CA 6.7* 7.7*  --   --  8.1*   ALB 2.2* 2.6*  --   --  3.0*   * 136  --   --  138   K 3.2* 3.1* 2.7* 2.9* 2.9*    100  --   --  104   CO2 21.0 25.0  --   --  25.0   ALKPHO 171* 189*  --   --  200*   * 973*  --   --  918*   * 949*  --   --  1,127*   BILT 2.3* 2.6*  --   --  2.3*   TP 4.3* 4.6*  --   --  5.2*       Recent Labs   Lab 12/17/23  0617 12/19/23  1728 12/21/23  0521   RBC 2.74* 3.51*  3.55* 3.16*   HGB 7.9* 10.2*  10.2* 8.9*   HCT 22.3* 31.4*  30.0* 27.2*   MCV 81.4 89.5  84.5 86.1   MCH 28.8 29.1  28.7 28.2   MCHC 35.4 32.5  34.0 32.7   RDW 12.9 14.0  14.0 14.2   NEPRELIM 7.20 11.46* 6.33   WBC 8.3 13.0*  12.8* 7.2   .0 200.0  98.0* 79.0*       Recent Labs   Lab 12/19/23  1728 12/20/23  0800 12/21/23  0521   PTP 26.1* 24.8* 18.0*   INR 2.36* 2.21*  1.48*       No results for input(s): \"TROP\", \"CK\" in the last 168 hours.    Diagnostics:   Tele: Sinus tachycardia.  EKG: Sinus tachycardia, non-specific ST/T changes  Echo: Unremarkable by 5/23 echo (EF low normal, mild MR).    12/17/23  Conclusions:     1. Left ventricle: The cavity size was normal. Wall thickness was normal.      Systolic function was markedly reduced. The estimated ejection fraction      was 20-25%, by visual assessment. There was severe diffuse hypokinesis.      Technical limitations of the study preclude regional wall motion      analysis. Unable to assess LV diastolic function due to heart rhythm.   2. Right ventricle: The cavity size was increased. Systolic function was      reduced. The RV free wall longitudinal strain was -16.50%. The tricuspid      annular plane systolic excursion (TAPSE) is 1.56cm.   3. Left atrium: The left atrial volume was moderately increased.   4. Right atrium: The atrium was moderately dilated.   5. Mitral valve: The annulus was dilated. The leaflets were non-specifically      thickened. There was moderate regurgitation, with multiple jets.   6. Tricuspid valve: The annulus is dilated. There was severe regurgitation.   7. Pulmonary arteries: Systolic pressure was moderately to markedly      increased, at least 55mm Hg. Systolic pressure may be underestimated due      to wide tricuspid regurgitation. Estimated pulmonary artery diastolic      pressure was 34mm Hg.   8. Pericardium, extracardiac: A trivial pericardial effusion was identified.      There was a left pleural effusion.           Assessment:  1. New onset severe cardiomyopathy  2. PNA  3. Hypoxic resp failure  4. Sinus tachycaria  5. Palpitations  6. Breast CA, stage IIIb  7. Hyponatremia      Plan:  Cardiomyopathy: IV diuresis.  Breathing improved.  Edema improved.  By weight somehow still up 20lbs.   Cr improving.  Follow BMP. Could switch to bolus IV diuretic at this point. Or drip 1 more day not  unreasonable.  Defer to renal.  But excellent response so far.  Needs cath but can wait for now given clinical stability.  Elevated LFTS: Likely hepatic congestion (severe TR from ventricular dysfunction and pulm HTN).  Did not check today.  Diuresis from cardiac standpoint should help.  Primary team.  Tachycardia: ST.  Likely secondary to infection and medical illness.  Can increase metoprolol to 5mg BID given BP.  Needs ARNI in future.  Will let Cr settle first.    Resp/PNA: ID, pulm.  Antibiotics.  Hyponatremia: Improved.  Renal.  BP: BB as above.  Metastatic breast CA: Per primary and oncology.    Leonel Villalba MD  12/18/2023  11:42 AM

## 2023-12-21 NOTE — DIETARY MALNUTRITION NOTE
[facility]  NUTRITION ASSESSMENT    Pt meets moderate malnutrition criteria at this time.    CRITERIA FOR MALNUTRITION DIAGNOSIS:  Criteria for non-severe malnutrition diagnosis: acute illness/injury related to energy intake less than 75% for greater than 7 days, body fat mild depletion, and muscle mass mild depletion      NUTRITION INTERVENTION:    RD nutrition Care Plan- Initiated ONS (oral nutritional supplements), Ordered multivitamin, and See RD nutrition assessment for additional recommendations  Meal and Snacks - Liberalize diet to 2 gm Na as tolerated; Monitor and encourage adequate PO intake.  Medical Food Supplements - Change to Ensure Plus High Protein chocolate daily and Magic Shake chocolate daily. Rationale/use for oral supplements discussed.   Vitamin and Mineral Supplements - Recommend adding Multivitamin with minerals    PATIENT STATUS: 12/21: Pt transferred to ICU 12/19 and started on Bipap which has since been weaned. Visited pt at bedside. She reports her appetite is very slowly returning and is forcing herself to eat today. Does complain that the food is dry. Is not drinking her Ensure shakes currently but reports she is going to try to drink 1 throughout the day; prefers chocolate. Offered other ONS also and she was interested in trying Magic Shake daily. Denies nausea, vomiting and diarrhea but reports constipation with last BM 12/16. Continued to encourage PO and ONS intake; all questions answered at this time.    12/19: Back from bronch. Pt with dry cough.  NPO - will monitor for diet advancement. Minimal intake recorded. RD added Ensure +HP BID to support needs.  On 4L via NC. +BM 12/16. Skin intact.   12/16: 43 year old female admit d/t pneumonia. Pt with breast cancer, undergoing chemotherapy. Pt presented d/t nausea with minimal PO intake. Pt with pleural effusion, underwent thoracentesis today with 1450mL fluid removal.   Spoke with Nephrology, Na trending up slowly, recommended  1500mL fluid restriction. Receiving 2gNaCl tablets.  Discussed with pt fluid restriction and reasoning, pt states understanding.   Pt seen d/t consult for poor PO intake. Pt agreeable to taking Chocolate Ensure BID to supplement PO intake.     ANTHROPOMETRICS:  Ht: 162.6 cm (5' 4\")  Wt: 66.7 kg (147 lb 0.8 oz).   BMI: Body mass index is 25.24 kg/m².  IBW: 54.5 kg    WEIGHT HISTORY:   Weight loss: No  Wt Readings from Last 10 Encounters:   12/21/23 66.7 kg (147 lb 0.8 oz)   11/13/23 54.4 kg (120 lb)   12/16/21 61.2 kg (135 lb)   09/22/20 61.7 kg (136 lb)   08/15/20 63.5 kg (140 lb)   11/14/15 61.7 kg (136 lb)        NUTRITION:  Diet:       Procedures    Cardiac diet Cardiac; Sodium Restriction: 2 GM NA; Fluid Restriction: 1800 ml; Is Patient on Accuchecks? No      Food Allergies: No  Cultural/Ethnic/Cheondoism Preferences Addressed: Yes    Percent Meals Eaten (last 3 days)       Date/Time Percent Meals Eaten (%)    12/18/23 0733 0 %    12/18/23 1306 0 %    12/18/23 1553 100 %    12/18/23 1554 0 %    12/18/23 1646 0 %    12/18/23 2100 5 %            GI system review: WNL; Last BM: 12/16  Skin and wounds: WNL    NUTRITION RELATED PHYSICAL FINDINGS:     1. Body Fat/Muscle Mass: mild depletion body fat Orbital fat pad and Buccal fat pad and mild muscle depletion Temple region      2. Fluid Accumulation: lower extremity edema and b/l feet      NUTRITION PRESCRIPTION: 56.2 kg (12/13/23)  Calories: 1320-0080 calories/day (30-35 kcal/kg)  Protein: 67-84 grams protein/day (1.2-1.5 grams protein per kg)  Fluid: ~1 ml/kcal or per MD discretion    NUTRITION DIAGNOSIS/PROBLEM:  Malnutrition related to decreased intake as evidenced by documented/reported insufficient oral intake, documented/reported unintentional weight loss, loss of fat mass, and loss of muscle mass      MONITOR AND EVALUATE/NUTRITION GOALS:  PO intake of 75% of meals TID - Not met, Continues  PO intake of 75% of oral nutrition supplement/s - Not met,  Continues  Weight stable within 1 to 2 lbs during admission - Ongoing      MEDICATIONS:  Solumedrol, protonix, K citrate TID  Gtt: bumex    LABS:  K+ 2.9    Pt is at Moderate nutrition risk    Ana Maria Rojo RD, LDN, CNSC  Clinical Dietitian  Spectra: [id number]

## 2023-12-21 NOTE — PROGRESS NOTES
Inpatient Follow up Note    Alina Aceves Patient Status:  Inpatient    1980 MRN [id number]   Location [facility] 4SW-A Attending Noe Perkins MD   Hosp Day # 7 PCP HOLLY IBARRA     Reason for Consultation   Abnormal LFTs     Subjective   She is alert today, A&Ox3, no asterixis. Denies a history of liver disease. LFTs worsening today.             Objective:     BP (!) 138/100 (BP Location: Left arm)   Pulse 107   Temp 97.6 °F (36.4 °C) (Temporal)   Resp 24   Ht 5' 4\" (1.626 m)   Wt 150 lb 9.2 oz (68.3 kg)   LMP 2023 (Approximate)   SpO2 94%   Breastfeeding No   BMI 25.85 kg/m²   Gen: AAOx3  CV: RRR with normal S1 / S2  Resp: CTA bilaterally  Abd: (+)BS, soft, non-tender, non-distended; no rebound or guarding  Ext: +edema, no cyanosis  Skin: Warm and dry     Labs/Imaging     LABRCNTIP[PGLU:5@  Recent Labs   Lab 12/15/23  0513 23  1312 23  1728 23  0800   INR 1.27* 2.28* 2.36* 2.21*         Recent Labs   Lab 23  0631 23  0617 23  1728   WBC 8.3 8.3 12.8*   HGB 8.5* 7.9* 10.2*   .0 157.0 98.0*       Recent Labs   Lab 23  0616 23  0514 23  1728 23  0331 23  0940 23  1455   * 130* 133* 135* 136  --    K 4.3 4.3 3.9 3.2* 3.1* 2.7*   CL 93* 98 98 101 100  --    CO2 19.0* 23.0 22.0 21.0 25.0  --    BUN 46* 65* 74* 73* 84*  --    CREATSERUM 1.50* 1.83* 1.84* 1.65* 1.55*  --        Recent Labs   Lab 23  0514 23  1728 23  0331 23  0940   ALT 1,296* 1,093* 793* 949*   AST 1,216* 946* 725* 973*     US ABDOMEN COMPLETE (CPT=76700)    Result Date: 2023  CONCLUSION:  1. Cholelithiasis with mild gallbladder wall thickening and small amount of pericholecystic fluid.  Findings may represent acute cholecystitis.  Correlation with clinical symptoms is recommended.  2. Bilateral pleural effusions   LOCATION:  [facility]    Dictated by (CST): Sofi Laguna MD on 2023 at 7:37 PM      Finalized by (CST): Sofi Laguna MD on 12/19/2023 at 7:40 PM       XR CHEST AP PORTABLE  (CPT=71045)    Result Date: 12/19/2023  CONCLUSION:  Extensive airspace infiltrates bilaterally with interval worsening at the right base compared to the previous exam.  Differential considerations include pneumonia, pulmonary hemorrhage, pulmonary edema and ARDS.   LOCATION:  [facility]      Dictated by (CST): Juan Fernandez MD on 12/19/2023 at 4:39 PM     Finalized by (CST): Juan Fernandez MD on 12/19/2023 at 4:41 PM       XR CHEST AP PORTABLE  (CPT=71045)    Result Date: 12/18/2023  CONCLUSION:  Interval worsening of right lung airspace opacities.  Stable to minimally improved left lung airspace opacities.   LOCATION:  [facility]      Dictated by (CST): Moe Guzman MD on 12/18/2023 at 12:00 PM     Finalized by (CST): Moe Guzman MD on 12/18/2023 at 12:01 PM      AST   Date/Time Value Ref Range Status   12/20/2023 09:40  (H) 15 - 37 U/L Final     ALT   Date/Time Value Ref Range Status   12/20/2023 09:40  (H) 13 - 56 U/L Final     BUN   Date/Time Value Ref Range Status   12/20/2023 09:40 AM 84 (H) 9 - 23 mg/dL Final              Assessment   Ms Aceves is a 43 year old female with a history of stage IIIb breast cancer and lupus who presented on 12/13 with shortness of breath, found to have pneumonia, pleural effusions (transudative), and HFrEF (EF 20-25%) for whom GI is consulted for abnormal LFTs. These are most likely secondary to congestive hepatopathy, although Ddx includes ischemic hepatopathy, BCS or PVT, autoimmune hepatitis, viral infection, DILI, or less likely MCFP; although she has an elevated INR, this could be multifactorial in nature, and her LFTs initially improved with diuresis. Acute hepatitis and tylenol levels unremarkable.        Plan   -daily LFTs  -f/u NURIA, EBV/HSV/CMV studies  -IV vitamin K 10mg x3 days; s/p IV NAC  -continued CHF management per cards and primary team           Vamsi  MD Elina  9:40 PM  12/20/2023  [facility]  [phone]

## 2023-12-21 NOTE — PROGRESS NOTES
[facility] Infectious Disease  Progress Note    Alina Aceves Patient Status:  Inpatient    1980 MRN [id number]   Location [facility] 4SW-A Attending Neo Perkins MD   Hosp Day # 8 PCP HOLLY IBARRA     Subjective:  Patient seen/examined.  Clinical course reviewed since my last examination.  She continues to have some RLQ pain and elevated LFTs.  Clinically unclear if this could represent cholecystitis.  Awaiting GI input.      Objective:  Blood pressure (!) 144/107, pulse 112, temperature 97.5 °F (36.4 °C), temperature source Temporal, resp. rate 15, height 5' 4\" (1.626 m), weight 147 lb 0.8 oz (66.7 kg), last menstrual period 2023, SpO2 (!) 89%, not currently breastfeeding.    Intake/Output:    Intake/Output Summary (Last 24 hours) at 2023 1011  Last data filed at 2023 0930  Gross per 24 hour   Intake 1433.6 ml   Output 4690 ml   Net -3256.4 ml       Physical Exam:  General: Awake, alert, non-tox, NAD.  HEENT:  Oropharynx clear, trachea ML.  Heart: RRR S1S2 no murmurs.  Lungs: Essentially CTA b/l, no rhonchi, rales, wheezes.  Abdomen: Soft, RLQ abdominal pain.  Extremity: No edema.  Neurological: No focal deficits.  Derm:  Warm, dry, free from rashes.    Lab Data Review:  Lab Results   Component Value Date    WBC 7.2 2023    HGB 8.9 2023    HCT 27.2 2023    PLT 79.0 2023    CREATSERUM 1.17 2023    BUN 76 2023     2023    K 2.9 2023     2023    CO2 25.0 2023     2023    CA 8.1 2023    ALB 3.0 2023    ALKPHO 200 2023    BILT 2.3 2023    TP 5.2 2023     2023    ALT 1,127 2023    INR 1.48 2023      Cultures:  Blood cultures negative  Urine culture negative  C.diff negative  RVP negative  Strep pneumo negative  Pleural fluid negative  MRSA screen positive     Radiology:  CONCLUSION:  Interval worsening of right lung  airspace opacities.  Stable to minimally improved left lung airspace opacities.       Antibiotics Reviewed:  Ceftriaxone/doxycyline x 5 days complete 12/18/23     Assessment and Plan:     Acute hypoxic respiratory failure in this patient with several medical issues  - CT with acute pulmonary edema and pleural effusions > infection presentation but treated as possible multifocal pneumonia  - Possible atypical infection process considered  - RVP negative, cultures negative to date  - s/p bronch 12/19/23 - all studies negative thus far  - s/p empiric ceftriaxone/doxycyline x 5 days through 12/18/23     2.  Hyponatremia with pulmonary edema  - Nephrology following  - Gentle diuresis as able     3.  H/o breast cancer  - Was on chemotherapy until 9/2023  - Immunosuppressed patient     4.  SLE  - No on any immunosuppressive Rx for this  - Some rashes noted  - Rheum following, high dose steroids started for possible flare     5.  Disposition - inpatient.  Observing off antibiotics at this time.  Multiple ongoing medical issues but acute infection does not appear to be the etiology of events at present.  Otherwise stable without fevers or leukocytosis at this time so will continue to observe off all antibiotics.  If there is a clinical concern for cholecystitis we can certainly restart Rx.  Will follow.     Sharon Archibald DO, [facility] Infectious Disease  [phone]    12/21/2023  10:11 AM

## 2023-12-21 NOTE — PROGRESS NOTES
[facility]     Hospitalist Progress Note     Alina Aceves Patient Status:  Inpatient    1980 MRN [id number]   [facility] 4NW-A Attending Tosha Waite MD   Hosp Day # 8 PCP HOLLY IBARRA     Chief Complaint: Intractable nausea vomiting, diarrhea, generalized weakness.  Recent pneumonia.     Subjective:     Feels weak in legs, though says it's been that way a few weeks; she wonders if related to chemo and/or swelling.      Objective:    Review of Systems:   A comprehensive review of systems was completed; pertinent positive and negatives stated in subjective.    Vital signs:  Temp:  [97.3 °F (36.3 °C)-98.1 °F (36.7 °C)] 97.3 °F (36.3 °C)  Pulse:  [] 104  Resp:  [13-28] 18  BP: (118-145)/() 141/108  SpO2:  [92 %-100 %] 100 %    Physical Exam:    BP (!) 141/108   Pulse 104   Temp 97.3 °F (36.3 °C) (Temporal)   Resp 18   Ht 5' 4\" (1.626 m)   Wt 147 lb 0.8 oz (66.7 kg)   LMP 2023 (Approximate)   SpO2 100%   Breastfeeding No   BMI 25.24 kg/m²     General: No acute distress  Respiratory: Clear to auscultation bilateral except decreased breath sounds at bases  Cardiovascular: S1, S2, regular rate and rhythm  Abdomen: Soft, Non-tender, non-distended  Neuro: Awake alert, lethargic, no new focal deficits.   Extremities: 1+LE edema; very weak in lower legs/ankles/feet; sensation intact    Diagnostic Data:    Labs:  Recent Labs   Lab 12/15/23  0513 23  0617 23  1312 23  1728 23  0800 23  0521   WBC  --  8.3  --  13.0*  12.8*  --  7.2   HGB  --  7.9*  --  10.2*  10.2*  --  8.9*   MCV  --  81.4  --  89.5  84.5  --  86.1   PLT  --  157.0  --  200.0  98.0*  --  79.0*   BAND  --   --   --  8  --   --    INR 1.27*  --  2.28* 2.36* 2.21* 1.48*       Recent Labs   Lab 23  0331 23  0940 23  1455 23  0146 23  0521   * 130*  --   --  140*   BUN 73* 84*  --   --  76*   CREATSERUM 1.65* 1.55*  --   --  1.17*   CA 6.7*  7.7*  --   --  8.1*   ALB 2.2* 2.6*  --   --  3.0*   * 136  --   --  138   K 3.2* 3.1* 2.7* 2.9* 2.9*    100  --   --  104   CO2 21.0 25.0  --   --  25.0   ALKPHO 171* 189*  --   --  200*   * 973*  --   --  918*   * 949*  --   --  1,127*   BILT 2.3* 2.6*  --   --  2.3*   TP 4.3* 4.6*  --   --  5.2*       Estimated Creatinine Clearance: 53.5 mL/min (A) (based on SCr of 1.17 mg/dL (H)).    Microbiology    Hospital Encounter on 12/13/23   1. Body Fluid Cult Aerobic and Anaerobic     Status: None    Collection Time: 12/15/23  3:26 PM    Specimen: Pleural Fluid, Right; Body fluid, unspecified   Result Value Ref Range    Body Fluid Culture Result No Growth 5 Days N/A    Body Fld Smear 4+ Neutrophils seen N/A    Body Fld Smear No organisms seen N/A    Body Fld Smear This is a cytocentrifuged smear. N/A   2. Urine Culture, Routine     Status: None    Collection Time: 12/14/23  3:58 AM    Specimen: Urine, clean catch   Result Value Ref Range    Urine Culture No Growth at 18-24 hrs. N/A   3. Blood Culture     Status: None    Collection Time: 12/13/23  8:10 PM    Specimen: Blood,peripheral   Result Value Ref Range    Blood Culture Result No Growth 5 Days N/A     MRSA PCR nares positive on 12/14/2023    Imaging: Reviewed in Epic.  Chest x-ray done on 12/13/2023 with dense patchy airspace consolidation opacities present within the lungs suspicious for areas of pneumonia  CTA chest done on 12/14/2023 early a.m. shows moderate to large bilateral pleural effusion along with marked consolidation scattered throughout the lungs suggestive of pulmonary edema and fluid overload.  No evidence of pulm embolus.  Moderate large left axillary and left breast fluid collections noted.  Minimal gas in the left most central breast fluid collection, possibility of infection is a consideration.  Sequelae of seroma chronic hematoma is a consideration as well.    Medications:    potassium chloride  20 mEq Intravenous Once     potassium citrate  20 mEq Oral TID CC    methylPREDNISolone  40 mg Intravenous Daily    phytonadione (Aqua-[place]ephton) 10 mg in sodium chloride 0.9% 50 mL IVPB  10 mg Intravenous Daily    metoprolol tartrate  25 mg Oral 2x Daily(Beta Blocker)    [Held by provider] furosemide  20 mg Intravenous Once    pantoprazole  40 mg Intravenous QAM AC    Or    pantoprazole  40 mg Oral QAM AC       Assessment & Plan:      # Multifocal pneumonia with bilateral large bilateral pleural effusion with tachycardia, tachypnea, also rule out malignancy due to patient's locally advanced stage IIIb breast cancer  -s/p bronch; studies pending  -s/p rocephin/doxy; off abx now    #new onset cardiomyopathy-bumex drip; EF 20-25%, severe diffuse hypokinesis    #bilateral pleural effusion due to cardiomyopathy and acute systolic heart failure   Status post left thoracentesis on 12/15/2023 and right thoracentesis on 12/16/2023, pulmonary following   MRSA nares came back positive, s/p Bactroban application twice daily for 5 days.       # Acute hypoxic respiratory failure due to above-off abx; ongoing bumex     # Locally advanced stage IIIb breast cancer status post outpatient neoadjuvant chemo and history of left breast lumpectomy and left lymph node resection on 11/16/2023    #LE weakness and painful neuropathy-will ask neuro to evaluate    # Hyponatremia due to pneumonia and also hypovolemic due to nausea vomiting and diarrhea, siadh, ssri (off ssri now)-resolved  -per nephrology    # Hypokalemia-per protocol    # acute kidney injury; hx interstitial nephritis; might need repeat biopsy at some point; continue bumex; GFR improved today     # History of lupus, history of Sjogren's disease  -Takes azathioprine at home which was held at this time due to multifocal pneumonia  - steroids started per rheum    #elevated LFTs, elevated INR; clinically doubt cholecystitis; IV NAC given per GI; PPI; possibly from CHF hepatic congestion, but not sure; s/p  vit K; await further GI recs     # Gastritis  # Nausea vomiting and diarrhea at home possibly due to effect of chemo  # Anxiety  -Will give IV PPI    # Anemia and thrombocytopenia due to malignancy and chemo;    Follow CBC    # Small left apical pneumothorax on 12/15/2023 status post left thoracentesis  -Pulmonary following, on conservative management, repeat chest x-ray done on 12/15/2023 showed left pneumothorax resolved    # Moderate malnutrition  -Dietitian following    Noe Perkins MD  [facility]  Internal Medicine Hospitalist      Supplementary Documentation:     Quality:  DVT Mechanical Prophylaxis:   SCDs, Early ambuation  DVT Pharmacologic Prophylaxis   Medication   None                Code Status: Full Code  Peacock: Peacock catheter in place  Peacock Duration (in days):   Central line:    CHRISTELLE:     Discharge is dependent on: Clinical progress  At this point Ms. Aceves is expected to be discharge to: To be decided    The 21st Century Cures Act makes medical notes like these available to patients in the interest of transparency. Please be advised this is a medical document. Medical documents are intended to carry relevant information, facts as evident, and the clinical opinion of the practitioner. The medical note is intended as peer to peer communication and may appear blunt or direct. It is written in medical language and may contain abbreviations or verbiage that are unfamiliar.

## 2023-12-21 NOTE — OCCUPATIONAL THERAPY NOTE
OCCUPATIONAL THERAPY EVALUATION - INPATIENT     Room Number: 466/466-A  Evaluation Date: 12/21/2023  Type of Evaluation: Initial  Presenting Problem: PNA    Physician Order: IP Consult to Occupational Therapy  Reason for Therapy: ADL/IADL Dysfunction and Discharge Planning    History: Patient is a 43 year old female admitted on 12/13/2023 from home with SOB and possible PNA, fall noted 12/14 described as BLE buckling.    Pt with transfer to CNICU on 12/19 with lateral transfer to ICU on 12/20.       Pt seen for re-eval this date as unable to see for OT since 12/15 and has since been transferred to CNICU/ICU. Pt also with notable increased weakness in BLEs at this time and dec'd functional safety and independence. On initial OT eval, Pt able to mobilize with RW and MIN A to chair, complete ADLs SBA to MIN A. Pt needing DEP assist for LB ADLs and ceiling lift for transfers this session.  Per Hospitalist note, neuro has been consulted.     Co-Morbidities : breast cancer who is undergoing chemotherapy    ASSESSMENT   Patient presents with the following performance deficits: diffuse weakness (BLEs weaker than BUEs), report of pain and dec'd sensation to los hands and legs d/t neuropathy, decreased coordination, sitting balance and endurance, and dec'd knowledge re: adaptive techniques and equipment, and diminished cardiopulmonary activity tolerance. These deficits impact the patient’s ability to participate in ADL, transfers, instrumental activities of daily living, rest and sleep, leisure and social participation.     The patient is functioning below her previous functional level and would benefit from skilled inpatient OT to address the above deficits, maximizing patient’s ability to return safely to her prior level of function.    OT Discharge Recommendations: Undetermined  OT Device Recommendations: Reacher;Shower chair    WEIGHT BEARING RESTRICTION  Weight Bearing Restriction: None                Recommendations for  nursing staff:   Transfers: ceiling lift   Toileting location: bed level ([place] in place)    EVALUATION SESSION:  Patient Start of Session: semi-supine, alert   FUNCTIONAL TRANSFER ASSESSMENT  Sit to Stand: Edge of Bed  Edge of Bed: -- (Pt unable to clear buttocks from bed with MAX A x2 and RW, ceiling lift used)  Chair: Not Tested    BED MOBILITY  Rolling: Maximum Assist  Supine to Sit : -- (MAX A x2)  Sit to Supine (OT): -- (MAX A x2)  Scooting: MOD A    BALANCE ASSESSMENT  Static Sitting: Stand-by Assist  Sitting Bilateral: Contact Guard Assist  Static Standing: Contact Guard Assist  Standing Bilateral: Not Tested    FUNCTIONAL ADL ASSESSMENT  UB Dressing Seated: Not Tested  LB Dressing Seated: Dependent      ACTIVITY TOLERANCE: pt on 2L, baseline is room air, tolerates session with O2 remaining >90% throughout. Primary limiting factors are BLE pain and weakness.                          O2 SATURATIONS       COGNITION  Arousal/Alertness:  appropriate responses to stimuli  Orientation Level:  oriented x4  Following Commands:  follows all commands and directions without difficulty    Upper Extremity   ROM: within functional limits   Strength: 3 to 3+ throughout   Coordination  Gross motor: wfl  Fine motor: diminished d/t neuropathy   Sensation:  Pt reports significantly diminished sensation to BLEs as well as n/t to hands     EDUCATION PROVIDED  Patient : Role of Occupational Therapy; Discharge Recommendations; Plan of Care; Functional Transfer Techniques; Fall Prevention; Posture/Positioning; Proper Body Mechanics  Patient's Response to Education: Verbalized Understanding; Requires Further Education    Equipment used: RW, ceiling lift   Unable to safely achieve standing with RW     Therapist comments: Pt is pleasant and agreeable to therapy. Pt reporting she does not think she has been OOB since last therapy session 12/16. Pt with increased BLE weakness, unable to move at all in bed, is noted with some movements  once seated EOB. Attempted sit<>stand from EOB with MAX A x2, however quads not engaging and unable to safely attempt, Ceiling lift utilized for transfer to chair.     Patient End of Session: Up in chair;With  staff;Needs met;Call light within reach;RN aware of session/findings;All patient questions and concerns addressed;SCDs in place;Alarm set    OCCUPATIONAL PROFILE    HOME SITUATION  Type of Home: House  Home Layout: Two level  Lives With: Daughter (8 and 20(in college))    Toilet and Equipment: Standard height toilet  Shower/Tub and Equipment: Walk-in shower  Other Equipment: None       Hand Dominance: Right  Drives: Yes       Prior Level of Function: Per initial eval, Pt is typically independent with all aspects of mobility and self-cares without device. Denies any other falls except the ones right before admission.     SUBJECTIVE   \"I hate to ask for pain meds when it's just my legs that hurt, but I'm hoping it will help.\"    PAIN ASSESSMENT  Rating:  (did not formally rate)  Location: BLEs  Management Techniques: Activity promotion;Breathing techniques;Relaxation;Repositioning;Nurse notified    OBJECTIVE  Precautions: Bed/chair alarm (Chronic R foot drop per pt)  Fall Risk: High fall risk      ASSESSMENTS    AM-PAC ‘6-Clicks’ Inpatient Daily Activity Short Form  -   Putting on and taking off regular lower body clothing?: Total  -   Bathing (including washing, rinsing, drying)?: A Lot  -   Toileting, which includes using toilet, bedpan or urinal? : Total  -   Putting on and taking off regular upper body clothing?: A Lot  -   Taking care of personal grooming such as brushing teeth?: A Little  -   Eating meals?: A Little    AM-PAC Score:  Score: 12  Approx Degree of Impairment: 66.57%  Standardized Score (AM-PAC Scale): 30.6    ADDITIONAL TESTS     NEUROLOGICAL FINDINGS      COGNITION ASSESSMENTS       PLAN  OT Treatment Plan: Balance activities;Energy conservation/work simplification techniques;ADL  training;IADL training;Functional transfer training;UE strengthening/ROM;Endurance training;Patient/Family education;Patient/Family training;Equipment eval/education;Fine motor coordination activities;Compensatory technique education;Continued evaluation  Rehab Potential : Fair  Frequency: 3-5x/week  Number of Visits to Meet Established Goals: 5    ADL Goals   Patient will perform upper body bathing:  with supervision  Patient will perform upper body dressing:  with supervision  Patient will perform lower body dressing:  with min assist and with adaptive equipment PRN  Patient will perform toileting: with min assist    Functional Transfer Goals  Patient will transfer from supine to sit:  with min assist  Patient will transfer from sit to stand:  with mod assist  Patient will transfer to bedside commode:  with mod assist    UE Exercise Program Goal  Patient will be supervision with bilateral AROM HEP (home exercise program).    Additional Goals  Pt will tolerate sitting EOB x10 mins with SBA while completing functional ADL task    Patient Evaluation Complexity Level:   Occupational Profile/Medical History MODERATE - Expanded review of history including review of medical or therapy record   Specific performance deficits impacting engagement in ADL/IADL MODERATE  3 - 5 performance deficits   Client Assessment/Performance Deficits MODERATE - Comorbidities and min to mod modifications of tasks    Clinical Decision Making MODERATE - Analysis of occupational profile, detailed assessments, several treatment options    Overall Complexity MODERATE     OT Session Time: 30 minutes  Therapeutic Activity: 20 minutes

## 2023-12-22 LAB
ALBUMIN SERPL-MCNC: 3.1 G/DL (ref 3.4–5)
ALBUMIN/GLOB SERPL: 1.2 {RATIO} (ref 1–2)
ALP LIVER SERPL-CCNC: 210 U/L
ALT SERPL-CCNC: 986 U/L
ANION GAP SERPL CALC-SCNC: 9 MMOL/L (ref 0–18)
AST SERPL-CCNC: 452 U/L (ref 15–37)
BASOPHILS # BLD AUTO: 0.01 X10(3) UL (ref 0–0.2)
BASOPHILS NFR BLD AUTO: 0.1 %
BILIRUB SERPL-MCNC: 1.8 MG/DL (ref 0.1–2)
BUN BLD-MCNC: 90 MG/DL (ref 9–23)
CALCIUM BLD-MCNC: 8.2 MG/DL (ref 8.5–10.1)
CHLORIDE SERPL-SCNC: 108 MMOL/L (ref 98–112)
CMV PCR: NEGATIVE
CO2 SERPL-SCNC: 24 MMOL/L (ref 21–32)
CREAT BLD-MCNC: 1.16 MG/DL
EBV NA IGG SER QL IA: POSITIVE
EBV VCA IGG SER QL IA: POSITIVE
EBV VCA IGM SER QL IA: NEGATIVE
EGFRCR SERPLBLD CKD-EPI 2021: 60 ML/MIN/1.73M2 (ref 60–?)
EOSINOPHIL # BLD AUTO: 0 X10(3) UL (ref 0–0.7)
EOSINOPHIL NFR BLD AUTO: 0 %
ERYTHROCYTE [DISTWIDTH] IN BLOOD BY AUTOMATED COUNT: 15.5 %
GLOBULIN PLAS-MCNC: 2.5 G/DL (ref 2.8–4.4)
GLUCOSE BLD-MCNC: 129 MG/DL (ref 70–99)
HCT VFR BLD AUTO: 27.2 %
HGB BLD-MCNC: 8.8 G/DL
IMM GRANULOCYTES # BLD AUTO: 0.14 X10(3) UL (ref 0–1)
IMM GRANULOCYTES NFR BLD: 1.3 %
LYMPHOCYTES # BLD AUTO: 0.4 X10(3) UL (ref 1–4)
LYMPHOCYTES NFR BLD AUTO: 3.8 %
MAGNESIUM SERPL-MCNC: 1.9 MG/DL (ref 1.6–2.6)
MCH RBC QN AUTO: 28.8 PG (ref 26–34)
MCHC RBC AUTO-ENTMCNC: 32.4 G/DL (ref 31–37)
MCV RBC AUTO: 88.9 FL
MONOCYTES # BLD AUTO: 1.01 X10(3) UL (ref 0.1–1)
MONOCYTES NFR BLD AUTO: 9.7 %
NEUTROPHILS # BLD AUTO: 8.86 X10 (3) UL (ref 1.5–7.7)
NEUTROPHILS # BLD AUTO: 8.86 X10(3) UL (ref 1.5–7.7)
NEUTROPHILS NFR BLD AUTO: 85.1 %
OSMOLALITY SERPL CALC.SUM OF ELEC: 321 MOSM/KG (ref 275–295)
PLATELET # BLD AUTO: 75 10(3)UL (ref 150–450)
PNEUMOCYSTIS RESULT: NEGATIVE
PNEUMOCYSTIS RESULT: NEGATIVE
POTASSIUM SERPL-SCNC: 3.6 MMOL/L (ref 3.5–5.1)
PROT SERPL-MCNC: 5.6 G/DL (ref 6.4–8.2)
RBC # BLD AUTO: 3.06 X10(6)UL
SODIUM SERPL-SCNC: 141 MMOL/L (ref 136–145)
WBC # BLD AUTO: 10.4 X10(3) UL (ref 4–11)

## 2023-12-22 PROCEDURE — 99233 SBSQ HOSP IP/OBS HIGH 50: CPT | Performed by: INTERNAL MEDICINE

## 2023-12-22 PROCEDURE — 99232 SBSQ HOSP IP/OBS MODERATE 35: CPT | Performed by: HOSPITALIST

## 2023-12-22 PROCEDURE — 99291 CRITICAL CARE FIRST HOUR: CPT | Performed by: INTERNAL MEDICINE

## 2023-12-22 RX ORDER — ECHINACEA PURPUREA EXTRACT 125 MG
1 TABLET ORAL
Status: DISCONTINUED | OUTPATIENT
Start: 2023-12-22 | End: 2024-01-02

## 2023-12-22 RX ORDER — BENZONATATE 100 MG/1
100 CAPSULE ORAL 3 TIMES DAILY PRN
Status: DISCONTINUED | OUTPATIENT
Start: 2023-12-22 | End: 2024-01-02

## 2023-12-22 RX ORDER — BUMETANIDE 0.25 MG/ML
2 INJECTION INTRAMUSCULAR; INTRAVENOUS
Qty: 30 ML | Refills: 0 | Status: DISCONTINUED | OUTPATIENT
Start: 2023-12-22 | End: 2023-12-23

## 2023-12-22 NOTE — PROGRESS NOTES
[facility]     Hospitalist Progress Note     Alina Aceves Patient Status:  Inpatient    1980 MRN [id number]   Location [facility] 4NW-A Attending Tosha Waite MD   Hosp Day # 9 PCP HOLLY IBARRA     Chief Complaint: Intractable nausea vomiting, diarrhea, generalized weakness.  Recent pneumonia.     Subjective:     Ongoing cough she says and mild dyspnea.      Objective:    Review of Systems:   A comprehensive review of systems was completed; pertinent positive and negatives stated in subjective.    Vital signs:  Temp:  [97 °F (36.1 °C)-97.5 °F (36.4 °C)] 97.1 °F (36.2 °C)  Pulse:  [102-118] 111  Resp:  [15-26] 21  BP: (139-157)/(106-119) 140/110  SpO2:  [88 %-99 %] 90 %    Physical Exam:    BP (!) 140/110 (BP Location: Right arm)   Pulse 111   Temp 97.1 °F (36.2 °C) (Temporal)   Resp 21   Ht 5' 4\" (1.626 m)   Wt 147 lb 0.8 oz (66.7 kg)   LMP 2023 (Approximate)   SpO2 90%   Breastfeeding No   BMI 25.24 kg/m²     General: No acute distress  Respiratory: Clear to auscultation bilateral except decreased breath sounds at bases  Cardiovascular: S1, S2, regular rate and rhythm  Abdomen: Soft, Non-tender, non-distended  Neuro: Awake alert, lethargic, no new focal deficits.   Extremities: 1+LE edema; very weak in lower legs/ankles/feet; sensation intact    Diagnostic Data:    Labs:  Recent Labs   Lab 23  0617 23  1312 23  1728 23  0800 23  0521 23  0436   WBC 8.3  --  13.0*  12.8*  --  7.2 10.4   HGB 7.9*  --  10.2*  10.2*  --  8.9* 8.8*   MCV 81.4  --  89.5  84.5  --  86.1 88.9   .0  --  200.0  98.0*  --  79.0* 75.0*   BAND  --   --  8  --   --   --    INR  --  2.28* 2.36* 2.21* 1.48*  --        Recent Labs   Lab 23  0940 23  1455 23  0521 23  1550 23  0436   *  --  140*  --  129*   BUN 84*  --  76*  --  90*   CREATSERUM 1.55*  --  1.17*  --  1.16*   CA 7.7*  --  8.1*  --  8.2*   ALB 2.6*  --  3.0*  --   3.1*     --  138  --  141   K 3.1*   < > 2.9* 3.1* 3.6     --  104  --  108   CO2 25.0  --  25.0  --  24.0   ALKPHO 189*  --  200*  --  210*   *  --  918*  --  452*   *  --  1,127*  --  986*   BILT 2.6*  --  2.3*  --  1.8   TP 4.6*  --  5.2*  --  5.6*    < > = values in this interval not displayed.       Estimated Creatinine Clearance: 54 mL/min (A) (based on SCr of 1.16 mg/dL (H)).    Microbiology    Hospital Encounter on 12/13/23   1. Body Fluid Cult Aerobic and Anaerobic     Status: None    Collection Time: 12/15/23  3:26 PM    Specimen: Pleural Fluid, Right; Body fluid, unspecified   Result Value Ref Range    Body Fluid Culture Result No Growth 5 Days N/A    Body Fld Smear 4+ Neutrophils seen N/A    Body Fld Smear No organisms seen N/A    Body Fld Smear This is a cytocentrifuged smear. N/A   2. Urine Culture, Routine     Status: None    Collection Time: 12/14/23  3:58 AM    Specimen: Urine, clean catch   Result Value Ref Range    Urine Culture No Growth at 18-24 hrs. N/A   3. Blood Culture     Status: None    Collection Time: 12/13/23  8:10 PM    Specimen: Blood,peripheral   Result Value Ref Range    Blood Culture Result No Growth 5 Days N/A     MRSA PCR nares positive on 12/14/2023    Imaging: Reviewed in Epic.  Chest x-ray done on 12/13/2023 with dense patchy airspace consolidation opacities present within the lungs suspicious for areas of pneumonia  CTA chest done on 12/14/2023 early a.m. shows moderate to large bilateral pleural effusion along with marked consolidation scattered throughout the lungs suggestive of pulmonary edema and fluid overload.  No evidence of pulm embolus.  Moderate large left axillary and left breast fluid collections noted.  Minimal gas in the left most central breast fluid collection, possibility of infection is a consideration.  Sequelae of seroma chronic hematoma is a consideration as well.    Medications:    potassium citrate  20 mEq Oral TID CC     metoprolol tartrate  50 mg Oral 2x Daily(Beta Blocker)    DULoxetine  30 mg Oral Daily    multivitamin with minerals  1 tablet Oral Daily    methylPREDNISolone  40 mg Intravenous Daily    pantoprazole  40 mg Intravenous QAM AC    Or    pantoprazole  40 mg Oral QAM AC       Assessment & Plan:      # Multifocal pneumonia with bilateral large bilateral pleural effusion  -s/p left thora 12/15; s/p right thora 12/16; cytology without malignancy  -s/p bronch; studies pending  -completed empiric antibiotics : ceftriaxone, doxycycline (12/13-12/18)      #new onset cardiomyopathy-bumex drip; EF 20-25%, severe diffuse hypokinesis; will eventually need to be on GDMT, defer to cards; eventually needs cath when optimized    #bilateral pleural effusion due to cardiomyopathy and acute systolic heart failure   Status post left thoracentesis on 12/15/2023 and right thoracentesis on 12/16/2023, pulmonary following   MRSA nares came back positive, s/p Bactroban application twice daily for 5 days.       # Acute hypoxic respiratory failure due to above-off abx; ongoing bumex     # Locally advanced stage IIIb breast cancer status post outpatient neoadjuvant chemo and history of left breast lumpectomy and left lymph node resection on 11/16/2023    #LE weakness and painful neuropathy-MRI ordered per neuro    # Hyponatremia due to pneumonia and also hypovolemic due to nausea vomiting and diarrhea, siadh, ssri (off ssri now)-resolved    # Hypokalemia-resolved    # acute kidney injury; hx interstitial nephritis; might need repeat biopsy at some point; continue bumex; GFR improved; renal following     # History of lupus, history of Sjogren's disease  -Takes azathioprine at home which was held at this time due to multifocal pneumonia  - steroids started per rheum    #elevated LFTs, elevated INR; clinically doubt cholecystitis; PPI; possibly from CHF hepatic congestion, but not sure; s/p vit K and IV NAC;  if LFTs don't improve, might need  biopsy; CMV,  EBV, HSV pending    # Gastritis  # Nausea vomiting and diarrhea at home possibly due to effect of chemo  # Anxiety  - IV PPI    # Anemia and thrombocytopenia due to malignancy and chemo;    Follow CBC    # Small left apical pneumothorax on 12/15/2023 status post left thoracentesis  -Pulmonary following, on conservative management, repeat chest x-ray done on 12/15/2023 showed left pneumothorax resolved    # Moderate malnutrition  -Dietitian following    Noe Perkins MD  [facility]  Internal Medicine Hospitalist      Supplementary Documentation:     Quality:  DVT Mechanical Prophylaxis:   SCDs, Early ambuation  DVT Pharmacologic Prophylaxis   Medication   None                Code Status: Full Code  Peacock: Peacock catheter in place  Peacock Duration (in days):   Central line:    CHRISTELLE:     Discharge is dependent on: Clinical progress  At this point Ms. Aceves is expected to be discharge to: To be decided    The 21st Century Cures Act makes medical notes like these available to patients in the interest of transparency. Please be advised this is a medical document. Medical documents are intended to carry relevant information, facts as evident, and the clinical opinion of the practitioner. The medical note is intended as peer to peer communication and may appear blunt or direct. It is written in medical language and may contain abbreviations or verbiage that are unfamiliar.

## 2023-12-22 NOTE — PROGRESS NOTES
Inpatient Follow up Note    Alina Aceves Patient Status:  Inpatient    1980 MRN [id number]   Location [facility] 4SW-A Attending Noe Perkins MD   Hosp Day # 9 PCP HOLLY IBARRA     Reason for Consultation   Abnormal LFTs     Subjective   LFTs improved today, no abdominal pain, tolerating po.             Objective:     BP (!) 134/104   Pulse 103   Temp 97.5 °F (36.4 °C) (Temporal)   Resp 21   Ht 5' 4\" (1.626 m)   Wt 147 lb 7.8 oz (66.9 kg)   LMP 2023 (Approximate)   SpO2 91%   Breastfeeding No   BMI 25.32 kg/m²   Gen: AAOx3  CV: RRR with normal S1 / S2  Resp: CTA bilaterally  Abd: (+)BS, soft, non-tender, non-distended; no rebound or guarding  Ext: No edema or cyanosis  Skin: Warm and dry     Labs/Imaging     LABRCNTIP[PGLU:5@  Recent Labs   Lab 23  1312 23  1728 23  0800 23  0521   INR 2.28* 2.36* 2.21* 1.48*         Recent Labs   Lab 23  0617 23  1728 23  0521 23  0436   WBC 8.3 13.0*  12.8* 7.2 10.4   HGB 7.9* 10.2*  10.2* 8.9* 8.8*   .0 200.0  98.0* 79.0* 75.0*       Recent Labs   Lab 23  1728 23  0331 23  0940 23  1455 23  0146 23  0521 23  1550 23  0436   * 135* 136  --   --  138  --  141   K 3.9 3.2* 3.1* 2.7* 2.9* 2.9* 3.1* 3.6   CL 98 101 100  --   --  104  --  108   CO2 22.0 21.0 25.0  --   --  25.0  --  24.0   BUN 74* 73* 84*  --   --  76*  --  90*   CREATSERUM 1.84* 1.65* 1.55*  --   --  1.17*  --  1.16*       Recent Labs   Lab 23  1728 23  0331 23  0940 23  0521 23  0436   ALT 1,093* 793* 949* 1,127* 986*   * 725* 973* 918* 452*     US VENOUS DOPPLER LEG BILAT - DIAG IMG (CPT=93970)    Result Date: 2023  CONCLUSION:  No evidence of DVT in bilateral lower extremities.   LOCATION:  [place] [address]   Dictated by (CST): Gustavo Yuan MD on 2023 at 2:14 PM     Finalized by (CST): Gustavo Yuan MD on  12/21/2023 at 2:16 PM       US ABDOMEN COMPLETE (CPT=76700)    Result Date: 12/19/2023  CONCLUSION:  1. Cholelithiasis with mild gallbladder wall thickening and small amount of pericholecystic fluid.  Findings may represent acute cholecystitis.  Correlation with clinical symptoms is recommended.  2. Bilateral pleural effusions   LOCATION:  [facility]    Dictated by (CST): Sofi Laguna MD on 12/19/2023 at 7:37 PM     Finalized by (CST): Sofi Laguna MD on 12/19/2023 at 7:40 PM       XR CHEST AP PORTABLE  (CPT=71045)    Result Date: 12/19/2023  CONCLUSION:  Extensive airspace infiltrates bilaterally with interval worsening at the right base compared to the previous exam.  Differential considerations include pneumonia, pulmonary hemorrhage, pulmonary edema and ARDS.   LOCATION:  [facility]      Dictated by (CST): Juan Fernandez MD on 12/19/2023 at 4:39 PM     Finalized by (CST): Juan Fernandez MD on 12/19/2023 at 4:41 PM      AST   Date/Time Value Ref Range Status   12/22/2023 04:36  (H) 15 - 37 U/L Final     ALT   Date/Time Value Ref Range Status   12/22/2023 04:36  (H) 13 - 56 U/L Final     BUN   Date/Time Value Ref Range Status   12/22/2023 04:36 AM 90 (H) 9 - 23 mg/dL Final              Assessment   Ms Aceves is a 43 year old female with a history of stage IIIb breast cancer and lupus who presented on 12/13 with shortness of breath, found to have pneumonia, pleural effusions (transudative), and HFrEF (EF 20-25%) for whom GI is consulted for abnormal LFTs. These are most likely secondary to congestive hepatopathy. Her LFTs are improving today.       Plan   -daily LFTs  -f/u EBV/HSV/CMV studies  -s/p IV NAC and IV vitamin K  -continued CHF management per cards and primary team  -if LFTs worsen, especially with continued diuresis, will likely need liver biopsy for further evaluation; would continue to monitor LFTs over next few days for serial assessment       Vamsi Antoine MD  1:25  PM  12/22/2023  [facility]  [phone]

## 2023-12-22 NOTE — PROGRESS NOTES
[facility]     Nephrology Progress Note    Alina Aceves Patient Status:  Inpatient    1980 MRN [id number]   Location [facility] 6NE-A Attending Noe Perkins MD   Hosp Day # 9 PCP HOLLY IBARRA       SUBJECTIVE:  Notes ongoing issues with cough      Physical Exam:   BP (!) 150/115 (BP Location: Right arm)   Pulse 107   Temp 97.7 °F (36.5 °C) (Temporal)   Resp 17   Ht 5' 4\" (1.626 m)   Wt 147 lb 0.8 oz (66.7 kg)   LMP 2023 (Approximate)   SpO2 97%   Breastfeeding No   BMI 25.24 kg/m²   Temp (24hrs), Av.3 °F (36.3 °C), Min:97 °F (36.1 °C), Max:97.7 °F (36.5 °C)       Intake/Output Summary (Last 24 hours) at 2023 1059  Last data filed at 2023 0800  Gross per 24 hour   Intake 739.2 ml   Output 1625 ml   Net -885.8 ml     Last 3 Weights   23 0000 147 lb 0.8 oz (66.7 kg)   23 0538 150 lb 9.2 oz (68.3 kg)   23 0019 150 lb 3.2 oz (68.1 kg)   23 2326 124 lb (56.2 kg)   23 1823 125 lb (56.7 kg)   23 1816 120 lb (54.4 kg)   21 1537 135 lb (61.2 kg)   20 1405 136 lb (61.7 kg)   08/15/20 1020 140 lb (63.5 kg)     General: Alert and oriented in no apparent distress.  HEENT: No scleral icterus, MMM  Neck: Supple, no KALYN or thyromegaly  Cardiac: Regular rate and rhythm, S1, S2 normal, no murmur or rub  Lungs: Clear without wheezes, rales, rhonchi.    Abdomen: Soft, non-tender. + bowel sounds, no palpable organomegaly  Extremities: 1+ BLE edema.1-2+ pedal edema  Neurologic: moving all extremities  Skin: Warm and dry, no rash        Labs:     Recent Labs   Lab 23  0617 23  1312 23  1728 23  0800 23  0521 23  0436   WBC 8.3  --  13.0*  12.8*  --  7.2 10.4   HGB 7.9*  --  10.2*  10.2*  --  8.9* 8.8*   MCV 81.4  --  89.5  84.5  --  86.1 88.9   .0  --  200.0  98.0*  --  79.0* 75.0*   BAND  --   --  8  --   --   --    INR  --  2.28* 2.36* 2.21* 1.48*  --        Recent Labs   Lab 23  9195  12/20/23  0331 12/20/23  0940 12/20/23  1455 12/21/23  0146 12/21/23  0521 12/21/23  1550 12/22/23  0436   * 135* 136  --   --  138  --  141   K 3.9 3.2* 3.1* 2.7* 2.9* 2.9* 3.1* 3.6   CL 98 101 100  --   --  104  --  108   CO2 22.0 21.0 25.0  --   --  25.0  --  24.0   BUN 74* 73* 84*  --   --  76*  --  90*   CREATSERUM 1.84* 1.65* 1.55*  --   --  1.17*  --  1.16*   CA 7.8* 6.7* 7.7*  --   --  8.1*  --  8.2*   MG  --  2.1  --   --   --   --   --  1.9   GLU 77 165* 130*  --   --  140*  --  129*       Recent Labs   Lab 12/16/23  0650 12/18/23  0615 12/19/23  1728 12/20/23  0331 12/20/23  0940 12/21/23  0521 12/22/23  0436   ALT  --    < > 1,093* 793* 949* 1,127* 986*   AST  --    < > 946* 725* 973* 918* 452*   ALB  --    < > 2.7* 2.2* 2.6* 3.0* 3.1*   *  --   --   --   --   --   --     < > = values in this interval not displayed.       Recent Labs   Lab 12/18/23  0729 12/18/23  1106 12/19/23  1514   PGLU 84 125* 72       Meds:           Impression/Plan:         REJI- in setting of severe CM with cardiorenal physiology..  Follow closely with diuresis, creat stable overall today with ongoing excellent diuresis. UOP slowed a bit but wt improved.  Back off to bid bumex dosing  Proteinuria- pt w/ hx of interstitial nephritis - treated w/ steorids in past - @ [facility] (Dr Tjeeda); kidney biopsy jan 2023- interstitial nephritis-  Spot ratio ~ 2.9 g/g; low complements; +NURIA->+ anti-SSA ab  - may need to consider kidney biopsy for further evaluation although for now holding on ordering given overall clinical picture  Hyponatremia- would appear primarily due to hypervolemia.  Now resolved, cont loop diuretic.  Concern for poss component related to SIADH as well and SSRI has been held.  Normal with diuresis  Worsening LFTs- ? Due to ischemia/congestion.  GI following  Breast Ca- oncology following  Effusion- per pulm; on O2; s/p thoracentesis ;no malignancy on cytology  Hypoxia- effusion/CHF.  S/p bronch.  Per  pulm  Hypokelamia- replace per protocol and will cont standing dose as well. Pt states she was on potassium citrate in the past with good results  Hx of SLE/Sjogrens- appreciate rheum eval, on steroids          Questions/concerns were discussed with patient and/or family by bedside.      Cctime 35 minutes      Francisco Quarles MD  12/22/2023  11:21 AM

## 2023-12-22 NOTE — PHYSICAL THERAPY NOTE
PHYSICAL THERAPY TREATMENT NOTE - INPATIENT    Room Number: 466/466-A     Session: 1 after re-eval     Number of Visits to Meet Established Goals: 5    Presenting Problem: PNA  Co-Morbidities : breast cancer who is undergoing chemotherapy  ASSESSMENT     Pt easily falling asleep during PROM of BLE's, and AAROM BUE therex.  Pt issued therex for BUE - encouraged to perform 3x per day.  Pt dealing with high levels of nausea - encouraged up to chair with ed lift later today.     MRI spine is still pending per RN.     At this time, Pt. presents with decreased balance, impaired strength, difficulty with gait/transfers resulting in downgrade of overall functional mobility.  Due to above deficits, Pt will benefit from continued IP PT, so that patient may achieve highest functional independence/return to baseline.       DISCHARGE RECOMMENDATIONS  PT Discharge Recommendations: Undetermined     PLAN  PT Treatment Plan: Bed mobility;Body mechanics;Coordination;Endurance;Energy conservation;Patient education;Family education;Gait training;Neuromuscular re-educate;Range of motion;Strengthening;Stoop training;Stair training;Transfer training;Balance training  Rehab Potential : Fair  Frequency (Obs): 5x/week    CURRENT GOALS     Goal #1 Patient is able to demonstrate supine - sit EOB @ level: supervision      Goal #2 Patient is able to demonstrate transfers Sit to/from Stand at assistance level: minimum assistance      Goal #3 Patient is able to ambulate 10 feet with assist device: walker - rolling at assistance level: minimum assistance      Goal #4     Goal #5     Goal #6     Goal Comments: Goals established on 12/21/2023 12/22/2023 all goals ongoing     SUBJECTIVE  \"Yeah my legs do move anymore\"    OBJECTIVE  Precautions: Bed/chair alarm (Chronic R foot drop per pt)    WEIGHT BEARING RESTRICTION  Weight Bearing Restriction: None                PAIN ASSESSMENT   Rating: Unable to rate  Location: BLE's, generalized  soreness total body  Management Techniques: Activity promotion;Body mechanics;Repositioning;Relaxation    BALANCE                                                                                                                       Static Sitting: Fair +  Dynamic Sitting: Fair +           Static Standing: Dependent  Dynamic Standing: Dependent    ACTIVITY TOLERANCE                         O2 WALK         AM-PAC '6-Clicks' INPATIENT SHORT FORM - BASIC MOBILITY  How much difficulty does the patient currently have...  Patient Difficulty: Turning over in bed (including adjusting bedclothes, sheets and blankets)?: A Lot   Patient Difficulty: Sitting down on and standing up from a chair with arms (e.g., wheelchair, bedside commode, etc.): Unable   Patient Difficulty: Moving from lying on back to sitting on the side of the bed?: Unable   How much help from another person does the patient currently need...   Help from Another: Moving to and from a bed to a chair (including a wheelchair)?: Total   Help from Another: Need to walk in hospital room?: Total   Help from Another: Climbing 3-5 steps with a railing?: Total       AM-PAC Score:  Raw Score: 7   Approx Degree of Impairment: 92.36%   Standardized Score (AM-PAC Scale): 26.42   CMS Modifier (G-Code): CM    FUNCTIONAL ABILITY STATUS  Gait Assessment   Functional Mobility/Gait Assessment  Gait Assistance: Not tested  Distance (ft): 0  Assistive Device: Rolling walker  Pattern: Shuffle    Skilled Therapy Provided    Bed Mobility:  Rolling: Max A   Supine<>Sit: NT   Sit<>Supine: NT     Therapist's Comments: grossly weak, poor activity tolerance/lethargic      THERAPEUTIC EXERCISES  Lower Extremity PROM     Upper Extremity Elbow flex/ext,  - open/close, Shoulder flex/ext, and active assist - encouraged hand pumps     Position Supine     Repetitions   5-10   Sets   1     Patient End of Session: In bed;Needs met;Call light within reach;RN aware of session/findings;All patient  questions and concerns addressed;Alarm set    PT Session Time: 8 minutes  Gait Trainin minutes  Therapeutic Activity: 0 minutes  Therapeutic Exercise: 8 minutes   Neuromuscular Re-education: 0 minutes

## 2023-12-22 NOTE — PROGRESS NOTES
[facility] Cardiology  Report of Consultation    Alina Aceves Patient Status:  Inpatient    1980 MRN [id number]   Location [facility] 8NE-A Attending Tosha Waite MD   Hosp Day # 9 PCP [facility] IBARRA     Reason for Consultation:   Tachycardia, palpitations    History of Present Illness:   Alina Aceves is a(n) 43 year old female with SLE and palpitations who I saw in clinic 2 weeks ago.    She has Stage III breast CA.  She has been undergoing chemotherapy, EF  was 50%. It is now 20% range.    She was admitted with SOB 23.  She was diagnosed with PNA and pleural effusions.  She also had hyponatremia with a sodium 120.  She was seen by Pulmonology, ID, Hematology, and Nephrology.    Subjective:  -No events overnight  -Awake, alert  -No SOB today, on 1L NC  -IV diuresis continues  -Edema improved       Past Medical History:   Past Medical History:   Diagnosis Date    Breast cancer (HCC)     Gastritis     Lupus (HCC)     Sjogren's disease (HCC)        Social History:   Smoking:  None  Alcohol:  None    Family History:   No family history of premature arthrosclerotic heart disease     Medications:   Scheduled:    bumetanide  2 mg Intravenous BID (Diuretic)    potassium citrate  20 mEq Oral TID CC    metoprolol tartrate  50 mg Oral 2x Daily(Beta Blocker)    DULoxetine  30 mg Oral Daily    multivitamin with minerals  1 tablet Oral Daily    methylPREDNISolone  40 mg Intravenous Daily    pantoprazole  40 mg Intravenous QAM AC    Or    pantoprazole  40 mg Oral QAM AC       Continuous Infusion:         PRN Medications:   benzonatate, sodium chloride, HYDROcodone-acetaminophen, calcium carbonate, dextromethorphan-guaiFENesin, ALPRAZolam, LORazepam, metoprolol, ipratropium-albuterol, melatonin, prochlorperazine, polyethylene glycol (PEG 3350), sennosides, bisacodyl, fleet enema, HYDROmorphone **OR** [DISCONTINUED] HYDROmorphone **OR** [DISCONTINUED] HYDROmorphone    Outpatient Medications:    Current Facility-Administered Medications on File Prior to Encounter   Medication Dose Route Frequency Provider Last Rate Last Admin    [COMPLETED] morphINE PF 4 MG/ML injection 4 mg  4 mg Intravenous Once Jesus Manuel Hatfield MD   4 mg at 11/13/23 2255    [COMPLETED] iopamidol 76% (ISOVUE-370) injection for power injector  100 mL Intravenous ONCE PRN Jesus Manuel Hatfield MD   100 mL at 11/13/23 2247    [COMPLETED] potassium chloride (K-Dur) tab 40 mEq  40 mEq Oral Once Jesus Manuel Hatfield MD   40 mEq at 11/14/23 0010     Current Outpatient Medications on File Prior to Encounter   Medication Sig Dispense Refill    Potassium Citrate ER 15 MEQ (1620 MG) Oral Tab CR Take 1 tablet by mouth in the morning, at noon, and at bedtime.      DULoxetine 30 MG Oral Cap DR Particles Take 1 capsule (30 mg total) by mouth daily.      ondansetron (ZOFRAN) 8 MG tablet Take 1 tablet (8 mg total) by mouth as needed.      zolpidem 10 MG Oral Tab Take 1 tablet (10 mg total) by mouth nightly as needed for Sleep.      HYDROcodone-acetaminophen 5-325 MG Oral Tab Take 1 tablet by mouth every 8 (eight) hours as needed. (Patient not taking: Reported on 12/13/2023)      lidocaine-prilocaine 2.5-2.5 % External Cream APPLY SMALL AMOUNT OVER PORT PRIOR TO ACCESS      azaTHIOprine (IMURAN OR) Take by mouth. (Patient not taking: No sig reported)      Prenatal Vit-DSS-Fe Cbn-FA (PRENATAL AD OR) Take 1 tablet by mouth daily.         Allergies:   Allergies   Allergen Reactions    Bactrim [Sulfamethoxazole W/Trimethoprim] RASH       Review of Systems:   No fevers, chills, change in weight or bowel habits.  Ten point review of systems is otherwise negative or unremarkable.    Physical Exam:   Vitals:    12/22/23 1600   BP: (!) 136/114   Pulse: 99   Resp: 15   Temp: 97.5 °F (36.4 °C)     Wt Readings from Last 3 Encounters:   12/22/23 147 lb 7.8 oz (66.9 kg)   11/13/23 120 lb (54.4 kg)   12/16/21 135 lb (61.2 kg)           General:  Well developed, well nourished female.  Pt is in no acute distress.  HEENT:   Normocephalic.  Atraumatic.  Eyes with no scleral icterus.  Neck: Supple.  No JVD.  Carotids 2+ and equal in symmetric fashion.  No bruits are noted.  Cardiac: Regular rate and rhythm.   There is a normal S1 and S2.  No S3 or S4.  No murmurs, rubs, or gallops.  PMI is non-displaced with a normal apical impulse.  Lungs: Clear to ascultation bilaterally.  No focal rales, rhonchi, or wheezes.  Good air movement is noted throughout all lung fields.  Abdomen: Soft.  Non-distended.  Non-tender.  Bowel sounds are present and normoactive.  No guarding or rebound.   Extremities: Extremities do demonstrate 2+ peripheral edema.   No cyanosis or clubbing of the digits is appreciated.  Femoral, Dorsalis Pedis, and Posterior Tibialis  pulses are 2+ and equal in a symmetric fashion.  Neurologic: Alert and oriented, normal affect.  No gross deficit appreciated.  Integument:  No visible rashes are appreciated.      Laboratories and Data:   Labs:    Recent Labs   Lab 12/20/23  0940 12/20/23  1455 12/21/23  0521 12/21/23  1550 12/22/23  0436   *  --  140*  --  129*   BUN 84*  --  76*  --  90*   CREATSERUM 1.55*  --  1.17*  --  1.16*   CA 7.7*  --  8.1*  --  8.2*   ALB 2.6*  --  3.0*  --  3.1*     --  138  --  141   K 3.1*   < > 2.9* 3.1* 3.6     --  104  --  108   CO2 25.0  --  25.0  --  24.0   ALKPHO 189*  --  200*  --  210*   *  --  918*  --  452*   *  --  1,127*  --  986*   BILT 2.6*  --  2.3*  --  1.8   TP 4.6*  --  5.2*  --  5.6*    < > = values in this interval not displayed.       Recent Labs   Lab 12/19/23  1728 12/21/23  0521 12/22/23  0436   RBC 3.51*  3.55* 3.16* 3.06*   HGB 10.2*  10.2* 8.9* 8.8*   HCT 31.4*  30.0* 27.2* 27.2*   MCV 89.5  84.5 86.1 88.9   MCH 29.1  28.7 28.2 28.8   MCHC 32.5  34.0 32.7 32.4   RDW 14.0  14.0 14.2 15.5   NEPRELIM 11.46* 6.33 8.86*   WBC 13.0*  12.8* 7.2 10.4   .0   98.0* 79.0* 75.0*       Recent Labs   Lab 12/19/23  1728 12/20/23  0800 12/21/23  0521   PTP 26.1* 24.8* 18.0*   INR 2.36* 2.21* 1.48*       No results for input(s): \"TROP\", \"CK\" in the last 168 hours.    Diagnostics:   Tele: Sinus tachycardia.  EKG: Sinus tachycardia, non-specific ST/T changes  Echo: Unremarkable by 5/23 echo (EF low normal, mild MR).    12/17/23  Conclusions:     1. Left ventricle: The cavity size was normal. Wall thickness was normal.      Systolic function was markedly reduced. The estimated ejection fraction      was 20-25%, by visual assessment. There was severe diffuse hypokinesis.      Technical limitations of the study preclude regional wall motion      analysis. Unable to assess LV diastolic function due to heart rhythm.   2. Right ventricle: The cavity size was increased. Systolic function was      reduced. The RV free wall longitudinal strain was -16.50%. The tricuspid      annular plane systolic excursion (TAPSE) is 1.56cm.   3. Left atrium: The left atrial volume was moderately increased.   4. Right atrium: The atrium was moderately dilated.   5. Mitral valve: The annulus was dilated. The leaflets were non-specifically      thickened. There was moderate regurgitation, with multiple jets.   6. Tricuspid valve: The annulus is dilated. There was severe regurgitation.   7. Pulmonary arteries: Systolic pressure was moderately to markedly      increased, at least 55mm Hg. Systolic pressure may be underestimated due      to wide tricuspid regurgitation. Estimated pulmonary artery diastolic      pressure was 34mm Hg.   8. Pericardium, extracardiac: A trivial pericardial effusion was identified.      There was a left pleural effusion.           Assessment:  1. New onset severe cardiomyopathy  2. PNA  3. Hypoxic resp failure  4. Sinus tachycaria  5. Palpitations  6. Breast CA, stage IIIb  7. Hyponatremia      Plan:  Cardiomyopathy: IV diuresis.  Breathing improved.  Edema improved.  By weight  somehow still up 20lbs.   Cr continues to improve.  Follow BMP.  Now BID loop diuretic.  Defer to renal.  But excellent response so far.  Needs cath but can wait for now given clinical stability.  Elevated LFTS: Likely hepatic congestion (severe TR from ventricular dysfunction and pulm HTN).  Diuresis and treatment of CHF should help.  Primary team.  Tachycardia: ST.  Likely secondary to infection and medical illness.  Can increase metoprolol to 50mg BID given BP.  Needs ARNI in future.  Will let Cr settle first (BUN still high as well).  Will start hydralizine and consider low dose entresto over the weekend or early next week.  Hydralizine 25 TID for now.  Resp/PNA: ID, pulm.  Antibiotics.  Hyponatremia: Improved.  Renal.  BP: BB as above.  Metastatic breast CA: Per primary and oncology.    Leonel Villalba MD

## 2023-12-22 NOTE — PROGRESS NOTES
Received pt this morning following shift report. AOx4. Weaned to room air. ST on telemetry, HR sustaining up to 110s today. Discussed with cardiology, metoprolol dose increased. UOP decreased today, discussed with renal, continue bumex gtt for one more day. Up to chair with total lift with PT. Neuro consulted, MRI ordered and screening form completed. Port a cath accessed due to poor venous access and frequent lab draws. CTU orders in place. See flowsheets and MAR for further data.

## 2023-12-22 NOTE — PROGRESS NOTES
[facility] Infectious Disease  Progress Note    Alina Aceves Patient Status:  Inpatient    1980 MRN [id number]   Location [place] 4SW-A Attending Noe Perkins MD   Hosp Day # 9 PCP [facility] IBARRA     Subjective:  Patient seen/examined.  Clinical course reviewed.  GI input appreciated.  LFTs continue to slowly improve.    Objective:  Blood pressure (!) 134/104, pulse 103, temperature 97.5 °F (36.4 °C), temperature source Temporal, resp. rate 21, height 5' 4\" (1.626 m), weight 147 lb 7.8 oz (66.9 kg), last menstrual period 2023, SpO2 91%, not currently breastfeeding.    Intake/Output:    Intake/Output Summary (Last 24 hours) at 2023 1424  Last data filed at 2023 1102  Gross per 24 hour   Intake 747.9 ml   Output 1575 ml   Net -827.1 ml       Physical Exam:  General: Awake, alert, non-tox, NAD.  HEENT:  Oropharynx clear, trachea ML.  Heart: RRR S1S2 no murmurs.  Lungs: Essentially CTA b/l, no rhonchi, rales, wheezes.  Abdomen: Soft, NT/ND.  BS present.  No organomegaly.  Extremity: No edema.  Neurological: No focal deficits.  Derm:  Warm, dry, free from rashes.    Lab Data Review:  Lab Results   Component Value Date    WBC 10.4 2023    HGB 8.8 2023    HCT 27.2 2023    PLT 75.0 2023    CREATSERUM 1.16 2023    BUN 90 2023     2023    K 3.6 2023     2023    CO2 24.0 2023     2023    CA 8.2 2023    ALB 3.1 2023    ALKPHO 210 2023    BILT 1.8 2023    TP 5.6 2023     2023     2023    MG 1.9 2023      Cultures:  Blood cultures negative  Urine culture negative  C.diff negative  RVP negative  Strep pneumo negative  Pleural fluid negative  MRSA screen positive     Radiology:  CONCLUSION:  Interval worsening of right lung airspace opacities.  Stable to minimally improved left lung airspace opacities.       Antibiotics  Reviewed:  Ceftriaxone/doxycyline x 5 days complete 12/18/23     Assessment and Plan:     Acute hypoxic respiratory failure in this patient with several medical issues  - CT with acute pulmonary edema and pleural effusions > infection presentation but treated as possible multifocal pneumonia  - Possible atypical infection process considered  - RVP negative, cultures negative to date  - s/p bronch 12/19/23 - all studies negative thus far  - s/p empiric ceftriaxone/doxycyline x 5 days through 12/18/23     2.  Significant transaminitis  - Less likely a concern for acute cholecystitis per GI  - Slowly trending down     3.  H/o breast cancer  - Was on chemotherapy until 9/2023  - Immunosuppressed patient     4.  SLE  - No on any immunosuppressive Rx for this  - Some rashes noted  - Rheum following, high dose steroids started for possible flare     5.  Disposition - inpatient.  Observing off antibiotics at this time.  Multiple ongoing medical issues but acute infection does not appear to be the etiology of events at present.  Otherwise stable without fevers or new s/s of sepsis.  ID will sign off at this point.  Please call for re-eval if any new s/s of infection.    Sharon Archibald DO, [facility] Infectious Disease  [phone]    12/22/2023  2:24 PM

## 2023-12-22 NOTE — PLAN OF CARE
Assumed care of pt for the night shift. Pt with transfer orders, awaiting bed placement. See flowsheets for further assessment.

## 2023-12-22 NOTE — PROGRESS NOTES
[facility]    Alina Aceves Patient Status:  Inpatient    1980 MRN [id number]   [facility] 4SW-A Attending Noe Perkins MD   Hosp Day # 9 PCP HO[facility] IBARRA     Critical Care Progress Note     Date of Admission: 2023  7:15 PM  Admission Diagnosis: Hyponatremia [E87.1]  Weakness generalized [R53.1]  Anemia, unspecified type [D64.9]  Community acquired pneumonia, unspecified laterality [J18.9]     S:  Pt denies complaints today.        Current Medications:    Current Facility-Administered Medications:     benzonatate (Tessalon) cap 100 mg, 100 mg, Oral, TID PRN    sodium chloride (Saline Mist) 0.65 % nasal solution 1 spray, 1 spray, Each Nare, Q3H PRN    potassium citrate (Urocit-K) tab 20 mEq, 20 mEq, Oral, TID CC    HYDROcodone-acetaminophen (Norco) 5-325 MG per tab 1 tablet, 1 tablet, Oral, Q6H PRN    metoprolol tartrate (Lopressor) tab 50 mg, 50 mg, Oral, 2x Daily(Beta Blocker)    DULoxetine (Cymbalta) DR cap 30 mg, 30 mg, Oral, Daily    multivitamin with minerals (Thera M Plus) tab 1 tablet, 1 tablet, Oral, Daily    calcium carbonate (Tums) chewable tab 1,000 mg, 1,000 mg, Oral, Q6H PRN    methylPREDNISolone sodium succinate (Solu-MEDROL) injection 40 mg, 40 mg, Intravenous, Daily    bumetanide (Bumex) 12.5 mg in 50 mL infusion, 0.5 mg/hr, Intravenous, Continuous    dextromethorphan-guaiFENesin (Robitussin-DM)  mg/5mL oral solution 5 mL, 5 mL, Oral, Q6H PRN    ALPRAZolam (Xanax) tab 0.25 mg, 0.25 mg, Oral, TID PRN    pantoprazole (Protonix) 40 mg in sodium chloride 0.9% PF 10 mL IV push, 40 mg, Intravenous, QAM AC **OR** pantoprazole (Protonix) DR tab 40 mg, 40 mg, Oral, QAM AC    LORazepam (Ativan) 2 mg/mL injection 0.5 mg, 0.5 mg, Intravenous, Q6H PRN    metoprolol (Lopressor) 5 mg/5mL injection 5 mg, 5 mg, Intravenous, Q6H PRN    ipratropium-albuterol (Duoneb) 0.5-2.5 (3) MG/3ML inhalation solution 3 mL, 3 mL, Nebulization, Q4H PRN    melatonin tab 3 mg, 3 mg, Oral,  Nightly PRN    prochlorperazine (Compazine) 10 MG/2ML injection 5 mg, 5 mg, Intravenous, Q8H PRN    polyethylene glycol (PEG 3350) (Miralax) 17 g oral packet 17 g, 17 g, Oral, Daily PRN    sennosides (Senokot) tab 17.2 mg, 17.2 mg, Oral, Nightly PRN    bisacodyl (Dulcolax) 10 MG rectal suppository 10 mg, 10 mg, Rectal, Daily PRN    fleet enema (Fleet) 7-19 GM/118ML rectal enema 133 mL, 1 enema, Rectal, Once PRN    HYDROmorphone (Dilaudid) 1 MG/ML injection 0.2 mg, 0.2 mg, Intravenous, Q2H PRN **OR** [DISCONTINUED] HYDROmorphone (Dilaudid) 1 MG/ML injection 0.4 mg, 0.4 mg, Intravenous, Q2H PRN **OR** [DISCONTINUED] HYDROmorphone (Dilaudid) 1 MG/ML injection 0.8 mg, 0.8 mg, Intravenous, Q2H PRN     OBJECTIVE:  BP (!) 150/115 (BP Location: Right arm)   Pulse 107   Temp 97.7 °F (36.5 °C) (Temporal)   Resp 17   Ht 5' 4\" (1.626 m)   Wt 147 lb 0.8 oz (66.7 kg)   LMP 08/28/2023 (Approximate)   SpO2 97%   Breastfeeding No   BMI 25.24 kg/m²      Ventilator Settings:        Wt Readings from Last 3 Encounters:   12/21/23 147 lb 0.8 oz (66.7 kg)   11/13/23 120 lb (54.4 kg)   12/16/21 135 lb (61.2 kg)        I/O last 3 completed shifts:  In: 1573 [P.O.:350; I.V.:273; IV PIGGYBACK:950]  Out: 4165 [Urine:4165]  I/O this shift:  In: -   Out: 200 [Urine:200]      Physical Exam:                          General: alert, cooperative, in NAD                          HEENT: oropharynx clear without erythema or exudates, moist mucous membranes                          Lungs: Clear to auscultation bilaterally, no wheezes or crackles                           Chest wall: No tenderness or deformity.                          Heart: Regular rate and rhythm, normal S1S2                          Abdomen: soft, non-tender, non-distended, positive BS.                          Extremity: No clubbing or cyanosis. no edema                          Skin: No rashes or lesions.       Lab Results   Component Value Date    WBC 10.4 12/22/2023     RBC 3.06 12/22/2023    HGB 8.8 12/22/2023    HCT 27.2 12/22/2023    MCV 88.9 12/22/2023    MCH 28.8 12/22/2023    MCHC 32.4 12/22/2023    RDW 15.5 12/22/2023    PLT 75.0 12/22/2023     Lab Results   Component Value Date     12/22/2023    K 3.6 12/22/2023     12/22/2023    CO2 24.0 12/22/2023    BUN 90 12/22/2023    CREATSERUM 1.16 12/22/2023     12/22/2023    CA 8.2 12/22/2023    ALKPHO 210 12/22/2023     12/22/2023     12/22/2023    BILT 1.8 12/22/2023    ALB 3.1 12/22/2023    TP 5.6 12/22/2023     Lab Results   Component Value Date    INR 1.48 (H) 12/21/2023    INR 2.21 (H) 12/20/2023    INR 2.36 (H) 12/19/2023           Imaging: I have independently visualized all relevant chest imaging in PACS.  I agree with the radiology interpretation except where noted.       ASSESSMENT/PLAN:  Acute hypoxemic respiratory failure - secondary to pneumonia vs pneumonitis as well as systolic heart failure with bilateral pleural effusions.  -continue supplemental oxygen, now improved and on RA   -ECHO with EF: %  PASP: 55mmHg  Pneumonia - viral respiratory panel was negative. PCT was negative, but cannot r/o atypical pathogens  -completed empiric antibiotics : ceftriaxone, doxycycline (12/13-12/18)   -follow up cultures  -strep and legionella urine ag- negative  -s/p bronch 12/19 cultures NGTD  Systolic heart failure: EF: 20-25%  -cards eval appreciated  Transaminitis: suspect shock liver vs congestive hepatopathy  -lactate slightly elevated 12/19.   -trend LFTs, slowly improving   -GI consulted and following.   -abdominal US with cholecystitis. Per GI.   Pleural effusions - bilateral, transudative  -L side- 700 removed on 12/15  -R side- 1400 removed on 12/16  -cytology and cultures are negative.   LE weakness  -pt reports has been going on for a couple of weeks  -neuro consulted  -plan for MRI  SLE  -per hospitalist and rheumatology  -ANCA negative  -Analgesia per rheum.   Hyponatremia,  non-anion gap acidosis  -renal following  Breast cancer  -per heme/onc  Proph   -lmwh  Dispo   -full code  -stable to transfer to the floor, will follow peripherally once out of ICU    Ashley Tapia MD  12/22/2023  9:06 AM

## 2023-12-22 NOTE — PROGRESS NOTES
Inpatient Follow up Note    Alina Aceves Patient Status:  Inpatient    1980 MRN [id number]   [facility] 4SW-A Attending Noe Perkins MD   Hosp Day # 8 PCP HO[facility] IBARRA     Reason for Consultation   Abnormal LFTs     Subjective   She is more alert today, denies abdominal pain. LFTs a bit worse today. INR improved from 2.2 to 1.48.             Objective:     BP (!) 157/114   Pulse 113   Temp 97.3 °F (36.3 °C) (Temporal)   Resp 17   Ht 5' 4\" (1.626 m)   Wt 147 lb 0.8 oz (66.7 kg)   LMP 2023 (Approximate)   SpO2 (!) 89%   Breastfeeding No   BMI 25.24 kg/m²   Gen: AAOx3, no asterixis  CV: RRR with normal S1 / S2  Resp: CTA bilaterally  Abd: (+)BS, soft, non-tender, non-distended; no rebound or guarding  Ext: No edema or cyanosis  Skin: Warm and dry     Labs/Imaging     LABRCNTIP[PGLU:5@  Recent Labs   Lab 12/15/23  0513 23  1312 23  1728 23  0800 23  0521   INR 1.27* 2.28* 2.36* 2.21* 1.48*         Recent Labs   Lab 23  0617 23  1728 23  0521   WBC 8.3 13.0*  12.8* 7.2   HGB 7.9* 10.2*  10.2* 8.9*   .0 200.0  98.0* 79.0*       Recent Labs   Lab 23  0514 23  1728 23  0331 23  0940 23  1455 23  0146 23  0521 23  1550   * 133* 135* 136  --   --  138  --    K 4.3 3.9 3.2* 3.1* 2.7* 2.9* 2.9* 3.1*   CL 98 98 101 100  --   --  104  --    CO2 23.0 22.0 21.0 25.0  --   --  25.0  --    BUN 65* 74* 73* 84*  --   --  76*  --    CREATSERUM 1.83* 1.84* 1.65* 1.55*  --   --  1.17*  --        Recent Labs   Lab 23  0514 23  1728 23  0331 23  0940 23  0521   ALT 1,296* 1,093* 793* 949* 1,127*   AST 1,216* 946* 725* 973* 918*     US VENOUS DOPPLER LEG BILAT - DIAG IMG (CPT=93970)    Result Date: 2023  CONCLUSION:  No evidence of DVT in bilateral lower extremities.   LOCATION:  [place] [address]   Dictated by (CST): Gustavo Yuan MD on 2023 at 2:14  PM     Finalized by (CST): Gustavo Yuan MD on 12/21/2023 at 2:16 PM       US ABDOMEN COMPLETE (CPT=76700)    Result Date: 12/19/2023  CONCLUSION:  1. Cholelithiasis with mild gallbladder wall thickening and small amount of pericholecystic fluid.  Findings may represent acute cholecystitis.  Correlation with clinical symptoms is recommended.  2. Bilateral pleural effusions   LOCATION:  [facility]    Dictated by (CST): Sofi Laguna MD on 12/19/2023 at 7:37 PM     Finalized by (CST): Sofi Laguna MD on 12/19/2023 at 7:40 PM       XR CHEST AP PORTABLE  (CPT=71045)    Result Date: 12/19/2023  CONCLUSION:  Extensive airspace infiltrates bilaterally with interval worsening at the right base compared to the previous exam.  Differential considerations include pneumonia, pulmonary hemorrhage, pulmonary edema and ARDS.   LOCATION:  [facility]      Dictated by (CST): Juan Fernandez MD on 12/19/2023 at 4:39 PM     Finalized by (CST): Juan Fernandez MD on 12/19/2023 at 4:41 PM      AST   Date/Time Value Ref Range Status   12/21/2023 05:21  (H) 15 - 37 U/L Final     ALT   Date/Time Value Ref Range Status   12/21/2023 05:21 AM 1,127 (H) 13 - 56 U/L Final     BUN   Date/Time Value Ref Range Status   12/21/2023 05:21 AM 76 (H) 9 - 23 mg/dL Final              Assessment   Ms Aceves is a 43 year old female with a history of stage IIIb breast cancer and lupus who presented on 12/13 with shortness of breath, found to have pneumonia, pleural effusions (transudative), and HFrEF (EF 20-25%) for whom GI is consulted for abnormal LFTs. These are most likely secondary to congestive hepatopathy, although Ddx includes ischemic hepatopathy, BCS or PVT, autoimmune hepatitis, viral infection, DILI. Her LFTs have mildly worsened however her INR has improved and her mental status has as well, ensuring no AMY.       Plan   -daily LFTs  -f/u EBV/HSV/CMV studies  -s/p IV NAC and IV vitamin K  -continued CHF management per cards and  primary team  -if LFTs continue to worsen, especially with continued diuresis, will likely need liver biopsy for further evaluation; would continue to monitor LFTs over next few days for serial assessment             Vamsi Antoine MD  8:46 PM  12/21/2023  [facility]  [phone]

## 2023-12-23 PROBLEM — N18.30 STAGE 3 CHRONIC KIDNEY DISEASE (HCC): Status: ACTIVE | Noted: 2023-12-23

## 2023-12-23 PROBLEM — I13.10 CARDIORENAL SYNDROME: Status: ACTIVE | Noted: 2023-12-23

## 2023-12-23 PROBLEM — N12 INTERSTITIAL NEPHRITIS: Status: ACTIVE | Noted: 2023-12-23

## 2023-12-23 LAB
ALBUMIN SERPL-MCNC: 3.1 G/DL (ref 3.4–5)
ALBUMIN/GLOB SERPL: 1.3 {RATIO} (ref 1–2)
ALP LIVER SERPL-CCNC: 197 U/L
ALT SERPL-CCNC: 754 U/L
ANION GAP SERPL CALC-SCNC: 9 MMOL/L (ref 0–18)
AST SERPL-CCNC: 206 U/L (ref 15–37)
BASOPHILS # BLD AUTO: 0.01 X10(3) UL (ref 0–0.2)
BASOPHILS NFR BLD AUTO: 0.1 %
BILIRUB SERPL-MCNC: 2 MG/DL (ref 0.1–2)
BUN BLD-MCNC: 105 MG/DL (ref 9–23)
CALCIUM BLD-MCNC: 8.1 MG/DL (ref 8.5–10.1)
CHLORIDE SERPL-SCNC: 105 MMOL/L (ref 98–112)
CO2 SERPL-SCNC: 22 MMOL/L (ref 21–32)
CREAT BLD-MCNC: 1.35 MG/DL
EGFRCR SERPLBLD CKD-EPI 2021: 50 ML/MIN/1.73M2 (ref 60–?)
EOSINOPHIL # BLD AUTO: 0 X10(3) UL (ref 0–0.7)
EOSINOPHIL NFR BLD AUTO: 0 %
ERYTHROCYTE [DISTWIDTH] IN BLOOD BY AUTOMATED COUNT: 16.3 %
GLOBULIN PLAS-MCNC: 2.3 G/DL (ref 2.8–4.4)
GLUCOSE BLD-MCNC: 128 MG/DL (ref 70–99)
HCT VFR BLD AUTO: 26.1 %
HGB BLD-MCNC: 8.5 G/DL
HSV-1 DNA: NEGATIVE
HSV-2 DNA: NEGATIVE
IMM GRANULOCYTES # BLD AUTO: 0.16 X10(3) UL (ref 0–1)
IMM GRANULOCYTES NFR BLD: 1.7 %
INR BLD: 1.43 (ref 0.8–1.2)
LYMPHOCYTES # BLD AUTO: 0.45 X10(3) UL (ref 1–4)
LYMPHOCYTES NFR BLD AUTO: 4.8 %
MCH RBC QN AUTO: 28.4 PG (ref 26–34)
MCHC RBC AUTO-ENTMCNC: 32.6 G/DL (ref 31–37)
MCV RBC AUTO: 87.3 FL
MONOCYTES # BLD AUTO: 0.74 X10(3) UL (ref 0.1–1)
MONOCYTES NFR BLD AUTO: 7.9 %
NEUTROPHILS # BLD AUTO: 8.06 X10 (3) UL (ref 1.5–7.7)
NEUTROPHILS # BLD AUTO: 8.06 X10(3) UL (ref 1.5–7.7)
NEUTROPHILS NFR BLD AUTO: 85.5 %
OSMOLALITY SERPL CALC.SUM OF ELEC: 317 MOSM/KG (ref 275–295)
PLATELET # BLD AUTO: 68 10(3)UL (ref 150–450)
POTASSIUM SERPL-SCNC: 3.6 MMOL/L (ref 3.5–5.1)
PROT SERPL-MCNC: 5.4 G/DL (ref 6.4–8.2)
PROTHROMBIN TIME: 17.5 SECONDS (ref 11.6–14.8)
RBC # BLD AUTO: 2.99 X10(6)UL
SODIUM SERPL-SCNC: 136 MMOL/L (ref 136–145)
WBC # BLD AUTO: 9.4 X10(3) UL (ref 4–11)

## 2023-12-23 PROCEDURE — 99232 SBSQ HOSP IP/OBS MODERATE 35: CPT | Performed by: STUDENT IN AN ORGANIZED HEALTH CARE EDUCATION/TRAINING PROGRAM

## 2023-12-23 PROCEDURE — 99291 CRITICAL CARE FIRST HOUR: CPT | Performed by: INTERNAL MEDICINE

## 2023-12-23 RX ORDER — POTASSIUM CHLORIDE 20 MEQ/1
40 TABLET, EXTENDED RELEASE ORAL ONCE
Status: COMPLETED | OUTPATIENT
Start: 2023-12-23 | End: 2023-12-23

## 2023-12-23 RX ORDER — BUMETANIDE 0.25 MG/ML
1 INJECTION INTRAMUSCULAR; INTRAVENOUS
Status: DISCONTINUED | OUTPATIENT
Start: 2023-12-23 | End: 2023-12-23

## 2023-12-23 NOTE — PROGRESS NOTES
Inpatient Follow up Note    Alina Aceves Patient Status:  Inpatient    1980 MRN [id number]   [facility] 4SW-A Attending Noe Perkins MD   Hosp Day # 10 PCP HO[facility] IBARRA     Reason for Consultation   Abnormal LFTs     Subjective   Pt not in room x 2              Objective:     BP (!) 134/98   Pulse 94   Temp 96.9 °F (36.1 °C) (Temporal)   Resp 15   Ht 5' 4\" (1.626 m)   Wt 143 lb 8.3 oz (65.1 kg)   LMP 2023 (Approximate)   SpO2 96%   Breastfeeding No   BMI 24.64 kg/m²        Labs/Imaging     LABRCNTIP[PGLU:5@  Recent Labs   Lab 23  1312 23  1728 23  0800 23  0521 23  0522   INR 2.28* 2.36* 2.21* 1.48* 1.43*         Recent Labs   Lab 23  0617 23  1728 23  0521 23  0436 23  0522   WBC 8.3 13.0*  12.8* 7.2 10.4 9.4   HGB 7.9* 10.2*  10.2* 8.9* 8.8* 8.5*   .0 200.0  98.0* 79.0* 75.0* 68.0*       Recent Labs   Lab 23  0331 23  0940 23  1455 23  0146 23  0521 23  1550 23  0436 23  0522   * 136  --   --  138  --  141 136   K 3.2* 3.1*   < > 2.9* 2.9* 3.1* 3.6 3.6    100  --   --  104  --  108 105   CO2 21.0 25.0  --   --  25.0  --  24.0 22.0   BUN 73* 84*  --   --  76*  --  90* 105*   CREATSERUM 1.65* 1.55*  --   --  1.17*  --  1.16* 1.35*    < > = values in this interval not displayed.       Recent Labs   Lab 23  0331 23  0940 23  0521 23  0436 23  0522   * 949* 1,127* 986* 754*   * 973* 918* 452* 206*     US VENOUS DOPPLER LEG BILAT - DIAG IMG (CPT=93970)    Result Date: 2023  CONCLUSION:  No evidence of DVT in bilateral lower extremities.   LOCATION:  [id number]   Dictated by (CST): Gustavo Yuan MD on 2023 at 2:14 PM     Finalized by (CST): Gustavo Yuan MD on 2023 at 2:16 PM        AST   Date/Time Value Ref Range Status   2023 05:22  (H) 15 - 37 U/L Final     ALT    Date/Time Value Ref Range Status   12/23/2023 05:22  (H) 13 - 56 U/L Final     BUN   Date/Time Value Ref Range Status   12/23/2023 05:22  (H) 9 - 23 mg/dL Final              Assessment   Ms Aceves is a 43 year old female with a history of stage IIIb breast cancer and lupus who presented on 12/13 with shortness of breath, found to have pneumonia, pleural effusions (transudative), and HFrEF (EF 20-25%) for whom GI is consulted for abnormal LFTs. These are most likely secondary to congestive hepatopathy. LFTs markedly elevated, but downtrending with diuresis.        Plan   -daily LFTs  -f/u EBV/HSV/CMV studies  -s/p IV NAC and IV vitamin K  -continued CHF management per cards and primary team  -if LFTs worsen in the setting of diuresis, will consider liver biopsy for further evaluation       Chato Hall MD, 12/23/23, 2:11 PM  [facility]  [phone]

## 2023-12-23 NOTE — PLAN OF CARE
Shift Note:  Assumed care of patient around 0940, transferred from ICU via bed.   Patient alert and oriented x4.   Patient on room air, denies difficulty breathing, lung sounds diminished with crackles.   Mild nonproductive cough.  Denies any cardiac symptoms, NSR on tele.   BLE edema, compression stockings in placed.   Reported bilateral finger and BLE stiffness/numbess/pain, denied pain medication at this time.   Denies pain on arrival. Continent of bowel and incontinent of bladder, last BM 12/21.   Peacock removed prior to arrival to floor, purewick and brief in place.  Pt able to roll side to side, lift required for transfers, call light within reach, tolerating care well.     1600:  Pt returned from MRI - Not completed due to patient needing to urinate, unable to do down in CT, sent back to floor and attempt scan tomorrow     POC:  - Bumex gtts restarted today   - Daily weight   - MRI Lumbar still pending to be completed

## 2023-12-23 NOTE — PLAN OF CARE
Received pt alert and oriented x4, Pt states has bilateral finger numbness states baseline since admission. Remains with only able to move legs from side to side, noted foot drop, toes move when assessing plantar/dorsi flexion. Pt c/o pain d/t placement of hemanth hose prior to my arrival. PRN Norco and dilaudid given for pain. Pt stated ok to do bp on left arm. PAC intact. Minimal po intake, denies n/v. With adequate urine output. Pt aware has transfer orders

## 2023-12-23 NOTE — PROGRESS NOTES
[facility]      Assessment:  1. New onset severe cardiomyopathy EF 20-25%   2. PNA  3. Hypoxic resp failure// PNA // Pleural effusions s/p Thoracentesis   4. Sinus tachycaria  5. Palpitations  6. Breast CA, stage IIIb  7. Hyponatremia      Plan:  Cardiomyopathy: IV diuresis.  Breathing improved.  Edema improved.  By weight somehow still up 20lbs.   Cr continues to improve.  Follow BMP.  Now BID loop diuretic.  Defer to renal.  But excellent response so far.  Needs cath but can wait for now for clinical stability.S/P bilat thoracentesis   Elevated LFTS: Likely hepatic congestion (severe TR from ventricular dysfunction and pulm HTN).  Diuresis and treatment of CHF should help.  Primary team.  Tachycardia: ST.  Likely secondary to infection and medical illness.  Can increase metoprolol to 50mg BID given BP.  Needs ARNI in future.  Will let Cr settle first (BUN still high as well).  Will start hydralizine and consider low dose entresto over the weekend or early next week.  Hydralizine 25 TID for now.  Resp/PNA: ID, pulm.  Antibiotics.  Hyponatremia: Improved.  Renal.  BP: BB as above.  Metastatic breast CA: Per primary and oncology.    Subjective     History of Present Illness:   Alina Aceves is a(n) 43 year old female with SLE and palpitations who I saw in clinic 2 weeks ago.    She has Stage III breast CA.  She has been undergoing chemotherapy, EF 5/23 was 50%. It is now 20-25% % range.    She was admitted with SOB 12/13/23.  She was diagnosed with PNA and pleural effusions.  She also had hyponatremia with a sodium 120.  She was seen by Pulmonology, ID, Hematology, and Nephrology.    Subjective:  -No events overnight  -Awake, alert  -No SOB today, on 1L NC  -IV diuresis continues 2>1  on RA  Creat up  edema resolved          Past Medical History:   Past Medical History:   Diagnosis Date    Breast cancer (HCC)     Gastritis     Lupus (HCC)     Sjogren's disease (HCC)        Social History:    Smoking:  None  Alcohol:  None    Family History:   No family history of premature arthrosclerotic heart disease     Medications:   Scheduled:    potassium chloride  40 mEq Oral Once    bumetanide  2 mg Intravenous BID (Diuretic)    potassium citrate  20 mEq Oral TID CC    metoprolol tartrate  50 mg Oral 2x Daily(Beta Blocker)    DULoxetine  30 mg Oral Daily    multivitamin with minerals  1 tablet Oral Daily    methylPREDNISolone  40 mg Intravenous Daily    pantoprazole  40 mg Intravenous QAM AC    Or    pantoprazole  40 mg Oral QAM AC           PRN Medications:   benzonatate, sodium chloride, HYDROcodone-acetaminophen, calcium carbonate, dextromethorphan-guaiFENesin, ALPRAZolam, LORazepam, metoprolol, ipratropium-albuterol, melatonin, prochlorperazine, polyethylene glycol (PEG 3350), sennosides, bisacodyl, fleet enema, HYDROmorphone **OR** [DISCONTINUED] HYDROmorphone **OR** [DISCONTINUED] HYDROmorphone    Outpatient Medications:   Current Facility-Administered Medications on File Prior to Encounter   Medication Dose Route Frequency Provider Last Rate Last Admin    [COMPLETED] morphINE PF 4 MG/ML injection 4 mg  4 mg Intravenous Once Jesus Manuel Hatfield MD   4 mg at 11/13/23 2255    [COMPLETED] iopamidol 76% (ISOVUE-370) injection for power injector  100 mL Intravenous ONCE PRN Jesus Manuel Hatfield MD   100 mL at 11/13/23 2247    [COMPLETED] potassium chloride (K-Dur) tab 40 mEq  40 mEq Oral Once Jesus Manuel Hatfield MD   40 mEq at 11/14/23 0010     Current Outpatient Medications on File Prior to Encounter   Medication Sig Dispense Refill    Potassium Citrate ER 15 MEQ (1620 MG) Oral Tab CR Take 1 tablet by mouth in the morning, at noon, and at bedtime.      DULoxetine 30 MG Oral Cap DR Particles Take 1 capsule (30 mg total) by mouth daily.      ondansetron (ZOFRAN) 8 MG tablet Take 1 tablet (8 mg total) by mouth as needed.      zolpidem 10 MG Oral Tab Take 1 tablet (10 mg total) by mouth  nightly as needed for Sleep.      HYDROcodone-acetaminophen 5-325 MG Oral Tab Take 1 tablet by mouth every 8 (eight) hours as needed. (Patient not taking: Reported on 2023)      lidocaine-prilocaine 2.5-2.5 % External Cream APPLY SMALL AMOUNT OVER PORT PRIOR TO ACCESS      azaTHIOprine (IMURAN OR) Take by mouth. (Patient not taking: No sig reported)      Prenatal Vit-DSS-Fe Cbn-FA (PRENATAL AD OR) Take 1 tablet by mouth daily.         Allergies:   Allergies   Allergen Reactions    Bactrim [Sulfamethoxazole W/Trimethoprim] RASH         Physical Exam:   Vitals:    23 0400   BP: (!) 128/100   Pulse: 94   Resp: 13   Temp: 97.2 °F (36.2 °C)     Wt Readings from Last 3 Encounters:   23 143 lb 8.3 oz (65.1 kg)   23 120 lb (54.4 kg)   21 135 lb (61.2 kg)           General: Well developed, well nourished female.  Pt is in no acute distress.  HEENT:   Normocephalic.  Atraumatic.  Eyes with no scleral icterus.  Neck: Supple.  No JVD.  Carotids 2+ and equal in symmetric fashion.  No bruits are noted.  Cardiac: Regular rate and rhythm.   There is a normal S1 and S2.  No S3 or S4.  No murmurs, rubs, or gallops.  PMI is non-displaced with a normal apical impulse.  Lungs: Clear to ascultation bilaterally.   BS L base .  Abdomen: Soft.  Non-distended.  Non-tender.  Bowel sounds are present and normoactive.  No guarding or rebound.   Extremities: Extremities do demonstrate 2+ peripheral edema.   No cyanosis or clubbing of the digits is appreciated.  Femoral, Dorsalis Pedis, and Posterior Tibialis  pulses are 2+ and equal in a symmetric fashion.  Neurologic: Alert and oriented, normal affect.  No gross deficit appreciated.  Integument:  No visible rashes are appreciated. R Sub Q port       Laboratories and Data:   Labs:    Recent Labs   Lab 23  0521 23  1550 23  0436 23  0522   *  --  129* 128*   BUN 76*  --  90* 105*   CREATSERUM 1.17*  --  1.16* 1.35*   CA 8.1*  --   8.2* 8.1*   ALB 3.0*  --  3.1* 3.1*     --  141 136   K 2.9* 3.1* 3.6 3.6     --  108 105   CO2 25.0  --  24.0 22.0   ALKPHO 200*  --  210* 197*   *  --  452* 206*   ALT 1,127*  --  986* 754*   BILT 2.3*  --  1.8 2.0   TP 5.2*  --  5.6* 5.4*       Recent Labs   Lab 12/21/23  0521 12/22/23  0436 12/23/23  0522   RBC 3.16* 3.06* 2.99*   HGB 8.9* 8.8* 8.5*   HCT 27.2* 27.2* 26.1*   MCV 86.1 88.9 87.3   MCH 28.2 28.8 28.4   MCHC 32.7 32.4 32.6   RDW 14.2 15.5 16.3   NEPRELIM 6.33 8.86* 8.06*   WBC 7.2 10.4 9.4   PLT 79.0* 75.0* 68.0*       Recent Labs   Lab 12/20/23  0800 12/21/23  0521 12/23/23  0522   PTP 24.8* 18.0* 17.5*   INR 2.21* 1.48* 1.43*       No results for input(s): \"TROP\", \"CK\" in the last 168 hours.    Diagnostics:   Tele: Sinus tachycardia.  EKG: Sinus tachycardia, non-specific ST/T changes  Echo: Unremarkable by 5/23 echo (EF low normal, mild MR).    12/17/23  Conclusions:     1. Left ventricle: The cavity size was normal. Wall thickness was normal.      Systolic function was markedly reduced. The estimated ejection fraction      was 20-25%, by visual assessment. There was severe diffuse hypokinesis.      Technical limitations of the study preclude regional wall motion      analysis. Unable to assess LV diastolic function due to heart rhythm.   2. Right ventricle: The cavity size was increased. Systolic function was      reduced. The RV free wall longitudinal strain was -16.50%. The tricuspid      annular plane systolic excursion (TAPSE) is 1.56cm.   3. Left atrium: The left atrial volume was moderately increased.   4. Right atrium: The atrium was moderately dilated.   5. Mitral valve: The annulus was dilated. The leaflets were non-specifically      thickened. There was moderate regurgitation, with multiple jets.   6. Tricuspid valve: The annulus is dilated. There was severe regurgitation.   7. Pulmonary arteries: Systolic pressure was moderately to markedly      increased, at least  55mm Hg. Systolic pressure may be underestimated due      to wide tricuspid regurgitation. Estimated pulmonary artery diastolic      pressure was 34mm Hg.   8. Pericardium, extracardiac: A trivial pericardial effusion was identified.      There was a left pleural effusion.

## 2023-12-23 NOTE — PROGRESS NOTES
[facility]  Nephrology Progress Note    Alina Yola Attending:  Lenard Rosa DO       Assessment and Plan:    1) REJI- due to cardiorenal syndrome +/- underlying nephritis; UA noted with modest proteinuria / microscopic hematuria. PLAN- focus on volume mgmt; may need another renal biopsy to exclude lupus nephritis / active interstitial nephritis    2) HFrEF- EF 20-25% with RV dysfunction + severe TR / mod pul HTN- remains quite edematous -> bumex gtt. ? Due to chemo ?     3) Proteinuria with h/o biopsy proven interstitial nephritis (presumably due to Sjogrens) partially tx'ed with steroids at [facility]    4) Transaminitis- probable hepatic congestion; no biliary issues per GI     5) Stage 3 breast CA on chemo (no XRT)- last     6) Pleural effusion s/p bilat thora's- cytology / cx neg    7) SLE / Sjogrens    8) Acute resp failure tx'ed with abx; bronch - cx neg; RVP neg- off abx per ID      Subjective:  Awake alert in bed appears relatively comfortabler    Physical Exam:   BP (!) 128/100 (BP Location: Left arm)   Pulse 94   Temp 97.2 °F (36.2 °C)   Resp 13   Ht 5' 4\" (1.626 m)   Wt 143 lb 8.3 oz (65.1 kg)   LMP 2023 (Approximate)   SpO2 93%   Breastfeeding No   BMI 24.64 kg/m²   Temp (24hrs), Av.3 °F (36.3 °C), Min:97 °F (36.1 °C), Max:97.5 °F (36.4 °C)       Intake/Output Summary (Last 24 hours) at 2023 0859  Last data filed at 2023 0500  Gross per 24 hour   Intake 1088.7 ml   Output 1150 ml   Net -61.3 ml     Wt Readings from Last 3 Encounters:   23 143 lb 8.3 oz (65.1 kg)   23 120 lb (54.4 kg)   21 135 lb (61.2 kg)     General: awkae alert  HEENT: No scleral icterus, MMM  Neck: Supple, no KALYN or thyromegaly  Cardiac: Regular rate and rhythm, S1, S2 normal, no murmur or tub  Lungs: Decreased BS at bases bilaterally   Abdomen: Soft, non-tender. + bowel sounds, no palpable organomegaly  Extremities: Without clubbing, cyanosis; 2+ LE edema  Neurologic:  Cranial nerves grossly intact, moving all extremities  Skin: Warm and dry, no rashes       Labs:   Lab Results   Component Value Date    WBC 9.4 12/23/2023    HGB 8.5 12/23/2023    HCT 26.1 12/23/2023    PLT 68.0 12/23/2023    CREATSERUM 1.35 12/23/2023     12/23/2023     12/23/2023    K 3.6 12/23/2023     12/23/2023    CO2 22.0 12/23/2023     12/23/2023    CA 8.1 12/23/2023    ALB 3.1 12/23/2023    ALKPHO 197 12/23/2023    BILT 2.0 12/23/2023    TP 5.4 12/23/2023     12/23/2023     12/23/2023    INR 1.43 12/23/2023    PTP 17.5 12/23/2023       Imaging:  All imaging studies reviewed.    Meds:   Current Facility-Administered Medications   Medication Dose Route Frequency    potassium chloride (K-Dur) tab 40 mEq  40 mEq Oral Once    benzonatate (Tessalon) cap 100 mg  100 mg Oral TID PRN    sodium chloride (Saline Mist) 0.65 % nasal solution 1 spray  1 spray Each Nare Q3H PRN    bumetanide (Bumex) 0.25 MG/ML injection 2 mg  2 mg Intravenous BID (Diuretic)    potassium citrate (Urocit-K) tab 20 mEq  20 mEq Oral TID CC    HYDROcodone-acetaminophen (Norco) 5-325 MG per tab 1 tablet  1 tablet Oral Q6H PRN    metoprolol tartrate (Lopressor) tab 50 mg  50 mg Oral 2x Daily(Beta Blocker)    DULoxetine (Cymbalta) DR cap 30 mg  30 mg Oral Daily    multivitamin with minerals (Thera M Plus) tab 1 tablet  1 tablet Oral Daily    calcium carbonate (Tums) chewable tab 1,000 mg  1,000 mg Oral Q6H PRN    methylPREDNISolone sodium succinate (Solu-MEDROL) injection 40 mg  40 mg Intravenous Daily    dextromethorphan-guaiFENesin (Robitussin-DM)  mg/5mL oral solution 5 mL  5 mL Oral Q6H PRN    ALPRAZolam (Xanax) tab 0.25 mg  0.25 mg Oral TID PRN    pantoprazole (Protonix) 40 mg in sodium chloride 0.9% PF 10 mL IV push  40 mg Intravenous QAM AC    Or    pantoprazole (Protonix) DR tab 40 mg  40 mg Oral QAM AC    LORazepam (Ativan) 2 mg/mL injection 0.5 mg  0.5 mg Intravenous Q6H PRN    metoprolol  (Lopressor) 5 mg/5mL injection 5 mg  5 mg Intravenous Q6H PRN    ipratropium-albuterol (Duoneb) 0.5-2.5 (3) MG/3ML inhalation solution 3 mL  3 mL Nebulization Q4H PRN    melatonin tab 3 mg  3 mg Oral Nightly PRN    prochlorperazine (Compazine) 10 MG/2ML injection 5 mg  5 mg Intravenous Q8H PRN    polyethylene glycol (PEG 3350) (Miralax) 17 g oral packet 17 g  17 g Oral Daily PRN    sennosides (Senokot) tab 17.2 mg  17.2 mg Oral Nightly PRN    bisacodyl (Dulcolax) 10 MG rectal suppository 10 mg  10 mg Rectal Daily PRN    fleet enema (Fleet) 7-19 GM/118ML rectal enema 133 mL  1 enema Rectal Once PRN    HYDROmorphone (Dilaudid) 1 MG/ML injection 0.2 mg  0.2 mg Intravenous Q2H PRN         Questions/concerns were discussed with patient and/or family by bedside.    CCtime 30 min       Marya Caban MD  12/23/2023  8:59 AM

## 2023-12-23 NOTE — PROGRESS NOTES
[facility]     Hospitalist Progress Note     Alina Aceves Patient Status:  Inpatient    1980 MRN [id number]   Location [facility] 4NW-A Attending Tosha Waite MD   Hosp Day # 10 PCP HO[place] IBARRA     Chief Complaint: Intractable nausea vomiting, diarrhea, generalized weakness.  Recent pneumonia.     Subjective:     Patient denies any complaints       Objective:    Review of Systems:   A comprehensive review of systems was completed; pertinent positive and negatives stated in subjective.    Vital signs:  Temp:  [97 °F (36.1 °C)-97.5 °F (36.4 °C)] 97.2 °F (36.2 °C)  Pulse:  [] 94  Resp:  [13-27] 19  BP: (128-136)/() 128/90  SpO2:  [91 %-97 %] 97 %    Physical Exam:    /90 (BP Location: Left arm)   Pulse 94   Temp 97.2 °F (36.2 °C)   Resp 19   Ht 5' 4\" (1.626 m)   Wt 143 lb 8.3 oz (65.1 kg)   LMP 2023 (Approximate)   SpO2 97%   Breastfeeding No   BMI 24.64 kg/m²     General: No acute distress  Respiratory: Clear to auscultation bilateral except decreased breath sounds at bases  Cardiovascular: S1, S2, regular rate and rhythm  Abdomen: Soft, Non-tender, non-distended  Neuro: Awake alert, lethargic, no new focal deficits.   Extremities: 1+LE edema; very weak in lower legs/ankles/feet; sensation intact    Diagnostic Data:    Labs:  Recent Labs   Lab 23  0617 23  1312 23  1728 23  0800 23  0521 23  0436 23  0522   WBC 8.3  --  13.0*  12.8*  --  7.2 10.4 9.4   HGB 7.9*  --  10.2*  10.2*  --  8.9* 8.8* 8.5*   MCV 81.4  --  89.5  84.5  --  86.1 88.9 87.3   .0  --  200.0  98.0*  --  79.0* 75.0* 68.0*   BAND  --   --  8  --   --   --   --    INR  --  2.28* 2.36* 2.21* 1.48*  --  1.43*       Recent Labs   Lab 23  0521 23  1550 23  0436 23   *  --  129* 128*   BUN 76*  --  90* 105*   CREATSERUM 1.17*  --  1.16* 1.35*   CA 8.1*  --  8.2* 8.1*   ALB 3.0*  --  3.1* 3.1*     --  141 136    K 2.9* 3.1* 3.6 3.6     --  108 105   CO2 25.0  --  24.0 22.0   ALKPHO 200*  --  210* 197*   *  --  452* 206*   ALT 1,127*  --  986* 754*   BILT 2.3*  --  1.8 2.0   TP 5.2*  --  5.6* 5.4*       Estimated Creatinine Clearance: 46.4 mL/min (A) (based on SCr of 1.35 mg/dL (H)).    Microbiology    Hospital Encounter on 12/13/23   1. Body Fluid Cult Aerobic and Anaerobic     Status: None    Collection Time: 12/15/23  3:26 PM    Specimen: Pleural Fluid, Right; Body fluid, unspecified   Result Value Ref Range    Body Fluid Culture Result No Growth 5 Days N/A    Body Fld Smear 4+ Neutrophils seen N/A    Body Fld Smear No organisms seen N/A    Body Fld Smear This is a cytocentrifuged smear. N/A   2. Urine Culture, Routine     Status: None    Collection Time: 12/14/23  3:58 AM    Specimen: Urine, clean catch   Result Value Ref Range    Urine Culture No Growth at 18-24 hrs. N/A   3. Blood Culture     Status: None    Collection Time: 12/13/23  8:10 PM    Specimen: Blood,peripheral   Result Value Ref Range    Blood Culture Result No Growth 5 Days N/A     MRSA PCR nares positive on 12/14/2023    Imaging: Reviewed in Epic.  Chest x-ray done on 12/13/2023 with dense patchy airspace consolidation opacities present within the lungs suspicious for areas of pneumonia  CTA chest done on 12/14/2023 early a.m. shows moderate to large bilateral pleural effusion along with marked consolidation scattered throughout the lungs suggestive of pulmonary edema and fluid overload.  No evidence of pulm embolus.  Moderate large left axillary and left breast fluid collections noted.  Minimal gas in the left most central breast fluid collection, possibility of infection is a consideration.  Sequelae of seroma chronic hematoma is a consideration as well.    Medications:    potassium citrate  20 mEq Oral TID CC    metoprolol tartrate  50 mg Oral 2x Daily(Beta Blocker)    DULoxetine  30 mg Oral Daily    multivitamin with minerals  1 tablet  Oral Daily    methylPREDNISolone  40 mg Intravenous Daily    pantoprazole  40 mg Intravenous QAM AC    Or    pantoprazole  40 mg Oral QAM AC       Assessment & Plan:      # Multifocal pneumonia with bilateral large bilateral pleural effusion  -s/p left thora 12/15; s/p right thora 12/16; cytology without malignancy  -s/p bronch; studies pending  -completed empiric antibiotics : ceftriaxone, doxycycline (12/13-12/18)      #new onset cardiomyopathy-bumex drip; EF 20-25%, severe diffuse hypokinesis; will eventually need to be on GDMT, defer to cards; eventually needs cath when optimized    #bilateral pleural effusion due to cardiomyopathy and acute systolic heart failure   Status post left thoracentesis on 12/15/2023 and right thoracentesis on 12/16/2023, pulmonary following   MRSA nares came back positive, s/p Bactroban application twice daily for 5 days.       # Acute hypoxic respiratory failure due to above-off abx; ongoing bumex     # Locally advanced stage IIIb breast cancer status post outpatient neoadjuvant chemo and history of left breast lumpectomy and left lymph node resection on 11/16/2023    #LE weakness and painful neuropathy-MRI ordered per neuro    # Hyponatremia due to pneumonia and also hypovolemic due to nausea vomiting and diarrhea, siadh, ssri (off ssri now)-resolved    # Hypokalemia-resolved    # acute kidney injury; hx interstitial nephritis; might need repeat biopsy at some point; continue bumex; GFR improved; renal following     # History of lupus, history of Sjogren's disease  -Takes azathioprine at home which was held at this time due to multifocal pneumonia  - steroids started per rheum    #elevated LFTs, elevated INR; clinically doubt cholecystitis; PPI; possibly from CHF hepatic congestion, but not sure; s/p vit K and IV NAC;  if LFTs don't improve, might need biopsy; CMV,  EBV, HSV pending    # Gastritis  # Nausea vomiting and diarrhea at home possibly due to effect of chemo  # Anxiety  -  IV PPI    # Anemia and thrombocytopenia due to malignancy and chemo;    Follow CBC    # Small left apical pneumothorax on 12/15/2023 status post left thoracentesis  -Pulmonary following, on conservative management, repeat chest x-ray done on 12/15/2023 showed left pneumothorax resolved    # Moderate malnutrition  -Dietitian following    Lenard Rosa DO          Supplementary Documentation:     Quality:  DVT Mechanical Prophylaxis:   SCDs, Early ambuation  DVT Pharmacologic Prophylaxis   Medication   None                Code Status: Full Code  Peacock: External urinary catheter in place  Peacock Duration (in days):   Central line:    CHRISTELLE: 12/27/2023    Discharge is dependent on: Clinical progress  At this point Ms. Aceves is expected to be discharge to: To be decided    The 21st Century Cures Act makes medical notes like these available to patients in the interest of transparency. Please be advised this is a medical document. Medical documents are intended to carry relevant information, facts as evident, and the clinical opinion of the practitioner. The medical note is intended as peer to peer communication and may appear blunt or direct. It is written in medical language and may contain abbreviations or verbiage that are unfamiliar.

## 2023-12-24 ENCOUNTER — APPOINTMENT (OUTPATIENT)
Dept: MRI IMAGING | Facility: HOSPITAL | Age: 43
DRG: 987 | End: 2023-12-24
Payer: COMMERCIAL

## 2023-12-24 PROBLEM — N05.9 NEPHRITIS: Status: ACTIVE | Noted: 2023-12-23

## 2023-12-24 LAB
ALBUMIN SERPL-MCNC: 3.3 G/DL (ref 3.4–5)
ALP LIVER SERPL-CCNC: 258 U/L
ALT SERPL-CCNC: 594 U/L
ANION GAP SERPL CALC-SCNC: 10 MMOL/L (ref 0–18)
AST SERPL-CCNC: 118 U/L (ref 15–37)
BILIRUB DIRECT SERPL-MCNC: 1.7 MG/DL (ref 0–0.2)
BILIRUB SERPL-MCNC: 2.7 MG/DL (ref 0.1–2)
BUN BLD-MCNC: 110 MG/DL (ref 9–23)
CALCIUM BLD-MCNC: 8.2 MG/DL (ref 8.5–10.1)
CHLORIDE SERPL-SCNC: 106 MMOL/L (ref 98–112)
CO2 SERPL-SCNC: 24 MMOL/L (ref 21–32)
CREAT BLD-MCNC: 1.31 MG/DL
EGFRCR SERPLBLD CKD-EPI 2021: 52 ML/MIN/1.73M2 (ref 60–?)
GLUCOSE BLD-MCNC: 143 MG/DL (ref 70–99)
OSMOLALITY SERPL CALC.SUM OF ELEC: 327 MOSM/KG (ref 275–295)
POTASSIUM SERPL-SCNC: 3 MMOL/L (ref 3.5–5.1)
PROT SERPL-MCNC: 5.6 G/DL (ref 6.4–8.2)
SODIUM SERPL-SCNC: 140 MMOL/L (ref 136–145)

## 2023-12-24 PROCEDURE — 72158 MRI LUMBAR SPINE W/O & W/DYE: CPT

## 2023-12-24 PROCEDURE — 99233 SBSQ HOSP IP/OBS HIGH 50: CPT | Performed by: INTERNAL MEDICINE

## 2023-12-24 PROCEDURE — 99232 SBSQ HOSP IP/OBS MODERATE 35: CPT | Performed by: STUDENT IN AN ORGANIZED HEALTH CARE EDUCATION/TRAINING PROGRAM

## 2023-12-24 RX ORDER — PANTOPRAZOLE SODIUM 40 MG/1
40 TABLET, DELAYED RELEASE ORAL
Status: DISCONTINUED | OUTPATIENT
Start: 2023-12-24 | End: 2023-12-24

## 2023-12-24 RX ORDER — POTASSIUM CHLORIDE 20 MEQ/1
40 TABLET, EXTENDED RELEASE ORAL ONCE
Status: DISCONTINUED | OUTPATIENT
Start: 2023-12-24 | End: 2023-12-24

## 2023-12-24 RX ORDER — POTASSIUM CHLORIDE 1.5 G/1.58G
40 POWDER, FOR SOLUTION ORAL 2 TIMES DAILY
Status: DISCONTINUED | OUTPATIENT
Start: 2023-12-24 | End: 2023-12-24

## 2023-12-24 RX ORDER — GADOTERATE MEGLUMINE 376.9 MG/ML
15 INJECTION INTRAVENOUS
Status: COMPLETED | OUTPATIENT
Start: 2023-12-24 | End: 2023-12-24

## 2023-12-24 RX ORDER — POTASSIUM CHLORIDE 1.5 G/1.58G
40 POWDER, FOR SOLUTION ORAL ONCE
Status: COMPLETED | OUTPATIENT
Start: 2023-12-24 | End: 2023-12-24

## 2023-12-24 RX ORDER — SPIRONOLACTONE 25 MG/1
25 TABLET ORAL DAILY
Status: DISCONTINUED | OUTPATIENT
Start: 2023-12-24 | End: 2023-12-27

## 2023-12-24 NOTE — PROGRESS NOTES
[facility]      Assessment:  1. New onset severe cardiomyopathy EF 20-25%   2. PNA  3. Hypoxic resp failure// PNA // Pleural effusions s/p Thoracentesis   4. Sinus tachycaria  5. Palpitations  6. Breast CA, stage IIIb   7. Hyponatremia  8.  YUNIOR       Plan:  Cardiomyopathy: IV diuresis.  Breathing improved.  Edema improved.  By weight somehow still up 20lbs.   Cr mildly elevated .  Follow BMP.  Now BID loop diuretic.  Defer to renal.  But excellent response so far.  Needs cath but can wait for now for clinical stability.S/P bilat thoracentesis   Elevated LFTS: Likely hepatic congestion (severe TR from ventricular dysfunction and pulm HTN).  Diuresis and treatment of CHF should help.  Primary team.  Tachycardia: ST.  Likely secondary to infection and medical illness.  Can increase metoprolol to 50mg BID given BP.  Needs ARNI in future.  Will let Cr settle first (BUN still high as well).  Will start hydralizine and consider low dose entresto over the weekend or early next week.  Hydralizine 25 TID for now.  Resp/PNA: ID, pulm.  Antibiotics.  Hyponatremia: Improved.  Renal.  BP: BB as above.  Metastatic breast CA: Per primary and oncology.    Subjective     History of Present Illness:   Alina Aceves is a(n) 43 year old female with SLE and palpitations who I saw in clinic 2 weeks ago.    She has Stage III breast CA.  She has been undergoing chemotherapy, EF 5/23 was 50%. It is now 20-25% % range.    She was admitted with SOB 12/13/23.  She was diagnosed with PNA and pleural effusions.  She also had hyponatremia with a sodium 120.  She was seen by Pulmonology, ID, Hematology, and Nephrology.    Subjective:  -No events overnight  -Awake, alert  -No SOB today, on 1L NC  -IV diuresis continues Bumex  2>1  on RA  Creat up  edema resolved          Past Medical History:   Past Medical History:   Diagnosis Date    Breast cancer (HCC)     Gastritis     Lupus (HCC)     Sjogren's disease (HCC)        Social  History:   Smoking:  None  Alcohol:  None    Family History:   No family history of premature arthrosclerotic heart disease     Medications:   Scheduled:    potassium citrate  20 mEq Oral TID CC    metoprolol tartrate  50 mg Oral 2x Daily(Beta Blocker)    DULoxetine  30 mg Oral Daily    multivitamin with minerals  1 tablet Oral Daily    methylPREDNISolone  40 mg Intravenous Daily    pantoprazole  40 mg Intravenous QAM AC    Or    pantoprazole  40 mg Oral QAM AC           PRN Medications:   benzonatate, sodium chloride, HYDROcodone-acetaminophen, calcium carbonate, dextromethorphan-guaiFENesin, ALPRAZolam, LORazepam, metoprolol, ipratropium-albuterol, melatonin, prochlorperazine, polyethylene glycol (PEG 3350), sennosides, bisacodyl, fleet enema, HYDROmorphone **OR** [DISCONTINUED] HYDROmorphone **OR** [DISCONTINUED] HYDROmorphone    Outpatient Medications:   Current Facility-Administered Medications on File Prior to Encounter   Medication Dose Route Frequency Provider Last Rate Last Admin    [COMPLETED] morphINE PF 4 MG/ML injection 4 mg  4 mg Intravenous Once Jesus Manuel Hatfield MD   4 mg at 11/13/23 2255    [COMPLETED] iopamidol 76% (ISOVUE-370) injection for power injector  100 mL Intravenous ONCE PRN Jesus Manuel Hatfield MD   100 mL at 11/13/23 2247    [COMPLETED] potassium chloride (K-Dur) tab 40 mEq  40 mEq Oral Once Jesus Manuel Hatfield MD   40 mEq at 11/14/23 0010     Current Outpatient Medications on File Prior to Encounter   Medication Sig Dispense Refill    Potassium Citrate ER 15 MEQ (1620 MG) Oral Tab CR Take 1 tablet by mouth in the morning, at noon, and at bedtime.      DULoxetine 30 MG Oral Cap DR Particles Take 1 capsule (30 mg total) by mouth daily.      ondansetron (ZOFRAN) 8 MG tablet Take 1 tablet (8 mg total) by mouth as needed.      zolpidem 10 MG Oral Tab Take 1 tablet (10 mg total) by mouth nightly as needed for Sleep.      HYDROcodone-acetaminophen 5-325 MG Oral Tab Take  1 tablet by mouth every 8 (eight) hours as needed. (Patient not taking: Reported on 2023)      lidocaine-prilocaine 2.5-2.5 % External Cream APPLY SMALL AMOUNT OVER PORT PRIOR TO ACCESS      azaTHIOprine (IMURAN OR) Take by mouth. (Patient not taking: No sig reported)      Prenatal Vit-DSS-Fe Cbn-FA (PRENATAL AD OR) Take 1 tablet by mouth daily.         Allergies:   Allergies   Allergen Reactions    Bactrim [Sulfamethoxazole W/Trimethoprim] RASH         Physical Exam:   Vitals:    23 0409   BP: (!) 135/100   Pulse: 90   Resp: 20   Temp: 97.5 °F (36.4 °C)     Wt Readings from Last 3 Encounters:   23 147 lb (66.7 kg)   23 120 lb (54.4 kg)   21 135 lb (61.2 kg)           General: Well developed, well nourished female.  Pt is in no acute distress.  HEENT:   Normocephalic.  Atraumatic.  Eyes with no scleral icterus.  Neck: Supple.  No JVD.  Carotids 2+ and equal in symmetric fashion.  No bruits are noted.  Cardiac: Regular rate and rhythm.   There is a normal S1 and S2.  No S3 or S4.  No murmurs, rubs, or gallops.  PMI is non-displaced with a normal apical impulse.  Lungs: Clear to ascultation bilaterally.   BS L base .few basal crackles  Abdomen: Soft.  Non-distended.  Non-tender.  Bowel sounds are present and normoactive.  No guarding or rebound.   Extremities: Extremities do demonstrate 2+ peripheral edema.   No cyanosis or clubbing of the digits is appreciated.  Femoral, Dorsalis Pedis, and Posterior Tibialis  pulses are 2+ and equal in a symmetric fashion.  Neurologic: Alert and oriented, normal affect.  No gross deficit appreciated.  Integument:  No visible rashes are appreciated. R Sub Q port       Laboratories and Data:   Labs:    Recent Labs   Lab 23  0521 23  1550 23  0436 23  0522 23  0517   *  --  129* 128* 143*   BUN 76*  --  90* 105* 110*   CREATSERUM 1.17*  --  1.16* 1.35* 1.31*   CA 8.1*  --  8.2* 8.1* 8.2*   ALB 3.0*  --  3.1*  3.1*  --      --  141 136 140   K 2.9*   < > 3.6 3.6 3.0*     --  108 105 106   CO2 25.0  --  24.0 22.0 24.0   ALKPHO 200*  --  210* 197*  --    *  --  452* 206*  --    ALT 1,127*  --  986* 754*  --    BILT 2.3*  --  1.8 2.0  --    TP 5.2*  --  5.6* 5.4*  --     < > = values in this interval not displayed.       Recent Labs   Lab 12/21/23  0521 12/22/23  0436 12/23/23  0522   RBC 3.16* 3.06* 2.99*   HGB 8.9* 8.8* 8.5*   HCT 27.2* 27.2* 26.1*   MCV 86.1 88.9 87.3   MCH 28.2 28.8 28.4   MCHC 32.7 32.4 32.6   RDW 14.2 15.5 16.3   NEPRELIM 6.33 8.86* 8.06*   WBC 7.2 10.4 9.4   PLT 79.0* 75.0* 68.0*       Recent Labs   Lab 12/20/23  0800 12/21/23  0521 12/23/23  0522   PTP 24.8* 18.0* 17.5*   INR 2.21* 1.48* 1.43*       No results for input(s): \"TROP\", \"CK\" in the last 168 hours.    Diagnostics:   Tele: Sinus tachycardia.  EKG: Sinus tachycardia, non-specific ST/T changes  Echo: Unremarkable by 5/23 echo (EF low normal, mild MR).    12/17/23  Conclusions:     1. Left ventricle: The cavity size was normal. Wall thickness was normal.      Systolic function was markedly reduced. The estimated ejection fraction      was 20-25%, by visual assessment. There was severe diffuse hypokinesis.      Technical limitations of the study preclude regional wall motion      analysis. Unable to assess LV diastolic function due to heart rhythm.   2. Right ventricle: The cavity size was increased. Systolic function was      reduced. The RV free wall longitudinal strain was -16.50%. The tricuspid      annular plane systolic excursion (TAPSE) is 1.56cm.   3. Left atrium: The left atrial volume was moderately increased.   4. Right atrium: The atrium was moderately dilated.   5. Mitral valve: The annulus was dilated. The leaflets were non-specifically      thickened. There was moderate regurgitation, with multiple jets.   6. Tricuspid valve: The annulus is dilated. There was severe regurgitation.   7. Pulmonary arteries:  Systolic pressure was moderately to markedly      increased, at least 55mm Hg. Systolic pressure may be underestimated due      to wide tricuspid regurgitation. Estimated pulmonary artery diastolic      pressure was 34mm Hg.   8. Pericardium, extracardiac: A trivial pericardial effusion was identified.      There was a left pleural effusion.

## 2023-12-24 NOTE — PROGRESS NOTES
[facility]     Hospitalist Progress Note     Alina Aceves Patient Status:  Inpatient    1980 MRN [id number]   Location [facility] 4NW-A Attending Tosha Waite MD   Hosp Day # 11 PCP [facility] IBARRA     Chief Complaint: Intractable nausea vomiting, diarrhea, generalized weakness.  Recent pneumonia.     Subjective:     Patient feels well and has no complaints.  She is awaiting MRI imaging test      Objective:    Review of Systems:   A comprehensive review of systems was completed; pertinent positive and negatives stated in subjective.    Vital signs:  Temp:  [97.4 °F (36.3 °C)-97.8 °F (36.6 °C)] 97.4 °F (36.3 °C)  Pulse:  [86-99] 86  Resp:  [20-24] 20  BP: (124-139)/() 132/94  SpO2:  [92 %-96 %] 93 %    Physical Exam:    BP (!) 132/94 (BP Location: Left arm)   Pulse 86   Temp 97.4 °F (36.3 °C) (Oral)   Resp 20   Ht 5' 4\" (1.626 m)   Wt 147 lb (66.7 kg)   LMP 2023 (Approximate)   SpO2 93%   Breastfeeding No   BMI 25.23 kg/m²     General: No acute distress  Respiratory: Clear to auscultation bilateral except decreased breath sounds at bases  Cardiovascular: S1, S2, regular rate and rhythm  Abdomen: Soft, Non-tender, non-distended  Neuro: Awake alert, lethargic, no new focal deficits.   Extremities: 1+LE edema; very weak in lower legs/ankles/feet; sensation intact    Diagnostic Data:    Labs:  Recent Labs   Lab 23  1312 23  1728 23  0800 23  0521 23  0436 23  0522   WBC  --  13.0*  12.8*  --  7.2 10.4 9.4   HGB  --  10.2*  10.2*  --  8.9* 8.8* 8.5*   MCV  --  89.5  84.5  --  86.1 88.9 87.3   PLT  --  200.0  98.0*  --  79.0* 75.0* 68.0*   BAND  --  8  --   --   --   --    INR 2.28* 2.36* 2.21* 1.48*  --  1.43*       Recent Labs   Lab 23  0436 23  0522 23  0517   * 128* 143*   BUN 90* 105* 110*   CREATSERUM 1.16* 1.35* 1.31*   CA 8.2* 8.1* 8.2*   ALB 3.1* 3.1* 3.3*    136 140   K 3.6 3.6 3.0*    105 106   CO2  24.0 22.0 24.0   ALKPHO 210* 197* 258*   * 206* 118*   * 754* 594*   BILT 1.8 2.0 2.7*   TP 5.6* 5.4* 5.6*       Estimated Creatinine Clearance: 47.8 mL/min (A) (based on SCr of 1.31 mg/dL (H)).    Microbiology    Hospital Encounter on 12/13/23   1. Body Fluid Cult Aerobic and Anaerobic     Status: None    Collection Time: 12/15/23  3:26 PM    Specimen: Pleural Fluid, Right; Body fluid, unspecified   Result Value Ref Range    Body Fluid Culture Result No Growth 5 Days N/A    Body Fld Smear 4+ Neutrophils seen N/A    Body Fld Smear No organisms seen N/A    Body Fld Smear This is a cytocentrifuged smear. N/A   2. Urine Culture, Routine     Status: None    Collection Time: 12/14/23  3:58 AM    Specimen: Urine, clean catch   Result Value Ref Range    Urine Culture No Growth at 18-24 hrs. N/A   3. Blood Culture     Status: None    Collection Time: 12/13/23  8:10 PM    Specimen: Blood,peripheral   Result Value Ref Range    Blood Culture Result No Growth 5 Days N/A     MRSA PCR nares positive on 12/14/2023    Imaging: Reviewed in Epic.  Chest x-ray done on 12/13/2023 with dense patchy airspace consolidation opacities present within the lungs suspicious for areas of pneumonia  CTA chest done on 12/14/2023 early a.m. shows moderate to large bilateral pleural effusion along with marked consolidation scattered throughout the lungs suggestive of pulmonary edema and fluid overload.  No evidence of pulm embolus.  Moderate large left axillary and left breast fluid collections noted.  Minimal gas in the left most central breast fluid collection, possibility of infection is a consideration.  Sequelae of seroma chronic hematoma is a consideration as well.    Medications:    spironolactone  25 mg Oral Daily    potassium chloride  40 mEq Oral Once    metoprolol tartrate  50 mg Oral 2x Daily(Beta Blocker)    DULoxetine  30 mg Oral Daily    multivitamin with minerals  1 tablet Oral Daily    methylPREDNISolone  40 mg  Intravenous Daily    pantoprazole  40 mg Oral QAM AC       Assessment & Plan:      # Multifocal pneumonia with bilateral large bilateral pleural effusion  -s/p left thora 12/15; s/p right thora 12/16; cytology without malignancy  -s/p bronch; studies pending  -completed empiric antibiotics : ceftriaxone, doxycycline (12/13-12/18)      #new onset cardiomyopathy-bumex drip, Aldactone; EF 20-25%, severe diffuse hypokinesis; will eventually need to be on GDMT, defer to cards; eventually needs cath when optimized    #bilateral pleural effusion due to cardiomyopathy and acute systolic heart failure   Status post left thoracentesis on 12/15/2023 and right thoracentesis on 12/16/2023, pulmonary following   MRSA nares came back positive, s/p Bactroban application twice daily for 5 days.       # Acute hypoxic respiratory failure due to above-off abx; ongoing bumex     # Locally advanced stage IIIb breast cancer status post outpatient neoadjuvant chemo and history of left breast lumpectomy and left lymph node resection on 11/16/2023    #LE weakness and painful neuropathy-MRI ordered per neuro    # Hyponatremia due to pneumonia and also hypovolemic due to nausea vomiting and diarrhea, siadh, ssri (off ssri now)-resolved    # Hypokalemia-resolved    # acute kidney injury; hx interstitial nephritis; might need repeat biopsy at some point; continue bumex; GFR improved; renal following     # History of lupus, history of Sjogren's disease  -Takes azathioprine at home which was held at this time due to multifocal pneumonia  - steroids started per rheum    #elevated LFTs, improving.  Elevated INR; clinically doubt cholecystitis; PPI; possibly from CHF hepatic congestion, but not sure; s/p vit K and IV NAC;  if LFTs don't improve, might need biopsy; CMV,  EBV, HSV pending    # Gastritis  # Nausea vomiting and diarrhea at home possibly due to effect of chemo  # Anxiety  - IV PPI    # Anemia and thrombocytopenia due to malignancy and  chemo;    Follow CBC    # Small left apical pneumothorax on 12/15/2023 status post left thoracentesis  -Pulmonary following, on conservative management, repeat chest x-ray done on 12/15/2023 showed left pneumothorax resolved    # Moderate malnutrition  -Dietitian following    Lenard Rosa DO          Supplementary Documentation:     Quality:  DVT Mechanical Prophylaxis:   SCDs, Early ambuation  DVT Pharmacologic Prophylaxis   Medication   None                Code Status: Full Code  Peacock: External urinary catheter in place  Peacock Duration (in days):   Central line:    CHRISTELLE: 12/27/2023    Discharge is dependent on: Clinical progress  At this point Ms. Aceves is expected to be discharge to: To be decided    The 21st Century Cures Act makes medical notes like these available to patients in the interest of transparency. Please be advised this is a medical document. Medical documents are intended to carry relevant information, facts as evident, and the clinical opinion of the practitioner. The medical note is intended as peer to peer communication and may appear blunt or direct. It is written in medical language and may contain abbreviations or verbiage that are unfamiliar.

## 2023-12-24 NOTE — PLAN OF CARE
Patient laying in bed, denies chest pain, shortness of breath and dizziness. Patient alert and oriented on RA. Patient with low appetite noted. Patient up with max assist, BLE weakness with right foot drop noted. MRI completed today. Bumex gtt infusing per order. Patient declining to take oral tablet potassium, will take oral powder potassium, Paged Dr gann, see orders. Plan for bumex gtt. Call light with in reach, fall precautions reviewed, all questions answered.      Problem: RESPIRATORY - ADULT  Goal: Achieves optimal ventilation and oxygenation  Description: INTERVENTIONS:  - Assess for changes in respiratory status  - Assess for changes in mentation and behavior  - Position to facilitate oxygenation and minimize respiratory effort  - Oxygen supplementation based on oxygen saturation or ABGs  - Provide Smoking Cessation handout, if applicable  - Encourage broncho-pulmonary hygiene including cough, deep breathe, Incentive Spirometry  - Assess the need for suctioning and perform as needed  - Assess and instruct to report SOB or any respiratory difficulty  - Respiratory Therapy support as indicated  - Manage/alleviate anxiety  - Monitor for signs/symptoms of CO2 retention  Outcome: Progressing

## 2023-12-24 NOTE — PROGRESS NOTES
[facility]  Nephrology Progress Note    Alina Aceves Attending:  Lenard Rosa DO       Assessment and Plan:    1) REJI- due to cardiorenal syndrome +/- underlying nephritis; UA noted with modest proteinuria / microscopic hematuria. PLAN- focus on volume mgmt; may need another renal biopsy to exclude lupus nephritis / active interstitial nephritis    2) HFrEF- EF 20-25% with RV dysfunction + severe TR / mod pul HTN- etiology unclear (chemo?). Remains quite edematous -> titrate bumex gtt; add aldactone    3) Proteinuria with h/o biopsy proven interstitial nephritis (presumably due to Sjogrens) partially tx'ed with steroids at [facility]    4) Transaminitis- probable hepatic congestion; no biliary issues per GI- improving    5) Stage 3 breast CA on chemo (no XRT)- last     6) Pleural effusion s/p bilat thora's- cytology / cx neg    7) SLE / Sjogrens- normally on imuran -> steroids per rheum    8) Acute resp failure tx'ed with abx; bronch - cx neg; RVP neg- off abx per ID      Subjective:  Awake alert in bed appears relatively comfortable    Physical Exam:   BP (!) 135/100 (BP Location: Left arm)   Pulse 90   Temp 97.5 °F (36.4 °C) (Oral)   Resp 20   Ht 5' 4\" (1.626 m)   Wt 147 lb (66.7 kg)   LMP 2023 (Approximate)   SpO2 95%   Breastfeeding No   BMI 25.23 kg/m²   Temp (24hrs), Av.4 °F (36.3 °C), Min:96.9 °F (36.1 °C), Max:97.8 °F (36.6 °C)       Intake/Output Summary (Last 24 hours) at 2023 0823  Last data filed at 2023 0500  Gross per 24 hour   Intake 602.25 ml   Output 1550 ml   Net -947.75 ml     Wt Readings from Last 3 Encounters:   23 147 lb (66.7 kg)   23 120 lb (54.4 kg)   21 135 lb (61.2 kg)     General: awkae alert  HEENT: No scleral icterus, MMM  Neck: Supple, no KALYN or thyromegaly  Cardiac: Regular rate and rhythm, S1, S2 normal, no murmur or tub  Lungs: Decreased BS at bases bilaterally   Abdomen: Soft, non-tender. + bowel sounds, no palpable  organomegaly  Extremities: Without clubbing, cyanosis; 2+ LE edema  Neurologic: Cranial nerves grossly intact, moving all extremities  Skin: Warm and dry, no rashes       Labs:   Lab Results   Component Value Date    CREATSERUM 1.31 12/24/2023     12/24/2023     12/24/2023    K 3.0 12/24/2023     12/24/2023    CO2 24.0 12/24/2023     12/24/2023    CA 8.2 12/24/2023       Imaging:  All imaging studies reviewed.    Meds:           Questions/concerns were discussed with patient and/or family by bedside.        Marya Caban MD  12/24/2023  8:23 AM

## 2023-12-24 NOTE — PROGRESS NOTES
Inpatient Follow up Note    Alina Aceves Patient Status:  Inpatient    1980 MRN [id number]   [facility] 4SW-A Attending Noe Perkins MD   Hosp Day # 11 PCP [facility] IBARRA     Reason for Consultation   Abnormal LFTs     Subjective   Pt not in room x 2 again today; at MRI             Objective:     BP (!) 136/100   Pulse 89   Temp 97.4 °F (36.3 °C) (Oral)   Resp 20   Ht 5' 4\" (1.626 m)   Wt 147 lb (66.7 kg)   LMP 2023 (Approximate)   SpO2 93%   Breastfeeding No   BMI 25.23 kg/m²        Labs/Imaging     LABRCNTIP[PGLU:5@  Recent Labs   Lab 23  1312 23  1728 23  0800 23  0521 23  0522   INR 2.28* 2.36* 2.21* 1.48* 1.43*         Recent Labs   Lab 23  1728 23  0521 23  0436 23  0522   WBC 13.0*  12.8* 7.2 10.4 9.4   HGB 10.2*  10.2* 8.9* 8.8* 8.5*   .0  98.0* 79.0* 75.0* 68.0*       Recent Labs   Lab 23  0940 23  1455 23  0521 23  1550 23  0436 23  0522 23  0517     --  138  --  141 136 140   K 3.1*   < > 2.9* 3.1* 3.6 3.6 3.0*     --  104  --  108 105 106   CO2 25.0  --  25.0  --  24.0 22.0 24.0   BUN 84*  --  76*  --  90* 105* 110*   CREATSERUM 1.55*  --  1.17*  --  1.16* 1.35* 1.31*    < > = values in this interval not displayed.       Recent Labs   Lab 23  0940 23  0521 23  0436 2322 23  0517   * 1,127* 986* 754* 594*   * 918* 452* 206* 118*     No results found.    AST   Date/Time Value Ref Range Status   2023 05:17  (H) 15 - 37 U/L Final     ALT   Date/Time Value Ref Range Status   2023 05:17  (H) 13 - 56 U/L Final     BUN   Date/Time Value Ref Range Status   2023 05:17  (H) 9 - 23 mg/dL Final              Assessment   Ms Aceves is a 43 year old female with a history of stage IIIb breast cancer and lupus who presented on  with shortness of breath, found to have  pneumonia, pleural effusions (transudative), and HFrEF (EF 20-25%) for whom GI is consulted for abnormal LFTs. These are most likely secondary to congestive hepatopathy. LFTs markedly elevated, but downtrending with diuresis. Downtrending except for mild increase in T bili.        Plan   -daily LFTs  -f/u EBV/HSV/CMV studies  -s/p IV NAC and IV vitamin K  -continued CHF management per cards and primary team  -if LFTs worsen in the setting of diuresis, will consider liver biopsy for further evaluation       Chato Hall MD, 12/24/23, 3:00 PM  [facility]  [phone]

## 2023-12-25 LAB
ALBUMIN SERPL-MCNC: 3.5 G/DL (ref 3.4–5)
ALBUMIN/GLOB SERPL: 1.5 {RATIO} (ref 1–2)
ALP LIVER SERPL-CCNC: 276 U/L
ALT SERPL-CCNC: 487 U/L
ANION GAP SERPL CALC-SCNC: 10 MMOL/L (ref 0–18)
ASPERGILLUS AG BAL/SERUM: 0.06 INDEX
AST SERPL-CCNC: 89 U/L (ref 15–37)
BILIRUB SERPL-MCNC: 3.3 MG/DL (ref 0.1–2)
BUN BLD-MCNC: 95 MG/DL (ref 9–23)
CALCIUM BLD-MCNC: 8.3 MG/DL (ref 8.5–10.1)
CHLORIDE SERPL-SCNC: 105 MMOL/L (ref 98–112)
CO2 SERPL-SCNC: 27 MMOL/L (ref 21–32)
CREAT BLD-MCNC: 1.17 MG/DL
EGFRCR SERPLBLD CKD-EPI 2021: 59 ML/MIN/1.73M2 (ref 60–?)
GLOBULIN PLAS-MCNC: 2.4 G/DL (ref 2.8–4.4)
GLUCOSE BLD-MCNC: 136 MG/DL (ref 70–99)
MAGNESIUM SERPL-MCNC: 2 MG/DL (ref 1.6–2.6)
OSMOLALITY SERPL CALC.SUM OF ELEC: 325 MOSM/KG (ref 275–295)
PHOSPHATE SERPL-MCNC: 4.9 MG/DL (ref 2.5–4.9)
POTASSIUM SERPL-SCNC: 2.5 MMOL/L (ref 3.5–5.1)
PROT SERPL-MCNC: 5.9 G/DL (ref 6.4–8.2)
SODIUM SERPL-SCNC: 142 MMOL/L (ref 136–145)

## 2023-12-25 PROCEDURE — 99232 SBSQ HOSP IP/OBS MODERATE 35: CPT | Performed by: HOSPITALIST

## 2023-12-25 PROCEDURE — 99233 SBSQ HOSP IP/OBS HIGH 50: CPT | Performed by: INTERNAL MEDICINE

## 2023-12-25 PROCEDURE — 99232 SBSQ HOSP IP/OBS MODERATE 35: CPT | Performed by: INTERNAL MEDICINE

## 2023-12-25 RX ORDER — ZOLPIDEM TARTRATE 5 MG/1
5 TABLET ORAL NIGHTLY PRN
Status: DISCONTINUED | OUTPATIENT
Start: 2023-12-25 | End: 2024-01-02

## 2023-12-25 RX ORDER — PREDNISONE 10 MG/1
10 TABLET ORAL
Status: DISCONTINUED | OUTPATIENT
Start: 2023-12-26 | End: 2024-01-02

## 2023-12-25 RX ORDER — POTASSIUM CHLORIDE 20 MEQ/1
40 TABLET, EXTENDED RELEASE ORAL EVERY 4 HOURS
Status: COMPLETED | OUTPATIENT
Start: 2023-12-25 | End: 2023-12-25

## 2023-12-25 NOTE — PLAN OF CARE
Assumed care of patient @ 0730 patient resting in bed, AOX4, reports mild to moderate pain, prn medications provided per order. Lung sounds clear but diminished bilaterally, O2 saturation adequate on room air. No complaints of shortness of breath or chest pain. Sinus rhythm on tele, S1-S2 present, no adventitious sounds noted. Bowel sounds present and active in all quadrants, last BM 12/21 pt declining bowel regimen at this time. Patient voiding with increased frequency due to diuretic therapy. Up to bathroom with [place] assistive device.     Plan of Care: IV bumex. LFT trends. IV solumedrol. Monitor electrolytes.     Discussed plan of care with patient, verbalized understanding. Call light within reach.     Problem: PAIN - ADULT  Goal: Verbalizes/displays adequate comfort level or patient's stated pain goal  Description: INTERVENTIONS:  - Encourage pt to monitor pain and request assistance  - Assess pain using appropriate pain scale  - Administer analgesics based on type and severity of pain and evaluate response  - Implement non-pharmacological measures as appropriate and evaluate response  - Consider cultural and social influences on pain and pain management  - Manage/alleviate anxiety  - Utilize distraction and/or relaxation techniques  - Monitor for opioid side effects  - Notify MD/LIP if interventions unsuccessful or patient reports new pain  - Anticipate increased pain with activity and pre-medicate as appropriate  Outcome: Progressing     Problem: SAFETY ADULT - FALL  Goal: Free from fall injury  Description: INTERVENTIONS:  - Assess pt frequently for physical needs  - Identify cognitive and physical deficits and behaviors that affect risk of falls.  - [place] fall precautions as indicated by assessment.  - Educate pt/family on patient safety including physical limitations  - Instruct pt to call for assistance with activity based on assessment  - Modify environment to reduce risk of injury  - Provide  assistive devices as appropriate  - Consider OT/PT consult to assist with strengthening/mobility  - Encourage toileting schedule  Outcome: Progressing     Problem: RESPIRATORY - ADULT  Goal: Achieves optimal ventilation and oxygenation  Description: INTERVENTIONS:  - Assess for changes in respiratory status  - Assess for changes in mentation and behavior  - Position to facilitate oxygenation and minimize respiratory effort  - Oxygen supplementation based on oxygen saturation or ABGs  - Provide Smoking Cessation handout, if applicable  - Encourage broncho-pulmonary hygiene including cough, deep breathe, Incentive Spirometry  - Assess the need for suctioning and perform as needed  - Assess and instruct to report SOB or any respiratory difficulty  - Respiratory Therapy support as indicated  - Manage/alleviate anxiety  - Monitor for signs/symptoms of CO2 retention  Outcome: Progressing     Problem: METABOLIC/FLUID AND ELECTROLYTES - ADULT  Goal: Electrolytes maintained within normal limits  Description: INTERVENTIONS:  - Monitor labs and rhythm and assess patient for signs and symptoms of electrolyte imbalances  - Administer electrolyte replacement as ordered  - Monitor response to electrolyte replacements, including rhythm and repeat lab results as appropriate  - Fluid restriction as ordered  - Instruct patient on fluid and nutrition restrictions as appropriate  Outcome: Progressing  Goal: Hemodynamic stability and optimal renal function maintained  Description: INTERVENTIONS:  - Monitor labs and assess for signs and symptoms of volume excess or deficit  - Monitor intake, output and patient weight  - Monitor urine specific gravity, serum osmolarity and serum sodium as indicated or ordered  - Monitor response to interventions for patient's volume status, including labs, urine output, blood pressure (other measures as available)  - Encourage oral intake as appropriate  - Instruct patient on fluid and nutrition  restrictions as appropriate  Outcome: Progressing

## 2023-12-25 NOTE — PROGRESS NOTES
[facility]  Nephrology Progress Note    Alina Aceves Attending:  Lenard Rosa DO       Assessment and Plan:    1) REJI- due to cardiorenal syndrome +/- underlying nephritis; UA noted with modest proteinuria / microscopic hematuria. PLAN- focus on volume mgmt; may need another renal biopsy to exclude lupus nephritis / active interstitial nephritis    2) HFrEF- EF 20-25% with RV dysfunction + severe TR / mod pul HTN- etiology unclear (chemo?). Diuresing well- continue bumex gtt / aldactone x 24-48 hrs    3) Proteinuria with h/o biopsy proven interstitial nephritis (presumably due to Sjogrens) partially tx'ed with steroids at [facility]    4) Transaminitis- probable hepatic congestion; no biliary issues per GI- improving daily    5) Stage 3 breast CA on chemo (no XRT)- last     6) Pleural effusion s/p bilat thora's- cytology / cx neg    7) SLE / Sjogrens- normally on imuran -> steroids per rheum    8) Acute resp failure tx'ed with abx; bronch - cx neg; RVP neg- off abx per ID      Subjective:  Awake alert in bed in good spirits    Physical Exam:   BP (!) 129/94 (BP Location: Left arm)   Pulse 94   Temp 97.8 °F (36.6 °C) (Temporal)   Resp 20   Ht 5' 4\" (1.626 m)   Wt 135 lb 2.3 oz (61.3 kg)   LMP 2023 (Approximate)   SpO2 98%   Breastfeeding No   BMI 23.20 kg/m²   Temp (24hrs), Av.6 °F (36.4 °C), Min:97.4 °F (36.3 °C), Max:97.9 °F (36.6 °C)       Intake/Output Summary (Last 24 hours) at 2023 0729  Last data filed at 2023 0619  Gross per 24 hour   Intake --   Output 4750 ml   Net -4750 ml     Wt Readings from Last 3 Encounters:   23 135 lb 2.3 oz (61.3 kg)   23 120 lb (54.4 kg)   21 135 lb (61.2 kg)     General: awkae alert  HEENT: No scleral icterus, MMM  Neck: Supple, no KALYN or thyromegaly  Cardiac: Regular rate and rhythm, S1, S2 normal, no murmur or tub  Lungs: Decreased BS at bases bilaterally   Abdomen: Soft, non-tender. + bowel sounds, no palpable  organomegaly  Extremities: Without clubbing, cyanosis; 2+ LE edema  Neurologic: Cranial nerves grossly intact, moving all extremities  Skin: Warm and dry, no rashes       Labs:   Lab Results   Component Value Date    CREATSERUM 1.17 12/25/2023    BUN 95 12/25/2023     12/25/2023    K 2.5 12/25/2023     12/25/2023    CO2 27.0 12/25/2023     12/25/2023    CA 8.3 12/25/2023    ALB 3.5 12/25/2023    ALKPHO 276 12/25/2023    BILT 3.3 12/25/2023    TP 5.9 12/25/2023    AST 89 12/25/2023     12/25/2023    MG 2.0 12/25/2023    PHOS 4.9 12/25/2023       Imaging:  All imaging studies reviewed.    Meds:           Questions/concerns were discussed with patient and/or family by bedside.        Marya Caban MD  12/25/2023  729 AM

## 2023-12-25 NOTE — PROGRESS NOTES
Inpatient Follow up Note    Alina Aceves Patient Status:  Inpatient    1980 MRN [id number]   Location [facility] 4SW-A Attending Noe Perkins MD   Hosp Day # 12 PCP HOLLY IBARRA     Reason for Consultation   Abnormal LFTs     Subjective   Pt with no complaints. Denies abd pain.              Objective:     BP (!) 123/91 (BP Location: Left arm)   Pulse 83   Temp 97.8 °F (36.6 °C) (Oral)   Resp 20   Ht 5' 4\" (1.626 m)   Wt 135 lb 2.3 oz (61.3 kg)   LMP 2023 (Approximate)   SpO2 100%   Breastfeeding No   BMI 23.20 kg/m²   Gen: NAD  Abd: NT, ND, soft     Labs/Imaging     LABRCNTIP[PGLU:5@  Recent Labs   Lab 23  1312 23  1728 23  0800 23  0521 23  0522   INR 2.28* 2.36* 2.21* 1.48* 1.43*         Recent Labs   Lab 23  1728 23  0521 23  0436 23  0522   WBC 13.0*  12.8* 7.2 10.4 9.4   HGB 10.2*  10.2* 8.9* 8.8* 8.5*   .0  98.0* 79.0* 75.0* 68.0*       Recent Labs   Lab 23  0521 23  1550 23  0436 23  0522 23  0517 23  0629     --  141 136 140 142   K 2.9* 3.1* 3.6 3.6 3.0* 2.5*     --  108 105 106 105   CO2 25.0  --  24.0 22.0 24.0 27.0   BUN 76*  --  90* 105* 110* 95*   CREATSERUM 1.17*  --  1.16* 1.35* 1.31* 1.17*       Recent Labs   Lab 23  0521 23  0436 23  0522 23  0517 23  0629   ALT 1,127* 986* 754* 594* 487*   * 452* 206* 118* 89*     MRI SPINE LUMBAR (W+WO) (CPT=72158)    Result Date: 2023  CONCLUSION:   1. Mild enhancement involving likely hemangioma in the L1 vertebral body is noted.  No enhancing lesions are otherwise identified.  2. Multiple disc bulges throughout the lumbar spine are noted.  No significant spinal canal or neural foraminal stenosis is identified.  3. Markedly heterogeneous marrow signal may be related to red marrow conversion.  This can be seen with anemia.  Please correlate with appropriate labs.    LOCATION:  [place]      Dictated by (CST): Gustavo Yuan MD on 12/24/2023 at 5:48 PM     Finalized by (CST): Gustavo Yuan MD on 12/24/2023 at 5:50 PM        AST   Date/Time Value Ref Range Status   12/25/2023 06:29 AM 89 (H) 15 - 37 U/L Final     ALT   Date/Time Value Ref Range Status   12/25/2023 06:29  (H) 13 - 56 U/L Final     BUN   Date/Time Value Ref Range Status   12/25/2023 06:29 AM 95 (H) 9 - 23 mg/dL Final              Assessment   Ms Aceves is a 43 year old female with a history of stage IIIb breast cancer and lupus who presented on 12/13 with shortness of breath, found to have pneumonia, pleural effusions (transudative), and HFrEF (EF 20-25%) for whom GI is consulted for abnormal LFTs. These are most likely secondary to congestive hepatopathy. LFTs markedly elevated, but downtrending with diuresis. Downtrending  again except for mild increase in T bili, which may be lagging behind.       Plan   -daily LFTs  -f/u EBV/HSV/CMV studies  -s/p IV NAC and IV vitamin K  -continued CHF management per cards and primary team  - repeat US abd to assess GB  -if LFTs worsen in the setting of diuresis or with persisent elevation of T bili, will consider liver biopsy for further evaluation       Chato Hall MD, 12/25/23, 8:16 AM  [facility]  [phone]

## 2023-12-25 NOTE — PLAN OF CARE
Patient is alert & oriented x4. Pt is a sit-to-stand, x2 assist. Breath sounds are diminished bilateral. On room air. Normal sinus rhythm. BLE pain reported, pain meds given. Skin dry and intact. Bumex gtt. Bed in low position and call light within reach. Reviewed plan of care and patient verbalizes understanding.        Problem: PAIN - ADULT  Goal: Verbalizes/displays adequate comfort level or patient's stated pain goal  Description: INTERVENTIONS:  - Encourage pt to monitor pain and request assistance  - Assess pain using appropriate pain scale  - Administer analgesics based on type and severity of pain and evaluate response  - Implement non-pharmacological measures as appropriate and evaluate response  - Consider cultural and social influences on pain and pain management  - Manage/alleviate anxiety  - Utilize distraction and/or relaxation techniques  - Monitor for opioid side effects  - Notify MD/LIP if interventions unsuccessful or patient reports new pain  - Anticipate increased pain with activity and pre-medicate as appropriate  Outcome: Progressing     Problem: SAFETY ADULT - FALL  Goal: Free from fall injury  Description: INTERVENTIONS:  - Assess pt frequently for physical needs  - Identify cognitive and physical deficits and behaviors that affect risk of falls.  - [place] fall precautions as indicated by assessment.  - Educate pt/family on patient safety including physical limitations  - Instruct pt to call for assistance with activity based on assessment  - Modify environment to reduce risk of injury  - Provide assistive devices as appropriate  - Consider OT/PT consult to assist with strengthening/mobility  - Encourage toileting schedule  Outcome: Progressing     Problem: RESPIRATORY - ADULT  Goal: Achieves optimal ventilation and oxygenation  Description: INTERVENTIONS:  - Assess for changes in respiratory status  - Assess for changes in mentation and behavior  - Position to facilitate oxygenation and  minimize respiratory effort  - Oxygen supplementation based on oxygen saturation or ABGs  - Provide Smoking Cessation handout, if applicable  - Encourage broncho-pulmonary hygiene including cough, deep breathe, Incentive Spirometry  - Assess the need for suctioning and perform as needed  - Assess and instruct to report SOB or any respiratory difficulty  - Respiratory Therapy support as indicated  - Manage/alleviate anxiety  - Monitor for signs/symptoms of CO2 retention  Outcome: Progressing     Problem: METABOLIC/FLUID AND ELECTROLYTES - ADULT  Goal: Electrolytes maintained within normal limits  Description: INTERVENTIONS:  - Monitor labs and rhythm and assess patient for signs and symptoms of electrolyte imbalances  - Administer electrolyte replacement as ordered  - Monitor response to electrolyte replacements, including rhythm and repeat lab results as appropriate  - Fluid restriction as ordered  - Instruct patient on fluid and nutrition restrictions as appropriate  Outcome: Progressing  Goal: Hemodynamic stability and optimal renal function maintained  Description: INTERVENTIONS:  - Monitor labs and assess for signs and symptoms of volume excess or deficit  - Monitor intake, output and patient weight  - Monitor urine specific gravity, serum osmolarity and serum sodium as indicated or ordered  - Monitor response to interventions for patient's volume status, including labs, urine output, blood pressure (other measures as available)  - Encourage oral intake as appropriate  - Instruct patient on fluid and nutrition restrictions as appropriate  Outcome: Progressing

## 2023-12-25 NOTE — PROGRESS NOTES
[facility]      Assessment:  1. New onset severe cardiomyopathy EF 20-25%   2. PNA  3. Hypoxic resp failure// PNA // Pleural effusions s/p Thoracentesis   4. Sinus tachycaria  5. Palpitations  6. Breast CA, stage IIIb   7. Hyponatremia  8.  YUNIOR       Plan:  Cardiomyopathy: IV diuresis.  Breathing improved.  Edema improved.  By weight somehow still up 20lbs.   Cr mildly elevated .  Follow BMP.  Now Bumex drip   Defer to renal. D/W Dr Caban.   But excellent response so far.  Needs cath but can wait for now for clinical stability.S/P bilat thoracentesis   Elevated LFTS: Likely hepatic congestion (severe TR from ventricular dysfunction and pulm HTN).  Diuresis and treatment of CHF should help.  Primary team.  Tachycardia: ST. Improved   Likely secondary to infection and medical illness.  Can increase metoprolol to 50mg BID given BP.  Needs ARNI in future.  Will let Cr settle first (BUN still high as well).  Will start hydralizine and consider low dose entresto over the weekend or early next week.  Hydralizine 25 TID for now.  Resp/PNA: ID, pulm.  Antibiotics.  Hyponatremia: Improved.  Renal.  BP: BB as above.  Metastatic breast CA: Per primary and oncology.    Subjective     History of Present Illness:   Alina Aceves is a(n) 43 year old female with SLE and palpitations who I saw in clinic 2 weeks ago.    She has Stage III breast CA.  She has been undergoing chemotherapy, EF 5/23 was 50%. It is now 20-25% % range.    She was admitted with SOB 12/13/23.  She was diagnosed with PNA and pleural effusions.  She also had hyponatremia with a sodium 120.  She was seen by Pulmonology, ID, Hematology, and Nephrology.    Subjective:  -No CP no SOB  -Awake, alert  -No SOB today, on RA           Past Medical History:   Past Medical History:   Diagnosis Date    Breast cancer (HCC)     Gastritis     Lupus (HCC)     Sjogren's disease (HCC)        Social History:   Smoking:  None  Alcohol:  None    Family History:    No family history of premature arthrosclerotic heart disease     Medications:   Scheduled:    spironolactone  25 mg Oral Daily    metoprolol tartrate  50 mg Oral 2x Daily(Beta Blocker)    DULoxetine  30 mg Oral Daily    multivitamin with minerals  1 tablet Oral Daily    methylPREDNISolone  40 mg Intravenous Daily    pantoprazole  40 mg Oral QAM AC           PRN Medications:   benzonatate, sodium chloride, HYDROcodone-acetaminophen, calcium carbonate, dextromethorphan-guaiFENesin, ALPRAZolam, LORazepam, metoprolol, ipratropium-albuterol, melatonin, prochlorperazine, polyethylene glycol (PEG 3350), sennosides, bisacodyl, fleet enema, HYDROmorphone **OR** [DISCONTINUED] HYDROmorphone **OR** [DISCONTINUED] HYDROmorphone    Outpatient Medications:   Current Facility-Administered Medications on File Prior to Encounter   Medication Dose Route Frequency Provider Last Rate Last Admin    [COMPLETED] morphINE PF 4 MG/ML injection 4 mg  4 mg Intravenous Once Jesus Manuel Hatfield MD   4 mg at 11/13/23 2255    [COMPLETED] iopamidol 76% (ISOVUE-370) injection for power injector  100 mL Intravenous ONCE PRN Jesus Manuel Hatfield MD   100 mL at 11/13/23 2247    [COMPLETED] potassium chloride (K-Dur) tab 40 mEq  40 mEq Oral Once Jesus Manuel Hatfield MD   40 mEq at 11/14/23 0010     Current Outpatient Medications on File Prior to Encounter   Medication Sig Dispense Refill    Potassium Citrate ER 15 MEQ (1620 MG) Oral Tab CR Take 1 tablet by mouth in the morning, at noon, and at bedtime.      DULoxetine 30 MG Oral Cap DR Particles Take 1 capsule (30 mg total) by mouth daily.      ondansetron (ZOFRAN) 8 MG tablet Take 1 tablet (8 mg total) by mouth as needed.      zolpidem 10 MG Oral Tab Take 1 tablet (10 mg total) by mouth nightly as needed for Sleep.      HYDROcodone-acetaminophen 5-325 MG Oral Tab Take 1 tablet by mouth every 8 (eight) hours as needed. (Patient not taking: Reported on 12/13/2023)       lidocaine-prilocaine 2.5-2.5 % External Cream APPLY SMALL AMOUNT OVER PORT PRIOR TO ACCESS      azaTHIOprine (IMURAN OR) Take by mouth. (Patient not taking: No sig reported)      Prenatal Vit-DSS-Fe Cbn-FA (PRENATAL AD OR) Take 1 tablet by mouth daily.         Allergies:   Allergies   Allergen Reactions    Bactrim [Sulfamethoxazole W/Trimethoprim] RASH         Physical Exam:   Vitals:    23 0408   BP: (!) 129/94   Pulse: 94   Resp: 20   Temp: 97.8 °F (36.6 °C)     Wt Readings from Last 3 Encounters:   23 135 lb 2.3 oz (61.3 kg)   23 120 lb (54.4 kg)   21 135 lb (61.2 kg)           General: Well developed, well nourished female.  Pt is in no acute distress.  HEENT:   Normocephalic.  Atraumatic.  Eyes with no scleral icterus.  Neck: Supple.  No JVD.  Carotids 2+ and equal in symmetric fashion.  No bruits are noted.  Cardiac: Regular rate and rhythm.   There is a normal S1 and S2.  No S3 or S4.  No murmurs, rubs, or gallops.  PMI is non-displaced with a normal apical impulse.  Lungs: Clear to ascultation bilaterally.   BS L base .few basal crackles  Abdomen: Soft.  Non-distended.  Non-tender.  Bowel sounds are present and normoactive.  No guarding or rebound.   Extremities: Extremities do demonstrate 2+ peripheral edema.   Pedal pulses present  Neurologic: Alert and oriented, normal affect.  No gross deficit appreciated.  Integument:  No visible rashes are appreciated. R Sub Q port       Laboratories and Data:   Labs:    Recent Labs   Lab 23  0436 23  0522 23  0517   * 128* 143*   BUN 90* 105* 110*   CREATSERUM 1.16* 1.35* 1.31*   CA 8.2* 8.1* 8.2*   ALB 3.1* 3.1* 3.3*    136 140   K 3.6 3.6 3.0*    105 106   CO2 24.0 22.0 24.0   ALKPHO 210* 197* 258*   * 206* 118*   * 754* 594*   BILT 1.8 2.0 2.7*   TP 5.6* 5.4* 5.6*       Recent Labs   Lab 23  0521 23  0436 23  05   RBC 3.16* 3.06* 2.99*   HGB 8.9* 8.8* 8.5*   HCT  27.2* 27.2* 26.1*   MCV 86.1 88.9 87.3   MCH 28.2 28.8 28.4   MCHC 32.7 32.4 32.6   RDW 14.2 15.5 16.3   NEPRELIM 6.33 8.86* 8.06*   WBC 7.2 10.4 9.4   PLT 79.0* 75.0* 68.0*       Recent Labs   Lab 12/20/23  0800 12/21/23  0521 12/23/23  0522   PTP 24.8* 18.0* 17.5*   INR 2.21* 1.48* 1.43*       No results for input(s): \"TROP\", \"CK\" in the last 168 hours.    Diagnostics:   Tele: Sinus tachycardia.  EKG: Sinus tachycardia, non-specific ST/T changes  Echo: Unremarkable by 5/23 echo (EF low normal, mild MR).    12/17/23  Conclusions:     1. Left ventricle: The cavity size was normal. Wall thickness was normal.      Systolic function was markedly reduced. The estimated ejection fraction      was 20-25%, by visual assessment. There was severe diffuse hypokinesis.      Technical limitations of the study preclude regional wall motion      analysis. Unable to assess LV diastolic function due to heart rhythm.   2. Right ventricle: The cavity size was increased. Systolic function was      reduced. The RV free wall longitudinal strain was -16.50%. The tricuspid      annular plane systolic excursion (TAPSE) is 1.56cm.   3. Left atrium: The left atrial volume was moderately increased.   4. Right atrium: The atrium was moderately dilated.   5. Mitral valve: The annulus was dilated. The leaflets were non-specifically      thickened. There was moderate regurgitation, with multiple jets.   6. Tricuspid valve: The annulus is dilated. There was severe regurgitation.   7. Pulmonary arteries: Systolic pressure was moderately to markedly      increased, at least 55mm Hg. Systolic pressure may be underestimated due      to wide tricuspid regurgitation. Estimated pulmonary artery diastolic      pressure was 34mm Hg.   8. Pericardium, extracardiac: A trivial pericardial effusion was identified.      There was a left pleural effusion.

## 2023-12-25 NOTE — PROGRESS NOTES
[facility]     Hospitalist Progress Note     Alina Aceves Patient Status:  Inpatient    1980 MRN [id number]   Location [facility] 4NW-A Attending Tosha Waite MD   Hosp Day # 12 PCP HOLLY IBARRA     Chief Complaint: Intractable nausea vomiting, diarrhea, generalized weakness.  Recent pneumonia.     Subjective:     Patient feels well and has no complaints.      Objective:    Review of Systems:   A comprehensive review of systems was completed; pertinent positive and negatives stated in subjective.    Vital signs:  Temp:  [97.4 °F (36.3 °C)-97.9 °F (36.6 °C)] 97.8 °F (36.6 °C)  Pulse:  [81-94] 94  Resp:  [20] 20  BP: (126-136)/() 129/94  SpO2:  [93 %-98 %] 98 %    Physical Exam:    BP (!) 129/94 (BP Location: Left arm)   Pulse 94   Temp 97.8 °F (36.6 °C) (Temporal)   Resp 20   Ht 5' 4\" (1.626 m)   Wt 135 lb 2.3 oz (61.3 kg)   LMP 2023 (Approximate)   SpO2 98%   Breastfeeding No   BMI 23.20 kg/m²     General: No acute distress  Respiratory: Clear to auscultation bilateral   Cardiovascular: S1, S2, regular rate and rhythm  Abdomen: Soft, Non-tender, non-distended  Neuro: Awake alert, lethargic, no new focal deficits.   Extremities: 1+LE edema  but improved since I last saw her; very weak in lower legs/ankles/feet    Diagnostic Data:    Labs:  Recent Labs   Lab 23  1312 23  1728 23  0800 23  0521 23  0436 23  0522   WBC  --  13.0*  12.8*  --  7.2 10.4 9.4   HGB  --  10.2*  10.2*  --  8.9* 8.8* 8.5*   MCV  --  89.5  84.5  --  86.1 88.9 87.3   PLT  --  200.0  98.0*  --  79.0* 75.0* 68.0*   BAND  --  8  --   --   --   --    INR 2.28* 2.36* 2.21* 1.48*  --  1.43*       Recent Labs   Lab 23  0522 23  0517 23  0629   * 143* 136*   * 110* 95*   CREATSERUM 1.35* 1.31* 1.17*   CA 8.1* 8.2* 8.3*   ALB 3.1* 3.3* 3.5    140 142   K 3.6 3.0* 2.5*    106 105   CO2 22.0 24.0 27.0   ALKPHO 197* 258* 276*   AST  206* 118* 89*   * 594* 487*   BILT 2.0 2.7* 3.3*   TP 5.4* 5.6* 5.9*       Estimated Creatinine Clearance: 53.5 mL/min (A) (based on SCr of 1.17 mg/dL (H)).    Microbiology    Hospital Encounter on 12/13/23   1. Body Fluid Cult Aerobic and Anaerobic     Status: None    Collection Time: 12/15/23  3:26 PM    Specimen: Pleural Fluid, Right; Body fluid, unspecified   Result Value Ref Range    Body Fluid Culture Result No Growth 5 Days N/A    Body Fld Smear 4+ Neutrophils seen N/A    Body Fld Smear No organisms seen N/A    Body Fld Smear This is a cytocentrifuged smear. N/A   2. Urine Culture, Routine     Status: None    Collection Time: 12/14/23  3:58 AM    Specimen: Urine, clean catch   Result Value Ref Range    Urine Culture No Growth at 18-24 hrs. N/A   3. Blood Culture     Status: None    Collection Time: 12/13/23  8:10 PM    Specimen: Blood,peripheral   Result Value Ref Range    Blood Culture Result No Growth 5 Days N/A     MRSA PCR nares positive on 12/14/2023    Imaging: Reviewed in Epic.  Chest x-ray done on 12/13/2023 with dense patchy airspace consolidation opacities present within the lungs suspicious for areas of pneumonia  CTA chest done on 12/14/2023 early a.m. shows moderate to large bilateral pleural effusion along with marked consolidation scattered throughout the lungs suggestive of pulmonary edema and fluid overload.  No evidence of pulm embolus.  Moderate large left axillary and left breast fluid collections noted.  Minimal gas in the left most central breast fluid collection, possibility of infection is a consideration.  Sequelae of seroma chronic hematoma is a consideration as well.    Medications:    spironolactone  25 mg Oral Daily    metoprolol tartrate  50 mg Oral 2x Daily(Beta Blocker)    DULoxetine  30 mg Oral Daily    multivitamin with minerals  1 tablet Oral Daily    methylPREDNISolone  40 mg Intravenous Daily    pantoprazole  40 mg Oral QAM AC       Assessment & Plan:      #  Multifocal pneumonia with bilateral large bilateral pleural effusion  -s/p left thora 12/15; s/p right thora 12/16; cytology without malignancy  -s/p bronch; studies pending  -completed empiric antibiotics : ceftriaxone, doxycycline (12/13-12/18)      #new onset cardiomyopathy-bumex drip, EF 20-25%, severe diffuse hypokinesis; on aldactone; GDMT per to cards; eventually needs cath when optimized    #bilateral pleural effusion due to cardiomyopathy and acute systolic heart failure   Status post left thoracentesis on 12/15/2023 and right thoracentesis on 12/16/2023, pulmonary following   MRSA nares came back positive, s/p Bactroban application twice daily for 5 days.       # Acute hypoxic respiratory failure due to above-off abx; ongoing bumex     # Locally advanced stage IIIb breast cancer status post outpatient neoadjuvant chemo and history of left breast lumpectomy and left lymph node resection on 11/16/2023    #LE weakness and painful neuropathy-MRI reviewed and does not show etiology for weakness;  could be chemo toxicity    # Hyponatremia due to pneumonia and also hypovolemic due to nausea vomiting and diarrhea, siadh, ssri (off ssri now)-resolved    # Hypokalemia-replace per protocol    # acute kidney injury; hx interstitial nephritis; might need repeat biopsy at some point; continue bumex; GFR stable today; renal following     # History of lupus, history of Sjogren's disease  -Takes azathioprine at home which was held at this time due to multifocal pneumonia  - steroids started per rheum    #elevated LFTs, improving.  Elevated INR; clinically doubt cholecystitis; PPI; possibly from CHF hepatic congestion; bilirubin lagging in rise and fall, which is not uncommon; s/p vit K and IV NAC;  if LFTs don't improve, might need biopsy    # Gastritis  # Nausea vomiting and diarrhea at home possibly due to effect of chemo  # Anxiety  - PPI    # Anemia and thrombocytopenia due to malignancy and chemo;    Follow CBC    #  Small left apical pneumothorax on 12/15/2023 status post left thoracentesis  -Pulmonary following, on conservative management, repeat chest x-ray done on 12/15/2023 showed left pneumothorax resolved    # Moderate malnutrition  -Dietitian following    Noe Perkins MD  [facility]  Internal Medicine Hospitalist        Supplementary Documentation:     Quality:  DVT Mechanical Prophylaxis:   SCDs, Early ambuation  DVT Pharmacologic Prophylaxis   Medication   None                Code Status: Full Code  Peacock: External urinary catheter in place  Peacock Duration (in days):   Central line:    CHRISTELLE: 12/27/2023    Discharge is dependent on: Clinical progress  At this point Ms. Aceves is expected to be discharge to: To be decided    The 21st Century Cures Act makes medical notes like these available to patients in the interest of transparency. Please be advised this is a medical document. Medical documents are intended to carry relevant information, facts as evident, and the clinical opinion of the practitioner. The medical note is intended as peer to peer communication and may appear blunt or direct. It is written in medical language and may contain abbreviations or verbiage that are unfamiliar.

## 2023-12-26 PROBLEM — G62.0 POLYNEUROPATHY DUE TO DRUGS (HCC): Status: ACTIVE | Noted: 2023-12-26

## 2023-12-26 LAB
ALBUMIN SERPL-MCNC: 3.7 G/DL (ref 3.4–5)
ALBUMIN/GLOB SERPL: 1.4 {RATIO} (ref 1–2)
ALP LIVER SERPL-CCNC: 354 U/L
ALT SERPL-CCNC: 424 U/L
ANION GAP SERPL CALC-SCNC: 10 MMOL/L (ref 0–18)
AST SERPL-CCNC: 94 U/L (ref 15–37)
BILIRUB SERPL-MCNC: 3.6 MG/DL (ref 0.1–2)
BUN BLD-MCNC: 84 MG/DL (ref 9–23)
CALCIUM BLD-MCNC: 8.5 MG/DL (ref 8.5–10.1)
CHLORIDE SERPL-SCNC: 103 MMOL/L (ref 98–112)
CO2 SERPL-SCNC: 32 MMOL/L (ref 21–32)
CREAT BLD-MCNC: 1.1 MG/DL
EGFRCR SERPLBLD CKD-EPI 2021: 64 ML/MIN/1.73M2 (ref 60–?)
GLOBULIN PLAS-MCNC: 2.6 G/DL (ref 2.8–4.4)
GLUCOSE BLD-MCNC: 144 MG/DL (ref 70–99)
OSMOLALITY SERPL CALC.SUM OF ELEC: 328 MOSM/KG (ref 275–295)
POTASSIUM SERPL-SCNC: 2.4 MMOL/L (ref 3.5–5.1)
PROT SERPL-MCNC: 6.3 G/DL (ref 6.4–8.2)
SODIUM SERPL-SCNC: 145 MMOL/L (ref 136–145)

## 2023-12-26 PROCEDURE — 99233 SBSQ HOSP IP/OBS HIGH 50: CPT | Performed by: INTERNAL MEDICINE

## 2023-12-26 PROCEDURE — 99232 SBSQ HOSP IP/OBS MODERATE 35: CPT | Performed by: HOSPITALIST

## 2023-12-26 PROCEDURE — 99233 SBSQ HOSP IP/OBS HIGH 50: CPT | Performed by: OTHER

## 2023-12-26 RX ORDER — POTASSIUM CHLORIDE 29.8 MG/ML
40 INJECTION INTRAVENOUS ONCE
Status: COMPLETED | OUTPATIENT
Start: 2023-12-26 | End: 2023-12-26

## 2023-12-26 RX ORDER — POTASSIUM CHLORIDE 20 MEQ/1
40 TABLET, EXTENDED RELEASE ORAL EVERY 4 HOURS
Status: DISCONTINUED | OUTPATIENT
Start: 2023-12-26 | End: 2023-12-26

## 2023-12-26 RX ORDER — POTASSIUM CHLORIDE 20 MEQ/1
40 TABLET, EXTENDED RELEASE ORAL EVERY 4 HOURS
Status: DISCONTINUED | OUTPATIENT
Start: 2023-12-26 | End: 2023-12-27

## 2023-12-26 RX ORDER — POTASSIUM CHLORIDE 14.9 MG/ML
20 INJECTION INTRAVENOUS ONCE
Status: DISCONTINUED | OUTPATIENT
Start: 2023-12-26 | End: 2023-12-26

## 2023-12-26 NOTE — PHYSICAL THERAPY NOTE
PHYSICAL THERAPY TREATMENT NOTE - INPATIENT    Room Number: 8611/8611-A      Session: 2 after re-eval     Number of Visits to Meet Established Goals: 5    Presenting Problem: PNA  Co-Morbidities : breast cancer who is undergoing chemotherapy  ASSESSMENT     Pt reported some improvement in her breathing after steroids, but inconsistent feeling/strength in BLE's.  Pt was able to stand with two person assist, and take a few side steps - Pt unaware of foot placement/borderline scissoring and ankle inversion d/t neuropathy.      At this time, Pt. presents with decreased balance, impaired strength, difficulty with gait/transfers resulting in downgrade of overall functional mobility.  Due to above deficits, Pt will benefit from continued IP PT, so that patient may achieve highest functional independence/return to baseline.       DISCHARGE RECOMMENDATIONS  PT Discharge Recommendations: 24 hour care/supervision;Home with home health PT     Pt denied discussions about post-hospital rehab stay - plans to go home no matter the amount of assist required.       DME rec = ed lift, hospital bed, commode with drop arm for lateral transfers, wheelchair with drop arm arm rest to allow for lateral transfers.      Discussed case extensively with ELIZABETH Banerjee 12/26/2023     Pt is interested in purchase/rental of [employer] lift - ELIZABETH aware.     PLAN  PT Treatment Plan: Bed mobility;Body mechanics;Coordination;Endurance;Energy conservation;Patient education;Family education;Gait training;Neuromuscular re-educate;Range of motion;Strengthening;Stoop training;Stair training;Transfer training;Balance training  Rehab Potential : Fair  Frequency (Obs): 5x/week    CURRENT GOALS     Goal #1 Patient is able to demonstrate supine - sit EOB @ level: supervision      Goal #2 Patient is able to demonstrate transfers Sit to/from Stand at assistance level: minimum assistance      Goal #3 Patient is able to ambulate 10 feet with assist device:  walker - rolling at assistance level: minimum assistance      Goal #4     Goal #5     Goal #6     Goal Comments: Goals established on 12/21/2023 12/26/2023 all goals ongoing     SUBJECTIVE  \"Yeah I'm remember you... I appreciate your honesty, and listening\" - provided emotional encouragement    OBJECTIVE  Precautions: Bed/chair alarm (Chronic R foot drop per pt)    WEIGHT BEARING RESTRICTION  Weight Bearing Restriction: None                PAIN ASSESSMENT   Rating: Unable to rate  Location: BLE's, generalized soreness total body  Management Techniques: Activity promotion;Body mechanics;Repositioning;Relaxation    BALANCE                                                                                                                       Static Sitting: Fair +  Dynamic Sitting: Fair +           Static Standing: Dependent  Dynamic Standing: Dependent    ACTIVITY TOLERANCE                         O2 WALK         AM-PAC '6-Clicks' INPATIENT SHORT FORM - BASIC MOBILITY  How much difficulty does the patient currently have...  Patient Difficulty: Turning over in bed (including adjusting bedclothes, sheets and blankets)?: A Lot   Patient Difficulty: Sitting down on and standing up from a chair with arms (e.g., wheelchair, bedside commode, etc.): Unable   Patient Difficulty: Moving from lying on back to sitting on the side of the bed?: Unable   How much help from another person does the patient currently need...   Help from Another: Moving to and from a bed to a chair (including a wheelchair)?: Total   Help from Another: Need to walk in hospital room?: Total   Help from Another: Climbing 3-5 steps with a railing?: Total       AM-PAC Score:  Raw Score: 7   Approx Degree of Impairment: 92.36%   Standardized Score (AM-PAC Scale): 26.42   CMS Modifier (G-Code): CM    FUNCTIONAL ABILITY STATUS  Gait Assessment   Functional Mobility/Gait Assessment  Gait Assistance: Dependent assistance (Mod A x 2)  Distance (ft): 2 (mixture of  side steps - unable to feel BLE's - bilateral foot drag R>L)  Assistive Device: Rolling walker  Pattern: Shuffle (neuropathy)    Skilled Therapy Provided    Bed Mobility:  Rolling: Min A   Supine<>Sit: Min A    Sit<>Supine: NT    Sit<>Stand: Mod A x 2 (bilateral knee/hip flexion)  Stand<>Sit: Mod A x 2     Therapist's Comments: improved mentation, and following 100% simple commands.  Provided emotional support/empathetic listening    Pt assisted to bathroom using Ana Maria Steady - only required one person assist.     Patient End of Session: In bed;Needs met;Call light within reach;RN aware of session/findings;All patient questions and concerns addressed;Alarm set    PT Session Time: 8 minutes  Gait Trainin minutes  Therapeutic Activity: 15 minutes  Therapeutic Exercise: 0 minutes   Neuromuscular Re-education: 0 minutes

## 2023-12-26 NOTE — PROGRESS NOTES
[facility]      Assessment/Plan:  Cardiomyopathy: IV diuresis.  Breathing improved.  Edema improved.      - Net negative 8.6L   - Net negative 22LB  - Needs ARNI in future.  Will let Cr settle first (BUN still high as well).  Will start hydralizine and consider low dose entresto over the weekend or early next week.    - Continue Bumex drip today, plan to switch to PO tomorrow- BMP in AM  - Needs cath but can wait for now for clinical stability.S/P bilat thoracentesis- tentatively plan for Thursday   Elevated LFTS: Likely hepatic congestion (severe TR from ventricular dysfunction and pulm HTN).  Diuresis and treatment of CHF should help.  Primary team.  REJI   2/2   Cr 1.10, improving  Management per nephrology    Hypokalemia   Replace per protocol   Tachycardia  HTN  - ST. Improved   Likely secondary to infection and medical illness.    - Continue metoprolol 50mg BID given BP.    - Hydralazine 25 TID for now- will need to switch to arni    6. Resp/PNA: ID, pulm.  Antibiotics.  7. Hyponatremia: Improved.  Renal.  8. Metastatic breast CA: Per primary and oncology.    Subjective     History of Present Illness:   Alina Aceves is a(n) 43 year old female with SLE and palpitations who was seen in clinic 2 weeks ago.    She has Stage III breast CA.  She has been undergoing chemotherapy, EF 5/23 was 50%. It is now 20-25% % range.    She was admitted with SOB 12/13/23.  She was diagnosed with PNA and pleural effusions.  She also had hyponatremia with a sodium 120.  She was seen by Pulmonology, ID, Hematology, and Nephrology.    Subjective:  -No CP no SOB  -Awake, alert  -No SOB today, on RA           Past Medical History:   Past Medical History:   Diagnosis Date    Breast cancer (HCC)     Gastritis     Lupus (HCC)     Sjogren's disease (HCC)        Social History:   Smoking:  None  Alcohol:  None    Family History:   No family history of premature arthrosclerotic heart disease     Medications:    Scheduled:    potassium chloride  40 mEq Oral q4h    predniSONE  10 mg Oral Daily with breakfast    spironolactone  25 mg Oral Daily    metoprolol tartrate  50 mg Oral 2x Daily(Beta Blocker)    DULoxetine  30 mg Oral Daily    multivitamin with minerals  1 tablet Oral Daily    pantoprazole  40 mg Oral QAM AC           PRN Medications:   zolpidem, benzonatate, sodium chloride, HYDROcodone-acetaminophen, calcium carbonate, dextromethorphan-guaiFENesin, ALPRAZolam, LORazepam, metoprolol, ipratropium-albuterol, melatonin, prochlorperazine, polyethylene glycol (PEG 3350), sennosides, bisacodyl, fleet enema, HYDROmorphone **OR** [DISCONTINUED] HYDROmorphone **OR** [DISCONTINUED] HYDROmorphone    Outpatient Medications:   Current Facility-Administered Medications on File Prior to Encounter   Medication Dose Route Frequency Provider Last Rate Last Admin    [COMPLETED] morphINE PF 4 MG/ML injection 4 mg  4 mg Intravenous Once Jesus Manuel Hatfield MD   4 mg at 11/13/23 2255    [COMPLETED] iopamidol 76% (ISOVUE-370) injection for power injector  100 mL Intravenous ONCE PRN Jesus Manuel Hatfield MD   100 mL at 11/13/23 2247    [COMPLETED] potassium chloride (K-Dur) tab 40 mEq  40 mEq Oral Once Jesus Manuel Hatfield MD   40 mEq at 11/14/23 0010     Current Outpatient Medications on File Prior to Encounter   Medication Sig Dispense Refill    Potassium Citrate ER 15 MEQ (1620 MG) Oral Tab CR Take 1 tablet by mouth in the morning, at noon, and at bedtime.      DULoxetine 30 MG Oral Cap DR Particles Take 1 capsule (30 mg total) by mouth daily.      ondansetron (ZOFRAN) 8 MG tablet Take 1 tablet (8 mg total) by mouth as needed.      zolpidem 10 MG Oral Tab Take 1 tablet (10 mg total) by mouth nightly as needed for Sleep.      HYDROcodone-acetaminophen 5-325 MG Oral Tab Take 1 tablet by mouth every 8 (eight) hours as needed. (Patient not taking: Reported on 12/13/2023)      lidocaine-prilocaine 2.5-2.5 % External  Cream APPLY SMALL AMOUNT OVER PORT PRIOR TO ACCESS      azaTHIOprine (IMURAN OR) Take by mouth. (Patient not taking: No sig reported)      Prenatal Vit-DSS-Fe Cbn-FA (PRENATAL AD OR) Take 1 tablet by mouth daily.         Allergies:   Allergies   Allergen Reactions    Bactrim [Sulfamethoxazole W/Trimethoprim] RASH         Physical Exam:   Vitals:    23 0523   BP: 130/90   Pulse: 87   Resp: 16   Temp: 97.6 °F (36.4 °C)     Wt Readings from Last 3 Encounters:   23 128 lb 3.2 oz (58.2 kg)   23 120 lb (54.4 kg)   21 135 lb (61.2 kg)           General: Well developed, well nourished female.  Pt is in no acute distress.  HEENT:   Normocephalic.  Atraumatic.  Eyes with no scleral icterus.  Neck: Supple.  No JVD.  Carotids 2+ and equal in symmetric fashion.  No bruits are noted.  Cardiac: Regular rate and rhythm.   There is a normal S1 and S2.  No S3 or S4.  No murmurs, rubs, or gallops.  PMI is non-displaced with a normal apical impulse.  Lungs:  BS L base .few basal crackles  Abdomen: Soft.  Non-distended.  Non-tender.  Bowel sounds are present and normoactive.  No guarding or rebound.   Extremities: Extremities do demonstrate 1+ peripheral edema.   Pedal pulses present  Neurologic: Alert and oriented, normal affect.  No gross deficit appreciated.  Integument:  No visible rashes are appreciated. R Sub Q port       Laboratories and Data:   Labs:    Recent Labs   Lab 23  0517 23  0629 23  0525   * 136* 144*   * 95* 84*   CREATSERUM 1.31* 1.17* 1.10*   CA 8.2* 8.3* 8.5   ALB 3.3* 3.5 3.7    142 145   K 3.0* 2.5* 2.4*    105 103   CO2 24.0 27.0 32.0   ALKPHO 258* 276* 354*   * 89* 94*   * 487* 424*   BILT 2.7* 3.3* 3.6*   TP 5.6* 5.9* 6.3*       Recent Labs   Lab 23  0521 23  0436 23   RBC 3.16* 3.06* 2.99*   HGB 8.9* 8.8* 8.5*   HCT 27.2* 27.2* 26.1*   MCV 86.1 88.9 87.3   MCH 28.2 28.8 28.4   MCHC 32.7 32.4 32.6    RDW 14.2 15.5 16.3   NEPRELIM 6.33 8.86* 8.06*   WBC 7.2 10.4 9.4   PLT 79.0* 75.0* 68.0*       Recent Labs   Lab 12/20/23  0800 12/21/23  0521 12/23/23  0522   PTP 24.8* 18.0* 17.5*   INR 2.21* 1.48* 1.43*       No results for input(s): \"TROP\", \"CK\" in the last 168 hours.    Diagnostics:   Tele: Sinus tachycardia.  EKG: Sinus tachycardia, non-specific ST/T changes  Echo: Unremarkable by 5/23 echo (EF low normal, mild MR).    12/17/23  Conclusions:     1. Left ventricle: The cavity size was normal. Wall thickness was normal.      Systolic function was markedly reduced. The estimated ejection fraction      was 20-25%, by visual assessment. There was severe diffuse hypokinesis.      Technical limitations of the study preclude regional wall motion      analysis. Unable to assess LV diastolic function due to heart rhythm.   2. Right ventricle: The cavity size was increased. Systolic function was      reduced. The RV free wall longitudinal strain was -16.50%. The tricuspid      annular plane systolic excursion (TAPSE) is 1.56cm.   3. Left atrium: The left atrial volume was moderately increased.   4. Right atrium: The atrium was moderately dilated.   5. Mitral valve: The annulus was dilated. The leaflets were non-specifically      thickened. There was moderate regurgitation, with multiple jets.   6. Tricuspid valve: The annulus is dilated. There was severe regurgitation.   7. Pulmonary arteries: Systolic pressure was moderately to markedly      increased, at least 55mm Hg. Systolic pressure may be underestimated due      to wide tricuspid regurgitation. Estimated pulmonary artery diastolic      pressure was 34mm Hg.   8. Pericardium, extracardiac: A trivial pericardial effusion was identified.      There was a left pleural effusion.

## 2023-12-26 NOTE — PROGRESS NOTES
[facility]  Nephrology Progress Note    Alina Aceves Attending:  Lenard Rosa DO       Assessment and Plan:    1) REJI- due to cardiorenal syndrome +/- underlying nephritis; UA noted with modest proteinuria / microscopic hematuria. PLAN- focus on volume mgmt; may need another renal biopsy to exclude lupus nephritis / active interstitial nephritis    2) HFrEF- EF 20-25% with RV dysfunction + severe TR / mod pul HTN- etiology unclear (chemo?). Diuresing well- continue bumex gtt / aldactone x 24-48 hrs (down 15# so far)    3) Proteinuria with h/o biopsy proven interstitial nephritis (presumably due to Sjogrens) partially tx'ed with steroids at [facility]    4) Transaminitis- probable hepatic congestion; no biliary issues per GI- improving daily    5) Stage 3 breast CA on chemo (no XRT)- last     6) Pleural effusion s/p bilat thora's- cytology / cx neg    7) SLE / Sjogrens- normally on imuran -> steroids per rheum      Subjective:  Awake alert in bed in good spirits    Physical Exam:   BP (!) 128/92 (BP Location: Right arm)   Pulse 87   Temp 96.7 °F (35.9 °C) (Axillary)   Resp 16   Ht 5' 4\" (1.626 m)   Wt 128 lb 3.2 oz (58.2 kg)   LMP 2023 (Approximate)   SpO2 99%   Breastfeeding No   BMI 22.01 kg/m²   Temp (24hrs), Av.5 °F (36.4 °C), Min:96.7 °F (35.9 °C), Max:97.8 °F (36.6 °C)       Intake/Output Summary (Last 24 hours) at 2023 0948  Last data filed at 2023 0845  Gross per 24 hour   Intake 240 ml   Output 7025 ml   Net -6785 ml     Wt Readings from Last 3 Encounters:   23 128 lb 3.2 oz (58.2 kg)   23 120 lb (54.4 kg)   21 135 lb (61.2 kg)     General: awkae alert  HEENT: No scleral icterus, MMM  Neck: Supple, no KALYN or thyromegaly  Cardiac: Regular rate and rhythm, S1, S2 normal, no murmur or tub  Lungs: Decreased BS at bases bilaterally   Abdomen: Soft, non-tender. + bowel sounds, no palpable organomegaly  Extremities: Without clubbing, cyanosis; 2+ LE  edema  Neurologic: Cranial nerves grossly intact, moving all extremities  Skin: Warm and dry, no rashes       Labs:   Lab Results   Component Value Date    CREATSERUM 1.10 12/26/2023    BUN 84 12/26/2023     12/26/2023    K 2.4 12/26/2023     12/26/2023    CO2 32.0 12/26/2023     12/26/2023    CA 8.5 12/26/2023    ALB 3.7 12/26/2023    ALKPHO 354 12/26/2023    BILT 3.6 12/26/2023    TP 6.3 12/26/2023    AST 94 12/26/2023     12/26/2023       Imaging:  All imaging studies reviewed.    Meds:           Questions/concerns were discussed with patient and/or family by bedside.        Marya Caban MD  12/26/2023  948 AM

## 2023-12-26 NOTE — PROGRESS NOTES
[facility]     Hospitalist Progress Note     Alina Aceves Patient Status:  Inpatient    1980 MRN [id number]   Location [facility] 4NW-A Attending Tosha Waite MD   Hosp Day # 13 PCP HOLLY IBARRA     Chief Complaint: Intractable nausea vomiting, diarrhea, generalized weakness.  Recent pneumonia.     Subjective:     Patient feels well and has no complaints.      Objective:    Review of Systems:   A comprehensive review of systems was completed; pertinent positive and negatives stated in subjective.    Vital signs:  Temp:  [97.5 °F (36.4 °C)-97.8 °F (36.6 °C)] 97.6 °F (36.4 °C)  Pulse:  [83-94] 87  Resp:  [16-20] 16  BP: (123-133)/(90-96) 130/90  SpO2:  [99 %-100 %] 100 %    Physical Exam:    /90 (BP Location: Left arm)   Pulse 87   Temp 97.6 °F (36.4 °C) (Oral)   Resp 16   Ht 5' 4\" (1.626 m)   Wt 128 lb 3.2 oz (58.2 kg)   LMP 2023 (Approximate)   SpO2 100%   Breastfeeding No   BMI 22.01 kg/m²     General: No acute distress  Respiratory: Clear to auscultation bilateral   Cardiovascular: S1, S2, regular rate and rhythm  Abdomen: Soft, Non-tender, non-distended  Neuro: Awake alert, lethargic, no new focal deficits.   Extremities: trace+LE edema  but improved since I last saw her; very weak in lower legs/ankles/feet    Diagnostic Data:    Labs:  Recent Labs   Lab 23  1312 23  1728 23  0800 23  0521 23  0436 23  0522   WBC  --  13.0*  12.8*  --  7.2 10.4 9.4   HGB  --  10.2*  10.2*  --  8.9* 8.8* 8.5*   MCV  --  89.5  84.5  --  86.1 88.9 87.3   PLT  --  200.0  98.0*  --  79.0* 75.0* 68.0*   BAND  --  8  --   --   --   --    INR 2.28* 2.36* 2.21* 1.48*  --  1.43*       Recent Labs   Lab 23  0517 23  0629 23  0525   * 136* 144*   * 95* 84*   CREATSERUM 1.31* 1.17* 1.10*   CA 8.2* 8.3* 8.5   ALB 3.3* 3.5 3.7    142 145   K 3.0* 2.5* 2.4*    105 103   CO2 24.0 27.0 32.0   ALKPHO 258* 276* 354*   AST  118* 89* 94*   * 487* 424*   BILT 2.7* 3.3* 3.6*   TP 5.6* 5.9* 6.3*       Estimated Creatinine Clearance: 56.9 mL/min (A) (based on SCr of 1.1 mg/dL (H)).    Microbiology    Hospital Encounter on 12/13/23   1. Body Fluid Cult Aerobic and Anaerobic     Status: None    Collection Time: 12/15/23  3:26 PM    Specimen: Pleural Fluid, Right; Body fluid, unspecified   Result Value Ref Range    Body Fluid Culture Result No Growth 5 Days N/A    Body Fld Smear 4+ Neutrophils seen N/A    Body Fld Smear No organisms seen N/A    Body Fld Smear This is a cytocentrifuged smear. N/A   2. Urine Culture, Routine     Status: None    Collection Time: 12/14/23  3:58 AM    Specimen: Urine, clean catch   Result Value Ref Range    Urine Culture No Growth at 18-24 hrs. N/A   3. Blood Culture     Status: None    Collection Time: 12/13/23  8:10 PM    Specimen: Blood,peripheral   Result Value Ref Range    Blood Culture Result No Growth 5 Days N/A     MRSA PCR nares positive on 12/14/2023    Imaging: Reviewed in Epic.  Chest x-ray done on 12/13/2023 with dense patchy airspace consolidation opacities present within the lungs suspicious for areas of pneumonia  CTA chest done on 12/14/2023 early a.m. shows moderate to large bilateral pleural effusion along with marked consolidation scattered throughout the lungs suggestive of pulmonary edema and fluid overload.  No evidence of pulm embolus.  Moderate large left axillary and left breast fluid collections noted.  Minimal gas in the left most central breast fluid collection, possibility of infection is a consideration.  Sequelae of seroma chronic hematoma is a consideration as well.    Medications:    potassium chloride  40 mEq Oral q4h    predniSONE  10 mg Oral Daily with breakfast    spironolactone  25 mg Oral Daily    metoprolol tartrate  50 mg Oral 2x Daily(Beta Blocker)    DULoxetine  30 mg Oral Daily    multivitamin with minerals  1 tablet Oral Daily    pantoprazole  40 mg Oral QAM AC        Assessment & Plan:      # Multifocal pneumonia with bilateral large bilateral pleural effusion  -s/p left thora 12/15; s/p right thora 12/16; cytology without malignancy  -s/p bronch; studies pending  -completed empiric antibiotics : ceftriaxone, doxycycline (12/13-12/18)      #new onset cardiomyopathy-bumex drip, EF 20-25%, severe diffuse hypokinesis; on BB & aldactone; GDMT per to cards; eventually needs cath when optimized    #bilateral pleural effusion due to cardiomyopathy and acute systolic heart failure   Status post left thoracentesis on 12/15/2023 and right thoracentesis on 12/16/2023, pulmonary following   MRSA nares came back positive, s/p Bactroban application twice daily for 5 days.       # Acute hypoxic respiratory failure due to above-off abx; ongoing bumex per renal     # Locally advanced stage IIIb breast cancer status post outpatient neoadjuvant chemo and history of left breast lumpectomy and left lymph node resection on 11/16/2023    #LE weakness and painful neuropathy-MRI reviewed and does not show etiology for weakness;  could be chemo toxicity; will d/w neuro today    # Hyponatremia due to pneumonia and also hypovolemic due to nausea vomiting and diarrhea, siadh, ssri (off ssri now)-resolved    # Hypokalemia-replace per protocol again today 12/26    # acute kidney injury; hx interstitial nephritis; might need repeat biopsy at some point; continue bumex; GFR stable; renal following     # History of lupus, history of Sjogren's disease  -Takes azathioprine at home which was held at this time due to multifocal pneumonia  - steroids started per rheum; down to 10mg daily per rheum    #elevated LFTs, improving.  Elevated INR; clinically doubt cholecystitis; PPI; possibly from CHF hepatic congestion; bilirubin lagging in rise and fall, which is not uncommon; s/p vit K and IV NAC;  if LFTs don't improve, might need biopsy    # Gastritis  # Nausea vomiting and diarrhea at home possibly due to  effect of chemo  # Anxiety  - PPI    # Anemia and thrombocytopenia due to malignancy and chemo;    Follow CBC; repeat cbc tomorrow    # Small left apical pneumothorax on 12/15/2023 status post left thoracentesis  -Pulmonary following, on conservative management, repeat chest x-ray done on 12/15/2023 showed left pneumothorax resolved    # Moderate malnutrition  -Dietitian following    Noe Perkins MD  [facility]  Internal Medicine Hospitalist        Supplementary Documentation:     Quality:  DVT Mechanical Prophylaxis:   SCDs, Early ambuation  DVT Pharmacologic Prophylaxis   Medication   None                Code Status: Full Code  Peacock: External urinary catheter in place  Peacock Duration (in days):   Central line:    CHRISTELLE: 12/27/2023    Discharge is dependent on: Clinical progress  At this point Ms. Aceves is expected to be discharge to: To be decided    The 21st Century Cures Act makes medical notes like these available to patients in the interest of transparency. Please be advised this is a medical document. Medical documents are intended to carry relevant information, facts as evident, and the clinical opinion of the practitioner. The medical note is intended as peer to peer communication and may appear blunt or direct. It is written in medical language and may contain abbreviations or verbiage that are unfamiliar.

## 2023-12-26 NOTE — PLAN OF CARE
Received pt at shift change. AxOx4. Room air. Denies pain/SOB. Vitals stable. NSR on tele. Withdrawn toward staff. Lack of appetite, not wanting to take PO meds. K 2.4 this am. See flowsheets for additional assessments. Pt updated on POC. Call light within reach. All needs met at this time.     Plan:  -Bumex gtt  -IV K  -Daily weight // I&O  -Daily LFTs    Problem: PAIN - ADULT  Goal: Verbalizes/displays adequate comfort level or patient's stated pain goal  Description: INTERVENTIONS:  - Encourage pt to monitor pain and request assistance  - Assess pain using appropriate pain scale  - Administer analgesics based on type and severity of pain and evaluate response  - Implement non-pharmacological measures as appropriate and evaluate response  - Consider cultural and social influences on pain and pain management  - Manage/alleviate anxiety  - Utilize distraction and/or relaxation techniques  - Monitor for opioid side effects  - Notify MD/LIP if interventions unsuccessful or patient reports new pain  - Anticipate increased pain with activity and pre-medicate as appropriate  Outcome: Progressing     Problem: SAFETY ADULT - FALL  Goal: Free from fall injury  Description: INTERVENTIONS:  - Assess pt frequently for physical needs  - Identify cognitive and physical deficits and behaviors that affect risk of falls.  - [place] fall precautions as indicated by assessment.  - Educate pt/family on patient safety including physical limitations  - Instruct pt to call for assistance with activity based on assessment  - Modify environment to reduce risk of injury  - Provide assistive devices as appropriate  - Consider OT/PT consult to assist with strengthening/mobility  - Encourage toileting schedule  Outcome: Progressing     Problem: RESPIRATORY - ADULT  Goal: Achieves optimal ventilation and oxygenation  Description: INTERVENTIONS:  - Assess for changes in respiratory status  - Assess for changes in mentation and behavior  -  Position to facilitate oxygenation and minimize respiratory effort  - Oxygen supplementation based on oxygen saturation or ABGs  - Provide Smoking Cessation handout, if applicable  - Encourage broncho-pulmonary hygiene including cough, deep breathe, Incentive Spirometry  - Assess the need for suctioning and perform as needed  - Assess and instruct to report SOB or any respiratory difficulty  - Respiratory Therapy support as indicated  - Manage/alleviate anxiety  - Monitor for signs/symptoms of CO2 retention  Outcome: Progressing     Problem: METABOLIC/FLUID AND ELECTROLYTES - ADULT  Goal: Electrolytes maintained within normal limits  Description: INTERVENTIONS:  - Monitor labs and rhythm and assess patient for signs and symptoms of electrolyte imbalances  - Administer electrolyte replacement as ordered  - Monitor response to electrolyte replacements, including rhythm and repeat lab results as appropriate  - Fluid restriction as ordered  - Instruct patient on fluid and nutrition restrictions as appropriate  Outcome: Progressing  Goal: Hemodynamic stability and optimal renal function maintained  Description: INTERVENTIONS:  - Monitor labs and assess for signs and symptoms of volume excess or deficit  - Monitor intake, output and patient weight  - Monitor urine specific gravity, serum osmolarity and serum sodium as indicated or ordered  - Monitor response to interventions for patient's volume status, including labs, urine output, blood pressure (other measures as available)  - Encourage oral intake as appropriate  - Instruct patient on fluid and nutrition restrictions as appropriate  Outcome: Progressing

## 2023-12-26 NOTE — PLAN OF CARE
Assumed care at 1930. Pt is A&Ox4. Pt is on RA, O2 sats WNL. NSR on tele, VSS. Continent of B&B. C/o mild pain at this time, declines mediation. Up w/ sarasteady to bathroom, tolerating well. Plan of care reviewed with patient, verbalizes understanding, all needs addressed at this time, pt seems to be resting comfortably. Call light within reach.    POC: bumex gtt    Problem: PAIN - ADULT  Goal: Verbalizes/displays adequate comfort level or patient's stated pain goal  Description: INTERVENTIONS:  - Encourage pt to monitor pain and request assistance  - Assess pain using appropriate pain scale  - Administer analgesics based on type and severity of pain and evaluate response  - Implement non-pharmacological measures as appropriate and evaluate response  - Consider cultural and social influences on pain and pain management  - Manage/alleviate anxiety  - Utilize distraction and/or relaxation techniques  - Monitor for opioid side effects  - Notify MD/LIP if interventions unsuccessful or patient reports new pain  - Anticipate increased pain with activity and pre-medicate as appropriate  Outcome: Progressing     Problem: SAFETY ADULT - FALL  Goal: Free from fall injury  Description: INTERVENTIONS:  - Assess pt frequently for physical needs  - Identify cognitive and physical deficits and behaviors that affect risk of falls.  - [place] fall precautions as indicated by assessment.  - Educate pt/family on patient safety including physical limitations  - Instruct pt to call for assistance with activity based on assessment  - Modify environment to reduce risk of injury  - Provide assistive devices as appropriate  - Consider OT/PT consult to assist with strengthening/mobility  - Encourage toileting schedule  Outcome: Progressing     Problem: RESPIRATORY - ADULT  Goal: Achieves optimal ventilation and oxygenation  Description: INTERVENTIONS:  - Assess for changes in respiratory status  - Assess for changes in mentation and  behavior  - Position to facilitate oxygenation and minimize respiratory effort  - Oxygen supplementation based on oxygen saturation or ABGs  - Provide Smoking Cessation handout, if applicable  - Encourage broncho-pulmonary hygiene including cough, deep breathe, Incentive Spirometry  - Assess the need for suctioning and perform as needed  - Assess and instruct to report SOB or any respiratory difficulty  - Respiratory Therapy support as indicated  - Manage/alleviate anxiety  - Monitor for signs/symptoms of CO2 retention  Outcome: Progressing     Problem: METABOLIC/FLUID AND ELECTROLYTES - ADULT  Goal: Electrolytes maintained within normal limits  Description: INTERVENTIONS:  - Monitor labs and rhythm and assess patient for signs and symptoms of electrolyte imbalances  - Administer electrolyte replacement as ordered  - Monitor response to electrolyte replacements, including rhythm and repeat lab results as appropriate  - Fluid restriction as ordered  - Instruct patient on fluid and nutrition restrictions as appropriate  Outcome: Progressing  Goal: Hemodynamic stability and optimal renal function maintained  Description: INTERVENTIONS:  - Monitor labs and assess for signs and symptoms of volume excess or deficit  - Monitor intake, output and patient weight  - Monitor urine specific gravity, serum osmolarity and serum sodium as indicated or ordered  - Monitor response to interventions for patient's volume status, including labs, urine output, blood pressure (other measures as available)  - Encourage oral intake as appropriate  - Instruct patient on fluid and nutrition restrictions as appropriate  Outcome: Progressing

## 2023-12-26 NOTE — PROGRESS NOTES
[facility]     Progress Note     Alina Aceves Patient Status:  Inpatient    1980 MRN [id number]   Location [facility] 8NE-A Attending Noe Perkins MD   Hosp Day # 12 PCP HOLLY IBARRA     Chief Complaint:     Subjective:     S: Patient sitting up comfortably. Feels she has more energy. No significant shortness of breath.    Continues to have neuropathy both feet. Noted results of MRI spine and back on cymbalta    Feels steroid has helped her with her joints and some swelling and rash on  her face.    No fevers. No abdominal pain. Has some fatigue        Review of Systems:     Constitutional: Negative for activity change, chills, diaphoresis, fatigue, fever and unexpected weight change.    HENT: Negative for congestion, hearing loss and mouth sores.    Eyes: Negative for pain, redness and visual disturbance.    Respiratory: Negative for apnea, cough, chest tightness, shortness of breath, wheezing Cardiovascular: Negative for chest pain, palpitations and leg swelling.    Gastrointestinal: Negative for abdominal distention, abdominal pain, blood in stool, constipation, diarrhea and nausea.    Endo: Negative.    Genitourinary: Negative for decreased urine volume, difficulty urinating, dysuria, and frequency.     Musculoskeletal: + arthralgias, +gait problem and joint swelling. +weakness and neuro-abdelrahman lower extremities    Skin: Negative. Negative for color change, pallor and rash. No raynauds or digital ulcerations.    Allergic/Immunologic: Negative.    Neurological: Negative. Negative for dizziness, tremors, seizures, syncope,  speech difficulty, weakness, light-headedness, numbness and headaches.    Hematological: Does not bruise/bleed easily.    Psychiatric/Behavioral: Negative depression, confusion, decreased concentration, self-injury, sleep disturbance and suicidal ideas. The patient is not nervous/anxious.    Objective:   Vital signs:  Temp:  [97.4 °F (36.3 °C)-97.8 °F (36.6 °C)] 97.8 °F  (36.6 °C)  Pulse:  [83-94] 88  Resp:  [16-20] 18  BP: (123-135)/(90-97) 131/90  SpO2:  [98 %-100 %] 100 %    Wt Readings from Last 3 Encounters:   12/25/23 135 lb 2.3 oz (61.3 kg)   11/13/23 120 lb (54.4 kg)   12/16/21 135 lb (61.2 kg)       Intake/Output:    Intake/Output Summary (Last 24 hours) at 12/25/2023 2108  Last data filed at 12/25/2023 2100  Gross per 24 hour   Intake 480 ml   Output 6850 ml   Net -6370 ml         Physical Exam:      Constitutional: Is oriented to person, place, and time. No distress.    HEENT: Normocephalic, no jvd; no lad; EOMI; good oral pooling; no oral or nasal ulcers.    Eyes: Conjunctivae and EOM are normal. Pupils are equal, round, and reactive to light.    Neck: Normal range of motion. No thyromegaly present.    Cardiovascular: RRR, no murmurs.    Lungs: Clear, Bilateral air entry, no wheezes. Mild scattered crackles at bases    Abdominal: Soft. Nontender; nondistended; BS+.    Musculoskeletal:        Right shoulder: Exhibits normal range of motion on abduction and internal rotation, no tenderness, no bony tenderness, no deformity, no laceration, no pain and no spasm.        Left shoulder: Exhibits normal range of motion on abduction and internal and external rotation.  no tenderness, no bony tenderness, no swelling, no effusion, no deformity, no pain, no spasm and normal strength.        Right elbow: Exhibits normal range of motion, no swelling, no effusion and no deformity. No tenderness found. No medial epicondyle, no lateral epicondyle and no olecranon process tenderness noted. There are no contractures or tophi or nodules.        Left elbow: Exhibits normal range of motion, no swelling, no effusion and no deformity. No tenderness found. No medial epicondyle, no lateral epicondyle and no olecranon process tenderness noted.  There are no contractures or tophi or nodules        Right wrist: Exhibits normal range of motion, no tenderness, no bony tenderness, no swelling, no  effusion and no crepitus. Flexion and extension intact without limitation.        Left wrist:  Exhibits normal range of motion, no tenderness, no bony tenderness, no swelling, no effusion, no crepitus and no deformity. Flexion and extension intact without limitation.        Right hip: Exhibits normal range of motion, normal strength, no tenderness, no bony tenderness, no swelling and no crepitus.        Right hand: No synovitis of MCP,PIP or DIP joints; no Bouchards or Heberden nodules noted;  strength: 100%.        Left hand: No synovitis of MCP,PIP or DIP joints; no Bouchards or Heberden nodules noted;  strength: 100%.        Left hip: Exhibits normal range of motion, normal strength, no tenderness, no bony tenderness, no swelling and no crepitus.        Right Hip: Normal without LROM or TTP        Right knee: Exhibits normal range of motion, no swelling, no effusion, no ecchymosis, no deformity and no erythema. No tenderness found. No medial joint line, no lateral joint line, no MCL and no LCL tenderness noted. No crepitation found on flexion and extension normal.        Left knee: Exhibits normal range of motion, no swelling, no effusion, no ecchymosis and no erythema. No tenderness found. No medial joint line, no lateral joint line and no patellar tendon tenderness noted. No crepitation found on flexion and flexion intact.        Right ankle: Exhibits normal range of motion, no swelling, no deformity and no laceration. No tenderness. No lateral malleolus and no medial malleolus tenderness found. Achilles tendon normal. Achilles tendon exhibits no pain, no defect and normal Maradiaga's test results.        Left ankle: Exhibits normal range of motion, no swelling, and no deformity. No tenderness. No lateral malleolus and no medial malleolus tenderness found.        Cervical back: Exhibits normal range of motion, no tenderness, no bony tenderness, no swelling, no pain and no spasm.        Thoracic back:  Exhibits normal range of motion, no tenderness, no bony tenderness and no spasm.        Lumbar back: Exhibits normal range of motion, no tenderness, no bony tenderness, no pain and no spasm.        Right foot: No tenderness, no bony tenderness, no crepitus and no laceration. There is no synovitis or tenderness of the MTP joints to palpation.        Left foot: No tenderness, no bony tenderness and no crepitus. There is no synovitis or tenderness of the MTP joints to palpation.    Lymphadenopathy: No noted lympadenopathy.    Neurological: Is alert and oriented. No focal motor or sensory abnormalities.    Skin: Skin is warm, dry and intact. Mild malar rash faint of face improved    Psychiatric: Normal behavior    TTP of lower extremities on palpation    Foot drop bilaterally (dorsiflexion/plantar flexion) decreased     Results:   Diagnostic Data:      Labs:    Selected labs - last 24 hours:  Endo  Lytes  Renal   Glu 136  Na 142 Ca 8.3  BUN 95   POC Gluc  -  K 2.5 PO4 4.9  Cr 1.17   A1c -  Cl 105 Mg 2.0  eGFR 59   TSH -  CO2 27.0         LFT  CBC  Other   AST 89  WBC -  PTT - Procal -     Hb -  INR - CRP -   APk 276  Hct -  Trop - D dim -   T los 3.3  PLT -  pBNP -  BNP -  - Ferritin  -   Prot 5.9    CK  - Lactate  -   Alb 3.5    LDL  - COVID  -       Imaging:   MRI SPINE LUMBAR (W+WO) (CPT=72158)    Result Date: 12/24/2023  CONCLUSION:   1. Mild enhancement involving likely hemangioma in the L1 vertebral body is noted.  No enhancing lesions are otherwise identified.  2. Multiple disc bulges throughout the lumbar spine are noted.  No significant spinal canal or neural foraminal stenosis is identified.  3. Markedly heterogeneous marrow signal may be related to red marrow conversion.  This can be seen with anemia.  Please correlate with appropriate labs.   LOCATION:  [facility]      Dictated by (CST): Gustavo Yuan MD on 12/24/2023 at 5:48 PM     Finalized by (CST): Gustavo Yuan MD on 12/24/2023 at 5:50 PM        US VENOUS DOPPLER LEG BILAT - DIAG IMG (CPT=93970)    Result Date: 12/21/2023  CONCLUSION:  No evidence of DVT in bilateral lower extremities.   LOCATION:  [id number]   Dictated by (CST): Gustavo Yuan MD on 12/21/2023 at 2:14 PM     Finalized by (CST): Gustavo Yuan MD on 12/21/2023 at 2:16 PM       US ABDOMEN COMPLETE (CPT=76700)    Result Date: 12/19/2023  CONCLUSION:  1. Cholelithiasis with mild gallbladder wall thickening and small amount of pericholecystic fluid.  Findings may represent acute cholecystitis.  Correlation with clinical symptoms is recommended.  2. Bilateral pleural effusions   LOCATION:  [facility]    Dictated by (CST): Sofi Laguna MD on 12/19/2023 at 7:37 PM     Finalized by (CST): Sofi Laguna MD on 12/19/2023 at 7:40 PM       XR CHEST AP PORTABLE  (CPT=71045)    Result Date: 12/19/2023  CONCLUSION:  Extensive airspace infiltrates bilaterally with interval worsening at the right base compared to the previous exam.  Differential considerations include pneumonia, pulmonary hemorrhage, pulmonary edema and ARDS.   LOCATION:  [facility]      Dictated by (CST): Juan Fernandez MD on 12/19/2023 at 4:39 PM     Finalized by (CST): Juan Fernandez MD on 12/19/2023 at 4:41 PM       XR CHEST AP PORTABLE  (CPT=71045)    Result Date: 12/18/2023  CONCLUSION:  Interval worsening of right lung airspace opacities.  Stable to minimally improved left lung airspace opacities.   LOCATION:  [facility]      Dictated by (CST): Moe Guzman MD on 12/18/2023 at 12:00 PM     Finalized by (CST): Moe Guzman MD on 12/18/2023 at 12:01 PM       XR CHEST AP PORTABLE  (CPT=71045)    Result Date: 12/16/2023  CONCLUSION:  The patient is status post right-sided thoracentesis.  There is marked decrease in the right effusion with marked improved aeration of the right lower lobe.  There is no pneumothorax.  A right Port-A-Cath identified unchanged in position, tip in the SVC. There is diffuse decreased  ground-glass opacities in the central aspect of the right lung in the interval compared to 12/15/2023.  There is some unfavorable progression of extensive ground-glass opacities in the left perihilar region extending into the left upper lobe laterally and the left retrocardiac region however.  There is increase in the size of the left pleural effusion with slight increased elevation of the left hemidiaphragm.  Surgical clips project over the inferolateral left chest wall unchanged. The heart size is upper limits of normal.  No definite CHF.   LOCATION:  [facility]      Dictated by (CST): Sandip Dias MD on 12/16/2023 at 12:59 PM     Finalized by (CST): Sandip Dias MD on 12/16/2023 at 1:01 PM       US THORACENTESIS GUIDED RIGHT (CPT=32555)    Result Date: 12/16/2023  CONCLUSION:  Ultrasound-guided thoracentesis was provided and performed by Dr. Cano performed without complication.  PLEASE SEE HIS REPORT FOR FURTHER EVALUATION.   LOCATION:  [facility]    Dictated by (CST): Sandip Dias MD on 12/16/2023 at 11:43 AM     Finalized by (CST): Sandip Dias MD on 12/16/2023 at 11:44 AM       XR CHEST AP PORTABLE  (CPT=71045)    Result Date: 12/15/2023  PROCEDURE:  XR CHEST AP PORTABLE  (CPT=71045)  TECHNIQUE:  AP chest radiograph was obtained.  COMPARISON:  [facility] , XR, XR CHEST AP PORTABLE  (CPT=71045), 12/15/2023, 3:49 PM.  INDICATIONS:  Re eval pneumothorax  PATIENT STATED HISTORY: (As transcribed by Technologist)  Patient offered no additional history at this time.    FINDINGS:   Right-sided Port-A-Cath tip in the SVC.  Marked right perihilar consolidation is again noted.  Moderate left perihilar consolidation is again noted.  Previously noted left pneumothorax is no longer identified.    LOCATION:  [facility]      Dictated by (CST): Gustavo Yuan MD on 12/15/2023 at 7:09 PM     Finalized by (CST): Gustavo Yuan MD on 12/15/2023 at 7:10 PM       XR CHEST AP PORTABLE  (CPT=71045)    Result  Date: 12/15/2023  CONCLUSION:  1. Small 9 mm left apical pneumothorax status post left thoracentesis.    LOCATION:  [facility]      Dictated by (CST): Christi Ashraf MD on 12/15/2023 at 4:23 PM     Finalized by (CST): Christi Ashraf MD on 12/15/2023 at 4:29 PM       US THORACENTESIS GUIDED RIGHT (CPT=32555)    Result Date: 12/15/2023  CONCLUSION:  Ultrasound guidance provided for thoracentesis.   LOCATION:  [facility]    Dictated by (CST): Christi Ashraf MD on 12/15/2023 at 2:48 PM     Finalized by (CST): Christi Ashraf MD on 12/15/2023 at 2:49 PM       CT ANGIOGRAPHY, CHEST (CPT=71275)    Result Date: 12/14/2023  CONCLUSION:   1. Moderate to large bilateral pleural effusions along with marked solid a shin lungs are suggestive pulmonary edema and fluid overload.  2. No evidence of pulmonary embolus.  3. Moderate to large left axillary and left breast fluid collections are noted.  Minimal gas in the left more central breast fluid collection is noted.  Possibility of infection is of consideration.  Sequelae of seroma/chronic hematoma is of consideration as well.  Agree with preliminary radiology report from [facility].    LOCATION:  [facility]   Dictated by (CST): Gustavo Yuan MD on 12/14/2023 at 6:49 AM     Finalized by (CST): Gustavo Yuan MD on 12/14/2023 at 6:52 AM       XR CHEST AP PORTABLE  (CPT=71045)    Result Date: 12/13/2023  CONCLUSION:  Dense patchy airspace opacities are present within the lungs suspicious for areas of pneumonia.  Follow-up is recommended to ensure resolution.  If clinical symptoms persist then consider CT.   LOCATION:  [id number]      Dictated by (CST): Cedrick Tovar MD on 12/13/2023 at 8:13 PM     Finalized by (CST): Cedrick Tovar MD on 12/13/2023 at 8:13 PM       CT CHEST PE AORTA (IV ONLY) (CPT=71260)    Result Date: 11/13/2023  CONCLUSION:  1. No evidence of pulmonary embolism. 2. No acute findings within the chest.   LOCATION:  [facility]   Dictated by (CST): Moe Guzman MD on 11/13/2023 at 11:34 PM      Finalized by (CST): Moe Guzman MD on 11/13/2023 at 11:39 PM       XR CHEST AP PORTABLE  (CPT=71045)    Result Date: 11/13/2023  CONCLUSION:  Normal heart size and pulmonary vascularity.  Port-A-Cath tip SVC.  No focal consolidation, effusion or pneumothorax.   LOCATION:  [id number]      Dictated by (CST): Corina Epperson MD on 11/13/2023 at 8:18 PM     Finalized by (CST): Corina Epperson MD on 11/13/2023 at 8:18 PM         Current Medications:  Current Facility-Administered Medications   Medication Dose Route Frequency    zolpidem (Ambien) tab 5 mg  5 mg Oral Nightly PRN    spironolactone (Aldactone) tab 25 mg  25 mg Oral Daily    bumetanide (Bumex) 12.5 mg in 50 mL infusion  0.5 mg/hr Intravenous Continuous    benzonatate (Tessalon) cap 100 mg  100 mg Oral TID PRN    sodium chloride (Saline Mist) 0.65 % nasal solution 1 spray  1 spray Each Nare Q3H PRN    HYDROcodone-acetaminophen (Norco) 5-325 MG per tab 1 tablet  1 tablet Oral Q6H PRN    metoprolol tartrate (Lopressor) tab 50 mg  50 mg Oral 2x Daily(Beta Blocker)    DULoxetine (Cymbalta) DR cap 30 mg  30 mg Oral Daily    multivitamin with minerals (Thera M Plus) tab 1 tablet  1 tablet Oral Daily    calcium carbonate (Tums) chewable tab 1,000 mg  1,000 mg Oral Q6H PRN    methylPREDNISolone sodium succinate (Solu-MEDROL) injection 40 mg  40 mg Intravenous Daily    dextromethorphan-guaiFENesin (Robitussin-DM)  mg/5mL oral solution 5 mL  5 mL Oral Q6H PRN    ALPRAZolam (Xanax) tab 0.25 mg  0.25 mg Oral TID PRN    pantoprazole (Protonix) DR tab 40 mg  40 mg Oral QAM AC    LORazepam (Ativan) 2 mg/mL injection 0.5 mg  0.5 mg Intravenous Q6H PRN    metoprolol (Lopressor) 5 mg/5mL injection 5 mg  5 mg Intravenous Q6H PRN    ipratropium-albuterol (Duoneb) 0.5-2.5 (3) MG/3ML inhalation solution 3 mL  3 mL Nebulization Q4H PRN    melatonin tab 3 mg  3 mg Oral Nightly PRN    prochlorperazine (Compazine) 10 MG/2ML injection 5 mg  5 mg Intravenous Q8H PRN    polyethylene  glycol (PEG 3350) (Miralax) 17 g oral packet 17 g  17 g Oral Daily PRN    sennosides (Senokot) tab 17.2 mg  17.2 mg Oral Nightly PRN    bisacodyl (Dulcolax) 10 MG rectal suppository 10 mg  10 mg Rectal Daily PRN    fleet enema (Fleet) 7-19 GM/118ML rectal enema 133 mL  1 enema Rectal Once PRN    HYDROmorphone (Dilaudid) 1 MG/ML injection 0.2 mg  0.2 mg Intravenous Q2H PRN     Medications Prior to Admission   Medication Sig    Potassium Citrate ER 15 MEQ (1620 MG) Oral Tab CR Take 1 tablet by mouth in the morning, at noon, and at bedtime.    DULoxetine 30 MG Oral Cap DR Particles Take 1 capsule (30 mg total) by mouth daily.    ondansetron (ZOFRAN) 8 MG tablet Take 1 tablet (8 mg total) by mouth as needed.    zolpidem 10 MG Oral Tab Take 1 tablet (10 mg total) by mouth nightly as needed for Sleep.    HYDROcodone-acetaminophen 5-325 MG Oral Tab Take 1 tablet by mouth every 8 (eight) hours as needed. (Patient not taking: Reported on 12/13/2023)    lidocaine-prilocaine 2.5-2.5 % External Cream APPLY SMALL AMOUNT OVER PORT PRIOR TO ACCESS    azaTHIOprine (IMURAN OR) Take by mouth. (Patient not taking: No sig reported)    Prenatal Vit-DSS-Fe Cbn-FA (PRENATAL AD OR) Take 1 tablet by mouth daily.       Assessment & Plan:   ASSESSMENT / PLAN:     Acute respiratory failure with bilateral pulmonary infiltrates transudative effusions pointing away from lupus Sjogren's     -History of Community acquired pneumonia, unspecified laterality s/p treatment     Bronchoscopy done final cultures noted     On antibiotics and followed closely by ID pulmonology     # Metastatic breast cancer s/p mastectomy s/p chemotherapy    -Patient reports bilateral peripheral neuropathy with right foot drop due to Taxol in summer 2023     This appears to be chronic consider addressing this with boot to avoid injury     #UCTD with Sjogren's features versus SLE?  Consider malar rash Raynaud's see media (12/20)     --Relevant Clinical Manifestations: Malar  rash, lower extremity rash (biopsy consistent with leukocytoclastic vasculitis), Raynaud's, dry eyes/mouth, pleurisy (around 2016, treated with ibuprofen), interstitial nephritis (2023)     --Serologies/Laboratory findings: Leukopenia, positive NURIA, positive SSA, low C3/C4     --Prior medications: Hydroxychloroquine (no improvement).  Azathioprine stopped due to recurrent cellulitis in the distant past.      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     -Responded to short course of prednisone in early 2023.     #Hyponatremia:  -per renal, suspect SIADH     Severe cardiomyopathy low ejection fraction followed by cardiology (drug induced?chemo related?)     ABN LFTS; followed by GI (reviewed note) improving; likely multifactorial; US showing concerns of acute cholecystitis but patient clinically improving   ?  Plan:  -TTE pending given transudative effusion and pulmonary edema.     -Given recurrence of significant proteinuria (UPCR 2.93), lower C3/C4, in context of biopsy-proven interstitial nephritis (1/2023 at [facility]); previously discussed with renal, clinical picture is not consistent with Sjogren's interstitial nephritis. But might need another biopsy when patient stable     -Given lack of definitive evidence of active systemic autoimmune disease:  no role for any immunomodulatory therapy for her any extra-renal autoimmune disease at this juncture.     Defer to nephrology whether or not another renal biopsy is needed.  Patient's prognosis guarded.    Kidney function appears to be stable at this time.      with improving Cr since steroids suspect mild aspect of immune mediated etiology (Cr 1.65-->1.17)     (restarted methlpred 40mg IV daily 12/20/23)    -will switch to prednisone 10mg daily starting tommorow    -consider restarting plaquenil in near future (was not beneficial in past)      -consider rechecking EMG/NCS for further evaluation of the patient's right lower extremity foot drop. - although was told  it was  Though Taxol may be the cause of her motor weakness, given her underlying Sjogren's/SLE, would evaluate for other etiologies. Neurology following patient  -MRI reviewed (patient back on cymbalta)     ABN LFT; improving; followed by GI; US concerns of acute cholecystitis but they do not think so clinically (no further abd pain at this time)     Quality:  DVT Mechanical Prophylaxis:   SCDs, Early ambuation  DVT Pharmacologic Prophylaxis   Medication   None                Code Status: Full Code  Peacock: External urinary catheter in place  Peacock Duration (in days):   Central line:    CHRISTELLE: 12/27/2023    Education and counseling provided to patient on medications and diagnosis. Instructed patient to call my office or seek medical attention immediately if symptoms worsen.    Return to clinic:    Carol Wu MD    Supplementary Documentation:

## 2023-12-26 NOTE — DIETARY MALNUTRITION NOTE
[facility]  NUTRITION ASSESSMENT    Pt meets moderate malnutrition criteria at this time.    CRITERIA FOR MALNUTRITION DIAGNOSIS:  Criteria for non-severe malnutrition diagnosis: acute illness/injury related to energy intake less than 75% for greater than 7 days, body fat mild depletion, and muscle mass mild depletion      NUTRITION INTERVENTION:    RD nutrition Care Plan- Initiated ONS (oral nutritional supplements), Ordered multivitamin, and See RD nutrition assessment for additional recommendations  Meal and Snacks - Liberalize diet to 2 gm Na as tolerated; Monitor and encourage adequate PO intake.  Medical Food Supplements - Change to Ensure Plus High Protein chocolate daily and Magic Shake chocolate daily. Rationale/use for oral supplements discussed.   Vitamin and Mineral Supplements - Recommend adding Multivitamin with minerals    PATIENT STATUS: PO intake % over the last 2 days. ONS added to maximize intake. Weight down. Edema improved per MD.+BM 12/26 without n/v/d. Will continue to monitor and support as able.  12/21: Pt transferred to ICU 12/19 and started on Bipap which has since been weaned. Visited pt at bedside. She reports her appetite is very slowly returning and is forcing herself to eat today. Does complain that the food is dry. Is not drinking her Ensure shakes currently but reports she is going to try to drink 1 throughout the day; prefers chocolate. Offered other ONS also and she was interested in trying Magic Shake daily. Denies nausea, vomiting and diarrhea but reports constipation with last BM 12/16. Continued to encourage PO and ONS intake; all questions answered at this time.    12/19: Back from bronch. Pt with dry cough.  NPO - will monitor for diet advancement. Minimal intake recorded. RD added Ensure +HP BID to support needs.  On 4L via NC. +BM 12/16. Skin intact.   12/16: 43 year old female admit d/t pneumonia. Pt with breast cancer, undergoing chemotherapy. Pt presented d/t  nausea with minimal PO intake. Pt with pleural effusion, underwent thoracentesis today with 1450mL fluid removal.   Spoke with Nephrology, Na trending up slowly, recommended 1500mL fluid restriction. Receiving 2gNaCl tablets.  Discussed with pt fluid restriction and reasoning, pt states understanding.   Pt seen d/t consult for poor PO intake. Pt agreeable to taking Chocolate Ensure BID to supplement PO intake.     ANTHROPOMETRICS:  Ht: 162.6 cm (5' 4\")  Wt: 58.2 kg (128 lb 3.2 oz).   BMI: Body mass index is 22.01 kg/m².  IBW: 54.5 kg    WEIGHT HISTORY:   Weight loss: No  Wt Readings from Last 10 Encounters:   12/26/23 58.2 kg (128 lb 3.2 oz)   11/13/23 54.4 kg (120 lb)   12/16/21 61.2 kg (135 lb)   09/22/20 61.7 kg (136 lb)   08/15/20 63.5 kg (140 lb)   11/14/15 61.7 kg (136 lb)        NUTRITION:  Diet:       Procedures    Regular/General diet Sodium Restriction: 2 GM NA; Fluid Restriction: 1800 ml; Is Patient on Accuchecks? No      Food Allergies: No  Cultural/Ethnic/Amish Preferences Addressed: Yes    Percent Meals Eaten (last 3 days)       Date/Time Percent Meals Eaten (%)    12/23/23 1000 100 %    12/23/23 1300 100 %    12/25/23 0800 80 %    12/26/23 1435 15 %            GI system review: WNL; Last BM: 12/26  Skin and wounds: WNL    NUTRITION RELATED PHYSICAL FINDINGS:     1. Body Fat/Muscle Mass: mild depletion body fat Orbital fat pad and Buccal fat pad and mild muscle depletion Temple region      2. Fluid Accumulation:  2+LE edema      NUTRITION PRESCRIPTION: 56.2 kg (12/13/23)  Calories: 8554-2893 calories/day (30-35 kcal/kg)  Protein: 67-84 grams protein/day (1.2-1.5 grams protein per kg)  Fluid: ~1 ml/kcal or per MD discretion    NUTRITION DIAGNOSIS/PROBLEM:  Malnutrition related to decreased intake as evidenced by documented/reported insufficient oral intake, documented/reported unintentional weight loss, loss of fat mass, and loss of muscle mass      MONITOR AND EVALUATE/NUTRITION GOALS:  PO intake  of 75% of meals TID - Not met, Continues  PO intake of 75% of oral nutrition supplement/s - Not met, Continues  Weight stable within 1 to 2 lbs during admission - Ongoing      MEDICATIONS:  MVI, protonix, Kcl, prednisone, spironolatone  Gtt: bumex    LABS:  K+ 2.4    Pt is at Moderate nutrition risk    Mary Cade RD, LDN  Clinical Nutrition  Pager [phone]  [phone]

## 2023-12-26 NOTE — PROGRESS NOTES
[facility]  [facility] Neurology Progress Note    Alina Aceves Patient Status:  Inpatient    1980 MRN [id number]   Location [facility] 8NE-A Attending Noe Perkins MD   Hosp Day # 13 PCP HO[facility] IBARRA     CC: Bilateral lower extremities weakness    Subjective:  Seen for follow up. Awake, alert and oriented x 4. Doing somewhat better, on Bumex gtt, BLE edema has decreased, patent is able to move her legs a bit. Back on Cymbalta. Denies any headache, no new weakness or paresthesias. No vision or speech difficulties.       MEDICATIONS:  No current outpatient medications on file.     Current Facility-Administered Medications   Medication Dose Route Frequency    potassium chloride 40 mEq/100mL IVPB premix (central line) 40 mEq  40 mEq Intravenous Once    zolpidem (Ambien) tab 5 mg  5 mg Oral Nightly PRN    predniSONE (Deltasone) tab 10 mg  10 mg Oral Daily with breakfast    spironolactone (Aldactone) tab 25 mg  25 mg Oral Daily    bumetanide (Bumex) 12.5 mg in 50 mL infusion  0.25 mg/hr Intravenous Continuous    benzonatate (Tessalon) cap 100 mg  100 mg Oral TID PRN    sodium chloride (Saline Mist) 0.65 % nasal solution 1 spray  1 spray Each Nare Q3H PRN    HYDROcodone-acetaminophen (Norco) 5-325 MG per tab 1 tablet  1 tablet Oral Q6H PRN    metoprolol tartrate (Lopressor) tab 50 mg  50 mg Oral 2x Daily(Beta Blocker)    DULoxetine (Cymbalta) DR cap 30 mg  30 mg Oral Daily    multivitamin with minerals (Thera M Plus) tab 1 tablet  1 tablet Oral Daily    calcium carbonate (Tums) chewable tab 1,000 mg  1,000 mg Oral Q6H PRN    dextromethorphan-guaiFENesin (Robitussin-DM)  mg/5mL oral solution 5 mL  5 mL Oral Q6H PRN    ALPRAZolam (Xanax) tab 0.25 mg  0.25 mg Oral TID PRN    pantoprazole (Protonix) DR tab 40 mg  40 mg Oral QAM AC    LORazepam (Ativan) 2 mg/mL injection 0.5 mg  0.5 mg Intravenous Q6H PRN    metoprolol (Lopressor) 5 mg/5mL injection 5 mg  5 mg Intravenous Q6H PRN     ipratropium-albuterol (Duoneb) 0.5-2.5 (3) MG/3ML inhalation solution 3 mL  3 mL Nebulization Q4H PRN    melatonin tab 3 mg  3 mg Oral Nightly PRN    prochlorperazine (Compazine) 10 MG/2ML injection 5 mg  5 mg Intravenous Q8H PRN    polyethylene glycol (PEG 3350) (Miralax) 17 g oral packet 17 g  17 g Oral Daily PRN    sennosides (Senokot) tab 17.2 mg  17.2 mg Oral Nightly PRN    bisacodyl (Dulcolax) 10 MG rectal suppository 10 mg  10 mg Rectal Daily PRN    fleet enema (Fleet) 7-19 GM/118ML rectal enema 133 mL  1 enema Rectal Once PRN    HYDROmorphone (Dilaudid) 1 MG/ML injection 0.2 mg  0.2 mg Intravenous Q2H PRN       REVIEW OF SYSTEMS:  A 10-point system was reviewed.  Pertinent positives and negatives are noted in HPI.      PHYSICAL EXAMINATION:  VITAL SIGNS: BP (!) 131/95 (BP Location: Right arm)   Pulse 104   Temp 97.5 °F (36.4 °C) (Oral)   Resp 16   Ht 64\"   Wt 128 lb 3.2 oz (58.2 kg)   LMP 08/28/2023 (Approximate)   SpO2 99%   Breastfeeding No   BMI 22.01 kg/m²   GENERAL:  Patient is a 43 year old female in no acute distress.  HEENT:  Normocephalic, atraumatic  ABD: Soft, non tender  SKIN: Warm, dry, no rashes    NEUROLOGICAL:   Mental status: Oriented to person, place, situation and time   Speech: Fluent, no obvious aphasia or dysarthria  Memory and comprehension: Intact   Cranial Nerves: VFF, PERRL 3mm brisk, EOMI, no nystagmus, facial sensation intact, face symmetric, tongue midline, shoulder shrug equal, remainder CN intact  Motor:  Lower extremities weakness, right >left. Motor strength 5 out of 5 in UEs,3 out of 5 to left LE, 2/5 RLE. Unable to lift lower legs off the bed, able to move all spontaneously. LEs weak, RR>L  DTRs: 2+ in UE bilaterally, 1+ to LLE and 0 to R LE   Sensory: Intact to light touch  Coordination: FTN intact  Gait: Deferred      Imaging/Diagnostics:  MRI SPINE LUMBAR (W+WO) (CPT=72158)    Result Date: 12/24/2023  CONCLUSION:   1. Mild enhancement involving likely hemangioma  in the L1 vertebral body is noted.  No enhancing lesions are otherwise identified.  2. Multiple disc bulges throughout the lumbar spine are noted.  No significant spinal canal or neural foraminal stenosis is identified.  3. Markedly heterogeneous marrow signal may be related to red marrow conversion.  This can be seen with anemia.  Please correlate with appropriate labs.   LOCATION:  [facility]      Dictated by (CST): Gustavo Yuan MD on 12/24/2023 at 5:48 PM     Finalized by (CST): Gustavo Yuan MD on 12/24/2023 at 5:50 PM       US VENOUS DOPPLER LEG BILAT - DIAG IMG (CPT=93970)    Result Date: 12/21/2023  CONCLUSION:  No evidence of DVT in bilateral lower extremities.   LOCATION:  [place] [id number]   Dictated by (CST): Gustavo Yuan MD on 12/21/2023 at 2:14 PM     Finalized by (CST): Gustavo Yuan MD on 12/21/2023 at 2:16 PM       US ABDOMEN COMPLETE (CPT=76700)    Result Date: 12/19/2023  CONCLUSION:  1. Cholelithiasis with mild gallbladder wall thickening and small amount of pericholecystic fluid.  Findings may represent acute cholecystitis.  Correlation with clinical symptoms is recommended.  2. Bilateral pleural effusions   LOCATION:  [facility]    Dictated by (CST): Sofi Laguna MD on 12/19/2023 at 7:37 PM     Finalized by (CST): Sofi Laguna MD on 12/19/2023 at 7:40 PM       XR CHEST AP PORTABLE  (CPT=71045)    Result Date: 12/19/2023  CONCLUSION:  Extensive airspace infiltrates bilaterally with interval worsening at the right base compared to the previous exam.  Differential considerations include pneumonia, pulmonary hemorrhage, pulmonary edema and ARDS.   LOCATION:  [facility]      Dictated by (CST): Juan Fernandez MD on 12/19/2023 at 4:39 PM     Finalized by (CST): Juan Fernandez MD on 12/19/2023 at 4:41 PM       XR CHEST AP PORTABLE  (CPT=71045)    Result Date: 12/18/2023  CONCLUSION:  Interval worsening of right lung airspace opacities.  Stable to minimally improved left lung airspace  opacities.   LOCATION:  [facility]      Dictated by (CST): Moe Guzman MD on 12/18/2023 at 12:00 PM     Finalized by (CST): Moe Guzman MD on 12/18/2023 at 12:01 PM       XR CHEST AP PORTABLE  (CPT=71045)    Result Date: 12/16/2023  CONCLUSION:  The patient is status post right-sided thoracentesis.  There is marked decrease in the right effusion with marked improved aeration of the right lower lobe.  There is no pneumothorax.  A right Port-A-Cath identified unchanged in position, tip in the SVC. There is diffuse decreased ground-glass opacities in the central aspect of the right lung in the interval compared to 12/15/2023.  There is some unfavorable progression of extensive ground-glass opacities in the left perihilar region extending into the left upper lobe laterally and the left retrocardiac region however.  There is increase in the size of the left pleural effusion with slight increased elevation of the left hemidiaphragm.  Surgical clips project over the inferolateral left chest wall unchanged. The heart size is upper limits of normal.  No definite CHF.   LOCATION:  [facility]      Dictated by (CST): Sandip Dias MD on 12/16/2023 at 12:59 PM     Finalized by (CST): Sandip Dias MD on 12/16/2023 at 1:01 PM       US THORACENTESIS GUIDED RIGHT (CPT=32555)    Result Date: 12/16/2023  CONCLUSION:  Ultrasound-guided thoracentesis was provided and performed by Dr. Cano performed without complication.  PLEASE SEE HIS REPORT FOR FURTHER EVALUATION.   LOCATION:  [facility]    Dictated by (CST): Sandip Dias MD on 12/16/2023 at 11:43 AM     Finalized by (CST): Sandip Dias MD on 12/16/2023 at 11:44 AM       XR CHEST AP PORTABLE  (CPT=71045)    Result Date: 12/15/2023  PROCEDURE:  XR CHEST AP PORTABLE  (CPT=71045)  TECHNIQUE:  AP chest radiograph was obtained.  COMPARISON:  [facility] , XR, XR CHEST AP PORTABLE  (CPT=71045), 12/15/2023, 3:49 PM.  INDICATIONS:  Re eval pneumothorax  PATIENT STATED  HISTORY: (As transcribed by Technologist)  Patient offered no additional history at this time.    FINDINGS:   Right-sided Port-A-Cath tip in the SVC.  Marked right perihilar consolidation is again noted.  Moderate left perihilar consolidation is again noted.  Previously noted left pneumothorax is no longer identified.    LOCATION:  [facility]      Dictated by (CST): Gustavo Yuan MD on 12/15/2023 at 7:09 PM     Finalized by (CST): Gustavo Yuan MD on 12/15/2023 at 7:10 PM       XR CHEST AP PORTABLE  (CPT=71045)    Result Date: 12/15/2023  CONCLUSION:  1. Small 9 mm left apical pneumothorax status post left thoracentesis.    LOCATION:  [facility]      Dictated by (CST): Christi Ashraf MD on 12/15/2023 at 4:23 PM     Finalized by (CST): Christi Ashraf MD on 12/15/2023 at 4:29 PM       US THORACENTESIS GUIDED RIGHT (CPT=32555)    Result Date: 12/15/2023  CONCLUSION:  Ultrasound guidance provided for thoracentesis.   LOCATION:  [facility]    Dictated by (CST): Christi Ashraf MD on 12/15/2023 at 2:48 PM     Finalized by (CST): Christi Ashraf MD on 12/15/2023 at 2:49 PM       CT ANGIOGRAPHY, CHEST (CPT=71275)    Result Date: 12/14/2023  CONCLUSION:   1. Moderate to large bilateral pleural effusions along with marked solid a shin lungs are suggestive pulmonary edema and fluid overload.  2. No evidence of pulmonary embolus.  3. Moderate to large left axillary and left breast fluid collections are noted.  Minimal gas in the left more central breast fluid collection is noted.  Possibility of infection is of consideration.  Sequelae of seroma/chronic hematoma is of consideration as well.  Agree with preliminary radiology report from [facility].    LOCATION:  [facility]   Dictated by ([facility]): Gustavo Yuan MD on 12/14/2023 at 6:49 AM     Finalized by (CST): Gustavo Yuan MD on 12/14/2023 at 6:52 AM       XR CHEST AP PORTABLE  (CPT=71045)    Result Date: 12/13/2023  CONCLUSION:  Dense patchy airspace opacities are present within the lungs  suspicious for areas of pneumonia.  Follow-up is recommended to ensure resolution.  If clinical symptoms persist then consider CT.   LOCATION:  [id number]      Dictated by (CST): Cedrick Tovar MD on 12/13/2023 at 8:13 PM     Finalized by (CST): Cedrick Tovar MD on 12/13/2023 at 8:13 PM          Labs:  Recent Labs   Lab 12/21/23  0521 12/22/23  0436 12/23/23  0522   RBC 3.16* 3.06* 2.99*   HGB 8.9* 8.8* 8.5*   HCT 27.2* 27.2* 26.1*   MCV 86.1 88.9 87.3   MCH 28.2 28.8 28.4   MCHC 32.7 32.4 32.6   RDW 14.2 15.5 16.3   NEPRELIM 6.33 8.86* 8.06*   WBC 7.2 10.4 9.4   PLT 79.0* 75.0* 68.0*         Recent Labs   Lab 12/24/23  0517 12/25/23  0629 12/26/23  0525   * 136* 144*   * 95* 84*   CREATSERUM 1.31* 1.17* 1.10*   EGFRCR 52* 59* 64   CA 8.2* 8.3* 8.5    142 145   K 3.0* 2.5* 2.4*    105 103   CO2 24.0 27.0 32.0     Assessment & Plan:    A 43 year old female with:     Worsening of bilateral low extremities weakness  - has been going on for a few weeks, now worsening.  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  Recovering from pneumonia, pleural effusions, breast cancer surgery. Concern for worsening peripheral neuropathy vs  radiculopathy? Sensory and motor exam abnormal, bilateral edema and more pain, concern for DVT. Autoimmune conditions are associated with sensory neuropathy, patient has been off her maintenance therapies. Somewhat better today. Has been back on Cymbalta now and on Bumex gtt, BLE edema improved.  Bilateral US of LEs - no evidence of DVT.   MRI lumbar spine w/wo - mild enhancement involving likely hemangioma in the L1 vertebral body, no enhancing lesions, multiple disc bulges throughout, no significant spinal or neural foraminal stenosis.   B12 level recently checked, noted  Continue Cymbalta 30 mg daily.   Advised outpatient EMG/ nerve  conduction study after discharge. To follow up in 4 weeks with Neurology clinic.      Discussed with patient and nursing.     Discussed with dr. [place], to see and follow with further recommendations if indicated.    Is this a shared or split note between Advanced Practice Provider and Physician? Yes       Violetta BONILLA  [facility]  12/26/2023, 2:28 PM  [facility] # [id number]    Impression/MDM:    Patient seen and examined personally.  Agree to above information.    Signs symptoms of polyneuropathy including bilateral foot drop.  Patient initially developed right foot drop in July after receiving Taxol.  Patient has since then received 12 doses of Taxol.  Advise EMG nerve conduction studies for further evaluation of polyneuropathy.  MRI of lumbosacral spine did not show significant abnormalities to explain above findings.  Continue symptomatic treatment for pain relief.  Advised OT/PT/rehab.  Patient counseled.  Patient to follow-up with neurology clinic 4 weeks after discharge.  Patient to be also scheduled for EMG nerve conduction studies.

## 2023-12-26 NOTE — PROGRESS NOTES
[facility]  [facility] Rheumatology Progress Note  Alina Aceves Patient Status:  Inpatient    1980 MRN [id number]   Location [facility] 8NE-A Attending Noe Perkins MD   Hosp Day # 13 PCP [facility] IBARRA     Subjective:  Alina Aceves is a(n) 43 year old female.    Current complaints:   Patient seen and examined this morning.  States she is very tired and did not get much sleep.  Still has some pain in the hands and the knees bilaterally, right knee worse than left knee.    Physical Exam:   BP (!) 128/92 (BP Location: Right arm)   Pulse 87   Temp 96.7 °F (35.9 °C) (Axillary)   Resp 16   Ht 5' 4\" (1.626 m)   Wt 128 lb 3.2 oz (58.2 kg)   LMP 2023 (Approximate)   SpO2 99%   Breastfeeding No   BMI 22.01 kg/m²   Temp (24hrs), Av.5 °F (36.4 °C), Min:96.7 °F (35.9 °C), Max:97.8 °F (36.6 °C)       Intake/Output Summary (Last 24 hours) at 2023 0904  Last data filed at 2023 0845  Gross per 24 hour   Intake 240 ml   Output 7475 ml   Net -7235 ml     Wt Readings from Last 3 Encounters:   23 128 lb 3.2 oz (58.2 kg)   23 120 lb (54.4 kg)   21 135 lb (61.2 kg)     General: Alert and oriented, appears chronically ill   HEENT: No focal deficits.  Cardiac: Regular rate and rhythm  Lungs: scattered rales at bases  Abdomen: Soft, non-tender.   Skin: Warm and dry.    MSK: no tenderness/swelling over DIPs, PIPs, MCPs, wrists, elbows, ankles, or feet. B/l knee tender, right with some swelling and more tender than left    Labs:  Recent Labs     23  0525   CREATSERUM 1.10*   BUN 84*      K 2.4*      CO2 32.0   *   CA 8.5   ALB 3.7   ALKPHO 354*   BILT 3.6*   TP 6.3*   AST 94*   *     Assessment  Acute respiratory failure with bilateral pulmonary infiltrates transudative effusions pointing away from lupus Sjogren's     -History of Community acquired pneumonia, unspecified laterality s/p treatment     Bronchoscopy done final cultures noted     On  antibiotics and followed closely by ID pulmonology     # Metastatic breast cancer s/p mastectomy s/p chemotherapy     -Patient reports bilateral peripheral neuropathy with right foot drop due to Taxol in summer 2023     This appears to be chronic consider addressing this with boot to avoid injury     #UCTD with Sjogren's features versus SLE?  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  Azathioprine stopped due to recurrent cellulitis in the distant past.      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     -Responded to short course of prednisone in early 2023.     #Hyponatremia:  -per renal, suspect SIADH     Severe cardiomyopathy low ejection fraction followed by cardiology (drug induced?chemo related?)     ABN LFTS; followed by GI (reviewed note) improving; likely multifactorial; US showing concerns of acute cholecystitis but patient clinically improving    Plan:  -- continue prednisone 10mg daily.  Will need to monitor for worsening joint pain with this.  And consider higher dose daily.  --Renal disease does not seem align with Sjogren's interstitial nephritis, however patient may benefit from repeat biopsy when more stable.  -- No further immunosuppression required at this time, but can consider restarting low-dose hydroxychloroquine to see if this helps.  This did not help her previously.  Was on azathioprine but currently held due to multifocal pneumonia  -- appreciate input by other specialists      Valentina Quinn, DO  [facility]  12/26/2023

## 2023-12-27 LAB
ALBUMIN SERPL-MCNC: 3.7 G/DL (ref 3.4–5)
ALP LIVER SERPL-CCNC: 311 U/L
ALT SERPL-CCNC: 339 U/L
ANION GAP SERPL CALC-SCNC: 4 MMOL/L (ref 0–18)
AST SERPL-CCNC: 78 U/L (ref 15–37)
BASOPHILS # BLD AUTO: 0.01 X10(3) UL (ref 0–0.2)
BASOPHILS NFR BLD AUTO: 0.1 %
BILIRUB DIRECT SERPL-MCNC: 1.6 MG/DL (ref 0–0.2)
BILIRUB SERPL-MCNC: 3.3 MG/DL (ref 0.1–2)
BUN BLD-MCNC: 61 MG/DL (ref 9–23)
CALCIUM BLD-MCNC: 8.8 MG/DL (ref 8.5–10.1)
CHLORIDE SERPL-SCNC: 103 MMOL/L (ref 98–112)
CO2 SERPL-SCNC: 38 MMOL/L (ref 21–32)
CREAT BLD-MCNC: 0.8 MG/DL
EGFRCR SERPLBLD CKD-EPI 2021: 94 ML/MIN/1.73M2 (ref 60–?)
EOSINOPHIL # BLD AUTO: 0.01 X10(3) UL (ref 0–0.7)
EOSINOPHIL NFR BLD AUTO: 0.1 %
ERYTHROCYTE [DISTWIDTH] IN BLOOD BY AUTOMATED COUNT: 20.8 %
GLUCOSE BLD-MCNC: 108 MG/DL (ref 70–99)
HCT VFR BLD AUTO: 32.5 %
HGB BLD-MCNC: 10.4 G/DL
IMM GRANULOCYTES # BLD AUTO: 0.1 X10(3) UL (ref 0–1)
IMM GRANULOCYTES NFR BLD: 0.6 %
LYMPHOCYTES # BLD AUTO: 0.91 X10(3) UL (ref 1–4)
LYMPHOCYTES NFR BLD AUTO: 5.9 %
MAGNESIUM SERPL-MCNC: 1.9 MG/DL (ref 1.6–2.6)
MCH RBC QN AUTO: 29.7 PG (ref 26–34)
MCHC RBC AUTO-ENTMCNC: 32 G/DL (ref 31–37)
MCV RBC AUTO: 92.9 FL
MONOCYTES # BLD AUTO: 1.06 X10(3) UL (ref 0.1–1)
MONOCYTES NFR BLD AUTO: 6.9 %
NEUTROPHILS # BLD AUTO: 13.32 X10 (3) UL (ref 1.5–7.7)
NEUTROPHILS # BLD AUTO: 13.32 X10(3) UL (ref 1.5–7.7)
NEUTROPHILS NFR BLD AUTO: 86.4 %
OSMOLALITY SERPL CALC.SUM OF ELEC: 318 MOSM/KG (ref 275–295)
PHOSPHATE SERPL-MCNC: 2.6 MG/DL (ref 2.5–4.9)
PLATELET # BLD AUTO: 69 10(3)UL (ref 150–450)
POTASSIUM SERPL-SCNC: 2.6 MMOL/L (ref 3.5–5.1)
POTASSIUM SERPL-SCNC: 3 MMOL/L (ref 3.5–5.1)
POTASSIUM SERPL-SCNC: 3 MMOL/L (ref 3.5–5.1)
PROT SERPL-MCNC: 6.1 G/DL (ref 6.4–8.2)
RBC # BLD AUTO: 3.5 X10(6)UL
SODIUM SERPL-SCNC: 145 MMOL/L (ref 136–145)
WBC # BLD AUTO: 15.4 X10(3) UL (ref 4–11)

## 2023-12-27 PROCEDURE — 99233 SBSQ HOSP IP/OBS HIGH 50: CPT | Performed by: HOSPITALIST

## 2023-12-27 PROCEDURE — 99233 SBSQ HOSP IP/OBS HIGH 50: CPT | Performed by: INTERNAL MEDICINE

## 2023-12-27 RX ORDER — SPIRONOLACTONE 25 MG/1
50 TABLET ORAL DAILY
Status: DISCONTINUED | OUTPATIENT
Start: 2023-12-28 | End: 2023-12-28

## 2023-12-27 RX ORDER — POTASSIUM CHLORIDE 29.8 MG/ML
40 INJECTION INTRAVENOUS ONCE
Status: COMPLETED | OUTPATIENT
Start: 2023-12-27 | End: 2023-12-27

## 2023-12-27 RX ORDER — METOPROLOL SUCCINATE 50 MG/1
50 TABLET, EXTENDED RELEASE ORAL
Status: DISCONTINUED | OUTPATIENT
Start: 2023-12-27 | End: 2024-01-02

## 2023-12-27 RX ORDER — TORSEMIDE 20 MG/1
20 TABLET ORAL DAILY
Status: DISCONTINUED | OUTPATIENT
Start: 2023-12-28 | End: 2024-01-02

## 2023-12-27 NOTE — PROGRESS NOTES
[facility]      Assessment/Plan:  Cardiomyopathy / Acute systolic HF, EF 25%: Suspect due to chemotoxicity. Improving with IV diuresis.     - Net negative 20L and 30+ lbs   - S/P bilat thoracentesis  - GDMT: change lopressor to Toprol, continue cedric, eventual Entresto, SGLT2i   - IV bumex -> PO torsemide   - Plan for R/LHC this week  Elevated LFTS: Likely hepatic congestion (severe TR from ventricular dysfunction and pulm HTN). Stable/improving slowly.   REJI: cardiorenal syndrome, ? nephritis   Cr improved   Possible renal biopsy to evaluate for lupus nephritis  Management per nephrology, rheum    Hypokalemia   Increase cedric to 50mg daily  Replace per protocol   5. Resp/PNA: ID, pulm.  Antibiotics.  6. Hyponatremia: Improved.  Renal.  7. Metastatic breast CA:   - s/p neadjuvant chemo (ddAC+T; Adriamycin, cytoxan, taxol), L mastectomy/SLNB  - Per primary and oncology.    Subjective:  -No CP no SOB    History of Present Illness:   Alina Aceves is a(n) 43 year old female with SLE and palpitations who was seen in clinic 2 weeks ago.    She has Stage III breast CA.  She has been undergoing chemotherapy, EF 5/23 was 50%. It is now 20-25% % range.    She was admitted with SOB 12/13/23.  She was diagnosed with PNA and pleural effusions.  She also had hyponatremia with a sodium 120.  She was seen by Pulmonology, ID, Hematology, and Nephrology.             Past Medical History:   Past Medical History:   Diagnosis Date    Breast cancer (HCC)     Gastritis     Lupus (HCC)     Sjogren's disease (HCC)        Social History:   Smoking:  None  Alcohol:  None    Family History:   No family history of premature arthrosclerotic heart disease     Medications:   Scheduled:    [START ON 12/28/2023] torsemide  20 mg Oral Daily    potassium chloride  40 mEq Intravenous Once    predniSONE  10 mg Oral Daily with breakfast    spironolactone  25 mg Oral Daily    metoprolol tartrate  50 mg Oral 2x Daily(Beta Blocker)     DULoxetine  30 mg Oral Daily    multivitamin with minerals  1 tablet Oral Daily    pantoprazole  40 mg Oral QAM AC           PRN Medications:   zolpidem, benzonatate, sodium chloride, HYDROcodone-acetaminophen, calcium carbonate, dextromethorphan-guaiFENesin, ALPRAZolam, LORazepam, metoprolol, ipratropium-albuterol, melatonin, prochlorperazine, polyethylene glycol (PEG 3350), sennosides, bisacodyl, fleet enema, HYDROmorphone **OR** [DISCONTINUED] HYDROmorphone **OR** [DISCONTINUED] HYDROmorphone    Outpatient Medications:   Current Facility-Administered Medications on File Prior to Encounter   Medication Dose Route Frequency Provider Last Rate Last Admin    [COMPLETED] morphINE PF 4 MG/ML injection 4 mg  4 mg Intravenous Once Jesus Manuel Hatfield MD   4 mg at 11/13/23 2255    [COMPLETED] iopamidol 76% (ISOVUE-370) injection for power injector  100 mL Intravenous ONCE PRN Jesus Manuel Hatfield MD   100 mL at 11/13/23 2247    [COMPLETED] potassium chloride (K-Dur) tab 40 mEq  40 mEq Oral Once Jesus Manuel Hatfield MD   40 mEq at 11/14/23 0010     Current Outpatient Medications on File Prior to Encounter   Medication Sig Dispense Refill    Potassium Citrate ER 15 MEQ (1620 MG) Oral Tab CR Take 1 tablet by mouth in the morning, at noon, and at bedtime.      DULoxetine 30 MG Oral Cap DR Particles Take 1 capsule (30 mg total) by mouth daily.      ondansetron (ZOFRAN) 8 MG tablet Take 1 tablet (8 mg total) by mouth as needed.      zolpidem 10 MG Oral Tab Take 1 tablet (10 mg total) by mouth nightly as needed for Sleep.      HYDROcodone-acetaminophen 5-325 MG Oral Tab Take 1 tablet by mouth every 8 (eight) hours as needed. (Patient not taking: Reported on 12/13/2023)      lidocaine-prilocaine 2.5-2.5 % External Cream APPLY SMALL AMOUNT OVER PORT PRIOR TO ACCESS      azaTHIOprine (IMURAN OR) Take by mouth. (Patient not taking: No sig reported)      Prenatal Vit-DSS-Fe Cbn-FA (PRENATAL AD OR) Take 1 tablet  by mouth daily.         Allergies:   Allergies   Allergen Reactions    Bactrim [Sulfamethoxazole W/Trimethoprim] RASH         Physical Exam:   Vitals:    23 1700   BP: 123/87   Pulse: 100   Resp: 19   Temp: 98 °F (36.7 °C)     Wt Readings from Last 3 Encounters:   23 114 lb 6.7 oz (51.9 kg)   23 120 lb (54.4 kg)   21 135 lb (61.2 kg)           General: Well developed, well nourished female.  Pt is in no acute distress.  HEENT:   Normocephalic.  Atraumatic.  Eyes with no scleral icterus.  Neck: Supple.  No JVD.  Carotids 2+ and equal in symmetric fashion.  No bruits are noted.  Cardiac: Regular rate and rhythm.   There is a normal S1 and S2.  No S3 or S4.  No murmurs, rubs, or gallops.  PMI is non-displaced with a normal apical impulse.  Lungs:  BS L base .few basal crackles  Abdomen: Soft.  Non-distended.  Non-tender.  Bowel sounds are present and normoactive.  No guarding or rebound.   Extremities: Extremities do demonstrate 1+ peripheral edema.   Pedal pulses present  Neurologic: Alert and oriented, normal affect.  No gross deficit appreciated.  Integument:  No visible rashes are appreciated. R Sub Q port       Laboratories and Data:   Labs:    Recent Labs   Lab 23  0629 23  0525 23  0007 23  0520 23  1423   * 144*  --  108*  --    BUN 95* 84*  --  61*  --    CREATSERUM 1.17* 1.10*  --  0.80  --    CA 8.3* 8.5  --  8.8  --    ALB 3.5 3.7  --  3.7  --     145  --  145  --    K 2.5* 2.4* 3.0* 2.6* 3.0*    103  --  103  --    CO2 27.0 32.0  --  38.0*  --    ALKPHO 276* 354*  --  311*  --    AST 89* 94*  --  78*  --    * 424*  --  339*  --    BILT 3.3* 3.6*  --  3.3*  --    TP 5.9* 6.3*  --  6.1*  --        Recent Labs   Lab 23  0436 23  0522 23  0520   RBC 3.06* 2.99* 3.50*   HGB 8.8* 8.5* 10.4*   HCT 27.2* 26.1* 32.5*   MCV 88.9 87.3 92.9   MCH 28.8 28.4 29.7   MCHC 32.4 32.6 32.0   RDW 15.5 16.3 20.8   NEPRELIM  8.86* 8.06* 13.32*   WBC 10.4 9.4 15.4*   PLT 75.0* 68.0* 69.0*       Recent Labs   Lab 12/21/23  0521 12/23/23  0522   PTP 18.0* 17.5*   INR 1.48* 1.43*       No results for input(s): \"TROP\", \"CK\" in the last 168 hours.    Diagnostics:   Tele: Sinus tachycardia.  EKG: Sinus tachycardia, non-specific ST/T changes  Echo: Unremarkable by 5/23 echo (EF low normal, mild MR).    12/17/23  Conclusions:     1. Left ventricle: The cavity size was normal. Wall thickness was normal.      Systolic function was markedly reduced. The estimated ejection fraction      was 20-25%, by visual assessment. There was severe diffuse hypokinesis.      Technical limitations of the study preclude regional wall motion      analysis. Unable to assess LV diastolic function due to heart rhythm.   2. Right ventricle: The cavity size was increased. Systolic function was      reduced. The RV free wall longitudinal strain was -16.50%. The tricuspid      annular plane systolic excursion (TAPSE) is 1.56cm.   3. Left atrium: The left atrial volume was moderately increased.   4. Right atrium: The atrium was moderately dilated.   5. Mitral valve: The annulus was dilated. The leaflets were non-specifically      thickened. There was moderate regurgitation, with multiple jets.   6. Tricuspid valve: The annulus is dilated. There was severe regurgitation.   7. Pulmonary arteries: Systolic pressure was moderately to markedly      increased, at least 55mm Hg. Systolic pressure may be underestimated due      to wide tricuspid regurgitation. Estimated pulmonary artery diastolic      pressure was 34mm Hg.   8. Pericardium, extracardiac: A trivial pericardial effusion was identified.      There was a left pleural effusion.     Patient seen and examined independently. H and P reviewed. No changes in H and P.   Risks and benefits of procedure were discussed with patient. Airway examined.    Patient is ASA class 3 and Mallampati class 2. Pt is appropriate for  conscious sedation. No history of difficult airway.     The risks, benefits, indications and alternatives of left heart catheterization and coronary angigoraphy with possible percutaneous coronary intervention were discussed. The risks include, but are not limited to: bleeding, anemia requiring blood transfusion, allergic reaction, hypotension, infection, vascular injury, injury to upper or lower extremity requiring endovascular or open surgical repair,  kidney failure, stroke, cardiac or pulmonary artery perforation, pericardial effusion and tamponade requiring pericardiocentesis, myocardial infarction (heart attack), respiratory failure needing intubation, cardiac arrest, need for emergent surgery, and death. Benefits of the procedure include: symptomatic improvement, diagnosis of coronary artery disease or other forms of heart disease and possibly prevention of myocardial infarction. Alternatives to the procedure include: not performing cardiac catheterization, treatment with medications only, and observation. The patient and any accompanying family have considered the above, all questions have been answered to the best of my ability to their satisfaction, and have decided to proceed with the procedure.      Appropriate candidate for Sedation/Analgesia: Yes  Plan for Sedation reviewed: Yes    Explained Anesthesia options and attendant risks, and have determined patient is an appropriate candidate. Yes  Consent for Sedation obtained: Yes  Patient reevaluated immediately prior to Sedation/Analgesia: Yes

## 2023-12-27 NOTE — PROGRESS NOTES
[facility]                       Gastroenterology Follow up Note - [facility]    Alina Aceves Patient Status:  Inpatient    1980 MRN [id number]   Location [facility] 8NE-A Attending Nicole Cartwright MD   Hosp Day # 13 PCP HOLLY IBARRA     Reason for consultation: Elevated liver enzymes  Subjective: Patient seen and examined.  No overnight events.  Denies abdominal pain, nausea, and emesis.  Review of Systems:   10 point ROS completed and was negative, except for pertinent positive and negatives stated in subjective.    For PMH, PSH, FHx and SHx- please see initial consult note.     Objective: BP (!) 124/94 (BP Location: Right arm)   Pulse 97   Temp 97.9 °F (36.6 °C) (Oral)   Resp 18   Ht 5' 4\" (1.626 m)   Wt 128 lb 3.2 oz (58.2 kg)   LMP 2023 (Approximate)   SpO2 100%   Breastfeeding No   BMI 22.01 kg/m²   Gen: AAO x 3, able to speak in complete sentences  Abdomen: Soft, non-tender, non-distended with the presence of bowel sounds; No hepatosplenomegaly; no rebound or guarding; No ascites is clinically apparent; no tympany to percussion  Labs:   Lab Results   Component Value Date    CREATSERUM 1.10 2023    BUN 84 2023     2023    K 2.4 2023     2023    CO2 32.0 2023     2023    CA 8.5 2023    ALB 3.7 2023    ALKPHO 354 2023    BILT 3.6 2023    AST 94 2023     2023     Recent Labs   Lab 23  0517 23  0629 23  0525   * 136* 144*   * 95* 84*   CREATSERUM 1.31* 1.17* 1.10*   CA 8.2* 8.3* 8.5    142 145   K 3.0* 2.5* 2.4*    105 103   CO2 24.0 27.0 32.0     Recent Labs   Lab 23  0522   RBC 2.99*   HGB 8.5*   HCT 26.1*   MCV 87.3   MCH 28.4   MCHC 32.6   RDW 16.3   NEPRELIM 8.06*   WBC 9.4   PLT 68.0*       Recent Labs   Lab 23  0517 23  0629 23  0525   * 487* 424*   * 89* 94*        Assessment:  Ms Aceves is a 43 year old female with a history of stage IIIb breast cancer and lupus who presented on 12/13 with shortness of breath, found to have pneumonia, pleural effusions (transudative), and HFrEF (EF 20-25%) for whom GI is consulted for abnormal LFTs. These are most likely secondary to congestive hepatopathy. LFTs markedly elevated, but downtrending with diuresis. Downtrending  again except for mild increase in T bili, which may be lagging behind.   Plan:  -daily LFTs  -s/p IV NAC and IV vitamin K  -continued CHF management per cards and primary team  - Consider repeat US abd to reassess GB given possible acute cholecystitis noted on US 12/19 if LFTs continue to remain elevated  -if LFTs worsen in the setting of diuresis or with persistent elevation of T bili, will consider liver biopsy for further evaluation    Thank you for allowing us to participate in patient's care. Please call us with any questions or concerns.  The GI consult service will continue to follow.     Matt Hall DO  [facility]  [phone]

## 2023-12-27 NOTE — PROGRESS NOTES
[facility]  Nephrology Progress Note    Alina Aceves Attending:  Lenard Rosa DO       Assessment and Plan:    1) REJI- due to cardiorenal syndrome +/- underlying nephritis; UA noted with modest proteinuria / microscopic hematuria. PLAN- focus on volume mgmt; may need another renal biopsy to exclude lupus nephritis    2) HFrEF- EF 20-25% with RV dysfunction + severe TR / mod pul HTN- etiology unclear (chemo?). Diuresing well- down > 25#. PLAN- dc bumex gtt; transition to PO diuretics in AM     3) Proteinuria with h/o biopsy proven interstitial nephritis (presumably due to Sjogrens) partially tx'ed with steroids at [facility]    4) Transaminitis- probable hepatic congestion; no biliary issues per GI- improving daily    5) Stage 3 breast CA on chemo (no XRT)- last     6) Pleural effusion s/p bilat thora's- cytology / cx neg    7) SLE / Sjogrens- normally on imuran -> steroids per rheum    Agree with cardiac cath tomorrow as outlined by cards. Hold off on entresto / WOLFF, etc until after cath.       Subjective:  Awake alert in bed in good spirits    Physical Exam:   /87   Pulse 92   Temp 97.7 °F (36.5 °C) (Oral)   Resp 17   Ht 5' 4\" (1.626 m)   Wt 114 lb 6.7 oz (51.9 kg)   LMP 2023 (Approximate)   SpO2 100%   Breastfeeding No   BMI 19.64 kg/m²   Temp (24hrs), Av.7 °F (36.5 °C), Min:97.5 °F (36.4 °C), Max:97.9 °F (36.6 °C)       Intake/Output Summary (Last 24 hours) at 2023 0945  Last data filed at 2023 0829  Gross per 24 hour   Intake 420 ml   Output 6650 ml   Net -6230 ml     Wt Readings from Last 3 Encounters:   23 114 lb 6.7 oz (51.9 kg)   23 120 lb (54.4 kg)   21 135 lb (61.2 kg)     General: awkae alert  HEENT: No scleral icterus, MMM  Neck: Supple, no KALYN or thyromegaly  Cardiac: Regular rate and rhythm, S1, S2 normal, no murmur or tub  Lungs: Decreased BS at bases bilaterally   Abdomen: Soft, non-tender. + bowel sounds, no palpable  organomegaly  Extremities: Without clubbing, cyanosis; 2+ LE edema  Neurologic: Cranial nerves grossly intact, moving all extremities  Skin: Warm and dry, no rashes       Labs:   Lab Results   Component Value Date    WBC 15.4 12/27/2023    HGB 10.4 12/27/2023    HCT 32.5 12/27/2023    PLT 69.0 12/27/2023    CREATSERUM 0.80 12/27/2023    BUN 61 12/27/2023     12/27/2023    K 2.6 12/27/2023     12/27/2023    CO2 38.0 12/27/2023     12/27/2023    CA 8.8 12/27/2023    ALB 3.7 12/27/2023    ALKPHO 311 12/27/2023    BILT 3.3 12/27/2023    TP 6.1 12/27/2023    AST 78 12/27/2023     12/27/2023    MG 1.9 12/27/2023    PHOS 2.6 12/27/2023       Imaging:  All imaging studies reviewed.    Meds:           Questions/concerns were discussed with patient and/or family by bedside.        Marya Caban MD  12/27/2023  945 AM

## 2023-12-27 NOTE — CM/SW NOTE
12/27/23 1000   CM/SW Referral Data   Referral Source Social Work (self-referral)   Reason for Referral Discharge planning   Informant Patient;Daughter   Medical Hx   Does patient have an established PCP? Yes   Patient Info   Patient's Current Mental Status at Time of Assessment Alert;Oriented   Patient's Home Environment House   Patient Status Prior to Admission   Independent with ADLs and Mobility No   Discharge Needs   Anticipated D/C needs Home health care;Medical equipment       SW met with pt and daughter at bedside to discuss discharge planning due to LOS. The pt is a 42 y/o female who was admitted for PNA. The pt is alert and oriented x4. Pt resides at home w/ son and daughter (daughter is home on winter break and going back to school soon). Pt states that her mom is currently staying w/ son. Mom lives in [place]. Pt wishing to return home at discharge. PT is recommending DME at home. Explained that the [employer] is not something that would be covered under insurance. [employer] does not carry the [employer]. Encouraged to look at [employer]. Explained that a ed lift could be covered under insurance, but it is bulky. Explained that PT is recommending a hospital bed, ed lift, wheelchair, and commode. Pt stated that she is not sure if any of the DME would fit in the house, but is agreeable to having a commode. SW will place orders, will plan for wheelchair also just in case. Discussed that HHC will be arranged and that the only available provider is [facility]. Reserved [facility] in [place]. Notified liaison.    [facility]  P:[phone]  F:[phone]    Lavonne Miller, DAYNE, LSW  Discharge Planner  [id number]

## 2023-12-27 NOTE — PAYOR COMM NOTE
--------------  12/23- 26 CONTINUED STAY REVIEW    Payor: RO PENNY/JUANA  Subscriber #:  [id number]  Authorization Number: [id number]    12/23:     HOSPITALIST:    Patient denies any complaints       Vital signs:  Temp:  [97 °F (36.1 °C)-97.5 °F (36.4 °C)] 97.2 °F (36.2 °C)  Pulse:  [] 94  Resp:  [13-27] 19  BP: (128-136)/() 128/90  SpO2:  [91 %-97 %] 97 %     Physical Exam:    /90 (BP Location: Left arm)   Pulse 94   Temp 97.2 °F (36.2 °C)   Resp 19   Ht 5' 4\" (1.626 m)   Wt 143 lb 8.3 oz (65.1 kg)   LMP 08/28/2023 (Approximate)   SpO2 97%   Breastfeeding No   BMI 24.64 kg/m²      General: No acute distress  Respiratory: Clear to auscultation bilateral except decreased breath sounds at bases  Cardiovascular: S1, S2, regular rate and rhythm  Abdomen: Soft, Non-tender, non-distended  Neuro: Awake alert, lethargic, no new focal deficits.   Extremities: 1+LE edema; very weak in lower legs/ankles/feet; sensation intact     Lab 12/17/23  0617 12/19/23  1312 12/19/23  1728 12/20/23  0800 12/21/23  0521 12/22/23  0436 12/23/23  0522   WBC 8.3  --  13.0*  12.8*  --  7.2 10.4 9.4   HGB 7.9*  --  10.2*  10.2*  --  8.9* 8.8* 8.5*   MCV 81.4  --  89.5  84.5  --  86.1 88.9 87.3   .0  --  200.0  98.0*  --  79.0* 75.0* 68.0*   BAND  --   --  8  --   --   --   --    INR  --  2.28* 2.36* 2.21* 1.48*  --  1.43*      Lab 12/21/23  0521 12/21/23  1550 12/22/23  0436 12/23/23  0522   *  --  129* 128*   BUN 76*  --  90* 105*   CREATSERUM 1.17*  --  1.16* 1.35*   CA 8.1*  --  8.2* 8.1*   ALB 3.0*  --  3.1* 3.1*     --  141 136   K 2.9* 3.1* 3.6 3.6     --  108 105   CO2 25.0  --  24.0 22.0   ALKPHO 200*  --  210* 197*   *  --  452* 206*   ALT 1,127*  --  986* 754*   BILT 2.3*  --  1.8 2.0   TP 5.2*  --  5.6* 5.4*           Medications:    potassium citrate  20 mEq Oral TID CC    metoprolol tartrate  50 mg Oral 2x Daily(Beta Blocker)    DULoxetine  30 mg Oral Daily     multivitamin with minerals  1 tablet Oral Daily    methylPREDNISolone  40 mg Intravenous Daily    pantoprazole  40 mg Intravenous QAM AC     Or    pantoprazole  40 mg Oral QAM AC       Assessment & Plan:   # Multifocal pneumonia with bilateral large bilateral pleural effusion  -s/p left thora 12/15; s/p right thora 12/16; cytology without malignancy  -s/p bronch; studies pending  -completed empiric antibiotics : ceftriaxone, doxycycline (12/13-12/18)       #new onset cardiomyopathy-bumex drip; EF 20-25%, severe diffuse hypokinesis; will eventually need to be on GDMT, defer to cards; eventually needs cath when optimized     #bilateral pleural effusion due to cardiomyopathy and acute systolic heart failure                 Status post left thoracentesis on 12/15/2023 and right thoracentesis on 12/16/2023, pulmonary following                 MRSA nares came back positive, s/p Bactroban application twice daily for 5 days.                     # Acute hypoxic respiratory failure due to above-off abx; ongoing bumex                   # Locally advanced stage IIIb breast cancer status post outpatient neoadjuvant chemo and history of left breast lumpectomy and left lymph node resection on 11/16/2023     #LE weakness and painful neuropathy-MRI ordered per neuro     # Hyponatremia due to pneumonia and also hypovolemic due to nausea vomiting and diarrhea, siadh, ssri (off ssri now)-resolved     # Hypokalemia-resolved     # acute kidney injury; hx interstitial nephritis; might need repeat biopsy at some point; continue bumex; GFR improved; renal following                   # History of lupus, history of Sjogren's disease  -Takes azathioprine at home which was held at this time due to multifocal pneumonia  - steroids started per rheum     #elevated LFTs, elevated INR; clinically doubt cholecystitis; PPI; possibly from CHF hepatic congestion, but not sure; s/p vit K and IV NAC;  if LFTs don't improve, might need biopsy; CMV,  EBV,  HSV pending     # Gastritis  # Nausea vomiting and diarrhea at home possibly due to effect of chemo  # Anxiety  - IV PPI     # Anemia and thrombocytopenia due to malignancy and chemo;                  Follow CBC     # Small left apical pneumothorax on 12/15/2023 status post left thoracentesis  -Pulmonary following, on conservative management, repeat chest x-ray done on 12/15/2023 showed left pneumothorax resolved     # Moderate malnutrition  -Dietitian following         CARDS:      Assessment:  1. New onset severe cardiomyopathy  2. PNA  3. Hypoxic resp failure  4. Sinus tachycaria  5. Palpitations  6. Breast CA, stage IIIb  7. Hyponatremia        Plan:  Cardiomyopathy: IV diuresis.  Breathing improved.  Edema improved.  By weight somehow still up 20lbs.   Cr continues to improve.  Follow BMP.  Now BID loop diuretic.  Defer to renal.  But excellent response so far.  Needs cath but can wait for now given clinical stability.  Elevated LFTS: Likely hepatic congestion (severe TR from ventricular dysfunction and pulm HTN).  Diuresis and treatment of CHF should help.  Primary team.  Tachycardia: ST.  Likely secondary to infection and medical illness.  Can increase metoprolol to 50mg BID given BP.  Needs ARNI in future.  Will let Cr settle first (BUN still high as well).  Will start hydralizine and consider low dose entresto over the weekend or early next week.  Hydralizine 25 TID for now.  Resp/PNA: ID, pulm.  Antibiotics.  Hyponatremia: Improved.  Renal.  BP: BB as above.  Metastatic breast CA: Per primary and oncology.      RENAL:    Assessment and Plan:     1) REJI- due to cardiorenal syndrome +/- underlying nephritis; UA noted with modest proteinuria / microscopic hematuria. PLAN- focus on volume mgmt; may need another renal biopsy to exclude lupus nephritis / active interstitial nephritis     2) HFrEF- EF 20-25% with RV dysfunction + severe TR / mod pul HTN- remains quite edematous -> bumex gtt. ? Due to chemo ?       3) Proteinuria with h/o biopsy proven interstitial nephritis (presumably due to Sjogrens) partially tx'ed with steroids at [facility]     4) Transaminitis- probable hepatic congestion; no biliary issues per GI      5) Stage 3 breast CA on chemo (no XRT)- last 9/23     6) Pleural effusion s/p bilat thora's- cytology / cx neg     7) SLE / Sjogrens     8) Acute resp failure tx'ed with abx; bronch 12/19- cx neg; RVP neg- off abx per ID    12/24:    CARDS:      Assessment:  1. New onset severe cardiomyopathy EF 20-25%   2. PNA  3. Hypoxic resp failure// PNA // Pleural effusions s/p Thoracentesis   4. Sinus tachycaria  5. Palpitations  6. Breast CA, stage IIIb   7. Hyponatremia  8.  YUNIOR         Plan:  Cardiomyopathy: IV diuresis.  Breathing improved.  Edema improved.  By weight somehow still up 20lbs.   Cr mildly elevated .  Follow BMP.  Now BID loop diuretic.  Defer to renal.  But excellent response so far.  Needs cath but can wait for now for clinical stability.S/P bilat thoracentesis   Elevated LFTS: Likely hepatic congestion (severe TR from ventricular dysfunction and pulm HTN).  Diuresis and treatment of CHF should help.  Primary team.  Tachycardia: ST.  Likely secondary to infection and medical illness.  Can increase metoprolol to 50mg BID given BP.  Needs ARNI in future.  Will let Cr settle first (BUN still high as well).  Will start hydralizine and consider low dose entresto over the weekend or early next week.  Hydralizine 25 TID for now.  Resp/PNA: ID, pulm.  Antibiotics.  Hyponatremia: Improved.  Renal.  BP: BB as above.  Metastatic breast CA: Per primary and oncology.      Subjective:  -No events overnight  -Awake, alert  -No SOB today, on 1L NC  -IV diuresis continues Bumex  2>1  on RA  Creat up  edema resolved     Medications:   Scheduled:    potassium citrate  20 mEq Oral TID CC    metoprolol tartrate  50 mg Oral 2x Daily(Beta Blocker)    DULoxetine  30 mg Oral Daily    multivitamin with minerals  1 tablet Oral  Daily    methylPREDNISolone  40 mg Intravenous Daily    pantoprazole  40 mg Intravenous QAM AC     Or    pantoprazole  40 mg Oral QAM AC         Physical Exam:       Vitals:     23 0409   BP: (!) 135/100   Pulse: 90   Resp: 20   Temp: 97.5 °F (36.4 °C)          Wt Readings from Last 3 Encounters:   23 147 lb (66.7 kg)   23 120 lb (54.4 kg)   21 135 lb (61.2 kg)              General: Well developed, well nourished female.  Pt is in no acute distress.  HEENT:   Normocephalic.  Atraumatic.  Eyes with no scleral icterus.  Neck: Supple.  No JVD.  Carotids 2+ and equal in symmetric fashion.  No bruits are noted.  Cardiac: Regular rate and rhythm.   There is a normal S1 and S2.  No S3 or S4.  No murmurs, rubs, or gallops.  PMI is non-displaced with a normal apical impulse.  Lungs: Clear to ascultation bilaterally.   BS L base .few basal crackles  Abdomen: Soft.  Non-distended.  Non-tender.  Bowel sounds are present and normoactive.  No guarding or rebound.   Extremities: Extremities do demonstrate 2+ peripheral edema.   No cyanosis or clubbing of the digits is appreciated.  Femoral, Dorsalis Pedis, and Posterior Tibialis  pulses are 2+ and equal in a symmetric fashion.  Neurologic: Alert and oriented, normal affect.  No gross deficit appreciated.  Integument:  No visible rashes are appreciated. R Sub Q port         Lab 23  0521 23  1550 23  0436 23  0522 23  0517   *  --  129* 128* 143*   BUN 76*  --  90* 105* 110*   CREATSERUM 1.17*  --  1.16* 1.35* 1.31*   CA 8.1*  --  8.2* 8.1* 8.2*   ALB 3.0*  --  3.1* 3.1*  --      --  141 136 140   K 2.9*   < > 3.6 3.6 3.0*     --  108 105 106   CO2 25.0  --  24.0 22.0 24.0       RENAL:    Assessment and Plan:     1) REJI- due to cardiorenal syndrome +/- underlying nephritis; UA noted with modest proteinuria / microscopic hematuria. PLAN- focus on volume mgmt; may need another renal biopsy to exclude  lupus nephritis / active interstitial nephritis     2) HFrEF- EF 20-25% with RV dysfunction + severe TR / mod pul HTN- etiology unclear (chemo?). Remains quite edematous -> titrate bumex gtt; add aldactone     3) Proteinuria with h/o biopsy proven interstitial nephritis (presumably due to Sjogrens) partially tx'ed with steroids at [facility]     4) Transaminitis- probable hepatic congestion; no biliary issues per GI- improving     5) Stage 3 breast CA on chemo (no XRT)- last 9/23     6) Pleural effusion s/p bilat thora's- cytology / cx neg     7) SLE / Sjogrens- normally on imuran -> steroids per rheum     8) Acute resp failure tx'ed with abx; bronch 12/19- cx neg; RVP neg- off abx per ID         12/25:    CARDS:    Assessment:  1. New onset severe cardiomyopathy EF 20-25%   2. PNA  3. Hypoxic resp failure// PNA // Pleural effusions s/p Thoracentesis   4. Sinus tachycaria  5. Palpitations  6. Breast CA, stage IIIb   7. Hyponatremia  8.  YUNIOR         Plan:  Cardiomyopathy: IV diuresis.  Breathing improved.  Edema improved.  By weight somehow still up 20lbs.   Cr mildly elevated .  Follow BMP.  Now Bumex drip   Defer to renal. D/W Dr Caban.   But excellent response so far.  Needs cath but can wait for now for clinical stability.S/P bilat thoracentesis   Elevated LFTS: Likely hepatic congestion (severe TR from ventricular dysfunction and pulm HTN).  Diuresis and treatment of CHF should help.  Primary team.  Tachycardia: ST. Improved   Likely secondary to infection and medical illness.  Can increase metoprolol to 50mg BID given BP.  Needs ARNI in future.  Will let Cr settle first (BUN still high as well).  Will start hydralizine and consider low dose entresto over the weekend or early next week.  Hydralizine 25 TID for now.  Resp/PNA: ID, pulm.  Antibiotics.  Hyponatremia: Improved.  Renal.  BP: BB as above.  Metastatic breast CA: Per primary and oncology.       RENAL:       Assessment and Plan:     1) REJI- due to cardiorenal  syndrome +/- underlying nephritis; UA noted with modest proteinuria / microscopic hematuria. PLAN- focus on volume mgmt; may need another renal biopsy to exclude lupus nephritis / active interstitial nephritis     2) HFrEF- EF 20-25% with RV dysfunction + severe TR / mod pul HTN- etiology unclear (chemo?). Diuresing well- continue bumex gtt / aldactone x 24-48 hrs     3) Proteinuria with h/o biopsy proven interstitial nephritis (presumably due to Sjogrens) partially tx'ed with steroids at [facility]     4) Transaminitis- probable hepatic congestion; no biliary issues per GI- improving daily     5) Stage 3 breast CA on chemo (no XRT)- last      6) Pleural effusion s/p bilat thora's- cytology / cx neg     7) SLE / Sjogrens- normally on imuran -> steroids per rheum     8) Acute resp failure tx'ed with abx; bronch - cx neg; RVP neg- off abx per ID        Subjective:  Awake alert in bed in good spirits     Physical Exam:   BP (!) 129/94 (BP Location: Left arm)   Pulse 94   Temp 97.8 °F (36.6 °C) (Temporal)   Resp 20   Ht 5' 4\" (1.626 m)   Wt 135 lb 2.3 oz (61.3 kg)   LMP 2023 (Approximate)   SpO2 98%   Breastfeeding No   BMI 23.20 kg/m²   Temp (24hrs), Av.6 °F (36.4 °C), Min:97.4 °F (36.3 °C), Max:97.9 °F (36.6 °C)        Intake/Output Summary (Last 24 hours) at 2023 0729  Last data filed at 2023 0619      Gross per 24 hour   Intake --   Output 4750 ml   Net -4750 ml          Wt Readings from Last 3 Encounters:   23 135 lb 2.3 oz (61.3 kg)   23 120 lb (54.4 kg)   21 135 lb (61.2 kg)      General: awkae alert  HEENT: No scleral icterus, MMM  Neck: Supple, no KALYN or thyromegaly  Cardiac: Regular rate and rhythm, S1, S2 normal, no murmur or tub  Lungs: Decreased BS at bases bilaterally   Abdomen: Soft, non-tender. + bowel sounds, no palpable organomegaly  Extremities: Without clubbing, cyanosis; 2+ LE edema  Neurologic: Cranial nerves grossly intact, moving all  extremities  Skin: Warm and dry, no rashes        Labs:         Lab Results   Component Value Date     CREATSERUM 1.17 12/25/2023     BUN 95 12/25/2023      12/25/2023     K 2.5 12/25/2023      12/25/2023     CO2 27.0 12/25/2023      12/25/2023     CA 8.3 12/25/2023     ALB 3.5 12/25/2023     ALKPHO 276 12/25/2023     BILT 3.3 12/25/2023     TP 5.9 12/25/2023     AST 89 12/25/2023      12/25/2023     MG 2.0 12/25/2023     PHOS 4.9 12/25/2023      GI:    Assessment   Ms Aceves is a 43 year old female with a history of stage IIIb breast cancer and lupus who presented on 12/13 with shortness of breath, found to have pneumonia, pleural effusions (transudative), and HFrEF (EF 20-25%) for whom GI is consulted for abnormal LFTs. These are most likely secondary to congestive hepatopathy. LFTs markedly elevated, but downtrending with diuresis. Downtrending  again except for mild increase in T bili, which may be lagging behind.      Plan   -daily LFTs  -f/u EBV/HSV/CMV studies  -s/p IV NAC and IV vitamin K  -continued CHF management per cards and primary team  - repeat US abd to assess GB  -if LFTs worsen in the setting of diuresis or with persisent elevation of T bili, will consider liver biopsy for further evaluation       12/26:    RENAL:     Assessment and Plan:     1) REJI- due to cardiorenal syndrome +/- underlying nephritis; UA noted with modest proteinuria / microscopic hematuria. PLAN- focus on volume mgmt; may need another renal biopsy to exclude lupus nephritis / active interstitial nephritis     2) HFrEF- EF 20-25% with RV dysfunction + severe TR / mod pul HTN- etiology unclear (chemo?). Diuresing well- continue bumex gtt / aldactone x 24-48 hrs (down 15# so far)     3) Proteinuria with h/o biopsy proven interstitial nephritis (presumably due to Sjogrens) partially tx'ed with steroids at [facility]     4) Transaminitis- probable hepatic congestion; no biliary issues per GI- improving daily     5)  Stage 3 breast CA on chemo (no XRT)- last      6) Pleural effusion s/p bilat thora's- cytology / cx neg     7) SLE / Sjogrens- normally on imuran -> steroids per rheum        Subjective:  Awake alert in bed in good spirits     Physical Exam:   BP (!) 128/92 (BP Location: Right arm)   Pulse 87   Temp 96.7 °F (35.9 °C) (Axillary)   Resp 16   Ht 5' 4\" (1.626 m)   Wt 128 lb 3.2 oz (58.2 kg)   LMP 2023 (Approximate)   SpO2 99%   Breastfeeding No   BMI 22.01 kg/m²   Temp (24hrs), Av.5 °F (36.4 °C), Min:96.7 °F (35.9 °C), Max:97.8 °F (36.6 °C)        Intake/Output Summary (Last 24 hours) at 2023 0948  Last data filed at 2023 0845      Gross per 24 hour   Intake 240 ml   Output 7025 ml   Net -6785 ml          Wt Readings from Last 3 Encounters:   23 128 lb 3.2 oz (58.2 kg)   23 120 lb (54.4 kg)   21 135 lb (61.2 kg)      General: awkae alert  HEENT: No scleral icterus, MMM  Neck: Supple, no KALYN or thyromegaly  Cardiac: Regular rate and rhythm, S1, S2 normal, no murmur or tub  Lungs: Decreased BS at bases bilaterally   Abdomen: Soft, non-tender. + bowel sounds, no palpable organomegaly  Extremities: Without clubbing, cyanosis; 2+ LE edema  Neurologic: Cranial nerves grossly intact, moving all extremities  Skin: Warm and dry, no rashes        Labs:         Lab Results   Component Value Date     CREATSERUM 1.10 2023     BUN 84 2023      2023     K 2.4 2023      2023     CO2 32.0 2023      2023     CA 8.5 2023     ALB 3.7 2023     ALKPHO 354 2023     BILT 3.6 2023     TP 6.3 2023     AST 94 2023      2023      MEDICATIONS ADMINISTERED IN LAST 1 DAY:  DULoxetine (Cymbalta) DR cap 30 mg       Date Action Dose Route User    2023 0824 Given 30 mg Oral Paulette Berman RN          HYDROcodone-acetaminophen (Norco) 5-325 MG per tab 1 tablet       Date Action Dose  Route User    12/27/2023 1412 Given 1 tablet Oral Paulette Berman RN          metoprolol tartrate (Lopressor) tab 50 mg       Date Action Dose Route User    12/27/2023 0522 Given 50 mg Oral Lavonne Cornell RN    12/26/2023 1800 Given 50 mg Oral Inomjonova, Dilnoza          pantoprazole (Protonix) DR tab 40 mg       Date Action Dose Route User    12/27/2023 0522 Given 40 mg Oral Lavonne Cornell RN          potassium chloride 40 mEq/100mL IVPB premix (central line) 40 mEq       Date Action Dose Route User    12/26/2023 1600 New Bag 40 mEq Intravenous Lizzie Rick RN          potassium chloride 40 mEq/100mL IVPB premix (central line) 40 mEq       Date Action Dose Route User    12/27/2023 0649 New Bag 40 mEq Intravenous Lavonne Cornell RN          predniSONE (Deltasone) tab 10 mg       Date Action Dose Route User    12/27/2023 0824 Given 10 mg Oral Paulette Berman RN          spironolactone (Aldactone) tab 25 mg       Date Action Dose Route User    12/27/2023 0824 Given 25 mg Oral Paulette Berman RN          multivitamin with minerals (Thera M Plus) tab 1 tablet       Date Action Dose Route User    12/27/2023 0824 Given 1 tablet Oral Paulette Berman RN          zolpidem (Ambien) tab 5 mg       Date Action Dose Route User    12/26/2023 2011 Given 5 mg Oral Lavonne Cornell RN            Vitals (last day)       Date/Time Temp Pulse Resp BP SpO2 Weight O2 Device O2 Flow Rate (L/min) [facility]    12/27/23 1212 97.8 °F (36.6 °C) 95 18 127/90 100 % -- None (Room air) 0 L/min NE    12/27/23 0829 -- 90 -- -- -- -- -- -- MS    12/27/23 0829 97.7 °F (36.5 °C) -- 17 -- 100 % -- None (Room air) 0 L/min NE    12/27/23 0815 -- 92 -- 124/87 99 % -- None (Room air) -- MS    12/27/23 0351 -- 87 -- -- 100 % -- -- -- AR    12/27/23 0316 97.5 °F (36.4 °C) 91 16 127/88 98 % 114 lb 6.7 oz None (Room air) -- AR    12/27/23 0004 97.7 °F (36.5 °C) -- 18 119/85 97 % -- None (Room air) -- VW    12/26/23 1934 97.9 °F (36.6 °C) 97 18 124/94 100 %  -- None (Room air) -- AR    12/26/23 1702 -- 102 -- -- 100 % -- -- -- TP    12/26/23 1615 97.7 °F (36.5 °C) 99 16 131/94 100 % -- None (Room air) 0 L/min TP    12/26/23 1435 -- 110 -- -- 100 % -- -- -- TP    12/26/23 1305 -- 104 -- -- 99 % -- -- -- TP    12/26/23 1217 97.5 °F (36.4 °C) 97 16 131/95 100 % -- None (Room air) 0 L/min TP    12/26/23 0845 -- 87 -- -- 99 % -- -- -- TP    12/26/23 0832 96.7 °F (35.9 °C) 85 16 128/92 -- -- None (Room air) 0 L/min TP    12/26/23 0523 97.6 °F (36.4 °C) 87 16 130/90 100 % -- None (Room air) -- NF    12/26/23 0500 -- -- -- -- -- 128 lb 3.2 oz -- -- NF

## 2023-12-27 NOTE — PLAN OF CARE
Pt A&O x4.  Reports no pain. Denies SOB & cardiac symptoms.  Reports numbness and tingling in extremities.  Maintaining O2 on room air. Weak, non-productive cough.  NSR on tele, S1&S2 present.  Bowel sounds active in all quadrants.   Continent of bowel and bladder.  R chest port a cath infusing. Flushed, blood return noted.  Bumex gtt running at 0.25 mg/hr.  Pt updated on plan of care.  Bed in lowest position. Bed alarm on. Call light within reach     Problem: PAIN - ADULT  Goal: Verbalizes/displays adequate comfort level or patient's stated pain goal  Description: INTERVENTIONS:  - Encourage pt to monitor pain and request assistance  - Assess pain using appropriate pain scale  - Administer analgesics based on type and severity of pain and evaluate response  - Implement non-pharmacological measures as appropriate and evaluate response  - Consider cultural and social influences on pain and pain management  - Manage/alleviate anxiety  - Utilize distraction and/or relaxation techniques  - Monitor for opioid side effects  - Notify MD/LIP if interventions unsuccessful or patient reports new pain  - Anticipate increased pain with activity and pre-medicate as appropriate  Outcome: Progressing     Problem: SAFETY ADULT - FALL  Goal: Free from fall injury  Description: INTERVENTIONS:  - Assess pt frequently for physical needs  - Identify cognitive and physical deficits and behaviors that affect risk of falls.  - [place] fall precautions as indicated by assessment.  - Educate pt/family on patient safety including physical limitations  - Instruct pt to call for assistance with activity based on assessment  - Modify environment to reduce risk of injury  - Provide assistive devices as appropriate  - Consider OT/PT consult to assist with strengthening/mobility  - Encourage toileting schedule  Outcome: Progressing     Problem: RESPIRATORY - ADULT  Goal: Achieves optimal ventilation and oxygenation  Description: INTERVENTIONS:  -  Assess for changes in respiratory status  - Assess for changes in mentation and behavior  - Position to facilitate oxygenation and minimize respiratory effort  - Oxygen supplementation based on oxygen saturation or ABGs  - Provide Smoking Cessation handout, if applicable  - Encourage broncho-pulmonary hygiene including cough, deep breathe, Incentive Spirometry  - Assess the need for suctioning and perform as needed  - Assess and instruct to report SOB or any respiratory difficulty  - Respiratory Therapy support as indicated  - Manage/alleviate anxiety  - Monitor for signs/symptoms of CO2 retention  Outcome: Progressing     Problem: METABOLIC/FLUID AND ELECTROLYTES - ADULT  Goal: Electrolytes maintained within normal limits  Description: INTERVENTIONS:  - Monitor labs and rhythm and assess patient for signs and symptoms of electrolyte imbalances  - Administer electrolyte replacement as ordered  - Monitor response to electrolyte replacements, including rhythm and repeat lab results as appropriate  - Fluid restriction as ordered  - Instruct patient on fluid and nutrition restrictions as appropriate  Outcome: Progressing  Goal: Hemodynamic stability and optimal renal function maintained  Description: INTERVENTIONS:  - Monitor labs and assess for signs and symptoms of volume excess or deficit  - Monitor intake, output and patient weight  - Monitor urine specific gravity, serum osmolarity and serum sodium as indicated or ordered  - Monitor response to interventions for patient's volume status, including labs, urine output, blood pressure (other measures as available)  - Encourage oral intake as appropriate  - Instruct patient on fluid and nutrition restrictions as appropriate  Outcome: Progressing     Problem: CARDIOVASCULAR - ADULT  Goal: Maintains optimal cardiac output and hemodynamic stability  Description: INTERVENTIONS:  - Monitor vital signs, rhythm, and trends  - Monitor for bleeding, hypotension and signs of  decreased cardiac output  - Evaluate effectiveness of vasoactive medications to optimize hemodynamic stability  - Monitor arterial and/or venous puncture sites for bleeding and/or hematoma  - Assess quality of pulses, skin color and temperature  - Assess for signs of decreased coronary artery perfusion - ex. Angina  - Evaluate fluid balance, assess for edema, trend weights  Outcome: Progressing

## 2023-12-27 NOTE — PROGRESS NOTES
12/26/23 2020   Clinical Encounter Type   Visited With Patient not available   Continue Visiting No   Taxonomy   Intended Effects Demonstrate caring and concern   Methods Collaborate with care team member   Interventions Silent prayer   Trigger for Consult   Trigger for Spiritual Care Consult No     Referred by/responded to: Scheduled Visitation-LOS    Visit summary: Pt asleep. Will revisit.    Plan: Spiritual Care support can be requested via an Epic consult.      Rev. Dara Katz  12/26/2023

## 2023-12-27 NOTE — HOME CARE LIAISON
Received referral via [place] for Home Health services. Spoke w/ patient at the bedside and agreeable for home health care. Updated AVS with contact information. Demographics confirmed at e bed side.Patient has not seen pcp in a while encouraged to follow up with pcp once discharged. Also informed ARGENTINA Banerjee PCP needs to be seen and or follow up with [facility] before home health care start.

## 2023-12-27 NOTE — PROGRESS NOTES
[facility]     Hospitalist Progress Note     Alina Aceves Patient Status:  Inpatient    1980 MRN [id number]   Location [facility] 4NW-A Attending Tosha Waite MD   Hosp Day # 14 PCP HOLLY IBARRA     Chief Complaint: Intractable nausea vomiting, diarrhea, generalized weakness.  Recent pneumonia.     Subjective:     Patient feels well and has no complaints.      Objective:    Review of Systems:   A comprehensive review of systems was completed; pertinent positive and negatives stated in subjective.    Vital signs:  Temp:  [97.5 °F (36.4 °C)-97.9 °F (36.6 °C)] 97.8 °F (36.6 °C)  Pulse:  [] 95  Resp:  [16-18] 18  BP: (119-131)/(85-94) 127/90  SpO2:  [97 %-100 %] 100 %    Physical Exam:    /90 (BP Location: Left arm)   Pulse 95   Temp 97.8 °F (36.6 °C) (Oral)   Resp 18   Ht 5' 4\" (1.626 m)   Wt 114 lb 6.7 oz (51.9 kg)   LMP 2023 (Approximate)   SpO2 100%   Breastfeeding No   BMI 19.64 kg/m²     General: No acute distress  Respiratory: Clear to auscultation bilateral   Cardiovascular: S1, S2, regular rate and rhythm  Abdomen: Soft, Non-tender, non-distended  Neuro: Awake alert, lethargic, no new focal deficits.   Extremities: trace+LE edema  but improved since I last saw her; very weak in lower legs/ankles/feet    Diagnostic Data:    Labs:  Recent Labs   Lab 23  0521 23  0436 23  0522 23  0520   WBC 7.2 10.4 9.4 15.4*   HGB 8.9* 8.8* 8.5* 10.4*   MCV 86.1 88.9 87.3 92.9   PLT 79.0* 75.0* 68.0* 69.0*   INR 1.48*  --  1.43*  --        Recent Labs   Lab 23  0629 23  0525 23  0007 23  0520 23  1423   * 144*  --  108*  --    BUN 95* 84*  --  61*  --    CREATSERUM 1.17* 1.10*  --  0.80  --    CA 8.3* 8.5  --  8.8  --    ALB 3.5 3.7  --  3.7  --     145  --  145  --    K 2.5* 2.4* 3.0* 2.6* 3.0*    103  --  103  --    CO2 27.0 32.0  --  38.0*  --    ALKPHO 276* 354*  --  311*  --    AST 89* 94*  --  78*  --     * 424*  --  339*  --    BILT 3.3* 3.6*  --  3.3*  --    TP 5.9* 6.3*  --  6.1*  --        Estimated Creatinine Clearance: 74.3 mL/min (based on SCr of 0.8 mg/dL).    Microbiology    Hospital Encounter on 12/13/23   1. Body Fluid Cult Aerobic and Anaerobic     Status: None    Collection Time: 12/15/23  3:26 PM    Specimen: Pleural Fluid, Right; Body fluid, unspecified   Result Value Ref Range    Body Fluid Culture Result No Growth 5 Days N/A    Body Fld Smear 4+ Neutrophils seen N/A    Body Fld Smear No organisms seen N/A    Body Fld Smear This is a cytocentrifuged smear. N/A   2. Urine Culture, Routine     Status: None    Collection Time: 12/14/23  3:58 AM    Specimen: Urine, clean catch   Result Value Ref Range    Urine Culture No Growth at 18-24 hrs. N/A   3. Blood Culture     Status: None    Collection Time: 12/13/23  8:10 PM    Specimen: Blood,peripheral   Result Value Ref Range    Blood Culture Result No Growth 5 Days N/A     MRSA PCR nares positive on 12/14/2023    Imaging: Reviewed in Epic.  Chest x-ray done on 12/13/2023 with dense patchy airspace consolidation opacities present within the lungs suspicious for areas of pneumonia  CTA chest done on 12/14/2023 early a.m. shows moderate to large bilateral pleural effusion along with marked consolidation scattered throughout the lungs suggestive of pulmonary edema and fluid overload.  No evidence of pulm embolus.  Moderate large left axillary and left breast fluid collections noted.  Minimal gas in the left most central breast fluid collection, possibility of infection is a consideration.  Sequelae of seroma chronic hematoma is a consideration as well.    Medications:    [START ON 12/28/2023] torsemide  20 mg Oral Daily    predniSONE  10 mg Oral Daily with breakfast    spironolactone  25 mg Oral Daily    metoprolol tartrate  50 mg Oral 2x Daily(Beta Blocker)    DULoxetine  30 mg Oral Daily    multivitamin with minerals  1 tablet Oral Daily     pantoprazole  40 mg Oral QAM AC       Assessment & Plan:      # Multifocal pneumonia   -bilateral large bilateral pleural effusion  -s/p left thora 12/15; s/p right thora 12/16; cytology without malignancy  -s/p bronch; Candida   -completed empiric antibiotics : ceftriaxone, doxycycline (12/13-12/18)      #new onset cardiomyopathy  -bumex drip, EF 20-25%, severe diffuse hypokinesis;   on BB & aldactone;   GDMT per to cards;   needs cath when optimized    #bilateral pleural effusion due to cardiomyopathy and acute systolic heart failure   Status post left thoracentesis on 12/15/2023 and right thoracentesis on 12/16/2023, pulmonary following   MRSA nares came back positive, s/p Bactroban application twice daily for 5 days.       # Acute hypoxic respiratory failure due to above-off abx; ongoing bumex per renal     # Locally advanced stage IIIb breast cancer status post outpatient neoadjuvant chemo and history of left breast lumpectomy and left lymph node resection on 11/16/2023    #LE weakness and painful neuropathy-MRI reviewed and does not show etiology for weakness;  could be chemo toxicity;    # Hyponatremia due to pneumonia and also hypovolemic due to nausea vomiting and diarrhea, siadh, ssri (off ssri now)-resolved    # Hypokalemia-replace per protocol again today 12/26    # acute kidney injury; hx interstitial nephritis; might need repeat biopsy at some point; continue bumex; GFR stable; renal following     # History of lupus, history of Sjogren's disease  -Takes azathioprine at home which was held at this time due to multifocal pneumonia  - steroids started per rheum; down to 10mg daily per rheum    #elevated LFTs,   improving.    Elevated INR;  s/p vit K and IV NAC;  if LFTs don't improve, might need biopsy    # Gastritis  # Nausea vomiting and diarrhea at home possibly due to effect of chemo  # Anxiety  - PPI    # Anemia and thrombocytopenia due to malignancy and chemo;       # Small left apical pneumothorax  on 12/15/2023 status post left thoracentesis  -Pulmonary following, on conservative management, repeat chest x-ray done on 12/15/2023 showed left pneumothorax resolved            Supplementary Documentation:     Quality:  DVT Mechanical Prophylaxis:   SCDs, Early ambuation  DVT Pharmacologic Prophylaxis   Medication   None                Code Status: Full Code  Peacock: No urinary catheter in place  Peacock Duration (in days):   Central line:    CHRISTELLE: 12/29/2023    Discharge is dependent on: Clinical progress  At this point Ms. Aceves is expected to be discharge to: To be decided    The 21st Century Cures Act makes medical notes like these available to patients in the interest of transparency. Please be advised this is a medical document. Medical documents are intended to carry relevant information, facts as evident, and the clinical opinion of the practitioner. The medical note is intended as peer to peer communication and may appear blunt or direct. It is written in medical language and may contain abbreviations or verbiage that are unfamiliar.

## 2023-12-27 NOTE — PLAN OF CARE
Assumed care of patient @ 0730 patient resting in bed, AOX4, reports no pain at this time. Lung sounds clear but diminished bilaterally, O2 saturation adequate on room air. No complaints of shortness of breath or chest pain. Sinus rhythm on tele, S1-S2 present, no adventitious sounds noted. Bowel sounds present and active in all quadrants, last BM 12/27. Patient voiding with increased frequency due to diuretic therapy. Up to bathroom with Sarasteady assistive device.      Plan of Care: LFT trends. PO steroids. Start PO torsemide. Monitor electrolytes. Decide home vs. Rehab on discharge.      Discussed plan of care with patient, verbalized understanding. Call light within reach.    Problem: PAIN - ADULT  Goal: Verbalizes/displays adequate comfort level or patient's stated pain goal  Description: INTERVENTIONS:  - Encourage pt to monitor pain and request assistance  - Assess pain using appropriate pain scale  - Administer analgesics based on type and severity of pain and evaluate response  - Implement non-pharmacological measures as appropriate and evaluate response  - Consider cultural and social influences on pain and pain management  - Manage/alleviate anxiety  - Utilize distraction and/or relaxation techniques  - Monitor for opioid side effects  - Notify MD/LIP if interventions unsuccessful or patient reports new pain  - Anticipate increased pain with activity and pre-medicate as appropriate  Outcome: Progressing     Problem: SAFETY ADULT - FALL  Goal: Free from fall injury  Description: INTERVENTIONS:  - Assess pt frequently for physical needs  - Identify cognitive and physical deficits and behaviors that affect risk of falls.  - [place] fall precautions as indicated by assessment.  - Educate pt/family on patient safety including physical limitations  - Instruct pt to call for assistance with activity based on assessment  - Modify environment to reduce risk of injury  - Provide assistive devices as  appropriate  - Consider OT/PT consult to assist with strengthening/mobility  - Encourage toileting schedule  Outcome: Progressing     Problem: RESPIRATORY - ADULT  Goal: Achieves optimal ventilation and oxygenation  Description: INTERVENTIONS:  - Assess for changes in respiratory status  - Assess for changes in mentation and behavior  - Position to facilitate oxygenation and minimize respiratory effort  - Oxygen supplementation based on oxygen saturation or ABGs  - Provide Smoking Cessation handout, if applicable  - Encourage broncho-pulmonary hygiene including cough, deep breathe, Incentive Spirometry  - Assess the need for suctioning and perform as needed  - Assess and instruct to report SOB or any respiratory difficulty  - Respiratory Therapy support as indicated  - Manage/alleviate anxiety  - Monitor for signs/symptoms of CO2 retention  Outcome: Progressing     Problem: METABOLIC/FLUID AND ELECTROLYTES - ADULT  Goal: Electrolytes maintained within normal limits  Description: INTERVENTIONS:  - Monitor labs and rhythm and assess patient for signs and symptoms of electrolyte imbalances  - Administer electrolyte replacement as ordered  - Monitor response to electrolyte replacements, including rhythm and repeat lab results as appropriate  - Fluid restriction as ordered  - Instruct patient on fluid and nutrition restrictions as appropriate  Outcome: Progressing  Goal: Hemodynamic stability and optimal renal function maintained  Description: INTERVENTIONS:  - Monitor labs and assess for signs and symptoms of volume excess or deficit  - Monitor intake, output and patient weight  - Monitor urine specific gravity, serum osmolarity and serum sodium as indicated or ordered  - Monitor response to interventions for patient's volume status, including labs, urine output, blood pressure (other measures as available)  - Encourage oral intake as appropriate  - Instruct patient on fluid and nutrition restrictions as  appropriate  Outcome: Progressing     Problem: CARDIOVASCULAR - ADULT  Goal: Maintains optimal cardiac output and hemodynamic stability  Description: INTERVENTIONS:  - Monitor vital signs, rhythm, and trends  - Monitor for bleeding, hypotension and signs of decreased cardiac output  - Evaluate effectiveness of vasoactive medications to optimize hemodynamic stability  - Monitor arterial and/or venous puncture sites for bleeding and/or hematoma  - Assess quality of pulses, skin color and temperature  - Assess for signs of decreased coronary artery perfusion - ex. Angina  - Evaluate fluid balance, assess for edema, trend weights  Outcome: Progressing

## 2023-12-28 ENCOUNTER — APPOINTMENT (OUTPATIENT)
Dept: CT IMAGING | Facility: HOSPITAL | Age: 43
DRG: 987 | End: 2023-12-28
Payer: COMMERCIAL

## 2023-12-28 ENCOUNTER — APPOINTMENT (OUTPATIENT)
Dept: ULTRASOUND IMAGING | Facility: HOSPITAL | Age: 43
DRG: 987 | End: 2023-12-28
Attending: HOSPITALIST
Payer: COMMERCIAL

## 2023-12-28 ENCOUNTER — APPOINTMENT (OUTPATIENT)
Dept: INTERVENTIONAL RADIOLOGY/VASCULAR | Facility: HOSPITAL | Age: 43
DRG: 987 | End: 2023-12-28
Payer: COMMERCIAL

## 2023-12-28 PROBLEM — K81.0 ACUTE CHOLECYSTITIS: Status: ACTIVE | Noted: 2023-12-28

## 2023-12-28 PROBLEM — R80.9 PROTEINURIA: Status: ACTIVE | Noted: 2023-12-17

## 2023-12-28 LAB
ALBUMIN SERPL-MCNC: 3 G/DL (ref 3.4–5)
ALBUMIN/GLOB SERPL: 1.3 {RATIO} (ref 1–2)
ALP LIVER SERPL-CCNC: 250 U/L
ALT SERPL-CCNC: 248 U/L
ANION GAP SERPL CALC-SCNC: 5 MMOL/L (ref 0–18)
AST SERPL-CCNC: 68 U/L (ref 15–37)
BASOPHILS # BLD AUTO: 0 X10(3) UL (ref 0–0.2)
BASOPHILS NFR BLD AUTO: 0 %
BILIRUB SERPL-MCNC: 2.8 MG/DL (ref 0.1–2)
BUN BLD-MCNC: 34 MG/DL (ref 9–23)
CALCIUM BLD-MCNC: 8.7 MG/DL (ref 8.5–10.1)
CHLORIDE SERPL-SCNC: 104 MMOL/L (ref 98–112)
CO2 SERPL-SCNC: 34 MMOL/L (ref 21–32)
CREAT BLD-MCNC: 0.67 MG/DL
EGFRCR SERPLBLD CKD-EPI 2021: 111 ML/MIN/1.73M2 (ref 60–?)
EOSINOPHIL # BLD AUTO: 0.1 X10(3) UL (ref 0–0.7)
EOSINOPHIL NFR BLD AUTO: 1 %
ERYTHROCYTE [DISTWIDTH] IN BLOOD BY AUTOMATED COUNT: 20 %
GLOBULIN PLAS-MCNC: 2.3 G/DL (ref 2.8–4.4)
GLUCOSE BLD-MCNC: 95 MG/DL (ref 70–99)
HCG UR QL: NEGATIVE
HCT VFR BLD AUTO: 28.5 %
HGB BLD-MCNC: 8.8 G/DL
IMM GRANULOCYTES # BLD AUTO: 0.06 X10(3) UL (ref 0–1)
IMM GRANULOCYTES NFR BLD: 0.6 %
INR BLD: 1.26 (ref 0.8–1.2)
LYMPHOCYTES # BLD AUTO: 0.63 X10(3) UL (ref 1–4)
LYMPHOCYTES NFR BLD AUTO: 6.6 %
MCH RBC QN AUTO: 29.5 PG (ref 26–34)
MCHC RBC AUTO-ENTMCNC: 30.9 G/DL (ref 31–37)
MCV RBC AUTO: 95.6 FL
MONOCYTES # BLD AUTO: 0.51 X10(3) UL (ref 0.1–1)
MONOCYTES NFR BLD AUTO: 5.3 %
NEUTROPHILS # BLD AUTO: 8.3 X10 (3) UL (ref 1.5–7.7)
NEUTROPHILS # BLD AUTO: 8.3 X10(3) UL (ref 1.5–7.7)
NEUTROPHILS NFR BLD AUTO: 86.5 %
OSMOLALITY SERPL CALC.SUM OF ELEC: 303 MOSM/KG (ref 275–295)
PHOSPHATE SERPL-MCNC: 2.6 MG/DL (ref 2.5–4.9)
PLATELET # BLD AUTO: 61 10(3)UL (ref 150–450)
POTASSIUM SERPL-SCNC: 3.4 MMOL/L (ref 3.5–5.1)
PROT SERPL-MCNC: 5.3 G/DL (ref 6.4–8.2)
PROTHROMBIN TIME: 15.8 SECONDS (ref 11.6–14.8)
RBC # BLD AUTO: 2.98 X10(6)UL
SODIUM SERPL-SCNC: 143 MMOL/L (ref 136–145)
WBC # BLD AUTO: 9.6 X10(3) UL (ref 4–11)

## 2023-12-28 PROCEDURE — B2151ZZ FLUOROSCOPY OF LEFT HEART USING LOW OSMOLAR CONTRAST: ICD-10-PCS | Performed by: INTERNAL MEDICINE

## 2023-12-28 PROCEDURE — 99253 IP/OBS CNSLTJ NEW/EST LOW 45: CPT | Performed by: STUDENT IN AN ORGANIZED HEALTH CARE EDUCATION/TRAINING PROGRAM

## 2023-12-28 PROCEDURE — 4A033BC MEASUREMENT OF ARTERIAL PRESSURE, CORONARY, PERCUTANEOUS APPROACH: ICD-10-PCS | Performed by: INTERNAL MEDICINE

## 2023-12-28 PROCEDURE — 4A03353 MEASUREMENT OF ARTERIAL FLOW, PULMONARY, PERCUTANEOUS APPROACH: ICD-10-PCS | Performed by: INTERNAL MEDICINE

## 2023-12-28 PROCEDURE — B2111ZZ FLUOROSCOPY OF MULTIPLE CORONARY ARTERIES USING LOW OSMOLAR CONTRAST: ICD-10-PCS | Performed by: INTERNAL MEDICINE

## 2023-12-28 PROCEDURE — 74176 CT ABD & PELVIS W/O CONTRAST: CPT

## 2023-12-28 PROCEDURE — 4A023N8 MEASUREMENT OF CARDIAC SAMPLING AND PRESSURE, BILATERAL, PERCUTANEOUS APPROACH: ICD-10-PCS | Performed by: INTERNAL MEDICINE

## 2023-12-28 PROCEDURE — 99233 SBSQ HOSP IP/OBS HIGH 50: CPT | Performed by: INTERNAL MEDICINE

## 2023-12-28 PROCEDURE — 76705 ECHO EXAM OF ABDOMEN: CPT | Performed by: HOSPITALIST

## 2023-12-28 RX ORDER — HYDROMORPHONE HYDROCHLORIDE 1 MG/ML
0.8 INJECTION, SOLUTION INTRAMUSCULAR; INTRAVENOUS; SUBCUTANEOUS EVERY 2 HOUR PRN
Status: DISCONTINUED | OUTPATIENT
Start: 2023-12-28 | End: 2023-12-29

## 2023-12-28 RX ORDER — NITROGLYCERIN 20 MG/100ML
INJECTION INTRAVENOUS
Status: COMPLETED
Start: 2023-12-28 | End: 2023-12-28

## 2023-12-28 RX ORDER — MIDAZOLAM HYDROCHLORIDE 1 MG/ML
INJECTION INTRAMUSCULAR; INTRAVENOUS
Status: COMPLETED
Start: 2023-12-28 | End: 2023-12-28

## 2023-12-28 RX ORDER — ACETAMINOPHEN 10 MG/ML
15 INJECTION, SOLUTION INTRAVENOUS EVERY 8 HOURS
Status: DISCONTINUED | OUTPATIENT
Start: 2023-12-28 | End: 2023-12-29

## 2023-12-28 RX ORDER — ASPIRIN 81 MG/1
81 TABLET, CHEWABLE ORAL ONCE
Status: COMPLETED | OUTPATIENT
Start: 2023-12-28 | End: 2023-12-28

## 2023-12-28 RX ORDER — VERAPAMIL HYDROCHLORIDE 2.5 MG/ML
INJECTION, SOLUTION INTRAVENOUS
Status: DISCONTINUED
Start: 2023-12-28 | End: 2023-12-28 | Stop reason: WASHOUT

## 2023-12-28 RX ORDER — HYDROMORPHONE HYDROCHLORIDE 1 MG/ML
0.4 INJECTION, SOLUTION INTRAMUSCULAR; INTRAVENOUS; SUBCUTANEOUS EVERY 2 HOUR PRN
Status: DISCONTINUED | OUTPATIENT
Start: 2023-12-28 | End: 2023-12-29

## 2023-12-28 RX ORDER — LIDOCAINE HYDROCHLORIDE 10 MG/ML
INJECTION, SOLUTION EPIDURAL; INFILTRATION; INTRACAUDAL; PERINEURAL
Status: COMPLETED
Start: 2023-12-28 | End: 2023-12-28

## 2023-12-28 RX ORDER — SODIUM CHLORIDE 9 MG/ML
INJECTION, SOLUTION INTRAVENOUS
Status: DISCONTINUED | OUTPATIENT
Start: 2023-12-29 | End: 2023-12-28 | Stop reason: HOSPADM

## 2023-12-28 RX ORDER — SPIRONOLACTONE 25 MG/1
25 TABLET ORAL DAILY
Status: DISCONTINUED | OUTPATIENT
Start: 2023-12-28 | End: 2024-01-02

## 2023-12-28 RX ORDER — HEPARIN SODIUM 5000 [USP'U]/ML
INJECTION, SOLUTION INTRAVENOUS; SUBCUTANEOUS
Status: COMPLETED
Start: 2023-12-28 | End: 2023-12-28

## 2023-12-28 RX ORDER — HYDROMORPHONE HYDROCHLORIDE 1 MG/ML
0.2 INJECTION, SOLUTION INTRAMUSCULAR; INTRAVENOUS; SUBCUTANEOUS EVERY 2 HOUR PRN
Status: DISCONTINUED | OUTPATIENT
Start: 2023-12-28 | End: 2023-12-29

## 2023-12-28 RX ORDER — POTASSIUM CHLORIDE 14.9 MG/ML
20 INJECTION INTRAVENOUS ONCE
Status: COMPLETED | OUTPATIENT
Start: 2023-12-28 | End: 2023-12-28

## 2023-12-28 RX ORDER — VERAPAMIL HYDROCHLORIDE 2.5 MG/ML
INJECTION, SOLUTION INTRAVENOUS
Status: COMPLETED
Start: 2023-12-28 | End: 2023-12-28

## 2023-12-28 NOTE — CM/SW NOTE
ELIZABETH entered DME order. Requested hospitalist to sign order. Verbiage also entered and requested to have MD enter in progress note. ELIZABETH sent order (not signed yet) to [facility] in [place].    Addendum: Progress note entered by hospitalist with necessary DME verbiage. Uploaded in [place]. Await signed DME ELIZABETH order.

## 2023-12-28 NOTE — PLAN OF CARE
Assumed care of patient @ 0730 patient resting in bed, AOX4, reports no pain at this time. Lung sounds clear but diminished bilaterally, O2 saturation adequate on room air. No complaints of shortness of breath or chest pain. Sinus rhythm on tele, S1-S2 present, no adventitious sounds noted. Bowel sounds present and active in all quadrants, last BM 12/27. Up to bathroom with Sarasteady assistive device.      Plan of Care: LFT trends. PO steroids. Start PO torsemide. Monitor electrolytes. Heart cath today. Decide home with equipment vs. Rehab on discharge.      Discussed plan of care with patient, verbalized understanding. Call light within reach.    Approx 1430 pt back from IVS, right groin soft, no hematoma, right wrist with TR band in place.    Approx 1600 pt with new onset severe abdominal pain, cardiology notified, see new orders.     Problem: PAIN - ADULT  Goal: Verbalizes/displays adequate comfort level or patient's stated pain goal  Description: INTERVENTIONS:  - Encourage pt to monitor pain and request assistance  - Assess pain using appropriate pain scale  - Administer analgesics based on type and severity of pain and evaluate response  - Implement non-pharmacological measures as appropriate and evaluate response  - Consider cultural and social influences on pain and pain management  - Manage/alleviate anxiety  - Utilize distraction and/or relaxation techniques  - Monitor for opioid side effects  - Notify MD/LIP if interventions unsuccessful or patient reports new pain  - Anticipate increased pain with activity and pre-medicate as appropriate  Outcome: Progressing     Problem: SAFETY ADULT - FALL  Goal: Free from fall injury  Description: INTERVENTIONS:  - Assess pt frequently for physical needs  - Identify cognitive and physical deficits and behaviors that affect risk of falls.  - [place] fall precautions as indicated by assessment.  - Educate pt/family on patient safety including physical limitations  -  Instruct pt to call for assistance with activity based on assessment  - Modify environment to reduce risk of injury  - Provide assistive devices as appropriate  - Consider OT/PT consult to assist with strengthening/mobility  - Encourage toileting schedule  Outcome: Progressing     Problem: RESPIRATORY - ADULT  Goal: Achieves optimal ventilation and oxygenation  Description: INTERVENTIONS:  - Assess for changes in respiratory status  - Assess for changes in mentation and behavior  - Position to facilitate oxygenation and minimize respiratory effort  - Oxygen supplementation based on oxygen saturation or ABGs  - Provide Smoking Cessation handout, if applicable  - Encourage broncho-pulmonary hygiene including cough, deep breathe, Incentive Spirometry  - Assess the need for suctioning and perform as needed  - Assess and instruct to report SOB or any respiratory difficulty  - Respiratory Therapy support as indicated  - Manage/alleviate anxiety  - Monitor for signs/symptoms of CO2 retention  Outcome: Progressing     Problem: METABOLIC/FLUID AND ELECTROLYTES - ADULT  Goal: Electrolytes maintained within normal limits  Description: INTERVENTIONS:  - Monitor labs and rhythm and assess patient for signs and symptoms of electrolyte imbalances  - Administer electrolyte replacement as ordered  - Monitor response to electrolyte replacements, including rhythm and repeat lab results as appropriate  - Fluid restriction as ordered  - Instruct patient on fluid and nutrition restrictions as appropriate  Outcome: Progressing  Goal: Hemodynamic stability and optimal renal function maintained  Description: INTERVENTIONS:  - Monitor labs and assess for signs and symptoms of volume excess or deficit  - Monitor intake, output and patient weight  - Monitor urine specific gravity, serum osmolarity and serum sodium as indicated or ordered  - Monitor response to interventions for patient's volume status, including labs, urine output, blood  pressure (other measures as available)  - Encourage oral intake as appropriate  - Instruct patient on fluid and nutrition restrictions as appropriate  Outcome: Progressing     Problem: CARDIOVASCULAR - ADULT  Goal: Maintains optimal cardiac output and hemodynamic stability  Description: INTERVENTIONS:  - Monitor vital signs, rhythm, and trends  - Monitor for bleeding, hypotension and signs of decreased cardiac output  - Evaluate effectiveness of vasoactive medications to optimize hemodynamic stability  - Monitor arterial and/or venous puncture sites for bleeding and/or hematoma  - Assess quality of pulses, skin color and temperature  - Assess for signs of decreased coronary artery perfusion - ex. Angina  - Evaluate fluid balance, assess for edema, trend weights  Outcome: Progressing

## 2023-12-28 NOTE — PROGRESS NOTES
[facility]     Hospitalist Progress Note     Alina Aceves Patient Status:  Inpatient    1980 MRN [id number]   Location [facility] 4NW-A Attending Tosha Waite MD   Hosp Day # 15 PCP HOLLY IBARRA     Chief Complaint: Intractable nausea vomiting, diarrhea, generalized weakness.  Recent pneumonia.     Subjective:     Patient feels well and has no complaints.      Objective:    Review of Systems:   A comprehensive review of systems was completed; pertinent positive and negatives stated in subjective.    Vital signs:  Temp:  [97.8 °F (36.6 °C)-98.2 °F (36.8 °C)] 98.2 °F (36.8 °C)  Pulse:  [] 99  Resp:  [16-19] 18  BP: (115-127)/(81-90) 117/87  SpO2:  [91 %-100 %] 91 %    Physical Exam:    /87 (BP Location: Right arm)   Pulse 99   Temp 98.2 °F (36.8 °C) (Oral)   Resp 18   Ht 5' 4\" (1.626 m)   Wt 102 lb 11.8 oz (46.6 kg)   LMP 2023 (Approximate)   SpO2 91%   Breastfeeding No   BMI 17.63 kg/m²     General: No acute distress  Respiratory: Clear to auscultation bilateral   Cardiovascular: S1, S2, regular rate and rhythm  Abdomen: Soft, Non-tender, non-distended  Neuro: Awake alert, lethargic, no new focal deficits.   Extremities: trace+LE edema  but improved since I last saw her; very weak in lower legs/ankles/feet    Diagnostic Data:    Labs:  Recent Labs   Lab 23  0436 23  0522 23  0520   WBC 10.4 9.4 15.4*   HGB 8.8* 8.5* 10.4*   MCV 88.9 87.3 92.9   PLT 75.0* 68.0* 69.0*   INR  --  1.43*  --        Recent Labs   Lab 23  0525 23  0007 23  0520 23  1423 23  0535   *  --  108*  --  95   BUN 84*  --  61*  --  34*   CREATSERUM 1.10*  --  0.80  --  0.67   CA 8.5  --  8.8  --  8.7   ALB 3.7  --  3.7  --  3.0*     --  145  --  143   K 2.4*   < > 2.6* 3.0* 3.4*     --  103  --  104   CO2 32.0  --  38.0*  --  34.0*   ALKPHO 354*  --  311*  --  250*   AST 94*  --  78*  --  68*   *  --  339*  --  248*   BILT 3.6*   --  3.3*  --  2.8*   TP 6.3*  --  6.1*  --  5.3*    < > = values in this interval not displayed.       Estimated Creatinine Clearance: 79.6 mL/min (based on SCr of 0.67 mg/dL).    Microbiology    Hospital Encounter on 12/13/23   1. Body Fluid Cult Aerobic and Anaerobic     Status: None    Collection Time: 12/15/23  3:26 PM    Specimen: Pleural Fluid, Right; Body fluid, unspecified   Result Value Ref Range    Body Fluid Culture Result No Growth 5 Days N/A    Body Fld Smear 4+ Neutrophils seen N/A    Body Fld Smear No organisms seen N/A    Body Fld Smear This is a cytocentrifuged smear. N/A   2. Urine Culture, Routine     Status: None    Collection Time: 12/14/23  3:58 AM    Specimen: Urine, clean catch   Result Value Ref Range    Urine Culture No Growth at 18-24 hrs. N/A   3. Blood Culture     Status: None    Collection Time: 12/13/23  8:10 PM    Specimen: Blood,peripheral   Result Value Ref Range    Blood Culture Result No Growth 5 Days N/A     MRSA PCR nares positive on 12/14/2023    Imaging: Reviewed in Epic.  Chest x-ray done on 12/13/2023 with dense patchy airspace consolidation opacities present within the lungs suspicious for areas of pneumonia  CTA chest done on 12/14/2023 early a.m. shows moderate to large bilateral pleural effusion along with marked consolidation scattered throughout the lungs suggestive of pulmonary edema and fluid overload.  No evidence of pulm embolus.  Moderate large left axillary and left breast fluid collections noted.  Minimal gas in the left most central breast fluid collection, possibility of infection is a consideration.  Sequelae of seroma chronic hematoma is a consideration as well.    Medications:    [START ON 12/29/2023] sodium chloride   Intravenous On Call    spironolactone  25 mg Oral Daily    potassium chloride  20 mEq Intravenous Once    torsemide  20 mg Oral Daily    metoprolol succinate  50 mg Oral 2x Daily(Beta Blocker)    predniSONE  10 mg Oral Daily with breakfast     DULoxetine  30 mg Oral Daily    multivitamin with minerals  1 tablet Oral Daily    pantoprazole  40 mg Oral QAM AC       Assessment & Plan:      # Multifocal pneumonia   -bilateral large bilateral pleural effusion  -s/p left thora 12/15; s/p right thora 12/16; cytology without malignancy  -s/p bronch; Candida   -completed empiric antibiotics : ceftriaxone, doxycycline (12/13-12/18)      #new onset cardiomyopathy RV dysfct severe TR mod pulm HTN  -bumex drip, EF 20-25%, severe diffuse hypokinesis;   on BB & aldactone;   Now on oral Torsemide    #bilateral pleural effusion due to cardiomyopathy and acute systolic heart failure   Status post left thoracentesis on 12/15/2023 and right thoracentesis on 12/16/2023, pulmonary following   MRSA nares came back positive, s/p Bactroban application twice daily for 5 days.       # Acute hypoxic respiratory failure resolved     # Locally advanced stage IIIb breast cancer status post outpatient neoadjuvant chemo and history of left breast lumpectomy and left lymph node resection on 11/16/2023    #LE weakness and painful neuropathy-MRI reviewed and does not show etiology for weakness;  could be chemo toxicity;    # Hyponatremia due to pneumonia and also hypovolemic due to nausea vomiting and diarrhea, siadh, ssri (off ssri now)-resolved    # Hypokalemia-replace per protocol again today 12/26    # acute kidney injury;   hx interstitial nephritis;   resolved     # History of lupus, history of Sjogren's disease  -Takes azathioprine at home which was held at this time due to multifocal pneumonia  - steroids started per rheum; down to 10mg daily per rheum    #elevated LFTs,   improving.    Elevated INR;  s/p vit K and IV NAC;  if LFTs don't improve, might need biopsy    # Gastritis  # Nausea vomiting and diarrhea at home possibly due to effect of chemo  # Anxiety  - PPI    # Anemia and thrombocytopenia due to malignancy and chemo;       # Small left apical pneumothorax on 12/15/2023  status post left thoracentesis  -Pulmonary following, on conservative management, repeat chest x-ray done on 12/15/2023 showed left pneumothorax resolved    Patient requires a semi-electric hospital bed at home due to physical condition in which they are not able to turn self. Positioning in an ordinary bed will be insufficient and requires frequent and/or immediate changes in body positions. Patient requires a wheelchair to complete mobility related ADL's (MRADL) within the home. Patient is unable to complete all MRADL's with a cane or walker.  Patient has a caregiver to propel her in a lightweight wheel chair. Patient has adequate space within their home to safely use the wheelchair. A ed lift has been ordered because a ed lift is needed for transfer between bed and a chair, wheelchair, or commode, and without the use of a lift the patient would be bed confined. A three in one commode has been ordered to assist patient in activities of daily living. Patient is confined to one room of the home.              Supplementary Documentation:     Quality:  DVT Mechanical Prophylaxis:   SCDs, Early ambuation  DVT Pharmacologic Prophylaxis   Medication   None                Code Status: Full Code  Peacock: No urinary catheter in place  Peacock Duration (in days):   Central line:    CHRISTELLE: 12/29/2023    Discharge is dependent on: Clinical progress  At this point Ms. Aceves is expected to be discharge to: To be decided    The 21st Century Cures Act makes medical notes like these available to patients in the interest of transparency. Please be advised this is a medical document. Medical documents are intended to carry relevant information, facts as evident, and the clinical opinion of the practitioner. The medical note is intended as peer to peer communication and may appear blunt or direct. It is written in medical language and may contain abbreviations or verbiage that are unfamiliar.

## 2023-12-28 NOTE — PROGRESS NOTES
[facility]      Assessment/Plan:  Cardiomyopathy / Acute systolic HF, EF 25%: Suspect due to chemotoxicity. Cath 12/28/23 normal coronaries with normal filling pressures and CO. Improving with IV diuresis.     - Net negative 20L and 30+ lbs   - S/P bilat thoracentesis  - GDMT: Toprol, cedric. Entresto next if ok with renal. Eventual SGLT2i   - IV bumex -> now PO torsemide, euvolemic clinically and normal filling pressures  Elevated LFTS: Likely hepatic congestion (severe TR from ventricular dysfunction and pulm HTN). Stable/improving slowly.   REJI: cardiorenal syndrome, ? nephritis   Cr improved   Possible renal biopsy to evaluate for lupus nephritis  Management per nephrology, rheum    Hypokalemia   Increased cedric to 50mg daily  Replace per protocol   5. Resp/PNA: ID, pulm.  Antibiotics.  6. Hyponatremia: Improved.  Renal.  7. Metastatic breast CA:   - s/p neadjuvant chemo (ddAC+T; Adriamycin, cytoxan, taxol), L mastectomy/SLNB  - Per primary and oncology.    Subjective:  -No CP no SOB. S/p cath today.     History of Present Illness:   Alina Aceves is a(n) 43 year old female with SLE and palpitations who was seen in clinic 2 weeks ago.    She has Stage III breast CA.  She has been undergoing chemotherapy, EF 5/23 was 50%. It is now 20-25% % range.    She was admitted with SOB 12/13/23.  She was diagnosed with PNA and pleural effusions.  She also had hyponatremia with a sodium 120.  She was seen by Pulmonology, ID, Hematology, and Nephrology.             Past Medical History:   Past Medical History:   Diagnosis Date    Breast cancer (HCC)     Gastritis     Lupus (HCC)     Sjogren's disease (HCC)        Social History:   Smoking:  None  Alcohol:  None    Family History:   No family history of premature arthrosclerotic heart disease     Medications:   Scheduled:    [START ON 12/29/2023] sodium chloride   Intravenous On Call    spironolactone  25 mg Oral Daily    potassium chloride  20 mEq  Intravenous Once    torsemide  20 mg Oral Daily    metoprolol succinate  50 mg Oral 2x Daily(Beta Blocker)    predniSONE  10 mg Oral Daily with breakfast    DULoxetine  30 mg Oral Daily    multivitamin with minerals  1 tablet Oral Daily    pantoprazole  40 mg Oral QAM AC           PRN Medications:   zolpidem, benzonatate, sodium chloride, HYDROcodone-acetaminophen, calcium carbonate, dextromethorphan-guaiFENesin, ALPRAZolam, LORazepam, metoprolol, ipratropium-albuterol, melatonin, prochlorperazine, polyethylene glycol (PEG 3350), sennosides, bisacodyl, fleet enema, HYDROmorphone **OR** [DISCONTINUED] HYDROmorphone **OR** [DISCONTINUED] HYDROmorphone    Outpatient Medications:   Current Facility-Administered Medications on File Prior to Encounter   Medication Dose Route Frequency Provider Last Rate Last Admin    [COMPLETED] morphINE PF 4 MG/ML injection 4 mg  4 mg Intravenous Once Jesus Manuel Hatfield MD   4 mg at 11/13/23 2255    [COMPLETED] iopamidol 76% (ISOVUE-370) injection for power injector  100 mL Intravenous ONCE PRN Jesus Manuel Hatfield MD   100 mL at 11/13/23 2247    [COMPLETED] potassium chloride (K-Dur) tab 40 mEq  40 mEq Oral Once Jesus Manuel Hatfield MD   40 mEq at 11/14/23 0010     Current Outpatient Medications on File Prior to Encounter   Medication Sig Dispense Refill    Potassium Citrate ER 15 MEQ (1620 MG) Oral Tab CR Take 1 tablet by mouth in the morning, at noon, and at bedtime.      DULoxetine 30 MG Oral Cap DR Particles Take 1 capsule (30 mg total) by mouth daily.      ondansetron (ZOFRAN) 8 MG tablet Take 1 tablet (8 mg total) by mouth as needed.      zolpidem 10 MG Oral Tab Take 1 tablet (10 mg total) by mouth nightly as needed for Sleep.      HYDROcodone-acetaminophen 5-325 MG Oral Tab Take 1 tablet by mouth every 8 (eight) hours as needed. (Patient not taking: Reported on 12/13/2023)      lidocaine-prilocaine 2.5-2.5 % External Cream APPLY SMALL AMOUNT OVER PORT PRIOR TO  ACCESS      azaTHIOprine (IMURAN OR) Take by mouth. (Patient not taking: No sig reported)      Prenatal Vit-DSS-Fe Cbn-FA (PRENATAL AD OR) Take 1 tablet by mouth daily.         Allergies:   Allergies   Allergen Reactions    Bactrim [Sulfamethoxazole W/Trimethoprim] RASH         Physical Exam:   Vitals:    23 1423   BP: 121/85   Pulse:    Resp:    Temp:      Wt Readings from Last 3 Encounters:   23 102 lb 11.8 oz (46.6 kg)   23 120 lb (54.4 kg)   21 135 lb (61.2 kg)           General: Well developed, well nourished female.  Pt is in no acute distress.  HEENT:   Normocephalic.  Atraumatic.  Eyes with no scleral icterus.  Neck: Supple.  No JVD.  Carotids 2+ and equal in symmetric fashion.  No bruits are noted.  Cardiac: Regular rate and rhythm.   There is a normal S1 and S2.  No S3 or S4.  No murmurs, rubs, or gallops.  PMI is non-displaced with a normal apical impulse.  Lungs:  BS L base .few basal crackles  Abdomen: Soft.  Non-distended.  Non-tender.  Bowel sounds are present and normoactive.  No guarding or rebound.   Extremities: Extremities do demonstrate 1+ peripheral edema.   Pedal pulses present  Neurologic: Alert and oriented, normal affect.  No gross deficit appreciated.  Integument:  No visible rashes are appreciated. R Sub Q port       Laboratories and Data:   Labs:    Recent Labs   Lab 23  0525 23  0007 23  0520 23  1423 23  0535   *  --  108*  --  95   BUN 84*  --  61*  --  34*   CREATSERUM 1.10*  --  0.80  --  0.67   CA 8.5  --  8.8  --  8.7   ALB 3.7  --  3.7  --  3.0*     --  145  --  143   K 2.4*   < > 2.6* 3.0* 3.4*     --  103  --  104   CO2 32.0  --  38.0*  --  34.0*   ALKPHO 354*  --  311*  --  250*   AST 94*  --  78*  --  68*   *  --  339*  --  248*   BILT 3.6*  --  3.3*  --  2.8*   TP 6.3*  --  6.1*  --  5.3*    < > = values in this interval not displayed.       Recent Labs   Lab 23  0436 23  0522  12/27/23  0520   RBC 3.06* 2.99* 3.50*   HGB 8.8* 8.5* 10.4*   HCT 27.2* 26.1* 32.5*   MCV 88.9 87.3 92.9   MCH 28.8 28.4 29.7   MCHC 32.4 32.6 32.0   RDW 15.5 16.3 20.8   NEPRELIM 8.86* 8.06* 13.32*   WBC 10.4 9.4 15.4*   PLT 75.0* 68.0* 69.0*       Recent Labs   Lab 12/23/23  0522   PTP 17.5*   INR 1.43*       No results for input(s): \"TROP\", \"CK\" in the last 168 hours.    Diagnostics:   Tele: Sinus tachycardia.  EKG: Sinus tachycardia, non-specific ST/T changes  Echo: Unremarkable by 5/23 echo (EF low normal, mild MR).    12/17/23  Conclusions:     1. Left ventricle: The cavity size was normal. Wall thickness was normal.      Systolic function was markedly reduced. The estimated ejection fraction      was 20-25%, by visual assessment. There was severe diffuse hypokinesis.      Technical limitations of the study preclude regional wall motion      analysis. Unable to assess LV diastolic function due to heart rhythm.   2. Right ventricle: The cavity size was increased. Systolic function was      reduced. The RV free wall longitudinal strain was -16.50%. The tricuspid      annular plane systolic excursion (TAPSE) is 1.56cm.   3. Left atrium: The left atrial volume was moderately increased.   4. Right atrium: The atrium was moderately dilated.   5. Mitral valve: The annulus was dilated. The leaflets were non-specifically      thickened. There was moderate regurgitation, with multiple jets.   6. Tricuspid valve: The annulus is dilated. There was severe regurgitation.   7. Pulmonary arteries: Systolic pressure was moderately to markedly      increased, at least 55mm Hg. Systolic pressure may be underestimated due      to wide tricuspid regurgitation. Estimated pulmonary artery diastolic      pressure was 34mm Hg.   8. Pericardium, extracardiac: A trivial pericardial effusion was identified.      There was a left pleural effusion.

## 2023-12-28 NOTE — OCCUPATIONAL THERAPY NOTE
IP OT treatment attempted. Pt occupied with nursing care, preparing pt for angio. OT continue to follow.

## 2023-12-28 NOTE — PROGRESS NOTES
[facility]                       Gastroenterology Follow up Note - [facility]    Alina Aceves Patient Status:  Inpatient    1980 MRN [id number]   Location [facility] 8NE-A Attending Nicole Cartwright MD   Hosp Day # 14 PCP HOLLY IBARRA     Reason for consultation: Elevated liver enzymes  Subjective: Patient seen and examined.  No overnight events.  Denies abdominal pain, nausea, and emesis.  LFTs downtrending.  Review of Systems:   10 point ROS completed and was negative, except for pertinent positive and negatives stated in subjective.    For PMH, PSH, FHx and SHx- please see initial consult note.     Objective: /87 (BP Location: Left arm)   Pulse 100   Temp 98.2 °F (36.8 °C) (Oral)   Resp 19   Ht 5' 4\" (1.626 m)   Wt 114 lb 6.7 oz (51.9 kg)   LMP 2023 (Approximate)   SpO2 99%   Breastfeeding No   BMI 19.64 kg/m²   Gen: AAO x 3, able to speak in complete sentences.  Sleeping comfortably in bed but easily awoken  Abdomen: Soft, non-tender, non-distended with the presence of bowel sounds; No hepatosplenomegaly; no rebound or guarding; No ascites is clinically apparent; no tympany to percussion  Labs:   Lab Results   Component Value Date    WBC 15.4 2023    HGB 10.4 2023    HCT 32.5 2023    PLT 69.0 2023    CREATSERUM 0.80 2023    BUN 61 2023     2023    K 3.0 2023     2023    CO2 38.0 2023     2023    CA 8.8 2023    ALB 3.7 2023    ALKPHO 311 2023    BILT 3.3 2023    AST 78 2023     2023    MG 1.9 2023    PHOS 2.6 2023     Recent Labs   Lab 23  0629 23  0525 23  0007 23  0520 23  1423   * 144*  --  108*  --    BUN 95* 84*  --  61*  --    CREATSERUM 1.17* 1.10*  --  0.80  --    CA 8.3* 8.5  --  8.8  --     145  --  145  --    K 2.5* 2.4* 3.0* 2.6* 3.0*    103  --  103  --     CO2 27.0 32.0  --  38.0*  --      Recent Labs   Lab 12/27/23  0520   RBC 3.50*   HGB 10.4*   HCT 32.5*   MCV 92.9   MCH 29.7   MCHC 32.0   RDW 20.8   NEPRELIM 13.32*   WBC 15.4*   PLT 69.0*       Recent Labs   Lab 12/25/23  0629 12/26/23  0525 12/27/23  0520   * 424* 339*   AST 89* 94* 78*       Assessment:  Ms Aceves is a 43 year old female with a history of stage IIIb breast cancer and lupus who presented on 12/13 with shortness of breath, found to have pneumonia, pleural effusions (transudative), and HFrEF (EF 20-25%) for whom GI is consulted for abnormal LFTs. These are most likely secondary to congestive hepatopathy. LFTs markedly elevated, but downtrending with diuresis.  Plan:  -daily LFTs  -s/p IV NAC and IV vitamin K  -continued CHF management per cards and primary team  - Consider repeat US abd to reassess GB given possible acute cholecystitis noted on US 12/19 if LFTs worsen  -if LFTs worsen in the setting of diuresis or with persistent elevation of T bili, will consider liver biopsy for further evaluation  -Given improvement in LFTs will continue to follow along peripherally at this time.  Please call back with any further questions or concerns.    Thank you for allowing us to participate in patient's care. Please call us with any questions or concerns.  The GI consult service will continue to follow.     Matt Hall DO  [facility]  [phone]

## 2023-12-28 NOTE — PROGRESS NOTES
[facility]  Nephrology Progress Note    Alina Aceves Attending:  Lenard Rosa DO       Assessment and Plan:    1) REJI- due to cardiorenal syndrome- resolved with HF / volume mgmt    2) HFrEF- EF 20-25% with RV dysfunction + severe TR / mod pul HTN- etiology unclear (chemo?). Diuresing well- down > 30#. Transitioned to PO diuretics (torsemide 20 mg daily)    3) Proteinuria with h/o biopsy proven interstitial nephritis (presumably due to Sjogrens) partially tx'ed with steroids at [facility]    4) Transaminitis- probable hepatic congestion; no biliary issues per GI- improving daily    5) Stage 3 breast CA on chemo (no XRT)- last     6) Pleural effusion s/p bilat thora's- cytology / cx neg    7) SLE / Sjogrens- normally on imuran -> steroids per rheum    For cardiac cath today. Hold off on entresto / WOLFF pending cath; BP much lower now with diuresis      Subjective:  Awake alert in bed in good spirits    Physical Exam:   /87 (BP Location: Right arm)   Pulse 99   Temp 98.2 °F (36.8 °C) (Oral)   Resp 18   Ht 5' 4\" (1.626 m)   Wt 102 lb 11.8 oz (46.6 kg)   LMP 2023 (Approximate)   SpO2 91%   Breastfeeding No   BMI 17.63 kg/m²   Temp (24hrs), Av °F (36.7 °C), Min:97.8 °F (36.6 °C), Max:98.2 °F (36.8 °C)       Intake/Output Summary (Last 24 hours) at 2023 0930  Last data filed at 2023 0910  Gross per 24 hour   Intake 480 ml   Output 2700 ml   Net -2220 ml     Wt Readings from Last 3 Encounters:   23 102 lb 11.8 oz (46.6 kg)   23 120 lb (54.4 kg)   21 135 lb (61.2 kg)     General: awkae alert  HEENT: No scleral icterus, MMM  Neck: Supple, no KALYN or thyromegaly  Cardiac: Regular rate and rhythm, S1, S2 normal, no murmur or tub  Lungs: Decreased BS at bases bilaterally   Abdomen: Soft, non-tender. + bowel sounds, no palpable organomegaly  Extremities: Without clubbing, cyanosis; 2+ LE edema  Neurologic: Cranial nerves grossly intact, moving all extremities  Skin:  Warm and dry, no rashes       Labs:   Lab Results   Component Value Date    CREATSERUM 0.67 12/28/2023    BUN 34 12/28/2023     12/28/2023    K 3.4 12/28/2023     12/28/2023    CO2 34.0 12/28/2023    GLU 95 12/28/2023    CA 8.7 12/28/2023    ALB 3.0 12/28/2023    ALKPHO 250 12/28/2023    BILT 2.8 12/28/2023    TP 5.3 12/28/2023    AST 68 12/28/2023     12/28/2023    PHOS 2.6 12/28/2023       Imaging:  All imaging studies reviewed.    Meds:           Questions/concerns were discussed with patient and/or family by bedside.        Marya Caban MD  12/28/2023  930 AM

## 2023-12-28 NOTE — PROCEDURES
OPERATIVE REPORT - CARDIAC CATHETERIZATION    Date of Procedure: 12/28/2023     Performing Physician: Francisco Lee MD    Pre-Procedure Diagnosis:   1. Acute systolic HF  2. Cardiomyopathy, EF 25%  3. Breast CA s/p chemotherapy    Post-Procedure Diagnosis:   1. Same as pre  2. Normal coronary arteries  3. Normal right heart, pulmonary artery and pulmonary capillary wedge pressures  4. Normal cardiac output    Procedures performed:   1. Right heart catheterization  2. Left heart catheterization  3. Selective bilateral coronary angiography  4. Moderate sedation    Indications: This is a 43 year old year old female with acute systolic HF who is referred for left and right heart catheterization with coronary angiography.     Description of Procedure: Informed consent was obtained from the patient in writing. The risks and benefits of the procedure were reviewed in detail with the patient, including but not limited to the risk of myocardial infarction, stroke, and death. After a thorough discussion of these risks and benefits, the patient agreed to proceed and signed an informed consent document. The patient was brought to the cardiac catheterization laboratory in the fasting state. Moderate sedation was employed using 4 mg of IV Versed and 100 mcg of IV fentanyl given in divided doses.  A trained professional under my supervision and I observed the patient from 1323 until 1357.  All operators present for the case performed standard pre-procedural prep including hand washing, sterile gloves, gown, mask, and cap. All aspects of the maximum sterile barrier technique were followed. A preprocedural time out was performed with all physicians, technologists, and nurses involved with the procedure. 1% lidocaine was infiltrated subcutaneously in the right antecubital fossa, however under ultrasound there were no suitable veins to use for access. We also gave lidocaine to the right wrist for local anesthesia. Access was  obtained in the right radial artery with a 5/6F Slender Glidesheath using the Seldinger technique and a micropuncture needle. Vasodilator cocktail was given with nitroglycerin 100 mcg and verapamil 2.5 mg through the arterial sheath. We then obtained access in the right common femoral vein with ultrasound with a 6/7F Slender Glidesheath using the Seldinger technique. Heparin 3000 units was given. First, a 7F balloon floatation catheter was advanced through the venous sheath to the right atrium, right ventricle, pulmonary artery, and pulmonary capillary wedge positions. Measurements of pressure were taken at each position. Farideh cardiac outputs were calculated. The catheter was then removed under fluoroscopy. A 6F TIG 4  catheter was advanced over a J tipped wire through the arterial sheath and selectively engaged in the left and right coronary arteries. Standard angiographic views were taken in orthogonal projections. The catheter was exchanged for a pigtail and prolapsed over a wire across the aortic valve and pressures were taken in the LV as well as pullback pressures across the aortic valve. LV gram performed. The catheter was then removed over a wire. At the conclusion of the procedure, all catheters and wires were removed. The arterial sheath was removed and hemostasis achieved with standard TR band using patent hemostasis technique. The femoral vein sheath was removed and manual pressure held for 10 minutes. There was no bleeding or hematoma.  The patient tolerated the procedure well without complication. EBL < 10 ml. Total contrast used ~ 95 ml.    Findings:  /1. Hemodynamics:   -- Ao 108/78/91, /1/10, RA 3/2/2, RV 25/0/2, PA 21/6/13, PCW 7/6/5  -- Ao sat 93%, PA sat 63%, Farideh CO 5.9 L/min, CI 4 L/min/m2, PVR 1.3 ANNA, SVR 1200 Dyn    2. Coronary angiography  -- Left main: normal  -- LAD: normal  -- LCX: normal  -- RCA: dominant to PDA, normal    Conclusions:  1. Normal coronary arteries   2. Normal right  heart, pulmonary artery and pulmonary capillary wedge pressures  3. Normal cardiac output    Recommendations:  - Remove the TR band per protocol  - Continue GDMT    Francisco Lee MD  Interventional Cardiology  [facility]  Office: [phone]

## 2023-12-29 ENCOUNTER — ANESTHESIA EVENT (OUTPATIENT)
Dept: SURGERY | Facility: HOSPITAL | Age: 43
DRG: 987 | End: 2023-12-29
Payer: COMMERCIAL

## 2023-12-29 ENCOUNTER — APPOINTMENT (OUTPATIENT)
Dept: GENERAL RADIOLOGY | Facility: HOSPITAL | Age: 43
DRG: 987 | End: 2023-12-29
Attending: STUDENT IN AN ORGANIZED HEALTH CARE EDUCATION/TRAINING PROGRAM
Payer: COMMERCIAL

## 2023-12-29 ENCOUNTER — ANESTHESIA (OUTPATIENT)
Dept: SURGERY | Facility: HOSPITAL | Age: 43
DRG: 987 | End: 2023-12-29
Payer: COMMERCIAL

## 2023-12-29 LAB
ALBUMIN SERPL-MCNC: 3 G/DL (ref 3.4–5)
ALBUMIN/GLOB SERPL: 1.4 {RATIO} (ref 1–2)
ALP LIVER SERPL-CCNC: 538 U/L
ALT SERPL-CCNC: 418 U/L
ANION GAP SERPL CALC-SCNC: 7 MMOL/L (ref 0–18)
AST SERPL-CCNC: 317 U/L (ref 15–37)
BILIRUB SERPL-MCNC: 8.1 MG/DL (ref 0.1–2)
BUN BLD-MCNC: 24 MG/DL (ref 9–23)
CALCIUM BLD-MCNC: 9 MG/DL (ref 8.5–10.1)
CHLORIDE SERPL-SCNC: 103 MMOL/L (ref 98–112)
CO2 SERPL-SCNC: 31 MMOL/L (ref 21–32)
CREAT BLD-MCNC: 0.65 MG/DL
EGFRCR SERPLBLD CKD-EPI 2021: 112 ML/MIN/1.73M2 (ref 60–?)
GLOBULIN PLAS-MCNC: 2.2 G/DL (ref 2.8–4.4)
GLUCOSE BLD-MCNC: 111 MG/DL (ref 70–99)
OSMOLALITY SERPL CALC.SUM OF ELEC: 297 MOSM/KG (ref 275–295)
POTASSIUM SERPL-SCNC: 3.5 MMOL/L (ref 3.5–5.1)
PROT SERPL-MCNC: 5.2 G/DL (ref 6.4–8.2)
SODIUM SERPL-SCNC: 141 MMOL/L (ref 136–145)

## 2023-12-29 PROCEDURE — 0FT44ZZ RESECTION OF GALLBLADDER, PERCUTANEOUS ENDOSCOPIC APPROACH: ICD-10-PCS | Performed by: STUDENT IN AN ORGANIZED HEALTH CARE EDUCATION/TRAINING PROGRAM

## 2023-12-29 PROCEDURE — 99233 SBSQ HOSP IP/OBS HIGH 50: CPT | Performed by: INTERNAL MEDICINE

## 2023-12-29 PROCEDURE — 3074F SYST BP LT 130 MM HG: CPT | Performed by: INTERNAL MEDICINE

## 2023-12-29 PROCEDURE — 3008F BODY MASS INDEX DOCD: CPT | Performed by: INTERNAL MEDICINE

## 2023-12-29 PROCEDURE — 99233 SBSQ HOSP IP/OBS HIGH 50: CPT | Performed by: HOSPITALIST

## 2023-12-29 PROCEDURE — BF121ZZ FLUOROSCOPY OF GALLBLADDER USING LOW OSMOLAR CONTRAST: ICD-10-PCS | Performed by: STUDENT IN AN ORGANIZED HEALTH CARE EDUCATION/TRAINING PROGRAM

## 2023-12-29 PROCEDURE — 3079F DIAST BP 80-89 MM HG: CPT | Performed by: INTERNAL MEDICINE

## 2023-12-29 PROCEDURE — 74300 X-RAY BILE DUCTS/PANCREAS: CPT | Performed by: STUDENT IN AN ORGANIZED HEALTH CARE EDUCATION/TRAINING PROGRAM

## 2023-12-29 RX ORDER — HYDROMORPHONE HYDROCHLORIDE 1 MG/ML
0.4 INJECTION, SOLUTION INTRAMUSCULAR; INTRAVENOUS; SUBCUTANEOUS EVERY 2 HOUR PRN
Status: DISCONTINUED | OUTPATIENT
Start: 2023-12-29 | End: 2024-01-02

## 2023-12-29 RX ORDER — LIDOCAINE HYDROCHLORIDE 10 MG/ML
INJECTION, SOLUTION EPIDURAL; INFILTRATION; INTRACAUDAL; PERINEURAL AS NEEDED
Status: DISCONTINUED | OUTPATIENT
Start: 2023-12-29 | End: 2023-12-29 | Stop reason: SURG

## 2023-12-29 RX ORDER — LIDOCAINE HYDROCHLORIDE AND EPINEPHRINE 10; 10 MG/ML; UG/ML
INJECTION, SOLUTION INFILTRATION; PERINEURAL AS NEEDED
Status: DISCONTINUED | OUTPATIENT
Start: 2023-12-29 | End: 2023-12-29 | Stop reason: HOSPADM

## 2023-12-29 RX ORDER — OXYCODONE HYDROCHLORIDE 5 MG/1
10 TABLET ORAL EVERY 4 HOURS PRN
Status: DISCONTINUED | OUTPATIENT
Start: 2023-12-29 | End: 2024-01-02

## 2023-12-29 RX ORDER — NALOXONE HYDROCHLORIDE 0.4 MG/ML
0.08 INJECTION, SOLUTION INTRAMUSCULAR; INTRAVENOUS; SUBCUTANEOUS AS NEEDED
Status: DISCONTINUED | OUTPATIENT
Start: 2023-12-29 | End: 2023-12-29 | Stop reason: HOSPADM

## 2023-12-29 RX ORDER — DOBUTAMINE HYDROCHLORIDE 200 MG/100ML
INJECTION INTRAVENOUS CONTINUOUS PRN
Status: DISCONTINUED | OUTPATIENT
Start: 2023-12-29 | End: 2023-12-29 | Stop reason: SURG

## 2023-12-29 RX ORDER — DEXAMETHASONE SODIUM PHOSPHATE 4 MG/ML
VIAL (ML) INJECTION AS NEEDED
Status: DISCONTINUED | OUTPATIENT
Start: 2023-12-29 | End: 2023-12-29 | Stop reason: SURG

## 2023-12-29 RX ORDER — DIPHENHYDRAMINE HYDROCHLORIDE 50 MG/ML
12.5 INJECTION INTRAMUSCULAR; INTRAVENOUS AS NEEDED
Status: DISCONTINUED | OUTPATIENT
Start: 2023-12-29 | End: 2023-12-29 | Stop reason: HOSPADM

## 2023-12-29 RX ORDER — ONDANSETRON 2 MG/ML
4 INJECTION INTRAMUSCULAR; INTRAVENOUS EVERY 6 HOURS PRN
Status: DISCONTINUED | OUTPATIENT
Start: 2023-12-29 | End: 2023-12-29 | Stop reason: HOSPADM

## 2023-12-29 RX ORDER — BUPIVACAINE HYDROCHLORIDE 2.5 MG/ML
INJECTION, SOLUTION EPIDURAL; INFILTRATION; INTRACAUDAL AS NEEDED
Status: DISCONTINUED | OUTPATIENT
Start: 2023-12-29 | End: 2023-12-29 | Stop reason: HOSPADM

## 2023-12-29 RX ORDER — MEPERIDINE HYDROCHLORIDE 25 MG/ML
12.5 INJECTION INTRAMUSCULAR; INTRAVENOUS; SUBCUTANEOUS AS NEEDED
Status: DISCONTINUED | OUTPATIENT
Start: 2023-12-29 | End: 2023-12-29 | Stop reason: HOSPADM

## 2023-12-29 RX ORDER — HYDROMORPHONE HYDROCHLORIDE 1 MG/ML
0.8 INJECTION, SOLUTION INTRAMUSCULAR; INTRAVENOUS; SUBCUTANEOUS EVERY 2 HOUR PRN
Status: DISCONTINUED | OUTPATIENT
Start: 2023-12-29 | End: 2024-01-02

## 2023-12-29 RX ORDER — MIDAZOLAM HYDROCHLORIDE 1 MG/ML
1 INJECTION INTRAMUSCULAR; INTRAVENOUS EVERY 5 MIN PRN
Status: DISCONTINUED | OUTPATIENT
Start: 2023-12-29 | End: 2023-12-29 | Stop reason: HOSPADM

## 2023-12-29 RX ORDER — MIDAZOLAM HYDROCHLORIDE 1 MG/ML
INJECTION INTRAMUSCULAR; INTRAVENOUS AS NEEDED
Status: DISCONTINUED | OUTPATIENT
Start: 2023-12-29 | End: 2023-12-29 | Stop reason: SURG

## 2023-12-29 RX ORDER — PHENYLEPHRINE HCL 10 MG/ML
VIAL (ML) INJECTION AS NEEDED
Status: DISCONTINUED | OUTPATIENT
Start: 2023-12-29 | End: 2023-12-29 | Stop reason: SURG

## 2023-12-29 RX ORDER — OXYCODONE HYDROCHLORIDE 5 MG/1
5 TABLET ORAL EVERY 4 HOURS PRN
Status: DISCONTINUED | OUTPATIENT
Start: 2023-12-29 | End: 2024-01-02

## 2023-12-29 RX ORDER — HYDROMORPHONE HYDROCHLORIDE 1 MG/ML
0.2 INJECTION, SOLUTION INTRAMUSCULAR; INTRAVENOUS; SUBCUTANEOUS EVERY 5 MIN PRN
Status: DISCONTINUED | OUTPATIENT
Start: 2023-12-29 | End: 2023-12-29 | Stop reason: HOSPADM

## 2023-12-29 RX ORDER — SODIUM CHLORIDE, SODIUM LACTATE, POTASSIUM CHLORIDE, CALCIUM CHLORIDE 600; 310; 30; 20 MG/100ML; MG/100ML; MG/100ML; MG/100ML
INJECTION, SOLUTION INTRAVENOUS CONTINUOUS
Status: DISCONTINUED | OUTPATIENT
Start: 2023-12-29 | End: 2023-12-29 | Stop reason: HOSPADM

## 2023-12-29 RX ORDER — PROCHLORPERAZINE EDISYLATE 5 MG/ML
5 INJECTION INTRAMUSCULAR; INTRAVENOUS EVERY 8 HOURS PRN
Status: DISCONTINUED | OUTPATIENT
Start: 2023-12-29 | End: 2023-12-29 | Stop reason: HOSPADM

## 2023-12-29 RX ORDER — POLYETHYLENE GLYCOL 3350 17 G/17G
17 POWDER, FOR SOLUTION ORAL DAILY PRN
Qty: 30 EACH | Refills: 0 | Status: SHIPPED | OUTPATIENT
Start: 2023-12-29

## 2023-12-29 RX ORDER — CEFAZOLIN SODIUM/WATER 2 G/20 ML
SYRINGE (ML) INTRAVENOUS AS NEEDED
Status: DISCONTINUED | OUTPATIENT
Start: 2023-12-29 | End: 2023-12-29 | Stop reason: SURG

## 2023-12-29 RX ORDER — HYDROMORPHONE HYDROCHLORIDE 1 MG/ML
0.4 INJECTION, SOLUTION INTRAMUSCULAR; INTRAVENOUS; SUBCUTANEOUS EVERY 5 MIN PRN
Status: DISCONTINUED | OUTPATIENT
Start: 2023-12-29 | End: 2023-12-29 | Stop reason: HOSPADM

## 2023-12-29 RX ORDER — TRAMADOL HYDROCHLORIDE 50 MG/1
50 TABLET ORAL EVERY 6 HOURS PRN
Qty: 20 TABLET | Refills: 0 | Status: SHIPPED | OUTPATIENT
Start: 2023-12-29

## 2023-12-29 RX ORDER — ONDANSETRON 4 MG/1
4 TABLET, ORALLY DISINTEGRATING ORAL EVERY 4 HOURS PRN
Qty: 10 TABLET | Refills: 0 | Status: SHIPPED | OUTPATIENT
Start: 2023-12-29

## 2023-12-29 RX ORDER — HYDROMORPHONE HYDROCHLORIDE 1 MG/ML
0.6 INJECTION, SOLUTION INTRAMUSCULAR; INTRAVENOUS; SUBCUTANEOUS EVERY 5 MIN PRN
Status: DISCONTINUED | OUTPATIENT
Start: 2023-12-29 | End: 2023-12-29 | Stop reason: HOSPADM

## 2023-12-29 RX ORDER — ROCURONIUM BROMIDE 10 MG/ML
INJECTION, SOLUTION INTRAVENOUS AS NEEDED
Status: DISCONTINUED | OUTPATIENT
Start: 2023-12-29 | End: 2023-12-29 | Stop reason: SURG

## 2023-12-29 RX ORDER — SODIUM CHLORIDE, SODIUM LACTATE, POTASSIUM CHLORIDE, CALCIUM CHLORIDE 600; 310; 30; 20 MG/100ML; MG/100ML; MG/100ML; MG/100ML
INJECTION, SOLUTION INTRAVENOUS CONTINUOUS PRN
Status: DISCONTINUED | OUTPATIENT
Start: 2023-12-29 | End: 2023-12-29 | Stop reason: SURG

## 2023-12-29 RX ORDER — ACETAMINOPHEN 500 MG
1000 TABLET ORAL EVERY 8 HOURS SCHEDULED
Status: DISCONTINUED | OUTPATIENT
Start: 2023-12-29 | End: 2024-01-02

## 2023-12-29 RX ORDER — ONDANSETRON 2 MG/ML
INJECTION INTRAMUSCULAR; INTRAVENOUS AS NEEDED
Status: DISCONTINUED | OUTPATIENT
Start: 2023-12-29 | End: 2023-12-29 | Stop reason: SURG

## 2023-12-29 RX ADMIN — MIDAZOLAM HYDROCHLORIDE 2 MG: 1 INJECTION INTRAMUSCULAR; INTRAVENOUS at 19:37:00

## 2023-12-29 RX ADMIN — PHENYLEPHRINE HCL 100 MCG: 10 MG/ML VIAL (ML) INJECTION at 19:45:00

## 2023-12-29 RX ADMIN — DOBUTAMINE HYDROCHLORIDE 1 MCG/KG/MIN: 200 INJECTION INTRAVENOUS at 20:49:00

## 2023-12-29 RX ADMIN — PHENYLEPHRINE HCL 100 MCG: 10 MG/ML VIAL (ML) INJECTION at 19:49:00

## 2023-12-29 RX ADMIN — DOBUTAMINE HYDROCHLORIDE 3 MCG/KG/MIN: 200 INJECTION INTRAVENOUS at 19:46:00

## 2023-12-29 RX ADMIN — CEFAZOLIN SODIUM/WATER 2 G: 2 G/20 ML SYRINGE (ML) INTRAVENOUS at 19:43:00

## 2023-12-29 RX ADMIN — SODIUM CHLORIDE, SODIUM LACTATE, POTASSIUM CHLORIDE, CALCIUM CHLORIDE: 600; 310; 30; 20 INJECTION, SOLUTION INTRAVENOUS at 19:33:00

## 2023-12-29 RX ADMIN — LIDOCAINE HYDROCHLORIDE 50 MG: 10 INJECTION, SOLUTION EPIDURAL; INFILTRATION; INTRACAUDAL; PERINEURAL at 19:39:00

## 2023-12-29 RX ADMIN — DOBUTAMINE HYDROCHLORIDE 2 MCG/KG/MIN: 200 INJECTION INTRAVENOUS at 19:59:00

## 2023-12-29 RX ADMIN — ONDANSETRON 4 MG: 2 INJECTION INTRAMUSCULAR; INTRAVENOUS at 20:49:00

## 2023-12-29 RX ADMIN — DEXAMETHASONE SODIUM PHOSPHATE 4 MG: 4 MG/ML VIAL (ML) INJECTION at 19:44:00

## 2023-12-29 RX ADMIN — ROCURONIUM BROMIDE 50 MG: 10 INJECTION, SOLUTION INTRAVENOUS at 19:39:00

## 2023-12-29 NOTE — PHYSICAL THERAPY NOTE
Chart reviewed, RN approved session. Possible gall bladder surgery today. Patient declined PT session and states \" I don't want to push too much.\" She is worried about increased pain. Patient confirmed that she has been up in the chair daily. PT will follow up at a later date.

## 2023-12-29 NOTE — CONSULTS
[facility]  Report of  Surgical Consultation with History and Physical Exam    Alina Aceves Patient Status:  Inpatient    1980 MRN [id number]   Location [facility] 8NE-A Attending Nicole Cartwright MD   Hosp Day # 15 PCP HOLLY IBARRA     Reason for Surgical Consultation:  I am seeing this patient at the request of Dr. Cartwright for concern of acute cholecystitis     History of Present Illness:  Alina Aceves is a a(n) 43 year old female with a history of breast cancer s/p chemotherapy (last treatment 10/5/2023) and lumpectomy with sentinel lymph node biopsy in 2023, Sjrogren's disease, vasculitis, cardiomyopathy (EF 25%)  who presented to [facility] on 2023 with concerns of shortness of breath. She was found to have pneumonia,pleural effusions and was admitted for further management.    During patient's hospital stay, she was noted to have right upper quadrant abdominal pain. She reports she has been NPO today. She states pain is improved with dilaudid. She denies a history of similar pain in the past. She denies nausea or vomiting. The patient has had elevated LFTs  (Ast 68, , alkaline phosphatase 250 today). A CT abdomen and pelvis was ordered which revealed moderately distended gallbladder, gallbladder wall thickening and possible cholelithiasis vs biliary sludge.     She has a past surgical history of .  History:  Past Medical History:   Diagnosis Date    Breast cancer (HCC)     Gastritis     Lupus (HCC)     Sjogren's disease (HCC)      Past Surgical History:   Procedure Laterality Date    BREAST SURGERY Left      DELIVERY ONLY       History reviewed. No pertinent family history.   reports that she quit smoking about 13 years ago. Her smoking use included cigarettes. She has never used smokeless tobacco. She reports that she does not currently use alcohol. She reports that she does not use drugs.    Allergies:  Allergies   Allergen Reactions    Bactrim  [Sulfamethoxazole W/Trimethoprim] RASH       Medications:    Current Facility-Administered Medications:     spironolactone (Aldactone) tab 25 mg, 25 mg, Oral, Daily    potassium chloride 20 mEq/100mL IVPB premix 20 mEq, 20 mEq, Intravenous, Once    torsemide (Demadex) tab 20 mg, 20 mg, Oral, Daily    metoprolol succinate ER (Toprol XL) 24 hr tab 50 mg, 50 mg, Oral, 2x Daily(Beta Blocker)    zolpidem (Ambien) tab 5 mg, 5 mg, Oral, Nightly PRN    predniSONE (Deltasone) tab 10 mg, 10 mg, Oral, Daily with breakfast    benzonatate (Tessalon) cap 100 mg, 100 mg, Oral, TID PRN    sodium chloride (Saline Mist) 0.65 % nasal solution 1 spray, 1 spray, Each Nare, Q3H PRN    HYDROcodone-acetaminophen (Norco) 5-325 MG per tab 1 tablet, 1 tablet, Oral, Q6H PRN    DULoxetine (Cymbalta) DR cap 30 mg, 30 mg, Oral, Daily    multivitamin with minerals (Thera M Plus) tab 1 tablet, 1 tablet, Oral, Daily    calcium carbonate (Tums) chewable tab 1,000 mg, 1,000 mg, Oral, Q6H PRN    dextromethorphan-guaiFENesin (Robitussin-DM)  mg/5mL oral solution 5 mL, 5 mL, Oral, Q6H PRN    ALPRAZolam (Xanax) tab 0.25 mg, 0.25 mg, Oral, TID PRN    [DISCONTINUED] pantoprazole (Protonix) 40 mg in sodium chloride 0.9% PF 10 mL IV push, 40 mg, Intravenous, QAM AC **OR** pantoprazole (Protonix) DR tab 40 mg, 40 mg, Oral, QAM AC    LORazepam (Ativan) 2 mg/mL injection 0.5 mg, 0.5 mg, Intravenous, Q6H PRN    metoprolol (Lopressor) 5 mg/5mL injection 5 mg, 5 mg, Intravenous, Q6H PRN    ipratropium-albuterol (Duoneb) 0.5-2.5 (3) MG/3ML inhalation solution 3 mL, 3 mL, Nebulization, Q4H PRN    melatonin tab 3 mg, 3 mg, Oral, Nightly PRN    prochlorperazine (Compazine) 10 MG/2ML injection 5 mg, 5 mg, Intravenous, Q8H PRN    polyethylene glycol (PEG 3350) (Miralax) 17 g oral packet 17 g, 17 g, Oral, Daily PRN    sennosides (Senokot) tab 17.2 mg, 17.2 mg, Oral, Nightly PRN    bisacodyl (Dulcolax) 10 MG rectal suppository 10 mg, 10 mg, Rectal, Daily PRN    fleet  enema (Fleet) 7-19 GM/118ML rectal enema 133 mL, 1 enema, Rectal, Once PRN    HYDROmorphone (Dilaudid) 1 MG/ML injection 0.2 mg, 0.2 mg, Intravenous, Q2H PRN **OR** [DISCONTINUED] HYDROmorphone (Dilaudid) 1 MG/ML injection 0.4 mg, 0.4 mg, Intravenous, Q2H PRN **OR** [DISCONTINUED] HYDROmorphone (Dilaudid) 1 MG/ML injection 0.8 mg, 0.8 mg, Intravenous, Q2H PRN    Review of Systems:  Pertinent items are noted in HPI.    Allergic/Immuno:  Review of patient's allergies performed.  Cardiovascular:  Negative for cool extremity and irregular heartbeat/palpitations  Constitutional:  Negative for decreased activity, fever, irritability and lethargy  Endocrine:  Negative for abnormal sleep patterns, increased activity, polydipsia and polyphagia  ENMT:  Negative for ear drainage, hearing loss and nasal drainage  Eyes:  Negative for eye discharge and vision loss  Gastrointestinal:  Positive for abdominal pain.  Negative for constipation, decreased appetite, diarrhea and vomiting  Genitourinary:  Negative for dysuria and hematuria  Hema/Lymph:  Negative for easy bleeding and easy bruising  Integumentary:  Negative for pruritus and rash  Musculoskeletal:  Negative for bone/joint symptoms  Neurological:  Negative for gait disturbance  Psychiatric:  Negative for inappropriate interaction and psychiatric symptoms  Respiratory:  Negative for cough, dyspnea and wheezing      Physical Exam:  Blood pressure (!) 137/99, pulse 97, temperature 98 °F (36.7 °C), temperature source Oral, resp. rate 18, height 64\", weight 102 lb 11.8 oz, last menstrual period 08/28/2023, SpO2 99%, not currently breastfeeding.    General: Alert, Cooperative.  No apparent distress.    HEENT: Exam is unremarkable.  Without scleral icterus.  Mucous membranes are moist. EOM are WNL.    Neck: Full range of motion to flexion and extension, lateral rotation and lateral flexion of cervical spine.  No JVD. Supple.     Lungs: No respiratory distress, effort normal.      Cardiac: Regular rate and rhythm. No murmur.    Abdomen:  abdomen soft, non-distended, tender to palpation in right upper quadrant.     Extremities:  No lower extremity edema noted.  Without clubbing or cyanosis.      Skin: Normal texture and turgor.      Laboratory Data and Relevant X-rays:  Recent Labs   Lab 12/23/23  0522 12/27/23  0520 12/28/23  1643   RBC 2.99* 3.50* 2.98*   HGB 8.5* 10.4* 8.8*   HCT 26.1* 32.5* 28.5*   MCV 87.3 92.9 95.6   MCH 28.4 29.7 29.5   MCHC 32.6 32.0 30.9*   RDW 16.3 20.8 20.0   NEPRELIM 8.06* 13.32* 8.30*   WBC 9.4 15.4* 9.6   PLT 68.0* 69.0* 61.0*       Recent Labs   Lab 12/26/23  0525 12/27/23  0007 12/27/23  0520 12/27/23  1423 12/28/23  0535   *  --  108*  --  95   BUN 84*  --  61*  --  34*   CREATSERUM 1.10*  --  0.80  --  0.67   CA 8.5  --  8.8  --  8.7   ALB 3.7  --  3.7  --  3.0*     --  145  --  143   K 2.4*   < > 2.6* 3.0* 3.4*     --  103  --  104   CO2 32.0  --  38.0*  --  34.0*   ALKPHO 354*  --  311*  --  250*   AST 94*  --  78*  --  68*   *  --  339*  --  248*   BILT 3.6*  --  3.3*  --  2.8*   TP 6.3*  --  6.1*  --  5.3*    < > = values in this interval not displayed.         Recent Labs   Lab 12/23/23  0522 12/28/23  1643   PTP 17.5* 15.8*   INR 1.43* 1.26*         Impression:  Patient Active Problem List   Diagnosis    Breast abscess    Hypokalemia    Hyponatremia    Arthralgia of left forearm    BRCA gene mutation negative in female    Anemia    Metabolic acidosis    Community acquired pneumonia, unspecified laterality    Weakness generalized    Anemia, unspecified type    Malignant neoplasm of female breast (HCC)    Bilateral pleural effusion    SLE (systemic lupus erythematosus related syndrome) (HCC)    Neuropathy    Sjogren's syndrome (HCC)    Proteinuria    REJI (acute kidney injury) (HCC)    SIADH (syndrome of inappropriate ADH production) (HCC)    Cardiomyopathy (HCC)    Elevated liver function tests    HFrEF (heart failure with  reduced ejection fraction) (HCC)    Other forms of systemic lupus erythematosus (HCC)    Weakness of both lower extremities    Stage 3 chronic kidney disease (HCC)    Interstitial nephritis    Cardiorenal syndrome    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)     This on physical exam, is a 43-year-old woman with acute cholecystitis    On physical exam, patient is tender in the right upper quadrant  Ultrasound imaging done on 12/19/2023 showed cholelithiasis  Ultrasound done today showed a distended gallbladder with worsening sludge and wall thickening  With these constellation of symptoms and findings, patient has acute cholecystitis    She was admitted to the hospital on 12/13/2023 and was found to have pneumonia which she has been treated for  During hospitalization, she was diagnosed with cardiomyopathy and acute heart failure with EF of 25%  Patient also has a significant history of SLE/Sjogren's, and is on steroids  Patient also has metastatic breast cancer and recently underwent chemotherapy, last dose was in October    Due to the patient's significant recent and past medical history, I think she would be too high of a risk for surgery at this time  I recommend IR consultation for cholecystostomy tube placement  Once patient is recuperated and optimized from her medical comorbidities, we can plan for interval cholecystectomy at that time    Okay for tonight, n.p.o. at midnight  Consultation for IR already placed  IV antibiotics  Surgery will continue to follow  Rest of care per primary    Thank you for let me participate in the care of this patient      Is this a shared or split note between Advanced Practice Provider and Physician? Yes    Dejah Min MD  [facility] [place] [facility]  12/28/2023  9:42 PM

## 2023-12-29 NOTE — PAYOR COMM NOTE
--------------  12/27- 29 CONTINUED STAY REVIEW    Payor: [employer]/MC[employer]  Subscriber #:  [id number]  Authorization Number: [id number]    12/27:     RENAL:    Assessment and Plan:     1) REJI- due to cardiorenal syndrome +/- underlying nephritis; UA noted with modest proteinuria / microscopic hematuria. PLAN- focus on volume mgmt; may need another renal biopsy to exclude lupus nephritis     2) HFrEF- EF 20-25% with RV dysfunction + severe TR / mod pul HTN- etiology unclear (chemo?). Diuresing well- down > 25#. PLAN- dc bumex gtt; transition to PO diuretics in AM      3) Proteinuria with h/o biopsy proven interstitial nephritis (presumably due to Sjogrens) partially tx'ed with steroids at [facility]     4) Transaminitis- probable hepatic congestion; no biliary issues per GI- improving daily     5) Stage 3 breast CA on chemo (no XRT)- last 9/23     6) Pleural effusion s/p bilat thora's- cytology / cx neg     7) SLE / Sjogrens- normally on imuran -> steroids per rheum     Agree with cardiac cath tomorrow as outlined by cards. Hold off on entresto / WOLFF, etc until after cath.         Subjective:  Awake alert in bed in good spirits     Physical Exam:   /87   Pulse 92   Temp 97.7 °F (36.5 °C) (Oral)   Resp 17   Ht 5' 4\" (1.626 m)   Wt 114 lb 6.7 oz (51.9 kg)   LMP 08/28/2023 (Approximate)   SpO2 100%   Breastfeeding No   BMI 19.64 kg/m²      General: awkae alert  HEENT: No scleral icterus, MMM  Neck: Supple, no KALYN or thyromegaly  Cardiac: Regular rate and rhythm, S1, S2 normal, no murmur or tub  Lungs: Decreased BS at bases bilaterally   Abdomen: Soft, non-tender. + bowel sounds, no palpable organomegaly  Extremities: Without clubbing, cyanosis; 2+ LE edema  Neurologic: Cranial nerves grossly intact, moving all extremities  Skin: Warm and dry, no rashes        Labs:         Lab Results   Component Value Date     WBC 15.4 12/27/2023     HGB 10.4 12/27/2023     HCT 32.5 12/27/2023     PLT 69.0 12/27/2023      CREATSERUM 0.80 12/27/2023     BUN 61 12/27/2023      12/27/2023     K 2.6 12/27/2023      12/27/2023     CO2 38.0 12/27/2023      12/27/2023     CA 8.8 12/27/2023     ALB 3.7 12/27/2023     ALKPHO 311 12/27/2023     BILT 3.3 12/27/2023     TP 6.1 12/27/2023     AST 78 12/27/2023      12/27/2023     MG 1.9 12/27/2023     PHOS 2.6 12/27/2023           HOSPITALIST:    Assessment & Plan:   # Multifocal pneumonia   -bilateral large bilateral pleural effusion  -s/p left thora 12/15; s/p right thora 12/16; cytology without malignancy  -s/p bronch; Candida   -completed empiric antibiotics : ceftriaxone, doxycycline (12/13-12/18)       #new onset cardiomyopathy  -bumex drip, EF 20-25%, severe diffuse hypokinesis;   on BB & aldactone;   GDMT per to cards;   needs cath when optimized     #bilateral pleural effusion due to cardiomyopathy and acute systolic heart failure                 Status post left thoracentesis on 12/15/2023 and right thoracentesis on 12/16/2023, pulmonary following                 MRSA nares came back positive, s/p Bactroban application twice daily for 5 days.                     # Acute hypoxic respiratory failure due to above-off abx; ongoing bumex per renal                   # Locally advanced stage IIIb breast cancer status post outpatient neoadjuvant chemo and history of left breast lumpectomy and left lymph node resection on 11/16/2023     #LE weakness and painful neuropathy-MRI reviewed and does not show etiology for weakness;  could be chemo toxicity;     # Hyponatremia due to pneumonia and also hypovolemic due to nausea vomiting and diarrhea, siadh, ssri (off ssri now)-resolved     # Hypokalemia-replace per protocol again today 12/26     # acute kidney injury; hx interstitial nephritis; might need repeat biopsy at some point; continue bumex; GFR stable; renal following                   # History of lupus, history of Sjogren's disease  -Takes azathioprine at home which  was held at this time due to multifocal pneumonia  - steroids started per rheum; down to 10mg daily per rheum     #elevated LFTs,   improving.    Elevated INR;  s/p vit K and IV NAC;  if LFTs don't improve, might need biopsy     # Gastritis  # Nausea vomiting and diarrhea at home possibly due to effect of chemo  # Anxiety  - PPI     # Anemia and thrombocytopenia due to malignancy and chemo;     CARDS:    Assessment/Plan:  Cardiomyopathy / Acute systolic HF, EF 25%: Suspect due to chemotoxicity. Improving with IV diuresis.                   - Net negative 20L and 30+ lbs   - S/P bilat thoracentesis  - GDMT: change lopressor to Toprol, continue cedric, eventual Entresto, SGLT2i   - IV bumex -> PO torsemide   - Plan for R/LHC this week  Elevated LFTS: Likely hepatic congestion (severe TR from ventricular dysfunction and pulm HTN). Stable/improving slowly.   REJI: cardiorenal syndrome, ? nephritis                 Cr improved                 Possible renal biopsy to evaluate for lupus nephritis  Management per nephrology, rheum    Hypokalemia                 Increase cedric to 50mg daily  Replace per protocol   5. Resp/PNA: ID, pulm.  Antibiotics.  6. Hyponatremia: Improved.  Renal.  7. Metastatic breast CA:   - s/p neadjuvant chemo (ddAC+T; Adriamycin, cytoxan, taxol), L mastectomy/SLNB  - Per primary and oncology.     Subjective:  -No CP no SOB        12/28:     RENAL:    Assessment and Plan:     1) REJI- due to cardiorenal syndrome- resolved with HF / volume mgmt     2) HFrEF- EF 20-25% with RV dysfunction + severe TR / mod pul HTN- etiology unclear (chemo?). Diuresing well- down > 30#. Transitioned to PO diuretics (torsemide 20 mg daily)     3) Proteinuria with h/o biopsy proven interstitial nephritis (presumably due to Sjogrens) partially tx[facility]ed with [facility] at [facility]     4) Transaminitis- probable hepatic congestion; no biliary issues per GI- improving daily     5) Stage 3 breast CA on chemo (no XRT)- last 9/23     6)  Pleural effusion s/p bilat thora's- cytology / cx neg     7) SLE / Sjogrens- normally on imuran -> steroids per rheum     For cardiac cath today. Hold off on entresto / WOLFF pending cath; BP much lower now with diuresis        Subjective:  Awake alert in bed in good spirits     Physical Exam:   /87 (BP Location: Right arm)   Pulse 99   Temp 98.2 °F (36.8 °C) (Oral)   Resp 18   Ht 5' 4\" (1.626 m)   Wt 102 lb 11.8 oz (46.6 kg)   LMP 2023 (Approximate)   SpO2 91%   Breastfeeding No   BMI 17.63 kg/m²   Temp (24hrs), Av °F (36.7 °C), Min:97.8 °F (36.6 °C), Max:98.2 °F (36.8 °C)          Wt Readings from Last 3 Encounters:   23 102 lb 11.8 oz (46.6 kg)   23 120 lb (54.4 kg)   21 135 lb (61.2 kg)      General: awkae alert  HEENT: No scleral icterus, MMM  Neck: Supple, no KALYN or thyromegaly  Cardiac: Regular rate and rhythm, S1, S2 normal, no murmur or tub  Lungs: Decreased BS at bases bilaterally   Abdomen: Soft, non-tender. + bowel sounds, no palpable organomegaly  Extremities: Without clubbing, cyanosis; 2+ LE edema  Neurologic: Cranial nerves grossly intact, moving all extremities  Skin: Warm and dry, no rashes      Labs:         Lab Results   Component Value Date     CREATSERUM 0.67 2023     BUN 34 2023      2023     K 3.4 2023      2023     CO2 34.0 2023     GLU 95 2023     CA 8.7 2023     ALB 3.0 2023     ALKPHO 250 2023     BILT 2.8 2023     TP 5.3 2023     AST 68 2023      2023     PHOS 2.6 2023         OPERATIVE REPORT - CARDIAC CATHETERIZATION     Date of Procedure: 2023      Performing Physician: Francisco Lee MD     Pre-Procedure Diagnosis:   1. Acute systolic HF  2. Cardiomyopathy, EF 25%  3. Breast CA s/p chemotherapy     Post-Procedure Diagnosis:   1. Same as pre  2. Normal coronary arteries  3. Normal right heart, pulmonary artery and pulmonary  capillary wedge pressures  4. Normal cardiac output     Procedures performed:   1. Right heart catheterization  2. Left heart catheterization  3. Selective bilateral coronary angiography  4. Moderate sedation     Indications: This is a 43 year old year old female with acute systolic HF who is referred for left and right heart catheterization with coronary angiography.      Description of Procedure: Informed consent was obtained from the patient in writing. The risks and benefits of the procedure were reviewed in detail with the patient, including but not limited to the risk of myocardial infarction, stroke, and death. After a thorough discussion of these risks and benefits, the patient agreed to proceed and signed an informed consent document. The patient was brought to the cardiac catheterization laboratory in the fasting state. Moderate sedation was employed using 4 mg of IV Versed and 100 mcg of IV fentanyl given in divided doses.  A trained professional under my supervision and I observed the patient from 1323 until 1357.  All operators present for the case performed standard pre-procedural prep including hand washing, sterile gloves, gown, mask, and cap. All aspects of the maximum sterile barrier technique were followed. A preprocedural time out was performed with all physicians, technologists, and nurses involved with the procedure. 1% lidocaine was infiltrated subcutaneously in the right antecubital fossa, however under ultrasound there were no suitable veins to use for access. We also gave lidocaine to the right wrist for local anesthesia. Access was obtained in the right radial artery with a 5/6F Slender Glidesheath using the Seldinger technique and a micropuncture needle. Vasodilator cocktail was given with nitroglycerin 100 mcg and verapamil 2.5 mg through the arterial sheath. We then obtained access in the right common femoral vein with ultrasound with a 6/7F Slender Glidesheath using the Seldinger  technique. Heparin 3000 units was given. First, a 7F balloon floatation catheter was advanced through the venous sheath to the right atrium, right ventricle, pulmonary artery, and pulmonary capillary wedge positions. Measurements of pressure were taken at each position. Farideh cardiac outputs were calculated. The catheter was then removed under fluoroscopy. A 6F TIG 4  catheter was advanced over a J tipped wire through the arterial sheath and selectively engaged in the left and right coronary arteries. Standard angiographic views were taken in orthogonal projections. The catheter was exchanged for a pigtail and prolapsed over a wire across the aortic valve and pressures were taken in the LV as well as pullback pressures across the aortic valve. LV gram performed. The catheter was then removed over a wire. At the conclusion of the procedure, all catheters and wires were removed. The arterial sheath was removed and hemostasis achieved with standard TR band using patent hemostasis technique. The femoral vein sheath was removed and manual pressure held for 10 minutes. There was no bleeding or hematoma.  The patient tolerated the procedure well without complication. EBL < 10 ml. Total contrast used ~ 95 ml.     Findings:  /1. Hemodynamics:   -- Ao 108/78/91, /1/10, RA 3/2/2, RV 25/0/2, PA 21/6/13, PCW 7/6/5  -- Ao sat 93%, PA sat 63%, Farideh CO 5.9 L/min, CI 4 L/min/m2, PVR 1.3 ANNA, SVR 1200 Dyn     2. Coronary angiography  -- Left main: normal  -- LAD: normal  -- LCX: normal  -- RCA: dominant to PDA, normal     Conclusions:  1. Normal coronary arteries   2. Normal right heart, pulmonary artery and pulmonary capillary wedge pressures  3. Normal cardiac output     Recommendations:  - Remove the TR band per protocol  - Continue GDMT      SURGERY:    Alina Aceves is a a(n) 43 year old female with a history of breast cancer s/p chemotherapy (last treatment 10/5/2023) and lumpectomy with sentinel lymph node biopsy in  11/16/2023, Sjrogren's disease, vasculitis, cardiomyopathy (EF 25%)  who presented to [facility] on 12/13/2023 with concerns of shortness of breath. She was found to have pneumonia,pleural effusions and was admitted for further management.     During patient's hospital stay, she was noted to have right upper quadrant abdominal pain. She reports she has been NPO today. She states pain is improved with dilaudid. She denies a history of similar pain in the past. She denies nausea or vomiting. The patient has had elevated LFTs  (Ast 68, , alkaline phosphatase 250 today). A CT abdomen and pelvis was ordered which revealed moderately distended gallbladder, gallbladder wall thickening and possible cholelithiasis vs biliary sludge.     Recent Labs   Lab 12/23/23  0522 12/27/23  0520 12/28/23  1643   RBC 2.99* 3.50* 2.98*   HGB 8.5* 10.4* 8.8*   HCT 26.1* 32.5* 28.5*   MCV 87.3 92.9 95.6   MCH 28.4 29.7 29.5   MCHC 32.6 32.0 30.9*   RDW 16.3 20.8 20.0   NEPRELIM 8.06* 13.32* 8.30*   WBC 9.4 15.4* 9.6   PLT 68.0* 69.0* 61.0*      Lab 12/26/23  0525 12/27/23  0007 12/27/23  0520 12/27/23  1423 12/28/23  0535   *  --  108*  --  95   BUN 84*  --  61*  --  34*   CREATSERUM 1.10*  --  0.80  --  0.67   CA 8.5  --  8.8  --  8.7   ALB 3.7  --  3.7  --  3.0*     --  145  --  143   K 2.4*   < > 2.6* 3.0* 3.4*     --  103  --  104   CO2 32.0  --  38.0*  --  34.0*   ALKPHO 354*  --  311*  --  250*   AST 94*  --  78*  --  68*   *  --  339*  --  248*   BILT 3.6*  --  3.3*  --  2.8*   TP 6.3*  --  6.1*  --  5.3*     Lab 12/23/23  0522 12/28/23  1643   PTP 17.5* 15.8*   INR 1.43* 1.26*      his on physical exam, is a 43-year-old woman with acute cholecystitis     On physical exam, patient is tender in the right upper quadrant  Ultrasound imaging done on 12/19/2023 showed cholelithiasis  Ultrasound done today showed a distended gallbladder with worsening sludge and wall thickening  With these  constellation of symptoms and findings, patient has acute cholecystitis     She was admitted to the hospital on 2023 and was found to have pneumonia which she has been treated for  During hospitalization, she was diagnosed with cardiomyopathy and acute heart failure with EF of 25%  Patient also has a significant history of SLE/Sjogren's, and is on steroids  Patient also has metastatic breast cancer and recently underwent chemotherapy, last dose was in October     Due to the patient's significant recent and past medical history, I think she would be too high of a risk for surgery at this time  I recommend IR consultation for cholecystostomy tube placement  Once patient is recuperated and optimized from her medical comorbidities, we can plan for interval cholecystectomy at that time     Okay for tonight, n.p.o. at midnight  Consultation for IR already placed  IV antibiotics  Surgery will continue to follow  Rest of care per primary      NURSIN: Pt c/o severe abdominal pain and nausea. Orders from Dr Gan to increase dilauded. Orders from Dr Min for IV Ofirmev Q8       HOSPITALIST:    Patient w/ abdominal pain requiring dilaudid IV overnight.  NPO for procedure and general surgery evaluated yesterday evening.       Vital signs:  Temp:  [96.9 °F (36.1 °C)-98 °F (36.7 °C)] 97.6 °F (36.4 °C)  Pulse:  [] 90  Resp:  [16-18] 18  BP: (112-140)/(80-99) 130/87  SpO2:  [93 %-100 %] 98 %     Physical Exam:    /87 (BP Location: Left arm)   Pulse 90   Temp 97.6 °F (36.4 °C) (Oral)   Resp 18   Ht 5' 4\" (1.626 m)   Wt 102 lb 11.8 oz (46.6 kg)   LMP 2023 (Approximate)   SpO2 98%   Breastfeeding No   BMI 17.63 kg/m²      General: No acute distress 43 year old female ill appearing  Respiratory: Clear to auscultation on RA  Cardiovascular: S1, S2, regular rate and rhythm 90s  Abdomen: Soft, tender, non-distended  Neuro: Awake alert, fatigued.  Extremities: edema resolving     Recent  Labs   Lab 12/27/23  0520 12/27/23  1423 12/28/23  0535 12/29/23  0519   *  --  95 111*   BUN 61*  --  34* 24*   CREATSERUM 0.80  --  0.67 0.65   CA 8.8  --  8.7 9.0   ALB 3.7  --  3.0* 3.0*     --  143 141   K 2.6* 3.0* 3.4* 3.5     --  104 103   CO2 38.0*  --  34.0* 31.0   ALKPHO 311*  --  250* 538*   AST 78*  --  68* 317*   *  --  248* 418*   BILT 3.3*  --  2.8* 8.1*   TP 6.1*  --  5.3* 5.2*          Medications:    piperacillin-tazobactam  3.375 g Intravenous Q8H    spironolactone  25 mg Oral Daily    acetaminophen  15 mg/kg Intravenous Q8H    torsemide  20 mg Oral Daily    metoprolol succinate  50 mg Oral 2x Daily(Beta Blocker)    predniSONE  10 mg Oral Daily with breakfast    DULoxetine  30 mg Oral Daily    multivitamin with minerals  1 tablet Oral Daily    pantoprazole  40 mg Oral QAM AC          Assessment & Plan:   #Acute cholecystitis  #Transaminitis 2/2 above   -start Zosyn IV as reviewed w/ surgery  - NPO, pending OR today  - imaging reviewed  - pain mgmt/antiemetics prn     # Multifocal pneumonia, resolved  -completed ceftriaxone, doxycycline (12/13-12/18)   -s/p bronch; Candida      # New onset CMP RV dysfct severe TR mod pulm HTN  # Bilateral large bilateral pleural effusion 2/2 Acute systolic heart failure  -s/p left thora 12/15; s/p right thora 12/16; cytology without malignancy  - EF 20-25%, severe diffuse hypokinesis;   - On toprol XL, aldactone, demadex per cards     #LE weakness/neuropathy  -MRI reviewed and does not show etiology for weakness;  could be chemo toxicity; back on cymbalta per neuro     # History of lupus, history of Sjogren's disease  -Takes azathioprine at home which was held at this time due to multifocal pneumonia then acute reyes  - steroids per rheum     # Acute hypoxic respiratory failure resolved  # Acute kidney injury, resolved  # hx interstitial nephritis  # Locally advanced stage IIIb breast cancer status post outpatient neoadjuvant chemo and  history of left breast lumpectomy and left lymph node resection on 11/16/2023  # Elevated INR, improved  # Gastritis- PPI  # N/V, diarrhea at home possibly due to effect of chemo  # Anxiety  # Hypokalemia, resolved  # Hyponatremia due to pneumonia and also hypovolemic due to nausea vomiting and diarrhea, siadh, ssri (off ssri initially but then resumed on 12/21 after review w/ nephro.)  # Anemia and thrombocytopenia due to malignancy and chemo; monitor  # Small left apical pneumothorax status post left thoracentesis, repeat chest x-ray done on 12/15/2023 showed left pneumothorax resolved  # MRSA nares     Case d/w pt, RN, nephMD chel, surgery and updated cards.  Pending OR.      MEDICATIONS ADMINISTERED IN LAST 1 DAY:  acetaminophen (Ofirmev) 10 mg/mL infusion premix 700 mg       Date Action Dose Route User    12/29/2023 0841 New Bag 700 mg Intravenous Dianne Paiz RN    12/28/2023 2344 New Bag 700 mg Intravenous Lavonne Cornell RN          HYDROcodone-acetaminophen (Norco) 5-325 MG per tab 1 tablet       Date Action Dose Route User    12/28/2023 2016 Given 1 tablet Oral Lavonne Cornell RN          HYDROmorphone (Dilaudid) 1 MG/ML injection 0.2 mg       Date Action Dose Route User    12/28/2023 1837 Given 0.2 mg Intravenous Paulette Berman RN          HYDROmorphone (Dilaudid) 1 MG/ML injection 0.4 mg       Date Action Dose Route User    12/29/2023 0525 Given 0.4 mg Intravenous Lavonne Cornell RN    12/28/2023 2210 Given 0.4 mg Intravenous Bethany Soto RN          iohexol (OMNIPAQUE) 350 MG/ML injection 100 mL       Date Action Dose Route User    12/28/2023 1404 Given 95 mL Injection Francisco Lee MD          metoprolol succinate ER (Toprol XL) 24 hr tab 50 mg       Date Action Dose Route User    12/29/2023 0525 Given 50 mg Oral Lavonne Cornell RN    12/28/2023 2347 Given 50 mg Oral Lavonne Cornell RN          pantoprazole (Protonix) DR tab 40 mg       Date Action Dose Route User    12/29/2023 0525 Given 40  mg Oral Lavonne Cornell RN          piperacillin-tazobactam (Zosyn) 3.375 g in dextrose 5% 100 mL IVPB-ADDV       Date Action Dose Route User    12/29/2023 0902 New Bag 3.375 g Intravenous MarciniDianne roman RN          potassium chloride 20 mEq/100mL IVPB premix 20 mEq       Date Action Dose Route User    12/28/2023 1841 New Bag 20 mEq Intravenous Paulette Berman, JULIETTE          prochlorperazine (Compazine) 10 MG/2ML injection 5 mg       Date Action Dose Route User    12/28/2023 2214 Given 5 mg Intravenous Bethany Soto RN          zolpidem (Ambien) tab 5 mg       Date Action Dose Route User    12/28/2023 2016 Given 5 mg Oral Lavonne Cornell RN            Vitals (last day)       Date/Time Temp Pulse Resp BP SpO2 Weight O2 Device O2 Flow Rate (L/min) [facility]    12/29/23 1100 -- 88 -- -- 100 % -- -- -- NE    12/29/23 0854 97.6 °F (36.4 °C) 100 18 125/84 100 % -- None (Room air) 0 L/min NE    12/29/23 0646 -- 90 -- -- 98 % -- -- -- NE    12/29/23 0426 97.6 °F (36.4 °C) 97 18 130/87 100 % -- None (Room air) --     12/28/23 2323 -- 95 -- -- -- -- -- --     12/28/23 2322 97.4 °F (36.3 °C) 93 18 112/80 100 % -- -- --     12/28/23 1959 96.9 °F (36.1 °C) 105 16 127/80 93 % -- None (Room air) 0 L/min TP    12/28/23 1815 -- 97 -- 137/99 -- -- -- -- MS    12/28/23 1700 -- 95 -- 124/81 99 % -- -- -- MS    12/28/23 1607 98 °F (36.7 °C) 103 18 140/97 100 % -- None (Room air) 0 L/min NE    12/28/23 1544 -- 95 -- 135/95 100 % -- -- -- MS    12/28/23 1515 -- 90 -- 131/91 100 % -- -- -- MS    12/28/23 1500 -- 89 -- 127/88 99 % -- -- -- MS    12/28/23 1445 -- 88 -- 125/84 99 % -- -- -- MS    12/28/23 1430 -- 88 -- 121/85 -- -- -- -- MS    12/28/23 1423 -- -- -- 121/85 -- -- -- -- NE    12/28/23 0807 98.2 °F (36.8 °C) 99 18 117/87 91 % -- None (Room air) 0 L/min NE    12/28/23 0437 97.9 °F (36.6 °C) 102 17 115/84 95 % 102 lb 11.8 oz None (Room air) -- KK

## 2023-12-29 NOTE — CM/SW NOTE
DME:  sent signed physician order and note for equipment to E via [employer]    Called pt in her room and she reports she is only interested in commode and wheelchair.  She does not have room for a hospital bed.  She will sleep in her recliner for now.    / to remain available for support and/or discharge planning.     Sonal Shane MBA MSN, RN CTL/  [id number]

## 2023-12-29 NOTE — PROGRESS NOTES
[facility]      Assessment/Plan:  Cardiac Risk for Cholecystectomy: Given normal coronaries and normal filling pressures, she is low risk for perioperative MI.  EF remains low but she is compensated and without SOB/VALDES.  Fluid management to prevent overload suggested but she does not need further testing or medical management prior to surgery.  We will follow with you postoperatively.  Cardiomyopathy / Acute systolic HF, EF 25%: Suspect due to chemotoxicity. Cath 12/28/23 normal coronaries with normal filling pressures and CO. Improving with IV diuresis.     - Net negative 20L and 30+ lbs   - S/P bilat thoracentesis  - GDMT: Toprol, cedric. Entresto next if ok with renal. Eventual SGLT2i   - IV bumex -> now PO torsemide, euvolemic clinically and normal filling pressures  Elevated LFTS: Likely hepatic congestion (severe TR from ventricular dysfunction and pulm HTN). Stable/improving slowly.   REJI: cardiorenal syndrome, ? nephritis   Cr improved   Possible renal biopsy to evaluate for lupus nephritis  Management per nephrology, rheum    Hypokalemia   Increased cedric to 50mg daily  Replace per protocol   5. Resp/PNA: ID, pulm.  Antibiotics.  6. Hyponatremia: Improved.  Renal.  7. Metastatic breast CA:   - s/p neadjuvant chemo (ddAC+T; Adriamycin, cytoxan, taxol), L mastectomy/SLNB  - Per primary and oncology.    Subjective:  -No CP no SOB. S/p cath yesterday.  -Abdominal pain.  Plan for reyes today.    History of Present Illness:   Alina Aceves is a(n) 43 year old female with SLE and palpitations who was seen in clinic 2 weeks ago.    She has Stage III breast CA.  She has been undergoing chemotherapy, EF 5/23 was 50%. It is now 20-25% % range.    She was admitted with SOB 12/13/23.  She was diagnosed with PNA and pleural effusions.  She also had hyponatremia with a sodium 120.  She was seen by Pulmonology, ID, Hematology, and Nephrology.             Past Medical History:   Past Medical History:    Diagnosis Date    Breast cancer (HCC)     Gastritis     Lupus (HCC)     Sjogren's disease (HCC)        Social History:   Smoking:  None  Alcohol:  None    Family History:   No family history of premature arthrosclerotic heart disease     Medications:   Scheduled:    piperacillin-tazobactam  3.375 g Intravenous Q8H    [Held by provider] spironolactone  25 mg Oral Daily    acetaminophen  15 mg/kg Intravenous Q8H    [Held by provider] torsemide  20 mg Oral Daily    metoprolol succinate  50 mg Oral 2x Daily(Beta Blocker)    predniSONE  10 mg Oral Daily with breakfast    DULoxetine  30 mg Oral Daily    multivitamin with minerals  1 tablet Oral Daily    pantoprazole  40 mg Oral QAM AC           PRN Medications:   HYDROmorphone **OR** HYDROmorphone **OR** HYDROmorphone, zolpidem, benzonatate, sodium chloride, HYDROcodone-acetaminophen, calcium carbonate, dextromethorphan-guaiFENesin, ALPRAZolam, LORazepam, metoprolol, ipratropium-albuterol, melatonin, prochlorperazine, polyethylene glycol (PEG 3350), sennosides, bisacodyl, fleet enema    Outpatient Medications:   Current Facility-Administered Medications on File Prior to Encounter   Medication Dose Route Frequency Provider Last Rate Last Admin    [COMPLETED] morphINE PF 4 MG/ML injection 4 mg  4 mg Intravenous Once Jesus Manuel Hatfield MD   4 mg at 11/13/23 2255    [COMPLETED] iopamidol 76% (ISOVUE-370) injection for power injector  100 mL Intravenous ONCE PRN Jesus Manuel Hatfield MD   100 mL at 11/13/23 2247    [COMPLETED] potassium chloride (K-Dur) tab 40 mEq  40 mEq Oral Once Jesus Manuel Hatfield MD   40 mEq at 11/14/23 0010     Current Outpatient Medications on File Prior to Encounter   Medication Sig Dispense Refill    Potassium Citrate ER 15 MEQ (1620 MG) Oral Tab CR Take 1 tablet by mouth in the morning, at noon, and at bedtime.      DULoxetine 30 MG Oral Cap DR Particles Take 1 capsule (30 mg total) by mouth daily.      ondansetron (ZOFRAN) 8 MG  tablet Take 1 tablet (8 mg total) by mouth as needed.      zolpidem 10 MG Oral Tab Take 1 tablet (10 mg total) by mouth nightly as needed for Sleep.      HYDROcodone-acetaminophen 5-325 MG Oral Tab Take 1 tablet by mouth every 8 (eight) hours as needed. (Patient not taking: Reported on 2023)      lidocaine-prilocaine 2.5-2.5 % External Cream APPLY SMALL AMOUNT OVER PORT PRIOR TO ACCESS      azaTHIOprine (IMURAN OR) Take by mouth. (Patient not taking: No sig reported)      Prenatal Vit-DSS-Fe Cbn-FA (PRENATAL AD OR) Take 1 tablet by mouth daily.         Allergies:   Allergies   Allergen Reactions    Bactrim [Sulfamethoxazole W/Trimethoprim] RASH         Physical Exam:   Vitals:    23 1100   BP:    Pulse: 88   Resp:    Temp:      Wt Readings from Last 3 Encounters:   23 102 lb 11.8 oz (46.6 kg)   23 120 lb (54.4 kg)   21 135 lb (61.2 kg)           General: Well developed, well nourished female.  Pt is in no acute distress.  HEENT:   Normocephalic.  Atraumatic.  Eyes with no scleral icterus.  Neck: Supple.  No JVD.  Carotids 2+ and equal in symmetric fashion.  No bruits are noted.  Cardiac: Regular rate and rhythm.   There is a normal S1 and S2.  No S3 or S4.  No murmurs, rubs, or gallops.  PMI is non-displaced with a normal apical impulse.  Lungs:  BS L base .few basal crackles  Abdomen: Soft.  Non-distended.  Non-tender.  Bowel sounds are present and normoactive.  No guarding or rebound.   Extremities: Extremities do demonstrate 1+ peripheral edema.   Pedal pulses present  Neurologic: Alert and oriented, normal affect.  No gross deficit appreciated.  Integument:  No visible rashes are appreciated. R Sub Q port       Laboratories and Data:   Labs:    Recent Labs   Lab 23  0520 23  1423 23  0535 23  0519   *  --  95 111*   BUN 61*  --  34* 24*   CREATSERUM 0.80  --  0.67 0.65   CA 8.8  --  8.7 9.0   ALB 3.7  --  3.0* 3.0*     --  143 141   K  2.6* 3.0* 3.4* 3.5     --  104 103   CO2 38.0*  --  34.0* 31.0   ALKPHO 311*  --  250* 538*   AST 78*  --  68* 317*   *  --  248* 418*   BILT 3.3*  --  2.8* 8.1*   TP 6.1*  --  5.3* 5.2*       Recent Labs   Lab 12/23/23  0522 12/27/23  0520 12/28/23  1643   RBC 2.99* 3.50* 2.98*   HGB 8.5* 10.4* 8.8*   HCT 26.1* 32.5* 28.5*   MCV 87.3 92.9 95.6   MCH 28.4 29.7 29.5   MCHC 32.6 32.0 30.9*   RDW 16.3 20.8 20.0   NEPRELIM 8.06* 13.32* 8.30*   WBC 9.4 15.4* 9.6   PLT 68.0* 69.0* 61.0*       Recent Labs   Lab 12/23/23  0522 12/28/23  1643   PTP 17.5* 15.8*   INR 1.43* 1.26*       No results for input(s): \"TROP\", \"CK\" in the last 168 hours.    Diagnostics:   Tele: Sinus tachycardia.  EKG: Sinus tachycardia, non-specific ST/T changes  Echo: Unremarkable by 5/23 echo (EF low normal, mild MR).    12/17/23  Conclusions:     1. Left ventricle: The cavity size was normal. Wall thickness was normal.      Systolic function was markedly reduced. The estimated ejection fraction      was 20-25%, by visual assessment. There was severe diffuse hypokinesis.      Technical limitations of the study preclude regional wall motion      analysis. Unable to assess LV diastolic function due to heart rhythm.   2. Right ventricle: The cavity size was increased. Systolic function was      reduced. The RV free wall longitudinal strain was -16.50%. The tricuspid      annular plane systolic excursion (TAPSE) is 1.56cm.   3. Left atrium: The left atrial volume was moderately increased.   4. Right atrium: The atrium was moderately dilated.   5. Mitral valve: The annulus was dilated. The leaflets were non-specifically      thickened. There was moderate regurgitation, with multiple jets.   6. Tricuspid valve: The annulus is dilated. There was severe regurgitation.   7. Pulmonary arteries: Systolic pressure was moderately to markedly      increased, at least 55mm Hg. Systolic pressure may be underestimated due      to wide tricuspid  regurgitation. Estimated pulmonary artery diastolic      pressure was 34mm Hg.   8. Pericardium, extracardiac: A trivial pericardial effusion was identified.      There was a left pleural effusion.

## 2023-12-29 NOTE — PROGRESS NOTES
[facility]     Hospitalist Progress Note     Alina Aceves Patient Status:  Inpatient    1980 MRN [id number]   Location [facility] 4NW-A Attending Nicole Cartwright MD   Hosp Day # 16 PCP HOLLY IBARRA     Chief Complaint: Intractable nausea vomiting, diarrhea, generalized weakness.  Recent pneumonia.     Subjective:  Patient w/ abdominal pain requiring dilaudid IV overnight.  NPO for procedure and general surgery evaluated yesterday evening.     Objective:    Review of Systems:   A comprehensive review of systems was completed; pertinent positive and negatives stated in subjective.    Vital signs:  Temp:  [96.9 °F (36.1 °C)-98 °F (36.7 °C)] 97.6 °F (36.4 °C)  Pulse:  [] 90  Resp:  [16-18] 18  BP: (112-140)/(80-99) 130/87  SpO2:  [93 %-100 %] 98 %    Physical Exam:    /87 (BP Location: Left arm)   Pulse 90   Temp 97.6 °F (36.4 °C) (Oral)   Resp 18   Ht 5' 4\" (1.626 m)   Wt 102 lb 11.8 oz (46.6 kg)   LMP 2023 (Approximate)   SpO2 98%   Breastfeeding No   BMI 17.63 kg/m²     General: No acute distress 43 year old female ill appearing  Respiratory: Clear to auscultation on RA  Cardiovascular: S1, S2, regular rate and rhythm 90s  Abdomen: Soft, tender, non-distended  Neuro: Awake alert, fatigued.  Extremities: edema resolving    Diagnostic Data:    Labs:  Recent Labs   Lab 23  0522 23  0520 23  1643   WBC 9.4 15.4* 9.6   HGB 8.5* 10.4* 8.8*   MCV 87.3 92.9 95.6   PLT 68.0* 69.0* 61.0*   INR 1.43*  --  1.26*       Recent Labs   Lab 23  0520 23  1423 23  0535 23  0519   *  --  95 111*   BUN 61*  --  34* 24*   CREATSERUM 0.80  --  0.67 0.65   CA 8.8  --  8.7 9.0   ALB 3.7  --  3.0* 3.0*     --  143 141   K 2.6* 3.0* 3.4* 3.5     --  104 103   CO2 38.0*  --  34.0* 31.0   ALKPHO 311*  --  250* 538*   AST 78*  --  68* 317*   *  --  248* 418*   BILT 3.3*  --  2.8* 8.1*   TP 6.1*  --  5.3* 5.2*       Estimated Creatinine  Clearance: 82.1 mL/min (based on SCr of 0.65 mg/dL).    Microbiology    Hospital Encounter on 12/13/23   1. Body Fluid Cult Aerobic and Anaerobic     Status: None    Collection Time: 12/15/23  3:26 PM    Specimen: Pleural Fluid, Right; Body fluid, unspecified   Result Value Ref Range    Body Fluid Culture Result No Growth 5 Days N/A    Body Fld Smear 4+ Neutrophils seen N/A    Body Fld Smear No organisms seen N/A    Body Fld Smear This is a cytocentrifuged smear. N/A   2. Urine Culture, Routine     Status: None    Collection Time: 12/14/23  3:58 AM    Specimen: Urine, clean catch   Result Value Ref Range    Urine Culture No Growth at 18-24 hrs. N/A   3. Blood Culture     Status: None    Collection Time: 12/13/23  8:10 PM    Specimen: Blood,peripheral   Result Value Ref Range    Blood Culture Result No Growth 5 Days N/A     MRSA PCR nares positive on 12/14/2023    Imaging: Reviewed in Epic.    Medications:    piperacillin-tazobactam  3.375 g Intravenous Q8H    spironolactone  25 mg Oral Daily    acetaminophen  15 mg/kg Intravenous Q8H    torsemide  20 mg Oral Daily    metoprolol succinate  50 mg Oral 2x Daily(Beta Blocker)    predniSONE  10 mg Oral Daily with breakfast    DULoxetine  30 mg Oral Daily    multivitamin with minerals  1 tablet Oral Daily    pantoprazole  40 mg Oral QAM AC       Assessment & Plan:      #Acute cholecystitis  #Transaminitis 2/2 above   -start Zosyn IV as reviewed w/ surgery  - NPO, pending OR today  - imaging reviewed  - pain mgmt/antiemetics prn    # Multifocal pneumonia, resolved  -completed ceftriaxone, doxycycline (12/13-12/18)   -s/p bronch; Candida     # New onset CMP RV dysfct severe TR mod pulm HTN  # Bilateral large bilateral pleural effusion 2/2 Acute systolic heart failure  -s/p left thora 12/15; s/p right thora 12/16; cytology without malignancy  - EF 20-25%, severe diffuse hypokinesis;   - On toprol XL, aldactone, demadex per cards    #LE weakness/neuropathy  -MRI reviewed and  does not show etiology for weakness;  could be chemo toxicity; back on cymbalta per neuro    # History of lupus, history of Sjogren's disease  -Takes azathioprine at home which was held at this time due to multifocal pneumonia then acute reyes  - steroids per rheum    # Acute hypoxic respiratory failure resolved  # Acute kidney injury, resolved  # hx interstitial nephritis  # Locally advanced stage IIIb breast cancer status post outpatient neoadjuvant chemo and history of left breast lumpectomy and left lymph node resection on 11/16/2023  # Elevated INR, improved  # Gastritis- PPI  # N/V, diarrhea at home possibly due to effect of chemo  # Anxiety  # Hypokalemia, resolved  # Hyponatremia due to pneumonia and also hypovolemic due to nausea vomiting and diarrhea, siadh, ssri (off ssri initially but then resumed on 12/21 after review w/ nephro.)  # Anemia and thrombocytopenia due to malignancy and chemo; monitor  # Small left apical pneumothorax status post left thoracentesis, repeat chest x-ray done on 12/15/2023 showed left pneumothorax resolved  # MRSA nares    Case d/w pt, RN, nephro, MD, surgery and updated cards.  Pending OR.     MARIFER Sol  9:02 AM       The 21st Century Cures Act makes medical notes like these available to patients in the interest of transparency. Please be advised this is a medical document. Medical documents are intended to carry relevant information, facts as evident, and the clinical opinion of the practitioner. The medical note is intended as peer to peer communication and may appear blunt or direct. It is written in medical language and may contain abbreviations or verbiage that are unfamiliar.   Patient seen and examined agree with the above  Chest cta b/l  LEs no edema  #Acute cholecystitis s/p lap reyes  #Transaminitis 2/2 above   -start Zosyn IV as reviewed w/ surgery  - imaging reviewed  - pain mgmt/antiemetics prn    # Multifocal pneumonia, resolved  -completed ceftriaxone,  doxycycline (12/13-12/18)   -s/p bronch; Candida

## 2023-12-29 NOTE — PROGRESS NOTES
[facility]  General Surgery Progress Note    Alina Aceves Patient Status:  Inpatient    1980 MRN [id number]   Location [facility] 8NE-A Attending Nicole Cartwright MD   Hosp Day # 16 PCP HOLLY IBARRA     Subjective:  Continues to have pain today.  Denies any other issues overnight.    Objective/Physical Exam:      Intake/Output Summary (Last 24 hours) at 2023 1615  Last data filed at 2023 1100  Gross per 24 hour   Intake --   Output 1000 ml   Net -1000 ml       Vital Signs:  Blood pressure 121/83, pulse 92, temperature 97.5 °F (36.4 °C), temperature source Oral, resp. rate 18, height 64\", weight 102 lb 11.8 oz (46.6 kg), last menstrual period 2023, SpO2 100%, not currently breastfeeding.    General: Alert, orientated x3.  Cooperative.  No apparent distress.  HEENT: Exam is unremarkable.  Without scleral icterus.  Mucous membranes are moist. Oropharynx is clear.  Neck: No JVD. Supple.   Lungs: Non labored breathing, equal chest rise  Cardiac: Regular rate and rhythm. No murmur.  Abdomen:  Soft, non-distended, tender to palpation right upper quadrant  Extremities:  No lower extremity edema noted.  Without clubbing or cyanosis.  2+ pulses x4, motor and sensation grossly intact      Labs:  Lab Results   Component Value Date    WBC 9.6 2023    RBC 2.98 2023    HGB 8.8 2023    HCT 28.5 2023    MCV 95.6 2023    MCH 29.5 2023    MCHC 30.9 2023    RDW 20.0 2023    PLT 61.0 2023     Lab Results   Component Value Date     2023    K 3.5 2023     2023    CO2 31.0 2023    BUN 24 2023    CREATSERUM 0.65 2023     2023    CA 9.0 2023    ALKPHO 538 2023     2023     2023    BILT 8.1 2023    ALB 3.0 2023    TP 5.2 2023          Images:  US GALLBLADDER (CPT=76705)    Result Date: 2023  CONCLUSION:  Slight increase in gallbladder  sludge and inflammatory changes with gallbladder wall thickening and a positive sonographic Cristina's sign.  These findings could represent cholecystitis in the appropriate clinical setting.  If there is persistent concern then consider HIDA scan.   LOCATION:  [id number]    Dictated by (CST): Cedrick Tovar MD on 12/28/2023 at 8:12 PM     Finalized by (CST): Cedrick Tovar MD on 12/28/2023 at 8:18 PM       CT ABDOMEN+PELVIS(CPT=74176)    Result Date: 12/28/2023  CONCLUSION:   1. The gallbladder is moderately distended, demonstrates a thickened wall, and appears to be filled with stones and/or sludge that is concerning for changes of acute cholecystitis.  Clinical correlation recommended.  Dedicated abdominal ultrasound and/or  HIDA scan may be helpful for further evaluation as clinically directed.  2. There are patchy areas of consolidation in the partially imaged lungs that is concerning for multifocal pneumonia.  Clinical correlation recommended.  Partially imaged small bilateral pleural effusions.  Please see above for further details.  Critical results were discussed with the patient's care nurse Paulette at 1849 hours on 12/28/2023. Critical results were read back.  LOCATION:  [place]   Dictated by (CST): Brady Torres MD on 12/28/2023 at 6:46 PM     Finalized by (CST): Brady Torres MD on 12/28/2023 at 6:49 PM        Assessment/Plan:  Patient Active Problem List   Diagnosis    Breast abscess    Hypokalemia    Hyponatremia    Arthralgia of left forearm    BRCA gene mutation negative in female    Anemia    Metabolic acidosis    Community acquired pneumonia, unspecified laterality    Weakness generalized    Anemia, unspecified type    Malignant neoplasm of female breast (HCC)    Bilateral pleural effusion    SLE (systemic lupus erythematosus related syndrome) (HCC)    Neuropathy    Sjogren's syndrome (HCC)    Proteinuria    REJI (acute kidney injury) (HCC)    SIADH (syndrome of inappropriate ADH production) (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)    Acute cholecystitis       43 year old female with acute cholecystitis    Patient was cleared by the hospitalist, nephrology, and cardiology  Patient is at her most optimize for any surgical intervention  Will proceed with laparoscopic cholecystectomy with intraoperative cholangiogram  Procedure explained to the patient  Risks including bleeding, pain, infection, injury to the common bile duct, and need for further intervention all discussed with the patient  Patient does have elevated LFTs, I did discuss that if she has choledocholithiasis, she will need GI for possible ERCP    Postoperatively, patient will be able to have a diet  Pain control as needed  And plan for discharge soon and follow-up in 2 weeks    Dejah Min MD  [facility] General Surgery  12/29/2023  4:15 PM

## 2023-12-29 NOTE — PLAN OF CARE
"RN called patient and relayed message from Negrita Rees MD from this morning.      \" 9/21/23  8:06 AM  Liberalize sodium intake a bit.    TT\"     Nichole Hernandez RN on 9/21/2023 at 9:14 AM  " Pt A&O x4.  Reports abdominal pain. Managed with meds.  Denies SOB & cardiac symptoms.  Reports numbness and tingling in extremities.  Maintaining O2 on room air.   NSR on tele, S1&S2 present.   Continent of bowel and bladder.  R chest port a cath flushed, capped, & blood return noted.  R groin & R wrist soft, clean, & dry.  Pt updated on plan of care.  Bed in lowest position. Bed alarm on. Call light within reach    2200: Pt c/o severe abdominal pain and nausea. Orders from Dr Gan to increase dilauded. Orders from Dr Min for IV Ofirmev Q8    Problem: PAIN - ADULT  Goal: Verbalizes/displays adequate comfort level or patient's stated pain goal  Description: INTERVENTIONS:  - Encourage pt to monitor pain and request assistance  - Assess pain using appropriate pain scale  - Administer analgesics based on type and severity of pain and evaluate response  - Implement non-pharmacological measures as appropriate and evaluate response  - Consider cultural and social influences on pain and pain management  - Manage/alleviate anxiety  - Utilize distraction and/or relaxation techniques  - Monitor for opioid side effects  - Notify MD/LIP if interventions unsuccessful or patient reports new pain  - Anticipate increased pain with activity and pre-medicate as appropriate  Outcome: Progressing     Problem: SAFETY ADULT - FALL  Goal: Free from fall injury  Description: INTERVENTIONS:  - Assess pt frequently for physical needs  - Identify cognitive and physical deficits and behaviors that affect risk of falls.  - [place] fall precautions as indicated by assessment.  - Educate pt/family on patient safety including physical limitations  - Instruct pt to call for assistance with activity based on assessment  - Modify environment to reduce risk of injury  - Provide assistive devices as appropriate  - Consider OT/PT consult to assist with strengthening/mobility  - Encourage toileting schedule  Outcome: Progressing      Problem: RESPIRATORY - ADULT  Goal: Achieves optimal ventilation and oxygenation  Description: INTERVENTIONS:  - Assess for changes in respiratory status  - Assess for changes in mentation and behavior  - Position to facilitate oxygenation and minimize respiratory effort  - Oxygen supplementation based on oxygen saturation or ABGs  - Provide Smoking Cessation handout, if applicable  - Encourage broncho-pulmonary hygiene including cough, deep breathe, Incentive Spirometry  - Assess the need for suctioning and perform as needed  - Assess and instruct to report SOB or any respiratory difficulty  - Respiratory Therapy support as indicated  - Manage/alleviate anxiety  - Monitor for signs/symptoms of CO2 retention  Outcome: Progressing     Problem: METABOLIC/FLUID AND ELECTROLYTES - ADULT  Goal: Electrolytes maintained within normal limits  Description: INTERVENTIONS:  - Monitor labs and rhythm and assess patient for signs and symptoms of electrolyte imbalances  - Administer electrolyte replacement as ordered  - Monitor response to electrolyte replacements, including rhythm and repeat lab results as appropriate  - Fluid restriction as ordered  - Instruct patient on fluid and nutrition restrictions as appropriate  Outcome: Progressing  Goal: Hemodynamic stability and optimal renal function maintained  Description: INTERVENTIONS:  - Monitor labs and assess for signs and symptoms of volume excess or deficit  - Monitor intake, output and patient weight  - Monitor urine specific gravity, serum osmolarity and serum sodium as indicated or ordered  - Monitor response to interventions for patient's volume status, including labs, urine output, blood pressure (other measures as available)  - Encourage oral intake as appropriate  - Instruct patient on fluid and nutrition restrictions as appropriate  Outcome: Progressing     Problem: CARDIOVASCULAR - ADULT  Goal: Maintains optimal cardiac output and hemodynamic  stability  Description: INTERVENTIONS:  - Monitor vital signs, rhythm, and trends  - Monitor for bleeding, hypotension and signs of decreased cardiac output  - Evaluate effectiveness of vasoactive medications to optimize hemodynamic stability  - Monitor arterial and/or venous puncture sites for bleeding and/or hematoma  - Assess quality of pulses, skin color and temperature  - Assess for signs of decreased coronary artery perfusion - ex. Angina  - Evaluate fluid balance, assess for edema, trend weights  Outcome: Progressing

## 2023-12-29 NOTE — PROGRESS NOTES
[facility]  Nephrology Progress Note    Alina Yola Attending:  Lenard Rosa DO       Assessment and Plan:    1) REJI- due to cardiorenal syndrome- resolved with HF / volume mgmt    2) HFrEF- EF 20-25% with RV dysfunction + severe TR / mod pul HTN- etiology unclear (chemo?). Diuresed > 30#- now compensated. Resume diuretics post-op; cath clean     3) Proteinuria with h/o biopsy proven interstitial nephritis (presumably due to Sjogrens) partially tx'ed with steroids at [facility]    4) Transaminitis- probable hepatic congestion; no biliary issues per GI- improving daily    5) Stage 3 breast CA on chemo (no XRT)- last     6) Pleural effusion s/p bilat thora's- cytology / cx neg    7) SLE / Sjogrens- normally on imuran -> steroids per rheum    8) Acute cholecystitis- imaging noted; will d/w surgery re: lap reyes vs t-tube as pt is as good as she's going to be for OR      Subjective:  Awake alert in bed in good spirits    Physical Exam:   /87 (BP Location: Left arm)   Pulse 90   Temp 97.6 °F (36.4 °C) (Oral)   Resp 18   Ht 5' 4\" (1.626 m)   Wt 102 lb 11.8 oz (46.6 kg)   LMP 2023 (Approximate)   SpO2 98%   Breastfeeding No   BMI 17.63 kg/m²   Temp (24hrs), Av.5 °F (36.4 °C), Min:96.9 °F (36.1 °C), Max:98 °F (36.7 °C)       Intake/Output Summary (Last 24 hours) at 2023 0850  Last data filed at 2023 0526  Gross per 24 hour   Intake --   Output 1200 ml   Net -1200 ml     Wt Readings from Last 3 Encounters:   23 102 lb 11.8 oz (46.6 kg)   23 120 lb (54.4 kg)   21 135 lb (61.2 kg)     General: awkae alert  HEENT: No scleral icterus, MMM  Neck: Supple, no KALYN or thyromegaly  Cardiac: Regular rate and rhythm, S1, S2 normal, no murmur or tub  Lungs: Decreased BS at bases bilaterally   Abdomen: Soft, non-tender. + bowel sounds, no palpable organomegaly  Extremities: Without clubbing, cyanosis; 2+ LE edema  Neurologic: Cranial nerves grossly intact, moving all  extremities  Skin: Warm and dry, no rashes       Labs:   Lab Results   Component Value Date    WBC 9.6 12/28/2023    HGB 8.8 12/28/2023    HCT 28.5 12/28/2023    PLT 61.0 12/28/2023    CREATSERUM 0.65 12/29/2023    BUN 24 12/29/2023     12/29/2023    K 3.5 12/29/2023     12/29/2023    CO2 31.0 12/29/2023     12/29/2023    CA 9.0 12/29/2023    ALB 3.0 12/29/2023    ALKPHO 538 12/29/2023    BILT 8.1 12/29/2023    TP 5.2 12/29/2023     12/29/2023     12/29/2023    INR 1.26 12/28/2023    PTP 15.8 12/28/2023       Imaging:  All imaging studies reviewed.    Meds:           Questions/concerns were discussed with patient and/or family by bedside.        Marya Caban MD  12/29/2023  851 AM

## 2023-12-30 LAB
ALBUMIN SERPL-MCNC: 2.7 G/DL (ref 3.4–5)
ALBUMIN/GLOB SERPL: 1.2 {RATIO} (ref 1–2)
ALP LIVER SERPL-CCNC: 600 U/L
ALT SERPL-CCNC: 487 U/L
ANION GAP SERPL CALC-SCNC: 7 MMOL/L (ref 0–18)
AST SERPL-CCNC: 344 U/L (ref 15–37)
BILIRUB SERPL-MCNC: 7.8 MG/DL (ref 0.1–2)
BUN BLD-MCNC: 24 MG/DL (ref 9–23)
CALCIUM BLD-MCNC: 8.7 MG/DL (ref 8.5–10.1)
CHLORIDE SERPL-SCNC: 106 MMOL/L (ref 98–112)
CO2 SERPL-SCNC: 29 MMOL/L (ref 21–32)
CREAT BLD-MCNC: 0.81 MG/DL
EGFRCR SERPLBLD CKD-EPI 2021: 92 ML/MIN/1.73M2 (ref 60–?)
ERYTHROCYTE [DISTWIDTH] IN BLOOD BY AUTOMATED COUNT: 20.7 %
GLOBULIN PLAS-MCNC: 2.3 G/DL (ref 2.8–4.4)
GLUCOSE BLD-MCNC: 131 MG/DL (ref 70–99)
HCT VFR BLD AUTO: 27 %
HGB BLD-MCNC: 8.3 G/DL
MCH RBC QN AUTO: 29.4 PG (ref 26–34)
MCHC RBC AUTO-ENTMCNC: 30.7 G/DL (ref 31–37)
MCV RBC AUTO: 95.7 FL
OSMOLALITY SERPL CALC.SUM OF ELEC: 300 MOSM/KG (ref 275–295)
PLATELET # BLD AUTO: 49 10(3)UL (ref 150–450)
POTASSIUM SERPL-SCNC: 3.8 MMOL/L (ref 3.5–5.1)
PROT SERPL-MCNC: 5 G/DL (ref 6.4–8.2)
RBC # BLD AUTO: 2.82 X10(6)UL
SODIUM SERPL-SCNC: 142 MMOL/L (ref 136–145)
WBC # BLD AUTO: 11.9 X10(3) UL (ref 4–11)

## 2023-12-30 PROCEDURE — 99232 SBSQ HOSP IP/OBS MODERATE 35: CPT | Performed by: INTERNAL MEDICINE

## 2023-12-30 PROCEDURE — 99233 SBSQ HOSP IP/OBS HIGH 50: CPT | Performed by: HOSPITALIST

## 2023-12-30 RX ORDER — TORSEMIDE 20 MG/1
20 TABLET ORAL DAILY
Qty: 90 TABLET | Refills: 3 | Status: SHIPPED | OUTPATIENT
Start: 2023-12-30 | End: 2024-12-24

## 2023-12-30 RX ORDER — METOPROLOL SUCCINATE 50 MG/1
50 TABLET, EXTENDED RELEASE ORAL
Qty: 180 TABLET | Refills: 3 | Status: SHIPPED | OUTPATIENT
Start: 2023-12-30 | End: 2024-12-24

## 2023-12-30 NOTE — PROGRESS NOTES
[facility]     Nephrology Progress Note    Alina Aceves Patient Status:  Inpatient    1980 MRN [id number]   Location [facility] 8NE-A Attending Nicole Cartwright MD   Hosp Day # 17 PCP HOLLY IBARRA       SUBJECTIVE:  Looks great this afternoon, mild pain at surgical site        Physical Exam:   /78 (BP Location: Right arm)   Pulse 97   Temp 98.2 °F (36.8 °C) (Oral)   Resp 18   Ht 5' 4\" (1.626 m)   Wt 105 lb 9.6 oz (47.9 kg)   LMP 2023 (Approximate)   SpO2 100%   Breastfeeding No   BMI 18.13 kg/m²   Temp (24hrs), Av °F (36.7 °C), Min:97.4 °F (36.3 °C), Max:98.8 °F (37.1 °C)       Intake/Output Summary (Last 24 hours) at 2023 1418  Last data filed at 2023 1402  Gross per 24 hour   Intake 240 ml   Output 320 ml   Net -80 ml     Last 3 Weights   23 0518 105 lb 9.6 oz (47.9 kg)   23 0437 102 lb 11.8 oz (46.6 kg)   23 0316 114 lb 6.7 oz (51.9 kg)   23 0500 128 lb 3.2 oz (58.2 kg)   23 0408 135 lb 2.3 oz (61.3 kg)   23 0409 147 lb (66.7 kg)   23 0400 143 lb 8.3 oz (65.1 kg)   23 1100 147 lb 7.8 oz (66.9 kg)   23 0000 147 lb 0.8 oz (66.7 kg)   23 0538 150 lb 9.2 oz (68.3 kg)   23 0019 150 lb 3.2 oz (68.1 kg)   23 2326 124 lb (56.2 kg)   23 1823 125 lb (56.7 kg)   23 1816 120 lb (54.4 kg)   21 1537 135 lb (61.2 kg)   20 1405 136 lb (61.7 kg)   08/15/20 1020 140 lb (63.5 kg)     General: Alert and oriented in no apparent distress.  HEENT: No scleral icterus, MMM  Neck: Supple, no KALYN or thyromegaly  Cardiac: Regular rate and rhythm, S1, S2 normal, no murmur or rub  Lungs: Clear without wheezes, rales, rhonchi.    Abdomen: Soft,mildly tender RUQ. + bowel sounds, no palpable organomegaly  Extremities: Without clubbing, cyanosis or edema.  Neurologic: Alert and oriented, cranial nerves grossly intact, moving all extremities  Skin: Warm and dry, no rash        Labs:     Recent Labs    Lab 12/27/23  0520 12/28/23  1643 12/30/23  0515   WBC 15.4* 9.6 11.9*   HGB 10.4* 8.8* 8.3*   MCV 92.9 95.6 95.7   PLT 69.0* 61.0* 49.0*   INR  --  1.26*  --        Recent Labs   Lab 12/25/23  0629 12/26/23  0525 12/27/23  0007 12/27/23  0520 12/27/23  1423 12/28/23  0535 12/29/23  0519 12/30/23  0515    145  --  145  --  143 141 142   K 2.5* 2.4*   < > 2.6* 3.0* 3.4* 3.5 3.8    103  --  103  --  104 103 106   CO2 27.0 32.0  --  38.0*  --  34.0* 31.0 29.0   BUN 95* 84*  --  61*  --  34* 24* 24*   CREATSERUM 1.17* 1.10*  --  0.80  --  0.67 0.65 0.81   CA 8.3* 8.5  --  8.8  --  8.7 9.0 8.7   MG 2.0  --   --  1.9  --   --   --   --    PHOS 4.9  --   --  2.6  --  2.6  --   --    * 144*  --  108*  --  95 111* 131*    < > = values in this interval not displayed.       Recent Labs   Lab 12/26/23  0525 12/27/23  0520 12/28/23  0535 12/29/23  0519 12/30/23  0515   * 339* 248* 418* 487*   AST 94* 78* 68* 317* 344*   ALB 3.7 3.7 3.0* 3.0* 2.7*       No results for input(s): \"PGLU\" in the last 168 hours.    Meds:   Current Facility-Administered Medications   Medication Dose Route Frequency    heparin (Porcine) 100 Units/mL lock flush 500 Units  5 mL Intravenous PRN    acetaminophen (Tylenol Extra Strength) tab 1,000 mg  1,000 mg Oral Q8H KHALIDA    oxyCODONE immediate release tab 5 mg  5 mg Oral Q4H PRN    Or    oxyCODONE immediate release tab 10 mg  10 mg Oral Q4H PRN    HYDROmorphone (Dilaudid) 1 MG/ML injection 0.4 mg  0.4 mg Intravenous Q2H PRN    Or    HYDROmorphone (Dilaudid) 1 MG/ML injection 0.8 mg  0.8 mg Intravenous Q2H PRN    [Held by provider] spironolactone (Aldactone) tab 25 mg  25 mg Oral Daily    torsemide (Demadex) tab 20 mg  20 mg Oral Daily    metoprolol succinate ER (Toprol XL) 24 hr tab 50 mg  50 mg Oral 2x Daily(Beta Blocker)    zolpidem (Ambien) tab 5 mg  5 mg Oral Nightly PRN    predniSONE (Deltasone) tab 10 mg  10 mg Oral Daily with breakfast    benzonatate (Tessalon) cap 100 mg   100 mg Oral TID PRN    sodium chloride (Saline Mist) 0.65 % nasal solution 1 spray  1 spray Each Nare Q3H PRN    DULoxetine (Cymbalta) DR cap 30 mg  30 mg Oral Daily    multivitamin with minerals (Thera M Plus) tab 1 tablet  1 tablet Oral Daily    calcium carbonate (Tums) chewable tab 1,000 mg  1,000 mg Oral Q6H PRN    dextromethorphan-guaiFENesin (Robitussin-DM)  mg/5mL oral solution 5 mL  5 mL Oral Q6H PRN    ALPRAZolam (Xanax) tab 0.25 mg  0.25 mg Oral TID PRN    pantoprazole (Protonix) DR tab 40 mg  40 mg Oral QAM AC    LORazepam (Ativan) 2 mg/mL injection 0.5 mg  0.5 mg Intravenous Q6H PRN    metoprolol (Lopressor) 5 mg/5mL injection 5 mg  5 mg Intravenous Q6H PRN    ipratropium-albuterol (Duoneb) 0.5-2.5 (3) MG/3ML inhalation solution 3 mL  3 mL Nebulization Q4H PRN    melatonin tab 3 mg  3 mg Oral Nightly PRN    prochlorperazine (Compazine) 10 MG/2ML injection 5 mg  5 mg Intravenous Q8H PRN    polyethylene glycol (PEG 3350) (Miralax) 17 g oral packet 17 g  17 g Oral Daily PRN    sennosides (Senokot) tab 17.2 mg  17.2 mg Oral Nightly PRN    bisacodyl (Dulcolax) 10 MG rectal suppository 10 mg  10 mg Rectal Daily PRN    fleet enema (Fleet) 7-19 GM/118ML rectal enema 133 mL  1 enema Rectal Once PRN         Impression/Plan:      1) REJI- due to cardiorenal syndrome- resolved with HF / volume mgmt     2) HFrEF- EF 20-25% with RV dysfunction + severe TR / mod pul HTN- etiology unclear (chemo?). Diuresed > 30#- now compensated. Again on po torsemide    3) Proteinuria with h/o biopsy proven interstitial nephritis (presumably due to Sjogrens) partially tx'ed with steroids at [facility]     4) Transaminitis- probable hepatic congestion; no biliary issues per GI- improving daily     5) Stage 3 breast CA on chemo (no XRT)- last 9/23     6) Pleural effusion s/p bilat thora's- cytology / cx neg     7) SLE / Sjogrens- normally on imuran -> steroids per rheum     8) Acute cholecystitis- is/p OR    Questions/concerns were  discussed with patient and/or family by bedside.    Nephrology will sign off, please call with elisabet Quarles MD  12/30/2023  2:18 PM

## 2023-12-30 NOTE — OPERATIVE REPORT
OPERATIVE NOTE    Alina Aceves Location: OR   [facility] [id number] MRN [id number]   Admission Date 12/13/2023 Operation Date 12/29/2023   Attending Physician Nicole Cartwright MD Operating Physician Dejah Min MD     PREOPERATIVE DIAGNOSIS  Acute cholecystitis    POSTOPERATIVE DIAGNOSIS  Same    PROCEDURE PERFORMED  Laparoscopic cholecystectomy  Transversus abdominis plane block  Intra-operative cholangiogram    SURGEON  Dejah Min MD    ASSISTANTS  Amber Thomas MD Her assistance was essential to the performance and conduct of this case, especially in the performance of the cholangiogram and the careful dissection needed in and around the triangle of Calot and gallbladder hilum    ANESTHESIA  General    INDICATIONS   This is a 43-year-old woman who was currently in the hospital for pneumonia and new onset heart failure.  She has significant history for metastatic breast cancer status post chemoradiation complicated by cardiomyopathy and new onset heart failure.  During hospitalization, patient acutely had right upper quadrant tenderness.  Ultrasound imaging demonstrated a dilated gallbladder with wall thickening concerning for acute cholecystitis.  General surgery was consulted.  On physical exam, patient had right upper quadrant and epigastric tenderness.  She had persistently elevated LFTs.  Cholecystectomy was indicated. Patient was cleared by her cardiologist, nephrologist, and the hospitalist.     FINDINGS  Dilated and inflamed gallbladder with patches of necrosis, cholangiogram showed normal ductal anatomy with good flow of contrast into the duodenum    FINDINGS/DESCRIPTION OF PROCEDURE  After informed consent, patient was brought to the operating room and placed in supine position with arms out. Bilateral SCDs were placed and pre-operative antibiotics administered. Anesthesia was induced without any complication. Patient was prepped and draped in the usual sterile fashion. Time out was performed  confirming patient, procedure, and site.    The Veress needle was used to gain pneumoperitoneum. Using blade, a curvilinear supraumbilical incision was made. Veress needle was inserted just under the umbilical stalk with audible clicks.  Because of patient's heart failure, pneumoperitoneum was achieved with low flows.  Once we reached the adequate pressure of 15, an 11mm port was inserted. Upon entrance, no injury was noted to omentum or bowel at site of entrance.  Three 5mm ports were placed under direct visualization; two on the right lateral aspect of the abdomen and the third subxiphoid, triangulating toward the location of the gallbladder. Transversus abdominis plane block was performed.    Upon inspection of the abdomen, gallbladder was tense and distended. Gallbladder was needle decompressed for easier retraction.  There was some bile spillage and decompressing the gallbladder.  Dissection was started at the infundibulum. Cystic duct and artery were identified, clearly seeing the critical view of safety. The cystic duct was then clipped and partially opened. Cholangiogram catheter was inserted into the cystic duct and clipped in place. Cholangiogram was done and showed normal CBD and cystic duct with good flow of contrast into the duodenum. Catheter was removed and the cystic duct clipped and divided. The cystic artery was then clipped and divided. The gallbladder was then resected from the fossa and placed in endocatch bag. The gallbladder fossa was irrigated until clear. Hemostasis achieved. Gallbladder was extracted from abdominal cavity and sent for pathology. Pneumoperitoneum was evacuated.  Fascia at the umbilicus was closed with an 0 Vicryl.  Skin incisions were closed with 4-o monocryl. Incisions were washed and dressed with dermabond.     At the end of the case, all counts were correct. Patient tolerated procedure well. Patient was awakened and transferred to PACU in a hemodynamically stable  condition.     SPECIMENS REMOVED  Gallbladder    ESTIMATED BLOOD LOSS  20 ml    COMPLICATIONS  None    Dejah Min MD

## 2023-12-30 NOTE — PROGRESS NOTES
Inpatient Follow up Note    Alina Aceves Patient Status:  Inpatient    1980 MRN [id number]   Location [facility] 8NE-A Attending Nicole Cartwright MD   Hosp Day # 17 PCP HOLLY IBARRA     Reason for Consultation   Elev LFTs     Subjective     She's s/p cholecystectomy on  with no acute changes overnight. She remains afebrile. She reports pain as minimal tenderness. She's tolerating diet with no nausea, vomiting. Passing gas, +BS with no BM as of yet.     Objective:     /78 (BP Location: Right arm)   Pulse 97   Temp 98.2 °F (36.8 °C) (Oral)   Resp 18   Ht 5' 4\" (1.626 m)   Wt 105 lb 9.6 oz (47.9 kg)   LMP 2023 (Approximate)   SpO2 100%   Breastfeeding No   BMI 18.13 kg/m²   Gen: AAOx3  CV: RRR with normal S1 / S2  Resp: CTA bilaterally  Abd: (+)BS, soft, diffused tender, non-distended; no rebound or guarding  Ext: No edema or cyanosis  Skin: Warm and dry     Labs/Imaging     LABRCNTIP[PGLU:5@  Recent Labs   Lab 23  1643   INR 1.26*         Recent Labs   Lab 23  0520 23  1643 23  0515   WBC 15.4* 9.6 11.9*   HGB 10.4* 8.8* 8.3*   PLT 69.0* 61.0* 49.0*       Recent Labs   Lab 23  0525 23  0007 23  0520 23  1423 23  0535 23  0519 23  0515     --  145  --  143 141 142   K 2.4*   < > 2.6* 3.0* 3.4* 3.5 3.8     --  103  --  104 103 106   CO2 32.0  --  38.0*  --  34.0* 31.0 29.0   BUN 84*  --  61*  --  34* 24* 24*   CREATSERUM 1.10*  --  0.80  --  0.67 0.65 0.81    < > = values in this interval not displayed.       Recent Labs   Lab 23  0525 23  0520 23  0535 23  0519 23  0515   * 339* 248* 418* 487*   AST 94* 78* 68* 317* 344*     XR OPER CHOLANGIOGRAM (CPT=74300)    Result Date: 2023  CONCLUSION:  Unremarkable fluoroscopic images submitted by the surgeon during the performance of an intraoperative cholangiogram.    LOCATION:  [id number]    Dictated by (CST):  Leanna Tabares DO on 12/29/2023 at 8:56 PM     Finalized by (CST): Leanna Tabares DO on 12/29/2023 at 8:56 PM       US GALLBLADDER (CPT=76705)    Result Date: 12/28/2023  CONCLUSION:  Slight increase in gallbladder sludge and inflammatory changes with gallbladder wall thickening and a positive sonographic Cristina's sign.  These findings could represent cholecystitis in the appropriate clinical setting.  If there is persistent concern then consider HIDA scan.   LOCATION:  [place] [id number]    Dictated by (CST): Cedrick Tovar MD on 12/28/2023 at 8:12 PM     Finalized by (CST): Cedrick Tovar MD on 12/28/2023 at 8:18 PM       CT ABDOMEN+PELVIS(CPT=74176)    Result Date: 12/28/2023  CONCLUSION:   1. The gallbladder is moderately distended, demonstrates a thickened wall, and appears to be filled with stones and/or sludge that is concerning for changes of acute cholecystitis.  Clinical correlation recommended.  Dedicated abdominal ultrasound and/or  HIDA scan may be helpful for further evaluation as clinically directed.  2. There are patchy areas of consolidation in the partially imaged lungs that is concerning for multifocal pneumonia.  Clinical correlation recommended.  Partially imaged small bilateral pleural effusions.  Please see above for further details.  Critical results were discussed with the patient's care nurse Paulette at 1849 hours on 12/28/2023. Critical results were read back.  LOCATION:  [facility]   Dictated by (CST): Brady Torres MD on 12/28/2023 at 6:46 PM     Finalized by (CST): Brady Torres MD on 12/28/2023 at 6:49 PM      AST   Date/Time Value Ref Range Status   12/30/2023 05:15  (H) 15 - 37 U/L Final     ALT   Date/Time Value Ref Range Status   12/30/2023 05:15  (H) 13 - 56 U/L Final     BUN   Date/Time Value Ref Range Status   12/30/2023 05:15 AM 24 (H) 9 - 23 mg/dL Final        Assessment     Ms Aceves is a 43 year old female with a history of stage IIIb breast cancer and SLE /Sjogren's who presented on  12/13 with shortness of breath.  She was found to have pneumonia, pleural effusions, and elev LFTs.  She also has history of CHF with severe TR and RV dysfunction.  She is s/p cholecystectomy with neg IOC. LFTs remain elevated.    Likely etiology is some combination of her hepatic congestion, cholecystitis.  She remains on diuresis and is improving.  Would favor continued monitoring of labs, and if no improvement after continued diuresis, consider liver biopsy to rule out autoimmune pathology vs drug-induced liver injury.      Plan   CBC/CMP in am; trending LFTs  Will consider liver biopsy for further evaluation to continual increase in LFTs     CHAD Weldon  2:34 PM  12/30/2023  [facility]  [phone]      Physician Addendum  This case was discussed with ELVIN Israel.  The plan was discussed with ELVIN and her note above was reviewed.  Agree with plan above.    Anthony Diop MD

## 2023-12-30 NOTE — PROGRESS NOTES
[facility]      Assessment/Plan:  CS/p lap reyes   Cardiomyopathy / Acute systolic HF, EF 25%: Suspect due to chemotoxicity. Cath 12/28/23 normal coronaries with normal filling pressures and CO. Improving with IV diuresis.     - Net negative 24L  - S/P bilat thoracentesis  - GDMT: Toprol, cedric. Entresto next if ok with renal. Eventual SGLT2i   - resume PO torsemide today. Renal function stable   Elevated LFTS: Likely hepatic congestion (severe TR from ventricular dysfunction and pulm HTN). Stable/improving slowly.   REJI: cardiorenal syndrome, ? nephritis   Cr improved   Possible renal biopsy to evaluate for lupus nephritis  Management per nephrology, rheum    Hypokalemia   Increased cedric to 50mg daily  Replace per protocol   5. Resp/PNA: ID, pulm.  Antibiotics.  6. Metastatic breast CA:   - s/p neadjuvant chemo (ddAC+T; Adriamycin, cytoxan, taxol), L mastectomy/SLNB  - Per primary and oncology.    Subjective:  -s/p lap reyes  -likely home tomorrow     History of Present Illness:   Alina Aceves is a(n) 43 year old female with SLE and palpitations who was seen in clinic 2 weeks ago.    She has Stage III breast CA.  She has been undergoing chemotherapy, EF 5/23 was 50%. It is now 20-25% % range.    She was admitted with SOB 12/13/23.  She was diagnosed with PNA and pleural effusions.  She also had hyponatremia with a sodium 120.  She was seen by Pulmonology, ID, Hematology, and Nephrology.             Past Medical History:   Past Medical History:   Diagnosis Date    Breast cancer (HCC)     Gastritis     Lupus (HCC)     Sjogren's disease (HCC)        Social History:   Smoking:  None  Alcohol:  None    Family History:   No family history of premature arthrosclerotic heart disease     Medications:   Scheduled:    acetaminophen  1,000 mg Oral Q8H KHALIDA    [Held by provider] spironolactone  25 mg Oral Daily    [Held by provider] torsemide  20 mg Oral Daily    metoprolol succinate  50 mg Oral 2x  Daily(Beta Blocker)    predniSONE  10 mg Oral Daily with breakfast    DULoxetine  30 mg Oral Daily    multivitamin with minerals  1 tablet Oral Daily    pantoprazole  40 mg Oral QAM AC           PRN Medications:   heparin, oxyCODONE **OR** oxyCODONE, HYDROmorphone **OR** HYDROmorphone, zolpidem, benzonatate, sodium chloride, calcium carbonate, dextromethorphan-guaiFENesin, ALPRAZolam, LORazepam, metoprolol, ipratropium-albuterol, melatonin, prochlorperazine, polyethylene glycol (PEG 3350), sennosides, bisacodyl, fleet enema    Outpatient Medications:   Current Facility-Administered Medications on File Prior to Encounter   Medication Dose Route Frequency Provider Last Rate Last Admin    [COMPLETED] morphINE PF 4 MG/ML injection 4 mg  4 mg Intravenous Once Jesus Manuel Hatfield MD   4 mg at 11/13/23 2255    [COMPLETED] iopamidol 76% (ISOVUE-370) injection for power injector  100 mL Intravenous ONCE PRN Jesus Manuel Hatfield MD   100 mL at 11/13/23 2247    [COMPLETED] potassium chloride (K-Dur) tab 40 mEq  40 mEq Oral Once Jesus Manuel Hatfield MD   40 mEq at 11/14/23 0010     Current Outpatient Medications on File Prior to Encounter   Medication Sig Dispense Refill    Potassium Citrate ER 15 MEQ (1620 MG) Oral Tab CR Take 1 tablet by mouth in the morning, at noon, and at bedtime.      DULoxetine 30 MG Oral Cap DR Particles Take 1 capsule (30 mg total) by mouth daily.      ondansetron (ZOFRAN) 8 MG tablet Take 1 tablet (8 mg total) by mouth as needed.      zolpidem 10 MG Oral Tab Take 1 tablet (10 mg total) by mouth nightly as needed for Sleep.      HYDROcodone-acetaminophen 5-325 MG Oral Tab Take 1 tablet by mouth every 8 (eight) hours as needed. (Patient not taking: Reported on 12/13/2023)      lidocaine-prilocaine 2.5-2.5 % External Cream APPLY SMALL AMOUNT OVER PORT PRIOR TO ACCESS      azaTHIOprine (IMURAN OR) Take by mouth. (Patient not taking: No sig reported)      Prenatal Vit-DSS-Fe Cbn-FA  (PRENATAL AD OR) Take 1 tablet by mouth daily.         Allergies:   Allergies   Allergen Reactions    Bactrim [Sulfamethoxazole W/Trimethoprim] RASH         Physical Exam:   Vitals:    23 0518   BP:    Pulse: 90   Resp:    Temp:      Wt Readings from Last 3 Encounters:   23 105 lb 9.6 oz (47.9 kg)   23 120 lb (54.4 kg)   21 135 lb (61.2 kg)           General: Well developed, well nourished female.  Pt is in no acute distress.  HEENT:   Normocephalic.  Atraumatic.  Eyes with no scleral icterus.  Neck: Supple.  No JVD.  Carotids 2+ and equal in symmetric fashion.  No bruits are noted.  Cardiac: Regular rate and rhythm.   There is a normal S1 and S2.  No S3 or S4.  No murmurs, rubs, or gallops.  PMI is non-displaced with a normal apical impulse.  Lungs:  BS L base .few basal crackles  Abdomen: Soft.  Non-distended.  Non-tender.  Bowel sounds are present and normoactive.  No guarding or rebound.   Extremities: Extremities do demonstrate 1+ peripheral edema.   Pedal pulses present  Neurologic: Alert and oriented, normal affect.  No gross deficit appreciated.  Integument:  No visible rashes are appreciated. R Sub Q port       Laboratories and Data:   Labs:    Recent Labs   Lab 23  0535 23  0519 23  0515   GLU 95 111* 131*   BUN 34* 24* 24*   CREATSERUM 0.67 0.65 0.81   CA 8.7 9.0 8.7   ALB 3.0* 3.0* 2.7*    141 142   K 3.4* 3.5 3.8    103 106   CO2 34.0* 31.0 29.0   ALKPHO 250* 538* 600*   AST 68* 317* 344*   * 418* 487*   BILT 2.8* 8.1* 7.8*   TP 5.3* 5.2* 5.0*       Recent Labs   Lab 23  0520 23  1643 23  0515   RBC 3.50* 2.98* 2.82*   HGB 10.4* 8.8* 8.3*   HCT 32.5* 28.5* 27.0*   MCV 92.9 95.6 95.7   MCH 29.7 29.5 29.4   MCHC 32.0 30.9* 30.7*   RDW 20.8 20.0 20.7   NEPRELIM 13.32* 8.30*  --    WBC 15.4* 9.6 11.9*   PLT 69.0* 61.0* 49.0*       Recent Labs   Lab 23  1643   PTP 15.8*   INR 1.26*       No results for input(s):  \"TROP\", \"CK\" in the last 168 hours.    Diagnostics:   Tele: Sinus tachycardia.  EKG: Sinus tachycardia, non-specific ST/T changes  Echo: Unremarkable by 5/23 echo (EF low normal, mild MR).    12/17/23  Conclusions:     1. Left ventricle: The cavity size was normal. Wall thickness was normal.      Systolic function was markedly reduced. The estimated ejection fraction      was 20-25%, by visual assessment. There was severe diffuse hypokinesis.      Technical limitations of the study preclude regional wall motion      analysis. Unable to assess LV diastolic function due to heart rhythm.   2. Right ventricle: The cavity size was increased. Systolic function was      reduced. The RV free wall longitudinal strain was -16.50%. The tricuspid      annular plane systolic excursion (TAPSE) is 1.56cm.   3. Left atrium: The left atrial volume was moderately increased.   4. Right atrium: The atrium was moderately dilated.   5. Mitral valve: The annulus was dilated. The leaflets were non-specifically      thickened. There was moderate regurgitation, with multiple jets.   6. Tricuspid valve: The annulus is dilated. There was severe regurgitation.   7. Pulmonary arteries: Systolic pressure was moderately to markedly      increased, at least 55mm Hg. Systolic pressure may be underestimated due      to wide tricuspid regurgitation. Estimated pulmonary artery diastolic      pressure was 34mm Hg.   8. Pericardium, extracardiac: A trivial pericardial effusion was identified.      There was a left pleural effusion.

## 2023-12-30 NOTE — PLAN OF CARE
Received patient from PACU after having laparoscopic cholecystectomy. Reports some pain- pain medication given. Patient A&Ox4. On RA. NSR on tele-denies chest pain or SOB. Has jasiel cath in rt chest. Ana Maria Galdamez for ambulation. Continent of B&B. Patient updated on current plan of care. Call light within reach.     Problem: PAIN - ADULT  Goal: Verbalizes/displays adequate comfort level or patient's stated pain goal  Description: INTERVENTIONS:  - Encourage pt to monitor pain and request assistance  - Assess pain using appropriate pain scale  - Administer analgesics based on type and severity of pain and evaluate response  - Implement non-pharmacological measures as appropriate and evaluate response  - Consider cultural and social influences on pain and pain management  - Manage/alleviate anxiety  - Utilize distraction and/or relaxation techniques  - Monitor for opioid side effects  - Notify MD/LIP if interventions unsuccessful or patient reports new pain  - Anticipate increased pain with activity and pre-medicate as appropriate  Outcome: Progressing     Problem: SAFETY ADULT - FALL  Goal: Free from fall injury  Description: INTERVENTIONS:  - Assess pt frequently for physical needs  - Identify cognitive and physical deficits and behaviors that affect risk of falls.  - [place] fall precautions as indicated by assessment.  - Educate pt/family on patient safety including physical limitations  - Instruct pt to call for assistance with activity based on assessment  - Modify environment to reduce risk of injury  - Provide assistive devices as appropriate  - Consider OT/PT consult to assist with strengthening/mobility  - Encourage toileting schedule  Outcome: Progressing     Problem: RESPIRATORY - ADULT  Goal: Achieves optimal ventilation and oxygenation  Description: INTERVENTIONS:  - Assess for changes in respiratory status  - Assess for changes in mentation and behavior  - Position to facilitate oxygenation and minimize  respiratory effort  - Oxygen supplementation based on oxygen saturation or ABGs  - Provide Smoking Cessation handout, if applicable  - Encourage broncho-pulmonary hygiene including cough, deep breathe, Incentive Spirometry  - Assess the need for suctioning and perform as needed  - Assess and instruct to report SOB or any respiratory difficulty  - Respiratory Therapy support as indicated  - Manage/alleviate anxiety  - Monitor for signs/symptoms of CO2 retention  Outcome: Progressing     Problem: METABOLIC/FLUID AND ELECTROLYTES - ADULT  Goal: Electrolytes maintained within normal limits  Description: INTERVENTIONS:  - Monitor labs and rhythm and assess patient for signs and symptoms of electrolyte imbalances  - Administer electrolyte replacement as ordered  - Monitor response to electrolyte replacements, including rhythm and repeat lab results as appropriate  - Fluid restriction as ordered  - Instruct patient on fluid and nutrition restrictions as appropriate  Outcome: Progressing  Goal: Hemodynamic stability and optimal renal function maintained  Description: INTERVENTIONS:  - Monitor labs and assess for signs and symptoms of volume excess or deficit  - Monitor intake, output and patient weight  - Monitor urine specific gravity, serum osmolarity and serum sodium as indicated or ordered  - Monitor response to interventions for patient's volume status, including labs, urine output, blood pressure (other measures as available)  - Encourage oral intake as appropriate  - Instruct patient on fluid and nutrition restrictions as appropriate  Outcome: Progressing     Problem: CARDIOVASCULAR - ADULT  Goal: Maintains optimal cardiac output and hemodynamic stability  Description: INTERVENTIONS:  - Monitor vital signs, rhythm, and trends  - Monitor for bleeding, hypotension and signs of decreased cardiac output  - Evaluate effectiveness of vasoactive medications to optimize hemodynamic stability  - Monitor arterial and/or venous  puncture sites for bleeding and/or hematoma  - Assess quality of pulses, skin color and temperature  - Assess for signs of decreased coronary artery perfusion - ex. Angina  - Evaluate fluid balance, assess for edema, trend weights  Outcome: Progressing

## 2023-12-30 NOTE — PLAN OF CARE
Problem: PAIN - ADULT  Goal: Verbalizes/displays adequate comfort level or patient's stated pain goal  Description: INTERVENTIONS:  - Encourage pt to monitor pain and request assistance  - Assess pain using appropriate pain scale  - Administer analgesics based on type and severity of pain and evaluate response  - Implement non-pharmacological measures as appropriate and evaluate response  - Consider cultural and social influences on pain and pain management  - Manage/alleviate anxiety  - Utilize distraction and/or relaxation techniques  - Monitor for opioid side effects  - Notify MD/LIP if interventions unsuccessful or patient reports new pain  - Anticipate increased pain with activity and pre-medicate as appropriate  Outcome: Progressing     Problem: SAFETY ADULT - FALL  Goal: Free from fall injury  Description: INTERVENTIONS:  - Assess pt frequently for physical needs  - Identify cognitive and physical deficits and behaviors that affect risk of falls.  - [place] fall precautions as indicated by assessment.  - Educate pt/family on patient safety including physical limitations  - Instruct pt to call for assistance with activity based on assessment  - Modify environment to reduce risk of injury  - Provide assistive devices as appropriate  - Consider OT/PT consult to assist with strengthening/mobility  - Encourage toileting schedule  Outcome: Progressing     Problem: RESPIRATORY - ADULT  Goal: Achieves optimal ventilation and oxygenation  Description: INTERVENTIONS:  - Assess for changes in respiratory status  - Assess for changes in mentation and behavior  - Position to facilitate oxygenation and minimize respiratory effort  - Oxygen supplementation based on oxygen saturation or ABGs  - Provide Smoking Cessation handout, if applicable  - Encourage broncho-pulmonary hygiene including cough, deep breathe, Incentive Spirometry  - Assess the need for suctioning and perform as needed  - Assess and instruct to report  SOB or any respiratory difficulty  - Respiratory Therapy support as indicated  - Manage/alleviate anxiety  - Monitor for signs/symptoms of CO2 retention  Outcome: Progressing     Problem: METABOLIC/FLUID AND ELECTROLYTES - ADULT  Goal: Electrolytes maintained within normal limits  Description: INTERVENTIONS:  - Monitor labs and rhythm and assess patient for signs and symptoms of electrolyte imbalances  - Administer electrolyte replacement as ordered  - Monitor response to electrolyte replacements, including rhythm and repeat lab results as appropriate  - Fluid restriction as ordered  - Instruct patient on fluid and nutrition restrictions as appropriate  Outcome: Progressing  Goal: Hemodynamic stability and optimal renal function maintained  Description: INTERVENTIONS:  - Monitor labs and assess for signs and symptoms of volume excess or deficit  - Monitor intake, output and patient weight  - Monitor urine specific gravity, serum osmolarity and serum sodium as indicated or ordered  - Monitor response to interventions for patient's volume status, including labs, urine output, blood pressure (other measures as available)  - Encourage oral intake as appropriate  - Instruct patient on fluid and nutrition restrictions as appropriate  Outcome: Progressing     Problem: CARDIOVASCULAR - ADULT  Goal: Maintains optimal cardiac output and hemodynamic stability  Description: INTERVENTIONS:  - Monitor vital signs, rhythm, and trends  - Monitor for bleeding, hypotension and signs of decreased cardiac output  - Evaluate effectiveness of vasoactive medications to optimize hemodynamic stability  - Monitor arterial and/or venous puncture sites for bleeding and/or hematoma  - Assess quality of pulses, skin color and temperature  - Assess for signs of decreased coronary artery perfusion - ex. Angina  - Evaluate fluid balance, assess for edema, trend weights  Outcome: Progressing

## 2023-12-30 NOTE — PLAN OF CARE
Pt denies c/o malaise, or cardiac symptoms. A&Ox4. Lungs diminished bilaterally with equal expansion, on room air. Pt NSR on monitor with regular rate. Abdomen soft and non-tender with active bowel sounds in all four quadrants. Continent of B&B. Right Port a cath. Pt and family members updated with plan of care.    Problem: PAIN - ADULT  Goal: Verbalizes/displays adequate comfort level or patient's stated pain goal  Description: INTERVENTIONS:  - Encourage pt to monitor pain and request assistance  - Assess pain using appropriate pain scale  - Administer analgesics based on type and severity of pain and evaluate response  - Implement non-pharmacological measures as appropriate and evaluate response  - Consider cultural and social influences on pain and pain management  - Manage/alleviate anxiety  - Utilize distraction and/or relaxation techniques  - Monitor for opioid side effects  - Notify MD/LIP if interventions unsuccessful or patient reports new pain  - Anticipate increased pain with activity and pre-medicate as appropriate  Outcome: Progressing     Problem: SAFETY ADULT - FALL  Goal: Free from fall injury  Description: INTERVENTIONS:  - Assess pt frequently for physical needs  - Identify cognitive and physical deficits and behaviors that affect risk of falls.  - [place] fall precautions as indicated by assessment.  - Educate pt/family on patient safety including physical limitations  - Instruct pt to call for assistance with activity based on assessment  - Modify environment to reduce risk of injury  - Provide assistive devices as appropriate  - Consider OT/PT consult to assist with strengthening/mobility  - Encourage toileting schedule  Outcome: Progressing     Problem: RESPIRATORY - ADULT  Goal: Achieves optimal ventilation and oxygenation  Description: INTERVENTIONS:  - Assess for changes in respiratory status  - Assess for changes in mentation and behavior  - Position to facilitate oxygenation and  minimize respiratory effort  - Oxygen supplementation based on oxygen saturation or ABGs  - Provide Smoking Cessation handout, if applicable  - Encourage broncho-pulmonary hygiene including cough, deep breathe, Incentive Spirometry  - Assess the need for suctioning and perform as needed  - Assess and instruct to report SOB or any respiratory difficulty  - Respiratory Therapy support as indicated  - Manage/alleviate anxiety  - Monitor for signs/symptoms of CO2 retention  Outcome: Progressing     Problem: METABOLIC/FLUID AND ELECTROLYTES - ADULT  Goal: Electrolytes maintained within normal limits  Description: INTERVENTIONS:  - Monitor labs and rhythm and assess patient for signs and symptoms of electrolyte imbalances  - Administer electrolyte replacement as ordered  - Monitor response to electrolyte replacements, including rhythm and repeat lab results as appropriate  - Fluid restriction as ordered  - Instruct patient on fluid and nutrition restrictions as appropriate  Outcome: Progressing  Goal: Hemodynamic stability and optimal renal function maintained  Description: INTERVENTIONS:  - Monitor labs and assess for signs and symptoms of volume excess or deficit  - Monitor intake, output and patient weight  - Monitor urine specific gravity, serum osmolarity and serum sodium as indicated or ordered  - Monitor response to interventions for patient's volume status, including labs, urine output, blood pressure (other measures as available)  - Encourage oral intake as appropriate  - Instruct patient on fluid and nutrition restrictions as appropriate  Outcome: Progressing     Problem: CARDIOVASCULAR - ADULT  Goal: Maintains optimal cardiac output and hemodynamic stability  Description: INTERVENTIONS:  - Monitor vital signs, rhythm, and trends  - Monitor for bleeding, hypotension and signs of decreased cardiac output  - Evaluate effectiveness of vasoactive medications to optimize hemodynamic stability  - Monitor arterial  and/or venous puncture sites for bleeding and/or hematoma  - Assess quality of pulses, skin color and temperature  - Assess for signs of decreased coronary artery perfusion - ex. Angina  - Evaluate fluid balance, assess for edema, trend weights  Outcome: Progressing

## 2023-12-30 NOTE — ANESTHESIA PROCEDURE NOTES
Airway  Date/Time: 12/29/2023 7:41 PM  Urgency: elective    Airway not difficult    General Information and Staff    Patient location during procedure: OR  Anesthesiologist: Neva Steve MD  Performed: anesthesiologist   Performed by: Neva Steve MD  Authorized by: Neva Steve MD      Indications and Patient Condition  Indications for airway management: anesthesia  Sedation level: deep  Preoxygenated: yes  Patient position: sniffing  Mask difficulty assessment: 1 - vent by mask    Final Airway Details  Final airway type: endotracheal airway      Successful airway: ETT  Cuffed: yes   Successful intubation technique: direct laryngoscopy  Endotracheal tube insertion site: oral  Blade: Henry  Blade size: #3  ETT size (mm): 6.5    Cormack-Lehane Classification: grade I - full view of glottis  Placement verified by: capnometry   Cuff volume (mL): 7  Measured from: lips  ETT to lips (cm): 21  Number of attempts at approach: 1    Additional Comments  atraumatic

## 2023-12-30 NOTE — PROGRESS NOTES
[facility]     Hospitalist Progress Note     Alina Aceves Patient Status:  Inpatient    1980 MRN [id number]   Location [facility] 4NW-A Attending Nicole Cartwright MD   Hosp Day # 17 PCP HOLLY IBARRA     Chief Complaint: Intractable nausea vomiting, diarrhea, generalized weakness.  Recent pneumonia.     Subjective:  s/p lap reyes  Objective:    Review of Systems:   A comprehensive review of systems was completed; pertinent positive and negatives stated in subjective.    Vital signs:  Temp:  [97.4 °F (36.3 °C)-98.8 °F (37.1 °C)] 98.2 °F (36.8 °C)  Pulse:  [] 97  Resp:  [12-19] 18  BP: (113-135)/(75-90) 113/78  SpO2:  [98 %-100 %] 100 %    Physical Exam:    /78 (BP Location: Right arm)   Pulse 97   Temp 98.2 °F (36.8 °C) (Oral)   Resp 18   Ht 5' 4\" (1.626 m)   Wt 105 lb 9.6 oz (47.9 kg)   LMP 2023 (Approximate)   SpO2 100%   Breastfeeding No   BMI 18.13 kg/m²     General: No acute distress 43 year old female ill appearing  Respiratory: Clear to auscultation on RA  Cardiovascular: S1, S2, regular rate and rhythm 90s  Abdomen: Soft, tender, non-distended  Neuro: Awake alert, fatigued.  Extremities: edema resolving    Diagnostic Data:    Labs:  Recent Labs   Lab 23  0520 23  1643 23  0515   WBC 15.4* 9.6 11.9*   HGB 10.4* 8.8* 8.3*   MCV 92.9 95.6 95.7   PLT 69.0* 61.0* 49.0*   INR  --  1.26*  --        Recent Labs   Lab 23  0535 23  0519 23  0515   GLU 95 111* 131*   BUN 34* 24* 24*   CREATSERUM 0.67 0.65 0.81   CA 8.7 9.0 8.7   ALB 3.0* 3.0* 2.7*    141 142   K 3.4* 3.5 3.8    103 106   CO2 34.0* 31.0 29.0   ALKPHO 250* 538* 600*   AST 68* 317* 344*   * 418* 487*   BILT 2.8* 8.1* 7.8*   TP 5.3* 5.2* 5.0*       Estimated Creatinine Clearance: 67.7 mL/min (based on SCr of 0.81 mg/dL).    Microbiology    Hospital Encounter on 23   1. Body Fluid Cult Aerobic and Anaerobic     Status: None    Collection Time: 12/15/23  3:26  PM    Specimen: Pleural Fluid, Right; Body fluid, unspecified   Result Value Ref Range    Body Fluid Culture Result No Growth 5 Days N/A    Body Fld Smear 4+ Neutrophils seen N/A    Body Fld Smear No organisms seen N/A    Body Fld Smear This is a cytocentrifuged smear. N/A   2. Urine Culture, Routine     Status: None    Collection Time: 12/14/23  3:58 AM    Specimen: Urine, clean catch   Result Value Ref Range    Urine Culture No Growth at 18-24 hrs. N/A   3. Blood Culture     Status: None    Collection Time: 12/13/23  8:10 PM    Specimen: Blood,peripheral   Result Value Ref Range    Blood Culture Result No Growth 5 Days N/A     MRSA PCR nares positive on 12/14/2023    Imaging: Reviewed in Epic.    Medications:    acetaminophen  1,000 mg Oral Q8H KHALIDA    [Held by provider] spironolactone  25 mg Oral Daily    torsemide  20 mg Oral Daily    metoprolol succinate  50 mg Oral 2x Daily(Beta Blocker)    predniSONE  10 mg Oral Daily with breakfast    DULoxetine  30 mg Oral Daily    multivitamin with minerals  1 tablet Oral Daily    pantoprazole  40 mg Oral QAM AC       Assessment & Plan:      #Acute cholecystitis s/p lap reyes doing well pod#1  #Transaminitis    -EBV Ig G pos and IgM neg  -exposed to daughter who was pos in Sept  -no symptoms  -lap reyes with liver bx?    # Multifocal pneumonia, resolved  -completed ceftriaxone, doxycycline (12/13-12/18)   -s/p bronch; Candida     # New onset CMP RV dysfct severe TR mod pulm HTN  # Bilateral large bilateral pleural effusion 2/2 Acute systolic heart failure  -s/p left thora 12/15; s/p right thora 12/16; cytology without malignancy  - EF 20-25%, severe diffuse hypokinesis;   - On toprol XL, aldactone, demadex per cards    #LE weakness/neuropathy  -MRI reviewed and does not show etiology for weakness;  could be chemo toxicity; back on cymbalta per neuro    # History of lupus, history of Sjogren's disease  -Takes azathioprine at home which was held at this time due to multifocal  pneumonia then acute reyes  - steroids per rheum    # Acute hypoxic respiratory failure resolved  # Acute kidney injury, resolved  # hx interstitial nephritis  # Locally advanced stage IIIb breast cancer status post outpatient neoadjuvant chemo and history of left breast lumpectomy and left lymph node resection on 11/16/2023  # Elevated INR, improved  # Gastritis- PPI  # N/V, diarrhea at home possibly due to effect of chemo  # Anxiety  # Hypokalemia, resolved  # Hyponatremia due to pneumonia and also hypovolemic due to nausea vomiting and diarrhea, siadh, ssri (off ssri initially but then resumed on 12/21 after review w/ nephro.)  # Anemia and thrombocytopenia due to malignancy and chemo; monitor  # Small left apical pneumothorax status post left thoracentesis, repeat chest x-ray done on 12/15/2023 showed left pneumothorax resolved  # MRSA nares

## 2023-12-30 NOTE — ANESTHESIA PROCEDURE NOTES
Arterial Line    Date/Time: 12/29/2023 8:06 PM    Performed by: Diane Garcia MD  Authorized by: Diane Garcia MD    General Information and Staff    Procedure Start:  12/29/2023 8:06 PM  Procedure End:  12/29/2023 8:06 PM  Anesthesiologist:  Diane Garcia MD  Performed By:  Anesthesiologist  Patient Location:  OR  Indication: continuous blood pressure monitoring and blood sampling needed    Site Identification: real time ultrasound guided, surface landmarks and image stored and retrievable    Preanesthetic Checklist: 2 patient identifiers, IV checked, risks and benefits discussed, monitors and equipment checked, pre-op evaluation, timeout performed, anesthesia consent and sterile technique used    Procedure Details    Catheter Size:  20 G  Catheter Length:  1 and 3/4 inch  Catheter Type:  Arrow  Seldinger Technique?: Yes    Laterality:  Left  Site:  Radial artery  Site Prep: chlorhexidine    Line Secured:  Wrist Brace, tape and Tegaderm    Assessment    Events: patient tolerated procedure well with no complications      Medications  12/29/2023 8:06 PM      Additional Comments

## 2023-12-30 NOTE — PROGRESS NOTES
[facility]                       Gastroenterology Follow up Note - [facility]    Alina Aceves Patient Status:  Inpatient    1980 MRN [id number]   Location [facility] POST ANESTHESIA CARE UNIT Attending Nicole Cartwright MD   Hosp Day # 16 PCP HOLLY IBARRA     Reason for consultation: Elevated liver enzymes  Subjective: Called back to see patient due to jump in her liver enzymes.  Abdominal ultrasound with concern for cholecystitis yesterday.  She was initially tentatively planned for percutaneous cholecystostomy tube placement today, however decision was then made for laparoscopic cholecystectomy today instead.  This was performed at this evening and IOC was negative.  Review of Systems:   10 point ROS completed and was negative, except for pertinent positive and negatives stated in subjective.    For PMH, PSH, FHx and SHx- please see initial consult note.     Objective: /82   Pulse 89   Temp 98.4 °F (36.9 °C) (Temporal)   Resp 13   Ht 5' 4\" (1.626 m)   Wt 102 lb 11.8 oz (46.6 kg)   LMP 2023 (Approximate)   SpO2 99%   Breastfeeding No   BMI 17.63 kg/m²   Gen: AAO x 3, able to speak in complete sentences  Abdomen: Soft, non-tender, non-distended with the presence of bowel sounds; No hepatosplenomegaly; no rebound or guarding; No ascites is clinically apparent; no tympany to percussion  Labs:   Lab Results   Component Value Date    CREATSERUM 0.65 2023    BUN 24 2023     2023    K 3.5 2023     2023    CO2 31.0 2023     2023    CA 9.0 2023    ALB 3.0 2023    ALKPHO 538 2023    BILT 8.1 2023     2023     2023     Recent Labs   Lab 23  0520 23  1423 23  0535 23  0519   *  --  95 111*   BUN 61*  --  34* 24*   CREATSERUM 0.80  --  0.67 0.65   CA 8.8  --  8.7 9.0     --  143 141   K 2.6* 3.0* 3.4* 3.5     --  104  103   CO2 38.0*  --  34.0* 31.0     Recent Labs   Lab 12/28/23  1643   RBC 2.98*   HGB 8.8*   HCT 28.5*   MCV 95.6   MCH 29.5   MCHC 30.9*   RDW 20.0   NEPRELIM 8.30*   WBC 9.6   PLT 61.0*       Recent Labs   Lab 12/27/23  0520 12/28/23  0535 12/29/23  0519   * 248* 418*   AST 78* 68* 317*       Assessment:  Ms Aceves is a 43 year old female with a history of stage IIIb breast cancer and lupus who presented on 12/13 with shortness of breath, found to have pneumonia, pleural effusions (transudative), and HFrEF (EF 20-25%) for whom GI is consulted for abnormal LFTs. These are most likely secondary to congestive hepatopathy. LFTs markedly elevated.  These were downtrending, however noted to uptrend significantly today with bilirubin of 8.  Likely increase is secondary to acute cholecystitis as noted on gallbladder ultrasound.  She underwent laparoscopic cholecystectomy today and IOC was negative.  Plan:  -daily LFTs  -s/p IV NAC and IV vitamin K  -continued CHF management per cards and primary team  -if LFTs do not improve in the setting of diuresis and now after cholecystectomy or with persistent elevation of T bili, will consider liver biopsy for further evaluation  -Monitor CBC and CMP in the morning    Thank you for allowing us to participate in patient's care. Please call us with any questions or concerns.  The GI consult service will continue to follow.     Matt Hall DO  [facility]  [phone]

## 2023-12-30 NOTE — ANESTHESIA POSTPROCEDURE EVALUATION
[facility] Patient Status:  Inpatient   Age/Gender 37year old female MRN [id number]   [facility] SURGERY Attending Mickey Chao MD   [facility] Day # 16 PCP HOLLY IBARRA       Anesthesia Post-op Note    LAPAROSCOPIC CHOLECYSTECTOMY WITH INTRAOPERATIVE CHOLANGIOGRAM    Procedure Summary       Date: 12/29/23 Room / Location: [facility] MAIN OR 09 / [facility] MAIN OR    Anesthesia Start: 1925 Anesthesia Stop: 2106    Procedure: LAPAROSCOPIC CHOLECYSTECTOMY WITH INTRAOPERATIVE CHOLANGIOGRAM (Abdomen) Diagnosis:       Cholecystitis      (Cholecystitis [K81.9])    Surgeons: Batool Gross MD Anesthesiologist: Clark Araya MD    Anesthesia Type: general ASA Status: 4            Anesthesia Type: general    Vitals Value Taken Time   /72 12/29/23 2106   Temp 98 12/29/23 2106   Pulse 85 12/29/23 2106   Resp 19 12/29/23 2106   SpO2 99 12/29/23 2106       Patient Location: PACU    Anesthesia Type: general    Airway Patency: patent    Postop Pain Control: adequate    Mental Status: mildly sedated but able to meaningfully participate in the post-anesthesia evaluation    Nausea/Vomiting: none    Cardiopulmonary/Hydration status: stable euvolemic    Complications: no apparent anesthesia related complications    Postop vital signs: stable    Dental Exam: Unchanged from Preop    Patient to be discharged from PACU when criteria met.

## 2023-12-31 PROBLEM — G93.40 ENCEPHALOPATHY ACUTE: Status: ACTIVE | Noted: 2023-12-31

## 2023-12-31 LAB
ALBUMIN SERPL-MCNC: 2.4 G/DL (ref 3.4–5)
ALBUMIN/GLOB SERPL: 1 {RATIO} (ref 1–2)
ALP LIVER SERPL-CCNC: 446 U/L
ALT SERPL-CCNC: 275 U/L
ANION GAP SERPL CALC-SCNC: 7 MMOL/L (ref 0–18)
AST SERPL-CCNC: 129 U/L (ref 15–37)
BILIRUB SERPL-MCNC: 3.1 MG/DL (ref 0.1–2)
BUN BLD-MCNC: 27 MG/DL (ref 9–23)
CALCIUM BLD-MCNC: 8.2 MG/DL (ref 8.5–10.1)
CHLORIDE SERPL-SCNC: 103 MMOL/L (ref 98–112)
CO2 SERPL-SCNC: 29 MMOL/L (ref 21–32)
CREAT BLD-MCNC: 0.73 MG/DL
EGFRCR SERPLBLD CKD-EPI 2021: 105 ML/MIN/1.73M2 (ref 60–?)
ERYTHROCYTE [DISTWIDTH] IN BLOOD BY AUTOMATED COUNT: 20.3 %
GLOBULIN PLAS-MCNC: 2.4 G/DL (ref 2.8–4.4)
GLUCOSE BLD-MCNC: 98 MG/DL (ref 70–99)
HCT VFR BLD AUTO: 23 %
HGB BLD-MCNC: 7.3 G/DL
MCH RBC QN AUTO: 29.9 PG (ref 26–34)
MCHC RBC AUTO-ENTMCNC: 31.7 G/DL (ref 31–37)
MCV RBC AUTO: 94.3 FL
OSMOLALITY SERPL CALC.SUM OF ELEC: 293 MOSM/KG (ref 275–295)
PLATELET # BLD AUTO: 52 10(3)UL (ref 150–450)
POTASSIUM SERPL-SCNC: 2.7 MMOL/L (ref 3.5–5.1)
POTASSIUM SERPL-SCNC: 3.5 MMOL/L (ref 3.5–5.1)
PROT SERPL-MCNC: 4.8 G/DL (ref 6.4–8.2)
RBC # BLD AUTO: 2.44 X10(6)UL
SODIUM SERPL-SCNC: 139 MMOL/L (ref 136–145)
WBC # BLD AUTO: 12.5 X10(3) UL (ref 4–11)

## 2023-12-31 PROCEDURE — 99232 SBSQ HOSP IP/OBS MODERATE 35: CPT | Performed by: STUDENT IN AN ORGANIZED HEALTH CARE EDUCATION/TRAINING PROGRAM

## 2023-12-31 PROCEDURE — 95816 EEG AWAKE AND DROWSY: CPT | Performed by: OTHER

## 2023-12-31 RX ORDER — SPIRONOLACTONE 25 MG/1
25 TABLET ORAL DAILY
Qty: 90 TABLET | Refills: 3 | Status: SHIPPED | OUTPATIENT
Start: 2023-12-31 | End: 2024-12-25

## 2023-12-31 RX ORDER — POTASSIUM CHLORIDE 20 MEQ/1
40 TABLET, EXTENDED RELEASE ORAL EVERY 4 HOURS
Qty: 4 TABLET | Refills: 0 | Status: COMPLETED | OUTPATIENT
Start: 2023-12-31 | End: 2023-12-31

## 2023-12-31 NOTE — PROGRESS NOTES
[facility]     Hospitalist Progress Note     Alina Aceves Patient Status:  Inpatient    1980 MRN [id number]   Location [facility] 4NW-A Attending Nicole Cartwright MD   Hosp Day # 18 PCP HOLLY IBARRA     Chief Complaint: Intractable nausea vomiting, diarrhea, generalized weakness.  Recent pneumonia.     Subjective:    Nausea, passing gas  Objective:    Review of Systems:   A comprehensive review of systems was completed; pertinent positive and negatives stated in subjective.    Vital signs:  Temp:  [97.7 °F (36.5 °C)-98.9 °F (37.2 °C)] 98.7 °F (37.1 °C)  Pulse:  [] 100  Resp:  [16-18] 18  BP: (113-124)/(73-86) 120/77  SpO2:  [97 %-100 %] 100 %    Physical Exam:    /77 (BP Location: Right arm)   Pulse 100   Temp 98.7 °F (37.1 °C) (Oral)   Resp 18   Ht 5' 4\" (1.626 m)   Wt 110 lb 3.7 oz (50 kg)   LMP 2023 (Approximate)   SpO2 100%   Breastfeeding No   BMI 18.92 kg/m²     General: No acute distress 43 year old female ill appearing  Respiratory: Clear to auscultation on RA  Cardiovascular: S1, S2, regular rate and rhythm 90s  Abdomen: Soft, tender, non-distended  Neuro: Awake alert, fatigued.  Extremities: edema resolving    Diagnostic Data:    Labs:  Recent Labs   Lab 23  0520 23  1643 23  0515 23  0449   WBC 15.4* 9.6 11.9* 12.5*   HGB 10.4* 8.8* 8.3* 7.3*   MCV 92.9 95.6 95.7 94.3   PLT 69.0* 61.0* 49.0* 52.0*   INR  --  1.26*  --   --        Recent Labs   Lab 23  0519 23  0515 23  0449   * 131* 98   BUN 24* 24* 27*   CREATSERUM 0.65 0.81 0.73   CA 9.0 8.7 8.2*   ALB 3.0* 2.7* 2.4*    142 139   K 3.5 3.8 2.7*    106 103   CO2 31.0 29.0 29.0   ALKPHO 538* 600* 446*   * 344* 129*   * 487* 275*   BILT 8.1* 7.8* 3.1*   TP 5.2* 5.0* 4.8*       Estimated Creatinine Clearance: 78.4 mL/min (based on SCr of 0.73 mg/dL).    Microbiology    Hospital Encounter on 23   1. Body Fluid Cult Aerobic and Anaerobic      Status: None    Collection Time: 12/15/23  3:26 PM    Specimen: Pleural Fluid, Right; Body fluid, unspecified   Result Value Ref Range    Body Fluid Culture Result No Growth 5 Days N/A    Body Fld Smear 4+ Neutrophils seen N/A    Body Fld Smear No organisms seen N/A    Body Fld Smear This is a cytocentrifuged smear. N/A   2. Urine Culture, Routine     Status: None    Collection Time: 12/14/23  3:58 AM    Specimen: Urine, clean catch   Result Value Ref Range    Urine Culture No Growth at 18-24 hrs. N/A   3. Blood Culture     Status: None    Collection Time: 12/13/23  8:10 PM    Specimen: Blood,peripheral   Result Value Ref Range    Blood Culture Result No Growth 5 Days N/A     MRSA PCR nares positive on 12/14/2023    Imaging: Reviewed in Epic.    Medications:    acetaminophen  1,000 mg Oral Q8H KHALIDA    [Held by provider] spironolactone  25 mg Oral Daily    torsemide  20 mg Oral Daily    metoprolol succinate  50 mg Oral 2x Daily(Beta Blocker)    predniSONE  10 mg Oral Daily with breakfast    DULoxetine  30 mg Oral Daily    multivitamin with minerals  1 tablet Oral Daily    pantoprazole  40 mg Oral QAM AC       Assessment & Plan:      #Acute cholecystitis s/p lap reyes d  #Transaminitis    -EBV Ig G pos and IgM neg  -exposed to daughter who was pos in Sept  -no symptoms  - Continue to trend LFT     # Multifocal pneumonia, resolved  -completed ceftriaxone, doxycycline (12/13-12/18)   -s/p bronch; Candida     # New onset CMP RV dysfct severe TR mod pulm HTN  # Bilateral large bilateral pleural effusion 2/2 Acute systolic heart failure  -s/p left thora 12/15; s/p right thora 12/16; cytology without malignancy  - EF 20-25%, severe diffuse hypokinesis;   - On toprol XL, aldactone, demadex per cards    #LE weakness/neuropathy  -MRI reviewed and does not show etiology for weakness;  could be chemo toxicity; back on cymbalta per neuro    # History of lupus, history of Sjogren's disease  -Takes azathioprine at home which was  held at this time due to multifocal pneumonia then acute reyes  - steroids per rheum: 10 Mg PO Prednisone QD    # Acute hypoxic respiratory failure resolved  # Acute kidney injury, resolved  # hx interstitial nephritis  # Locally advanced stage IIIb breast cancer status post outpatient neoadjuvant chemo and history of left breast lumpectomy and left lymph node resection on 11/16/2023  # Elevated INR, improved  # Gastritis- PPI  # N/V, diarrhea at home possibly due to effect of chemo  # Anxiety  # Hypokalemia, resolved  # Hyponatremia due to pneumonia and also hypovolemic due to nausea vomiting and diarrhea, siadh, ssri (off ssri initially but then resumed on 12/21 after review w/ nephro.)  # Anemia and thrombocytopenia due to malignancy and chemo; monitor  # Small left apical pneumothorax status post left thoracentesis, repeat chest x-ray done on 12/15/2023 showed left pneumothorax resolved  # MRSA nares

## 2023-12-31 NOTE — PLAN OF CARE
Alert able to make needs known.c/o of abdomen tenderness but better than yesterday. Bowel sounds present. Pt reports passing gas.   LFT improved from yesterday.   K+ being replaced.   Plan of care discussed with pt, verbalized understanding.   Call light within reach.         Problem: PAIN - ADULT  Goal: Verbalizes/displays adequate comfort level or patient's stated pain goal  Description: INTERVENTIONS:  - Encourage pt to monitor pain and request assistance  - Assess pain using appropriate pain scale  - Administer analgesics based on type and severity of pain and evaluate response  - Implement non-pharmacological measures as appropriate and evaluate response  - Consider cultural and social influences on pain and pain management  - Manage/alleviate anxiety  - Utilize distraction and/or relaxation techniques  - Monitor for opioid side effects  - Notify MD/LIP if interventions unsuccessful or patient reports new pain  - Anticipate increased pain with activity and pre-medicate as appropriate  Outcome: Progressing     Problem: SAFETY ADULT - FALL  Goal: Free from fall injury  Description: INTERVENTIONS:  - Assess pt frequently for physical needs  - Identify cognitive and physical deficits and behaviors that affect risk of falls.  - [place] fall precautions as indicated by assessment.  - Educate pt/family on patient safety including physical limitations  - Instruct pt to call for assistance with activity based on assessment  - Modify environment to reduce risk of injury  - Provide assistive devices as appropriate  - Consider OT/PT consult to assist with strengthening/mobility  - Encourage toileting schedule  Outcome: Progressing     Problem: RESPIRATORY - ADULT  Goal: Achieves optimal ventilation and oxygenation  Description: INTERVENTIONS:  - Assess for changes in respiratory status  - Assess for changes in mentation and behavior  - Position to facilitate oxygenation and minimize respiratory effort  - Oxygen  supplementation based on oxygen saturation or ABGs  - Provide Smoking Cessation handout, if applicable  - Encourage broncho-pulmonary hygiene including cough, deep breathe, Incentive Spirometry  - Assess the need for suctioning and perform as needed  - Assess and instruct to report SOB or any respiratory difficulty  - Respiratory Therapy support as indicated  - Manage/alleviate anxiety  - Monitor for signs/symptoms of CO2 retention  Outcome: Progressing     Problem: METABOLIC/FLUID AND ELECTROLYTES - ADULT  Goal: Electrolytes maintained within normal limits  Description: INTERVENTIONS:  - Monitor labs and rhythm and assess patient for signs and symptoms of electrolyte imbalances  - Administer electrolyte replacement as ordered  - Monitor response to electrolyte replacements, including rhythm and repeat lab results as appropriate  - Fluid restriction as ordered  - Instruct patient on fluid and nutrition restrictions as appropriate  Outcome: Progressing  Goal: Hemodynamic stability and optimal renal function maintained  Description: INTERVENTIONS:  - Monitor labs and assess for signs and symptoms of volume excess or deficit  - Monitor intake, output and patient weight  - Monitor urine specific gravity, serum osmolarity and serum sodium as indicated or ordered  - Monitor response to interventions for patient's volume status, including labs, urine output, blood pressure (other measures as available)  - Encourage oral intake as appropriate  - Instruct patient on fluid and nutrition restrictions as appropriate  Outcome: Progressing     Problem: CARDIOVASCULAR - ADULT  Goal: Maintains optimal cardiac output and hemodynamic stability  Description: INTERVENTIONS:  - Monitor vital signs, rhythm, and trends  - Monitor for bleeding, hypotension and signs of decreased cardiac output  - Evaluate effectiveness of vasoactive medications to optimize hemodynamic stability  - Monitor arterial and/or venous puncture sites for bleeding  and/or hematoma  - Assess quality of pulses, skin color and temperature  - Assess for signs of decreased coronary artery perfusion - ex. Angina  - Evaluate fluid balance, assess for edema, trend weights  Outcome: Progressing

## 2023-12-31 NOTE — PLAN OF CARE
Patient A&Ox4. On RA. NSR on tele- denies any chest pain or SOB. Cont complaints of pain at incision site- PRN pain medication given. Ana Maria Stedy used for ambulation. Continent of Bowel and Bladder. Has [id number]- CHG bath done in AM. Patient updated on plan of care. Call light within reach.     0605- Received critical lab result- K+ 2.7- Nephrology paged.  Problem: PAIN - ADULT  Goal: Verbalizes/displays adequate comfort level or patient's stated pain goal  Description: INTERVENTIONS:  - Encourage pt to monitor pain and request assistance  - Assess pain using appropriate pain scale  - Administer analgesics based on type and severity of pain and evaluate response  - Implement non-pharmacological measures as appropriate and evaluate response  - Consider cultural and social influences on pain and pain management  - Manage/alleviate anxiety  - Utilize distraction and/or relaxation techniques  - Monitor for opioid side effects  - Notify MD/LIP if interventions unsuccessful or patient reports new pain  - Anticipate increased pain with activity and pre-medicate as appropriate  Outcome: Progressing     Problem: SAFETY ADULT - FALL  Goal: Free from fall injury  Description: INTERVENTIONS:  - Assess pt frequently for physical needs  - Identify cognitive and physical deficits and behaviors that affect risk of falls.  - [place] fall precautions as indicated by assessment.  - Educate pt/family on patient safety including physical limitations  - Instruct pt to call for assistance with activity based on assessment  - Modify environment to reduce risk of injury  - Provide assistive devices as appropriate  - Consider OT/PT consult to assist with strengthening/mobility  - Encourage toileting schedule  Outcome: Progressing     Problem: RESPIRATORY - ADULT  Goal: Achieves optimal ventilation and oxygenation  Description: INTERVENTIONS:  - Assess for changes in respiratory status  - Assess for changes in mentation and behavior  -  Position to facilitate oxygenation and minimize respiratory effort  - Oxygen supplementation based on oxygen saturation or ABGs  - Provide Smoking Cessation handout, if applicable  - Encourage broncho-pulmonary hygiene including cough, deep breathe, Incentive Spirometry  - Assess the need for suctioning and perform as needed  - Assess and instruct to report SOB or any respiratory difficulty  - Respiratory Therapy support as indicated  - Manage/alleviate anxiety  - Monitor for signs/symptoms of CO2 retention  Outcome: Progressing     Problem: METABOLIC/FLUID AND ELECTROLYTES - ADULT  Goal: Electrolytes maintained within normal limits  Description: INTERVENTIONS:  - Monitor labs and rhythm and assess patient for signs and symptoms of electrolyte imbalances  - Administer electrolyte replacement as ordered  - Monitor response to electrolyte replacements, including rhythm and repeat lab results as appropriate  - Fluid restriction as ordered  - Instruct patient on fluid and nutrition restrictions as appropriate  Outcome: Progressing  Goal: Hemodynamic stability and optimal renal function maintained  Description: INTERVENTIONS:  - Monitor labs and assess for signs and symptoms of volume excess or deficit  - Monitor intake, output and patient weight  - Monitor urine specific gravity, serum osmolarity and serum sodium as indicated or ordered  - Monitor response to interventions for patient's volume status, including labs, urine output, blood pressure (other measures as available)  - Encourage oral intake as appropriate  - Instruct patient on fluid and nutrition restrictions as appropriate  Outcome: Progressing     Problem: CARDIOVASCULAR - ADULT  Goal: Maintains optimal cardiac output and hemodynamic stability  Description: INTERVENTIONS:  - Monitor vital signs, rhythm, and trends  - Monitor for bleeding, hypotension and signs of decreased cardiac output  - Evaluate effectiveness of vasoactive medications to optimize  hemodynamic stability  - Monitor arterial and/or venous puncture sites for bleeding and/or hematoma  - Assess quality of pulses, skin color and temperature  - Assess for signs of decreased coronary artery perfusion - ex. Angina  - Evaluate fluid balance, assess for edema, trend weights  Outcome: Progressing

## 2023-12-31 NOTE — PHYSICAL THERAPY NOTE
Chart reviewed. RN ok with session. PT entered patients room, introduced self and role. Patient reports that she has been up to the bathroom 3x today with assistance from nursing staff and use of the Ana Maria Stedy. Patient reports possible plan for dc home today so waiting to speak with SW/CM regarding arrangement of equipment and assistance needs for safe dc home. Patient declined any mobility or PROM of BLE's. Will continue to follow as appropriate. RN and PCT updated.

## 2023-12-31 NOTE — PROCEDURES
EEG REPORT;     Reason for Examination: Altered mental status     Technical Summary:   18 Channels of EEG and 1 Channel of EKG was performed utilizing internation 10/20 method.          Background Activity:   The background activity consisted of 9 Hz waveforms, reactive to eye opening/ external stimulation.     Abnormality:  Throughout the recording low to medium voltage, polymorphic, 3 to 6 Hz slow activity was noted diffusely over both hemispheres     Activation:     Hyperventilation:   Not Performed.     Photic Stimulation:  Driving response seen.No        Sleep:  Stage I sleep seen.   Stage I and II sleep seen.     Impression:  This is a Abnormal EEG.  Mild diffuse slowing into delta and theta range was noted.  This constellation of findings can be seen in encephalopathy due to metabolic/toxic etiology, medication effects or diffuse cerebral injury.  Clinical correlation is recommended.           Saleem Camarillo MD  [facility].

## 2024-01-01 LAB
ALBUMIN SERPL-MCNC: 2.4 G/DL (ref 3.4–5)
ALBUMIN/GLOB SERPL: 0.9 {RATIO} (ref 1–2)
ALP LIVER SERPL-CCNC: 425 U/L
ALT SERPL-CCNC: 216 U/L
ANION GAP SERPL CALC-SCNC: 6 MMOL/L (ref 0–18)
AST SERPL-CCNC: 70 U/L (ref 15–37)
BASOPHILS # BLD AUTO: 0 X10(3) UL (ref 0–0.2)
BASOPHILS NFR BLD AUTO: 0 %
BILIRUB SERPL-MCNC: 2.4 MG/DL (ref 0.1–2)
BUN BLD-MCNC: 25 MG/DL (ref 9–23)
CALCIUM BLD-MCNC: 8.5 MG/DL (ref 8.5–10.1)
CHLORIDE SERPL-SCNC: 102 MMOL/L (ref 98–112)
CO2 SERPL-SCNC: 31 MMOL/L (ref 21–32)
CREAT BLD-MCNC: 0.64 MG/DL
EGFRCR SERPLBLD CKD-EPI 2021: 112 ML/MIN/1.73M2 (ref 60–?)
EOSINOPHIL # BLD AUTO: 0.18 X10(3) UL (ref 0–0.7)
EOSINOPHIL NFR BLD AUTO: 2.4 %
ERYTHROCYTE [DISTWIDTH] IN BLOOD BY AUTOMATED COUNT: 20 %
GLOBULIN PLAS-MCNC: 2.7 G/DL (ref 2.8–4.4)
GLUCOSE BLD-MCNC: 89 MG/DL (ref 70–99)
HCT VFR BLD AUTO: 22.8 %
HGB BLD-MCNC: 7.4 G/DL
IMM GRANULOCYTES # BLD AUTO: 0.03 X10(3) UL (ref 0–1)
IMM GRANULOCYTES NFR BLD: 0.4 %
LYMPHOCYTES # BLD AUTO: 0.99 X10(3) UL (ref 1–4)
LYMPHOCYTES NFR BLD AUTO: 13.1 %
MCH RBC QN AUTO: 29.6 PG (ref 26–34)
MCHC RBC AUTO-ENTMCNC: 32.5 G/DL (ref 31–37)
MCV RBC AUTO: 91.2 FL
MONOCYTES # BLD AUTO: 0.71 X10(3) UL (ref 0.1–1)
MONOCYTES NFR BLD AUTO: 9.4 %
NEUTROPHILS # BLD AUTO: 5.64 X10 (3) UL (ref 1.5–7.7)
NEUTROPHILS # BLD AUTO: 5.64 X10(3) UL (ref 1.5–7.7)
NEUTROPHILS NFR BLD AUTO: 74.7 %
OSMOLALITY SERPL CALC.SUM OF ELEC: 292 MOSM/KG (ref 275–295)
PLATELET # BLD AUTO: 79 10(3)UL (ref 150–450)
POTASSIUM SERPL-SCNC: 3.1 MMOL/L (ref 3.5–5.1)
POTASSIUM SERPL-SCNC: 3.7 MMOL/L (ref 3.5–5.1)
PROT SERPL-MCNC: 5.1 G/DL (ref 6.4–8.2)
RBC # BLD AUTO: 2.5 X10(6)UL
SODIUM SERPL-SCNC: 139 MMOL/L (ref 136–145)
WBC # BLD AUTO: 7.6 X10(3) UL (ref 4–11)

## 2024-01-01 RX ORDER — PREDNISONE 10 MG/1
10 TABLET ORAL
Qty: 30 TABLET | Refills: 0 | Status: SHIPPED | OUTPATIENT
Start: 2024-01-02

## 2024-01-01 RX ORDER — POTASSIUM CHLORIDE 20 MEQ/1
40 TABLET, EXTENDED RELEASE ORAL EVERY 4 HOURS
Status: COMPLETED | OUTPATIENT
Start: 2024-01-01 | End: 2024-01-01

## 2024-01-01 NOTE — PLAN OF CARE
Received patient at 0730. Patient alert and oriented x4. Tele Rhythm NSR. O2 sats on RA. Lungs clear. Bed is locked and low position. Call light & personal belongings within reach. Denies chest pain at this time. Patient voiding WNL.  Skin dry and intact. Reviewed plan of care with patient and verbalized understanding.     POC: DC planning/pending hospital bed, WC, commode, PO steroids , PO torsemide.     Problem: PAIN - ADULT  Goal: Verbalizes/displays adequate comfort level or patient's stated pain goal  Description: INTERVENTIONS:  - Encourage pt to monitor pain and request assistance  - Assess pain using appropriate pain scale  - Administer analgesics based on type and severity of pain and evaluate response  - Implement non-pharmacological measures as appropriate and evaluate response  - Consider cultural and social influences on pain and pain management  - Manage/alleviate anxiety  - Utilize distraction and/or relaxation techniques  - Monitor for opioid side effects  - Notify MD/LIP if interventions unsuccessful or patient reports new pain  - Anticipate increased pain with activity and pre-medicate as appropriate  Outcome: Progressing     Problem: SAFETY ADULT - FALL  Goal: Free from fall injury  Description: INTERVENTIONS:  - Assess pt frequently for physical needs  - Identify cognitive and physical deficits and behaviors that affect risk of falls.  - [place] fall precautions as indicated by assessment.  - Educate pt/family on patient safety including physical limitations  - Instruct pt to call for assistance with activity based on assessment  - Modify environment to reduce risk of injury  - Provide assistive devices as appropriate  - Consider OT/PT consult to assist with strengthening/mobility  - Encourage toileting schedule  Outcome: Progressing     Problem: RESPIRATORY - ADULT  Goal: Achieves optimal ventilation and oxygenation  Description: INTERVENTIONS:  - Assess for changes in respiratory status  -  Assess for changes in mentation and behavior  - Position to facilitate oxygenation and minimize respiratory effort  - Oxygen supplementation based on oxygen saturation or ABGs  - Provide Smoking Cessation handout, if applicable  - Encourage broncho-pulmonary hygiene including cough, deep breathe, Incentive Spirometry  - Assess the need for suctioning and perform as needed  - Assess and instruct to report SOB or any respiratory difficulty  - Respiratory Therapy support as indicated  - Manage/alleviate anxiety  - Monitor for signs/symptoms of CO2 retention  Outcome: Progressing     Problem: METABOLIC/FLUID AND ELECTROLYTES - ADULT  Goal: Electrolytes maintained within normal limits  Description: INTERVENTIONS:  - Monitor labs and rhythm and assess patient for signs and symptoms of electrolyte imbalances  - Administer electrolyte replacement as ordered  - Monitor response to electrolyte replacements, including rhythm and repeat lab results as appropriate  - Fluid restriction as ordered  - Instruct patient on fluid and nutrition restrictions as appropriate  Outcome: Progressing  Goal: Hemodynamic stability and optimal renal function maintained  Description: INTERVENTIONS:  - Monitor labs and assess for signs and symptoms of volume excess or deficit  - Monitor intake, output and patient weight  - Monitor urine specific gravity, serum osmolarity and serum sodium as indicated or ordered  - Monitor response to interventions for patient's volume status, including labs, urine output, blood pressure (other measures as available)  - Encourage oral intake as appropriate  - Instruct patient on fluid and nutrition restrictions as appropriate  Outcome: Progressing     Problem: CARDIOVASCULAR - ADULT  Goal: Maintains optimal cardiac output and hemodynamic stability  Description: INTERVENTIONS:  - Monitor vital signs, rhythm, and trends  - Monitor for bleeding, hypotension and signs of decreased cardiac output  - Evaluate  effectiveness of vasoactive medications to optimize hemodynamic stability  - Monitor arterial and/or venous puncture sites for bleeding and/or hematoma  - Assess quality of pulses, skin color and temperature  - Assess for signs of decreased coronary artery perfusion - ex. Angina  - Evaluate fluid balance, assess for edema, trend weights  Outcome: Progressing

## 2024-01-01 NOTE — PLAN OF CARE
Patient A&Ox4. On RA. NSR on tele- denies any chest pain or SOB. Cont complaints of pain at incision sites on abdomen- PRN pain medication given. Incision sites are C/D/I- bruised. Continent of Bowel & Bladder. Ambulates with Ana Maria Mcclellandy- tolerates well. Has [id number]- dressing to be changed in AM. CHG bath to be done in AM. Patient updated on plan of care. Call light within reach.     Problem: PAIN - ADULT  Goal: Verbalizes/displays adequate comfort level or patient's stated pain goal  Description: INTERVENTIONS:  - Encourage pt to monitor pain and request assistance  - Assess pain using appropriate pain scale  - Administer analgesics based on type and severity of pain and evaluate response  - Implement non-pharmacological measures as appropriate and evaluate response  - Consider cultural and social influences on pain and pain management  - Manage/alleviate anxiety  - Utilize distraction and/or relaxation techniques  - Monitor for opioid side effects  - Notify MD/LIP if interventions unsuccessful or patient reports new pain  - Anticipate increased pain with activity and pre-medicate as appropriate  Outcome: Progressing     Problem: SAFETY ADULT - FALL  Goal: Free from fall injury  Description: INTERVENTIONS:  - Assess pt frequently for physical needs  - Identify cognitive and physical deficits and behaviors that affect risk of falls.  - [place] fall precautions as indicated by assessment.  - Educate pt/family on patient safety including physical limitations  - Instruct pt to call for assistance with activity based on assessment  - Modify environment to reduce risk of injury  - Provide assistive devices as appropriate  - Consider OT/PT consult to assist with strengthening/mobility  - Encourage toileting schedule  Outcome: Progressing     Problem: RESPIRATORY - ADULT  Goal: Achieves optimal ventilation and oxygenation  Description: INTERVENTIONS:  - Assess for changes in respiratory status  - Assess for changes in  mentation and behavior  - Position to facilitate oxygenation and minimize respiratory effort  - Oxygen supplementation based on oxygen saturation or ABGs  - Provide Smoking Cessation handout, if applicable  - Encourage broncho-pulmonary hygiene including cough, deep breathe, Incentive Spirometry  - Assess the need for suctioning and perform as needed  - Assess and instruct to report SOB or any respiratory difficulty  - Respiratory Therapy support as indicated  - Manage/alleviate anxiety  - Monitor for signs/symptoms of CO2 retention  Outcome: Progressing     Problem: METABOLIC/FLUID AND ELECTROLYTES - ADULT  Goal: Electrolytes maintained within normal limits  Description: INTERVENTIONS:  - Monitor labs and rhythm and assess patient for signs and symptoms of electrolyte imbalances  - Administer electrolyte replacement as ordered  - Monitor response to electrolyte replacements, including rhythm and repeat lab results as appropriate  - Fluid restriction as ordered  - Instruct patient on fluid and nutrition restrictions as appropriate  Outcome: Progressing  Goal: Hemodynamic stability and optimal renal function maintained  Description: INTERVENTIONS:  - Monitor labs and assess for signs and symptoms of volume excess or deficit  - Monitor intake, output and patient weight  - Monitor urine specific gravity, serum osmolarity and serum sodium as indicated or ordered  - Monitor response to interventions for patient's volume status, including labs, urine output, blood pressure (other measures as available)  - Encourage oral intake as appropriate  - Instruct patient on fluid and nutrition restrictions as appropriate  Outcome: Progressing     Problem: CARDIOVASCULAR - ADULT  Goal: Maintains optimal cardiac output and hemodynamic stability  Description: INTERVENTIONS:  - Monitor vital signs, rhythm, and trends  - Monitor for bleeding, hypotension and signs of decreased cardiac output  - Evaluate effectiveness of vasoactive  medications to optimize hemodynamic stability  - Monitor arterial and/or venous puncture sites for bleeding and/or hematoma  - Assess quality of pulses, skin color and temperature  - Assess for signs of decreased coronary artery perfusion - ex. Angina  - Evaluate fluid balance, assess for edema, trend weights  Outcome: Progressing

## 2024-01-01 NOTE — CM/SW NOTE
Spoke with patient by phone regarding discharge preparations--pt to receive commode and wheelchair from [facility] but after thinking about it, is now requesting hospital at discharge tomorrow.  Order written needs co-signature of hospitalist as well as supporting documentation of need.  Explained to patient may require insurance approval--with holiday--may not know right away--verbalized understanding.    [facility] bed with gel overlay when signed to [facility]--phone   [phone] / fax  [phone]

## 2024-01-01 NOTE — CM/SW NOTE
Orders for DME already sent to [facility] that has [facility] bed. Verbiage and order already cosigned by hospitalist. Message sent to [facility] to verify approval.     DAYNE Franklin, LSW  Discharge Planner  [id number]

## 2024-01-01 NOTE — DISCHARGE SUMMARY
[facility]     Alina Aceves Patient Status:  Inpatient    1980 MRN [id number]   Location [facility] 8NE-A Attending Dr. Bravo   Hosp Day # 20 PCP HOLLY IBARRA     Date of Admission: 2023  Date of Discharge: 2024     Discharge Disposition: Home w/ [facility]    Discharge Diagnosis:  #Acute cholecystitis s/p lap reyes   #Transaminitis improving  # Multifocal pneumonia, resolved s/p bronch;  # New onset CMP RV dysfct severe TR mod pulm HTN  # Acute systolic heart failure- EF 20-25%, severe diffuse hypokinesis  # Bilateral large bilateral pleural effusion / CHF -s/p left thora 12/15; s/p right thora ; cytology without malignancy  #LE weakness/neuropathy suspect chemo toxicity; back on cymbalta per neuro  # History of lupus, history of Sjogren's disease  # Acute hypoxic respiratory failure resolved  # Acute kidney injury, resolved  # hx interstitial nephritis  # Locally advanced stage IIIb breast cancer status post outpatient neoadjuvant chemo and history of left breast lumpectomy and left lymph node resection on 2023  # Elevated INR, improved  # Gastritis- PPI  # N/V, diarrhea at home possibly due to effect of chemo, moderate protein calorie Malnutrition  # Anxiety  # Hypokalemia, resolved  # Hyponatremia due to pneumonia, hypovolemic due to nausea vomiting and diarrhea, siadh  # Anemia and thrombocytopenia due to malignancy and chemo; monitor  # Small left apical ptx status post left thoracentesis, repeat chest x-ray done on 12/15/2023 showed left ptx resolved  # MRSA nares    History of Present Illness: Alina Aceves is a 43 year old female with past medical history of breast cancer who is undergoing chemotherapy. Patient has been feeling ill since Monday, was at urgent care, diagnosed with pneumonia, she is feeling short of breath, is denying pain with breathing but endorses trouble catching her breath.  Has been nauseous with emesis and not eating much.  No  significant abdominal pain.  No reported fevers.  Feels generally weak.    Brief Synopsis: Patient is a 43-year-old female who presented with shortness of breath, lower extremity edema, nausea vomiting and weakness.  Has a history of locally advanced breast cancer.  She had left mastectomy in November 2023.  She was admitted for further workup and started on broad-spectrum IV antibiotics for possible pneumonia.  Oncology, pulmonology, nephrology, ID, rheumatology were consulted. CTA of the chest showing moderate to large bilateral pleural effusions, no PE.  He underwent left thoracentesis w/ 700cc removal on 12/15 and right thora with 1450 cc removed on 12/16.  Echo showed EF of 20 to 25%, severe diffuse hypokinesis, severe TR, pulm htn.  Cardiology evaluated.  Patient continue on diuretics and received tolvaptan per nephrology.  Hyponatremia resolved.  Patient continued on steroids per rheumatology and she remains off imuran due to events. She had bronch on 12/19 nasreen Dr. Aretha HYMAN on cultures, candida colonization.   GI evaluated due to transaminitis.  Neuro evaluated due to bilateral lower extremity weakness, neuropathy.  MRI lumbar spine reviewed and does not show etiology for weakness.  Suspect chemotoxicity.  Nephrology okay with resumption of Cymbalta which neuro reordered.  Volume continues to improve with diuresis.  He underwent cardiac cath on 5/28 showing normal coronary arteries, normal cardiac output, normal pressures.  Developed worsening abdominal pain, transaminitis, lethargy.  General surgery was consulted for acute cholecystitis seen on CT/GB ultrasound.  She underwent lap reyes on 12/29.  Doing well postprocedure.  She is tolerating increased p.o. intake and activity.  Weight is down to 104lbs.  Patient will continue on oral cardiac management per cardiology.  Patient instructed to get repeat CBC, CMP and mag in 1 week.  Patient instructed to follow-up with nephrology at [place], also  PCP/oncology in 1 week.  She was instructed to follow-up with general surgery in 2 weeks and has appointment with cardiology on 1/9 at 8:30 AM for heart failure follow-up.  Timing of follow-up outpatient echo per cards.  Social work assist with [facility] arrangements.  She was discharged home once cleared by consultants and once equipment arranged.    Lace+ Score: 60  59-90 High Risk  29-58 Medium Risk  0-28   Low Risk  Patient was referred to the [facility].    TCM Follow-Up Recommendation:  LACE > 58: High Risk of readmission after discharge from the hospital.      Procedures during hospitalization:   12/15 Left thoracentesis  Left pleural fluid, cytospins and cell block preparation:   -Adequate for evaluation.   -Histiocytes and reactive mesothelial cells with background mixed inflammatory cells, predominantly neutrophils.   -No cytologic evidence of malignancy.     12/16 R thoracentesis  Right pleural fluid, thoracentesis:  -Adequate for evaluation.  -Acute inflammation, reactive mesothelial cells and histiocytes.   -Negative for malignancy.       12/19 bronchoscopy w/ BAL per Dr. Tapia  Description of procedure: Informed consent was obtained. Oxygen applied via facemask.  Bronchoscope was inserted via oral route.  Normal vocal cord movement was noted. Trachea appeared normal. Julianne appeared sharp.  Mucous membranes appeared friable. There were thin dark brown secretions noted.      Left and right lung were inspected to the subsegmental level.  No lesions seen     BAL of RUL completed.  100cc of sterile saline instilled, 12cc of blood tinged fluid aspirated back      Date of Procedure: 12/28/2023   Performing Physician: Francisco Lee MD  1. Right heart catheterization  2. Left heart catheterization  3. Selective bilateral coronary angiography  4. Moderate sedation     Post-Procedure Diagnosis:   1. Same as pre  2. Normal coronary arteries  3. Normal right heart, pulmonary artery and pulmonary  capillary wedge pressures  4. Normal cardiac output      12/29 s/p lap reyes per Dr. Min      Consultants:  Oncology, pulmonology, nephrology, ID, rheumatology, cardiology, GI, neurology, general surgery         Discharge Medications        START taking these medications        Instructions Prescription details   metoprolol succinate ER 50 MG Tb24  Commonly known as: Toprol XL      Take 1 tablet (50 mg total) by mouth 2x Daily(Beta Blocker).   Quantity: 180 tablet  Refills: 3     ondansetron 4 MG Tbdp  Commonly known as: Zofran-ODT      Take 1 tablet (4 mg total) by mouth every 4 (four) hours as needed for Nausea.   Quantity: 10 tablet  Refills: 0     Polyethylene Glycol 3350 17 g Pack  Commonly known as: MIRALAX      Take 17 g by mouth daily as needed.   Quantity: 30 each  Refills: 0     predniSONE 10 MG Tabs  Commonly known as: Deltasone      Take 1 tablet (10 mg total) by mouth daily with breakfast.   Quantity: 30 tablet  Refills: 0     spironolactone 25 MG Tabs  Commonly known as: Aldactone      Take 1 tablet (25 mg total) by mouth daily.   Quantity: 90 tablet  Refills: 3     torsemide 20 MG Tabs  Commonly known as: Demadex      Take 1 tablet (20 mg total) by mouth daily.   Quantity: 90 tablet  Refills: 3     traMADol 50 MG Tabs  Commonly known as: Ultram      Take 1 tablet (50 mg total) by mouth every 6 (six) hours as needed for Pain.   Quantity: 20 tablet  Refills: 0            CONTINUE taking these medications        Instructions Prescription details   DULoxetine 30 MG Cpep  Commonly known as: Cymbalta      Take 1 capsule (30 mg total) by mouth daily.   Refills: 0     lidocaine-prilocaine 2.5-2.5 % Crea  Commonly known as: Emla      APPLY SMALL AMOUNT OVER PORT PRIOR TO ACCESS   Refills: 0     ondansetron 8 MG tablet  Commonly known as: Zofran      Take 1 tablet (8 mg total) by mouth as needed.   Refills: 0     Potassium Citrate ER 15 MEQ (1620 MG) Tbcr      Take 1 tablet by mouth in the morning, at  noon, and at bedtime.   Refills: 0     PRENATAL AD OR      Take 1 tablet by mouth daily.   Refills: 0     zolpidem 10 MG Tabs  Commonly known as: Ambien      Take 1 tablet (10 mg total) by mouth nightly as needed for Sleep.   Refills: 0            STOP taking these medications      HYDROcodone-acetaminophen 5-325 MG Tabs  Commonly known as: Norco        IMURAN OR                  Where to Get Your Medications        These medications were sent to "[id number] - [place], [place] - [address] AT [facility] [address], [phone], [phone]  [address], [facility] [address]      Phone: [phone]   metoprolol succinate ER 50 MG Tb24  ondansetron 4 MG Tbdp  Polyethylene Glycol 3350 17 g Pack  predniSONE 10 MG Tabs  spironolactone 25 MG Tabs  torsemide 20 MG Tabs  traMADol 50 MG Tabs         [place]    Follow-up appointment:   Dejah Min MD  [address] at the [facility] [id number] [phone]    Schedule an appointment as soon as possible for a visit in 2 week(s)      Stephon Villeda  [address] [facility] [address] [phone]    Schedule an appointment as soon as possible for a visit in 1 week(s)      Wilma Tejeda MD  [address] [facility] [id number] [phone]    Schedule an appointment as soon as possible for a visit in 1 week(s)      Akin Gant MD  [address] [facility]  [address]  [facility] [id number] [phone]    Schedule an appointment as soon as possible for a visit in 1 week(s)      Amber Robbins, APRN  [address]  [address]  [facility] [id number] [phone]    Follow up on 1/9/2024  @ 8;30am for heart failure follow up      -----------------------------------------------------------------------------------------------  PATIENT DISCHARGE INSTRUCTIONS: See electronic chart    MARIFER Sol  1:24 PM     Total time spent on discharge planning:  >30 minutes     The 21st Century Cures Act makes medical notes like  these available to patients in the interest of transparency. Please be advised this is a medical document. Medical documents are intended to carry relevant information, facts as evident, and the clinical opinion of the practitioner. The medical note is intended as peer to peer communication and may appear blunt or direct. It is written in medical language and may contain abbreviations or verbiage that are unfamiliar.

## 2024-01-01 NOTE — CM/SW NOTE
Received message from [facility] that processing for delivery will not take place until Tuesday 1/2 because of the holiday today.

## 2024-01-01 NOTE — PROGRESS NOTES
[facility]  Report of GI Progress Note    Alina Aceves Patient Status:  Inpatient    1980 MRN [id number]   [facility] 8NE-A Attending Nicole Cartwright MD   Hosp Day # 19 PCP HOLLY IBARRA     Date of Admission:  2023  PCP: HOLLY IBARRA    Reason for Visit:   Elev LFTs    History of Present Illness:   Patient is a 43 year old female who was admitted to the hospital for Community acquired pneumonia, unspecified laterality:    D/C planning underway  LFTs continue to improve  Tolerating diet, no nausea or vomiting      Current Medications:  Current Facility-Administered Medications   Medication Dose Route Frequency    heparin (Porcine) 100 Units/mL lock flush 500 Units  5 mL Intravenous PRN    acetaminophen (Tylenol Extra Strength) tab 1,000 mg  1,000 mg Oral Q8H KHALIDA    oxyCODONE immediate release tab 5 mg  5 mg Oral Q4H PRN    Or    oxyCODONE immediate release tab 10 mg  10 mg Oral Q4H PRN    HYDROmorphone (Dilaudid) 1 MG/ML injection 0.4 mg  0.4 mg Intravenous Q2H PRN    Or    HYDROmorphone (Dilaudid) 1 MG/ML injection 0.8 mg  0.8 mg Intravenous Q2H PRN    [Held by provider] spironolactone (Aldactone) tab 25 mg  25 mg Oral Daily    torsemide (Demadex) tab 20 mg  20 mg Oral Daily    metoprolol succinate ER (Toprol XL) 24 hr tab 50 mg  50 mg Oral 2x Daily(Beta Blocker)    zolpidem (Ambien) tab 5 mg  5 mg Oral Nightly PRN    predniSONE (Deltasone) tab 10 mg  10 mg Oral Daily with breakfast    benzonatate (Tessalon) cap 100 mg  100 mg Oral TID PRN    sodium chloride (Saline Mist) 0.65 % nasal solution 1 spray  1 spray Each Nare Q3H PRN    DULoxetine (Cymbalta) DR cap 30 mg  30 mg Oral Daily    multivitamin with minerals (Thera M Plus) tab 1 tablet  1 tablet Oral Daily    calcium carbonate (Tums) chewable tab 1,000 mg  1,000 mg Oral Q6H PRN    dextromethorphan-guaiFENesin (Robitussin-DM)  mg/5mL oral solution 5 mL  5 mL Oral Q6H PRN    ALPRAZolam (Xanax) tab 0.25 mg  0.25 mg Oral TID PRN     pantoprazole (Protonix) DR tab 40 mg  40 mg Oral QAM AC    LORazepam (Ativan) 2 mg/mL injection 0.5 mg  0.5 mg Intravenous Q6H PRN    metoprolol (Lopressor) 5 mg/5mL injection 5 mg  5 mg Intravenous Q6H PRN    ipratropium-albuterol (Duoneb) 0.5-2.5 (3) MG/3ML inhalation solution 3 mL  3 mL Nebulization Q4H PRN    melatonin tab 3 mg  3 mg Oral Nightly PRN    prochlorperazine (Compazine) 10 MG/2ML injection 5 mg  5 mg Intravenous Q8H PRN    polyethylene glycol (PEG 3350) (Miralax) 17 g oral packet 17 g  17 g Oral Daily PRN    sennosides (Senokot) tab 17.2 mg  17.2 mg Oral Nightly PRN    bisacodyl (Dulcolax) 10 MG rectal suppository 10 mg  10 mg Rectal Daily PRN    fleet enema (Fleet) 7-19 GM/118ML rectal enema 133 mL  1 enema Rectal Once PRN       Allergies  Allergies   Allergen Reactions    Bactrim [Sulfamethoxazole W/Trimethoprim] RASH         Physical Exam:   Blood pressure 124/85, pulse 93, temperature 98 °F (36.7 °C), temperature source Oral, resp. rate 19, height 5' 4\" (1.626 m), weight 101 lb 10.1 oz (46.1 kg), last menstrual period 08/28/2023, SpO2 99%, not currently breastfeeding.    GENERAL: NAD, oriented x 3, pleasant  ABD: S/ND, tender around umbilical incision, no hepatomegaly    Results:     Laboratory Data:  Lab Results   Component Value Date    WBC 7.6 01/01/2024    HGB 7.4 (L) 01/01/2024    PLT 79.0 (L) 01/01/2024    CREATSERUM 0.64 01/01/2024    BUN 25 (H) 01/01/2024     01/01/2024    K 3.1 (L) 01/01/2024    CO2 31.0 01/01/2024    CA 8.5 01/01/2024    ALB 2.4 (L) 01/01/2024    ALKPHO 425 (H) 01/01/2024    TP 5.1 (L) 01/01/2024    AST 70 (H) 01/01/2024     (H) 01/01/2024    BILT 2.4 (H) 01/01/2024    INR 1.26 (H) 12/28/2023    PTP 15.8 (H) 12/28/2023    TSH 0.589 12/17/2023    LIP 15 12/13/2023    ESRML 23 (H) 12/14/2023    CRP 7.96 (H) 12/14/2023    MG 1.9 12/27/2023    PHOS 2.6 12/28/2023         No results for input(s): \"URINE\", \"CULTI\", \"BLDSMR\" in the last 168 hours.    Recent  Labs   Lab 12/30/23  0515 12/31/23  0449 01/01/24  0527   * 275* 216*   * 129* 70*   ALKPHO 600* 446* 425*   BILT 7.8* 3.1* 2.4*   HGB 8.3* 7.3* 7.4*   WBC 11.9* 12.5* 7.6       Imaging:  No results found.       Impression / Recommendations:   Ms Aceves is a 43 year old female with a history of stage IIIb breast cancer and SLE / Sjogren's who presented on 12/13 with shortness of breath.  She was found to have pneumonia, pleural effusions, and elev LFTs.  She also has history of CHF with severe TR and RV dysfunction.  She is s/p cholecystectomy with neg IOC. LFTs remained elevated.     Likely etiology is some combination of her hepatic congestion related to her TR, cholelithiasis (without persistent choledocholithiasis).  Given continued improvement, would hold off on on inpatient liver biopsy.  However, if LFTs were to rise again despite having had cholecystectomy and improved fluid status, then would consider biopsy to rule out autoimmune pathology vs drug-induced liver injury.     OK for discharge from GI perspective.    Anthony Diop MD   1/1/2024

## 2024-01-01 NOTE — PROGRESS NOTES
[facility]     Hospitalist Progress Note     Alina Aceves Patient Status:  Inpatient    1980 MRN [id number]   Location [facility] 4NW-A Attending Dr. Bravo   Hosp Day # 19 PCP HOLLY IBARRA     Chief Complaint: Intractable nausea vomiting, diarrhea, generalized weakness.  Recent pneumonia.     Subjective:  Pt reports doing well, tolerating breakfast.  Denies SOB/nausea.  Mild incisional pain.     Objective:    Review of Systems: A comprehensive review of systems was completed; pertinent positive and negatives stated in subjective.    Vital signs:  Temp:  [97.8 °F (36.6 °C)-98.7 °F (37.1 °C)] 98.1 °F (36.7 °C)  Pulse:  [] 93  Resp:  [16-19] 19  BP: (112-124)/(74-85) 124/85  SpO2:  [96 %-100 %] 99 %    Physical Exam:    /85 (BP Location: Right arm)   Pulse 93   Temp 98.1 °F (36.7 °C) (Oral)   Resp 19   Ht 5' 4\" (1.626 m)   Wt 101 lb 10.1 oz (46.1 kg)   LMP 2023 (Approximate)   SpO2 99%   Breastfeeding No   BMI 17.45 kg/m²     General: No acute distress 43 year old female oriented x3  Respiratory: Clear to auscultation on RA  Cardiovascular: S1, S2, regular rate and rhythm 90s  Abdomen: Soft, tender, non-distended  Neuro: Awake alert, following commands, good historian  Extremities: no edema    Diagnostic Data:    Labs:  Recent Labs   Lab 23  0520 23  1643 23  0515 23  05   WBC 15.4* 9.6 11.9* 12.5* 7.6   HGB 10.4* 8.8* 8.3* 7.3* 7.4*   MCV 92.9 95.6 95.7 94.3 91.2   PLT 69.0* 61.0* 49.0* 52.0* 79.0*   INR  --  1.26*  --   --   --        Recent Labs   Lab 23  0515 23  0449 23  1452 24  05   * 98  --  89   BUN 24* 27*  --  25*   CREATSERUM 0.81 0.73  --  0.64   CA 8.7 8.2*  --  8.5   ALB 2.7* 2.4*  --  2.4*    139  --  139   K 3.8 2.7* 3.5 3.1*    103  --  102   CO2 29.0 29.0  --  31.0   ALKPHO 600* 446*  --  425*   * 129*  --  70*   * 275*  --  216*   BILT 7.8* 3.1*  --  2.4*    TP 5.0* 4.8*  --  5.1*       Estimated Creatinine Clearance: 82.5 mL/min (based on SCr of 0.64 mg/dL).    Microbiology    Hospital Encounter on 12/13/23   1. Body Fluid Cult Aerobic and Anaerobic     Status: None    Collection Time: 12/15/23  3:26 PM    Specimen: Pleural Fluid, Right; Body fluid, unspecified   Result Value Ref Range    Body Fluid Culture Result No Growth 5 Days N/A    Body Fld Smear 4+ Neutrophils seen N/A    Body Fld Smear No organisms seen N/A    Body Fld Smear This is a cytocentrifuged smear. N/A   2. Urine Culture, Routine     Status: None    Collection Time: 12/14/23  3:58 AM    Specimen: Urine, clean catch   Result Value Ref Range    Urine Culture No Growth at 18-24 hrs. N/A   3. Blood Culture     Status: None    Collection Time: 12/13/23  8:10 PM    Specimen: Blood,peripheral   Result Value Ref Range    Blood Culture Result No Growth 5 Days N/A     MRSA PCR nares positive on 12/14/2023    Imaging: Reviewed in Epic.    Medications:    potassium chloride  40 mEq Oral Q4H    acetaminophen  1,000 mg Oral Q8H KHALIDA    [Held by provider] spironolactone  25 mg Oral Daily    torsemide  20 mg Oral Daily    metoprolol succinate  50 mg Oral 2x Daily(Beta Blocker)    predniSONE  10 mg Oral Daily with breakfast    DULoxetine  30 mg Oral Daily    multivitamin with minerals  1 tablet Oral Daily    pantoprazole  40 mg Oral QAM AC       Assessment & Plan:      #Acute cholecystitis s/p lap reyes   - doing well post op, tolerating po intake, binder ordered    #Transaminitis improving   -EBV Ig G pos and IgM neg  -exposed to daughter who was pos in Sept  -no symptoms  - Continue to trend LFT     # Multifocal pneumonia, resolved  -completed ceftriaxone, doxycycline (12/13-12/18)   -s/p bronch; Candida     # New onset CMP RV dysfct severe TR mod pulm HTN  # Bilateral large bilateral pleural effusion 2/2 Acute systolic heart failure  -s/p left thora 12/15; s/p right thora 12/16; cytology without malignancy  - EF  20-25%, severe diffuse hypokinesis;   - On toprol XL, demadex per cards,  currently aldactone on hold  - monitor weights at home    #LE weakness/neuropathy  -MRI reviewed and does not show etiology for weakness;  could be chemo toxicity; back on cymbalta per neuro    # History of lupus, history of Sjogren's disease  -Takes azathioprine at home which was held at this time due to multifocal pneumonia then acute reyes  - steroids per rheum: 10 Mg PO Prednisone QD    # Acute hypoxic respiratory failure resolved  # Acute kidney injury, resolved  # hx interstitial nephritis  # Locally advanced stage IIIb breast cancer status post outpatient neoadjuvant chemo and history of left breast lumpectomy and left lymph node resection on 11/16/2023  # Elevated INR, improved  # Gastritis- PPI  # N/V, diarrhea at home possibly due to effect of chemo  # Anxiety  # Hypokalemia, resolved  # Hyponatremia due to pneumonia and also hypovolemic due to nausea vomiting and diarrhea, siadh, ssri (off ssri initially but then resumed on 12/21 after review w/ nephro.)  # Anemia and thrombocytopenia due to malignancy and chemo; monitor  # Small left apical ptx status post left thoracentesis, repeat chest x-ray done on 12/15/2023 showed left ptx resolved  # MRSA nares      Case d/w pt, RN, MD.  Dc planning.  Home today - SW arranging equipment/C.  F/u w/ PCP, solitario vasquez, gen surgery as OP.      MARIFER Sol  8:42 AM

## 2024-01-02 ENCOUNTER — TELEPHONE (OUTPATIENT)
Dept: RHEUMATOLOGY | Facility: CLINIC | Age: 44
End: 2024-01-02

## 2024-01-02 VITALS
RESPIRATION RATE: 16 BRPM | OXYGEN SATURATION: 99 % | DIASTOLIC BLOOD PRESSURE: 78 MMHG | BODY MASS INDEX: 17.92 KG/M2 | HEART RATE: 107 BPM | SYSTOLIC BLOOD PRESSURE: 101 MMHG | WEIGHT: 104.94 LBS | TEMPERATURE: 98 F | HEIGHT: 64 IN

## 2024-01-02 LAB
ALBUMIN SERPL-MCNC: 2.7 G/DL (ref 3.4–5)
ALBUMIN/GLOB SERPL: 1.1 {RATIO} (ref 1–2)
ALP LIVER SERPL-CCNC: 385 U/L
ALT SERPL-CCNC: 181 U/L
ANION GAP SERPL CALC-SCNC: 5 MMOL/L (ref 0–18)
AST SERPL-CCNC: 59 U/L (ref 15–37)
BASOPHILS # BLD AUTO: 0 X10(3) UL (ref 0–0.2)
BASOPHILS NFR BLD AUTO: 0 %
BILIRUB SERPL-MCNC: 1.8 MG/DL (ref 0.1–2)
BUN BLD-MCNC: 20 MG/DL (ref 9–23)
CALCIUM BLD-MCNC: 8.6 MG/DL (ref 8.5–10.1)
CHLORIDE SERPL-SCNC: 103 MMOL/L (ref 98–112)
CO2 SERPL-SCNC: 32 MMOL/L (ref 21–32)
CREAT BLD-MCNC: 0.56 MG/DL
EGFRCR SERPLBLD CKD-EPI 2021: 116 ML/MIN/1.73M2 (ref 60–?)
EOSINOPHIL # BLD AUTO: 0.22 X10(3) UL (ref 0–0.7)
EOSINOPHIL NFR BLD AUTO: 3.7 %
ERYTHROCYTE [DISTWIDTH] IN BLOOD BY AUTOMATED COUNT: 19.5 %
GLOBULIN PLAS-MCNC: 2.5 G/DL (ref 2.8–4.4)
GLUCOSE BLD-MCNC: 92 MG/DL (ref 70–99)
HCT VFR BLD AUTO: 24.2 %
HGB BLD-MCNC: 7.5 G/DL
IMM GRANULOCYTES # BLD AUTO: 0.02 X10(3) UL (ref 0–1)
IMM GRANULOCYTES NFR BLD: 0.3 %
LYMPHOCYTES # BLD AUTO: 0.84 X10(3) UL (ref 1–4)
LYMPHOCYTES NFR BLD AUTO: 14 %
MAGNESIUM SERPL-MCNC: 1.3 MG/DL (ref 1.6–2.6)
MCH RBC QN AUTO: 29.9 PG (ref 26–34)
MCHC RBC AUTO-ENTMCNC: 31 G/DL (ref 31–37)
MCV RBC AUTO: 96.4 FL
MONOCYTES # BLD AUTO: 0.66 X10(3) UL (ref 0.1–1)
MONOCYTES NFR BLD AUTO: 11 %
NEUTROPHILS # BLD AUTO: 4.25 X10 (3) UL (ref 1.5–7.7)
NEUTROPHILS # BLD AUTO: 4.25 X10(3) UL (ref 1.5–7.7)
NEUTROPHILS NFR BLD AUTO: 71 %
OSMOLALITY SERPL CALC.SUM OF ELEC: 292 MOSM/KG (ref 275–295)
PLATELET # BLD AUTO: 98 10(3)UL (ref 150–450)
POTASSIUM SERPL-SCNC: 3.4 MMOL/L (ref 3.5–5.1)
PROT SERPL-MCNC: 5.2 G/DL (ref 6.4–8.2)
RBC # BLD AUTO: 2.51 X10(6)UL
SODIUM SERPL-SCNC: 140 MMOL/L (ref 136–145)
WBC # BLD AUTO: 6 X10(3) UL (ref 4–11)

## 2024-01-02 RX ORDER — MAGNESIUM OXIDE 400 MG/1
800 TABLET ORAL ONCE
Status: COMPLETED | OUTPATIENT
Start: 2024-01-02 | End: 2024-01-02

## 2024-01-02 RX ORDER — POTASSIUM CHLORIDE 20 MEQ/1
40 TABLET, EXTENDED RELEASE ORAL EVERY 4 HOURS
Status: COMPLETED | OUTPATIENT
Start: 2024-01-02 | End: 2024-01-02

## 2024-01-02 NOTE — PLAN OF CARE
Received patient at 0730. Patient alert and oriented x4. Tele Rhythm NSR. O2 sats on RA. Lungs clear. Bed is locked and low position. Call light & personal belongings within reach.  PRN oxycodone as needed for finger/neuropathy pain.  Patient voiding WNL- using roshan steady transfer device.   Skin dry and intact. Reviewed plan of care with patient and verbalized understanding.     POC: DC planning/pending hospital bed, WC, commode, PO steroids , PO torsemide. Potassium and Magnesium replaced today.    Problem: PAIN - ADULT  Goal: Verbalizes/displays adequate comfort level or patient's stated pain goal  Description: INTERVENTIONS:  - Encourage pt to monitor pain and request assistance  - Assess pain using appropriate pain scale  - Administer analgesics based on type and severity of pain and evaluate response  - Implement non-pharmacological measures as appropriate and evaluate response  - Consider cultural and social influences on pain and pain management  - Manage/alleviate anxiety  - Utilize distraction and/or relaxation techniques  - Monitor for opioid side effects  - Notify MD/LIP if interventions unsuccessful or patient reports new pain  - Anticipate increased pain with activity and pre-medicate as appropriate  Outcome: Progressing     Problem: SAFETY ADULT - FALL  Goal: Free from fall injury  Description: INTERVENTIONS:  - Assess pt frequently for physical needs  - Identify cognitive and physical deficits and behaviors that affect risk of falls.  - Lee fall precautions as indicated by assessment.  - Educate pt/family on patient safety including physical limitations  - Instruct pt to call for assistance with activity based on assessment  - Modify environment to reduce risk of injury  - Provide assistive devices as appropriate  - Consider OT/PT consult to assist with strengthening/mobility  - Encourage toileting schedule  Outcome: Progressing     Problem: RESPIRATORY - ADULT  Goal: Achieves optimal  ventilation and oxygenation  Description: INTERVENTIONS:  - Assess for changes in respiratory status  - Assess for changes in mentation and behavior  - Position to facilitate oxygenation and minimize respiratory effort  - Oxygen supplementation based on oxygen saturation or ABGs  - Provide Smoking Cessation handout, if applicable  - Encourage broncho-pulmonary hygiene including cough, deep breathe, Incentive Spirometry  - Assess the need for suctioning and perform as needed  - Assess and instruct to report SOB or any respiratory difficulty  - Respiratory Therapy support as indicated  - Manage/alleviate anxiety  - Monitor for signs/symptoms of CO2 retention  Outcome: Progressing     Problem: METABOLIC/FLUID AND ELECTROLYTES - ADULT  Goal: Electrolytes maintained within normal limits  Description: INTERVENTIONS:  - Monitor labs and rhythm and assess patient for signs and symptoms of electrolyte imbalances  - Administer electrolyte replacement as ordered  - Monitor response to electrolyte replacements, including rhythm and repeat lab results as appropriate  - Fluid restriction as ordered  - Instruct patient on fluid and nutrition restrictions as appropriate  Outcome: Progressing  Goal: Hemodynamic stability and optimal renal function maintained  Description: INTERVENTIONS:  - Monitor labs and assess for signs and symptoms of volume excess or deficit  - Monitor intake, output and patient weight  - Monitor urine specific gravity, serum osmolarity and serum sodium as indicated or ordered  - Monitor response to interventions for patient's volume status, including labs, urine output, blood pressure (other measures as available)  - Encourage oral intake as appropriate  - Instruct patient on fluid and nutrition restrictions as appropriate  Outcome: Progressing     Problem: CARDIOVASCULAR - ADULT  Goal: Maintains optimal cardiac output and hemodynamic stability  Description: INTERVENTIONS:  - Monitor vital signs, rhythm, and  trends  - Monitor for bleeding, hypotension and signs of decreased cardiac output  - Evaluate effectiveness of vasoactive medications to optimize hemodynamic stability  - Monitor arterial and/or venous puncture sites for bleeding and/or hematoma  - Assess quality of pulses, skin color and temperature  - Assess for signs of decreased coronary artery perfusion - ex. Angina  - Evaluate fluid balance, assess for edema, trend weights  Outcome: Progressing

## 2024-01-02 NOTE — PROGRESS NOTES
[facility]     Hospitalist Progress Note     Alina Aceves Patient Status:  Inpatient    1980 MRN [id number]   Location [facility] 4NW-A Attending Dr. Bravo   Hosp Day # 20 PCP HOLLY IBARRA     Chief Complaint: Intractable nausea vomiting, diarrhea, generalized weakness.  Recent pneumonia.     Subjective:  Pt reports no complaints. Currently in bed eating breakfast and agreeable to be up in chair today.     Objective:    Review of Systems: A comprehensive review of systems was completed; pertinent positive and negatives stated in subjective.    Vital signs:  Temp:  [97.7 °F (36.5 °C)-98.3 °F (36.8 °C)] 98.1 °F (36.7 °C)  Pulse:  [] 90  Resp:  [14-19] 14  BP: (108-126)/(68-84) 119/78  SpO2:  [94 %-100 %] 95 %    Physical Exam:    /78 (BP Location: Right arm)   Pulse 90   Temp 98.1 °F (36.7 °C) (Oral)   Resp 14   Ht 5' 4\" (1.626 m)   Wt 104 lb 15 oz (47.6 kg)   LMP 2023 (Approximate)   SpO2 95%   Breastfeeding No   BMI 18.01 kg/m²     General: No acute distress 43 year old female oriented x3  Respiratory: Clear to auscultation on RA  Cardiovascular: S1, S2, regular rate and rhythm 90s  Abdomen: Soft, tender, non-distended  Neuro: Awake alert, following commands, good historian  Extremities: no edema    Diagnostic Data:    Labs:  Recent Labs   Lab 23  1643 23  0515 23  0449 24  0524  0458   WBC 9.6 11.9* 12.5* 7.6 6.0   HGB 8.8* 8.3* 7.3* 7.4* 7.5*   MCV 95.6 95.7 94.3 91.2 96.4   PLT 61.0* 49.0* 52.0* 79.0* 98.0*   INR 1.26*  --   --   --   --        Recent Labs   Lab 23  0449 23  1452 24  0527 24  2104 24  0458   GLU 98  --  89  --  92   BUN 27*  --  25*  --  20   CREATSERUM 0.73  --  0.64  --  0.56   CA 8.2*  --  8.5  --  8.6   ALB 2.4*  --  2.4*  --  2.7*     --  139  --  140   K 2.7*   < > 3.1* 3.7 3.4*     --  102  --  103   CO2 29.0  --  31.0  --  32.0   ALKPHO 446*  --  425*  --  385*   AST  129*  --  70*  --  59*   *  --  216*  --  181*   BILT 3.1*  --  2.4*  --  1.8   TP 4.8*  --  5.1*  --  5.2*    < > = values in this interval not displayed.       Estimated Creatinine Clearance: 97.3 mL/min (based on SCr of 0.56 mg/dL).    Microbiology    Hospital Encounter on 12/13/23   1. Body Fluid Cult Aerobic and Anaerobic     Status: None    Collection Time: 12/15/23  3:26 PM    Specimen: Pleural Fluid, Right; Body fluid, unspecified   Result Value Ref Range    Body Fluid Culture Result No Growth 5 Days N/A    Body Fld Smear 4+ Neutrophils seen N/A    Body Fld Smear No organisms seen N/A    Body Fld Smear This is a cytocentrifuged smear. N/A   2. Urine Culture, Routine     Status: None    Collection Time: 12/14/23  3:58 AM    Specimen: Urine, clean catch   Result Value Ref Range    Urine Culture No Growth at 18-24 hrs. N/A   3. Blood Culture     Status: None    Collection Time: 12/13/23  8:10 PM    Specimen: Blood,peripheral   Result Value Ref Range    Blood Culture Result No Growth 5 Days N/A     MRSA PCR nares positive on 12/14/2023    Imaging: Reviewed in Epic.    Medications:    potassium chloride  40 mEq Oral Q4H    acetaminophen  1,000 mg Oral Q8H KHALIDA    [Held by provider] spironolactone  25 mg Oral Daily    torsemide  20 mg Oral Daily    metoprolol succinate  50 mg Oral 2x Daily(Beta Blocker)    predniSONE  10 mg Oral Daily with breakfast    DULoxetine  30 mg Oral Daily    multivitamin with minerals  1 tablet Oral Daily    pantoprazole  40 mg Oral QAM AC       Assessment & Plan:      # Acute cholecystitis s/p lap reyes   - doing well post op, tolerating po intake, binder ordered    # Transaminitis resolving   -EBV Ig G pos and IgM neg  - exposed to daughter who was pos in Sept  - no symptoms  - Continue to trend LFT     # Multifocal pneumonia, resolved  - completed ceftriaxone, doxycycline (12/13-12/18)   - s/p bronch; Candida     # New onset CMP RV dysfct severe TR mod pulm HTN  # Bilateral large  bilateral pleural effusion 2/2 Acute systolic heart failure  - s/p left thora 12/15; s/p right thora 12/16; cytology without malignancy  - EF 20-25%, severe diffuse hypokinesis;   - On toprol XL, demadex per cards,  currently aldactone on hold  - monitor weights at home    # LE weakness/neuropathy  - MRI reviewed and does not show etiology for weakness;  could be chemo toxicity; back on cymbalta per neuro    # History of lupus, history of Sjogren's disease  - Takes azathioprine at home which was held at this time due to multifocal pneumonia then acute reyes  - steroids per rheum: 10 Mg PO Prednisone QD    # Acute hypoxic respiratory failure resolved  # Acute kidney injury, resolved  # hx interstitial nephritis  # Locally advanced stage IIIb breast cancer status post outpatient neoadjuvant chemo and history of left breast lumpectomy and left lymph node resection on 11/16/2023  # Elevated INR, improved  # Gastritis- PPI  # N/V, diarrhea at home possibly due to effect of chemo  # Anxiety  # Hypokalemia, resolved  # Hyponatremia due to pneumonia and also hypovolemic due to nausea vomiting and diarrhea, siadh, ssri (off ssri initially but then resumed on 12/21 after review w/ nephro.)  # Anemia and thrombocytopenia due to malignancy and chemo; monitor  # Small left apical ptx status post left thoracentesis, repeat chest x-ray done on 12/15/2023 showed left ptx resolved  # MRSA nares      Case d/w pt, RN.  Dc planning.  Home today - SW arranging equipment/HHC.  F/u w/ PCP, solitario nephro, gen surgery as OP.  Replacing lytes and d/w pt repeat labs in 1 week.     MARIFER Sol  9:17 AM

## 2024-01-02 NOTE — TELEPHONE ENCOUNTER
Please call patient.  Not able to see the patient today before she left the hospital today. Starting service today. Taking over for Dr. Quinn.  Recommend stopping prednisone 10 mg tablet.  No need for her to take this.

## 2024-01-02 NOTE — PLAN OF CARE
PT A&OX4 RA, NSR on tele. Bilateral breath south clear/diminished. Denied cough.  Denied CP. Denied SOB. Complaint of pain, R hand and R foot, requested Oxycodone PRN for pain. Pt reported BM from 12/27. refused stool soft. Bowl sound active, bladder soft, non distended   Updated POC to pt, all needs meet at this time.   Call light within reach, bed alarm on for pt safety.     POC:   PO torsemide  Daily weight/ I&O  Monitor electrolytes  Daily LFTs  Liver Biopsy if LFTs within range???    Problem: PAIN - ADULT  Goal: Verbalizes/displays adequate comfort level or patient's stated pain goal  Description: INTERVENTIONS:  - Encourage pt to monitor pain and request assistance  - Assess pain using appropriate pain scale  - Administer analgesics based on type and severity of pain and evaluate response  - Implement non-pharmacological measures as appropriate and evaluate response  - Consider cultural and social influences on pain and pain management  - Manage/alleviate anxiety  - Utilize distraction and/or relaxation techniques  - Monitor for opioid side effects  - Notify MD/LIP if interventions unsuccessful or patient reports new pain  - Anticipate increased pain with activity and pre-medicate as appropriate  Outcome: Progressing     Problem: SAFETY ADULT - FALL  Goal: Free from fall injury  Description: INTERVENTIONS:  - Assess pt frequently for physical needs  - Identify cognitive and physical deficits and behaviors that affect risk of falls.  - [place] fall precautions as indicated by assessment.  - Educate pt/family on patient safety including physical limitations  - Instruct pt to call for assistance with activity based on assessment  - Modify environment to reduce risk of injury  - Provide assistive devices as appropriate  - Consider OT/PT consult to assist with strengthening/mobility  - Encourage toileting schedule  Outcome: Progressing     Problem: RESPIRATORY - ADULT  Goal: Achieves optimal ventilation and  oxygenation  Description: INTERVENTIONS:  - Assess for changes in respiratory status  - Assess for changes in mentation and behavior  - Position to facilitate oxygenation and minimize respiratory effort  - Oxygen supplementation based on oxygen saturation or ABGs  - Provide Smoking Cessation handout, if applicable  - Encourage broncho-pulmonary hygiene including cough, deep breathe, Incentive Spirometry  - Assess the need for suctioning and perform as needed  - Assess and instruct to report SOB or any respiratory difficulty  - Respiratory Therapy support as indicated  - Manage/alleviate anxiety  - Monitor for signs/symptoms of CO2 retention  Outcome: Progressing     Problem: METABOLIC/FLUID AND ELECTROLYTES - ADULT  Goal: Electrolytes maintained within normal limits  Description: INTERVENTIONS:  - Monitor labs and rhythm and assess patient for signs and symptoms of electrolyte imbalances  - Administer electrolyte replacement as ordered  - Monitor response to electrolyte replacements, including rhythm and repeat lab results as appropriate  - Fluid restriction as ordered  - Instruct patient on fluid and nutrition restrictions as appropriate  Outcome: Progressing  Goal: Hemodynamic stability and optimal renal function maintained  Description: INTERVENTIONS:  - Monitor labs and assess for signs and symptoms of volume excess or deficit  - Monitor intake, output and patient weight  - Monitor urine specific gravity, serum osmolarity and serum sodium as indicated or ordered  - Monitor response to interventions for patient's volume status, including labs, urine output, blood pressure (other measures as available)  - Encourage oral intake as appropriate  - Instruct patient on fluid and nutrition restrictions as appropriate  Outcome: Progressing     Problem: CARDIOVASCULAR - ADULT  Goal: Maintains optimal cardiac output and hemodynamic stability  Description: INTERVENTIONS:  - Monitor vital signs, rhythm, and trends  - Monitor  for bleeding, hypotension and signs of decreased cardiac output  - Evaluate effectiveness of vasoactive medications to optimize hemodynamic stability  - Monitor arterial and/or venous puncture sites for bleeding and/or hematoma  - Assess quality of pulses, skin color and temperature  - Assess for signs of decreased coronary artery perfusion - ex. Angina  - Evaluate fluid balance, assess for edema, trend weights  Outcome: Progressing

## 2024-01-02 NOTE — OCCUPATIONAL THERAPY NOTE
OCCUPATIONAL THERAPY TREATMENT NOTE - INPATIENT     Room Number: 8611/8611-A  Session: 1   Number of Visits to Meet Established Goals: 5    Presenting Problem: PNA    History:  Patient is a 43 year old female admitted on 12/13/2023 from home with SOB and possible PNA, fall noted 12/14 described as BLE buckling.    Pt with transfer to CNICU on 12/19 with lateral transfer to ICU on 12/20.       Pt seen for re-eval this date as unable to see for OT since 12/15 and has since been transferred to CNICU/ICU.     Co-Morbidities : breast cancer who is undergoing chemotherapy       ASSESSMENT   Patient presents with the following performance deficits: decreased standing balance.   Recommend discharge home with intermittent assistance with LB dressing and showering. Per chart, commode and wheelchair are supposed to be delivered to her house this week. Pt is still using roshan steady lift to stand. Possibly look into stand-alone grab bar for home use.   OT Discharge Recommendations: Undetermined  OT Device Recommendations:  (stand-alone grab bar)    WEIGHT BEARING RESTRICTION  Weight Bearing Restriction: None                Recommendations for nursing staff:   Transfers: roshan steady  Toileting location: toilet    TREATMENT SESSION:  Patient Start of Session: supine  FUNCTIONAL TRANSFER ASSESSMENT  Sit to Stand: Edge of Bed  Edge of Bed: Maximum Assist (using Roshan Steady)  Chair: Not Tested    BED MOBILITY  Rolling: Maximum Assist  Supine to Sit : Independent  Sit to Supine (OT): -- (MAX A x2)  Scooting: modified independent    BALANCE ASSESSMENT  Static Sitting: Stand-by Assist  Sitting Bilateral: Contact Guard Assist  Static Standing: Contact Guard Assist  Standing Bilateral: Not Tested    FUNCTIONAL ADL ASSESSMENT  UB Dressing Seated: Not Tested  LB Dressing Seated: Dependent      EDUCATION PROVIDED  Patient : Role of Occupational Therapy; Plan of Care; DME Recommendations; Adaptive Equipment Recommendations;  Posture/Positioning  Patient's Response to Education: Verbalized Understanding      Equipment used: roshan oliveira  Demonstrates functional use,     Therapist comments: Patient is able to use Roshan Steady to stand from bed and from toilet.  Pt was independent toileting hygiene and managing underwear.  Pt was able to lower shorts independently while she was supporting her one arm on the Roshan steady bar. After sitting in the chair, PT and OT educated the pt about lateral transfer sequencing, since pt does not have the lift at home. Supervision chair to bed and back to chair lateral transfer. Per chart, pt is to have commode, wheelchair, and hospital bed delivered to the house this week. Also, OT educated the pt about stand-alone grab bar (encouraged the pt to ask home health agency one she is home). Issued adaptive equipment/device handouts. Verbalized understanding.   Patient End of Session: Up in chair;Needs met;Call light within reach;All patient questions and concerns addressed      PAIN ASSESSMENT  Rating:  (did not formally rate)  Location: BLEs  Management Techniques: Activity promotion;Breathing techniques;Relaxation;Repositioning;Nurse notified     OBJECTIVE  Precautions: Bed/chair alarm (Chronic R foot drop per pt)    AM-PAC ‘6-Clicks’ Inpatient Daily Activity Short Form  -   Putting on and taking off regular lower body clothing?: A Little  -   Bathing (including washing, rinsing, drying)?: A Little  -   Toileting, which includes using toilet, bedpan or urinal? : A Little  -   Putting on and taking off regular upper body clothing?: None  -   Taking care of personal grooming such as brushing teeth?: None  -   Eating meals?: None    AM-PAC Score:  Score: 21  Approx Degree of Impairment: 32.79%  Standardized Score (AM-PAC Scale): 44.27    PLAN  OT Treatment Plan: Balance activities;Energy conservation/work simplification techniques;ADL training;IADL training;Functional transfer training;UE  strengthening/ROM;Endurance training;Patient/Family education;Patient/Family training;Equipment eval/education;Fine motor coordination activities;Compensatory technique education;Continued evaluation  Rehab Potential : Fair  Frequency: 3-5x/week    OT Goals:   Progressing 1/2  ADL Goals   Patient will perform upper body bathing:  with supervision  Patient will perform upper body dressing:  with supervision  Patient will perform lower body dressing:  with min assist and with adaptive equipment PRN  Patient will perform Toileting: with min assist-MET 1/2     Functional Transfer Goals  Patient will transfer from supine to sit:  with min assist-MET 1/2  Patient will transfer from sit to stand:  with mod assist  Patient will transfer to bedside commode:  with mod assist     UE Exercise Program Goal  Patient will be supervision with bilateral AROM HEP (home exercise program).     Additional Goals  Pt will tolerate sitting EOB x10 mins with SBA while completing functional ADL task        OT Session Time: 23 minutes  Self-Care Home Management: 10 minutes  Therapeutic Activity: 8 minutes

## 2024-01-02 NOTE — PLAN OF CARE
Pt okay to discharge per primary and consults. Patient ambulating and tolerating well. Discharge instructions and education went over with patient & and verbalized understanding. Patient went home with home care. Patient transport by wheelchair.

## 2024-01-02 NOTE — DIETARY MALNUTRITION NOTE
[facility]  NUTRITION ASSESSMENT    Pt meets moderate malnutrition criteria at this time.    CRITERIA FOR MALNUTRITION DIAGNOSIS:  Criteria for non-severe malnutrition diagnosis: acute illness/injury related to energy intake less than 75% for greater than 7 days, body fat mild depletion, and muscle mass mild depletion      NUTRITION INTERVENTION:    RD nutrition Care Plan- Initiated ONS (oral nutritional supplements), Ordered multivitamin, and See RD nutrition assessment for additional recommendations  Meal and Snacks - Liberalize diet to 2 gm Na as tolerated; Monitor and encourage adequate PO intake.  Medical Food Supplements - continue Ensure Plus High Protein chocolate daily and Magic Shake chocolate daily. Rationale/use for oral supplements discussed.   Vitamin and Mineral Supplements - continue Multivitamin with minerals    PATIENT STATUS:     1/2: Discharge planning home today with [facility]. PO intake variable past few days. Wt down since admission - will monitor. Last BM 1/1. Visited pt at bedside, pt is eager to go home today. Pt reports good appetite and intake, reports she tries to drink the entire Ensure throughout the day. Encouraged pt to continue ONS on discharge with smaller, more frequent meals. Pt expressed understanding. RD will continue to monitor pt status and f/u as able.    12/26: PO intake % over the last 2 days. ONS added to maximize intake. Weight down. Edema improved per MD.+BM 12/26 without n/v/d. Will continue to monitor and support as able.  12/21: Pt transferred to ICU 12/19 and started on Bipap which has since been weaned. Visited pt at bedside. She reports her appetite is very slowly returning and is forcing herself to eat today. Does complain that the food is dry. Is not drinking her Ensure shakes currently but reports she is going to try to drink 1 throughout the day; prefers chocolate. Offered other ONS also and she was interested in trying Magic Shake daily. Denies nausea,  vomiting and diarrhea but reports constipation with last BM 12/16. Continued to encourage PO and ONS intake; all questions answered at this time.  12/19: Back from bronch. Pt with dry cough.  NPO - will monitor for diet advancement. Minimal intake recorded. RD added Ensure +HP BID to support needs.  On 4L via NC. +BM 12/16. Skin intact.   12/16: 43 year old female admit d/t pneumonia. Pt with breast cancer, undergoing chemotherapy. Pt presented d/t nausea with minimal PO intake. Pt with pleural effusion, underwent thoracentesis today with 1450mL fluid removal.   Spoke with Nephrology, Na trending up slowly, recommended 1500mL fluid restriction. Receiving 2gNaCl tablets.  Discussed with pt fluid restriction and reasoning, pt states understanding.   Pt seen d/t consult for poor PO intake. Pt agreeable to taking Chocolate Ensure BID to supplement PO intake.     PMH: breast cancer, gastritis, Lupus, Sjogren's disease    ANTHROPOMETRICS:  Ht: 162.6 cm (5' 4\")  Wt: 47.6 kg (104 lb 15 oz).   BMI: Body mass index is 18.01 kg/m².  IBW: 54.5 kg    WEIGHT HISTORY:   Weight loss: No  Wt Readings from Last 10 Encounters:   01/02/24 47.6 kg (104 lb 15 oz)   11/13/23 54.4 kg (120 lb)   12/16/21 61.2 kg (135 lb)   09/22/20 61.7 kg (136 lb)   08/15/20 63.5 kg (140 lb)   11/14/15 61.7 kg (136 lb)        NUTRITION:  Diet:       Procedures    Cardiac diet Cardiac; Sodium Restriction: 2 GM NA; Fluid Restriction: 1800 ml; Is Patient on Accuchecks? No      Food Allergies: No  Cultural/Ethnic/Orthodoxy Preferences Addressed: Yes    Percent Meals Eaten (last 3 days)       Date/Time Percent Meals Eaten (%)    12/30/23 0900 100 %    12/30/23 1030 0 %    12/30/23 1402 0 %    12/30/23 1859 0 %    12/31/23 0839 0 %    12/31/23 1030 0 %    12/31/23 1130 0 %    12/31/23 1432 0 %    12/31/23 1640 90 %    01/02/24 1258 90 %            GI system review: WNL; Last BM: 1/1  Skin and wounds: WNL    NUTRITION RELATED PHYSICAL FINDINGS:     1. Body  Fat/Muscle Mass: mild depletion body fat Orbital fat pad and Buccal fat pad and mild muscle depletion Temple region      2. Fluid Accumulation: none per RN documentation     NUTRITION PRESCRIPTION: 47.6 kg (1/2/24)  Calories: 3734-7432 calories/day (30-35 kcal/kg)  Protein: 57-71 grams protein/day (1.2-1.5 grams protein per kg)  Fluid: ~1 ml/kcal or per MD discretion    NUTRITION DIAGNOSIS/PROBLEM:  Moderate, Acute Malnutrition related to decreased intake as evidenced by documented/reported insufficient oral intake, loss of fat mass, and loss of muscle mass. - Improving      MONITOR AND EVALUATE/NUTRITION GOALS:  PO intake of 75% of meals TID - Not met, Continues  PO intake of 75% of oral nutrition supplement/s - Met, continues  Weight stable within 1 to 2 lbs during admission - Ongoing      MEDICATIONS:  MVI, Protonix, prednisone    LABS:  K 3.4, Mag 1.3    Pt is at Moderate nutrition risk      Nayely Yu MA, RD/LD  Clinical Dietitian  [id number]

## 2024-01-02 NOTE — PHYSICAL THERAPY NOTE
PHYSICAL THERAPY TREATMENT NOTE - INPATIENT    Room Number: 8611/8611-A      Session: 3after re-eval     Number of Visits to Meet Established Goals: 5    Presenting Problem: PNA  Co-Morbidities : breast cancer who is undergoing chemotherapy  ASSESSMENT     Pt able to demo bed mobility and lateral transfer to chair with supervision. Roshan steady used to assist Pt with commode with good result and tolerance.     At this time, Pt. presents with decreased balance, impaired strength, difficulty with gait/transfers resulting in downgrade of overall functional mobility.  Due to above deficits, Pt will benefit from continued IP PT, so that patient may achieve highest functional independence/return to baseline.     DISCHARGE RECOMMENDATIONS  PT Discharge Recommendations: 24 hour care/supervision;Home with home health PT     Pt set up with DME for home including RW, commode, and hospital bed.   Discussed horizontal grab bar and roshan steady options at home.     PLAN  PT Treatment Plan: Bed mobility;Body mechanics;Coordination;Endurance;Energy conservation;Patient education;Family education;Gait training;Neuromuscular re-educate;Range of motion;Strengthening;Stoop training;Stair training;Transfer training;Balance training  Rehab Potential : Fair  Frequency (Obs): 5x/week    CURRENT GOALS     Goal #1 Patient is able to demonstrate supine - sit EOB @ level: supervision      Goal #2 Patient is able to demonstrate transfers Sit to/from Stand at assistance level: minimum assistance      Goal #3 Patient is able to ambulate 10 feet with assist device: walker - rolling at assistance level: minimum assistance      Goal #4     Goal #5     Goal #6     Goal Comments: Goals established on 12/21/2023 1/2/2023 all goals ongoing     SUBJECTIVE  \"Thank you for your encouragement.\"     OBJECTIVE  Precautions: Bed/chair alarm (Chronic R foot drop per pt)    WEIGHT BEARING RESTRICTION  Weight Bearing Restriction: None                PAIN  ASSESSMENT   Ratin  Location: BLE's, generalized soreness total body  Management Techniques: Activity promotion;Body mechanics;Repositioning;Relaxation    BALANCE                                                                                                                       Static Sitting: Fair +  Dynamic Sitting: Fair +           Static Standing: Poor  Dynamic Standing: Not tested    ACTIVITY TOLERANCE                         O2 WALK         AM-PAC '6-Clicks' INPATIENT SHORT FORM - BASIC MOBILITY  How much difficulty does the patient currently have...  Patient Difficulty: Turning over in bed (including adjusting bedclothes, sheets and blankets)?: A Little   Patient Difficulty: Sitting down on and standing up from a chair with arms (e.g., wheelchair, bedside commode, etc.): A Lot   Patient Difficulty: Moving from lying on back to sitting on the side of the bed?: A Lot   How much help from another person does the patient currently need...   Help from Another: Moving to and from a bed to a chair (including a wheelchair)?: Total   Help from Another: Need to walk in hospital room?: Total   Help from Another: Climbing 3-5 steps with a railing?: Total       AM-PAC Score:  Raw Score: 10   Approx Degree of Impairment: 76.75%   Standardized Score (AM-PAC Scale): 32.29   CMS Modifier (G-Code): CL    FUNCTIONAL ABILITY STATUS  Gait Assessment   Functional Mobility/Gait Assessment  Gait Assistance: Not tested  Distance (ft): 0  Assistive Device: Rolling walker  Pattern: Shuffle (neuropathy)    Skilled Therapy Provided    Bed Mobility:  Rolling: supervision  Supine<>Sit: supervision   Sit<>Supine: NT    Sit<>Stand: NT (supervision with roshan steady)  Stand<>Sit: NT (supervision with roshan steady)    There-act:  Pt given VC for glut/quad activation with roshan steady use.   Pt able to perform lateral transfer from chair to bed and back with drop arm and supervision using UEs. Pt given VC for UE placement and safety.      Therapist's Comments: Pt assisted on/off commode with roshan steady. Pt able to doff/don underwear. Multiple discussions on activity recommendations and DME use at home.     Pt assisted to bathroom using Roshan Steady - only required one person assist.     Patient End of Session: Up in chair;Needs met;Call light within reach;RN aware of session/findings;All patient questions and concerns addressed    PT Session Time: 23 minutes  Gait Trainin minutes  Therapeutic Activity:25 minutes  Therapeutic Exercise: minutes   Neuromuscular Re-education: 0 minutes

## 2024-01-02 NOTE — CM/SW NOTE
Spoke with Gege at [facility], equipment is set to be delivered today between 3:00-5:00pm today. RN updated. Pt can discharge around later afternoon today. RN reported that pt's family member will transport pt home. SW notified [facility] liaison of update for discharge.     Lavonne Miller, MSW, LSW  Discharge Planner  [phone]

## 2024-01-03 ENCOUNTER — PATIENT OUTREACH (OUTPATIENT)
Dept: CASE MANAGEMENT | Age: 44
End: 2024-01-03

## 2024-01-03 NOTE — PROGRESS NOTES
FELICE s/w patient for HFU. She states that she is doing okay. She did get the hospital bed, wheelchair and commode. She was looking to get a commode with wheels on it instead of a stationary one. FELICE provided phone number for HME and she stated that she would call and see if they have one. FELICE discussed TCC but she declined stating that she is seeing her PCP Dr. Stephon Villeda on 1/16/24. She denied having any questions or concerns at this time.

## 2024-01-03 NOTE — TELEPHONE ENCOUNTER
Called pt and informed them of the following per provider instruction:  Please call patient.  Not able to see the patient today before she left the hospital today. Starting service today. Taking over for Dr. Quinn.  Recommend stopping prednisone 10 mg tablet.  No need for her to take this.     Pt verbalized understanding and states she will no longer take the prednisone this AM.

## 2024-01-08 ENCOUNTER — LAB REQUISITION (OUTPATIENT)
Dept: LAB | Facility: HOSPITAL | Age: 44
End: 2024-01-08
Payer: COMMERCIAL

## 2024-01-08 DIAGNOSIS — I50.32 CHRONIC DIASTOLIC (CONGESTIVE) HEART FAILURE (HCC): ICD-10-CM

## 2024-01-08 LAB
ANION GAP SERPL CALC-SCNC: 8 MMOL/L (ref 0–18)
BUN BLD-MCNC: 27 MG/DL (ref 9–23)
CALCIUM BLD-MCNC: 9.1 MG/DL (ref 8.5–10.1)
CHLORIDE SERPL-SCNC: 94 MMOL/L (ref 98–112)
CO2 SERPL-SCNC: 30 MMOL/L (ref 21–32)
CREAT BLD-MCNC: 0.63 MG/DL
EGFRCR SERPLBLD CKD-EPI 2021: 113 ML/MIN/1.73M2 (ref 60–?)
GLUCOSE BLD-MCNC: 104 MG/DL (ref 70–99)
OSMOLALITY SERPL CALC.SUM OF ELEC: 279 MOSM/KG (ref 275–295)
POTASSIUM SERPL-SCNC: 4.9 MMOL/L (ref 3.5–5.1)
SODIUM SERPL-SCNC: 132 MMOL/L (ref 136–145)

## 2024-01-08 PROCEDURE — 80048 BASIC METABOLIC PNL TOTAL CA: CPT | Performed by: INTERNAL MEDICINE

## 2024-03-06 ENCOUNTER — APPOINTMENT (OUTPATIENT)
Dept: CT IMAGING | Facility: HOSPITAL | Age: 44
End: 2024-03-06
Attending: EMERGENCY MEDICINE
Payer: COMMERCIAL

## 2024-03-06 ENCOUNTER — APPOINTMENT (OUTPATIENT)
Dept: GENERAL RADIOLOGY | Facility: HOSPITAL | Age: 44
End: 2024-03-06
Attending: EMERGENCY MEDICINE
Payer: COMMERCIAL

## 2024-03-06 ENCOUNTER — HOSPITAL ENCOUNTER (INPATIENT)
Facility: HOSPITAL | Age: 44
LOS: 6 days | Discharge: HOME HEALTH | End: 2024-03-12
Attending: EMERGENCY MEDICINE | Admitting: HOSPITALIST
Payer: COMMERCIAL

## 2024-03-06 ENCOUNTER — HOSPITAL ENCOUNTER (INPATIENT)
Facility: HOSPITAL | Age: 44
LOS: 6 days | Discharge: HOME | End: 2024-03-12
Attending: EMERGENCY MEDICINE | Admitting: HOSPITALIST
Payer: COMMERCIAL

## 2024-03-06 DIAGNOSIS — R79.89 ELEVATED TROPONIN: ICD-10-CM

## 2024-03-06 DIAGNOSIS — E87.6 HYPOKALEMIA: ICD-10-CM

## 2024-03-06 DIAGNOSIS — I50.9 CONGESTIVE HEART FAILURE, UNSPECIFIED HF CHRONICITY, UNSPECIFIED HEART FAILURE TYPE (HCC): ICD-10-CM

## 2024-03-06 DIAGNOSIS — R50.9 FEVER, UNSPECIFIED FEVER CAUSE: ICD-10-CM

## 2024-03-06 DIAGNOSIS — E87.1 HYPONATREMIA: Primary | ICD-10-CM

## 2024-03-06 PROBLEM — J18.9 PNEUMONIA DUE TO INFECTIOUS ORGANISM: Status: ACTIVE | Noted: 2024-03-06

## 2024-03-06 LAB
ALBUMIN SERPL-MCNC: 2.8 G/DL (ref 3.4–5)
ALBUMIN/GLOB SERPL: 0.8 {RATIO} (ref 1–2)
ALP LIVER SERPL-CCNC: 87 U/L
ALT SERPL-CCNC: 16 U/L
ANION GAP SERPL CALC-SCNC: 11 MMOL/L (ref 0–18)
AST SERPL-CCNC: 22 U/L (ref 15–37)
BASOPHILS # BLD AUTO: 0 X10(3) UL (ref 0–0.2)
BASOPHILS NFR BLD AUTO: 0 %
BILIRUB SERPL-MCNC: 0.9 MG/DL (ref 0.1–2)
BILIRUB UR QL STRIP.AUTO: NEGATIVE
BUN BLD-MCNC: 14 MG/DL (ref 9–23)
CALCIUM BLD-MCNC: 9 MG/DL (ref 8.5–10.1)
CHLORIDE SERPL-SCNC: 95 MMOL/L (ref 98–112)
CHOLEST SERPL-MCNC: 164 MG/DL (ref ?–200)
CO2 SERPL-SCNC: 15 MMOL/L (ref 21–32)
CREAT BLD-MCNC: 0.75 MG/DL
EGFRCR SERPLBLD CKD-EPI 2021: 101 ML/MIN/1.73M2 (ref 60–?)
EOSINOPHIL # BLD AUTO: 0.01 X10(3) UL (ref 0–0.7)
EOSINOPHIL NFR BLD AUTO: 0.1 %
ERYTHROCYTE [DISTWIDTH] IN BLOOD BY AUTOMATED COUNT: 13.9 %
FLUAV + FLUBV RNA SPEC NAA+PROBE: NEGATIVE
FLUAV + FLUBV RNA SPEC NAA+PROBE: NEGATIVE
GLOBULIN PLAS-MCNC: 3.3 G/DL (ref 2.8–4.4)
GLUCOSE BLD-MCNC: 96 MG/DL (ref 70–99)
GLUCOSE UR STRIP.AUTO-MCNC: NORMAL MG/DL
HCT VFR BLD AUTO: 23.8 %
HDLC SERPL-MCNC: 49 MG/DL (ref 40–59)
HGB BLD-MCNC: 8.5 G/DL
IMM GRANULOCYTES # BLD AUTO: 0.05 X10(3) UL (ref 0–1)
IMM GRANULOCYTES NFR BLD: 0.6 %
KETONES UR STRIP.AUTO-MCNC: NEGATIVE MG/DL
LACTATE SERPL-SCNC: 0.7 MMOL/L (ref 0.4–2)
LDLC SERPL CALC-MCNC: 102 MG/DL (ref ?–100)
LEUKOCYTE ESTERASE UR QL STRIP.AUTO: 500
LYMPHOCYTES # BLD AUTO: 0.15 X10(3) UL (ref 1–4)
LYMPHOCYTES NFR BLD AUTO: 1.8 %
MCH RBC QN AUTO: 29.9 PG (ref 26–34)
MCHC RBC AUTO-ENTMCNC: 35.7 G/DL (ref 31–37)
MCV RBC AUTO: 83.8 FL
MONOCYTES # BLD AUTO: 0.46 X10(3) UL (ref 0.1–1)
MONOCYTES NFR BLD AUTO: 5.5 %
NEUTROPHILS # BLD AUTO: 7.7 X10 (3) UL (ref 1.5–7.7)
NEUTROPHILS # BLD AUTO: 7.7 X10(3) UL (ref 1.5–7.7)
NEUTROPHILS NFR BLD AUTO: 92 %
NITRITE UR QL STRIP.AUTO: NEGATIVE
NONHDLC SERPL-MCNC: 115 MG/DL (ref ?–130)
NT-PROBNP SERPL-MCNC: ABNORMAL PG/ML (ref ?–125)
OSMOLALITY SERPL CALC.SUM OF ELEC: 252 MOSM/KG (ref 275–295)
PH UR STRIP.AUTO: 6.5 [PH] (ref 5–8)
PLATELET # BLD AUTO: 192 10(3)UL (ref 150–450)
POTASSIUM SERPL-SCNC: 3.1 MMOL/L (ref 3.5–5.1)
PROT SERPL-MCNC: 6.1 G/DL (ref 6.4–8.2)
PROT UR STRIP.AUTO-MCNC: 100 MG/DL
RBC # BLD AUTO: 2.84 X10(6)UL
RBC #/AREA URNS AUTO: >10 /HPF
RSV RNA SPEC NAA+PROBE: NEGATIVE
SARS-COV-2 RNA RESP QL NAA+PROBE: NOT DETECTED
SODIUM SERPL-SCNC: 121 MMOL/L (ref 136–145)
SP GR UR STRIP.AUTO: 1.01 (ref 1–1.03)
TRIGL SERPL-MCNC: 69 MG/DL (ref 30–149)
TROPONIN I SERPL HS-MCNC: 265 NG/L
TROPONIN I SERPL HS-MCNC: 355 NG/L
UROBILINOGEN UR STRIP.AUTO-MCNC: NORMAL MG/DL
VLDLC SERPL CALC-MCNC: 11 MG/DL (ref 0–30)
WBC # BLD AUTO: 8.4 X10(3) UL (ref 4–11)

## 2024-03-06 PROCEDURE — 99223 1ST HOSP IP/OBS HIGH 75: CPT | Performed by: STUDENT IN AN ORGANIZED HEALTH CARE EDUCATION/TRAINING PROGRAM

## 2024-03-06 PROCEDURE — 71260 CT THORAX DX C+: CPT | Performed by: EMERGENCY MEDICINE

## 2024-03-06 PROCEDURE — 71045 X-RAY EXAM CHEST 1 VIEW: CPT | Performed by: EMERGENCY MEDICINE

## 2024-03-06 RX ORDER — ENEMA 19; 7 G/133ML; G/133ML
1 ENEMA RECTAL ONCE AS NEEDED
Status: DISCONTINUED | OUTPATIENT
Start: 2024-03-06 | End: 2024-03-12

## 2024-03-06 RX ORDER — CAPECITABINE 500 MG/1
TABLET, FILM COATED ORAL
COMMUNITY
Start: 2023-12-01 | End: 2024-03-12

## 2024-03-06 RX ORDER — HYDROCODONE BITARTRATE AND ACETAMINOPHEN 5; 325 MG/1; MG/1
1 TABLET ORAL EVERY 6 HOURS PRN
COMMUNITY
Start: 2024-01-17

## 2024-03-06 RX ORDER — ENOXAPARIN SODIUM 100 MG/ML
40 INJECTION SUBCUTANEOUS DAILY
Status: DISCONTINUED | OUTPATIENT
Start: 2024-03-07 | End: 2024-03-12

## 2024-03-06 RX ORDER — SODIUM CHLORIDE 9 MG/ML
1000 INJECTION, SOLUTION INTRAVENOUS ONCE
Status: COMPLETED | OUTPATIENT
Start: 2024-03-06 | End: 2024-03-06

## 2024-03-06 RX ORDER — GUAIFENESIN 600 MG/1
600 TABLET, EXTENDED RELEASE ORAL 2 TIMES DAILY PRN
Status: DISCONTINUED | OUTPATIENT
Start: 2024-03-06 | End: 2024-03-12

## 2024-03-06 RX ORDER — MELATONIN
3 NIGHTLY PRN
Status: DISCONTINUED | OUTPATIENT
Start: 2024-03-06 | End: 2024-03-12

## 2024-03-06 RX ORDER — METOPROLOL SUCCINATE 50 MG/1
50 TABLET, EXTENDED RELEASE ORAL
Status: DISCONTINUED | OUTPATIENT
Start: 2024-03-06 | End: 2024-03-12

## 2024-03-06 RX ORDER — POTASSIUM CHLORIDE 20 MEQ/1
40 TABLET, EXTENDED RELEASE ORAL ONCE
Status: COMPLETED | OUTPATIENT
Start: 2024-03-06 | End: 2024-03-06

## 2024-03-06 RX ORDER — BISACODYL 10 MG
10 SUPPOSITORY, RECTAL RECTAL
Status: DISCONTINUED | OUTPATIENT
Start: 2024-03-06 | End: 2024-03-12

## 2024-03-06 RX ORDER — ACETAMINOPHEN 325 MG/1
650 TABLET ORAL ONCE
Status: COMPLETED | OUTPATIENT
Start: 2024-03-06 | End: 2024-03-06

## 2024-03-06 RX ORDER — ONDANSETRON 2 MG/ML
4 INJECTION INTRAMUSCULAR; INTRAVENOUS EVERY 6 HOURS PRN
Status: DISCONTINUED | OUTPATIENT
Start: 2024-03-06 | End: 2024-03-12

## 2024-03-06 RX ORDER — ECHINACEA PURPUREA EXTRACT 125 MG
1 TABLET ORAL
Status: DISCONTINUED | OUTPATIENT
Start: 2024-03-06 | End: 2024-03-12

## 2024-03-06 RX ORDER — SENNOSIDES 8.6 MG
17.2 TABLET ORAL NIGHTLY PRN
Status: DISCONTINUED | OUTPATIENT
Start: 2024-03-06 | End: 2024-03-12

## 2024-03-06 RX ORDER — BENZONATATE 100 MG/1
200 CAPSULE ORAL 3 TIMES DAILY PRN
Status: DISCONTINUED | OUTPATIENT
Start: 2024-03-06 | End: 2024-03-12

## 2024-03-06 RX ORDER — PROCHLORPERAZINE EDISYLATE 5 MG/ML
5 INJECTION INTRAMUSCULAR; INTRAVENOUS EVERY 8 HOURS PRN
Status: DISCONTINUED | OUTPATIENT
Start: 2024-03-06 | End: 2024-03-12

## 2024-03-06 RX ORDER — DULOXETIN HYDROCHLORIDE 30 MG/1
30 CAPSULE, DELAYED RELEASE ORAL DAILY
Status: DISCONTINUED | OUTPATIENT
Start: 2024-03-06 | End: 2024-03-08

## 2024-03-06 RX ORDER — ONDANSETRON 2 MG/ML
4 INJECTION INTRAMUSCULAR; INTRAVENOUS ONCE
Status: COMPLETED | OUTPATIENT
Start: 2024-03-06 | End: 2024-03-06

## 2024-03-06 RX ORDER — PROCHLORPERAZINE MALEATE 10 MG
10 TABLET ORAL EVERY 6 HOURS PRN
COMMUNITY
Start: 2023-10-24

## 2024-03-06 RX ORDER — ACETAMINOPHEN 500 MG
500 TABLET ORAL EVERY 4 HOURS PRN
Status: DISCONTINUED | OUTPATIENT
Start: 2024-03-06 | End: 2024-03-12

## 2024-03-06 RX ORDER — ZOLPIDEM TARTRATE 5 MG/1
10 TABLET ORAL NIGHTLY PRN
Status: DISCONTINUED | OUTPATIENT
Start: 2024-03-06 | End: 2024-03-12

## 2024-03-06 RX ORDER — POLYETHYLENE GLYCOL 3350 17 G/17G
17 POWDER, FOR SOLUTION ORAL DAILY PRN
Status: DISCONTINUED | OUTPATIENT
Start: 2024-03-06 | End: 2024-03-12

## 2024-03-06 RX ORDER — HYDROCODONE BITARTRATE AND ACETAMINOPHEN 5; 325 MG/1; MG/1
1 TABLET ORAL EVERY 6 HOURS PRN
Status: DISCONTINUED | OUTPATIENT
Start: 2024-03-06 | End: 2024-03-12

## 2024-03-06 NOTE — ED PROVIDER NOTES
Patient Seen in: [facility] Emergency Department      History     Chief Complaint   Patient presents with    Nausea/Vomiting/Diarrhea    Chest Pain Angina            Stated Complaint: nvd    Subjective:   HPI    43-year-old female with a history of lupus, Sjogren's, history of breast cancer as well status post chemo, 2023, chronic CHF follows with cardiology, leukocytic vasculitis, neuropathy presents ED for evaluation today, complaining of cough.  Nausea vomiting diarrhea and fatigue over the last week.  Intermittent chest pain noted yesterday.  Febrile here in triage of 101.4.    Objective:   Past Medical History:   Diagnosis Date    Breast cancer (HCC)     Gastritis     Lupus (HCC)     Sjogren's disease (HCC)               Past Surgical History:   Procedure Laterality Date    BREAST SURGERY Left      DELIVERY ONLY                  Social History     Socioeconomic History    Marital status:    Tobacco Use    Smoking status: Former     Types: Cigarettes     Quit date:      Years since quittin.1    Smokeless tobacco: Never    Tobacco comments:     social smoker   Vaping Use    Vaping Use: Never used   Substance and Sexual Activity    Alcohol use: Not Currently     Comment: occassional    Drug use: Never     Social Determinants of Health     Food Insecurity: No Food Insecurity (2023)    Food Insecurity     Food Insecurity: Never true   Transportation Needs: No Transportation Needs (2023)    Transportation Needs     Lack of Transportation: No   Housing Stability: Medium Risk (2023)    Housing Stability     Housing Instability: Yes              Review of Systems    Positive for stated complaint: nvd  Other systems are as noted in HPI.  Constitutional and vital signs reviewed.      All other systems reviewed and negative except as noted above.    Physical Exam     ED Triage Vitals   BP 24 1606 (!) 132/92   Pulse 24 1605 (!) 135   Resp 24 1606 20    Temp 03/06/24 1605 (!) 101.4 °F (38.6 °C)   Temp src 03/06/24 1605 Temporal   SpO2 03/06/24 1605 98 %   O2 Device 03/06/24 1730 None (Room air)       Current:BP (!) 123/95 (BP Location: Right arm)   Pulse 120   Temp 98.1 °F (36.7 °C) (Oral)   Resp (!) 31   Wt 49.3 kg   LMP 08/28/2023 (Approximate)   SpO2 94%   BMI 18.64 kg/m²         Physical Exam  Vitals and nursing note reviewed.   Constitutional:       General: She is not in acute distress.     Appearance: She is well-developed. She is not toxic-appearing.   HENT:      Head: Normocephalic and atraumatic.   Eyes:      General: No scleral icterus.     Conjunctiva/sclera: Conjunctivae normal.   Cardiovascular:      Rate and Rhythm: Normal rate.   Pulmonary:      Effort: Pulmonary effort is normal. No respiratory distress.      Breath sounds: No wheezing, rhonchi or rales.   Abdominal:      General: There is no distension.      Tenderness: There is no abdominal tenderness. There is no guarding or rebound.   Musculoskeletal:         General: No tenderness. Normal range of motion.      Cervical back: Normal range of motion and neck supple.   Skin:     General: Skin is warm and dry.      Findings: No rash.   Neurological:      Mental Status: She is alert and oriented to person, place, and time.      Motor: No abnormal muscle tone.      Coordination: Coordination normal.   Psychiatric:         Behavior: Behavior normal.              ED Course     Labs Reviewed   COMP METABOLIC PANEL (14) - Abnormal; Notable for the following components:       Result Value    Sodium 121 (*)     Potassium 3.1 (*)     Chloride 95 (*)     CO2 15.0 (*)     Calculated Osmolality 252 (*)     Total Protein 6.1 (*)     Albumin 2.8 (*)     A/G Ratio 0.8 (*)     All other components within normal limits   TROPONIN I HIGH SENSITIVITY - Abnormal; Notable for the following components:    Troponin I (High Sensitivity) 355 (*)     All other components within normal limits   LIPID PANEL -  Abnormal; Notable for the following components:    LDL Cholesterol 102 (*)     All other components within normal limits   URINALYSIS WITH CULTURE REFLEX - Abnormal; Notable for the following components:    Clarity Urine Turbid (*)     Blood Urine 3+ (*)     Protein Urine 100 (*)     Leukocyte Esterase Urine 500 (*)     WBC Urine 21-50 (*)     RBC Urine >10 (*)     Bacteria Urine Rare (*)     Squamous Epi. Cells Few (*)     Renal Tubular Epithelial Cells Few (*)     Transitional Cells Few (*)     All other components within normal limits   PRO BETA NATRIURETIC PEPTIDE - Abnormal; Notable for the following components:    Pro-Beta Natriuretic Peptide 76,446 (*)     All other components within normal limits   CBC W/ DIFFERENTIAL - Abnormal; Notable for the following components:    RBC 2.84 (*)     HGB 8.5 (*)     HCT 23.8 (*)     Lymphocyte Absolute 0.15 (*)     All other components within normal limits   LACTIC ACID, PLASMA - Normal   SARS-COV-2/FLU A AND B/RSV BY PCR (GENEXPERT) - Normal    Narrative:     This test is intended for the qualitative detection and differentiation of SARS-CoV-2, influenza A, influenza B, and respiratory syncytial virus (RSV) viral RNA in nasopharyngeal or nares swabs from individuals suspected of respiratory viral infection consistent with COVID-19 by their healthcare provider. Signs and symptoms of respiratory viral infection due to SARS-CoV-2, influenza, and RSV can be similar.    Test performed using the Xpert Xpress SARS-CoV-2/FLU/RSV (real time RT-PCR)  assay on the GeneXpert instrument, [employer], [place] [address].   This test is being used under the Food and Drug Administration's Emergency Use Authorization.    The authorized Fact Sheet for Healthcare Providers for this assay is available upon request from the laboratory.   CBC WITH DIFFERENTIAL WITH PLATELET    Narrative:     The following orders were created for panel order CBC With Differential With Platelet.  Procedure                                Abnormality         Status                     ---------                               -----------         ------                     CBC W/ DIFFERENTIAL[[id number]]          Abnormal            Final result                 Please view results for these tests on the individual orders.   LEGIONELLA URINE AG SEROGRP 1   STREPTOCOCCUS PNEUMONIAE AG, URINE   TROPONIN I HIGH SENSITIVITY   RAINBOW DRAW BLUE   BLOOD CULTURE   BLOOD CULTURE   URINE CULTURE, ROUTINE   SPUTUM CULTURE   BLOOD CULTURE   BLOOD CULTURE     EKG    Rate, intervals and axes as noted on EKG Report.  Rate: 137  Rhythm: Sinus Rhythm  Reading: Sinus tachycardia nonspecific ST changes                 v         MDM         -Tracing on cardiac monitor and pulse oximetry was reviewed by myself.   -The cardiac monitor revealed normal sinus rhythm as interpreted by me. The cardiac monitor was ordered to monitor the patient for dysrhythmia  -Pulse oximetry was interpreted by me and was normal.  Pulse oximeter was ordered to monitor patient for hypoxia.       he management are mentioned in the HPI above        -I personally reviewed the prior external notes and the medical record to obtain additional history -I reviewed patient discharge summary from January 2024,        -DDX: Includes but not limited to -sepsis, PE, CHF, dehydration, electrolyte disturbance,        -I personally reviewed the radiographs findings and they show no lobar consolidations  Please refer to radiology report for official interpretation        -I personally reviewed the CT findings and it shows no PE  Please refer to radiology report for official interpretation        CT CHEST PE AORTA (IV ONLY) (CPT=71260)   Final Result   PROCEDURE:  CT CHEST PE AORTA (IV ONLY) (CPT=71260)       COMPARISON:  [facility] CT, CT ANGIOGRAPHY, CHEST (CPT=71275), 12/14/2023,    1:23 AM.       INDICATIONS:  nvd       TECHNIQUE:  CT images were obtained with non-ionic intravenous contrast     material. Dose reduction techniques were used. Dose information is    transmitted to the ACR (American College of Radiology) NRDR (National    Radiology Data Registry) which includes the    Dose Index Registry.       PATIENT STATED HISTORY:(As transcribed by Technologist)  C/o of    n/v/d/fatigued over the last week. Denies any known fevers         CONTRAST USED:  100cc of Isovue 370       FINDINGS:     LUNGS:  Moderate ground-glass opacities noted within a perihilar    distribution within both lungs encompassing portions of the upper lobes,    lower lobes, middle lobe, lingula, though most extensively involving the    perihilar right lung.  More extensive    airspace disease was noted in a similar distribution on 12/14/2023.   VASCULATURE:  No visible pulmonary arterial thrombus or attenuation.     BYRON:  Mildly enlarged bilateral hilar lymph nodes.   MEDIASTINUM:  Mildly enlarged lymph nodes of the paratracheal,    para-aortic, subcarinal stations, likely reactive.   CARDIAC:  Heart size upper limits normal.  No pericardial effusion.  No    coronary calcification.   PLEURA:  Mild right and small left pleural effusions.  No pneumothorax.   THORACIC AORTA:  No aneurysm.     CHEST WALL:  Right-sided chest port terminates at the cavoatrial junction.   LIMITED ABDOMEN:  Reflux of contrast noted within the intrahepatic IVC    indicating elevated right heart pressure.   BONES:  No bony lesion or fracture.                           =====   CONCLUSION:         1. Negative for pulmonary embolism.       2. Moderate ground-glass airspace opacities noted within a perihilar    distribution throughout both lungs, possibly representing pulmonary edema    versus infectious/inflammatory process.       3. Mild right and small left pleural effusions.             LOCATION:  [facility]           Dictated by (CST): Puja Jules MD on 3/06/2024 at 8:19 PM        Finalized by (CST): Puja Jules MD on 3/06/2024 at 8:25 PM         XR CHEST AP  PORTABLE  (CPT=71045)   Final Result   PROCEDURE:  XR CHEST AP PORTABLE  (CPT=71045)       TECHNIQUE:  AP chest radiograph was obtained.       COMPARISON:  [facility] , XR, XR CHEST AP PORTABLE  (CPT=71045), 12/19/2023,    3:49 PM.       INDICATIONS:  nvd       PATIENT STATED HISTORY: (As transcribed by Technologist)  Patient stated    feeling nauseous today.                                        =====   CONCLUSION:         Stable cardiac and mediastinal contours.  Patchy perihilar airspace    disease most in keeping with residual/recurrent pneumonia, though    significantly improved compared to 12/19/2023.  No associated pleural    effusion or pneumothorax.       Right subclavian chest port terminates in the lower SVC.  No acute osseous    findings.           LOCATION:  [facility]                       Dictated by (CST): Puja Jules MD on 3/06/2024 at 6:38 PM        Finalized by (CST): Puja Jules MD on 3/06/2024 at 6:38 PM           Labs Reviewed   COMP METABOLIC PANEL (14) - Abnormal; Notable for the following components:       Result Value    Sodium 121 (*)     Potassium 3.1 (*)     Chloride 95 (*)     CO2 15.0 (*)     Calculated Osmolality 252 (*)     Total Protein 6.1 (*)     Albumin 2.8 (*)     A/G Ratio 0.8 (*)     All other components within normal limits   TROPONIN I HIGH SENSITIVITY - Abnormal; Notable for the following components:    Troponin I (High Sensitivity) 355 (*)     All other components within normal limits   LIPID PANEL - Abnormal; Notable for the following components:    LDL Cholesterol 102 (*)     All other components within normal limits   URINALYSIS WITH CULTURE REFLEX - Abnormal; Notable for the following components:    Clarity Urine Turbid (*)     Blood Urine 3+ (*)     Protein Urine 100 (*)     Leukocyte Esterase Urine 500 (*)     WBC Urine 21-50 (*)     RBC Urine >10 (*)     Bacteria Urine Rare (*)     Squamous Epi. Cells Few (*)     Renal Tubular Epithelial Cells Few (*)     Transitional  Cells Few (*)     All other components within normal limits   PRO BETA NATRIURETIC PEPTIDE - Abnormal; Notable for the following components:    Pro-Beta Natriuretic Peptide 76,446 (*)     All other components within normal limits   CBC W/ DIFFERENTIAL - Abnormal; Notable for the following components:    RBC 2.84 (*)     HGB 8.5 (*)     HCT 23.8 (*)     Lymphocyte Absolute 0.15 (*)     All other components within normal limits   LACTIC ACID, PLASMA - Normal   SARS-COV-2/FLU A AND B/RSV BY PCR (GENEXPERT) - Normal    Narrative:     This test is intended for the qualitative detection and differentiation of SARS-CoV-2, influenza A, influenza B, and respiratory syncytial virus (RSV) viral RNA in nasopharyngeal or nares swabs from individuals suspected of respiratory viral infection consistent with COVID-19 by their healthcare provider. Signs and symptoms of respiratory viral infection due to SARS-CoV-2, influenza, and RSV can be similar.    Test performed using the Xpert Xpress SARS-CoV-2/FLU/RSV (real time RT-PCR)  assay on the [employer] instrument, [employer], [address] [place] [address].   This test is being used under the Food and Drug Administration's Emergency Use Authorization.    The authorized Fact Sheet for Healthcare Providers for this assay is available upon request from the laboratory.   CBC WITH DIFFERENTIAL WITH PLATELET    Narrative:     The following orders were created for panel order CBC With Differential With Platelet.  Procedure                               Abnormality         Status                     ---------                               -----------         ------                     CBC W/ DIFFERENTIAL[[id number]]          Abnormal            Final result                 Please view results for these tests on the individual orders.   LEGIONELLA URINE AG SEROGRP 1   STREPTOCOCCUS PNEUMONIAE AG, URINE   TROPONIN I HIGH SENSITIVITY   RAINBOW DRAW BLUE   BLOOD CULTURE   BLOOD CULTURE   URINE CULTURE, ROUTINE    SPUTUM CULTURE   BLOOD CULTURE   BLOOD CULTURE     Labs reviewed the BNP significantly elevated 76,000.  But no findings of CHF on imaging studies.  Patient troponin is at 355.  I did discuss with case cardiology.  Patient recently had cath showing normal coronaries last year.  She has no chest pain.  Clinically does not appear to be fluid overloaded.  Patient likely volume depleted given history of nausea vomiting diarrhea and poor p.o. intake.  Also hyponatremic.  Also discussed with nephrology.  Also do not feel patient is fluid overloaded clinically.  Will hold off on diuresis at this time.  Patient placed on fluid restriction.  She does have fever here in the ED with findings of groundglass opacities on the chest CT I will cover her for with IV antibiotics for pneumonia.  Patient findings discussed with the [facility] as well.  Patient will be admitted for further care.    Admission disposition: 3/6/2024  9:11 PM                                        Medical Decision Making      Disposition and Plan     Clinical Impression:  1. Hyponatremia    2. Fever, unspecified fever cause    3. Congestive heart failure, unspecified HF chronicity, unspecified heart failure type (HCC)    4. Elevated troponin    5. Hypokalemia         Disposition:  Admit  3/6/2024  9:11 pm    Follow-up:  No follow-up provider specified.        Medications Prescribed:  Current Discharge Medication List                            Hospital Problems       Present on Admission  Date Reviewed: 12/16/2021            ICD-10-CM Noted POA    * (Principal) Hyponatremia E87.1 11/14/2015 Unknown    Congestive heart failure, unspecified HF chronicity, unspecified heart failure type (HCC) I50.9 3/6/2024 Unknown    Elevated troponin R79.89 3/6/2024 Unknown    Fever, unspecified fever cause R50.9 3/6/2024 Unknown    Hypokalemia E87.6 11/14/2015 Unknown    Pneumonia due to infectious organism J18.9 3/6/2024 Unknown

## 2024-03-06 NOTE — ED INITIAL ASSESSMENT (HPI)
C/o of cough/n/v/d/fatigued over the last week. Denies any known fevers  With intermittent CP at rest starting yesterday     Ca patient.

## 2024-03-07 ENCOUNTER — APPOINTMENT (OUTPATIENT)
Dept: CT IMAGING | Facility: HOSPITAL | Age: 44
End: 2024-03-07
Attending: STUDENT IN AN ORGANIZED HEALTH CARE EDUCATION/TRAINING PROGRAM
Payer: COMMERCIAL

## 2024-03-07 LAB
ALBUMIN SERPL-MCNC: 2.2 G/DL (ref 3.4–5)
ALBUMIN/GLOB SERPL: 0.7 {RATIO} (ref 1–2)
ALP LIVER SERPL-CCNC: 69 U/L
ALT SERPL-CCNC: 13 U/L
ANION GAP SERPL CALC-SCNC: 11 MMOL/L (ref 0–18)
AST SERPL-CCNC: 19 U/L (ref 15–37)
ATRIAL RATE: 137 BPM
BASOPHILS # BLD AUTO: 0 X10(3) UL (ref 0–0.2)
BASOPHILS NFR BLD AUTO: 0 %
BILIRUB SERPL-MCNC: 0.8 MG/DL (ref 0.1–2)
BUN BLD-MCNC: 16 MG/DL (ref 9–23)
CALCIUM BLD-MCNC: 8.8 MG/DL (ref 8.5–10.1)
CHLORIDE SERPL-SCNC: 102 MMOL/L (ref 98–112)
CO2 SERPL-SCNC: 16 MMOL/L (ref 21–32)
CREAT BLD-MCNC: 0.74 MG/DL
EGFRCR SERPLBLD CKD-EPI 2021: 103 ML/MIN/1.73M2 (ref 60–?)
EOSINOPHIL # BLD AUTO: 0.01 X10(3) UL (ref 0–0.7)
EOSINOPHIL NFR BLD AUTO: 0.1 %
ERYTHROCYTE [DISTWIDTH] IN BLOOD BY AUTOMATED COUNT: 14 %
GLOBULIN PLAS-MCNC: 3 G/DL (ref 2.8–4.4)
GLUCOSE BLD-MCNC: 106 MG/DL (ref 70–99)
HCT VFR BLD AUTO: 19.5 %
HGB BLD-MCNC: 7 G/DL
IMM GRANULOCYTES # BLD AUTO: 0.05 X10(3) UL (ref 0–1)
IMM GRANULOCYTES NFR BLD: 0.6 %
L PNEUMO AG UR QL: NEGATIVE
LYMPHOCYTES # BLD AUTO: 0.18 X10(3) UL (ref 1–4)
LYMPHOCYTES NFR BLD AUTO: 2.1 %
MCH RBC QN AUTO: 30.3 PG (ref 26–34)
MCHC RBC AUTO-ENTMCNC: 35.9 G/DL (ref 31–37)
MCV RBC AUTO: 84.4 FL
MONOCYTES # BLD AUTO: 0.42 X10(3) UL (ref 0.1–1)
MONOCYTES NFR BLD AUTO: 5 %
NEUTROPHILS # BLD AUTO: 7.73 X10 (3) UL (ref 1.5–7.7)
NEUTROPHILS # BLD AUTO: 7.73 X10(3) UL (ref 1.5–7.7)
NEUTROPHILS NFR BLD AUTO: 92.2 %
OSMOLALITY SERPL CALC.SUM OF ELEC: 270 MOSM/KG (ref 275–295)
OSMOLALITY UR: 175 MOSM/KG (ref 300–1100)
P AXIS: 58 DEGREES
P-R INTERVAL: 170 MS
PLATELET # BLD AUTO: 185 10(3)UL (ref 150–450)
POTASSIUM SERPL-SCNC: 3.5 MMOL/L (ref 3.5–5.1)
PROT SERPL-MCNC: 5.2 G/DL (ref 6.4–8.2)
Q-T INTERVAL: 342 MS
QRS DURATION: 74 MS
QTC CALCULATION (BEZET): 516 MS
R AXIS: 33 DEGREES
RBC # BLD AUTO: 2.31 X10(6)UL
SODIUM SERPL-SCNC: 11 MMOL/L
SODIUM SERPL-SCNC: 129 MMOL/L (ref 136–145)
STREP PNEUMO ANTIGEN, URINE: NEGATIVE
T AXIS: 40 DEGREES
TROPONIN I SERPL HS-MCNC: 201 NG/L
TROPONIN I SERPL HS-MCNC: 257 NG/L
VENTRICULAR RATE: 137 BPM
WBC # BLD AUTO: 8.4 X10(3) UL (ref 4–11)

## 2024-03-07 PROCEDURE — 70450 CT HEAD/BRAIN W/O DYE: CPT | Performed by: STUDENT IN AN ORGANIZED HEALTH CARE EDUCATION/TRAINING PROGRAM

## 2024-03-07 PROCEDURE — 99233 SBSQ HOSP IP/OBS HIGH 50: CPT | Performed by: HOSPITALIST

## 2024-03-07 PROCEDURE — 99255 IP/OBS CONSLTJ NEW/EST HI 80: CPT | Performed by: INTERNAL MEDICINE

## 2024-03-07 RX ORDER — DOXYCYCLINE HYCLATE 100 MG/1
100 CAPSULE ORAL EVERY 12 HOURS SCHEDULED
Status: DISCONTINUED | OUTPATIENT
Start: 2024-03-07 | End: 2024-03-09

## 2024-03-07 NOTE — OCCUPATIONAL THERAPY NOTE
OCCUPATIONAL THERAPY EVALUATION - INPATIENT    Room Number: 2621/2621-A  Evaluation Date: 3/7/2024     Type of Evaluation: Initial  Presenting Problem: Hyponatremia (Hyperkalemia, Elevated troponin)    Physician Order: IP Consult to Occupational Therapy  Reason for Therapy:  ADL/IADL Dysfunction and Discharge Planning    Recent Admissions:   12/13-1/2 PNA --> [facility] (Note: Pt transferred to [facility] during this visit)  OCCUPATIONAL THERAPY ASSESSMENT   Patient is a 43 year old female admitted on 3/6/2024 with Presenting Problem: Hyponatremia (Hyperkalemia, Elevated troponin). Co-Morbidities : Lupus, Sjogren's, h/o breast CA s/p chemo, chronic CHF, neuropathy  Patient is currently functioning near baseline with lower body dressing, bed mobility, transfers, stating sitting balance, dynamic sitting balance, static standing balance, dynamic standing balance, functional standing tolerance, and energy conservation strategies.  Prior to admission, patient's baseline is pending tolerance and medical condition typically mod I with assist from dtr PRN.  Patient met all OT goals at sup to IND level.  Patient reports no further questions/concerns at this time.     WEIGHT BEARING RESTRICTION  Weight Bearing Restriction: None                Recommendations for nursing staff:   Transfers: 1p CGA c. FWW  Toileting location: Toilet    EVALUATION SESSION:  Patient at start of session: Unsupported sitting in bed  FUNCTIONAL TRANSFER ASSESSMENT  Sit to Stand: Edge of Bed  Edge of Bed: Modified Independent  Toilet Transfer: Supervision (toilet lower than hers at home c. use of grab bars)    BED MOBILITY  Rolling: Independent  Supine to Sit : Independent  Sit to Supine (OT): Independent  Scooting: IND to scoot EOB    BALANCE ASSESSMENT  Static Sitting: Independent  Sitting Unilateral: Independent  Static Standing: Modified Independent  Standing Bilateral: Modified Independent    FUNCTIONAL ADL ASSESSMENT  LB Dressing Seated:  Independent      ACTIVITY TOLERANCE: Pulse 124 during ambulation dropping to 111 with rest after ambulation. Pt reporting fatigue while ambulating hallway.                         O2 SATURATIONS       COGNITION  Overall Cognitive Status:  WFL - within functional limits  COGNITION ASSESSMENTS       Upper Extremity:   ROM: within functional limits   Strength: is within functional limits   Coordination:  Gross motor: WFL  Fine motor: WFL  Sensation: Light touch:  intact    EDUCATION PROVIDED  Patient: Role of Occupational Therapy; Plan of Care; Discharge Recommendations; Adaptive Equipment Recommendations; DME Recommendations; Functional Transfer Techniques; Energy Conservation; Compensatory ADL Techniques  Patient's Response to Education: Verbalized Understanding; Returned Demonstration    Equipment used: FWW  Demonstrates functional use    Therapist comments: Pt agreeable to therapy. Reports she has a commode and a hospital bed at home for when her medical condition becomes debilitating. Dtr at home to assist PRN. Has numbness below B knees and foot drop in R foot. Educated on role of therapy. Just finished up with HHPT/OT recently. Demo'd toilet transfer without use of grab bars, pt states hospital toilet is lower than hers at home. No concerns at this time.    Patient End of Session: In bed;Needs met;Call light within reach;All patient questions and concerns addressed    OCCUPATIONAL PROFILE    HOME SITUATION  Type of Home: House  Home Layout: Multi-level;Able to live on main level  Lives With: Daughter (7 y/o)    Toilet and Equipment: Comfort height toilet;3-in-1 commode  Shower/Tub and Equipment: Walk-in shower;Shower chair  Other Equipment: Hospital bed             Patient Regularly Uses: None    Prior Level of Function: Mod I with ADLs and functional mobility with FWW typically, changes by the day with adjustments in home setup for more debilitating days    SUBJECTIVE  \"I'm just tired\"    PAIN  ASSESSMENT  Ratin  Location: None reported or observed       OBJECTIVE  Precautions: Other (Comment) (R foot drop)  Fall Risk: Standard fall risk    WEIGHT BEARING RESTRICTION  Weight Bearing Restriction: None                AM-PAC ‘6-Clicks’ Inpatient Daily Activity Short Form  -   Putting on and taking off regular lower body clothing?: A Little  -   Bathing (including washing, rinsing, drying)?: A Little  -   Toileting, which includes using toilet, bedpan or urinal? : None  -   Putting on and taking off regular upper body clothing?: None  -   Taking care of personal grooming such as brushing teeth?: None  -   Eating meals?: None    AM-PAC Score:  Score: 22  Approx Degree of Impairment: 25.8%  Standardized Score (AM-PAC Scale): 47.1      ADDITIONAL TESTS     NEUROLOGICAL FINDINGS        PLAN   Patient has been evaluated and presents with no skilled Occupational Therapy needs at this time.  Patient discharged from Occupational Therapy services.  Please re-order if a new functional limitation presents during this admission.      Patient Evaluation Complexity Level:   Occupational Profile/Medical History LOW - Brief history including review of medical or therapy records    Specific performance deficits impacting engagement in ADL/IADL LOW  1 - 3 performance deficits    Client Assessment/Performance Deficits LOW - No comorbidities nor modifications of tasks    Clinical Decision Making LOW - Analysis of occupational profile, problem-focused assessments, limited treatment options    Overall Complexity LOW     OT Session Time: 20 minutes  Self-Care Home Management: 5 minutes  Therapeutic Activity: 5 minutes  Neuromuscular Re-education: 0 minutes  Therapeutic Exercise: 0 minutes  Cognitive Skills: 0 minutes  Sensory Integrative: 0 minutes  Orthotic Management and Trainin minutes  Can add/delete any of these

## 2024-03-07 NOTE — H&P
[facility]     Alina Aceves Patient Status:  Inpatient    1980 MRN [id number]   Location [facility] 2NE-A Attending Lenard Rosa, DO   Hosp Day # 0 PCP HOLLY IBARRA     Chief Complaint: Nausea vomiting    Subjective:    History of Present Illness:     Alina Aceves is a 43 year old female with past medical history of breast cancer, Sjogren's syndrome, lupus who presents ED for nausea vomiting diarrhea.  Patient reports symptoms began last week.  She is also had fatigue that is worsened throughout the week.  Patient denies any fevers, chills, abdominal pain, dyspnea, headaches, dizziness.    History/Other:    Past Medical History:  Past Medical History:   Diagnosis Date    Breast cancer (HCC)     Gastritis     Lupus (HCC)     Sjogren's disease (HCC)      Past Surgical History:   Past Surgical History:   Procedure Laterality Date    BREAST SURGERY Left      DELIVERY ONLY        Family History:   No family history on file.  Social History:    reports that she quit smoking about 14 years ago. Her smoking use included cigarettes. She has never used smokeless tobacco. She reports that she does not currently use alcohol. She reports that she does not use drugs.     Allergies:   Allergies   Allergen Reactions    Bactrim [Sulfamethoxazole W/Trimethoprim] RASH       Medications:    Current Facility-Administered Medications on File Prior to Encounter   Medication Dose Route Frequency Provider Last Rate Last Admin    [COMPLETED] potassium chloride (K-Dur) tab 40 mEq  40 mEq Oral Q4H Jessica Adhikari PA   40 mEq at 24 1156    [COMPLETED] magnesium oxide (Mag-Ox) tab 800 mg  800 mg Oral Once Jessica Adhikari PA   800 mg at 24 0815    [COMPLETED] potassium chloride (K-Dur) tab 40 mEq  40 mEq Oral Q4H Nicole Cartwright MD   40 mEq at 24 1301    [COMPLETED] potassium chloride (K-Dur) tab 40 mEq  40 mEq Oral Q4H Francisco Quarles MD   40 mEq at 23 1017     [COMPLETED] aspirin chewable tab 81 mg  81 mg Oral Once Irma Kelley APRCAIO   81 mg at 12/28/23 0957    [COMPLETED] potassium chloride 20 mEq/100mL IVPB premix 20 mEq  20 mEq Intravenous Once Marya Caban MD 50 mL/hr at 12/28/23 1841 20 mEq at 12/28/23 1841    [COMPLETED] lidocaine PF (Xylocaine-MPF) 1 % injection             [COMPLETED] verapamil (Isoptin) 2.5 mg/mL injection             [COMPLETED] heparin (Porcine) 5000 UNIT/ML injection             [COMPLETED] Nitroglycerin in D5W 200-5 MCG/ML-% injection             [COMPLETED] iohexol (OMNIPAQUE) 350 MG/ML injection 100 mL  100 mL Injection ONCE PRN Francisco Lee MD   95 mL at 12/28/23 1404    [COMPLETED] fentaNYL (Sublimaze) 50 mcg/mL injection             [COMPLETED] midazolam (Versed) 2 MG/2ML injection             [COMPLETED] midazolam (Versed) 2 MG/2ML injection             [COMPLETED] potassium chloride 40 mEq/100mL IVPB premix (central line) 40 mEq  40 mEq Intravenous Once Marya Caban MD   Stopped at 12/27/23 1100    [COMPLETED] potassium chloride 40 mEq/100mL IVPB premix (central line) 40 mEq  40 mEq Intravenous Once Marya Caban MD 25 mL/hr at 12/27/23 1707 40 mEq at 12/27/23 1707    [COMPLETED] potassium chloride 40 mEq/100mL IVPB premix (central line) 40 mEq  40 mEq Intravenous Once Marya Caban MD 25 mL/hr at 12/26/23 0952 40 mEq at 12/26/23 0952    [COMPLETED] potassium chloride 40 mEq/100mL IVPB premix (central line) 40 mEq  40 mEq Intravenous Once Marya Caban MD 25 mL/hr at 12/26/23 1600 40 mEq at 12/26/23 1600    [COMPLETED] potassium chloride (K-Dur) tab 40 mEq  40 mEq Oral q4h Marya Caban MD   40 mEq at 12/25/23 1714    [COMPLETED] potassium chloride (Klor-Con) 20 MEQ oral powder 40 mEq  40 mEq Oral Once Marya Caban MD   40 mEq at 12/24/23 1413    [COMPLETED] gadoterate meglumine (Dotarem) 7.5 MMOL/15ML injection 15 mL  15 mL Intravenous ONCE PRN Lenard Rosa, DO   12 mL at 12/24/23 1227    [COMPLETED] potassium  chloride (K-Dur) tab 40 mEq  40 mEq Oral Once Lenard Rosa, DO   40 mEq at 23 0954    [COMPLETED] potassium chloride 40 mEq in 250mL sodium chloride 0.9% IVPB premix  40 mEq Intravenous Once Deep Fine, DO 62.5 mL/hr at 23 0459 40 mEq at 23 0459    Followed by    [COMPLETED] potassium chloride 20 mEq/100mL IVPB premix 20 mEq  20 mEq Intravenous Once Deep Fine, DO 50 mL/hr at 23 0943 20 mEq at 23 0943    [COMPLETED] potassium chloride 40 mEq in 250mL sodium chloride 0.9% IVPB premix  40 mEq Intravenous Once Noe Perkins MD 62.5 mL/hr at 23 1746 40 mEq at 23 1746    Followed by    [COMPLETED] potassium chloride 20 mEq/100mL IVPB premix 20 mEq  20 mEq Intravenous Once Noe Perkins MD 50 mL/hr at 23 2226 20 mEq at 23 2226    [] potassium chloride (Klor-Con) 20 MEQ oral powder 40 mEq  40 mEq Oral Q4H Leonel Villalba MD   40 mEq at 23 0651    [COMPLETED] potassium chloride (Klor-Con) 20 MEQ oral powder 40 mEq  40 mEq Oral Once Noe Perkins MD   40 mEq at 23 1156    [COMPLETED] potassium chloride 40 mEq in 250mL sodium chloride 0.9% IVPB premix  40 mEq Intravenous Once Francisco Quarles MD 62.5 mL/hr at 23 1632 40 mEq at 23 1632    Followed by    [COMPLETED] potassium chloride 20 mEq/100mL IVPB premix 20 mEq  20 mEq Intravenous Once Francisco Quarles MD 50 mL/hr at 23 2109 20 mEq at 23 210    [COMPLETED] albumin human (Albuminar) 25% injection 25 g  25 g Intravenous Q12H Darryn Potter MD   25 g at 23 1553    [COMPLETED] furosemide (Lasix) 10 mg/mL injection 40 mg  40 mg Intravenous Once Ashley Tapia MD   40 mg at 23 1548    [COMPLETED] furosemide (Lasix) 10 mg/mL injection             [COMPLETED] phytonadione (Aqua-Mephton) 10 mg in sodium chloride 0.9% 50 mL IVPB  10 mg Intravenous Once Tosha Waite  mL/hr at 23 10 mg at 23 171    [COMPLETED] acetylcysteine  (Acetadote) 10.2 g in dextrose 5% 200 mL infusion (Loading Dose)  150 mg/kg Intravenous Once Kitty King APRN 200 mL/hr at 12/19/23 1948 10.2 g at 12/19/23 1948    Followed by    [COMPLETED] acetylcysteine (Acetadote) 3.4 g in dextrose 5% 500 mL infusion (Second Dose)  50 mg/kg Intravenous Once Kitty King APRN 125 mL/hr at 12/19/23 2056 3.4 g at 12/19/23 2056    Followed by    [COMPLETED] acetylcysteine (Acetadote) 6.8 g in dextrose 5% 1,000 mL infusion (Third Dose)  100 mg/kg Intravenous Once Kitty King APRN 62.5 mL/hr at 12/20/23 0101 6.8 g at 12/20/23 0101    [COMPLETED] phytonadione (Aqua-Mephton) 10 mg in sodium chloride 0.9% 50 mL IVPB  10 mg Intravenous Daily Kitty King APRN 100 mL/hr at 12/21/23 0954 10 mg at 12/21/23 0954    [COMPLETED] tolvaptan (Samsca) tab 15 mg  15 mg Oral Once Francisco Quarles MD   15 mg at 12/18/23 1046    [COMPLETED] potassium chloride (K-Dur) tab 40 mEq  40 mEq Oral Q4H Tosha Waite MD   40 mEq at 12/17/23 1358    [COMPLETED] Perflutren Lipid Microsphere (DEFINITY) 6.52 MG/ML injection 1.5 mL  1.5 mL Intravenous ONCE PRN Tosha Waite MD   1.5 mL at 12/17/23 1149    [COMPLETED] furosemide (Lasix) 10 mg/mL injection 40 mg  40 mg Intravenous Once Darryn Potter MD   40 mg at 12/17/23 1517    [COMPLETED] potassium chloride (K-Dur) tab 40 mEq  40 mEq Oral q4h Tosha Waite MD   40 mEq at 12/17/23 2043    [COMPLETED] furosemide (Lasix) 10 mg/mL injection 40 mg  40 mg Intravenous Once Elian Childers MD   40 mg at 12/17/23 2043    [COMPLETED] potassium chloride (K-Dur) tab 40 mEq  40 mEq Oral q4h Darryn Potter MD   40 mEq at 12/16/23 0601    [COMPLETED] tolvaptan (Samsca) tab 15 mg  15 mg Oral Once Darryn Potter MD   15 mg at 12/16/23 0243    [COMPLETED] tolvaptan (Samsca) tab 15 mg  15 mg Oral Once Darryn Potter MD   15 mg at 12/16/23 1643    [COMPLETED] tolvaptan (Samsca) tab 15 mg  15 mg Oral Once Darryn Potter MD   15 mg at 12/15/23  0206    [COMPLETED] tolvaptan (Samsca) tab 30 mg  30 mg Oral Once Darryn Potter MD   30 mg at 12/15/23 0730    [COMPLETED] potassium chloride 40 mEq in 250mL sodium chloride 0.9% IVPB premix  40 mEq Intravenous Once Darryn Potter MD 62.5 mL/hr at 12/15/23 0957 40 mEq at 12/15/23 0957    [] mupirocin (Bactroban) 2% nasal ointment 1 Application  1 Application Nasal Q12H Tosha Waite MD   1 Application at 23 0602    [COMPLETED] furosemide (Lasix) 10 mg/mL injection 40 mg  40 mg Intravenous Once Darryn Potter MD   40 mg at 12/15/23 2013    [COMPLETED] LORazepam (Ativan) tab 0.5 mg  0.5 mg Oral Once Jesús Juarez MD   0.5 mg at 23 0151    [COMPLETED] iohexol (Omnipaque) 350 MG/ML injection via power injector 100 mL  100 mL Intravenous ONCE PRN Ravindra Carroll MD   100 mL at 23 0132    [COMPLETED] LORazepam (Ativan) 2 mg/mL injection 1 mg  1 mg Intravenous Once Jesús Juarez MD   1 mg at 23 0212    [COMPLETED] haloperidol lactate (Haldol) 5 MG/ML injection 2 mg  2 mg Intravenous Once Jesús Juarez MD   2 mg at 23 0450    [COMPLETED] furosemide (Lasix) 10 mg/mL injection 20 mg  20 mg Intravenous Once Darryn Potter MD   20 mg at 23 1138    [COMPLETED] ondansetron (Zofran) 4 MG/2ML injection 4 mg  4 mg Intravenous Once Jennifer Edmonds MD   4 mg at 23 194    [COMPLETED] cefTRIAXone (Rocephin) 1 g in D5W 100 mL IVPB-ADD  1 g Intravenous Once Jennifer Edmonds MD   Stopped at 23    [] sodium chloride 0.9 % IV bolus 1,701 mL  30 mL/kg Intravenous Continuous Jennifer Edmonds MD   Stopped at 23    [COMPLETED] doxycycline hyclate (Vibramycin) 100 mg in sodium chloride 0.9% 100 mL IVPB  100 mg Intravenous Once Jennifer Edmonds MD   Stopped at 23    [COMPLETED] cefTRIAXone (Rocephin) 1 g in D5W 100 mL IVPB-ADD  1 g Intravenous Q24H Chelsi Ramon  mL/hr at 23 1 g at 23    [COMPLETED] metoclopramide  (Reglan) 5 mg/mL injection 10 mg  10 mg Intravenous Once Jennifer Edmonds MD   10 mg at 12/13/23 6828    [COMPLETED] doxycycline hyclate (Vibramycin) 100 mg in sodium chloride 0.9% 100 mL IVPB  100 mg Intravenous Q12H Chelsi Ramon  mL/hr at 12/18/23 2010 100 mg at 12/18/23 2010     Current Outpatient Medications on File Prior to Encounter   Medication Sig Dispense Refill    prochlorperazine (COMPAZINE) 10 mg tablet       metoprolol succinate ER 50 MG Oral Tablet 24 Hr Take 1 tablet (50 mg total) by mouth 2x Daily(Beta Blocker). 180 tablet 3    ondansetron 4 MG Oral Tablet Dispersible Take 1 tablet (4 mg total) by mouth every 4 (four) hours as needed for Nausea. 10 tablet 0    Potassium Citrate ER 15 MEQ (1620 MG) Oral Tab CR Take 1 tablet by mouth in the morning, at noon, and at bedtime.      DULoxetine 30 MG Oral Cap DR Particles Take 1 capsule (30 mg total) by mouth daily.      zolpidem 10 MG Oral Tab Take 1 tablet (10 mg total) by mouth nightly as needed for Sleep.      capecitabine 500 MG Oral tab  (Patient not taking: Reported on 3/6/2024)      predniSONE 10 MG Oral Tab Take 1 tablet (10 mg total) by mouth daily with breakfast. (Patient not taking: Reported on 3/6/2024) 30 tablet 0    spironolactone 25 MG Oral Tab Take 1 tablet (25 mg total) by mouth daily. (Patient not taking: Reported on 3/6/2024) 90 tablet 3    torsemide 20 MG Oral Tab Take 1 tablet (20 mg total) by mouth daily. (Patient not taking: Reported on 3/6/2024) 90 tablet 3    Polyethylene Glycol 3350 17 g Oral Powd Pack Take 17 g by mouth daily as needed. 30 each 0    traMADol 50 MG Oral Tab Take 1 tablet (50 mg total) by mouth every 6 (six) hours as needed for Pain. (Patient not taking: Reported on 3/6/2024) 20 tablet 0    lidocaine-prilocaine 2.5-2.5 % External Cream APPLY SMALL AMOUNT OVER PORT PRIOR TO ACCESS      ondansetron (ZOFRAN) 8 MG tablet Take 1 tablet (8 mg total) by mouth as needed.      Prenatal Vit-DSS-Fe Cbn-FA  (PRENATAL AD OR) Take 1 tablet by mouth daily.         Review of Systems:   A comprehensive review of systems was completed.    Pertinent positives and negatives noted in the HPI.    Objective:   Physical Exam:    BP (!) 123/95 (BP Location: Right arm)   Pulse 120   Temp 98.1 °F (36.7 °C) (Oral)   Resp (!) 31   Wt 108 lb 9.6 oz (49.3 kg)   LMP 08/28/2023 (Approximate)   SpO2 94%   BMI 18.64 kg/m²   General: No acute distress, Alert  Respiratory: No rhonchi, no wheezes  Cardiovascular: S1, S2. Regular rate and rhythm  Abdomen: Soft, Non-tender, non-distended, positive bowel sounds  Neuro: No new focal deficits  Extremities: No edema      Results:    Labs:      Labs Last 24 Hours:    Recent Labs   Lab 03/06/24  1615   RBC 2.84*   HGB 8.5*   HCT 23.8*   MCV 83.8   MCH 29.9   MCHC 35.7   RDW 13.9   NEPRELIM 7.70   WBC 8.4   .0       Recent Labs   Lab 03/06/24  1615   GLU 96   BUN 14   CREATSERUM 0.75   EGFRCR 101   CA 9.0   ALB 2.8*   *   K 3.1*   CL 95*   CO2 15.0*   ALKPHO 87   AST 22   ALT 16   BILT 0.9   TP 6.1*       Lab Results   Component Value Date    INR 1.26 (H) 12/28/2023    INR 1.43 (H) 12/23/2023    INR 1.48 (H) 12/21/2023       Recent Labs   Lab 03/06/24  1615   TROPHS 355*       Recent Labs   Lab 03/06/24  1615   PBNP 76,446*       No results for input(s): \"PCT\" in the last 168 hours.    Imaging: Imaging data reviewed in Epic.    Assessment & Plan:      #Pneumonia  -Chest x-ray reviewed  -Blood cultures ordered  -Sputum culture ordered  -Urine antigens ordered  -IV antibiotics    #Hyponatremia  #Metabolic acidosis  -No IV fluids or diuresis per nephrology  -Monitor on morning BMP  -Nephrology consulted    #Elevated troponin  -Continue to trend  -Cardiology consulted    #HFrEF    #Lupus    #Sjogrens syndrome        Plan of care discussed with patient    Lenard Rosa DO    Supplementary Documentation:     The 21st Century Cures Act makes medical notes like these available to patients in  the interest of transparency. Please be advised this is a medical document. Medical documents are intended to carry relevant information, facts as evident, and the clinical opinion of the practitioner. The medical note is intended as peer to peer communication and may appear blunt or direct. It is written in medical language and may contain abbreviations or verbiage that are unfamiliar.

## 2024-03-07 NOTE — CONSULTS
[facility]  Report of Consultation    Alina Aceves Patient Status:  Inpatient    1980 MRN [id number]   Location [facility] 2NE-A Attending Jasiel Rubi, DO   Hosp Day # 1 PCP HOLLY IBARRA     Reason for Consultation:  hyponatremia    History of Present Illness:  Alina Aceves is a a(n) 43 year old woman known to our service from prior admit with mult med probs incl SLE/sjogren's, proteinuria s/p renal bx in past with interstitial nephritis, stage 3 breast cancer, HFrEF (EF 20-25%), chronically low na- 127 meq/l on ,  who presented to ED with c/o N/V/D.  Noted to have Na 121 meq/l on initial labs.  Abx started for poss pneumonia.  She did receive NS overnight per sepsis protocol.  Na 129 meq/l this AM    History:  Past Medical History:   Diagnosis Date    Breast cancer (HCC)     Congestive heart disease (HCC)     Gastritis     Lupus (HCC)     Personal history of antineoplastic chemotherapy     Sjogren's disease (HCC)      Past Surgical History:   Procedure Laterality Date    BREAST SURGERY Left      DELIVERY ONLY       History reviewed. No pertinent family history.   reports that she quit smoking about 14 years ago. Her smoking use included cigarettes. She has never used smokeless tobacco. She reports that she does not currently use alcohol. She reports that she does not use drugs.    Allergies:  Allergies   Allergen Reactions    Bactrim [Sulfamethoxazole W/Trimethoprim] RASH       Medications:    Current Facility-Administered Medications:     acetaminophen (Tylenol Extra Strength) tab 500 mg, 500 mg, Oral, Q4H PRN    melatonin tab 3 mg, 3 mg, Oral, Nightly PRN    guaiFENesin ER (Mucinex) 12 hr tab 600 mg, 600 mg, Oral, BID PRN    benzonatate (Tessalon) cap 200 mg, 200 mg, Oral, TID PRN    glycerin-hypromellose- (Artifical Tears) 0.2-0.2-1 % ophthalmic solution 1 drop, 1 drop, Both Eyes, QID PRN    sodium chloride (Saline Mist) 0.65 % nasal solution 1 spray, 1 spray, Each Nare,  Q3H PRN    enoxaparin (Lovenox) 40 MG/0.4ML SUBQ injection 40 mg, 40 mg, Subcutaneous, Daily    ondansetron (Zofran) 4 MG/2ML injection 4 mg, 4 mg, Intravenous, Q6H PRN    prochlorperazine (Compazine) 10 MG/2ML injection 5 mg, 5 mg, Intravenous, Q8H PRN    polyethylene glycol (PEG 3350) (Miralax) 17 g oral packet 17 g, 17 g, Oral, Daily PRN    sennosides (Senokot) tab 17.2 mg, 17.2 mg, Oral, Nightly PRN    bisacodyl (Dulcolax) 10 MG rectal suppository 10 mg, 10 mg, Rectal, Daily PRN    fleet enema (Fleet) 7-19 GM/118ML rectal enema 133 mL, 1 enema, Rectal, Once PRN    piperacillin-tazobactam (Zosyn) 3.375 g in dextrose 5% 100 mL IVPB-ADDV, 3.375 g, Intravenous, Q8H    DULoxetine (Cymbalta) DR cap 30 mg, 30 mg, Oral, Daily    metoprolol succinate ER (Toprol XL) 24 hr tab 50 mg, 50 mg, Oral, 2x Daily(Beta Blocker)    HYDROcodone-acetaminophen (Norco) 5-325 MG per tab 1 tablet, 1 tablet, Oral, Q6H PRN    zolpidem (Ambien) tab 10 mg, 10 mg, Oral, Nightly PRN  No current outpatient medications on file.       Review of Systems:  Denies fever/chills  Denies wt loss/gain  Denies HA or visual changes  Denies CP or palpitations  Denies SOB/cough/hemoptysis  Denies abd or flank pain  +N/V/D  Denies change in urinary habits or gross hematuria  Denies LE edema  Denies skin rashes/myalgias/arthralgias      Physical Exam:   /82 (BP Location: Right arm)   Pulse 117   Temp 98.1 °F (36.7 °C) (Oral)   Resp 18   Wt 108 lb 9.6 oz (49.3 kg)   LMP 2023 (Approximate)   SpO2 94%   BMI 18.64 kg/m²   Temp (24hrs), Av.7 °F (37.1 °C), Min:97.6 °F (36.4 °C), Max:101.4 °F (38.6 °C)     No intake or output data in the 24 hours ending 24 07  Last 3 Weights   24 2200 108 lb 9.6 oz (49.3 kg)   24 1605 115 lb (52.2 kg)   24 0500 104 lb 15 oz (47.6 kg)   24 0436 101 lb 10.1 oz (46.1 kg)   23 0448 110 lb 3.7 oz (50 kg)   23 0518 105 lb 9.6 oz (47.9 kg)   23 0437 102 lb 11.8 oz  (46.6 kg)   12/27/23 0316 114 lb 6.7 oz (51.9 kg)   12/26/23 0500 128 lb 3.2 oz (58.2 kg)   12/25/23 0408 135 lb 2.3 oz (61.3 kg)   12/24/23 0409 147 lb (66.7 kg)   12/23/23 0400 143 lb 8.3 oz (65.1 kg)   12/22/23 1100 147 lb 7.8 oz (66.9 kg)   12/21/23 0000 147 lb 0.8 oz (66.7 kg)   12/19/23 0538 150 lb 9.2 oz (68.3 kg)   12/18/23 0019 150 lb 3.2 oz (68.1 kg)   12/13/23 2326 124 lb (56.2 kg)   12/13/23 1823 125 lb (56.7 kg)   11/13/23 1816 120 lb (54.4 kg)   12/16/21 1537 135 lb (61.2 kg)   09/22/20 1405 136 lb (61.7 kg)     General: Alert and oriented in no apparent distress.  HEENT: No scleral icterus, MMM  Neck: Supple, no KALYN or thyromegaly  Cardiac: tachycardic, S1, S2 normal, no murmur or rub  Lungs: Clear without wheezes, rales, rhonchi.    Abdomen: Soft, non-tender. + bowel sounds, no palpable organomegaly  Extremities: Without clubbing, cyanosis or edema.  Neurologic: Alert and oriented, cranial nerves grossly intact, moving all extremities  Skin: Warm and dry, no rashes      Laboratory Data:  Lab Results   Component Value Date    WBC 8.4 03/07/2024    HGB 7.0 03/07/2024    HCT 19.5 03/07/2024    .0 03/07/2024    CREATSERUM 0.74 03/07/2024    BUN 16 03/07/2024     03/07/2024    K 3.5 03/07/2024     03/07/2024    CO2 16.0 03/07/2024     03/07/2024    CA 8.8 03/07/2024    ALB 2.2 03/07/2024    ALKPHO 69 03/07/2024    BILT 0.8 03/07/2024    TP 5.2 03/07/2024    AST 19 03/07/2024    ALT 13 03/07/2024       BUN (mg/dL)   Date Value   03/07/2024 16   03/06/2024 14   01/08/2024 27 (H)     Creatinine (mg/dL)   Date Value   03/07/2024 0.74   03/06/2024 0.75   01/08/2024 0.63       No results found for: \"MICROALBCREA\"    Recent Labs   Lab 03/06/24  1615 03/07/24  0501   WBC 8.4 8.4   HGB 8.5* 7.0*   MCV 83.8 84.4   .0 185.0       Recent Labs   Lab 03/06/24  1615 03/07/24  0501   * 129*   K 3.1* 3.5   CL 95* 102   CO2 15.0* 16.0*   BUN 14 16   CREATSERUM 0.75 0.74   CA 9.0 8.8    GLU 96 106*       Recent Labs   Lab 03/06/24  1615 03/07/24  0501   ALT 16 13   AST 22 19   ALB 2.8* 2.2*       No results for input(s): \"PGLU\" in the last 168 hours.        Imaging:  CXR reviewed  CT noted    Impression/Plan:    #1.  Hyponatremia- acute on chronic.  Suspect chronic component related to SIADH in setting of cancer with acute drop in setting of hypovolemic hyponatremia with N/V/D.  Na much better today after NS given overnight and would hold off on further IVF given CM.      #2.  Possible sepsis- consider urinary source vs poss pneumonia.  Cx sent, on empiric abx    #3.  HFrEF/elevated troponin- cards has been consulted    #4.  Anemia- has been chronic and thought to be due to chemotherapy    #5.  Stage 3 breast cancer- per oncology.  chemo held due to inability to tolerate with plan for XRT per notes.     Thank you for allowing me to participate in the care of your patient. Please do not hesitate to call with any questions or concerns.       Francisco Quarles MD  3/7/2024  7:29 AM

## 2024-03-07 NOTE — PROGRESS NOTES
[facility]   part of [facility]     Hospitalist Progress Note     Alina Aceves Patient Status:  Inpatient    1980 MRN [id number]   Location [facility] 2NE-A Attending Jasiel Rubi,    Hosp Day # 1 PCP HOLLY IBARRA     Chief Complaint: sob    Subjective:     Patient denies cp or sob at rest. Feels a bit better. Less weak.     Objective:    Review of Systems:   A comprehensive review of systems was completed; pertinent positive and negatives stated in subjective.    Vital signs:  Temp:  [97.4 °F (36.3 °C)-101.4 °F (38.6 °C)] 97.7 °F (36.5 °C)  Pulse:  [101-138] 116  Resp:  [18-31] 20  BP: (106-144)/() 112/82  SpO2:  [92 %-100 %] 100 %    Physical Exam:    General: No acute distress  Respiratory: No wheezes, no rhonchi  Cardiovascular: S1, S2, regular rate and rhythm  Abdomen: Soft, Non-tender, non-distended, positive bowel sounds  Neuro: No new focal deficits.   Extremities: No edema      Diagnostic Data:    Labs:  Recent Labs   Lab 24  1615 24  0501   WBC 8.4 8.4   HGB 8.5* 7.0*   MCV 83.8 84.4   .0 185.0       Recent Labs   Lab 24  1615 24  0501   GLU 96 106*   BUN 14 16   CREATSERUM 0.75 0.74   CA 9.0 8.8   ALB 2.8* 2.2*   * 129*   K 3.1* 3.5   CL 95* 102   CO2 15.0* 16.0*   ALKPHO 87 69   AST 22 19   ALT 16 13   BILT 0.9 0.8   TP 6.1* 5.2*       Estimated Creatinine Clearance: 76.3 mL/min (based on SCr of 0.74 mg/dL).    Recent Labs   Lab 24  2311 24  0029 24  0501   TROPHS 265* 257* 201*       No results for input(s): \"PTP\", \"INR\" in the last 168 hours.               Microbiology    No results found for this visit on 24.      Imaging: Reviewed in Epic.    Medications:    enoxaparin  40 mg Subcutaneous Daily    piperacillin-tazobactam  3.375 g Intravenous Q8H    DULoxetine  30 mg Oral Daily    metoprolol succinate ER  50 mg Oral 2x Daily(Beta Blocker)       Assessment & Plan:      #Pneumonia  #GGO  -given underlying  rheumatologic condition, will get pulm involved; likely will need repeat imaging as outpatient   -Chest x-ray & CT chest reviewed  -Blood cultures pending  -Sputum culture pending  -Urine antigens pending  -Continue IV zosyn, add doxy      #Hyponatremia  #Metabolic acidosis  #Dehydration   -No IV fluids or diuresis per nephrology  -Monitor on morning BMP  -Nephrology consulted     #Elevated troponin  -Continue to trend  -Cardiology consulted    #Asymptomatic pyuria  -Empiric antibiotics started for above, follow-up urine culture     #HFrEF     #Lupus     #Sjogrens syndrome      Jasiel Rubi,     Supplementary Documentation:     Quality:  DVT Mechanical Prophylaxis:   SCDs,    DVT Pharmacologic Prophylaxis   Medication    enoxaparin (Lovenox) 40 MG/0.4ML SUBQ injection 40 mg                Code Status: Full Code  Peacock: No urinary catheter in place  Peacock Duration (in days):   Central line:    CHRISTELLE: 3/9/2024    Discharge is dependent on: clincal improvement  At this point Ms. Aceves is expected to be discharge to: likely home    The 21st Century Cures Act makes medical notes like these available to patients in the interest of transparency. Please be advised this is a medical document. Medical documents are intended to carry relevant information, facts as evident, and the clinical opinion of the practitioner. The medical note is intended as peer to peer communication and may appear blunt or direct. It is written in medical language and may contain abbreviations or verbiage that are unfamiliar.

## 2024-03-07 NOTE — PHYSICAL THERAPY NOTE
PHYSICAL THERAPY EVALUATION - INPATIENT     Room Number: 2621/2621-A  Evaluation Date: 3/7/2024  Type of Evaluation: Initial  Physician Order: PT Eval and Treat    Presenting Problem: hyponatremia, PNA  Co-Morbidities : Lupus, Sjogren's, h/o breast CA s/p chemo, chronic CHF, neuropathy  Reason for Therapy: Mobility Dysfunction and Discharge Planning    PHYSICAL THERAPY ASSESSMENT   Patient is currently functioning near baseline with gait and stair negotiation.  Prior to admission, patient's baseline is modified indep with amb with RW and is able to ascend/descend stairs.  Patient is requiring contact guard assist as a result of the following impairments: decreased functional strength, impaired standing balance, decreased muscular endurance, and medical status.  Physical Therapy will continue to follow for duration of hospitalization.    Patient will benefit from continued skilled PT Services at discharge to promote functional independence in home.  Anticipate patient will return home with home health PT.    PLAN  PT Treatment Plan: Energy conservation;Patient education;Family education;Gait training;Stair training;Transfer training;Balance training  Rehab Potential : Good  Frequency (Obs): 3-5x/week  Number of Visits to Meet Established Goals: 3      CURRENT GOALS    Goal #1 Patient is able to demonstrate supine - sit EOB @ level: modified independent     Goal #2 Patient is able to demonstrate transfers Sit to/from Stand at assistance level: modified independent     Goal #3 Patient is able to ambulate 5- feet with assist device: walker - rolling at assistance level: modified independent     Goal #4 Pt able to ascend/descend 8 stairs with 1 rail with supervision.   Goal #5    Goal #6    Goal Comments: Goals established on 3/7/2024      PHYSICAL THERAPY MEDICAL/SOCIAL HISTORY  History related to current admission: Patient is a 43 year old female admitted on 3/6/2024 from home for nausea, vomiting, diarrhea, and  weakness.  Pt diagnosed with hyponatremia and PNA.    Recent admission:  23-24: fall, PNA rec home with 24 hr assist and supervision, HH PT    HOME SITUATION  Type of Home: House   Home Layout: Multi-level;Able to live on main level  Stairs to Enter : 1  Railing: No  Stairs to Bedroom: 8  Railing: Yes    Lives With: Daughter (7 y/o)     Patient Owned Equipment: Rolling walker;Hospital bed;Other (Comment) (commode)       Prior Level of [place]: per pt reports, pt was amb  with RW HH distances without assist, denies recent fall hx, has hospital bed lower level of the home. Pt has 8 steps from kitchen level to daughter's bedroom with 1 rail. Pt reports she is in the process of having 2nd handrail installed.  Pt reports she was able to ascend/descend stairs without assist.      SUBJECTIVE  \"I''m just really tired.\"      OBJECTIVE  Precautions: Other (Comment) (R foot drop)  Fall Risk: Standard fall risk    WEIGHT BEARING RESTRICTION  Weight Bearing Restriction: None                PAIN ASSESSMENT  Ratin  Location: denies pain at this time       COGNITION  Overall Cognitive Status:  WFL - within functional limits    RANGE OF MOTION AND STRENGTH ASSESSMENT  Upper extremity ROM and strength are within functional limits     Lower extremity ROM is within functional limits     Lower extremity strength is within functional limits except R ankle dorsiflex below functional limits.      BALANCE  Static Sitting: Good  Dynamic Sitting: Good  Static Standing: Fair (with RW)  Dynamic Standing: Fair (with RW)    ADDITIONAL TESTS                                    ACTIVITY TOLERANCE  Pulse: (!) 124 (during amb with RW)  Heart Rate Source: Monitor              Patient Position: Standing    O2 WALK       NEUROLOGICAL FINDINGS  Neurological Findings: Sensation           Sensation: B LE's intact to light touch grossly         AM-PAC '6-Clicks' INPATIENT SHORT FORM - BASIC MOBILITY  How much difficulty does the patient  currently have...  Patient Difficulty: Turning over in bed (including adjusting bedclothes, sheets and blankets)?: None   Patient Difficulty: Sitting down on and standing up from a chair with arms (e.g., wheelchair, bedside commode, etc.): A Little   Patient Difficulty: Moving from lying on back to sitting on the side of the bed?: None   How much help from another person does the patient currently need...   Help from Another: Moving to and from a bed to a chair (including a wheelchair)?: A Little   Help from Another: Need to walk in hospital room?: A Little   Help from Another: Climbing 3-5 steps with a railing?: A Little       AM-PAC Score:  Raw Score: 20   Approx Degree of Impairment: 35.83%   Standardized Score (AM-PAC Scale): 47.67   CMS Modifier (G-Code): CJ    FUNCTIONAL ABILITY STATUS  Gait Assessment   Functional Mobility/Gait Assessment  Gait Assistance: Contact guard assist  Distance (ft): 150  Assistive Device: Rolling walker  Pattern: Comment;R Foot drop (L foot flat, high steppage gait R LE)    Skilled Therapy Provided     Bed Mobility:  Rolling: not tested  Supine to sit: supervision   Sit to supine: supervision     Transfer Mobility:  Sit to stand: supervision   Stand to sit: supervision  Gait = pt amb x 150 feet with RW CGA/SBA, pt had one incidence failure to clear R foot, but was able to stabilize without assist from this writer.     Therapist's Comments: per RN pt ok to be seen. Pt received in bed and agreeable to therapy. Pt denies dizziness and pain during session, HR elevated with activity.  Pt transfers and bed mobility without assist/ supervision level. Pt amb in hallway with RW with CGA/ SBA. Pt refuses stair trg at this time due to fatigue. Pt returns to room and back to supine. Pt educated on call don't fall, activity recs, and questions answered.  RN/SW updated.    Exercise/Education Provided:  Bed mobility  Functional activity tolerated  Gait training  Transfer training    Patient End of  Session: In bed;Needs met;Call light within reach;RN aware of session/findings;All patient questions and concerns addressed;Discussed recommendations with /      Patient Evaluation Complexity Level:  History Moderate - 1 or 2 personal factors and/or co-morbidities   Examination of body systems Moderate - addressing a total of 3 or more elements   Clinical Presentation Low - Stable   Clinical Decision Making Low - Stable       PT Session Time: 30 minutes  Gait Training: 10 minutes

## 2024-03-07 NOTE — PAYOR COMM NOTE
--------------  ADMISSION REVIEW     Payor: [employer]/MC[employer]  Subscriber #:  [id number]  Authorization Number: [id number]    Admit date: 3/6/24  Admit time:  9:48 PM       REVIEW DOCUMENTATION:     ED Provider Notes          Patient Seen in: [facility] Emergency Department      History     Chief Complaint   Patient presents with    Nausea/Vomiting/Diarrhea    Chest Pain Angina            Stated Complaint: nvd    Subjective:   HPI    43-year-old female with a history of lupus, Sjogren's, history of breast cancer as well status post chemo, October 2023, chronic CHF follows with cardiology, leukocytic vasculitis, neuropathy presents ED for evaluation today, complaining of cough.  Nausea vomiting diarrhea and fatigue over the last week.  Intermittent chest pain noted yesterday.  Febrile here in triage of 101.4.    Objective:   Past Medical History:   Diagnosis Date    Breast cancer (HCC)     Gastritis     Lupus (HCC)     Sjogren's disease (HCC)      Review of Systems    Positive for stated complaint: nvd  Other systems are as noted in HPI.  Constitutional and vital signs reviewed.      All other systems reviewed and negative except as noted above.    Physical Exam     ED Triage Vitals   BP 03/06/24 1606 (!) 132/92   Pulse 03/06/24 1605 (!) 135   Resp 03/06/24 1606 20   Temp 03/06/24 1605 (!) 101.4 °F (38.6 °C)   Temp src 03/06/24 1605 Temporal   SpO2 03/06/24 1605 98 %   O2 Device 03/06/24 1730 None (Room air)       Current:BP (!) 123/95 (BP Location: Right arm)   Pulse 120   Temp 98.1 °F (36.7 °C) (Oral)   Resp (!) 31   Wt 49.3 kg   LMP 08/28/2023 (Approximate)   SpO2 94%   BMI 18.64 kg/m²         Physical Exam  Vitals and nursing note reviewed.   Constitutional:       General: She is not in acute distress.     Appearance: She is well-developed. She is not toxic-appearing.   HENT:      Head: Normocephalic and atraumatic.   Eyes:      General: No scleral icterus.     Conjunctiva/sclera: Conjunctivae  normal.   Cardiovascular:      Rate and Rhythm: Normal rate.   Pulmonary:      Effort: Pulmonary effort is normal. No respiratory distress.      Breath sounds: No wheezing, rhonchi or rales.   Abdominal:      General: There is no distension.      Tenderness: There is no abdominal tenderness. There is no guarding or rebound.   Musculoskeletal:         General: No tenderness. Normal range of motion.      Cervical back: Normal range of motion and neck supple.   Skin:     General: Skin is warm and dry.      Findings: No rash.   Neurological:      Mental Status: She is alert and oriented to person, place, and time.      Motor: No abnormal muscle tone.      Coordination: Coordination normal.   Psychiatric:         Behavior: Behavior normal.              ED Course     Labs Reviewed   COMP METABOLIC PANEL (14) - Abnormal; Notable for the following components:       Result Value    Sodium 121 (*)     Potassium 3.1 (*)     Chloride 95 (*)     CO2 15.0 (*)     Calculated Osmolality 252 (*)     Total Protein 6.1 (*)     Albumin 2.8 (*)     A/G Ratio 0.8 (*)     All other components within normal limits   TROPONIN I HIGH SENSITIVITY - Abnormal; Notable for the following components:    Troponin I (High Sensitivity) 355 (*)     All other components within normal limits   LIPID PANEL - Abnormal; Notable for the following components:    LDL Cholesterol 102 (*)     All other components within normal limits   URINALYSIS WITH CULTURE REFLEX - Abnormal; Notable for the following components:    Clarity Urine Turbid (*)     Blood Urine 3+ (*)     Protein Urine 100 (*)     Leukocyte Esterase Urine 500 (*)     WBC Urine 21-50 (*)     RBC Urine >10 (*)     Bacteria Urine Rare (*)     Squamous Epi. Cells Few (*)     Renal Tubular Epithelial Cells Few (*)     Transitional Cells Few (*)     All other components within normal limits   PRO BETA NATRIURETIC PEPTIDE - Abnormal; Notable for the following components:    Pro-Beta Natriuretic Peptide  76,446 (*)     All other components within normal limits   CBC W/ DIFFERENTIAL - Abnormal; Notable for the following components:    RBC 2.84 (*)     HGB 8.5 (*)     HCT 23.8 (*)     Lymphocyte Absolute 0.15 (*)     All other components within normal limits   LACTIC ACID, PLASMA - Normal   SARS-COV-2/FLU A AND B/RSV BY PCR (GENEXPERT) - Normal    Narrative:     This test is intended for the qualitative detection and differentiation of SARS-CoV-2, influenza A, influenza B, and respiratory syncytial virus (RSV) viral RNA in nasopharyngeal or nares swabs from individuals suspected of respiratory viral infection consistent with COVID-19 by their healthcare provider. Signs and symptoms of respiratory viral infection due to SARS-CoV-2, influenza, and RSV can be similar.    Test performed using the Xpert Xpress SARS-CoV-2/FLU/RSV (real time RT-PCR)  assay on the [employer] instrument, [email or web address], [place] [address].   This test is being used under the Food and Drug Administration's Emergency Use Authorization.    The authorized Fact Sheet for Healthcare Providers for this assay is available upon request from the laboratory.   CBC WITH DIFFERENTIAL WITH PLATELET    Narrative:     The following orders were created for panel order CBC With Differential With Platelet.  Procedure                               Abnormality         Status                     ---------                               -----------         ------                     CBC W/ DIFFERENTIAL[[id number]]          Abnormal            Final result                 Please view results for these tests on the individual orders.   LEGIONELLA URINE AG SEROGRP 1   STREPTOCOCCUS PNEUMONIAE AG, URINE   TROPONIN I HIGH SENSITIVITY   RAINBOW DRAW BLUE   BLOOD CULTURE   BLOOD CULTURE   URINE CULTURE, ROUTINE   SPUTUM CULTURE   BLOOD CULTURE   BLOOD CULTURE     EKG    Rate, intervals and axes as noted on EKG Report.  Rate: 137  Rhythm: Sinus Rhythm  Reading: Sinus tachycardia  nonspecific ST changes      MDM         -Tracing on cardiac monitor and pulse oximetry was reviewed by myself.   -The cardiac monitor revealed normal sinus rhythm as interpreted by me. The cardiac monitor was ordered to monitor the patient for dysrhythmia  -Pulse oximetry was interpreted by me and was normal.  Pulse oximeter was ordered to monitor patient for hypoxia.       he management are mentioned in the HPI above        -I personally reviewed the prior external notes and the medical record to obtain additional history -I reviewed patient discharge summary from January 2024,        -DDX: Includes but not limited to -sepsis, PE, CHF, dehydration, electrolyte disturbance,        -I personally reviewed the radiographs findings and they show no lobar consolidations  Please refer to radiology report for official interpretation        -I personally reviewed the CT findings and it shows no PE  Please refer to radiology report for official interpretation        CT CHEST PE AORTA (IV ONLY) (CPT=71260)   Final Result   PROCEDURE:  CT CHEST PE AORTA (IV ONLY) (CPT=71260)       COMPARISON:  [facility] , CT, CT ANGIOGRAPHY, CHEST (CPT=71275), 12/14/2023,    1:23 AM.       INDICATIONS:  nvd       TECHNIQUE:  CT images were obtained with non-ionic intravenous contrast    material. Dose reduction techniques were used. Dose information is    transmitted to the ACR (American College of Radiology) NRDR (National    Radiology Data Registry) which includes the    Dose Index Registry.       PATIENT STATED HISTORY:(As transcribed by Technologist)  C/o of    n/v/d/fatigued over the last week. Denies any known fevers         CONTRAST USED:  100cc of Isovue 370       FINDINGS:     LUNGS:  Moderate ground-glass opacities noted within a perihilar    distribution within both lungs encompassing portions of the upper lobes,    lower lobes, middle lobe, lingula, though most extensively involving the    perihilar right lung.  More extensive     airspace disease was noted in a similar distribution on 12/14/2023.   VASCULATURE:  No visible pulmonary arterial thrombus or attenuation.     BYRON:  Mildly enlarged bilateral hilar lymph nodes.   MEDIASTINUM:  Mildly enlarged lymph nodes of the paratracheal,    para-aortic, subcarinal stations, likely reactive.   CARDIAC:  Heart size upper limits normal.  No pericardial effusion.  No    coronary calcification.   PLEURA:  Mild right and small left pleural effusions.  No pneumothorax.   THORACIC AORTA:  No aneurysm.     CHEST WALL:  Right-sided chest port terminates at the cavoatrial junction.   LIMITED ABDOMEN:  Reflux of contrast noted within the intrahepatic IVC    indicating elevated right heart pressure.   BONES:  No bony lesion or fracture.                           =====   CONCLUSION:         1. Negative for pulmonary embolism.       2. Moderate ground-glass airspace opacities noted within a perihilar    distribution throughout both lungs, possibly representing pulmonary edema    versus infectious/inflammatory process.       3. Mild right and small left pleural effusions.             LOCATION:  [facility]           Dictated by (CST): Puja Jules MD on 3/06/2024 at 8:19 PM        Finalized by (CST): Puja Jules MD on 3/06/2024 at 8:25 PM         XR CHEST AP PORTABLE  (CPT=71045)   Final Result   PROCEDURE:  XR CHEST AP PORTABLE  (CPT=71045)       TECHNIQUE:  AP chest radiograph was obtained.       COMPARISON:  [facility] , XR, XR CHEST AP PORTABLE  (CPT=71045), 12/19/2023,    3:49 PM.       INDICATIONS:  nvd       PATIENT STATED HISTORY: (As transcribed by Technologist)  Patient stated    feeling nauseous today.                                        =====   CONCLUSION:         Stable cardiac and mediastinal contours.  Patchy perihilar airspace    disease most in keeping with residual/recurrent pneumonia, though    significantly improved compared to 12/19/2023.  No associated pleural    effusion or pneumothorax.        Right subclavian chest port terminates in the lower SVC.  No acute osseous    findings.           LOCATION:  [facility]                       Dictated by (CST): Puja Jules MD on 3/06/2024 at 6:38 PM        Finalized by (CST): Puja Jules MD on 3/06/2024 at 6:38 PM           Labs Reviewed   COMP METABOLIC PANEL (14) - Abnormal; Notable for the following components:       Result Value    Sodium 121 (*)     Potassium 3.1 (*)     Chloride 95 (*)     CO2 15.0 (*)     Calculated Osmolality 252 (*)     Total Protein 6.1 (*)     Albumin 2.8 (*)     A/G Ratio 0.8 (*)     All other components within normal limits   TROPONIN I HIGH SENSITIVITY - Abnormal; Notable for the following components:    Troponin I (High Sensitivity) 355 (*)     All other components within normal limits   LIPID PANEL - Abnormal; Notable for the following components:    LDL Cholesterol 102 (*)     All other components within normal limits   URINALYSIS WITH CULTURE REFLEX - Abnormal; Notable for the following components:    Clarity Urine Turbid (*)     Blood Urine 3+ (*)     Protein Urine 100 (*)     Leukocyte Esterase Urine 500 (*)     WBC Urine 21-50 (*)     RBC Urine >10 (*)     Bacteria Urine Rare (*)     Squamous Epi. Cells Few (*)     Renal Tubular Epithelial Cells Few (*)     Transitional Cells Few (*)     All other components within normal limits   PRO BETA NATRIURETIC PEPTIDE - Abnormal; Notable for the following components:    Pro-Beta Natriuretic Peptide 76,446 (*)     All other components within normal limits   CBC W/ DIFFERENTIAL - Abnormal; Notable for the following components:    RBC 2.84 (*)     HGB 8.5 (*)     HCT 23.8 (*)     Lymphocyte Absolute 0.15 (*)     All other components within normal limits   LACTIC ACID, PLASMA - Normal   SARS-COV-2/FLU A AND B/RSV BY PCR (GENEXPERT) - Normal    Narrative:     This test is intended for the qualitative detection and differentiation of SARS-CoV-2, influenza A, influenza B, and respiratory  syncytial virus (RSV) viral RNA in nasopharyngeal or nares swabs from individuals suspected of respiratory viral infection consistent with COVID-19 by their healthcare provider. Signs and symptoms of respiratory viral infection due to SARS-CoV-2, influenza, and RSV can be similar.    Test performed using the Xpert Xpress SARS-CoV-2/FLU/RSV (real time RT-PCR)  assay on the Gene[employer]pert instrument, [employer], [place] [address].   This test is being used under the Food and Drug Administration's Emergency Use Authorization.    The authorized Fact Sheet for Healthcare Providers for this assay is available upon request from the laboratory.   CBC WITH DIFFERENTIAL WITH PLATELET    Narrative:     The following orders were created for panel order CBC With Differential With Platelet.  Procedure                               Abnormality         Status                     ---------                               -----------         ------                     CBC W/ DIFFERENTIAL[[id number]]          Abnormal            Final result                 Please view results for these tests on the individual orders.   LEGIONELLA URINE AG SEROGRP 1   STREPTOCOCCUS PNEUMONIAE AG, URINE   TROPONIN I HIGH SENSITIVITY   RAINBOW DRAW BLUE   BLOOD CULTURE   BLOOD CULTURE   URINE CULTURE, ROUTINE   SPUTUM CULTURE   BLOOD CULTURE   BLOOD CULTURE     Labs reviewed the BNP significantly elevated 76,000.  But no findings of CHF on imaging studies.  Patient troponin is at 355.  I did discuss with case cardiology.  Patient recently had cath showing normal coronaries last year.  She has no chest pain.  Clinically does not appear to be fluid overloaded.  Patient likely volume depleted given history of nausea vomiting diarrhea and poor p.o. intake.  Also hyponatremic.  Also discussed with nephrology.  Also do not feel patient is fluid overloaded clinically.  Will hold off on diuresis at this time.  Patient placed on fluid restriction.  She does have fever  here in the ED with findings of groundglass opacities on the chest CT I will cover her for with IV antibiotics for pneumonia.  Patient findings discussed with the [facility] as well.  Patient will be admitted for further care.    Admission disposition: 3/6/2024  9:11 PM           Medical Decision Making      Disposition and Plan     Clinical Impression:  1. Hyponatremia    2. Fever, unspecified fever cause    3. Congestive heart failure, unspecified HF chronicity, unspecified heart failure type (HCC)    4. Elevated troponin    5. Hypokalemia         Disposition:  Admit  3/6/2024  9:11 pm    Hospital Problems       Present on Admission  Date Reviewed: 12/16/2021            ICD-10-CM Noted POA    * (Principal) Hyponatremia E87.1 11/14/2015 Unknown    Congestive heart failure, unspecified HF chronicity, unspecified heart failure type (HCC) I50.9 3/6/2024 Unknown    Elevated troponin R79.89 3/6/2024 Unknown    Fever, unspecified fever cause R50.9 3/6/2024 Unknown    Hypokalemia E87.6 11/14/2015 Unknown    Pneumonia due to infectious organism J18.9 3/6/2024 Unknown               Signed by Luly Lr MD on 3/6/2024 10:34 PM           CONSULT  Impression/Plan:     #1.  Hyponatremia- acute on chronic.  Suspect chronic component related to SIADH in setting of cancer with acute drop in setting of hypovolemic hyponatremia with N/V/D.  Na much better today after NS given overnight and would hold off on further IVF given CM.       #2.  Possible sepsis- consider urinary source vs poss pneumonia.  Cx sent, on empiric abx     #3.  HFrEF/elevated troponin- cards has been consulted     #4.  Anemia- has been chronic and thought to be due to chemotherapy     #5.  Stage 3 breast cancer- per oncology.  chemo held due to inability to tolerate with plan for XRT per notes.      Thank you for allowing me to participate in the care of your patient. Please do not hesitate to call with any questions or concerns.        Francisco  MD Willam  3/7/2024  7:29 AM         MEDICATIONS ADMINISTERED IN LAST 1 DAY:  acetaminophen (Tylenol) tab 650 mg       Date Action Dose Route User    Admitted on 3/6/2024    3/6/2024 1607 Given 650 mg Oral Kyle Alfaro RN          DULoxetine (Cymbalta) DR cap 30 mg       Date Action Dose Route User    3/7/2024 0829 Given 30 mg Oral Daria Wray RN          enoxaparin (Lovenox) 40 MG/0.4ML SUBQ injection 40 mg       Date Action Dose Route User    3/7/2024 0829 Given 40 mg Subcutaneous (Left Lower Abdomen) Daria Wray RN          HYDROcodone-acetaminophen (Norco) 5-325 MG per tab 1 tablet       Date Action Dose Route User    3/7/2024 0153 Given 1 tablet Oral Lavonne Cornell RN          iopamidol 76% (ISOVUE-370) injection for power injector       Date Action Dose Route User    3/6/2024 2014 Given 100 mL Intravenous Carranza, Crystal          metoprolol succinate ER (Toprol XL) 24 hr tab 50 mg       Date Action Dose Route User    3/7/2024 0513 Given 50 mg Oral Lavonne Cornell RN          ondansetron (Zofran) 4 MG/2ML injection 4 mg       Date Action Dose Route User    3/6/2024 1825 Given 4 mg Intravenous Pao Rodrigues RN          piperacillin-tazobactam (Zosyn) 3.375 g in dextrose 5% 100 mL IVPB-ADDV       Date Action Dose Route User    3/7/2024 0829 New Bag 3.375 g Intravenous Daria Wray RN    3/6/2024 2356 New Bag 3.375 g Intravenous Lavonne Cornell RN          piperacillin-tazobactam (Zosyn) 4.5 g in dextrose 5% 100 mL IVPB-ADDV       Date Action Dose Route User    3/6/2024 1908 New Bag 4.5 g Intravenous Pao Rodrigues RN          potassium chloride (K-Dur) tab 40 mEq       Date Action Dose Route User    3/6/2024 1825 Given 40 mEq Oral Pao Rodrigues RN          sodium chloride 0.9% infusion 1,000 mL       Date Action Dose Route User    3/6/2024 1825 New Bag 1,000 mL Intravenous Pao Rodrigues, RN          zolpidem (Ambien) tab 10 mg       Date Action Dose Route User     3/7/2024 0317 Given 10 mg Oral Lavonne Cornell RN            Vitals (last day)       Date/Time Temp Pulse Resp BP SpO2 Weight O2 Device O2 Flow Rate (L/min) [facility]    03/07/24 0826 97.4 °F (36.3 °C) 101 23 108/82 99 % -- None (Room air) -- LS    03/07/24 0600 98.1 °F (36.7 °C) 117 18 106/82 94 % -- None (Room air) 0 L/min NN    03/07/24 0015 98.2 °F (36.8 °C) 119 26 109/83 92 % -- None (Room air) 0 L/min NN    03/06/24 2200 98.1 °F (36.7 °C) 120 31 123/95 94 % 108 lb 9.6 oz None (Room air) 0 L/min NN    03/06/24 2115 -- 126 25 -- 99 % -- -- -- RB    03/06/24 2045 -- 101 -- -- 100 % -- -- -- RB    03/06/24 2030 -- 127 -- -- 99 % -- -- -- RB    03/06/24 2000 -- 124 21 -- 100 % -- -- -- RB    03/06/24 1930 -- 127 22 142/100 99 % -- -- -- RB    03/06/24 1926 97.6 °F (36.4 °C) -- -- -- -- -- -- -- RB    03/06/24 1915 -- 129 25 -- 99 % -- -- -- RB    03/06/24 1900 -- 130 21 139/96 99 % -- -- -- RB    03/06/24 1845 -- 126 22 -- 100 % -- -- -- RB    03/06/24 1830 -- 129 27 138/95 99 % -- None (Room air) -- RB    03/06/24 1815 -- 131 31 -- 99 % -- -- -- RB    03/06/24 1805 -- -- -- -- -- -- None (Room air) -- RB    03/06/24 1800 -- 130 26 144/100 99 % -- -- -- RB    03/06/24 1730 -- 138 25 143/94 98 % -- None (Room air) --     03/06/24 1606 -- -- 20 132/92 -- -- -- --     03/06/24 1605 101.4 °F (38.6 °C) 135 -- -- 98 % 115 lb -- -- JR

## 2024-03-07 NOTE — PLAN OF CARE
Patient alert and oriented x 4. On RA. Up with SBA w/ walker. NSR/ST on tele. Continent of bowel/bladder. No complaints of pain, shortness of breath, chest pain/discomfort. POC: IV abx, monitor labs, cards to see. Call light within reach. Fall precautions in place.     Problem: Patient/Family Goals  Goal: Patient/Family Long Term Goal  Description: Patient's Long Term Goal: Stay out of hospital    Interventions:  - Med compliance  - Follow up appointments  - See additional Care Plan goals for specific interventions  Outcome: Progressing  Goal: Patient/Family Short Term Goal  Description: Patient's Short Term Goal: Discharge    Interventions:   - IV antibiotics  - Cards and Nephro to see  - See additional Care Plan goals for specific interventions  Outcome: Progressing     Problem: CARDIOVASCULAR - ADULT  Goal: Maintains optimal cardiac output and hemodynamic stability  Description: INTERVENTIONS:  - Monitor vital signs, rhythm, and trends  - Monitor for bleeding, hypotension and signs of decreased cardiac output  - Evaluate effectiveness of vasoactive medications to optimize hemodynamic stability  - Monitor arterial and/or venous puncture sites for bleeding and/or hematoma  - Assess quality of pulses, skin color and temperature  - Assess for signs of decreased coronary artery perfusion - ex. Angina  - Evaluate fluid balance, assess for edema, trend weights  Outcome: Progressing     Problem: METABOLIC/FLUID AND ELECTROLYTES - ADULT  Goal: Electrolytes maintained within normal limits  Description: INTERVENTIONS:  - Monitor labs and rhythm and assess patient for signs and symptoms of electrolyte imbalances  - Administer electrolyte replacement as ordered  - Monitor response to electrolyte replacements, including rhythm and repeat lab results as appropriate  - Fluid restriction as ordered  - Instruct patient on fluid and nutrition restrictions as appropriate  Outcome: Progressing  Goal: Hemodynamic stability and optimal  renal function maintained  Description: INTERVENTIONS:  - Monitor labs and assess for signs and symptoms of volume excess or deficit  - Monitor intake, output and patient weight  - Monitor urine specific gravity, serum osmolarity and serum sodium as indicated or ordered  - Monitor response to interventions for patient's volume status, including labs, urine output, blood pressure (other measures as available)  - Encourage oral intake as appropriate  - Instruct patient on fluid and nutrition restrictions as appropriate  Outcome: Progressing

## 2024-03-07 NOTE — CONSULTS
[facility]  Report of Consultation    Alina Aceves Patient Status:  Inpatient    1980 MRN [id number]   Location [facility] 2NE-A Attending Jasiel Rubi, DO   Hosp Day # 1 PCP HOLLY IBARRA     Reason for Consultation:   CHF, elevated troponin    History of Present Illness:   Alina Aceves is a(n) 43 year old female with HFrEF from chemo who presented with nausea and vomiting for a week.  She is well known to the service and was hospitalized previously for approximately 1 month for cardiogenic shock and pulmonary related issues.    Currently CP free.  No SOB.  No cardiac complaints.    Past Medical History:   Past Medical History:   Diagnosis Date    Breast cancer (HCC)     Congestive heart disease (HCC)     Gastritis     Lupus (HCC)     Personal history of antineoplastic chemotherapy     Sjogren's disease (HCC)        Social History:   Smoking:  None  Alcohol:  None    Family History:   No family history of premature arthrosclerotic heart disease     Medications:   Scheduled:    doxycycline  100 mg Oral 2 times per day    enoxaparin  40 mg Subcutaneous Daily    piperacillin-tazobactam  3.375 g Intravenous Q8H    DULoxetine  30 mg Oral Daily    metoprolol succinate ER  50 mg Oral 2x Daily(Beta Blocker)       Continuous Infusion:       PRN Medications:   acetaminophen, melatonin, guaiFENesin ER, benzonatate, glycerin-hypromellose-, sodium chloride, ondansetron, prochlorperazine, polyethylene glycol (PEG 3350), sennosides, bisacodyl, fleet enema, HYDROcodone-acetaminophen, zolpidem    Outpatient Medications:   Current Facility-Administered Medications on File Prior to Encounter   Medication Dose Route Frequency Provider Last Rate Last Admin    [COMPLETED] potassium chloride (K-Dur) tab 40 mEq  40 mEq Oral Q4H Jessica Adhikari PA   40 mEq at 24 1156    [COMPLETED] magnesium oxide (Mag-Ox) tab 800 mg  800 mg Oral Once Jessica Adhikari PA   800 mg at 24 0815     [COMPLETED] potassium chloride (K-Dur) tab 40 mEq  40 mEq Oral Q4H Nicole Cartwright MD   40 mEq at 01/01/24 1301    [COMPLETED] potassium chloride (K-Dur) tab 40 mEq  40 mEq Oral Q4H Francisco Quarles MD   40 mEq at 12/31/23 1017    [COMPLETED] aspirin chewable tab 81 mg  81 mg Oral Once Irma Kelley APRN   81 mg at 12/28/23 0957    [COMPLETED] potassium chloride 20 mEq/100mL IVPB premix 20 mEq  20 mEq Intravenous Once Marya Caban MD 50 mL/hr at 12/28/23 1841 20 mEq at 12/28/23 1841    [COMPLETED] lidocaine PF (Xylocaine-MPF) 1 % injection             [COMPLETED] verapamil (Isoptin) 2.5 mg/mL injection             [COMPLETED] heparin (Porcine) 5000 UNIT/ML injection             [COMPLETED] Nitroglycerin in D5W 200-5 MCG/ML-% injection             [COMPLETED] iohexol (OMNIPAQUE) 350 MG/ML injection 100 mL  100 mL Injection ONCE PRN Francisco Lee MD   95 mL at 12/28/23 1404    [COMPLETED] fentaNYL (Sublimaze) 50 mcg/mL injection             [COMPLETED] midazolam (Versed) 2 MG/2ML injection             [COMPLETED] midazolam (Versed) 2 MG/2ML injection             [COMPLETED] potassium chloride 40 mEq/100mL IVPB premix (central line) 40 mEq  40 mEq Intravenous Once Marya Caban MD   Stopped at 12/27/23 1100    [COMPLETED] potassium chloride 40 mEq/100mL IVPB premix (central line) 40 mEq  40 mEq Intravenous Once Marya Caban MD 25 mL/hr at 12/27/23 1707 40 mEq at 12/27/23 1707    [COMPLETED] potassium chloride 40 mEq/100mL IVPB premix (central line) 40 mEq  40 mEq Intravenous Once Marya Caban MD 25 mL/hr at 12/26/23 0952 40 mEq at 12/26/23 0952    [COMPLETED] potassium chloride 40 mEq/100mL IVPB premix (central line) 40 mEq  40 mEq Intravenous Once Marya Caban MD 25 mL/hr at 12/26/23 1600 40 mEq at 12/26/23 1600    [COMPLETED] potassium chloride (K-Dur) tab 40 mEq  40 mEq Oral q4h Marya Caban MD   40 mEq at 12/25/23 1714    [COMPLETED] potassium chloride (Klor-Con) 20 MEQ oral powder 40 mEq  40 mEq Oral  Once Marya Caban MD   40 mEq at 23 1413    [COMPLETED] gadoterate meglumine (Dotarem) 7.5 MMOL/15ML injection 15 mL  15 mL Intravenous ONCE PRN Lenard Rosa, DO   12 mL at 23 1227    [COMPLETED] potassium chloride (K-Dur) tab 40 mEq  40 mEq Oral Once Lenard Rosa, DO   40 mEq at 23 0954    [COMPLETED] potassium chloride 40 mEq in 250mL sodium chloride 0.9% IVPB premix  40 mEq Intravenous Once Deep Fine, DO 62.5 mL/hr at 23 0459 40 mEq at 23 0459    Followed by    [COMPLETED] potassium chloride 20 mEq/100mL IVPB premix 20 mEq  20 mEq Intravenous Once Deep Fine, DO 50 mL/hr at 23 0943 20 mEq at 23 0943    [COMPLETED] potassium chloride 40 mEq in 250mL sodium chloride 0.9% IVPB premix  40 mEq Intravenous Once Noe Perkins MD 62.5 mL/hr at 23 1746 40 mEq at 23 1746    Followed by    [COMPLETED] potassium chloride 20 mEq/100mL IVPB premix 20 mEq  20 mEq Intravenous Once Noe Perkins MD 50 mL/hr at 23 2226 20 mEq at 23 2226    [] potassium chloride (Klor-Con) 20 MEQ oral powder 40 mEq  40 mEq Oral Q4H Leonel Villalba MD   40 mEq at 23 0651    [COMPLETED] potassium chloride (Klor-Con) 20 MEQ oral powder 40 mEq  40 mEq Oral Once Noe Perkins MD   40 mEq at 23 1156    [COMPLETED] potassium chloride 40 mEq in 250mL sodium chloride 0.9% IVPB premix  40 mEq Intravenous Once Francisco Quarles MD 62.5 mL/hr at 23 1632 40 mEq at 23 1632    Followed by    [COMPLETED] potassium chloride 20 mEq/100mL IVPB premix 20 mEq  20 mEq Intravenous Once Francisco Quarles MD 50 mL/hr at 23 210 20 mEq at 23    [COMPLETED] albumin human (Albuminar) 25% injection 25 g  25 g Intravenous Q12H Darryn Potter MD   25 g at 23 1553    [COMPLETED] furosemide (Lasix) 10 mg/mL injection 40 mg  40 mg Intravenous Once Ashley Tapia MD   40 mg at 23 1548    [COMPLETED] furosemide (Lasix) 10 mg/mL injection              [COMPLETED] phytonadione (Aqua-Mephton) 10 mg in sodium chloride 0.9% 50 mL IVPB  10 mg Intravenous Once Tosha Waite  mL/hr at 12/19/23 1715 10 mg at 12/19/23 1715    [COMPLETED] acetylcysteine (Acetadote) 10.2 g in dextrose 5% 200 mL infusion (Loading Dose)  150 mg/kg Intravenous Once Kitty King APRN 200 mL/hr at 12/19/23 1948 10.2 g at 12/19/23 1948    Followed by    [COMPLETED] acetylcysteine (Acetadote) 3.4 g in dextrose 5% 500 mL infusion (Second Dose)  50 mg/kg Intravenous Once Kitty King APRN 125 mL/hr at 12/19/23 2056 3.4 g at 12/19/23 2056    Followed by    [COMPLETED] acetylcysteine (Acetadote) 6.8 g in dextrose 5% 1,000 mL infusion (Third Dose)  100 mg/kg Intravenous Once Kitty King APRN 62.5 mL/hr at 12/20/23 0101 6.8 g at 12/20/23 0101    [COMPLETED] phytonadione (Aqua-Mephton) 10 mg in sodium chloride 0.9% 50 mL IVPB  10 mg Intravenous Daily Kitty King APRN 100 mL/hr at 12/21/23 0954 10 mg at 12/21/23 0954    [COMPLETED] tolvaptan (Samsca) tab 15 mg  15 mg Oral Once Francisco Quarles MD   15 mg at 12/18/23 1046    [COMPLETED] potassium chloride (K-Dur) tab 40 mEq  40 mEq Oral Q4H Tosha Waite MD   40 mEq at 12/17/23 1358    [COMPLETED] Perflutren Lipid Microsphere (DEFINITY) 6.52 MG/ML injection 1.5 mL  1.5 mL Intravenous ONCE PRN Tosha Waite MD   1.5 mL at 12/17/23 1149    [COMPLETED] furosemide (Lasix) 10 mg/mL injection 40 mg  40 mg Intravenous Once Darryn Potter MD   40 mg at 12/17/23 1517    [COMPLETED] potassium chloride (K-Dur) tab 40 mEq  40 mEq Oral q4h Tosha Waite MD   40 mEq at 12/17/23 2043    [COMPLETED] furosemide (Lasix) 10 mg/mL injection 40 mg  40 mg Intravenous Once Elian Childers MD   40 mg at 12/17/23 2043    [COMPLETED] potassium chloride (K-Dur) tab 40 mEq  40 mEq Oral q4h Darryn Potter MD   40 mEq at 12/16/23 0601    [COMPLETED] tolvaptan (Samsca) tab 15 mg  15 mg Oral Once Darryn Potter MD   15 mg at  23 0243    [COMPLETED] tolvaptan (Samsca) tab 15 mg  15 mg Oral Once Darryn Potter MD   15 mg at 23 1643    [COMPLETED] tolvaptan (Samsca) tab 15 mg  15 mg Oral Once Darryn Potter MD   15 mg at 12/15/23 0206    [COMPLETED] tolvaptan (Samsca) tab 30 mg  30 mg Oral Once Darryn Potter MD   30 mg at 12/15/23 0730    [COMPLETED] potassium chloride 40 mEq in 250mL sodium chloride 0.9% IVPB premix  40 mEq Intravenous Once Darryn Potter MD 62.5 mL/hr at 12/15/23 0957 40 mEq at 12/15/23 0957    [] mupirocin (Bactroban) 2% nasal ointment 1 Application  1 Application Nasal Q12H Tosha Waite MD   1 Application at 23 0602    [COMPLETED] furosemide (Lasix) 10 mg/mL injection 40 mg  40 mg Intravenous Once Darryn Potter MD   40 mg at 12/15/23 2013    [COMPLETED] LORazepam (Ativan) tab 0.5 mg  0.5 mg Oral Once Jesús Juarez MD   0.5 mg at 23 0151    [COMPLETED] iohexol (Omnipaque) 350 MG/ML injection via power injector 100 mL  100 mL Intravenous ONCE PRN Ravindra Carroll MD   100 mL at 23 0132    [COMPLETED] LORazepam (Ativan) 2 mg/mL injection 1 mg  1 mg Intravenous Once Jesús Juarez MD   1 mg at 23 0212    [COMPLETED] haloperidol lactate (Haldol) 5 MG/ML injection 2 mg  2 mg Intravenous Once Jesús Juarez MD   2 mg at 23 0450    [COMPLETED] furosemide (Lasix) 10 mg/mL injection 20 mg  20 mg Intravenous Once Darryn Potter MD   20 mg at 23 1138    [COMPLETED] ondansetron (Zofran) 4 MG/2ML injection 4 mg  4 mg Intravenous Once Jennifer Edmonds MD   4 mg at 23 1942    [COMPLETED] cefTRIAXone (Rocephin) 1 g in D5W 100 mL IVPB-ADD  1 g Intravenous Once Jennifer Edmonds MD   Stopped at 23    [] sodium chloride 0.9 % IV bolus 1,701 mL  30 mL/kg Intravenous Continuous Jennifer Edmonds MD   Stopped at 23    [COMPLETED] doxycycline hyclate (Vibramycin) 100 mg in sodium chloride 0.9% 100 mL IVPB  100 mg Intravenous Once Jennifer Edmonds MD    Stopped at 12/13/23 2214    [COMPLETED] cefTRIAXone (Rocephin) 1 g in D5W 100 mL IVPB-ADD  1 g Intravenous Q24H Chelsi Ramon  mL/hr at 12/18/23 2115 1 g at 12/18/23 2115    [COMPLETED] metoclopramide (Reglan) 5 mg/mL injection 10 mg  10 mg Intravenous Once Jennifer Edmonds MD   10 mg at 12/13/23 2257    [COMPLETED] doxycycline hyclate (Vibramycin) 100 mg in sodium chloride 0.9% 100 mL IVPB  100 mg Intravenous Q12H Chelsi Ramon  mL/hr at 12/18/23 2010 100 mg at 12/18/23 2010     Current Outpatient Medications on File Prior to Encounter   Medication Sig Dispense Refill    prochlorperazine (COMPAZINE) 10 mg tablet       HYDROcodone-acetaminophen 5-325 MG Oral Tab Take 1 tablet by mouth every 6 (six) hours as needed for Pain.      metoprolol succinate ER 50 MG Oral Tablet 24 Hr Take 1 tablet (50 mg total) by mouth 2x Daily(Beta Blocker). 180 tablet 3    ondansetron 4 MG Oral Tablet Dispersible Take 1 tablet (4 mg total) by mouth every 4 (four) hours as needed for Nausea. 10 tablet 0    Potassium Citrate ER 15 MEQ (1620 MG) Oral Tab CR Take 1 tablet by mouth in the morning, at noon, and at bedtime.      DULoxetine 30 MG Oral Cap DR Particles Take 1 capsule (30 mg total) by mouth daily.      zolpidem 10 MG Oral Tab Take 1 tablet (10 mg total) by mouth nightly as needed for Sleep.      capecitabine 500 MG Oral tab  (Patient not taking: Reported on 3/6/2024)      predniSONE 10 MG Oral Tab Take 1 tablet (10 mg total) by mouth daily with breakfast. (Patient not taking: Reported on 3/6/2024) 30 tablet 0    spironolactone 25 MG Oral Tab Take 1 tablet (25 mg total) by mouth daily. (Patient not taking: Reported on 3/6/2024) 90 tablet 3    torsemide 20 MG Oral Tab Take 1 tablet (20 mg total) by mouth daily. (Patient not taking: Reported on 3/6/2024) 90 tablet 3    Polyethylene Glycol 3350 17 g Oral Powd Pack Take 17 g by mouth daily as needed. 30 each 0    traMADol 50 MG Oral Tab Take 1 tablet (50 mg  total) by mouth every 6 (six) hours as needed for Pain. (Patient not taking: Reported on 3/6/2024) 20 tablet 0    lidocaine-prilocaine 2.5-2.5 % External Cream APPLY SMALL AMOUNT OVER PORT PRIOR TO ACCESS      ondansetron (ZOFRAN) 8 MG tablet Take 1 tablet (8 mg total) by mouth as needed.      Prenatal Vit-DSS-Fe Cbn-FA (PRENATAL AD OR) Take 1 tablet by mouth daily.         Allergies:   Allergies   Allergen Reactions    Bactrim [Sulfamethoxazole W/Trimethoprim] RASH       Review of Systems:   No fevers, chills, change in weight or bowel habits.  Ten point review of systems is otherwise negative or unremarkable.    Physical Exam:   Vitals:    03/07/24 1702   BP: (!) 127/91   Pulse: (!) 131   Resp: 20   Temp: 98.6 °F (37 °C)     Wt Readings from Last 3 Encounters:   03/06/24 108 lb 9.6 oz (49.3 kg)   01/02/24 104 lb 15 oz (47.6 kg)   11/13/23 120 lb (54.4 kg)           General: Well developed, well nourished female.  Pt is in no acute distress.  HEENT:   Normocephalic.  Atraumatic.  Eyes with no scleral icterus.  Neck: Supple.  No JVD.  Carotids 2+ and equal in symmetric fashion.  No bruits are noted.  Cardiac: Regular rate and rhythm.   There is a normal S1 and S2.  No S3 or S4.  No murmurs, rubs, or gallops.  PMI is non-displaced with a normal apical impulse.  Lungs: Clear to ascultation bilaterally.  No focal rales, rhonchi, or wheezes.  Good air movement is noted throughout all lung fields.  Abdomen: Soft.  Non-distended.  Non-tender.  Bowel sounds are present and normoactive.  No guarding or rebound.   Extremities: Extremities do not demonstrate any evidence of peripheral edema.   No cyanosis or clubbing of the digits is appreciated.  Femoral, Dorsalis Pedis, and Posterior Tibialis  pulses are 2+ and equal in a symmetric fashion.  Neurologic: Alert and oriented, normal affect.  No gross deficit appreciated.  Integument:  No visible rashes are appreciated.      Laboratories and Data:   Labs:    Recent Labs   Lab  03/06/24  1615 03/07/24  0501   GLU 96 106*   BUN 14 16   CREATSERUM 0.75 0.74   CA 9.0 8.8   ALB 2.8* 2.2*   * 129*   K 3.1* 3.5   CL 95* 102   CO2 15.0* 16.0*   ALKPHO 87 69   AST 22 19   ALT 16 13   BILT 0.9 0.8   TP 6.1* 5.2*       Recent Labs   Lab 03/06/24  1615 03/07/24  0501   RBC 2.84* 2.31*   HGB 8.5* 7.0*   HCT 23.8* 19.5*   MCV 83.8 84.4   MCH 29.9 30.3   MCHC 35.7 35.9   RDW 13.9 14.0   NEPRELIM 7.70 7.73*   WBC 8.4 8.4   .0 185.0       No results for input(s): \"PTP\", \"INR\" in the last 168 hours.    No results for input(s): \"TROP\", \"CK\" in the last 168 hours.    Diagnostics:   Tele: NSR.  EKG: ST.  NSST.      Assessment:  1. HFrEF  2. PNA  3. Hyponatremia  4. Mild troponin elevation with normal coronaries  5. Lupus  6. Sjogren's syndrome  7. Anemia      Plan:  1. Troponin of no clinical significance  2. Echo  3. Will follow    Leonel Villalba MD  3/7/2024  5:23 PM

## 2024-03-07 NOTE — CONSULTS
Pulmonary H&P/Consult       NAME: Alina Aceves - ROOM: 2621/2621-A - MRN: [id number] - Age: 43 year old - :  1980    Date of Admission: 3/6/2024  5:21 PM  Admission Diagnosis: Hypokalemia [E87.6]  Hyponatremia [E87.1]  Elevated troponin [R79.89]  Fever, unspecified fever cause [R50.9]  Congestive heart failure, unspecified HF chronicity, unspecified heart failure type (HCC) [I50.9]  Reason for consult: abnl CT    History of Present Illness: 44 y/o w/ h/o Sjogren's who was admitted to [facility] in December w/ fluid overload and abnl CT presented to [facility] yesterday w/ c/o N/V/D.  She had a CT of the chest that showed los infiltrates that  had a somewhat improved appearance compared to her film from December but overall looked better.  We have been asked to assess her.  She denies issues w/ her breathing currently.  She does note a cough and some occasional wheezing    Past Medical History:   Diagnosis Date    Breast cancer (HCC)     Congestive heart disease (HCC)     Gastritis     Lupus (HCC)     Personal history of antineoplastic chemotherapy     Sjogren's disease (HCC)       Past Surgical History:   Procedure Laterality Date    BREAST SURGERY Left      DELIVERY ONLY        Medications Prior to Admission   Medication Sig Dispense Refill Last Dose    prochlorperazine (COMPAZINE) 10 mg tablet    Past Week    HYDROcodone-acetaminophen 5-325 MG Oral Tab Take 1 tablet by mouth every 6 (six) hours as needed for Pain.   Past Week    metoprolol succinate ER 50 MG Oral Tablet 24 Hr Take 1 tablet (50 mg total) by mouth 2x Daily(Beta Blocker). 180 tablet 3 3/5/2024    ondansetron 4 MG Oral Tablet Dispersible Take 1 tablet (4 mg total) by mouth every 4 (four) hours as needed for Nausea. 10 tablet 0 Past Week    Potassium Citrate ER 15 MEQ (1620 MG) Oral Tab CR Take 1 tablet by mouth in the morning, at noon, and at bedtime.   Taking    DULoxetine 30 MG Oral Cap DR Particles Take 1 capsule (30 mg total) by mouth daily.    3/5/2024    zolpidem 10 MG Oral Tab Take 1 tablet (10 mg total) by mouth nightly as needed for Sleep.   3/5/2024    capecitabine 500 MG Oral tab  (Patient not taking: Reported on 3/6/2024)   Not Taking    predniSONE 10 MG Oral Tab Take 1 tablet (10 mg total) by mouth daily with breakfast. (Patient not taking: Reported on 3/6/2024) 30 tablet 0 Not Taking    spironolactone 25 MG Oral Tab Take 1 tablet (25 mg total) by mouth daily. (Patient not taking: Reported on 3/6/2024) 90 tablet 3 Not Taking    torsemide 20 MG Oral Tab Take 1 tablet (20 mg total) by mouth daily. (Patient not taking: Reported on 3/6/2024) 90 tablet 3 Not Taking    Polyethylene Glycol 3350 17 g Oral Powd Pack Take 17 g by mouth daily as needed. 30 each 0     traMADol 50 MG Oral Tab Take 1 tablet (50 mg total) by mouth every 6 (six) hours as needed for Pain. (Patient not taking: Reported on 3/6/2024) 20 tablet 0 Not Taking    lidocaine-prilocaine 2.5-2.5 % External Cream APPLY SMALL AMOUNT OVER PORT PRIOR TO ACCESS       ondansetron (ZOFRAN) 8 MG tablet Take 1 tablet (8 mg total) by mouth as needed.       Prenatal Vit-DSS-Fe Cbn-FA (PRENATAL AD OR) Take 1 tablet by mouth daily.        Allergies   Allergen Reactions    Bactrim [Sulfamethoxazole W/Trimethoprim] RASH       Social History:  Social History     Socioeconomic History    Marital status:      Spouse name: Not on file    Number of children: Not on file    Years of education: Not on file    Highest education level: Not on file   Occupational History    Not on file   Tobacco Use    Smoking status: Former     Types: Cigarettes     Quit date:      Years since quittin.1    Smokeless tobacco: Never    Tobacco comments:     social smoker   Vaping Use    Vaping Use: Never used   Substance and Sexual Activity    Alcohol use: Not Currently     Comment: occassional    Drug use: Never    Sexual activity: Not on file   Other Topics Concern    Not on file   Social History Narrative    Not  on file     Social Determinants of Health     Financial Resource Strain: Not on file   Food Insecurity: No Food Insecurity (3/6/2024)    Food Insecurity     Food Insecurity: Never true   Transportation Needs: No Transportation Needs (3/6/2024)    Transportation Needs     Lack of Transportation: No   Physical Activity: Not on file   Stress: Not on file   Social Connections: Not on file   Housing Stability: Low Risk  (3/6/2024)    Housing Stability     Housing Instability: No     Housing Instability Emergency: Not on file   Recent Concern: Housing Stability - Medium Risk (12/13/2023)    Housing Stability     Housing Instability: Yes     Housing Instability Emergency: Not on file          Family History:  History reviewed. No pertinent family history.     Home Medications:  Outpatient Medications Marked as Taking for the 3/6/24 encounter (Hospital Encounter)   Medication Sig Dispense Refill    prochlorperazine (COMPAZINE) 10 mg tablet       HYDROcodone-acetaminophen 5-325 MG Oral Tab Take 1 tablet by mouth every 6 (six) hours as needed for Pain.      metoprolol succinate ER 50 MG Oral Tablet 24 Hr Take 1 tablet (50 mg total) by mouth 2x Daily(Beta Blocker). 180 tablet 3    ondansetron 4 MG Oral Tablet Dispersible Take 1 tablet (4 mg total) by mouth every 4 (four) hours as needed for Nausea. 10 tablet 0    Potassium Citrate ER 15 MEQ (1620 MG) Oral Tab CR Take 1 tablet by mouth in the morning, at noon, and at bedtime.      DULoxetine 30 MG Oral Cap DR Particles Take 1 capsule (30 mg total) by mouth daily.      zolpidem 10 MG Oral Tab Take 1 tablet (10 mg total) by mouth nightly as needed for Sleep.         Scheduled Medication:   doxycycline  100 mg Oral 2 times per day    enoxaparin  40 mg Subcutaneous Daily    piperacillin-tazobactam  3.375 g Intravenous Q8H    DULoxetine  30 mg Oral Daily    metoprolol succinate ER  50 mg Oral 2x Daily(Beta Blocker)     Continuous Infusing Medication:    PRN Medication:acetaminophen,  melatonin, guaiFENesin ER, benzonatate, glycerin-hypromellose-, sodium chloride, ondansetron, prochlorperazine, polyethylene glycol (PEG 3350), sennosides, bisacodyl, fleet enema, HYDROcodone-acetaminophen, zolpidem     REVIEW OF SYSTEMS:   14 point ROS conducted, negative save for above       OBJECTIVE:  Vitals:    03/07/24 0600 03/07/24 0826 03/07/24 1101 03/07/24 1211   BP: 106/82 108/82  112/82   BP Location: Right arm Left arm  Right arm   Pulse: 117 101 (!) 124 116   Resp: 18 23 20   Temp: 98.1 °F (36.7 °C) 97.4 °F (36.3 °C)  97.7 °F (36.5 °C)   TempSrc: Oral Oral  Oral   SpO2: 94% 99%  100%   Weight:           Oxygen Therapy  SpO2: 100 %  O2 Device: None (Room air)  O2 Flow Rate (L/min): 0 L/min  Pulse Oximetry Type: Continuous  Oximetry Probe Site Changed: No  Pulse Ox Probe Location: Ear lobe                  Wt Readings from Last 3 Encounters:   03/06/24 108 lb 9.6 oz (49.3 kg)   01/02/24 104 lb 15 oz (47.6 kg)   11/13/23 120 lb (54.4 kg)         Intake/Output Summary (Last 24 hours) at 3/7/2024 1352  Last data filed at 3/7/2024 1046  Gross per 24 hour   Intake 120 ml   Output 450 ml   Net -330 ml       /82 (BP Location: Right arm)   Pulse 116   Temp 97.7 °F (36.5 °C) (Oral)   Resp 20   Wt 108 lb 9.6 oz (49.3 kg)   LMP 08/28/2023 (Approximate)   SpO2 100%   BMI 18.64 kg/m²     General Appearance:    Alert, cooperative, no distress, appears stated age   Head:    Normocephalic, without obvious abnormality, atraumatic   Eyes:    PERRL, conjunctiva/corneas clear, EOM's intact, both eyes   Ears:    Normal TM's and external ear canals, both ears   Nose:   Nares normal, septum midline, mucosa normal, no drainage    or sinus tenderness   Throat:   Lips, mucosa, and tongue normal; teeth and gums normal   Neck:   Supple, symmetrical, trachea midline, no adenopathy;        thyroid:  No enlargement/tenderness/nodules; no carotid    bruit or JVD   Back:     Symmetric, no curvature, ROM normal, no  CVA tenderness   Lungs:     Rales in right posterior lung field respirations unlabored   Chest wall:    No tenderness or deformity   Heart:    Regular rate and rhythm, S1 and S2 normal, no murmur, rub   or gallop   Abdomen:     Soft, non-tender, bowel sounds active all four quadrants,     no masses, no organomegaly   Extremities:   Extremities normal, atraumatic, no cyanosis or edema   Pulses:   2+ and symmetric all extremities   Skin:   Skin color, texture, turgor normal, no rashes or lesions   Neurologic:   CNII-XII intact. 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

## 2024-03-07 NOTE — PLAN OF CARE
Pt is A&O x4.  R port a cath flushed.  Reports pain in BLE. Managed with medication.   Petechiae and numbness below the knees.  Denies cardiac symptoms.  ST on tele, S1&S2 present. Rates 110-140 overnight.  Tachypneic, dyspnea with exertion. Dry cough.  Maintaining O2 on room air.   Bowel sounds active in all quadrants. Denies n/v.  Continent of bowel and bladder.  Pt updated on plan of care.  Bed in lowest position. Bed alarm on. Call light within reach.    2241: Sepsis BPA fired, Dr Gan notified    0325: HR 130s-140s for past hour. Dr Simental notified. No new orders.    Problem: Patient/Family Goals  Goal: Patient/Family Long Term Goal  Description: Patient's Long Term Goal: Stay out of hospital  Interventions:  - Med compliance  - Follow up appointments  - See additional Care Plan goals for specific interventions  Outcome: Progressing  Goal: Patient/Family Short Term Goal  Description: Patient's Short Term Goal: Discharge  Interventions:   - IV antibiotics  - Cards and Nephro to see 3/7  - See additional Care Plan goals for specific interventions  Outcome: Progressing     Problem: CARDIOVASCULAR - ADULT  Goal: Maintains optimal cardiac output and hemodynamic stability  Description: INTERVENTIONS:  - Monitor vital signs, rhythm, and trends  - Monitor for bleeding, hypotension and signs of decreased cardiac output  - Evaluate effectiveness of vasoactive medications to optimize hemodynamic stability  - Monitor arterial and/or venous puncture sites for bleeding and/or hematoma  - Assess quality of pulses, skin color and temperature  - Assess for signs of decreased coronary artery perfusion - ex. Angina  - Evaluate fluid balance, assess for edema, trend weights  Outcome: Progressing     Problem: METABOLIC/FLUID AND ELECTROLYTES - ADULT  Goal: Electrolytes maintained within normal limits  Description: INTERVENTIONS:  - Monitor labs and rhythm and assess patient for signs and symptoms of electrolyte imbalances  -  Administer electrolyte replacement as ordered  - Monitor response to electrolyte replacements, including rhythm and repeat lab results as appropriate  - Fluid restriction as ordered  - Instruct patient on fluid and nutrition restrictions as appropriate  Outcome: Progressing  Goal: Hemodynamic stability and optimal renal function maintained  Description: INTERVENTIONS:  - Monitor labs and assess for signs and symptoms of volume excess or deficit  - Monitor intake, output and patient weight  - Monitor urine specific gravity, serum osmolarity and serum sodium as indicated or ordered  - Monitor response to interventions for patient's volume status, including labs, urine output, blood pressure (other measures as available)  - Encourage oral intake as appropriate  - Instruct patient on fluid and nutrition restrictions as appropriate  Outcome: Progressing

## 2024-03-07 NOTE — ED QUICK NOTES
Orders for admission, patient is aware of plan and ready to go upstairs. Any questions, please call ED RN Sammie at extension [phone].     Patient Covid vaccination status: Fully vaccinated     COVID Test Ordered in ED: SARS-CoV-2/Flu A and B/RSV by PCR (GeneXpert)    COVID Suspicion at Admission: N/A    Running Infusions:  per MAR    Mental Status/LOC at time of transport: A&Ox3    Other pertinent information:   CIWA score: N/A   NIH score:  N/A

## 2024-03-08 ENCOUNTER — APPOINTMENT (OUTPATIENT)
Dept: CV DIAGNOSTICS | Facility: HOSPITAL | Age: 44
End: 2024-03-08
Attending: INTERNAL MEDICINE
Payer: COMMERCIAL

## 2024-03-08 LAB
ALBUMIN SERPL-MCNC: 2.2 G/DL (ref 3.4–5)
ALBUMIN/GLOB SERPL: 0.8 {RATIO} (ref 1–2)
ALP LIVER SERPL-CCNC: 65 U/L
ALT SERPL-CCNC: 11 U/L
ANION GAP SERPL CALC-SCNC: 9 MMOL/L (ref 0–18)
AST SERPL-CCNC: 17 U/L (ref 15–37)
BILIRUB SERPL-MCNC: 0.6 MG/DL (ref 0.1–2)
BUN BLD-MCNC: 19 MG/DL (ref 9–23)
CALCIUM BLD-MCNC: 8.8 MG/DL (ref 8.5–10.1)
CHLORIDE SERPL-SCNC: 100 MMOL/L (ref 98–112)
CO2 SERPL-SCNC: 15 MMOL/L (ref 21–32)
CREAT BLD-MCNC: 0.82 MG/DL
EGFRCR SERPLBLD CKD-EPI 2021: 91 ML/MIN/1.73M2 (ref 60–?)
GLOBULIN PLAS-MCNC: 2.8 G/DL (ref 2.8–4.4)
GLUCOSE BLD-MCNC: 92 MG/DL (ref 70–99)
OSMOLALITY SERPL CALC.SUM OF ELEC: 260 MOSM/KG (ref 275–295)
POTASSIUM SERPL-SCNC: 3 MMOL/L (ref 3.5–5.1)
POTASSIUM SERPL-SCNC: 3.9 MMOL/L (ref 3.5–5.1)
PROT SERPL-MCNC: 5 G/DL (ref 6.4–8.2)
SODIUM SERPL-SCNC: 123 MMOL/L (ref 136–145)
SODIUM SERPL-SCNC: 124 MMOL/L (ref 136–145)

## 2024-03-08 PROCEDURE — 93306 TTE W/DOPPLER COMPLETE: CPT | Performed by: INTERNAL MEDICINE

## 2024-03-08 PROCEDURE — 99233 SBSQ HOSP IP/OBS HIGH 50: CPT | Performed by: INTERNAL MEDICINE

## 2024-03-08 PROCEDURE — 99233 SBSQ HOSP IP/OBS HIGH 50: CPT | Performed by: HOSPITALIST

## 2024-03-08 RX ORDER — TORSEMIDE 20 MG/1
20 TABLET ORAL DAILY
Status: DISCONTINUED | OUTPATIENT
Start: 2024-03-08 | End: 2024-03-08

## 2024-03-08 RX ORDER — ONDANSETRON 4 MG/1
4 TABLET, ORALLY DISINTEGRATING ORAL EVERY 4 HOURS PRN
Qty: 10 TABLET | Refills: 0 | OUTPATIENT
Start: 2024-03-08

## 2024-03-08 RX ORDER — TOLVAPTAN 15 MG/1
15 TABLET ORAL ONCE
Status: COMPLETED | OUTPATIENT
Start: 2024-03-08 | End: 2024-03-08

## 2024-03-08 RX ORDER — FUROSEMIDE 10 MG/ML
20 INJECTION INTRAMUSCULAR; INTRAVENOUS
Status: DISCONTINUED | OUTPATIENT
Start: 2024-03-08 | End: 2024-03-09

## 2024-03-08 RX ORDER — POTASSIUM CHLORIDE 20 MEQ/1
40 TABLET, EXTENDED RELEASE ORAL EVERY 4 HOURS
Status: COMPLETED | OUTPATIENT
Start: 2024-03-08 | End: 2024-03-08

## 2024-03-08 NOTE — PROGRESS NOTES
[facility]  Progress Note    Alina Aceves Patient Status:  Inpatient    1980 MRN [id number]   Location [facility] 2NE-A Attending Jasiel Rubi, DO   Hosp Day # 2 PCP HOLLY IBARRA     Impression:  HFrEF, EF 20% range  Moderate MR  Severe TR  Pulm HTN, moderate  ? PNA  Breast CA  Anemia  Hyponatremia      Plan:  12 lead EKG now  Would consider diuresis, however is profoundly hyponatremic.  Discussed with nephrology. Lasix 20 IV BID.  Antibiotics per primary.  Breast CA per heme/onc.  GDMT: On BB BID.  Would not start entresto in this setting.    Appears tenuous.  Would xfer to CCU if saturations/respiratory status worsens.      Subjective:  The patient denies any chest pain or dyspnea at this time.  Nausea and vomiting better but still orthopnic.    Objective:  /77 (BP Location: Right arm)   Pulse (!) 131   Temp 98.5 °F (36.9 °C) (Oral)   Resp 23   Wt 108 lb 9.6 oz (49.3 kg)   LMP 2023 (Approximate)   SpO2 100%   BMI 18.64 kg/m²   Temp (24hrs), Av.6 °F (37 °C), Min:97.9 °F (36.6 °C), Max:99.8 °F (37.7 °C)      Intake/Output Summary (Last 24 hours) at 3/8/2024 1632  Last data filed at 3/8/2024 0852  Gross per 24 hour   Intake --   Output 350 ml   Net -350 ml     Wt Readings from Last 3 Encounters:   24 108 lb 9.6 oz (49.3 kg)   24 104 lb 15 oz (47.6 kg)   23 120 lb (54.4 kg)       General: Awake and alert; in no acute distress  Cardiac: Regular rate and regular rhythm; no murmurs/rubs/gallops are appreciated  Lungs: Clear to auscultation bilaterally; no accessory muscle use  Abdomen: Soft, non-tender; bowel sounds are normoactive  Extremities: No clubbing/cyanosis; moves all 4 extremities normally    Current Facility-Administered Medications   Medication Dose Route Frequency    torsemide (Demadex) tab 20 mg  20 mg Oral Daily    potassium chloride (K-Dur) tab 40 mEq  40 mEq Oral Q4H    doxycycline (Vibramycin) cap 100 mg  100 mg Oral 2 times per  day    acetaminophen (Tylenol Extra Strength) tab 500 mg  500 mg Oral Q4H PRN    melatonin tab 3 mg  3 mg Oral Nightly PRN    guaiFENesin ER (Mucinex) 12 hr tab 600 mg  600 mg Oral BID PRN    benzonatate (Tessalon) cap 200 mg  200 mg Oral TID PRN    glycerin-hypromellose- (Artifical Tears) 0.2-0.2-1 % ophthalmic solution 1 drop  1 drop Both Eyes QID PRN    sodium chloride (Saline Mist) 0.65 % nasal solution 1 spray  1 spray Each Nare Q3H PRN    enoxaparin (Lovenox) 40 MG/0.4ML SUBQ injection 40 mg  40 mg Subcutaneous Daily    ondansetron (Zofran) 4 MG/2ML injection 4 mg  4 mg Intravenous Q6H PRN    prochlorperazine (Compazine) 10 MG/2ML injection 5 mg  5 mg Intravenous Q8H PRN    polyethylene glycol (PEG 3350) (Miralax) 17 g oral packet 17 g  17 g Oral Daily PRN    sennosides (Senokot) tab 17.2 mg  17.2 mg Oral Nightly PRN    bisacodyl (Dulcolax) 10 MG rectal suppository 10 mg  10 mg Rectal Daily PRN    fleet enema (Fleet) 7-19 GM/118ML rectal enema 133 mL  1 enema Rectal Once PRN    piperacillin-tazobactam (Zosyn) 3.375 g in dextrose 5% 100 mL IVPB-ADDV  3.375 g Intravenous Q8H    metoprolol succinate ER (Toprol XL) 24 hr tab 50 mg  50 mg Oral 2x Daily(Beta Blocker)    HYDROcodone-acetaminophen (Norco) 5-325 MG per tab 1 tablet  1 tablet Oral Q6H PRN    zolpidem (Ambien) tab 10 mg  10 mg Oral Nightly PRN       Laboratory Data:     Lab Results   Component Value Date    INR 1.26 (H) 12/28/2023    INR 1.43 (H) 12/23/2023    INR 1.48 (H) 12/21/2023     Lab Results   Component Value Date     03/08/2024    K 3.0 03/08/2024     03/08/2024    CO2 15.0 03/08/2024    BUN 19 03/08/2024    CREATSERUM 0.82 03/08/2024    GLU 92 03/08/2024    CA 8.8 03/08/2024       Telemetry: ST.      Thank you for allowing our practice to participate in the care of your patient. Please do not hesitate to contact me if you have any questions.    Leonel Villalba MD  3/8/2024  4:32 PM

## 2024-03-08 NOTE — PROGRESS NOTES
Pulmonary Progress Note        NAME: Alina Aceves - ROOM: [address] [place], [place] [address]-A - MRN: [id number] - Age: 43 year old - : 1980        Last 24hrs: Pt fell out of bed overnight, feels that breathing is at baseline    OBJECTIVE:  Vitals:    24 0025 24 0500 24 0736   BP: 110/81 101/78 109/77 122/88   BP Location: Right arm Right arm Right arm Right arm   Pulse: 117 103 109 (!) 129   Resp: 20 20 18 20   Temp: 98.7 °F (37.1 °C)  98.1 °F (36.7 °C) 99.8 °F (37.7 °C)   TempSrc: Oral  Oral Oral   SpO2: (!) 88%  (!) 88% 93%   Weight:           Oxygen Therapy  SpO2: 93 %  O2 Device: None (Room air)  O2 Flow Rate (L/min): 0 L/min  Pulse Oximetry Type: Continuous  Oximetry Probe Site Changed: No  Pulse Ox Probe Location: Ear lobe                  Intake/Output Summary (Last 24 hours) at 3/8/2024 1222  Last data filed at 3/8/2024 0852  Gross per 24 hour   Intake 240 ml   Output 550 ml   Net -310 ml       Scheduled Medication:   torsemide  20 mg Oral Daily    potassium chloride  40 mEq Oral Q4H    doxycycline  100 mg Oral 2 times per day    enoxaparin  40 mg Subcutaneous Daily    piperacillin-tazobactam  3.375 g Intravenous Q8H    metoprolol succinate ER  50 mg Oral 2x Daily(Beta Blocker)     Continuous Infusing Medication:    Lungs: clear to auscultation bilaterally  Heart: S1, S2 normal, no murmur, click, rub or gallop, regular rate and rhythm  Abdomen: soft, non-tender; bowel sounds normal; no masses,  no organomegaly  Extremities: extremities normal, atraumatic, no cyanosis or edema    Labs reviewed as noted below        ASSESSMENT/PLAN:    ILD  -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

## 2024-03-08 NOTE — CM/SW NOTE
Per recommendation of PT--[facility]

## 2024-03-08 NOTE — PHYSICAL THERAPY NOTE
PHYSICAL THERAPY TREATMENT NOTE - INPATIENT    Room Number: 2621/2621-A     Session: 1     Number of Visits to Meet Established Goals: 3  History related to current admission: Patient is a 43 year old female admitted on 3/6/2024 from home for nausea, vomiting, diarrhea, and weakness.  Pt diagnosed with hyponatremia and PNA. 3/8 fell out of bed while trying to reach her phone on the ground    Prior Level of [place]: per pt reports, pt was amb  with RW HH distances without assist, denies recent fall hx, has hospital bed lower level of the home. Pt has 8 steps from kitchen level to daughter's bedroom with 1 rail. Pt reports she is in the process of having 2nd handrail installed.  Pt reports she was able to ascend/descend stairs without assist.  - pt reports she has been doing this on her bottom and has only recently started working on standing while navigating stairs with therapy. Pt reports eventual plan to transition to OPPT and look into getting AFO for R foot drop.      Presenting Problem: hyponatremia, PNA  Co-Morbidities : Lupus, Sjogren's, h/o breast CA s/p chemo, chronic CHF, neuropathy    ASSESSMENT   Patient demonstrates good  progress this session, goals  remain in progress. Denied SOB throughout session - progressed gait training to 150ft and stair training x2 stairs with CGA. Cont to recommend pt scoot up stairs and continue to work on other methods with therapy.     Patient continues to function near baseline with bed mobility, transfers, gait, and stair negotiation.  Contributing factors to remaining limitations include decreased muscular endurance.  Pt denied SOB throughout session. Next session anticipate patient to progress gait and stair negotiation.  Physical Therapy will continue to follow patient for duration of hospitalization.    Patient continues to benefit from continued skilled PT services: at discharge to promote functional independence and safety with additional support and return  home with home health PT.    PLAN  PT Treatment Plan: Energy conservation;Patient education;Family education;Gait training;Stair training;Transfer training;Balance training  Rehab Potential : Good  Frequency (Obs): 3-5x/week    CURRENT GOALS     Goal #1 Patient is able to demonstrate supine - sit EOB @ level: modified independent      Goal #2 Patient is able to demonstrate transfers Sit to/from Stand at assistance level: modified independent      Goal #3 Patient is able to ambulate 5- feet with assist device: walker - rolling at assistance level: modified independent      Goal #4 Pt able to ascend/descend 8 stairs with 1 rail with supervision.   Goal #5     Goal #6     Goal Comments: Goals established on 3/7/2024  3/8/2024 all goals ongoing    SUBJECTIVE  \"It was stupid I was trying to reach my phone and the cord was over there and I fell and hit my head on the IV pole\"     OBJECTIVE  Precautions: Other (Comment) (R foot drop)    WEIGHT BEARING RESTRICTION  Weight Bearing Restriction: None                PAIN ASSESSMENT   Ratin  Location: denies pain at this time       BALANCE                                                                                                                       Static Sitting: Good  Dynamic Sitting: Good           Static Standing: Fair -  Dynamic Standing: Fair -    ACTIVITY TOLERANCE                         O2 WALK         AM-PAC '6-Clicks' INPATIENT SHORT FORM - BASIC MOBILITY  How much difficulty does the patient currently have...  Patient Difficulty: Turning over in bed (including adjusting bedclothes, sheets and blankets)?: None   Patient Difficulty: Sitting down on and standing up from a chair with arms (e.g., wheelchair, bedside commode, etc.): A Little   Patient Difficulty: Moving from lying on back to sitting on the side of the bed?: None   How much help from another person does the patient currently need...   Help from Another: Moving to and from a bed to a chair  (including a wheelchair)?: A Little   Help from Another: Need to walk in hospital room?: A Little   Help from Another: Climbing 3-5 steps with a railing?: A Little       AM-PAC Score:  Raw Score: 20   Approx Degree of Impairment: 35.83%   Standardized Score (AM-PAC Scale): 47.67   CMS Modifier (G-Code): CJ    FUNCTIONAL ABILITY STATUS  Gait Assessment   Functional Mobility/Gait Assessment  Gait Assistance: Supervision  Distance (ft): 150  Assistive Device: Rolling walker  Pattern: R Foot drop;R Steppage  Stairs: Stairs  How Many Stairs: 2  Device: 1 Rail  Assist: Contact guard assist  Pattern: Ascend and Descend  Ascend and Descend : Side step    Skilled Therapy Provided    Bed Mobility:  Rolling: mod I    Supine<>Sit: SBA   Sit<>Supine: SBA     Transfer Mobility:  Sit<>Stand: SBA   Stand<>Sit: SBA   Gait: SBA - minor cuing intermittently for full clearance of toes during steppage pattern. No LOB demonstrated this session, discussed EC and pacing techniques with cuing for slower gait speed    Therapist's Comments: agreeable to participate in stair training this session - able to perform side step holding onto LEFT rail ascending with L descending with R x2 stairs with inc effort, CGA provided by therapist, good clearance of R foot demonstrated       THERAPEUTIC EXERCISES  Lower Extremity LAQ  Standing Marching    Upper Extremity      Position      Repetitions   10   Sets   1     Patient End of Session: In bed;Needs met;Call light within reach;RN aware of session/findings;All patient questions and concerns addressed;Discussed recommendations with /    PT Session Time: 29 minutes  Gait Trainin minutes  Therapeutic Activity:  minutes  Therapeutic Exercise:  minutes   Neuromuscular Re-education:  minutes

## 2024-03-08 NOTE — PROGRESS NOTES
[facility]   part of [facility]     Hospitalist Progress Note     Alina Aceves Patient Status:  Inpatient    1980 MRN [id number]   Location [facility] 2NE-A Attending Jasiel Rubi,    Hosp Day # 2 PCP HOLLY IBARRA     Chief Complaint: sob    Subjective:     Patient denies any nausea vomiting overnight.  No loose stools.  Patient continues to feel weak.    Objective:    Review of Systems:   A comprehensive review of systems was completed; pertinent positive and negatives stated in subjective.    Vital signs:  Temp:  [97.7 °F (36.5 °C)-99.8 °F (37.7 °C)] 99.8 °F (37.7 °C)  Pulse:  [103-131] 129  Resp:  [18-20] 20  BP: (101-127)/(77-91) 122/88  SpO2:  [88 %-100 %] 93 %    Physical Exam:    General: No acute distress  Respiratory: No wheezes, no rhonchi  Cardiovascular: S1, S2, regular rate and rhythm  Abdomen: Soft, Non-tender, non-distended, positive bowel sounds  Neuro: No new focal deficits.   Extremities: No edema      Diagnostic Data:    Labs:  Recent Labs   Lab 24  1615 24  0501   WBC 8.4 8.4   HGB 8.5* 7.0*   MCV 83.8 84.4   .0 185.0       Recent Labs   Lab 24  1615 24  0501 24  0547   GLU 96 106* 92   BUN 14 16 19   CREATSERUM 0.75 0.74 0.82   CA 9.0 8.8 8.8   ALB 2.8* 2.2* 2.2*   * 129* 124*   K 3.1* 3.5 3.0*   CL 95* 102 100   CO2 15.0* 16.0* 15.0*   ALKPHO 87 69 65   AST 22 19 17   ALT 16 13 11*   BILT 0.9 0.8 0.6   TP 6.1* 5.2* 5.0*       Estimated Creatinine Clearance: 68.8 mL/min (based on SCr of 0.82 mg/dL).    Recent Labs   Lab 24  2311 24  0029 24  0501   TROPHS 265* 257* 201*       No results for input(s): \"PTP\", \"INR\" in the last 168 hours.               Microbiology    Hospital Encounter on 24   1. Urine Culture, Routine     Status: None    Collection Time: 24  8:26 PM    Specimen: Urine, clean catch   Result Value Ref Range    Urine Culture No Growth at 18-24 hrs. N/A   2. Blood Culture     Status:  None (Preliminary result)    Collection Time: 03/06/24  6:00 PM    Specimen: Blood,peripheral   Result Value Ref Range    Blood Culture Result No Growth 1 Day N/A         Imaging: Reviewed in Epic.    Medications:    torsemide  20 mg Oral Daily    potassium chloride  40 mEq Oral Q4H    doxycycline  100 mg Oral 2 times per day    enoxaparin  40 mg Subcutaneous Daily    piperacillin-tazobactam  3.375 g Intravenous Q8H    metoprolol succinate ER  50 mg Oral 2x Daily(Beta Blocker)       Assessment & Plan:      #Pneumonia  #GGO  -Pulm following, will get repeat CT as an outpatient to rule out underlying etiology GGO  -Chest x-ray & CT chest reviewed  -Blood cultures pending  -Sputum culture pending  -Urine antigens pending  -Continue IV zosyn, stop doxycycline     #Hyponatremia, worsening  #Metabolic acidosis, worsening  #Dehydration   -No IV fluids or diuresis per nephrology  -Monitor on morning BMP  -Nephrology following, discussed with Dr. Quarles  -Telopeptide today     #Elevated troponin of no clinical significance  -Continue to trend  -Cardiology eval noted    #Asymptomatic pyuria  -Empiric antibiotics started for above, urine culture no growth     #HFrEF     #Lupus     #Sjogrens syndrome      Jasiel Rubi, DO    Supplementary Documentation:     Quality:  DVT Mechanical Prophylaxis:   SCDs,    DVT Pharmacologic Prophylaxis   Medication    enoxaparin (Lovenox) 40 MG/0.4ML SUBQ injection 40 mg                Code Status: Full Code  Peacock: No urinary catheter in place  Peacock Duration (in days):   Central line:    CHRISTELLE: 3/9/2024    Discharge is dependent on: clincal improvement  At this point Ms. Aceves is expected to be discharge to: likely home    The 21st Century Cures Act makes medical notes like these available to patients in the interest of transparency. Please be advised this is a medical document. Medical documents are intended to carry relevant information, facts as evident, and the clinical opinion of the  practitioner. The medical note is intended as peer to peer communication and may appear blunt or direct. It is written in medical language and may contain abbreviations or verbiage that are unfamiliar.

## 2024-03-08 NOTE — PLAN OF CARE
RN SHIFT NOTE    Assumed care of pt at 0700. No c/o pain at this time. A&O x 4, but forgetful at times. ST on tele. No edema noted. Lung sounds diminished with crackles in the bases. Abdomen soft and round. Abrasion to left forehead C/D/I and open  to air. Tolerating medications and care needs have been met at this time.       POC: Echo, IV abx, PRN nausea, fall risk.      Problem: Patient/Family Goals  Goal: Patient/Family Long Term Goal  Description: Patient's Long Term Goal: Stay out of hospital    Interventions:  - Med compliance  - Follow up appointments  - See additional Care Plan goals for specific interventions  Outcome: Progressing  Goal: Patient/Family Short Term Goal  Description: Patient's Short Term Goal: Discharge    Interventions:   - IV antibiotics  - Cards and Nephro to see  - See additional Care Plan goals for specific interventions  Outcome: Progressing     Problem: CARDIOVASCULAR - ADULT  Goal: Maintains optimal cardiac output and hemodynamic stability  Description: INTERVENTIONS:  - Monitor vital signs, rhythm, and trends  - Monitor for bleeding, hypotension and signs of decreased cardiac output  - Evaluate effectiveness of vasoactive medications to optimize hemodynamic stability  - Monitor arterial and/or venous puncture sites for bleeding and/or hematoma  - Assess quality of pulses, skin color and temperature  - Assess for signs of decreased coronary artery perfusion - ex. Angina  - Evaluate fluid balance, assess for edema, trend weights  Outcome: Progressing     Problem: METABOLIC/FLUID AND ELECTROLYTES - ADULT  Goal: Electrolytes maintained within normal limits  Description: INTERVENTIONS:  - Monitor labs and rhythm and assess patient for signs and symptoms of electrolyte imbalances  - Administer electrolyte replacement as ordered  - Monitor response to electrolyte replacements, including rhythm and repeat lab results as appropriate  - Fluid restriction as ordered  - Instruct patient on  fluid and nutrition restrictions as appropriate  Outcome: Progressing  Goal: Hemodynamic stability and optimal renal function maintained  Description: INTERVENTIONS:  - Monitor labs and assess for signs and symptoms of volume excess or deficit  - Monitor intake, output and patient weight  - Monitor urine specific gravity, serum osmolarity and serum sodium as indicated or ordered  - Monitor response to interventions for patient's volume status, including labs, urine output, blood pressure (other measures as available)  - Encourage oral intake as appropriate  - Instruct patient on fluid and nutrition restrictions as appropriate  Outcome: Progressing

## 2024-03-08 NOTE — PROGRESS NOTES
[facility]   part of [facility]     Nephrology Progress Note    Alina Aceves Patient Status:  Inpatient    1980 MRN [id number]   Location [facility] 2NE-A Attending Jasiel Rubi,    Hosp Day # 2 PCP HOLLY IBARRA       SUBJECTIVE:  Noted to have fall overnight, feels much better overall today        Physical Exam:   /88 (BP Location: Right arm)   Pulse (!) 129   Temp 99.8 °F (37.7 °C) (Oral)   Resp 20   Wt 108 lb 9.6 oz (49.3 kg)   LMP 2023 (Approximate)   SpO2 93%   BMI 18.64 kg/m²   Temp (24hrs), Av.6 °F (37 °C), Min:97.7 °F (36.5 °C), Max:99.8 °F (37.7 °C)       Intake/Output Summary (Last 24 hours) at 3/8/2024 1103  Last data filed at 3/8/2024 0852  Gross per 24 hour   Intake 240 ml   Output 550 ml   Net -310 ml     Last 3 Weights   24 2200 108 lb 9.6 oz (49.3 kg)   24 1605 115 lb (52.2 kg)   24 0500 104 lb 15 oz (47.6 kg)   24 0436 101 lb 10.1 oz (46.1 kg)   23 0448 110 lb 3.7 oz (50 kg)   23 0518 105 lb 9.6 oz (47.9 kg)   23 0437 102 lb 11.8 oz (46.6 kg)   23 0316 114 lb 6.7 oz (51.9 kg)   23 0500 128 lb 3.2 oz (58.2 kg)   23 0408 135 lb 2.3 oz (61.3 kg)   23 0409 147 lb (66.7 kg)   23 0400 143 lb 8.3 oz (65.1 kg)   23 1100 147 lb 7.8 oz (66.9 kg)   23 0000 147 lb 0.8 oz (66.7 kg)   23 0538 150 lb 9.2 oz (68.3 kg)   23 0019 150 lb 3.2 oz (68.1 kg)   23 2326 124 lb (56.2 kg)   23 1823 125 lb (56.7 kg)   23 1816 120 lb (54.4 kg)   21 1537 135 lb (61.2 kg)   20 1405 136 lb (61.7 kg)     General: Alert and in no apparent distress.  HEENT: No scleral icterus, MMM  Neck: Supple, no KALYN or thyromegaly  Cardiac: Regular rate and rhythm, S1, S2 normal, no murmur or rub  Lungs: Clear without wheezes, rales, rhonchi.    Abdomen: Soft, non-tender. + bowel sounds, no palpable organomegaly  Extremities: Without clubbing, cyanosis or edema.  Neurologic:   moving all extremities  Skin: Warm and dry, no rash        Labs:     Recent Labs   Lab 03/06/24  1615 03/07/24  0501   WBC 8.4 8.4   HGB 8.5* 7.0*   MCV 83.8 84.4   .0 185.0       Recent Labs   Lab 03/06/24  1615 03/07/24  0501 03/08/24  0547   * 129* 124*   K 3.1* 3.5 3.0*   CL 95* 102 100   CO2 15.0* 16.0* 15.0*   BUN 14 16 19   CREATSERUM 0.75 0.74 0.82   CA 9.0 8.8 8.8   GLU 96 106* 92       Recent Labs   Lab 03/06/24  1615 03/07/24  0501 03/08/24  0547   ALT 16 13 11*   AST 22 19 17   ALB 2.8* 2.2* 2.2*       No results for input(s): \"PGLU\" in the last 168 hours.    Meds:   Current Facility-Administered Medications   Medication Dose Route Frequency    doxycycline (Vibramycin) cap 100 mg  100 mg Oral 2 times per day    acetaminophen (Tylenol Extra Strength) tab 500 mg  500 mg Oral Q4H PRN    melatonin tab 3 mg  3 mg Oral Nightly PRN    guaiFENesin ER (Mucinex) 12 hr tab 600 mg  600 mg Oral BID PRN    benzonatate (Tessalon) cap 200 mg  200 mg Oral TID PRN    glycerin-hypromellose- (Artifical Tears) 0.2-0.2-1 % ophthalmic solution 1 drop  1 drop Both Eyes QID PRN    sodium chloride (Saline Mist) 0.65 % nasal solution 1 spray  1 spray Each Nare Q3H PRN    enoxaparin (Lovenox) 40 MG/0.4ML SUBQ injection 40 mg  40 mg Subcutaneous Daily    ondansetron (Zofran) 4 MG/2ML injection 4 mg  4 mg Intravenous Q6H PRN    prochlorperazine (Compazine) 10 MG/2ML injection 5 mg  5 mg Intravenous Q8H PRN    polyethylene glycol (PEG 3350) (Miralax) 17 g oral packet 17 g  17 g Oral Daily PRN    sennosides (Senokot) tab 17.2 mg  17.2 mg Oral Nightly PRN    bisacodyl (Dulcolax) 10 MG rectal suppository 10 mg  10 mg Rectal Daily PRN    fleet enema (Fleet) 7-19 GM/118ML rectal enema 133 mL  1 enema Rectal Once PRN    piperacillin-tazobactam (Zosyn) 3.375 g in dextrose 5% 100 mL IVPB-ADDV  3.375 g Intravenous Q8H    metoprolol succinate ER (Toprol XL) 24 hr tab 50 mg  50 mg Oral 2x Daily(Beta Blocker)     HYDROcodone-acetaminophen (Norco) 5-325 MG per tab 1 tablet  1 tablet Oral Q6H PRN    zolpidem (Ambien) tab 10 mg  10 mg Oral Nightly PRN         Impression/Plan:      #1.  Hyponatremia- acute on chronic.  Suspect chronic component related to SIADH in setting of cancer/underlying lung disease with acute drop in setting of hypovolemic hyponatremia with N/V/D.  Na was initially much better yesterday after NS given.  IVF not continued given EF 20%.  Na noted this AM and tolvaptan given.  Will follow closely     #2.  Possible sepsis- consider urinary source vs poss pneumonia.  Cx sent, on empiric abx     #3.  HFrEF/elevated troponin- cards has been consulted, repeat ECHO pending     #4.  Anemia- has been chronic and thought to be due to chemotherapy, per oncology     #5.  Stage 3 breast cancer- per oncology.  chemo held due to inability to tolerate with plan for XRT per notes.     #6.  Hypokalemia- replace per protocol    Questions/concerns were discussed with patient and/or family by bedside.          Francisco Quarles MD  3/8/2024  11:03 AM

## 2024-03-08 NOTE — SIGNIFICANT EVENT
Significant Event - Fall Note    Date/Time of Fall: March 7, 2024 at 2130    Fall huddle completed: Yes    Description of patient fall:     Patient fell from: Bed     Activity when fall occurred: Reaching for item     Where did fall occur: Patient room     Was the fall assisted: Found on floor/unassisted to floor    Who witnessed the fall: Unwitnessed    Patient narrative of fall: Pt stated she was reaching over to get her cell phone that had fallen next to her bed on the floor. She said she reached to far and tumbled over out of the bed, hitting her head on the bottom of the IV pole.     Staff narrative of fall: Fall was unwitnessed. RN, Annie and João Bryant walked in and found pt sitting on floor next to bed.     Name of Provider notified of fall: Lenard Rosa MD    Family notification: Patient declined    Factors contributing to fall:     Physical:  none     Psychological: Alert and Oriented     Environmental: Equipment     Medications received in the past 8 hours:   Medication(s) Administered in past 8 Hours from 03/07/2024 1755 to 03/08/2024 0155       Date/Time Order Dose Route Action Action by Comments    03/07/2024 2023 CST doxycycline (Vibramycin) cap 100 mg 100 mg Oral Given Rhoda Gastelum RN --    03/07/2024 2024 CST HYDROcodone-acetaminophen (Norco) 5-325 MG per tab 1 tablet 1 tablet Oral Given Rhoda Gastelum RN --    03/08/2024 0025 CST piperacillin-tazobactam (Zosyn) 3.375 g in dextrose 5% 100 mL IVPB-ADDV 3.375 g Intravenous New Bag Rhoda Gastelum RN --    03/07/2024 1818 CST prochlorperazine (Compazine) 10 MG/2ML injection 5 mg 5 mg Intravenous Given Daria Wray RN --    03/07/2024 2024 CST zolpidem (Ambien) tab 10 mg 10 mg Oral Given Rhoda Gastelum RN --            Was patient identified as high fall risk prior to fall: Yes. Safety measures were reviewed w/ pt due to medications given. Educated pt on safety protocols.                                What interventions were in place prior to  fall: Bed alarm, Bed in lowest position, Call light within reach, Low bed ([facility] only), [place] footwear, Personal items within reach, and Rounding    Interventions post fall: Bed alarm, Bed in lowest position, Call light within reach, Low bed (Edward only), [place] footwear, Patient education tool, Patient/family involved in fall prevention plan, Personal items within reach, and Rounding    Additional comments: Safety measures reviewed again with pt.

## 2024-03-08 NOTE — PLAN OF CARE
Assumed care of pt at 1930  A/Ox4, up w/ assistance and a walker.   Room air, o2 sats above 90%.   NSR/ST on tele. Pt denies chest pain.   Continent of bladder and bowel.   Pt endorses pain in BLE. PRN pain meds given. Reviewed safety measures w/ pt.   Bed locked and in lowest position w/ bed alarm on. Call light in reach. Pt updated on plan of care.     2130 - Pt had an unwitnessed fall. Hit head on floor. Laceration and hematoma present. Notified MD. CT head ordered.     Problem: Patient/Family Goals  Goal: Patient/Family Long Term Goal  Description: Patient's Long Term Goal: Stay out of hospital    Interventions:  - Med compliance  - Follow up appointments  - See additional Care Plan goals for specific interventions  Outcome: Progressing  Goal: Patient/Family Short Term Goal  Description: Patient's Short Term Goal: Discharge    Interventions:   - IV antibiotics  - Cards and Nephro to see  - See additional Care Plan goals for specific interventions  Outcome: Progressing     Problem: CARDIOVASCULAR - ADULT  Goal: Maintains optimal cardiac output and hemodynamic stability  Description: INTERVENTIONS:  - Monitor vital signs, rhythm, and trends  - Monitor for bleeding, hypotension and signs of decreased cardiac output  - Evaluate effectiveness of vasoactive medications to optimize hemodynamic stability  - Monitor arterial and/or venous puncture sites for bleeding and/or hematoma  - Assess quality of pulses, skin color and temperature  - Assess for signs of decreased coronary artery perfusion - ex. Angina  - Evaluate fluid balance, assess for edema, trend weights  Outcome: Progressing     Problem: METABOLIC/FLUID AND ELECTROLYTES - ADULT  Goal: Electrolytes maintained within normal limits  Description: INTERVENTIONS:  - Monitor labs and rhythm and assess patient for signs and symptoms of electrolyte imbalances  - Administer electrolyte replacement as ordered  - Monitor response to electrolyte replacements, including  rhythm and repeat lab results as appropriate  - Fluid restriction as ordered  - Instruct patient on fluid and nutrition restrictions as appropriate  Outcome: Progressing  Goal: Hemodynamic stability and optimal renal function maintained  Description: INTERVENTIONS:  - Monitor labs and assess for signs and symptoms of volume excess or deficit  - Monitor intake, output and patient weight  - Monitor urine specific gravity, serum osmolarity and serum sodium as indicated or ordered  - Monitor response to interventions for patient's volume status, including labs, urine output, blood pressure (other measures as available)  - Encourage oral intake as appropriate  - Instruct patient on fluid and nutrition restrictions as appropriate  Outcome: Progressing

## 2024-03-09 LAB
ANION GAP SERPL CALC-SCNC: 7 MMOL/L (ref 0–18)
ANTIBODY SCREEN: NEGATIVE
ATRIAL RATE: 136 BPM
BUN BLD-MCNC: 24 MG/DL (ref 9–23)
CALCIUM BLD-MCNC: 8.6 MG/DL (ref 8.5–10.1)
CHLORIDE SERPL-SCNC: 101 MMOL/L (ref 98–112)
CO2 SERPL-SCNC: 15 MMOL/L (ref 21–32)
CREAT BLD-MCNC: 1.11 MG/DL
DEPRECATED HBV CORE AB SER IA-ACNC: 335 NG/ML
DIRECT COOMBS POLY: NEGATIVE
EGFRCR SERPLBLD CKD-EPI 2021: 63 ML/MIN/1.73M2 (ref 60–?)
ERYTHROCYTE [DISTWIDTH] IN BLOOD BY AUTOMATED COUNT: 14.6 %
GLUCOSE BLD-MCNC: 95 MG/DL (ref 70–99)
HAPTOGLOB SERPL-MCNC: 149 MG/DL (ref 30–200)
HCT VFR BLD AUTO: 18.2 %
HGB BLD-MCNC: 6.3 G/DL
HGB BLD-MCNC: 7.8 G/DL
IRON SATN MFR SERPL: 5 %
IRON SERPL-MCNC: 12 UG/DL
LDH SERPL L TO P-CCNC: 313 U/L
MCH RBC QN AUTO: 29.3 PG (ref 26–34)
MCHC RBC AUTO-ENTMCNC: 34.6 G/DL (ref 31–37)
MCV RBC AUTO: 84.7 FL
OSMOLALITY SERPL CALC.SUM OF ELEC: 260 MOSM/KG (ref 275–295)
PLATELET # BLD AUTO: 174 10(3)UL (ref 150–450)
POTASSIUM SERPL-SCNC: 3.9 MMOL/L (ref 3.5–5.1)
Q-T INTERVAL: 344 MS
QRS DURATION: 76 MS
QTC CALCULATION (BEZET): 526 MS
R AXIS: 60 DEGREES
RBC # BLD AUTO: 2.15 X10(6)UL
RH BLOOD TYPE: POSITIVE
SODIUM SERPL-SCNC: 123 MMOL/L (ref 136–145)
T AXIS: 41 DEGREES
TIBC SERPL-MCNC: 222 UG/DL (ref 240–450)
TRANSFERRIN SERPL-MCNC: 149 MG/DL (ref 200–360)
VENTRICULAR RATE: 141 BPM
WBC # BLD AUTO: 4.8 X10(3) UL (ref 4–11)

## 2024-03-09 PROCEDURE — 30233N1 TRANSFUSION OF NONAUTOLOGOUS RED BLOOD CELLS INTO PERIPHERAL VEIN, PERCUTANEOUS APPROACH: ICD-10-PCS | Performed by: HOSPITALIST

## 2024-03-09 PROCEDURE — 99233 SBSQ HOSP IP/OBS HIGH 50: CPT | Performed by: INTERNAL MEDICINE

## 2024-03-09 PROCEDURE — 99232 SBSQ HOSP IP/OBS MODERATE 35: CPT | Performed by: HOSPITALIST

## 2024-03-09 RX ORDER — TOLVAPTAN 15 MG/1
15 TABLET ORAL ONCE
Status: COMPLETED | OUTPATIENT
Start: 2024-03-09 | End: 2024-03-09

## 2024-03-09 RX ORDER — SODIUM CHLORIDE 9 MG/ML
INJECTION, SOLUTION INTRAVENOUS ONCE
Status: DISCONTINUED | OUTPATIENT
Start: 2024-03-09 | End: 2024-03-09

## 2024-03-09 RX ORDER — TORSEMIDE 20 MG/1
20 TABLET ORAL DAILY
Status: DISCONTINUED | OUTPATIENT
Start: 2024-03-09 | End: 2024-03-12

## 2024-03-09 RX ORDER — SODIUM BICARBONATE 325 MG/1
650 TABLET ORAL 2 TIMES DAILY
Status: DISCONTINUED | OUTPATIENT
Start: 2024-03-09 | End: 2024-03-12

## 2024-03-09 NOTE — PROGRESS NOTES
[facility]  Progress Note    Alina Aceves Patient Status:  Inpatient    1980 MRN [id number]   Location [facility] 2NE-A Attending Jasiel Rubi, DO   Hosp Day # 3 PCP HOLLY IBARRA     Impression:  HFrEF, EF 20% range  Moderate MR  Severe TR  Pulm HTN, moderate  ? PNA  Breast CA  Anemia  Hyponatremia      Plan:    PO diuresis as she is doing much better.  Antibiotics per primary.  Breast CA per heme/onc.  GDMT: On BB BID.  Would not start entresto in this setting.        Subjective:  The patient denies any chest pain or dyspnea at this time.  Nausea and vomiting better but still orthopnic.    Objective:  /89 (BP Location: Left arm)   Pulse 107   Temp 97.3 °F (36.3 °C) (Oral)   Resp 18   Wt 115 lb 15.4 oz (52.6 kg)   LMP 2023 (Approximate)   SpO2 100%   BMI 19.90 kg/m²   Temp (24hrs), Av.9 °F (36.6 °C), Min:97.3 °F (36.3 °C), Max:98.5 °F (36.9 °C)      Intake/Output Summary (Last 24 hours) at 3/9/2024 1500  Last data filed at 3/9/2024 1100  Gross per 24 hour   Intake 340 ml   Output 1250 ml   Net -910 ml     Wt Readings from Last 3 Encounters:   24 115 lb 15.4 oz (52.6 kg)   24 104 lb 15 oz (47.6 kg)   23 120 lb (54.4 kg)       General: Awake and alert; in no acute distress  Cardiac: Regular rate and regular rhythm; no murmurs/rubs/gallops are appreciated  Lungs: Clear to auscultation bilaterally; no accessory muscle use  Abdomen: Soft, non-tender; bowel sounds are normoactive  Extremities: No clubbing/cyanosis; moves all 4 extremities normally          Laboratory Data:  Lab Results   Component Value Date    WBC 4.8 2024    HGB 6.3 2024    HCT 18.2 2024    .0 2024     Lab Results   Component Value Date    INR 1.26 (H) 2023    INR 1.43 (H) 2023    INR 1.48 (H) 2023     Lab Results   Component Value Date     2024    K 3.9 2024     2024    CO2 15.0 2024    BUN 24  03/09/2024    CREATSERUM 1.11 03/09/2024    GLU 95 03/09/2024    CA 8.6 03/09/2024       Telemetry:       Thank you for allowing our practice to participate in the care of your patient. Please do not hesitate to contact me if you have any questions.

## 2024-03-09 NOTE — PLAN OF CARE
Received patient at 0730. Alert and Oriented x4. Tele Rhythm ST.  O2 saturation 98% On room air. Breath sounds diminished.  Bed is locked and in low position. Call light and personal items within reach.  Pt voiding with no issue. Pt ambulating with walker and SBA, generalized weakness noted. . Laceration MARION to left forehead, is dry. Plan for 1 U PRBC today. IV lasix transitioned to PO torsemide. Pt on IV zosyn. Reviewed plan of care and patient verbalizes understanding.      Problem: Patient/Family Goals  Goal: Patient/Family Long Term Goal  Description: Patient's Long Term Goal: Stay out of hospital    Interventions:  - Med compliance  - Follow up appointments  - See additional Care Plan goals for specific interventions  Outcome: Progressing  Goal: Patient/Family Short Term Goal  Description: Patient's Short Term Goal: Discharge    Interventions:   - IV antibiotics  - Cards and Nephro to see  - See additional Care Plan goals for specific interventions  Outcome: Progressing     Problem: CARDIOVASCULAR - ADULT  Goal: Maintains optimal cardiac output and hemodynamic stability  Description: INTERVENTIONS:  - Monitor vital signs, rhythm, and trends  - Monitor for bleeding, hypotension and signs of decreased cardiac output  - Evaluate effectiveness of vasoactive medications to optimize hemodynamic stability  - Monitor arterial and/or venous puncture sites for bleeding and/or hematoma  - Assess quality of pulses, skin color and temperature  - Assess for signs of decreased coronary artery perfusion - ex. Angina  - Evaluate fluid balance, assess for edema, trend weights  Outcome: Progressing     Problem: METABOLIC/FLUID AND ELECTROLYTES - ADULT  Goal: Electrolytes maintained within normal limits  Description: INTERVENTIONS:  - Monitor labs and rhythm and assess patient for signs and symptoms of electrolyte imbalances  - Administer electrolyte replacement as ordered  - Monitor response to electrolyte replacements, including  rhythm and repeat lab results as appropriate  - Fluid restriction as ordered  - Instruct patient on fluid and nutrition restrictions as appropriate  Outcome: Progressing  Goal: Hemodynamic stability and optimal renal function maintained  Description: INTERVENTIONS:  - Monitor labs and assess for signs and symptoms of volume excess or deficit  - Monitor intake, output and patient weight  - Monitor urine specific gravity, serum osmolarity and serum sodium as indicated or ordered  - Monitor response to interventions for patient's volume status, including labs, urine output, blood pressure (other measures as available)  - Encourage oral intake as appropriate  - Instruct patient on fluid and nutrition restrictions as appropriate  Outcome: Progressing     Problem: RESPIRATORY - ADULT  Goal: Achieves optimal ventilation and oxygenation  Description: INTERVENTIONS:  - Assess for changes in respiratory status  - Assess for changes in mentation and behavior  - Position to facilitate oxygenation and minimize respiratory effort  - Oxygen supplementation based on oxygen saturation or ABGs  - Provide Smoking Cessation handout, if applicable  - Encourage broncho-pulmonary hygiene including cough, deep breathe, Incentive Spirometry  - Assess the need for suctioning and perform as needed  - Assess and instruct to report SOB or any respiratory difficulty  - Respiratory Therapy support as indicated  - Manage/alleviate anxiety  - Monitor for signs/symptoms of CO2 retention  Outcome: Progressing

## 2024-03-09 NOTE — PROGRESS NOTES
[facility]  Nephrology Progress Note    Alina Aceves Attending:  Jasiel Rubi DO       Assessment and Plan:    1) Hyponatremia-  due to CHF / meds (cymbalta) / pulm issues (ADH). Recent TSH WNL. Volume OK. 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 No clear bleeding. Check Fe stores / eval for hemolysis given autoimmune disease; transfuse      Subjective:  Awake alert in good spirits on RA no issues overnight no emesis overnight    Physical Exam:   BP (!) 115/99 (BP Location: Right arm)   Pulse 109   Temp 97.9 °F (36.6 °C) (Oral)   Resp 18   Wt 115 lb 15.4 oz (52.6 kg)   LMP 2023 (Approximate)   SpO2 94%   BMI 19.90 kg/m²   Temp (24hrs), Av.2 °F (36.8 °C), Min:97.9 °F (36.6 °C), Max:98.5 °F (36.9 °C)       Intake/Output Summary (Last 24 hours) at 3/9/2024 0855  Last data filed at 3/8/2024 1842  Gross per 24 hour   Intake --   Output 600 ml   Net -600 ml     Wt Readings from Last 3 Encounters:   24 115 lb 15.4 oz (52.6 kg)   24 104 lb 15 oz (47.6 kg)   23 120 lb (54.4 kg)     General: awake alert  HEENT: No scleral icterus, MMM  Neck: Supple, no KALYN or thyromegaly  Cardiac: Regular rate and rhythm, S1, S2 normal, no murmur or tub  Lungs: Decreased BS at bases bilaterally   Abdomen: Soft, non-tender. + bowel sounds, no palpable organomegaly  Extremities: Without clubbing, cyanosis; no edema  Neurologic: Cranial nerves grossly intact, moving all  extremities  Skin: Warm and dry, no rashes       Labs:   Lab Results   Component Value Date    WBC 4.8 03/09/2024    HGB 6.3 03/09/2024    HCT 18.2 03/09/2024    .0 03/09/2024    CREATSERUM 1.11 03/09/2024    BUN 24 03/09/2024     03/09/2024    K 3.9 03/09/2024     03/09/2024    CO2 15.0 03/09/2024    GLU 95 03/09/2024    CA 8.6 03/09/2024       Imaging:  All imaging studies reviewed.    Meds:   Current Facility-Administered Medications   Medication Dose Route Frequency    sodium chloride 0.9% infusion   Intravenous Once    furosemide (Lasix) 10 mg/mL injection 20 mg  20 mg Intravenous BID (Diuretic)    doxycycline (Vibramycin) cap 100 mg  100 mg Oral 2 times per day    acetaminophen (Tylenol Extra Strength) tab 500 mg  500 mg Oral Q4H PRN    melatonin tab 3 mg  3 mg Oral Nightly PRN    guaiFENesin ER (Mucinex) 12 hr tab 600 mg  600 mg Oral BID PRN    benzonatate (Tessalon) cap 200 mg  200 mg Oral TID PRN    glycerin-hypromellose- (Artifical Tears) 0.2-0.2-1 % ophthalmic solution 1 drop  1 drop Both Eyes QID PRN    sodium chloride (Saline Mist) 0.65 % nasal solution 1 spray  1 spray Each Nare Q3H PRN    enoxaparin (Lovenox) 40 MG/0.4ML SUBQ injection 40 mg  40 mg Subcutaneous Daily    ondansetron (Zofran) 4 MG/2ML injection 4 mg  4 mg Intravenous Q6H PRN    prochlorperazine (Compazine) 10 MG/2ML injection 5 mg  5 mg Intravenous Q8H PRN    polyethylene glycol (PEG 3350) (Miralax) 17 g oral packet 17 g  17 g Oral Daily PRN    sennosides (Senokot) tab 17.2 mg  17.2 mg Oral Nightly PRN    bisacodyl (Dulcolax) 10 MG rectal suppository 10 mg  10 mg Rectal Daily PRN    fleet enema (Fleet) 7-19 GM/118ML rectal enema 133 mL  1 enema Rectal Once PRN    piperacillin-tazobactam (Zosyn) 3.375 g in dextrose 5% 100 mL IVPB-ADDV  3.375 g Intravenous Q8H    metoprolol succinate ER (Toprol XL) 24 hr tab 50 mg  50 mg Oral 2x Daily(Beta Blocker)    HYDROcodone-acetaminophen (Norco) 5-325 MG per tab 1 tablet  1  tablet Oral Q6H PRN    zolpidem (Ambien) tab 10 mg  10 mg Oral Nightly PRN         Questions/concerns were discussed with patient and/or family by bedside.          Marya Caban MD  3/9/2024  8:55 AM

## 2024-03-09 NOTE — PLAN OF CARE
Assumed care of pt at 1930  A/Ox4, up w/ assistance and walker.   Room air. Adequate o2 sats.   NSR/ST on tele. Pt denies chest pain.   IV abx as ordered. IV lasix as ordered.   Continent of bladder and bowel. Last BM 3/6.   Fall precautions in place. Call light in reach. Pt updated on plan of care.     Problem: Patient/Family Goals  Goal: Patient/Family Long Term Goal  Description: Patient's Long Term Goal: Stay out of hospital    Interventions:  - Med compliance  - Follow up appointments  - See additional Care Plan goals for specific interventions  Outcome: Progressing  Goal: Patient/Family Short Term Goal  Description: Patient's Short Term Goal: Discharge    Interventions:   - IV antibiotics  - Cards and Nephro to see  - See additional Care Plan goals for specific interventions  Outcome: Progressing     Problem: CARDIOVASCULAR - ADULT  Goal: Maintains optimal cardiac output and hemodynamic stability  Description: INTERVENTIONS:  - Monitor vital signs, rhythm, and trends  - Monitor for bleeding, hypotension and signs of decreased cardiac output  - Evaluate effectiveness of vasoactive medications to optimize hemodynamic stability  - Monitor arterial and/or venous puncture sites for bleeding and/or hematoma  - Assess quality of pulses, skin color and temperature  - Assess for signs of decreased coronary artery perfusion - ex. Angina  - Evaluate fluid balance, assess for edema, trend weights  Outcome: Progressing     Problem: METABOLIC/FLUID AND ELECTROLYTES - ADULT  Goal: Electrolytes maintained within normal limits  Description: INTERVENTIONS:  - Monitor labs and rhythm and assess patient for signs and symptoms of electrolyte imbalances  - Administer electrolyte replacement as ordered  - Monitor response to electrolyte replacements, including rhythm and repeat lab results as appropriate  - Fluid restriction as ordered  - Instruct patient on fluid and nutrition restrictions as appropriate  Outcome:  Progressing  Goal: Hemodynamic stability and optimal renal function maintained  Description: INTERVENTIONS:  - Monitor labs and assess for signs and symptoms of volume excess or deficit  - Monitor intake, output and patient weight  - Monitor urine specific gravity, serum osmolarity and serum sodium as indicated or ordered  - Monitor response to interventions for patient's volume status, including labs, urine output, blood pressure (other measures as available)  - Encourage oral intake as appropriate  - Instruct patient on fluid and nutrition restrictions as appropriate  Outcome: Progressing

## 2024-03-09 NOTE — PROGRESS NOTES
Pulmonary Medicine Inpatient Progress Note                 Subjective:  On RA.   Afebrile.   Transfusion ordered for Hg 6.3 on labs this am  Coughing. Some blood streaked phlegm per pt.        ALLERGIES:  Allergies   Allergen Reactions    Bactrim [Sulfamethoxazole W/Trimethoprim] RASH        MEDS:  Home Medications:  Outpatient Medications Marked as Taking for the 3/6/24 encounter (Hospital Encounter)   Medication Sig Dispense Refill    prochlorperazine (COMPAZINE) 10 mg tablet       HYDROcodone-acetaminophen 5-325 MG Oral Tab Take 1 tablet by mouth every 6 (six) hours as needed for Pain.      metoprolol succinate ER 50 MG Oral Tablet 24 Hr Take 1 tablet (50 mg total) by mouth 2x Daily(Beta Blocker). 180 tablet 3    ondansetron 4 MG Oral Tablet Dispersible Take 1 tablet (4 mg total) by mouth every 4 (four) hours as needed for Nausea. 10 tablet 0    Potassium Citrate ER 15 MEQ (1620 MG) Oral Tab CR Take 1 tablet by mouth in the morning, at noon, and at bedtime.      DULoxetine 30 MG Oral Cap DR Particles Take 1 capsule (30 mg total) by mouth daily.      zolpidem 10 MG Oral Tab Take 1 tablet (10 mg total) by mouth nightly as needed for Sleep.       Scheduled Medication:   tolvaptan  15 mg Oral Once    sodium bicarbonate  650 mg Oral BID    torsemide  20 mg Oral Daily    doxycycline  100 mg Oral 2 times per day    enoxaparin  40 mg Subcutaneous Daily    piperacillin-tazobactam  3.375 g Intravenous Q8H    metoprolol succinate ER  50 mg Oral 2x Daily(Beta Blocker)     Continuous Infusing Medication:    PRN Medications:  acetaminophen, melatonin, guaiFENesin ER, benzonatate, glycerin-hypromellose-, sodium chloride, ondansetron, prochlorperazine, polyethylene glycol (PEG 3350), sennosides, bisacodyl, fleet enema, HYDROcodone-acetaminophen, zolpidem       PHYSICAL EXAM:  BP (!) 115/99 (BP Location: Right arm)   Pulse 109   Temp 97.9 °F (36.6 °C) (Oral)   Resp 18   Wt 115 lb 15.4 oz (52.6 kg)   LMP 08/28/2023  (Approximate)   SpO2 94%   BMI 19.90 kg/m²   CONSTITUTIONAL: alert, oriented, no apparent distress  HEENT: atraumatic normocephalic  MOUTH: mucous membranes are moist. No OP exudates  NECK/THROAT: no JVD. Trachea midline. No obvious thyromegaly  LUNG: no wheezing, + mild crackles. Chest symmetric with respiratory motion  HEART: regular rate and rhythm, no obvious murmers or gallops note  ABD: soft non tender. + bowel sounds. No organomegaly noted  EXT: no clubbing, cyanosis, or edema noted. Pulses intact grossly       IMAGES:  Reviewed in EMR      LABS:  Recent Labs   Lab 03/06/24  1615 03/07/24  0501 03/09/24  0538   RBC 2.84* 2.31* 2.15*   HGB 8.5* 7.0* 6.3*   HCT 23.8* 19.5* 18.2*   MCV 83.8 84.4 84.7   MCH 29.9 30.3 29.3   MCHC 35.7 35.9 34.6   RDW 13.9 14.0 14.6   NEPRELIM 7.70 7.73*  --    WBC 8.4 8.4 4.8   .0 185.0 174.0       Recent Labs   Lab 03/06/24  1615 03/07/24  0501 03/08/24  0547 03/08/24  1156 03/08/24  2236 03/09/24  0538   GLU 96 106* 92  --   --  95   BUN 14 16 19  --   --  24*   CREATSERUM 0.75 0.74 0.82  --   --  1.11*   EGFRCR 101 103 91  --   --  63   CA 9.0 8.8 8.8  --   --  8.6   ALB 2.8* 2.2* 2.2*  --   --   --    * 129* 124* 123*  --  123*   K 3.1* 3.5 3.0*  --  3.9 3.9   CL 95* 102 100  --   --  101   CO2 15.0* 16.0* 15.0*  --   --  15.0*   ALKPHO 87 69 65  --   --   --    AST 22 19 17  --   --   --    ALT 16 13 11*  --   --   --    BILT 0.9 0.8 0.6  --   --   --    TP 6.1* 5.2* 5.0*  --   --   --        ASSESSMENT/PLAN:  ILD  -suspect inflammatory lung disease related to autoimmune/connective tissue disease. Pt reports hx of lupus and Sjogren's  -follows w/ [facility] rheumatology  -could consider open lung biopsy vs empiric trial of steroids if there are persistent changes on repeat CT in 6-8 weeks  Aspiration PNA  -w/ h/o recent emesis, some of the changes on CT may be consistent w/ aspirated gastric contents  -cont zosyn-can switch to augmentin to complete 7 day course if  going to be discharged before iv course is completed  -monitor cultures  Pleural Effusions  -most likely due to HFrEF  -tapped on previous admission and transudative  -diuresis PRN    Will follow.      Thank you for the opportunity to care for Alina Aceves.     VIDHI Freedman DO, MPH  Pulmonary Critical Care Medicine

## 2024-03-09 NOTE — PROGRESS NOTES
[facility]   part of [facility]     Hospitalist Progress Note     Alina Aceves Patient Status:  Inpatient    1980 MRN [id number]   Location [facility] 2NE-A Attending Jasiel Rubi,    Hosp Day # 3 PCP HOLLY IBARRA     Chief Complaint: sob    Subjective:     Patient denies any nausea vomiting overnight.  No loose stools.  Patient continues to feel weak.    Objective:    Review of Systems:   A comprehensive review of systems was completed; pertinent positive and negatives stated in subjective.    Vital signs:  Temp:  [97.5 °F (36.4 °C)-98.5 °F (36.9 °C)] 97.5 °F (36.4 °C)  Pulse:  [] 106  Resp:  [18-23] 18  BP: ()/(68-99) 103/80  SpO2:  [90 %-100 %] 94 %    Physical Exam:    General: No acute distress  Respiratory: No wheezes, no rhonchi  Cardiovascular: S1, S2, regular rate and rhythm  Abdomen: Soft, Non-tender, non-distended, positive bowel sounds  Neuro: No new focal deficits.   Extremities: No edema      Diagnostic Data:    Labs:  Recent Labs   Lab 24  1615 24  0501 24  0538   WBC 8.4 8.4 4.8   HGB 8.5* 7.0* 6.3*   MCV 83.8 84.4 84.7   .0 185.0 174.0       Recent Labs   Lab 24  1615 24  0501 24  0547 24  1156 24  2236 24  0538   GLU 96 106* 92  --   --  95   BUN 14 16 19  --   --  24*   CREATSERUM 0.75 0.74 0.82  --   --  1.11*   CA 9.0 8.8 8.8  --   --  8.6   ALB 2.8* 2.2* 2.2*  --   --   --    * 129* 124* 123*  --  123*   K 3.1* 3.5 3.0*  --  3.9 3.9   CL 95* 102 100  --   --  101   CO2 15.0* 16.0* 15.0*  --   --  15.0*   ALKPHO 87 69 65  --   --   --    AST 22 19 17  --   --   --    ALT 16 13 11*  --   --   --    BILT 0.9 0.8 0.6  --   --   --    TP 6.1* 5.2* 5.0*  --   --   --        Estimated Creatinine Clearance: 54.3 mL/min (A) (based on SCr of 1.11 mg/dL (H)).    Recent Labs   Lab 24  2311 24  0029 24  0501   TROPHS 265* 257* 201*       No results for input(s): \"PTP\", \"INR\" in the last  168 hours.               Microbiology    Hospital Encounter on 03/06/24   1. Urine Culture, Routine     Status: None    Collection Time: 03/06/24  8:26 PM    Specimen: Urine, clean catch   Result Value Ref Range    Urine Culture No Growth at 18-24 hrs. N/A   2. Blood Culture     Status: None (Preliminary result)    Collection Time: 03/06/24  6:00 PM    Specimen: Blood,peripheral   Result Value Ref Range    Blood Culture Result No Growth 2 Days N/A         Imaging: Reviewed in Epic.    Medications:    sodium bicarbonate  650 mg Oral BID    torsemide  20 mg Oral Daily    doxycycline  100 mg Oral 2 times per day    enoxaparin  40 mg Subcutaneous Daily    piperacillin-tazobactam  3.375 g Intravenous Q8H    metoprolol succinate ER  50 mg Oral 2x Daily(Beta Blocker)       Assessment & Plan:      #Acute on chronic normocytic anemia  -hgb 6.3 today, transfuse 1u prbc  -anemia agudelo underway    #Pneumonia  #GGO  -Pulm following, will get repeat CT as an outpatient to rule out underlying etiology GGO  -Chest x-ray & CT chest reviewed  -Blood cultures pending  -Sputum culture pending  -Urine antigens pending  -Continue IV zosyn, stop doxycycline     #Hyponatremia, stable  #Metabolic acidosis, worsening  #Dehydration   -Fluid restriction, tolvaptan, diuretic  -Monitor on morning BMP  -Nephrology following,    #Breast cancer  -follows duly onc     #Elevated troponin of no clinical significance  -Cardiology eval noted    #Asymptomatic pyuria  -Empiric antibiotics started for above, urine culture no growth     #HFrEF     #Lupus     #Sjogrens syndrome      Jasiel Rubi,     Supplementary Documentation:     Quality:  DVT Mechanical Prophylaxis:   SCDs,    DVT Pharmacologic Prophylaxis   Medication    enoxaparin (Lovenox) 40 MG/0.4ML SUBQ injection 40 mg                Code Status: Full Code  Peacock: No urinary catheter in place  Peacock Duration (in days):   Central line:    CHRISTELLE: 3/10/2024    Discharge is dependent on: clincal  improvement  At this point Ms. Aceves is expected to be discharge to: likely home    The 21st Century Cures Act makes medical notes like these available to patients in the interest of transparency. Please be advised this is a medical document. Medical documents are intended to carry relevant information, facts as evident, and the clinical opinion of the practitioner. The medical note is intended as peer to peer communication and may appear blunt or direct. It is written in medical language and may contain abbreviations or verbiage that are unfamiliar.

## 2024-03-10 LAB
ALBUMIN SERPL-MCNC: 2.2 G/DL (ref 3.4–5)
ALBUMIN/GLOB SERPL: 0.7 {RATIO} (ref 1–2)
ALP LIVER SERPL-CCNC: 54 U/L
ALT SERPL-CCNC: 12 U/L
ANION GAP SERPL CALC-SCNC: 10 MMOL/L (ref 0–18)
ANION GAP SERPL CALC-SCNC: 7 MMOL/L (ref 0–18)
APTT PPP: 31 SECONDS (ref 23.3–35.6)
AST SERPL-CCNC: 18 U/L (ref 15–37)
ATRIAL RATE: 140 BPM
BASOPHILS # BLD AUTO: 0 X10(3) UL (ref 0–0.2)
BASOPHILS NFR BLD AUTO: 0 %
BILIRUB SERPL-MCNC: 0.9 MG/DL (ref 0.1–2)
BILIRUB UR QL STRIP.AUTO: NEGATIVE
BLOOD TYPE BARCODE: 6200
BUN BLD-MCNC: 26 MG/DL (ref 9–23)
BUN BLD-MCNC: 28 MG/DL (ref 9–23)
CALCIUM BLD-MCNC: 9 MG/DL (ref 8.5–10.1)
CALCIUM BLD-MCNC: 9.3 MG/DL (ref 8.5–10.1)
CHLORIDE SERPL-SCNC: 104 MMOL/L (ref 98–112)
CHLORIDE SERPL-SCNC: 106 MMOL/L (ref 98–112)
CLARITY UR REFRACT.AUTO: CLEAR
CO2 SERPL-SCNC: 14 MMOL/L (ref 21–32)
CO2 SERPL-SCNC: 15 MMOL/L (ref 21–32)
CREAT BLD-MCNC: 0.93 MG/DL
CREAT BLD-MCNC: 0.96 MG/DL
EGFRCR SERPLBLD CKD-EPI 2021: 75 ML/MIN/1.73M2 (ref 60–?)
EGFRCR SERPLBLD CKD-EPI 2021: 78 ML/MIN/1.73M2 (ref 60–?)
EOSINOPHIL # BLD AUTO: 0.02 X10(3) UL (ref 0–0.7)
EOSINOPHIL NFR BLD AUTO: 0.2 %
ERYTHROCYTE [DISTWIDTH] IN BLOOD BY AUTOMATED COUNT: 14.3 %
ERYTHROCYTE [DISTWIDTH] IN BLOOD BY AUTOMATED COUNT: 14.3 %
GLOBULIN PLAS-MCNC: 3.1 G/DL (ref 2.8–4.4)
GLUCOSE BLD-MCNC: 106 MG/DL (ref 70–99)
GLUCOSE BLD-MCNC: 96 MG/DL (ref 70–99)
GLUCOSE UR STRIP.AUTO-MCNC: NORMAL MG/DL
HCT VFR BLD AUTO: 22.3 %
HCT VFR BLD AUTO: 24.4 %
HGB BLD-MCNC: 8.1 G/DL
HGB BLD-MCNC: 8.4 G/DL
IMM GRANULOCYTES # BLD AUTO: 0.08 X10(3) UL (ref 0–1)
IMM GRANULOCYTES NFR BLD: 0.9 %
INR BLD: 1.18 (ref 0.8–1.2)
KETONES UR STRIP.AUTO-MCNC: NEGATIVE MG/DL
LACTATE SERPL-SCNC: 0.6 MMOL/L (ref 0.4–2)
LEUKOCYTE ESTERASE UR QL STRIP.AUTO: NEGATIVE
LYMPHOCYTES # BLD AUTO: 0.26 X10(3) UL (ref 1–4)
LYMPHOCYTES NFR BLD AUTO: 2.8 %
MCH RBC QN AUTO: 29.3 PG (ref 26–34)
MCH RBC QN AUTO: 30.5 PG (ref 26–34)
MCHC RBC AUTO-ENTMCNC: 34.4 G/DL (ref 31–37)
MCHC RBC AUTO-ENTMCNC: 36.3 G/DL (ref 31–37)
MCV RBC AUTO: 83.8 FL
MCV RBC AUTO: 85 FL
MONOCYTES # BLD AUTO: 0.46 X10(3) UL (ref 0.1–1)
MONOCYTES NFR BLD AUTO: 5 %
NEUTROPHILS # BLD AUTO: 8.37 X10 (3) UL (ref 1.5–7.7)
NEUTROPHILS # BLD AUTO: 8.37 X10(3) UL (ref 1.5–7.7)
NEUTROPHILS NFR BLD AUTO: 91.1 %
NITRITE UR QL STRIP.AUTO: NEGATIVE
OSMOLALITY SERPL CALC.SUM OF ELEC: 269 MOSM/KG (ref 275–295)
OSMOLALITY SERPL CALC.SUM OF ELEC: 273 MOSM/KG (ref 275–295)
P AXIS: 82 DEGREES
P-R INTERVAL: 192 MS
PH UR STRIP.AUTO: 6.5 [PH] (ref 5–8)
PLATELET # BLD AUTO: 174 10(3)UL (ref 150–450)
PLATELET # BLD AUTO: 186 10(3)UL (ref 150–450)
POTASSIUM SERPL-SCNC: 3.2 MMOL/L (ref 3.5–5.1)
POTASSIUM SERPL-SCNC: 3.6 MMOL/L (ref 3.5–5.1)
PROT SERPL-MCNC: 5.3 G/DL (ref 6.4–8.2)
PROT UR STRIP.AUTO-MCNC: 50 MG/DL
PROTHROMBIN TIME: 15 SECONDS (ref 11.6–14.8)
Q-T INTERVAL: 250 MS
QRS DURATION: 80 MS
QTC CALCULATION (BEZET): 381 MS
R AXIS: 75 DEGREES
RBC # BLD AUTO: 2.66 X10(6)UL
RBC # BLD AUTO: 2.87 X10(6)UL
RBC #/AREA URNS AUTO: >10 /HPF
SODIUM SERPL-SCNC: 127 MMOL/L (ref 136–145)
SODIUM SERPL-SCNC: 129 MMOL/L (ref 136–145)
SP GR UR STRIP.AUTO: 1.01 (ref 1–1.03)
T AXIS: 28 DEGREES
UNIT VOLUME: 350 ML
UROBILINOGEN UR STRIP.AUTO-MCNC: NORMAL MG/DL
VENTRICULAR RATE: 140 BPM
WBC # BLD AUTO: 7.3 X10(3) UL (ref 4–11)
WBC # BLD AUTO: 9.2 X10(3) UL (ref 4–11)

## 2024-03-10 PROCEDURE — 99233 SBSQ HOSP IP/OBS HIGH 50: CPT | Performed by: INTERNAL MEDICINE

## 2024-03-10 PROCEDURE — 99232 SBSQ HOSP IP/OBS MODERATE 35: CPT | Performed by: HOSPITALIST

## 2024-03-10 RX ORDER — TOLVAPTAN 15 MG/1
15 TABLET ORAL ONCE
Status: COMPLETED | OUTPATIENT
Start: 2024-03-10 | End: 2024-03-10

## 2024-03-10 RX ORDER — POTASSIUM CHLORIDE 20 MEQ/1
40 TABLET, EXTENDED RELEASE ORAL ONCE
Status: COMPLETED | OUTPATIENT
Start: 2024-03-10 | End: 2024-03-10

## 2024-03-10 RX ORDER — POTASSIUM CHLORIDE 20 MEQ/1
40 TABLET, EXTENDED RELEASE ORAL ONCE
Status: DISCONTINUED | OUTPATIENT
Start: 2024-03-10 | End: 2024-03-10

## 2024-03-10 NOTE — PLAN OF CARE
Assumed pt care at 1930. A&O x4. Tele rhythm ST. SPO2 96% on room air. Breath sounds diminished bilaterally. Pt voiding with no issues. No c/o chest pain or shortness of breath. Pt c/o coughing and body aches, relieved with mucinex and tylenol. Left forehead laceration, open to air; petechiae BLE. Pt port not flushing, met with resistance; will not flush or draw with repositioning. PIV placed. Bed is locked and in low position. Call light and personal items within reach. Reviewed plan of care and patient verbalizes understanding.  POC: PO Torsemide, IV Zosyn q8, sputum culture pending.  Physician aware of port not being usable.  Problem: Patient/Family Goals  Goal: Patient/Family Long Term Goal  Description: Patient's Long Term Goal: Stay out of hospital    Interventions:  - Med compliance  - Follow up appointments  - See additional Care Plan goals for specific interventions  Outcome: Progressing  Goal: Patient/Family Short Term Goal  Description: Patient's Short Term Goal: Discharge    Interventions:   - IV antibiotics  - Cards and Nephro to see  - See additional Care Plan goals for specific interventions  Outcome: Progressing     Problem: CARDIOVASCULAR - ADULT  Goal: Maintains optimal cardiac output and hemodynamic stability  Description: INTERVENTIONS:  - Monitor vital signs, rhythm, and trends  - Monitor for bleeding, hypotension and signs of decreased cardiac output  - Evaluate effectiveness of vasoactive medications to optimize hemodynamic stability  - Monitor arterial and/or venous puncture sites for bleeding and/or hematoma  - Assess quality of pulses, skin color and temperature  - Assess for signs of decreased coronary artery perfusion - ex. Angina  - Evaluate fluid balance, assess for edema, trend weights  Outcome: Progressing     Problem: METABOLIC/FLUID AND ELECTROLYTES - ADULT  Goal: Electrolytes maintained within normal limits  Description: INTERVENTIONS:  - Monitor labs and rhythm and assess patient  for signs and symptoms of electrolyte imbalances  - Administer electrolyte replacement as ordered  - Monitor response to electrolyte replacements, including rhythm and repeat lab results as appropriate  - Fluid restriction as ordered  - Instruct patient on fluid and nutrition restrictions as appropriate  Outcome: Progressing  Goal: Hemodynamic stability and optimal renal function maintained  Description: INTERVENTIONS:  - Monitor labs and assess for signs and symptoms of volume excess or deficit  - Monitor intake, output and patient weight  - Monitor urine specific gravity, serum osmolarity and serum sodium as indicated or ordered  - Monitor response to interventions for patient's volume status, including labs, urine output, blood pressure (other measures as available)  - Encourage oral intake as appropriate  - Instruct patient on fluid and nutrition restrictions as appropriate  Outcome: Progressing     Problem: RESPIRATORY - ADULT  Goal: Achieves optimal ventilation and oxygenation  Description: INTERVENTIONS:  - Assess for changes in respiratory status  - Assess for changes in mentation and behavior  - Position to facilitate oxygenation and minimize respiratory effort  - Oxygen supplementation based on oxygen saturation or ABGs  - Provide Smoking Cessation handout, if applicable  - Encourage broncho-pulmonary hygiene including cough, deep breathe, Incentive Spirometry  - Assess the need for suctioning and perform as needed  - Assess and instruct to report SOB or any respiratory difficulty  - Respiratory Therapy support as indicated  - Manage/alleviate anxiety  - Monitor for signs/symptoms of CO2 retention  Outcome: Progressing

## 2024-03-10 NOTE — PROGRESS NOTES
[facility]  Nephrology Progress Note    Alina Acevse Attending:  Jasiel Rubi DO       Assessment and Plan:    1) Hyponatremia-  due to CHF / meds (cymbalta) / pulm issues (ADH). Recent TSH WNL. Volume OK. PLAN- continue loop diuretic / fluid restriction; redose tolvaptan     2) HFrEF- EF 20-25% with RV dysfunction + severe TR / mod pul HTN- etiology unclear (chemo?)- compensated     3) Proteinuria with h/o biopsy proven interstitial nephritis (presumably due to Sjogrens) partially tx'ed with steroids at [facility]    4) Pulm- aspiration PNA on abx; ILD due to autoimmune diease; effusions s/p previous thora (transudate)     5) Stage 3 breast CA on chemo (no XRT)- last     6) SLE / Sjogrens- was not on imuran / steroids on admit per [facility] rheum     7) Anemia- due to chronic disease. No clear bleeding. Hapto WNL, LDH mildly elevated- doubt significant hemolysis. Fe stores low -> IV Fe    8) Presumed NAGMA- started PO sodium bicarb      Subjective:  Pt asleep, appears comfortable     Physical Exam:   /82 (BP Location: Left arm)   Pulse 99   Temp 97.5 °F (36.4 °C) (Oral)   Resp 20   Wt 103 lb 13.4 oz (47.1 kg)   LMP 2023 (Approximate)   SpO2 100%   BMI 17.82 kg/m²   Temp (24hrs), Av.8 °F (36.6 °C), Min:97.3 °F (36.3 °C), Max:98.7 °F (37.1 °C)       Intake/Output Summary (Last 24 hours) at 3/10/2024 0848  Last data filed at 3/10/2024 0813  Gross per 24 hour   Intake 1130 ml   Output 2450 ml   Net -1320 ml     Wt Readings from Last 3 Encounters:   03/10/24 103 lb 13.4 oz (47.1 kg)   24 104 lb 15 oz (47.6 kg)   23 120 lb (54.4 kg)     General: asleep  HEENT: No scleral icterus, MMM  Neck: Supple, no KALYN or thyromegaly  Cardiac: Regular rate and rhythm, S1, S2 normal, no murmur or tub  Lungs: Decreased BS at bases bilaterally   Abdomen: Soft, non-tender. + bowel sounds, no palpable organomegaly  Extremities: Without clubbing, cyanosis; no edema  Neurologic: Cranial nerves grossly  intact, moving all extremities  Skin: Warm and dry, no rashes       Labs:   Lab Results   Component Value Date    WBC 7.3 03/10/2024    HGB 8.1 03/10/2024    HCT 22.3 03/10/2024    .0 03/10/2024    CREATSERUM 0.93 03/10/2024    BUN 26 03/10/2024     03/10/2024    K 3.2 03/10/2024     03/10/2024    CO2 14.0 03/10/2024     03/10/2024    CA 9.0 03/10/2024       Imaging:  All imaging studies reviewed.    Meds:           Questions/concerns were discussed with patient and/or family by bedside.          Marya Caban MD  3/10/2024  848 AM

## 2024-03-10 NOTE — PROGRESS NOTES
Pulmonary Medicine Inpatient Progress Note                 Subjective:  On RA.   Afebrile.   Coughs with clear phlegm now.          ALLERGIES:  Allergies   Allergen Reactions    Bactrim [Sulfamethoxazole W/Trimethoprim] RASH        MEDS:  Home Medications:  Outpatient Medications Marked as Taking for the 3/6/24 encounter (Hospital Encounter)   Medication Sig Dispense Refill    prochlorperazine (COMPAZINE) 10 mg tablet       HYDROcodone-acetaminophen 5-325 MG Oral Tab Take 1 tablet by mouth every 6 (six) hours as needed for Pain.      metoprolol succinate ER 50 MG Oral Tablet 24 Hr Take 1 tablet (50 mg total) by mouth 2x Daily(Beta Blocker). 180 tablet 3    ondansetron 4 MG Oral Tablet Dispersible Take 1 tablet (4 mg total) by mouth every 4 (four) hours as needed for Nausea. 10 tablet 0    Potassium Citrate ER 15 MEQ (1620 MG) Oral Tab CR Take 1 tablet by mouth in the morning, at noon, and at bedtime.      DULoxetine 30 MG Oral Cap DR Particles Take 1 capsule (30 mg total) by mouth daily.      zolpidem 10 MG Oral Tab Take 1 tablet (10 mg total) by mouth nightly as needed for Sleep.       Scheduled Medication:   sodium bicarbonate  650 mg Oral BID    torsemide  20 mg Oral Daily    enoxaparin  40 mg Subcutaneous Daily    piperacillin-tazobactam  3.375 g Intravenous Q8H    metoprolol succinate ER  50 mg Oral 2x Daily(Beta Blocker)     Continuous Infusing Medication:    PRN Medications:  acetaminophen, melatonin, guaiFENesin ER, benzonatate, glycerin-hypromellose-, sodium chloride, ondansetron, prochlorperazine, polyethylene glycol (PEG 3350), sennosides, bisacodyl, fleet enema, HYDROcodone-acetaminophen, zolpidem       PHYSICAL EXAM:  /82 (BP Location: Left arm)   Pulse 99   Temp 97.5 °F (36.4 °C) (Oral)   Resp 20   Wt 115 lb 15.4 oz (52.6 kg)   LMP 08/28/2023 (Approximate)   SpO2 100%   BMI 19.90 kg/m²   CONSTITUTIONAL: alert, oriented, no apparent distress  HEENT: atraumatic normocephalic  MOUTH:  mucous membranes are moist. No OP exudates  NECK/THROAT: no JVD. Trachea midline. No obvious thyromegaly  LUNG: no wheezing, + mild crackles. Chest symmetric with respiratory motion  HEART: regular rate and rhythm, no obvious murmers or gallops note  ABD: soft non tender. + bowel sounds. No organomegaly noted  EXT: no clubbing, cyanosis, or edema noted. Pulses intact grossly       IMAGES:  Reviewed in EMR      LABS:  Recent Labs   Lab 03/06/24  1615 03/07/24  0501 03/09/24  0538 03/09/24  1535   RBC 2.84* 2.31* 2.15*  --    HGB 8.5* 7.0* 6.3* 7.8*   HCT 23.8* 19.5* 18.2*  --    MCV 83.8 84.4 84.7  --    MCH 29.9 30.3 29.3  --    MCHC 35.7 35.9 34.6  --    RDW 13.9 14.0 14.6  --    NEPRELIM 7.70 7.73*  --   --    WBC 8.4 8.4 4.8  --    .0 185.0 174.0  --        Recent Labs   Lab 03/06/24  1615 03/07/24  0501 03/08/24  0547 03/08/24  1156 03/08/24  2236 03/09/24  0538   GLU 96 106* 92  --   --  95   BUN 14 16 19  --   --  24*   CREATSERUM 0.75 0.74 0.82  --   --  1.11*   EGFRCR 101 103 91  --   --  63   CA 9.0 8.8 8.8  --   --  8.6   ALB 2.8* 2.2* 2.2*  --   --   --    * 129* 124* 123*  --  123*   K 3.1* 3.5 3.0*  --  3.9 3.9   CL 95* 102 100  --   --  101   CO2 15.0* 16.0* 15.0*  --   --  15.0*   ALKPHO 87 69 65  --   --   --    AST 22 19 17  --   --   --    ALT 16 13 11*  --   --   --    BILT 0.9 0.8 0.6  --   --   --    TP 6.1* 5.2* 5.0*  --   --   --        ASSESSMENT/PLAN:  ILD  -suspect inflammatory lung disease related to autoimmune/connective tissue disease. Pt reports hx of lupus and Sjogren's  -follows w/ [facility] rheumatology  -could consider open lung biopsy vs empiric trial of steroids if there are persistent changes on repeat CT in 6-8 weeks  Aspiration PNA  -w/ h/o recent emesis, some of the changes on CT may be consistent w/ aspirated gastric contents  -cont zosyn-can switch to augmentin to complete 7 day course if going to be discharged before iv course is completed  -monitor cultures          - mucinex and tessalon perles for cough  Pleural Effusions  -most likely due to HFrEF  -tapped on previous admission and transudative  -diuresis PRN    Will follow.      Thank you for the opportunity to care for Alina Aceves.     VIDHI Freedman DO, MPH  Pulmonary Critical Care Medicine

## 2024-03-10 NOTE — PROGRESS NOTES
[facility]   part of [facility]     Hospitalist Progress Note     Alina Aceves Patient Status:  Inpatient    1980 MRN [id number]   Location [facility] 2NE-A Attending Jasiel Rubi, DO   Hosp Day # 4 PCP HOLLY IBARRA     Chief Complaint: sob    Subjective:     Pt denies any cp or sob. No lightheadedness.     Objective:    Review of Systems:   A comprehensive review of systems was completed; pertinent positive and negatives stated in subjective.    Vital signs:  Temp:  [97.3 °F (36.3 °C)-98.7 °F (37.1 °C)] 97.5 °F (36.4 °C)  Pulse:  [] 105  Resp:  [18-20] 18  BP: (103-117)/(72-89) 113/84  SpO2:  [94 %-100 %] 100 %    Physical Exam:    General: No acute distress  Respiratory: No wheezes, no rhonchi  Cardiovascular: S1, S2, regular rate and rhythm  Abdomen: Soft, Non-tender, non-distended, positive bowel sounds  Neuro: No new focal deficits.   Extremities: No edema      Diagnostic Data:    Labs:  Recent Labs   Lab 24  1615 24  0501 24  0538 24  1535 03/10/24  0738   WBC 8.4 8.4 4.8  --  7.3   HGB 8.5* 7.0* 6.3* 7.8* 8.1*   MCV 83.8 84.4 84.7  --  83.8   .0 185.0 174.0  --  174.0       Recent Labs   Lab 24  1615 24  0501 24  0547 24  1156 24  2236 24  0538 03/10/24  0738   GLU 96 106* 92  --   --  95 106*   BUN 14 16 19  --   --  24* 26*   CREATSERUM 0.75 0.74 0.82  --   --  1.11* 0.93   CA 9.0 8.8 8.8  --   --  8.6 9.0   ALB 2.8* 2.2* 2.2*  --   --   --   --    * 129* 124* 123*  --  123* 127*   K 3.1* 3.5 3.0*  --  3.9 3.9 3.2*   CL 95* 102 100  --   --  101 106   CO2 15.0* 16.0* 15.0*  --   --  15.0* 14.0*   ALKPHO 87 69 65  --   --   --   --    AST 22 19 17  --   --   --   --    ALT 16 13 11*  --   --   --   --    BILT 0.9 0.8 0.6  --   --   --   --    TP 6.1* 5.2* 5.0*  --   --   --   --        Estimated Creatinine Clearance: 58 mL/min (based on SCr of 0.93 mg/dL).    Recent Labs   Lab 24  4077 24  0029  03/07/24  0501   TROPHS 265* 257* 201*       No results for input(s): \"PTP\", \"INR\" in the last 168 hours.               Microbiology    Hospital Encounter on 03/06/24   1. Urine Culture, Routine     Status: None    Collection Time: 03/06/24  8:26 PM    Specimen: Urine, clean catch   Result Value Ref Range    Urine Culture No Growth at 18-24 hrs. N/A   2. Blood Culture     Status: None (Preliminary result)    Collection Time: 03/06/24  6:00 PM    Specimen: Blood,peripheral   Result Value Ref Range    Blood Culture Result No Growth 3 Days N/A         Imaging: Reviewed in Epic.    Medications:    iron sucrose  200 mg Intravenous Daily    sodium bicarbonate  650 mg Oral BID    torsemide  20 mg Oral Daily    enoxaparin  40 mg Subcutaneous Daily    piperacillin-tazobactam  3.375 g Intravenous Q8H    metoprolol succinate ER  50 mg Oral 2x Daily(Beta Blocker)       Assessment & Plan:      #Acute on chronic normocytic anemia  #Iron deficiency   -hgb 8.1 s/p 1u on 3/9  -anemia agudelo reviewed   -iv iron    #Pneumonia  #GGO  -Pulm following, will get repeat CT as an outpatient to rule out underlying etiology GGO  -Chest x-ray & CT chest reviewed  -Blood cultures pending  -Sputum culture pending  -Urine antigens pending  -Continue IV zosyn, stop doxycycline     #Hyponatremia, improving  #Metabolic acidosis, remains low  #Dehydration   #Hypokalemia  -Fluid restriction, tolvaptan, diuretic  -po bicarb  -Monitor on morning BMP  -Nephrology following    #Breast cancer  -follows duly onc     #Elevated troponin of no clinical significance  -Cardiology eval noted    #Asymptomatic pyuria  -Empiric antibiotics started for above, urine culture no growth     #HFrEF     #Lupus     #Sjogrens syndrome      Jasiel Rubi,     Supplementary Documentation:     Quality:  DVT Mechanical Prophylaxis:   SCDs,    DVT Pharmacologic Prophylaxis   Medication    enoxaparin (Lovenox) 40 MG/0.4ML SUBQ injection 40 mg                Code Status: Full  Code  Peacock: No urinary catheter in place  Peacock Duration (in days):   Central line:    CHRISTELLE: 3/12/2024    Discharge is dependent on: clincal improvement  At this point Ms. Aceves is expected to be discharge to: likely home    The 21st Century Cures Act makes medical notes like these available to patients in the interest of transparency. Please be advised this is a medical document. Medical documents are intended to carry relevant information, facts as evident, and the clinical opinion of the practitioner. The medical note is intended as peer to peer communication and may appear blunt or direct. It is written in medical language and may contain abbreviations or verbiage that are unfamiliar.

## 2024-03-10 NOTE — PLAN OF CARE
Received patient at 0730. Alert and Oriented x4. Tele Rhythm NSR. O2 saturation 97% On room air. Breath sounds diminished, productive cough noted with clear sputum. Bed is locked and in low position. Call light and personal items within reach. Pt voiding with no issue. Pt up with walker and SBA. Generalized weakness/fatigue noted.  Right portacath flushing this am, is positional, blood return noted. On IV zosyn. Samsca x1 today and pt started on 3 days IV Venofer. K replaced, Ok to use protocol per Nephrology.  Reviewed plan of care and patient verbalizes understanding.    1830, after a cough spell pt HR has been maintaining in 130-140s. Tessalon, mucinex and Norco given. Dr Villalba notified, EKG done. Metoprolol 50 XL was given at 1707.       Problem: Patient/Family Goals  Goal: Patient/Family Long Term Goal  Description: Patient's Long Term Goal: Stay out of hospital    Interventions:  - Med compliance  - Follow up appointments  - See additional Care Plan goals for specific interventions  Outcome: Progressing  Goal: Patient/Family Short Term Goal  Description: Patient's Short Term Goal: Discharge    Interventions:   - IV antibiotics  - Cards and Nephro to see  - See additional Care Plan goals for specific interventions  Outcome: Progressing     Problem: CARDIOVASCULAR - ADULT  Goal: Maintains optimal cardiac output and hemodynamic stability  Description: INTERVENTIONS:  - Monitor vital signs, rhythm, and trends  - Monitor for bleeding, hypotension and signs of decreased cardiac output  - Evaluate effectiveness of vasoactive medications to optimize hemodynamic stability  - Monitor arterial and/or venous puncture sites for bleeding and/or hematoma  - Assess quality of pulses, skin color and temperature  - Assess for signs of decreased coronary artery perfusion - ex. Angina  - Evaluate fluid balance, assess for edema, trend weights  Outcome: Progressing     Problem: METABOLIC/FLUID AND ELECTROLYTES - ADULT  Goal:  Electrolytes maintained within normal limits  Description: INTERVENTIONS:  - Monitor labs and rhythm and assess patient for signs and symptoms of electrolyte imbalances  - Administer electrolyte replacement as ordered  - Monitor response to electrolyte replacements, including rhythm and repeat lab results as appropriate  - Fluid restriction as ordered  - Instruct patient on fluid and nutrition restrictions as appropriate  Outcome: Progressing  Goal: Hemodynamic stability and optimal renal function maintained  Description: INTERVENTIONS:  - Monitor labs and assess for signs and symptoms of volume excess or deficit  - Monitor intake, output and patient weight  - Monitor urine specific gravity, serum osmolarity and serum sodium as indicated or ordered  - Monitor response to interventions for patient's volume status, including labs, urine output, blood pressure (other measures as available)  - Encourage oral intake as appropriate  - Instruct patient on fluid and nutrition restrictions as appropriate  Outcome: Progressing     Problem: RESPIRATORY - ADULT  Goal: Achieves optimal ventilation and oxygenation  Description: INTERVENTIONS:  - Assess for changes in respiratory status  - Assess for changes in mentation and behavior  - Position to facilitate oxygenation and minimize respiratory effort  - Oxygen supplementation based on oxygen saturation or ABGs  - Provide Smoking Cessation handout, if applicable  - Encourage broncho-pulmonary hygiene including cough, deep breathe, Incentive Spirometry  - Assess the need for suctioning and perform as needed  - Assess and instruct to report SOB or any respiratory difficulty  - Respiratory Therapy support as indicated  - Manage/alleviate anxiety  - Monitor for signs/symptoms of CO2 retention  Outcome: Progressing

## 2024-03-11 LAB
ALBUMIN SERPL-MCNC: 2.1 G/DL (ref 3.4–5)
ALBUMIN/GLOB SERPL: 0.7 {RATIO} (ref 1–2)
ALP LIVER SERPL-CCNC: 54 U/L
ALT SERPL-CCNC: 11 U/L
ANION GAP SERPL CALC-SCNC: 8 MMOL/L (ref 0–18)
AST SERPL-CCNC: 14 U/L (ref 15–37)
ATRIAL RATE: 129 BPM
BASOPHILS # BLD AUTO: 0 X10(3) UL (ref 0–0.2)
BASOPHILS NFR BLD AUTO: 0 %
BILIRUB SERPL-MCNC: 0.6 MG/DL (ref 0.1–2)
BUN BLD-MCNC: 27 MG/DL (ref 9–23)
CALCIUM BLD-MCNC: 8.8 MG/DL (ref 8.5–10.1)
CHLORIDE SERPL-SCNC: 106 MMOL/L (ref 98–112)
CO2 SERPL-SCNC: 16 MMOL/L (ref 21–32)
CREAT BLD-MCNC: 1.08 MG/DL
EGFRCR SERPLBLD CKD-EPI 2021: 65 ML/MIN/1.73M2 (ref 60–?)
EOSINOPHIL # BLD AUTO: 0.02 X10(3) UL (ref 0–0.7)
EOSINOPHIL NFR BLD AUTO: 0.4 %
ERYTHROCYTE [DISTWIDTH] IN BLOOD BY AUTOMATED COUNT: 14.4 %
GLOBULIN PLAS-MCNC: 2.9 G/DL (ref 2.8–4.4)
GLUCOSE BLD-MCNC: 85 MG/DL (ref 70–99)
HCT VFR BLD AUTO: 23.2 %
HGB BLD-MCNC: 7.9 G/DL
IMM GRANULOCYTES # BLD AUTO: 0.05 X10(3) UL (ref 0–1)
IMM GRANULOCYTES NFR BLD: 0.9 %
LYMPHOCYTES # BLD AUTO: 0.5 X10(3) UL (ref 1–4)
LYMPHOCYTES NFR BLD AUTO: 9.1 %
MAGNESIUM SERPL-MCNC: 1.8 MG/DL (ref 1.6–2.6)
MCH RBC QN AUTO: 29.2 PG (ref 26–34)
MCHC RBC AUTO-ENTMCNC: 34.1 G/DL (ref 31–37)
MCV RBC AUTO: 85.6 FL
MONOCYTES # BLD AUTO: 0.25 X10(3) UL (ref 0.1–1)
MONOCYTES NFR BLD AUTO: 4.6 %
NEUTROPHILS # BLD AUTO: 4.67 X10 (3) UL (ref 1.5–7.7)
NEUTROPHILS # BLD AUTO: 4.67 X10(3) UL (ref 1.5–7.7)
NEUTROPHILS NFR BLD AUTO: 85 %
OSMOLALITY SERPL CALC.SUM OF ELEC: 274 MOSM/KG (ref 275–295)
P AXIS: 69 DEGREES
P-R INTERVAL: 170 MS
PHOSPHATE SERPL-MCNC: 3.4 MG/DL (ref 2.5–4.9)
PLATELET # BLD AUTO: 190 10(3)UL (ref 150–450)
POTASSIUM SERPL-SCNC: 3.4 MMOL/L (ref 3.5–5.1)
PROT SERPL-MCNC: 5 G/DL (ref 6.4–8.2)
Q-T INTERVAL: 282 MS
QRS DURATION: 78 MS
QTC CALCULATION (BEZET): 413 MS
R AXIS: 57 DEGREES
RBC # BLD AUTO: 2.71 X10(6)UL
SODIUM SERPL-SCNC: 130 MMOL/L (ref 136–145)
T AXIS: 44 DEGREES
VENTRICULAR RATE: 129 BPM
WBC # BLD AUTO: 5.5 X10(3) UL (ref 4–11)

## 2024-03-11 PROCEDURE — 99233 SBSQ HOSP IP/OBS HIGH 50: CPT | Performed by: INTERNAL MEDICINE

## 2024-03-11 PROCEDURE — 99232 SBSQ HOSP IP/OBS MODERATE 35: CPT | Performed by: HOSPITALIST

## 2024-03-11 RX ORDER — POTASSIUM CHLORIDE 20 MEQ/1
40 TABLET, EXTENDED RELEASE ORAL ONCE
Status: COMPLETED | OUTPATIENT
Start: 2024-03-11 | End: 2024-03-11

## 2024-03-11 RX ORDER — DULOXETIN HYDROCHLORIDE 30 MG/1
60 CAPSULE, DELAYED RELEASE ORAL DAILY
Status: DISCONTINUED | OUTPATIENT
Start: 2024-03-11 | End: 2024-03-12

## 2024-03-11 RX ORDER — MAGNESIUM OXIDE 400 MG/1
400 TABLET ORAL ONCE
Status: COMPLETED | OUTPATIENT
Start: 2024-03-11 | End: 2024-03-11

## 2024-03-11 NOTE — PROGRESS NOTES
[facility]    Alina Aceves Patient Status:  Inpatient    1980 MRN [id number]   Location [facility] 2NE-A Attending Jasiel Rubi, DO   Hosp Day # 5 PCP HOLLY IBARRA     SUBJECTIVE: Pt continues to have coughing fits.       OBJECTIVE:  /87 (BP Location: Left arm)   Pulse 91   Temp 97.9 °F (36.6 °C) (Oral)   Resp 24   Wt 114 lb 3.2 oz (51.8 kg)   LMP 2023 (Approximate)   SpO2 99%   BMI 19.60 kg/m²   O2 requirement: RA     I/O last 3 completed shifts:  In: 820 [P.O.:720; IV PIGGYBACK:100]  Out: 2750 [Urine:2750]  I/O this shift:  In: 400 [P.O.:400]  Out: -      Current Medications:   Current Facility-Administered Medications:     iron sucrose (Venofer) 20 mg/mL injection 200 mg, 200 mg, Intravenous, Daily    sodium bicarbonate tab 650 mg, 650 mg, Oral, BID    torsemide (Demadex) tab 20 mg, 20 mg, Oral, Daily    acetaminophen (Tylenol Extra Strength) tab 500 mg, 500 mg, Oral, Q4H PRN    melatonin tab 3 mg, 3 mg, Oral, Nightly PRN    guaiFENesin ER (Mucinex) 12 hr tab 600 mg, 600 mg, Oral, BID PRN    benzonatate (Tessalon) cap 200 mg, 200 mg, Oral, TID PRN    glycerin-hypromellose- (Artifical Tears) 0.2-0.2-1 % ophthalmic solution 1 drop, 1 drop, Both Eyes, QID PRN    sodium chloride (Saline Mist) 0.65 % nasal solution 1 spray, 1 spray, Each Nare, Q3H PRN    enoxaparin (Lovenox) 40 MG/0.4ML SUBQ injection 40 mg, 40 mg, Subcutaneous, Daily    ondansetron (Zofran) 4 MG/2ML injection 4 mg, 4 mg, Intravenous, Q6H PRN    prochlorperazine (Compazine) 10 MG/2ML injection 5 mg, 5 mg, Intravenous, Q8H PRN    polyethylene glycol (PEG 3350) (Miralax) 17 g oral packet 17 g, 17 g, Oral, Daily PRN    sennosides (Senokot) tab 17.2 mg, 17.2 mg, Oral, Nightly PRN    bisacodyl (Dulcolax) 10 MG rectal suppository 10 mg, 10 mg, Rectal, Daily PRN    fleet enema (Fleet) 7-19 GM/118ML rectal enema 133 mL, 1 enema, Rectal, Once PRN    piperacillin-tazobactam (Zosyn) 3.375 g in dextrose 5% 100 mL  IVPB-ADDV, 3.375 g, Intravenous, Q8H    metoprolol succinate ER (Toprol XL) 24 hr tab 50 mg, 50 mg, Oral, 2x Daily(Beta Blocker)    HYDROcodone-acetaminophen (Norco) 5-325 MG per tab 1 tablet, 1 tablet, Oral, Q6H PRN    zolpidem (Ambien) tab 10 mg, 10 mg, Oral, Nightly PRN      Physical Exam:                          General: alert, cooperative, in NAD                          HEENT: oropharynx clear without erythema or exudates, moist mucous membranes                          Lungs: Clear to auscultation bilaterally, no wheezes or crackles                           Chest wall: No tenderness or deformity.                          Heart: Regular rate and rhythm, normal S1S2                          Abdomen: soft, non-tender, non-distended, positive BS.                          Extremity: No clubbing or cyanosis. no edema                          Skin: No rashes or lesions.       Lab Results   Component Value Date    WBC 5.5 03/11/2024    RBC 2.71 03/11/2024    HGB 7.9 03/11/2024    HCT 23.2 03/11/2024    MCV 85.6 03/11/2024    MCH 29.2 03/11/2024    MCHC 34.1 03/11/2024    RDW 14.4 03/11/2024    .0 03/11/2024     Lab Results   Component Value Date     03/11/2024    K 3.4 03/11/2024     03/11/2024    CO2 16.0 03/11/2024    BUN 27 03/11/2024    CREATSERUM 1.08 03/11/2024    GLU 85 03/11/2024    CA 8.8 03/11/2024    ALKPHO 54 03/11/2024    ALT 11 03/11/2024    AST 14 03/11/2024    BILT 0.6 03/11/2024    ALB 2.1 03/11/2024    TP 5.0 03/11/2024     Lab Results   Component Value Date    INR 1.18 03/10/2024    INR 1.26 (H) 12/28/2023    INR 1.43 (H) 12/23/2023          Imaging: I have independently visualized all relevant chest imaging in PACS.  I agree with the radiology interpretation except where noted.       ASSESSMENT/PLAN:  ILD  -suspect inflammatory lung disease related to autoimmune/connective tissue disease. Pt reports hx of lupus and Sjogren's  -follows w/ [facility] rheumatology, not currently on  immunosuppression per pt   Aspiration PNA  -w/ h/o recent emesis, some of the changes on CT may be consistent w/ aspirated gastric contents, plan for repeat CT chest in 6-8 weeks, if abnormalities persist would then favor proceeding with biopsy vs trial of steroids   -cont zosyn (3/6 -  ), can switch to augmentin to complete 7 day course if going to be discharged before iv course is completed  -monitor cultures         -mucinex and tessalon perles for cough  Pleural Effusions  -most likely due to HFrEF  -tapped on previous admission and transudative  -diuresis   Systolic CHF  -echo with EF: 20-25% with restrictive physiology, PASP: 50-55mmHg  Dispo:  -will follow   -pt to follow up with Dr. Maurilio CARTER in 2 weeks upon discharge    Ashley Tapia MD  3/11/2024  11:08 AM

## 2024-03-11 NOTE — PLAN OF CARE
Assumed care of pt around 1930. Pt alert and oriented x4, denies any pain or shortness of breath. Lung sounds diminished bilaterally, NSR/sinus tach on tele. Abdomen soft, non tender, LBM 3/9. Bed locked and in lowest position, call light within reach.  POC: continue PO diuretics, daily weight, strict I/Os, continue IV antibiotics. Pt updated, all questions answered, verbalized understanding.  2040: Sepsis BPA firing, notified on call hospitalist. Sepsis order set ordered.    Problem: Patient/Family Goals  Goal: Patient/Family Long Term Goal  Description: Patient's Long Term Goal: Stay out of hospital    Interventions:  - Med compliance  - Follow up appointments  - See additional Care Plan goals for specific interventions  Outcome: Progressing  Goal: Patient/Family Short Term Goal  Description: Patient's Short Term Goal: Discharge    Interventions:   - IV antibiotics  - Cards and Nephro to see  - See additional Care Plan goals for specific interventions  Outcome: Progressing     Problem: CARDIOVASCULAR - ADULT  Goal: Maintains optimal cardiac output and hemodynamic stability  Description: INTERVENTIONS:  - Monitor vital signs, rhythm, and trends  - Monitor for bleeding, hypotension and signs of decreased cardiac output  - Evaluate effectiveness of vasoactive medications to optimize hemodynamic stability  - Monitor arterial and/or venous puncture sites for bleeding and/or hematoma  - Assess quality of pulses, skin color and temperature  - Assess for signs of decreased coronary artery perfusion - ex. Angina  - Evaluate fluid balance, assess for edema, trend weights  Outcome: Progressing     Problem: METABOLIC/FLUID AND ELECTROLYTES - ADULT  Goal: Electrolytes maintained within normal limits  Description: INTERVENTIONS:  - Monitor labs and rhythm and assess patient for signs and symptoms of electrolyte imbalances  - Administer electrolyte replacement as ordered  - Monitor response to electrolyte replacements, including  rhythm and repeat lab results as appropriate  - Fluid restriction as ordered  - Instruct patient on fluid and nutrition restrictions as appropriate  Outcome: Progressing  Goal: Hemodynamic stability and optimal renal function maintained  Description: INTERVENTIONS:  - Monitor labs and assess for signs and symptoms of volume excess or deficit  - Monitor intake, output and patient weight  - Monitor urine specific gravity, serum osmolarity and serum sodium as indicated or ordered  - Monitor response to interventions for patient's volume status, including labs, urine output, blood pressure (other measures as available)  - Encourage oral intake as appropriate  - Instruct patient on fluid and nutrition restrictions as appropriate  Outcome: Progressing     Problem: RESPIRATORY - ADULT  Goal: Achieves optimal ventilation and oxygenation  Description: INTERVENTIONS:  - Assess for changes in respiratory status  - Assess for changes in mentation and behavior  - Position to facilitate oxygenation and minimize respiratory effort  - Oxygen supplementation based on oxygen saturation or ABGs  - Provide Smoking Cessation handout, if applicable  - Encourage broncho-pulmonary hygiene including cough, deep breathe, Incentive Spirometry  - Assess the need for suctioning and perform as needed  - Assess and instruct to report SOB or any respiratory difficulty  - Respiratory Therapy support as indicated  - Manage/alleviate anxiety  - Monitor for signs/symptoms of CO2 retention  Outcome: Progressing

## 2024-03-11 NOTE — PROGRESS NOTES
[facility]  Nephrology Progress Note    Alina Aceves Attending:  Jasiel Rubi DO       Assessment and Plan:    1) Hyponatremia-  due to CHF / meds (cymbalta) / pulm issues (ADH). Recent TSH WNL. Volume OK. 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 No clear bleeding. Hapto WNL, LDH mildly elevated- doubt significant hemolysis. Fe stores low -> IV Fe    8) Presumed NAGMA- started PO sodium bicarb    DC planning.       Subjective:  Pt awake alert feeling better overall no nausea / vomiting    Physical Exam:   /87 (BP Location: Left arm)   Pulse 91   Temp 97.9 °F (36.6 °C) (Oral)   Resp 24   Wt 114 lb 3.2 oz (51.8 kg)   LMP 2023 (Approximate)   SpO2 99%   BMI 19.60 kg/m²   Temp (24hrs), Av.4 °F (36.9 °C), Min:97.1 °F (36.2 °C), Max:100.5 °F (38.1 °C)       Intake/Output Summary (Last 24 hours) at 3/11/2024 1015  Last data filed at 3/11/2024 1012  Gross per 24 hour   Intake 1220 ml   Output 1550 ml   Net -330 ml     Wt Readings from Last 3 Encounters:   24 114 lb 3.2 oz (51.8 kg)   24 104 lb 15 oz (47.6 kg)   23 120 lb (54.4 kg)     General: asleep  HEENT: No scleral icterus, MMM  Neck: Supple, no KALYN or thyromegaly  Cardiac: Regular rate and rhythm, S1, S2 normal, no murmur or tub  Lungs: Decreased BS at bases bilaterally   Abdomen: Soft, non-tender. + bowel sounds, no palpable organomegaly  Extremities: Without clubbing, cyanosis; no  edema  Neurologic: Cranial nerves grossly intact, moving all extremities  Skin: Warm and dry, no rashes       Labs:   Lab Results   Component Value Date    WBC 5.5 03/11/2024    HGB 7.9 03/11/2024    HCT 23.2 03/11/2024    .0 03/11/2024    CREATSERUM 1.08 03/11/2024    BUN 27 03/11/2024     03/11/2024    K 3.4 03/11/2024     03/11/2024    CO2 16.0 03/11/2024    GLU 85 03/11/2024    CA 8.8 03/11/2024    ALB 2.1 03/11/2024    ALKPHO 54 03/11/2024    BILT 0.6 03/11/2024    TP 5.0 03/11/2024    AST 14 03/11/2024    ALT 11 03/11/2024    PTT 31.0 03/10/2024    INR 1.18 03/10/2024    PTP 15.0 03/10/2024    MG 1.8 03/11/2024    PHOS 3.4 03/11/2024       Imaging:  All imaging studies reviewed.    Meds:           Questions/concerns were discussed with patient and/or family by bedside.          Marya Caban MD  3/11/2024  1015 AM

## 2024-03-11 NOTE — PLAN OF CARE
Patient alert and oriented x 4. On RA. Up with SBA w/ walker. NSR/ST on tele. Continent of bowel/bladder. No complaints of pain, shortness of breath, chest pain/discomfort. POC: IV abx for PNA, monitor Hgb. Call light within reach. Fall precautions in place.     Problem: Patient/Family Goals  Goal: Patient/Family Long Term Goal  Description: Patient's Long Term Goal: Stay out of hospital    Interventions:  - Med compliance  - Follow up appointments  - See additional Care Plan goals for specific interventions  Outcome: Progressing  Goal: Patient/Family Short Term Goal  Description: Patient's Short Term Goal: Discharge    Interventions:   - IV antibiotics  - Cards and Nephro to see  - See additional Care Plan goals for specific interventions  Outcome: Progressing     Problem: CARDIOVASCULAR - ADULT  Goal: Maintains optimal cardiac output and hemodynamic stability  Description: INTERVENTIONS:  - Monitor vital signs, rhythm, and trends  - Monitor for bleeding, hypotension and signs of decreased cardiac output  - Evaluate effectiveness of vasoactive medications to optimize hemodynamic stability  - Monitor arterial and/or venous puncture sites for bleeding and/or hematoma  - Assess quality of pulses, skin color and temperature  - Assess for signs of decreased coronary artery perfusion - ex. Angina  - Evaluate fluid balance, assess for edema, trend weights  Outcome: Progressing     Problem: METABOLIC/FLUID AND ELECTROLYTES - ADULT  Goal: Electrolytes maintained within normal limits  Description: INTERVENTIONS:  - Monitor labs and rhythm and assess patient for signs and symptoms of electrolyte imbalances  - Administer electrolyte replacement as ordered  - Monitor response to electrolyte replacements, including rhythm and repeat lab results as appropriate  - Fluid restriction as ordered  - Instruct patient on fluid and nutrition restrictions as appropriate  Outcome: Progressing  Goal: Hemodynamic stability and optimal renal  function maintained  Description: INTERVENTIONS:  - Monitor labs and assess for signs and symptoms of volume excess or deficit  - Monitor intake, output and patient weight  - Monitor urine specific gravity, serum osmolarity and serum sodium as indicated or ordered  - Monitor response to interventions for patient's volume status, including labs, urine output, blood pressure (other measures as available)  - Encourage oral intake as appropriate  - Instruct patient on fluid and nutrition restrictions as appropriate  Outcome: Progressing     Problem: RESPIRATORY - ADULT  Goal: Achieves optimal ventilation and oxygenation  Description: INTERVENTIONS:  - Assess for changes in respiratory status  - Assess for changes in mentation and behavior  - Position to facilitate oxygenation and minimize respiratory effort  - Oxygen supplementation based on oxygen saturation or ABGs  - Provide Smoking Cessation handout, if applicable  - Encourage broncho-pulmonary hygiene including cough, deep breathe, Incentive Spirometry  - Assess the need for suctioning and perform as needed  - Assess and instruct to report SOB or any respiratory difficulty  - Respiratory Therapy support as indicated  - Manage/alleviate anxiety  - Monitor for signs/symptoms of CO2 retention  Outcome: Progressing

## 2024-03-11 NOTE — PAYOR COMM NOTE
--------------  CONTINUED STAY REVIEW    Payor: [place]/MCNP  Subscriber #:  [id number]  Authorization Number: [id number]    Admit date: 3/6/24  Admit time:  9:48 PM    Admitting Physician: Octavio Beasley MD  Attending Physician:  Jasiel Rubi DO  Primary Care Physician: Stephon Villeda    REVIEW DOCUMENTATION:  3/8  Nephrology Progress Note       Impression/Plan:       #1.  Hyponatremia- acute on chronic.  Suspect chronic component related to SIADH in setting of cancer/underlying lung disease with acute drop in setting of hypovolemic hyponatremia with N/V/D.  Na was initially much better yesterday after NS given.  IVF not continued given EF 20%.  Na noted this AM and tolvaptan given.  Will follow closely     #2.  Possible sepsis- consider urinary source vs poss pneumonia.  Cx sent, on empiric abx     #3.  HFrEF/elevated troponin- cards has been consulted, repeat ECHO pending     #4.  Anemia- has been chronic and thought to be due to chemotherapy, per oncology     #5.  Stage 3 breast cancer- per oncology.  chemo held due to inability to tolerate with plan for XRT per notes.      #6.  Hypokalemia- replace per protocol  Hospitalist Progress Note   Assessment & Plan:   #Pneumonia  #GGO  -Pulm following, will get repeat CT as an outpatient to rule out underlying etiology GGO  -Chest x-ray & CT chest reviewed  -Blood cultures pending  -Sputum culture pending  -Urine antigens pending  -Continue IV zosyn, stop doxycycline     #Hyponatremia, worsening  #Metabolic acidosis, worsening  #Dehydration   -No IV fluids or diuresis per nephrology  -Monitor on morning BMP  -Nephrology following, discussed with Dr. Quarles  -Telopeptide today     #Elevated troponin of no clinical significance  -Continue to trend  -Cardiology eval noted     #Asymptomatic pyuria  -Empiric antibiotics started for above, urine culture no growth     #HFrEF     #Lupus     #Sjogrens syndrome  Pulmonary Progress Note   ASSESSMENT/PLAN:      ILD  -suspect inflammatory lung disease related to autoimmune/connective tissue disease  -follows w/ [facility] rheumatology  -could consider open lung biopsy vs empiric trial of steroids if there are persistent changes on repeat CT in 6-8weeks  Aspiration PNA  -w/ h/o recent emesis, some of the changes on CT may be consistent w/ aspirated gastric contents  -cont zosyn  -monitor cultures  Pleural Effusions  -most likely due to HFrEF  -tapped on previous admission and transudative  -diuresis PRN   [facility] Cardiology  Progress Note  Impression:  HFrEF, EF 20% range  Moderate MR  Severe TR  Pulm HTN, moderate  ? PNA  Breast CA  Anemia  Hyponatremia        Plan:  12 lead EKG now  Would consider diuresis, however is profoundly hyponatremic.  Discussed with nephrology. Lasix 20 IV BID.  Antibiotics per primary.  Breast CA per heme/onc.  GDMT: On BB BID.  Would not start entresto in this setting.  3/9  Nephrology Progress Note   Assessment and Plan:     1) Hyponatremia-  due to CHF / meds (cymbalta) / pulm issues (ADH). Recent TSH WNL. Volume OK. 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 No clear bleeding. Check Fe stores / eval for hemolysis given autoimmune disease; transfuse  Pulmonary Medicine Inpatient Progress Note   ASSESSMENT/PLAN:  ILD  -suspect inflammatory lung disease related to autoimmune/connective tissue disease. Pt reports hx of lupus and Sjogren's  -follows w/ [facility]  rheumatology  -could consider open lung biopsy vs empiric trial of steroids if there are persistent changes on repeat CT in 6-8 weeks  Aspiration PNA  -w/ h/o recent emesis, some of the changes on CT may be consistent w/ aspirated gastric contents  -cont zosyn-can switch to augmentin to complete 7 day course if going to be discharged before iv course is completed  -monitor cultures  Pleural Effusions  -most likely due to HFrEF  -tapped on previous admission and transudative  -diuresis PRN   Hospitalist Progress Note   Assessment & Plan:   #Acute on chronic normocytic anemia  -hgb 6.3 today, transfuse 1u prbc  -anemia agudelo underway     #Pneumonia  #GGO  -Pulm following, will get repeat CT as an outpatient to rule out underlying etiology GGO  -Chest x-ray & CT chest reviewed  -Blood cultures pending  -Sputum culture pending  -Urine antigens pending  -Continue IV zosyn, stop doxycycline     #Hyponatremia, stable  #Metabolic acidosis, worsening  #Dehydration   -Fluid restriction, tolvaptan, diuretic  -Monitor on morning BMP  -Nephrology following,    #Breast cancer  -follows duly onc     #Elevated troponin of no clinical significance  -Cardiology eval noted     #Asymptomatic pyuria  -Empiric antibiotics started for above, urine culture no growth     #HFrEF     #Lupus     #Sjogrens syndrome   [facility] Cardiology  Progress Note  Impression:  HFrEF, EF 20% range  Moderate MR  Severe TR  Pulm HTN, moderate  ? PNA  Breast CA  Anemia  Hyponatremia        Plan:     PO diuresis as she is doing much better.  Antibiotics per primary.  Breast CA per heme/onc.  GDMT: On BB BID.  Would not start entresto in this setting.   3/10  Pulmonary Medicine Inpatient Progress Note   ASSESSMENT/PLAN:  ILD  -suspect inflammatory lung disease related to autoimmune/connective tissue disease. Pt reports hx of lupus and Sjogren's  -follows w/ [facility] rheumatology  -could consider open lung biopsy vs empiric trial of steroids if there are  persistent changes on repeat CT in 6-8 weeks  Aspiration PNA  -w/ h/o recent emesis, some of the changes on CT may be consistent w/ aspirated gastric contents  -cont zosyn-can switch to augmentin to complete 7 day course if going to be discharged before iv course is completed  -monitor cultures         - mucinex and tessalon perles for cough  Pleural Effusions  -most likely due to HFrEF  -tapped on previous admission and transudative  -diuresis PRN  Nephrology Progress Note   Assessment and Plan:     1) Hyponatremia-  due to CHF / meds (cymbalta) / pulm issues (ADH). Recent TSH WNL. Volume OK. 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 No clear bleeding. Hapto WNL, LDH mildly elevated- doubt significant hemolysis. Fe stores low -> IV Fe     8) Presumed NAGMA- started PO sodium bicarb  Hospitalist Progress Note   Assessment & Plan:   #Acute on chronic normocytic anemia  #Iron deficiency   -hgb 8.1 s/p 1u on 3/9  -anemia agudelo reviewed   -iv iron     #Pneumonia  #GGO  -Pulm following, will get repeat CT as an outpatient to rule out underlying etiology GGO  -Chest x-ray & CT chest reviewed  -Blood cultures pending  -Sputum culture pending  -Urine antigens pending  -Continue IV zosyn, stop doxycycline     #Hyponatremia, improving  #Metabolic acidosis, remains low  #Dehydration   #Hypokalemia  -Fluid restriction, tolvaptan, diuretic  -po bicarb  -Monitor on morning BMP  -Nephrology  following    #Breast cancer  -follows duly onc     #Elevated troponin of no clinical significance  -Cardiology eval noted     #Asymptomatic pyuria  -Empiric antibiotics started for above, urine culture no growth     #HFrEF     #Lupus     #Sjogrens syndrome    3/11  Nephrology Progress Note   Assessment and Plan:     1) Hyponatremia-  due to CHF / meds (cymbalta) / pulm issues (ADH). Recent TSH WNL. Volume OK. PLAN- improving wtih loop diuretic / fluid restriction + tolvaptan      2) HFrEF- EF 20-25% with RV dysfunction + severe TR / mod pul HTN- etiology unclear (chemo?)- compensated     3) Proteinuria with h/o biopsy proven interstitial nephritis (presumably due to Sjogrens) partially tx'ed with steroids at [facility]     4) Pulm- aspiration PNA on zosyn; ILD due to autoimmune diease; effusions s/p previous thora (transudate)     5) Stage 3 breast CA on chemo (no XRT)- last 9/23     6) SLE / Sjogrens- was not on imuran / steroids on admit per [facility] rheum     7) Anemia- due to chronic disease. No clear bleeding. Hapto WNL, LDH mildly elevated- doubt significant hemolysis. Fe stores low -> IV Fe     8) Presumed NAGMA- started PO sodium bicarb  Pulmonology  progress note  ASSESSMENT/PLAN:  ILD  -suspect inflammatory lung disease related to autoimmune/connective tissue disease. Pt reports hx of lupus and Sjogren's  -follows w/ [facility] rheumatology, not currently on immunosuppression per pt   Aspiration PNA  -w/ h/o recent emesis, some of the changes on CT may be consistent w/ aspirated gastric contents, plan for repeat CT chest in 6-8 weeks, if abnormalities persist would then favor proceeding with biopsy vs trial of steroids   -cont zosyn (3/6 -  ), can switch to augmentin to complete 7 day course if going to be discharged before iv course is completed  -monitor cultures         -mucinex and tessalon perles for cough  Pleural Effusions  -most likely due to HFrEF  -tapped on previous admission and transudative  -diuresis    Systolic CHF  -echo with EF: 20-25% with restrictive physiology, PASP: 50-55mmHg  Dispo:  -will follow   Hospitalist Progress Note   Assessment & Plan:   #Acute on chronic normocytic anemia  #Iron deficiency   -hgb 7.9 today s/p 1u on 3/9  -anemia agudelo reviewed   -iv iron     #Pneumonia  #GGO  -Pulm following, will get repeat CT as an outpatient to rule out underlying etiology GGO  -Chest x-ray & CT chest reviewed  -Blood cultures pending  -Sputum culture pending  -Urine antigens pending  -Continue IV zosyn.  Plan to complete 7-day course stop doxycycline     #Hyponatremia, improving  #Metabolic acidosis, remains low  #Dehydration   #Hypokalemia  -Fluid restriction, s/p tolvaptan, continue diuretic  -Continue po bicarb  -Sodium up to 130  -Monitor labs  -Nephrology following    #Breast cancer  -follows duly onc     #Elevated troponin of no clinical significance  -Cardiology eval noted     #Asymptomatic pyuria  -Empiric antibiotics started for above, urine culture no growth     #HFrEF     #Lupus     #Sjogrens syndrome     MEDICATIONS ADMINISTERED IN LAST 1 DAY:  benzonatate (Tessalon) cap 200 mg       Date Action Dose Route User    3/11/2024 0945 Given 200 mg Oral Daria Wray RN    3/10/2024 1725 Given 200 mg Oral Snell, Corina          DULoxetine (Cymbalta) DR cap 60 mg       Date Action Dose Route User    3/11/2024 1233 Given 60 mg Oral Daria Wray RN          enoxaparin (Lovenox) 40 MG/0.4ML SUBQ injection 40 mg       Date Action Dose Route User    3/11/2024 0907 Given 40 mg Subcutaneous (Left Lower Abdomen) Daria Wray RN          HYDROcodone-acetaminophen (Norco) 5-325 MG per tab 1 tablet       Date Action Dose Route User    3/10/2024 1736 Given 1 tablet Oral Snell, Corina          iron sucrose (Venofer) 20 mg/mL injection 200 mg       Date Action Dose Route User    3/11/2024 0907 New Bag 200 mg Intravenous Daria Wray RN          magnesium oxide (Mag-Ox) tab 400 mg       Date Action  Dose Route User    3/11/2024 0906 Given 400 mg Oral Daria Wray RN          metoprolol succinate ER (Toprol XL) 24 hr tab 50 mg       Date Action Dose Route User    3/11/2024 0550 Given 50 mg Oral Riddhi Yepez RN    3/10/2024 1707 Given 50 mg Oral Snell, Corina          piperacillin-tazobactam (Zosyn) 3.375 g in dextrose 5% 100 mL IVPB-ADDV       Date Action Dose Route User    3/11/2024 0907 New Bag 3.375 g Intravenous Daria Wray RN    3/11/2024 0017 New Bag 3.375 g Intravenous Riddhi Yepez RN    3/10/2024 1604 New Bag 3.375 g Intravenous Snell, Corina          potassium chloride (K-Dur) tab 40 mEq       Date Action Dose Route User    3/11/2024 0906 Given 40 mEq Oral Daria Wray RN          sodium bicarbonate tab 650 mg       Date Action Dose Route User    3/11/2024 0906 Given 650 mg Oral Daria Wray RN    3/10/2024 2213 Given 650 mg Oral Riddhi Yepez RN          torsemide (Demadex) tab 20 mg       Date Action Dose Route User    3/11/2024 0906 Given 20 mg Oral Daria Wray RN          zolpidem (Ambien) tab 10 mg       Date Action Dose Route User    3/10/2024 2224 Given 10 mg Oral Riddhi Yepez RN            Vitals (last day)       Date/Time Temp Pulse Resp BP SpO2 Weight O2 Device O2 Flow Rate (L/min) Who    03/10/24 2004 100.5 °F (38.1 °C) 118 32 116/80 97 % -- None (Room air) 0 L/min ZM    03/10/24 1700 -- 118 20 119/93 94 % -- None (Room air) 0 L/min MV    03/10/24 1605 97.1 °F (36.2 °C) 106 20 117/92 100 % -- None (Room air) 0 L/min NG          CIWA Scores (since admission)       None          Blood Transfusion Record       Product Unit Status Volume Start End            Transfuse RBC       24  [id number]  [id number] Completed 03/09/24 1545 350 mL 03/09/24 1200 03/09/24 0247                PLEASE FAX DAYS CERTIFIED AND NEXT REVIEW DATE -[phone]

## 2024-03-11 NOTE — PROGRESS NOTES
[facility]   part of [facility]     Hospitalist Progress Note     Alina Aceves Patient Status:  Inpatient    1980 MRN [id number]   Location [facility] 2NE-A Attending Jasiel Rubi, DO   Hosp Day # 5 PCP HOLLY IBARRA     Chief Complaint: sob    Subjective:     Patient continues to feel better.  More energy.  No longer having nausea.    Objective:    Review of Systems:   A comprehensive review of systems was completed; pertinent positive and negatives stated in subjective.    Vital signs:  Temp:  [97.1 °F (36.2 °C)-100.5 °F (38.1 °C)] 98.2 °F (36.8 °C)  Pulse:  [] 91  Resp:  [18-32] 24  BP: ()/(68-93) 111/87  SpO2:  [94 %-100 %] 100 %    Physical Exam:    General: No acute distress  Respiratory: No wheezes, no rhonchi  Cardiovascular: S1, S2, regular rate and rhythm  Abdomen: Soft, Non-tender, non-distended, positive bowel sounds  Neuro: No new focal deficits.   Extremities: No edema      Diagnostic Data:    Labs:  Recent Labs   Lab 24  0501 24  0538 24  1535 03/10/24  0738 03/10/24  2212 03/11/24  0601   WBC 8.4 4.8  --  7.3 9.2 5.5   HGB 7.0* 6.3* 7.8* 8.1* 8.4* 7.9*   MCV 84.4 84.7  --  83.8 85.0 85.6   .0 174.0  --  174.0 186.0 190.0   INR  --   --   --   --  1.18  --        Recent Labs   Lab 24  0547 24  1156 03/10/24  0738 03/10/24  22124  0601   GLU 92   < > 106* 96 85   BUN 19   < > 26* 28* 27*   CREATSERUM 0.82   < > 0.93 0.96 1.08*   CA 8.8   < > 9.0 9.3 8.8   ALB 2.2*  --   --  2.2* 2.1*   *   < > 127* 129* 130*   K 3.0*   < > 3.2* 3.6 3.4*      < > 106 104 106   CO2 15.0*   < > 14.0* 15.0* 16.0*   ALKPHO 65  --   --  54 54   AST 17  --   --  18 14*   ALT 11*  --   --  12* 11*   BILT 0.6  --   --  0.9 0.6   TP 5.0*  --   --  5.3* 5.0*    < > = values in this interval not displayed.       Estimated Creatinine Clearance: 54.9 mL/min (A) (based on SCr of 1.08 mg/dL (H)).    Recent Labs   Lab 24  6560  03/07/24  0029 03/07/24  0501   TROPHS 265* 257* 201*       Recent Labs   Lab 03/10/24  2212   PTP 15.0*   INR 1.18                  Microbiology    Hospital Encounter on 03/06/24   1. Urine Culture, Routine     Status: None    Collection Time: 03/06/24  8:26 PM    Specimen: Urine, clean catch   Result Value Ref Range    Urine Culture No Growth at 18-24 hrs. N/A   2. Blood Culture     Status: None (Preliminary result)    Collection Time: 03/06/24  6:00 PM    Specimen: Blood,peripheral   Result Value Ref Range    Blood Culture Result No Growth 4 Days N/A         Imaging: Reviewed in Epic.    Medications:    DULoxetine  60 mg Oral Daily    iron sucrose  200 mg Intravenous Daily    sodium bicarbonate  650 mg Oral BID    torsemide  20 mg Oral Daily    enoxaparin  40 mg Subcutaneous Daily    piperacillin-tazobactam  3.375 g Intravenous Q8H    metoprolol succinate ER  50 mg Oral 2x Daily(Beta Blocker)       Assessment & Plan:      #Acute on chronic normocytic anemia  #Iron deficiency   -hgb 7.9 today s/p 1u on 3/9  -anemia agudelo reviewed   -iv iron    #Pneumonia  #GGO  -Pulm following, will get repeat CT as an outpatient to rule out underlying etiology GGO  -Chest x-ray & CT chest reviewed  -Blood cultures pending  -Sputum culture pending  -Urine antigens pending  -Continue IV zosyn.  Plan to complete 7-day course stop doxycycline     #Hyponatremia, improving  #Metabolic acidosis, remains low  #Dehydration   #Hypokalemia  -Fluid restriction, s/p tolvaptan, continue diuretic  -Continue po bicarb  -Sodium up to 130  -Monitor labs  -Nephrology following    #Breast cancer  -follows duly onc     #Elevated troponin of no clinical significance  -Cardiology eval noted    #Asymptomatic pyuria  -Empiric antibiotics started for above, urine culture no growth     #HFrEF     #Lupus     #Sjogrens syndrome    #Dispo  -Hopefully by tomorrow    Jasiel Rubi DO    Supplementary Documentation:     Quality:  DVT Mechanical Prophylaxis:   SCDs,     DVT Pharmacologic Prophylaxis   Medication    enoxaparin (Lovenox) 40 MG/0.4ML SUBQ injection 40 mg                Code Status: Full Code  Peacock: No urinary catheter in place  Peacock Duration (in days):   Central line:    CHRISTELLE: 3/12/2024    Discharge is dependent on: clincal improvement  At this point Ms. Aceves is expected to be discharge to: likely home    The 21st Century Cures Act makes medical notes like these available to patients in the interest of transparency. Please be advised this is a medical document. Medical documents are intended to carry relevant information, facts as evident, and the clinical opinion of the practitioner. The medical note is intended as peer to peer communication and may appear blunt or direct. It is written in medical language and may contain abbreviations or verbiage that are unfamiliar.

## 2024-03-11 NOTE — PHYSICAL THERAPY NOTE
PHYSICAL THERAPY TREATMENT NOTE - INPATIENT    Room Number: 2621/2621-A     Session: 2/3     Number of Visits to Meet Established Goals: 3    Presenting Problem: hyponatremia, PNA  Co-Morbidities : Lupus, Sjogren's, h/o breast CA s/p chemo, chronic CHF, neuropathy    Prior Level of Benedict: per pt reports, pt was amb  with RW HH distances without assist, denies recent fall hx, has hospital bed lower level of the home. Pt has 8 steps from kitchen level to daughter's bedroom with 1 rail. Pt reports she is in the process of having 2nd handrail installed.  Pt reports she was able to ascend/descend stairs without assist.  - pt reports she has been doing this on her bottom and has only recently started working on standing while navigating stairs with therapy. Pt reports eventual plan to transition to OPPT and look into getting AFO for R foot drop.      Presenting Problem: hyponatremia, PNA  Co-Morbidities : Lupus, Sjogren's, h/o breast CA s/p chemo, chronic CHF, neuropathy    ASSESSMENT   Patient demonstrates good  progress this session, goals  updated to reflect patient performance.    Patient continues to function below baseline with gait and stair negotiation.  Contributing factors to remaining limitations include decreased functional strength and decreased muscular endurance.  Next session anticipate patient to progress gait and standing prolonged periods.  Physical Therapy will continue to follow patient for duration of hospitalization.    Patient continues to benefit from continued skilled PT services: at discharge to promote functional independence in home.  Anticipate patient will return home with home health PT.    PLAN  PT Treatment Plan: Energy conservation;Patient education;Family education;Gait training;Stair training;Transfer training;Balance training  Rehab Potential : Good  Frequency (Obs): 3-5x/week    CURRENT GOALS      Goal #1 Patient is able to demonstrate supine - sit EOB @ level: modified  independent-MET      Goal #2 Patient is able to demonstrate transfers Sit to/from Stand at assistance level: modified independent-MET      Goal #3 Patient is able to ambulate 5- feet with assist device: walker - rolling at assistance level: modified independent-distance met, updated to 300' c mod indep and RW      Goal #4 Pt able to ascend/descend 8 stairs with 1 rail with supervision.   Goal #5     Goal #6     Goal Comments: Goals established on 3/7/2024-updated 3/11/24    SUBJECTIVE  \"I am feeling my legs a lot today, my neuropathy is bad\"    OBJECTIVE  Precautions: Other (Comment) (R foot drop)    WEIGHT BEARING RESTRICTION  Weight Bearing Restriction: None                PAIN ASSESSMENT   Rating: Unable to rate  Location: BLE-neuropathy  Management Techniques: Activity promotion;Body mechanics;Repositioning;Other (Comment) (AROM as tolerated, proper footwear when WB)    BALANCE                                                                                                                       Static Sitting: Good  Dynamic Sitting: Good           Static Standing: Fair -  Dynamic Standing: Poor +    ACTIVITY TOLERANCE                         O2 WALK         AM-PAC '6-Clicks' INPATIENT SHORT FORM - BASIC MOBILITY  How much difficulty does the patient currently have...  Patient Difficulty: Turning over in bed (including adjusting bedclothes, sheets and blankets)?: None   Patient Difficulty: Sitting down on and standing up from a chair with arms (e.g., wheelchair, bedside commode, etc.): None   Patient Difficulty: Moving from lying on back to sitting on the side of the bed?: None   How much help from another person does the patient currently need...   Help from Another: Moving to and from a bed to a chair (including a wheelchair)?: None   Help from Another: Need to walk in hospital room?: A Little   Help from Another: Climbing 3-5 steps with a railing?: A Little       AM-PAC Score:  Raw Score: 22   Approx Degree of  Impairment: 20.91%   Standardized Score (AM-PAC Scale): 53.28   CMS Modifier (G-Code): CJ    FUNCTIONAL ABILITY STATUS  Gait Assessment   Functional Mobility/Gait Assessment  Gait Assistance: Supervision  Distance (ft): 300  Assistive Device: Rolling walker  Pattern: R Foot drop;R Steppage  Stairs: Stairs  How Many Stairs: 2  Device: 1 Rail  Assist: Contact guard assist  Pattern: Ascend and Descend  Ascend and Descend : Side step    Skilled Therapy Provided    Bed Mobility:  Rolling: NT   Supine<>Sit: mod indep   Sit<>Supine: mod indep     Transfer Mobility:  Sit<>Stand: mod indep c RW   Stand<>Sit: mod indep   Gait: supervision x300' c RW    Therapist's Comments: Pt presents to PT lying in semi supine position in bed. Pt has just finished her lunch and agreeable to participate. Focused on functional tasks in prep of upcoming DC. She was able to t/f to the EOB c mod indep, scooting and repositioning safely. Pt provided c her non skid slippers and able to don c increased time indep-ER BLE via hooking her ankle at her knee. Following this, sit/stand c mod indep and RW. Pt required a min in standing and alternating marching and flexing her knees to assist c initiation of amb. Following this, pt was able to amb 300' c supervision and RW, however c the cont'd above gait deviations. When this was completed pt returned to her room and requested to return back to her bed d/t having lack of sleep earlier. Pt able to t/f back to sitting at the EOB c mod indep, doffed her slippers indep and then t/f back to supine c mod indep. RN aware of session.     Patient End of Session: In bed;Needs met;Call light within reach;RN aware of session/findings;All patient questions and concerns addressed    PT Session Time: 30 minutes  Gait Trainin minutes  Therapeutic Activity: 5 minutes

## 2024-03-12 VITALS
BODY MASS INDEX: 20 KG/M2 | DIASTOLIC BLOOD PRESSURE: 84 MMHG | OXYGEN SATURATION: 100 % | WEIGHT: 114.19 LBS | RESPIRATION RATE: 18 BRPM | SYSTOLIC BLOOD PRESSURE: 122 MMHG | HEART RATE: 124 BPM | TEMPERATURE: 98 F

## 2024-03-12 LAB
ANION GAP SERPL CALC-SCNC: 6 MMOL/L (ref 0–18)
BUN BLD-MCNC: 26 MG/DL (ref 9–23)
CALCIUM BLD-MCNC: 8.9 MG/DL (ref 8.5–10.1)
CHLORIDE SERPL-SCNC: 107 MMOL/L (ref 98–112)
CO2 SERPL-SCNC: 17 MMOL/L (ref 21–32)
CREAT BLD-MCNC: 0.96 MG/DL
EGFRCR SERPLBLD CKD-EPI 2021: 75 ML/MIN/1.73M2 (ref 60–?)
ERYTHROCYTE [DISTWIDTH] IN BLOOD BY AUTOMATED COUNT: 14.4 %
GLUCOSE BLD-MCNC: 90 MG/DL (ref 70–99)
HCT VFR BLD AUTO: 21.2 %
HGB BLD-MCNC: 7.3 G/DL
MAGNESIUM SERPL-MCNC: 1.8 MG/DL (ref 1.6–2.6)
MCH RBC QN AUTO: 29.7 PG (ref 26–34)
MCHC RBC AUTO-ENTMCNC: 34.4 G/DL (ref 31–37)
MCV RBC AUTO: 86.2 FL
OSMOLALITY SERPL CALC.SUM OF ELEC: 274 MOSM/KG (ref 275–295)
PLATELET # BLD AUTO: 173 10(3)UL (ref 150–450)
POTASSIUM SERPL-SCNC: 3.5 MMOL/L (ref 3.5–5.1)
POTASSIUM SERPL-SCNC: 3.5 MMOL/L (ref 3.5–5.1)
RBC # BLD AUTO: 2.46 X10(6)UL
SODIUM SERPL-SCNC: 130 MMOL/L (ref 136–145)
WBC # BLD AUTO: 5.2 X10(3) UL (ref 4–11)

## 2024-03-12 PROCEDURE — 99232 SBSQ HOSP IP/OBS MODERATE 35: CPT | Performed by: HOSPITALIST

## 2024-03-12 PROCEDURE — 99233 SBSQ HOSP IP/OBS HIGH 50: CPT | Performed by: INTERNAL MEDICINE

## 2024-03-12 PROCEDURE — 99239 HOSP IP/OBS DSCHRG MGMT >30: CPT | Performed by: HOSPITALIST

## 2024-03-12 RX ORDER — MAGNESIUM OXIDE 400 MG/1
400 TABLET ORAL ONCE
Status: DISCONTINUED | OUTPATIENT
Start: 2024-03-12 | End: 2024-03-12

## 2024-03-12 RX ORDER — POTASSIUM CHLORIDE 20 MEQ/1
40 TABLET, EXTENDED RELEASE ORAL ONCE
Status: COMPLETED | OUTPATIENT
Start: 2024-03-12 | End: 2024-03-12

## 2024-03-12 RX ORDER — AMOXICILLIN AND CLAVULANATE POTASSIUM 875; 125 MG/1; MG/1
1 TABLET, FILM COATED ORAL 2 TIMES DAILY
Qty: 6 TABLET | Refills: 0 | Status: SHIPPED | OUTPATIENT
Start: 2024-03-12 | End: 2024-03-15

## 2024-03-12 NOTE — PROGRESS NOTES
[facility] Card[facility]  Progress Note    Alina Aceves Patient Status:  Inpatient    1980 MRN [id number]   Location [facility] 2NE-A Attending Jasiel Rubi, DO   Hosp Day # 6 PCP HOLLY IBARRA     Impression:  HFrEF, EF 20% range  Moderate MR  Severe TR  Pulm HTN, moderate  ? PNA  Breast CA  Anemia  Hyponatremia      Plan:    PO diuresis as she is doing much better.  Antibiotics per primary.  Breast CA per heme/onc.  GDMT: On BB BID.  Would not start entresto in this setting.        Subjective:  The patient denies any chest pain or dyspnea at this time.      Objective:  /86 (BP Location: Right arm)   Pulse 97   Temp 97.7 °F (36.5 °C) (Oral)   Resp 18   Wt 114 lb 3.2 oz (51.8 kg)   LMP 2023 (Approximate)   SpO2 100%   BMI 19.60 kg/m²   Temp (24hrs), Av.8 °F (36.6 °C), Min:97.6 °F (36.4 °C), Max:98 °F (36.7 °C)      Intake/Output Summary (Last 24 hours) at 3/12/2024 1210  Last data filed at 3/12/2024 1056  Gross per 24 hour   Intake 980 ml   Output 1600 ml   Net -620 ml     Wt Readings from Last 3 Encounters:   24 114 lb 3.2 oz (51.8 kg)   24 104 lb 15 oz (47.6 kg)   23 120 lb (54.4 kg)       General: Awake and alert; in no acute distress  Cardiac: Regular rate and regular rhythm; no murmurs/rubs/gallops are appreciated  Lungs: Clear to auscultation bilaterally; no accessory muscle use  Abdomen: Soft, non-tender; bowel sounds are normoactive  Extremities: No clubbing/cyanosis; moves all 4 extremities normally          Laboratory Data:  Lab Results   Component Value Date    WBC 5.2 2024    HGB 7.3 2024    HCT 21.2 2024    .0 2024     Lab Results   Component Value Date    INR 1.18 03/10/2024    INR 1.26 (H) 2023    INR 1.43 (H) 2023     Lab Results   Component Value Date     2024    K 3.5 2024    K 3.5 2024     2024    CO2 17.0 2024    BUN 26 2024    CREATSERUM 0.96  03/12/2024    GLU 90 03/12/2024    CA 8.9 03/12/2024    MG 1.8 03/12/2024       Telemetry:       Thank you for allowing our practice to participate in the care of your patient. Please do not hesitate to contact me if you have any questions.

## 2024-03-12 NOTE — PROGRESS NOTES
[facility]   part of [facility]     Hospitalist Progress Note     Alina Aceves Patient Status:  Inpatient    1980 MRN [id number]   Location [facility] 2NE-A Attending Jasiel Rubi, DO   Hosp Day # 6 PCP HOLLY IBARRA     Chief Complaint: sob    Subjective:     Patient feeling well.  No nausea or vomiting.  Tolerating diet.  Remains on room air.    Objective:    Review of Systems:   A comprehensive review of systems was completed; pertinent positive and negatives stated in subjective.    Vital signs:  Temp:  [97.6 °F (36.4 °C)-98.2 °F (36.8 °C)] 97.7 °F (36.5 °C)  Pulse:  [] 97  Resp:  [17-24] 18  BP: (104-124)/(80-96) 112/86  SpO2:  [98 %-100 %] 100 %    Physical Exam:    General: No acute distress  Respiratory: No wheezes, no rhonchi  Cardiovascular: S1, S2, regular rate and rhythm  Abdomen: Soft, Non-tender, non-distended, positive bowel sounds  Neuro: No new focal deficits.   Extremities: No edema      Diagnostic Data:    Labs:  Recent Labs   Lab 24  0538 24  1535 03/10/24  0738 03/10/24  2212 03/11/24  0601 24  0537   WBC 4.8  --  7.3 9.2 5.5 5.2   HGB 6.3* 7.8* 8.1* 8.4* 7.9* 7.3*   MCV 84.7  --  83.8 85.0 85.6 86.2   .0  --  174.0 186.0 190.0 173.0   INR  --   --   --  1.18  --   --        Recent Labs   Lab 24  0547 24  1156 03/10/24  2212 24  0601 24  0537   GLU 92   < > 96 85 90   BUN 19   < > 28* 27* 26*   CREATSERUM 0.82   < > 0.96 1.08* 0.96   CA 8.8   < > 9.3 8.8 8.9   ALB 2.2*  --  2.2* 2.1*  --    *   < > 129* 130* 130*   K 3.0*   < > 3.6 3.4* 3.5  3.5      < > 104 106 107   CO2 15.0*   < > 15.0* 16.0* 17.0*   ALKPHO 65  --  54 54  --    AST 17  --  18 14*  --    ALT 11*  --  12* 11*  --    BILT 0.6  --  0.9 0.6  --    TP 5.0*  --  5.3* 5.0*  --     < > = values in this interval not displayed.       Estimated Creatinine Clearance: 61.8 mL/min (based on SCr of 0.96 mg/dL).    Recent Labs   Lab 24  6952  03/07/24  0029 03/07/24  0501   TROPHS 265* 257* 201*       Recent Labs   Lab 03/10/24  2212   PTP 15.0*   INR 1.18                  Microbiology    Hospital Encounter on 03/06/24   1. Blood Culture     Status: None (Preliminary result)    Collection Time: 03/10/24 10:04 PM    Specimen: Blood,peripheral   Result Value Ref Range    Blood Culture Result No Growth 1 Day N/A   2. Urine Culture, Routine     Status: None    Collection Time: 03/06/24  8:26 PM    Specimen: Urine, clean catch   Result Value Ref Range    Urine Culture No Growth at 18-24 hrs. N/A         Imaging: Reviewed in Epic.    Medications:    DULoxetine  60 mg Oral Daily    sodium bicarbonate  650 mg Oral BID    torsemide  20 mg Oral Daily    enoxaparin  40 mg Subcutaneous Daily    piperacillin-tazobactam  3.375 g Intravenous Q8H    metoprolol succinate ER  50 mg Oral 2x Daily(Beta Blocker)       Assessment & Plan:      #Disposition  -Possible DC later today if cleared by other services    #Acute on chronic normocytic anemia  #Iron deficiency   -hgb 7.3 today s/p 1u on 3/9  -anemia agudelo reviewed   -iv iron    #Pneumonia  #GGO  -Pulm following, will get repeat CT as an outpatient to rule out underlying etiology GGO  -Chest x-ray & CT chest reviewed  -Blood cultures pending  -Sputum culture pending  -Urine antigens pending  -Continue IV zosyn.  Augmentin on discharge     #Hyponatremia, improving  #Metabolic acidosis, remains low  #Dehydration   #Hypokalemia  -Fluid restriction, s/p tolvaptan, continue diuretic  -Continue po bicarb for now, defer to renal on discharge  -Sodium up to 130  -Monitor labs  -Nephrology following    #Breast cancer  -follows duly onc     #Elevated troponin of no clinical significance  -Cardiology eval noted    #Asymptomatic pyuria  -Empiric antibiotics started for above, urine culture no growth     #HFrEF     #Lupus     #Sjogrens syndrome    Jasiel Rubi, DO    Supplementary Documentation:     Quality:  DVT Mechanical Prophylaxis:    SCDs,    DVT Pharmacologic Prophylaxis   Medication    heparin (Porcine) 100 Units/mL lock flush 500 Units    enoxaparin (Lovenox) 40 MG/0.4ML SUBQ injection 40 mg                Code Status: Full Code  Peacock: No urinary catheter in place  Peacock Duration (in days):   Central line:    CHRISTELLE: 3/12/2024    Discharge is dependent on: clincal improvement  At this point Ms. Aceves is expected to be discharge to: likely home    The 21st Century Cures Act makes medical notes like these available to patients in the interest of transparency. Please be advised this is a medical document. Medical documents are intended to carry relevant information, facts as evident, and the clinical opinion of the practitioner. The medical note is intended as peer to peer communication and may appear blunt or direct. It is written in medical language and may contain abbreviations or verbiage that are unfamiliar.

## 2024-03-12 NOTE — HOME CARE LIAISON
Received referral via [facility] for Home Health services. Spoke w/ patient at the bedside and is agreeable for home health care  updated AVS with contact information

## 2024-03-12 NOTE — PROGRESS NOTES
[facility]  Progress Note    Alina Aceves Patient Status:  Inpatient    1980 MRN [id number]   Location [facility] 2NE-A Attending Jasiel Rubi, DO   Hosp Day # 6 PCP HOLLY IBARRA     Impression:  HFrEF, EF 20% range  Moderate MR  Severe TR  Pulm HTN, moderate  ? PNA  Breast CA  Anemia  Hyponatremia      Plan:    CV stable.  Ok for discharge.  FU with me or Aubree OCONNOR in 2 weeks.  No entresto until FU.  On Metoprolol BID.  Diuretics per nephrology.  Call me if any questions.      Subjective:  The patient denies any chest pain or dyspnea at this time.      Objective:  /86 (BP Location: Right arm)   Pulse 97   Temp 97.7 °F (36.5 °C) (Oral)   Resp 18   Wt 114 lb 3.2 oz (51.8 kg)   LMP 2023 (Approximate)   SpO2 100%   BMI 19.60 kg/m²   Temp (24hrs), Av.8 °F (36.6 °C), Min:97.6 °F (36.4 °C), Max:98 °F (36.7 °C)      Intake/Output Summary (Last 24 hours) at 3/12/2024 1225  Last data filed at 3/12/2024 1056  Gross per 24 hour   Intake 980 ml   Output 1600 ml   Net -620 ml     Wt Readings from Last 3 Encounters:   24 114 lb 3.2 oz (51.8 kg)   24 104 lb 15 oz (47.6 kg)   23 120 lb (54.4 kg)       General: Awake and alert; in no acute distress  Cardiac: Regular rate and regular rhythm; no murmurs/rubs/gallops are appreciated  Lungs: Clear to auscultation bilaterally; no accessory muscle use  Abdomen: Soft, non-tender; bowel sounds are normoactive  Extremities: No clubbing/cyanosis; moves all 4 extremities normally          Laboratory Data:  Lab Results   Component Value Date    WBC 5.2 2024    HGB 7.3 2024    HCT 21.2 2024    .0 2024     Lab Results   Component Value Date    INR 1.18 03/10/2024    INR 1.26 (H) 2023    INR 1.43 (H) 2023     Lab Results   Component Value Date     2024    K 3.5 2024    K 3.5 2024     2024    CO2 17.0 2024    BUN 26 2024     CREATSERUM 0.96 03/12/2024    GLU 90 03/12/2024    CA 8.9 03/12/2024    MG 1.8 03/12/2024       Telemetry:       Thank you for allowing our practice to participate in the care of your patient. Please do not hesitate to contact me if you have any questions.

## 2024-03-12 NOTE — CM/SW NOTE
CM provided RN with home health agency choice list for patient review, will follow up for choice to finalize services for discharge.     Update:  CM updated by RN patient would like to resume care with [facility] services if possible.  CM reopened home health referral in [facility] system for review and updated [facility] liaison for follow up; agency can accept patient for services.      Yessi Hampton RN Case Manager [phone]

## 2024-03-12 NOTE — PLAN OF CARE
Patient alert and oriented x 4. On RA. Up with SBA w/ walker. NSR/ST on tele. Continent of bowel/bladder. No complaints of pain, shortness of breath, chest pain/discomfort. POC: IV abx, monitor labs, possible discharge today. Call light within reach. Fall precautions in place.     Problem: Patient/Family Goals  Goal: Patient/Family Long Term Goal  Description: Patient's Long Term Goal: Stay out of hospital    Interventions:  - Med compliance  - Follow up appointments  - See additional Care Plan goals for specific interventions  Outcome: Progressing  Goal: Patient/Family Short Term Goal  Description: Patient's Short Term Goal: Discharge    Interventions:   - IV antibiotics  - Cards and Nephro to see  - See additional Care Plan goals for specific interventions  Outcome: Progressing     Problem: CARDIOVASCULAR - ADULT  Goal: Maintains optimal cardiac output and hemodynamic stability  Description: INTERVENTIONS:  - Monitor vital signs, rhythm, and trends  - Monitor for bleeding, hypotension and signs of decreased cardiac output  - Evaluate effectiveness of vasoactive medications to optimize hemodynamic stability  - Monitor arterial and/or venous puncture sites for bleeding and/or hematoma  - Assess quality of pulses, skin color and temperature  - Assess for signs of decreased coronary artery perfusion - ex. Angina  - Evaluate fluid balance, assess for edema, trend weights  Outcome: Progressing     Problem: METABOLIC/FLUID AND ELECTROLYTES - ADULT  Goal: Electrolytes maintained within normal limits  Description: INTERVENTIONS:  - Monitor labs and rhythm and assess patient for signs and symptoms of electrolyte imbalances  - Administer electrolyte replacement as ordered  - Monitor response to electrolyte replacements, including rhythm and repeat lab results as appropriate  - Fluid restriction as ordered  - Instruct patient on fluid and nutrition restrictions as appropriate  Outcome: Progressing  Goal: Hemodynamic stability  and optimal renal function maintained  Description: INTERVENTIONS:  - Monitor labs and assess for signs and symptoms of volume excess or deficit  - Monitor intake, output and patient weight  - Monitor urine specific gravity, serum osmolarity and serum sodium as indicated or ordered  - Monitor response to interventions for patient's volume status, including labs, urine output, blood pressure (other measures as available)  - Encourage oral intake as appropriate  - Instruct patient on fluid and nutrition restrictions as appropriate  Outcome: Progressing     Problem: RESPIRATORY - ADULT  Goal: Achieves optimal ventilation and oxygenation  Description: INTERVENTIONS:  - Assess for changes in respiratory status  - Assess for changes in mentation and behavior  - Position to facilitate oxygenation and minimize respiratory effort  - Oxygen supplementation based on oxygen saturation or ABGs  - Provide Smoking Cessation handout, if applicable  - Encourage broncho-pulmonary hygiene including cough, deep breathe, Incentive Spirometry  - Assess the need for suctioning and perform as needed  - Assess and instruct to report SOB or any respiratory difficulty  - Respiratory Therapy support as indicated  - Manage/alleviate anxiety  - Monitor for signs/symptoms of CO2 retention  Outcome: Progressing

## 2024-03-12 NOTE — PROGRESS NOTES
Follow up with pediatrician-call Monday   [facility]    Alina Aceves Patient Status:  Inpatient    1980 MRN [id number]   Location [facility] 2NE-A Attending Jasiel Rubi, DO   Hosp Day # 6 PCP HOLLY IBARRA     SUBJECTIVE: Pt states that she is feeling better.    OBJECTIVE:  /80 (BP Location: Right arm)   Pulse 95   Temp 98 °F (36.7 °C) (Oral)   Resp 17   Wt 114 lb 3.2 oz (51.8 kg)   LMP 2023 (Approximate)   SpO2 100%   BMI 19.60 kg/m²   O2 requirement: RA     I/O last 3 completed shifts:  In: 1140 [P.O.:1140]  Out: 2250 [Urine:2250]  I/O this shift:  In: 240 [P.O.:240]  Out: 600 [Urine:600]     Current Medications:   Current Facility-Administered Medications:     heparin (Porcine) 100 Units/mL lock flush 500 Units, 5 mL, Intravenous, PRN    DULoxetine (Cymbalta) DR cap 60 mg, 60 mg, Oral, Daily    sodium bicarbonate tab 650 mg, 650 mg, Oral, BID    torsemide (Demadex) tab 20 mg, 20 mg, Oral, Daily    acetaminophen (Tylenol Extra Strength) tab 500 mg, 500 mg, Oral, Q4H PRN    melatonin tab 3 mg, 3 mg, Oral, Nightly PRN    guaiFENesin ER (Mucinex) 12 hr tab 600 mg, 600 mg, Oral, BID PRN    benzonatate (Tessalon) cap 200 mg, 200 mg, Oral, TID PRN    glycerin-hypromellose- (Artifical Tears) 0.2-0.2-1 % ophthalmic solution 1 drop, 1 drop, Both Eyes, QID PRN    sodium chloride (Saline Mist) 0.65 % nasal solution 1 spray, 1 spray, Each Nare, Q3H PRN    enoxaparin (Lovenox) 40 MG/0.4ML SUBQ injection 40 mg, 40 mg, Subcutaneous, Daily    ondansetron (Zofran) 4 MG/2ML injection 4 mg, 4 mg, Intravenous, Q6H PRN    prochlorperazine (Compazine) 10 MG/2ML injection 5 mg, 5 mg, Intravenous, Q8H PRN    polyethylene glycol (PEG 3350) (Miralax) 17 g oral packet 17 g, 17 g, Oral, Daily PRN    sennosides (Senokot) tab 17.2 mg, 17.2 mg, Oral, Nightly PRN    bisacodyl (Dulcolax) 10 MG rectal suppository 10 mg, 10 mg, Rectal, Daily PRN    fleet enema (Fleet) 7-19 GM/118ML rectal enema 133 mL, 1 enema, Rectal, Once PRN     piperacillin-tazobactam (Zosyn) 3.375 g in dextrose 5% 100 mL IVPB-ADDV, 3.375 g, Intravenous, Q8H    metoprolol succinate ER (Toprol XL) 24 hr tab 50 mg, 50 mg, Oral, 2x Daily(Beta Blocker)    HYDROcodone-acetaminophen (Norco) 5-325 MG per tab 1 tablet, 1 tablet, Oral, Q6H PRN    zolpidem (Ambien) tab 10 mg, 10 mg, Oral, Nightly PRN      Physical Exam:                          General: alert, cooperative, in NAD                          HEENT: oropharynx clear without erythema or exudates, moist mucous membranes                          Lungs: Clear to auscultation bilaterally, no wheezes or crackles                           Chest wall: No tenderness or deformity.                          Heart: Regular rate and rhythm, normal S1S2                          Abdomen: soft, non-tender, non-distended, positive BS.                          Extremity: No clubbing or cyanosis. no edema                          Skin: No rashes or lesions.       Lab Results   Component Value Date    WBC 5.2 03/12/2024    RBC 2.46 03/12/2024    HGB 7.3 03/12/2024    HCT 21.2 03/12/2024    MCV 86.2 03/12/2024    MCH 29.7 03/12/2024    MCHC 34.4 03/12/2024    RDW 14.4 03/12/2024    .0 03/12/2024     Lab Results   Component Value Date     03/12/2024    K 3.5 03/12/2024    K 3.5 03/12/2024     03/12/2024    CO2 17.0 03/12/2024    BUN 26 03/12/2024    CREATSERUM 0.96 03/12/2024    GLU 90 03/12/2024    CA 8.9 03/12/2024     Lab Results   Component Value Date    INR 1.18 03/10/2024    INR 1.26 (H) 12/28/2023    INR 1.43 (H) 12/23/2023          Imaging: I have independently visualized all relevant chest imaging in PACS.  I agree with the radiology interpretation except where noted.       ASSESSMENT/PLAN:  ILD  -suspect inflammatory lung disease related to autoimmune/connective tissue disease. Pt reports hx of lupus and Sjogren's  -follows w/ [facility] rheumatology, not currently on immunosuppression per pt   Aspiration PNA  -w/ h/o  recent emesis, some of the changes on CT may be consistent w/ aspirated gastric contents, plan for repeat CT chest in 6-8 weeks, if abnormalities persist would then favor proceeding with biopsy vs trial of steroids   -cont zosyn (3/6 - 3/12 ), to complete 7 day course today  -monitor cultures         -mucinex and tessalon perles for cough  Pleural Effusions  -most likely due to HFrEF  -tapped on previous admission and transudative  -diuresis   Systolic CHF  -echo with EF: 20-25% with restrictive physiology, PASP: 50-55mmHg  Dispo:  -stable from pulm perspective for   -pt to follow up with Dr. Maurilio CARTER in 2 weeks upon discharge    Ashley Tapia MD

## 2024-03-12 NOTE — PROGRESS NOTES
[facility]  Nephrology Progress Note    Alina Aceves Attending:  Jasiel Rubi DO       Assessment and Plan:    1) Hyponatremia-  due to CHF / meds (cymbalta) / pulm issues (ADH). Recent TSH WNL. Volume OK. PLAN- improving with loop diuretic / fluid restriction + tolvaptan     2) HFrEF- EF 20-25% with RV dysfunction + severe TR / mod pul HTN- etiology unclear (chemo?)- compensated     3) Proteinuria with h/o biopsy proven interstitial nephritis (presumably due to Sjogrens) partially tx'ed with steroids at 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 rheum     7) Anemia- due to chronic disease. No clear bleeding. Hapto WNL, LDH mildly elevated- doubt significant hemolysis. Fe stores low -> IV Fe    DC planning. DC home on K-citrate + prn torsemide + weekly labs      Subjective:  Pt awake alert feeling better overall no nausea / vomiting    Physical Exam:   /87 (BP Location: Left arm)   Pulse 110   Temp 97.8 °F (36.6 °C) (Oral)   Resp 20   Wt 114 lb 3.2 oz (51.8 kg)   LMP 2023 (Approximate)   SpO2 100%   BMI 19.60 kg/m²   Temp (24hrs), Av.8 °F (36.6 °C), Min:97.6 °F (36.4 °C), Max:98.2 °F (36.8 °C)       Intake/Output Summary (Last 24 hours) at 3/12/2024 0655  Last data filed at 3/11/2024 2357  Gross per 24 hour   Intake 1140 ml   Output 1000 ml   Net 140 ml     Wt Readings from Last 3 Encounters:   24 114 lb 3.2 oz (51.8 kg)   24 104 lb 15 oz (47.6 kg)   23 120 lb (54.4 kg)     General: asleep  HEENT: No scleral icterus, MMM  Neck: Supple, no KALYN or thyromegaly  Cardiac: Regular rate and rhythm, S1, S2 normal, no murmur or tub  Lungs: Decreased BS at bases bilaterally   Abdomen: Soft, non-tender. + bowel sounds, no palpable organomegaly  Extremities: Without clubbing, cyanosis; no  edema  Neurologic: Cranial nerves grossly intact, moving all extremities  Skin: Warm and dry, no rashes       Labs:   Lab Results   Component Value Date    WBC 5.2 03/12/2024    HGB 7.3 03/12/2024    HCT 21.2 03/12/2024    .0 03/12/2024    CREATSERUM 0.96 03/12/2024    BUN 26 03/12/2024     03/12/2024    K 3.5 03/12/2024    K 3.5 03/12/2024     03/12/2024    CO2 17.0 03/12/2024    GLU 90 03/12/2024    CA 8.9 03/12/2024    MG 1.8 03/12/2024       Imaging:  All imaging studies reviewed.    Meds:           Questions/concerns were discussed with patient and/or family by bedside.          Marya Caban MD  3/12/2024  655 AM

## 2024-03-12 NOTE — PROGRESS NOTES
NURSING DISCHARGE NOTE    Discharged Home via Wheelchair.  Accompanied by Support staff  Belongings Taken by patient/family.    All upcoming appointments reviewed with and understood by patient. All medications reviewed with and understood by patient. All questions answered at this time. Patient left unit with no signs of distress.

## 2024-03-12 NOTE — DISCHARGE INSTRUCTIONS
Have weekly labs per Dr. Caban, Nephrologist.         Going Home Instructions    In this section you will find the tools which will guide you through the first few days after you leave the hospital. Continued use of these tools will help you develop the skills necessary to keep your heart failure under control.       Home Care Instructions Following Heart Failure - the most important things to do every day include:     Weigh yourself  Take your medicines as prescribed  Limit your sodium (salt) and fluid intake  Know when to call your cardiologist, primary doctor, or nurse  Know when to seek emergency care    Things for You to Remember:   1. See your doctor or healthcare provider.  It is important that you attend this appointment to make sure your symptoms are under control.     2. Your recommended sodium intake is 3915-2050 mg daily    3. Limit your fluid intake to no more than 2 liters or 64 ounces per day    4. Some exercise and activity is important to help keep your heart functioning and strong. Unless instructed not to exercise, you may walk at a slow to moderate pace for 10-15 minutes 2-3 days per week to start. Pace your activity to prevent shortness of breath or fatigue. Stop exercise if you develop chest pain, lightheadedness, or significant shortness of breath.       Call Your Cardiologist If:   You gain 2 pounds overnight or 3-4 pounds in 3-5 days  You have more difficulty breathing  You are getting more tired with normal activity  You are more short of breath lying down, or awaken at night short of breath  You have swelling of your feet or legs  You urinate less often during the day and more often at night  You have cramps in your legs  You have blurred vision or see yellowish-green halos around objects of lights    Go to the Emergency Room If:   You have pain or tightness in your chest  You are extremely short of breath  You are coughing up pink-frothy mucus  You are traveling and develop symptoms of  worsening heart failure    Sometimes managing your health at home requires assistance.  The [facility] team has recognized your preference to use [facility].  They can be reached by phone at [phone].  The fax number for your reference is [phone].  A representative from the home health agency will contact you or your family to schedule your first visit.

## 2024-03-12 NOTE — PHYSICAL THERAPY NOTE
Attempted to see Pt this AM and PM - RN aware of attempt.  Pt encouraged to ambulate in hallway - offered stairs, but denied concerns prior to dc.      Will f/u later today if time permits, after all other patients are attempted per tentative schedule.

## 2024-03-13 NOTE — DISCHARGE SUMMARY
[facility]     Alina Aceves Patient Status:  Inpatient    1980 MRN [id number]   Location [facility] 2NE-A Attending No att. providers found   Hosp Day # 6 PCP HOLLY IBARRA     Date of Admission: 3/6/2024  Date of Discharge:  3/12/2024     Discharge Disposition: Home or Self Care    Discharge Diagnosis:  #Acute on chronic normocytic anemia  #Iron deficiency   -hgb 7.3 today s/p 1u on 3/9  -anemia agudelo reviewed   -iv iron     #Pneumonia  #GGO  -Pulm following, will get repeat CT as an outpatient to rule out underlying etiology GGO  -Chest x-ray & CT chest reviewed  -Blood cultures pending  -Sputum culture pending  -Urine antigens pending  -Continue IV zosyn.  Augmentin on discharge     #Hyponatremia, improving  #Metabolic acidosis, remains low  #Dehydration   #Hypokalemia  -Fluid restriction, s/p tolvaptan, continue diuretic  -Continue po bicarb for now, defer to renal on discharge  -Sodium up to 130  -Monitor labs  -Nephrology following    #Breast cancer  -follows duly onc     #Elevated troponin of no clinical significance  -Cardiology eval noted     #Asymptomatic pyuria  -Empiric antibiotics started for above, urine culture no growth     #HFrEF     #Lupus     #Sjogrens syndrome    History of Present Illness: Alina Aceves is a 43 year old female with past medical history of breast cancer, Sjogren's syndrome, lupus who presents ED for nausea vomiting diarrhea.  Patient reports symptoms began last week.  She is also had fatigue that is worsened throughout the week.  Patient denies any fevers, chills, abdominal pain, dyspnea, headaches, dizziness.       Brief Synopsis: Patient admitted to the hospital for nausea and vomiting that resolved on its own.  Patient found to have hyponatremia, metabolic acidosis which improved with fluid restriction, Samsca, diuretic eventually.  Patient initially did get IV fluids.  Patient also diagnosed with pneumonia/GGO on CT scan.  Patient to  follow-up with pulmonary for repeat imaging as an outpatient to rule out underlying rheumatologic associated lung disease.  At this point no steroids was initiated.  Patient was seen by cardiology, NSTEMI ruled out.  Patient to complete oral antibiotics on discharge for pneumonia.    Lace+ Score: 81  59-90 High Risk  29-58 Medium Risk  0-28   Low Risk       TCM Follow-Up Recommendation:  LACE > 58: High Risk of readmission after discharge from the hospital.      Procedures during hospitalization:       Incidental or significant findings and recommendations (brief descriptions):      Lab/Test results pending at Discharge:       Consultants:  Nephrology, pulmonary, cardiology    Discharge Medication List:     Discharge Medications        START taking these medications        Instructions Prescription details   amoxicillin clavulanate 875-125 MG Tabs  Commonly known as: Augmentin      Take 1 tablet by mouth 2 (two) times daily for 3 days.   Stop taking on: March 15, 2024  Quantity: 6 tablet  Refills: 0            CONTINUE taking these medications        Instructions Prescription details   DULoxetine 30 MG Cpep  Commonly known as: Cymbalta      Take 2 capsules (60 mg total) by mouth daily.   Refills: 0     HYDROcodone-acetaminophen 5-325 MG Tabs  Commonly known as: Norco      Take 1 tablet by mouth every 6 (six) hours as needed for Pain.   Refills: 0     lidocaine-prilocaine 2.5-2.5 % Crea  Commonly known as: Emla      APPLY SMALL AMOUNT OVER PORT PRIOR TO ACCESS   Refills: 0     metoprolol succinate ER 50 MG Tb24  Commonly known as: Toprol XL      Take 1 tablet (50 mg total) by mouth 2x Daily(Beta Blocker).   Quantity: 180 tablet  Refills: 3     ondansetron 4 MG Tbdp  Commonly known as: Zofran-ODT      Take 1 tablet (4 mg total) by mouth every 4 (four) hours as needed for Nausea.   Quantity: 10 tablet  Refills: 0     Polyethylene Glycol 3350 17 g Pack  Commonly known as: MIRALAX      Take 17 g by mouth daily as  needed.   Quantity: 30 each  Refills: 0     Potassium Citrate ER 15 MEQ (1620 MG) Tbcr      Take 1 tablet by mouth in the morning, at noon, and at bedtime.   Refills: 0     prochlorperazine 10 mg tablet  Commonly known as: Compazine      Take 1 tablet (10 mg total) by mouth every 6 (six) hours as needed for Nausea or vomiting.   Refills: 0     torsemide 20 MG Tabs  Commonly known as: Demadex      Take 1 tablet (20 mg total) by mouth daily.   Quantity: 90 tablet  Refills: 3     zolpidem 10 MG Tabs  Commonly known as: Ambien      Take 1 tablet (10 mg total) by mouth nightly as needed for Sleep.   Refills: 0            STOP taking these medications      capecitabine 500 MG tab  Commonly known as: Xeloda        predniSONE 10 MG Tabs  Commonly known as: Deltasone        PRENATAL AD OR        spironolactone 25 MG Tabs  Commonly known as: Aldactone        traMADol 50 MG Tabs  Commonly known as: Ultram                  Where to Get Your Medications        These medications were sent to [id number] - [place], [place] - [address] AT [facility] [address], [phone], [phone]  [address], [facility] [address]      Phone: [phone]   amoxicillin clavulanate 875-125 MG Tabs         [facility] reviewed:     Follow-up appointment:   Francisco Thorpe IV, MD  [address]  [facility] [address]  [facility] [place] [address] [phone]    Follow up in 2 week(s)      Stephon Villeda  [address] [place] [address] [phone]    Schedule an appointment as soon as possible for a visit in 1 week(s)      Leonel Villalba MD  [address]  [facility] [place] [address] [phone]    Schedule an appointment as soon as possible for a visit in 2 week(s)  with MD or Jersey OCONNOR    Appointments for Next 30 Days 3/13/2024 - 4/12/2024      None            Vital signs:       Physical Exam:    General: No acute distress   Lungs: clear to auscultation  Cardiovascular: S1, S2  Abdomen:  Soft      -----------------------------------------------------------------------------------------------  PATIENT DISCHARGE INSTRUCTIONS: See electronic chart    Jasiel Rubi,     Total time spent on discharge plannin minutes     The  [employer] Cures Act makes medical notes like these available to patients in the interest of transparency. Please be advised this is a medical document. Medical documents are intended to carry relevant information, facts as evident, and the clinical opinion of the practitioner. The medical note is intended as peer to peer communication and may appear blunt or direct. It is written in medical language and may contain abbreviations or verbiage that are unfamiliar.

## 2024-03-19 NOTE — CDS QUERY
Dear Dr Rubi,  Please indicate if you are in agreement with pulmonology's findings of aspiration pneumonia.    [ x ] Aspiration pneumonia is confirmed  [  ] Other pneumonia, indicate type: _________________      Clinical Indicators: 3/6 43 Y/F- C/o of cough/n/v/d/fatigued over the last week. Temp 101.4  CT chest- Moderate ground-glass airspace opacities noted within a perihilar distribution throughout both lungs, possibly representing pulmonary edema versus infectious/inflammatory process.  CXR- Patchy perihilar airspace disease most in keeping with residual/recurrent pneumonia, though significantly improved compared to 12/19/2023.   Strep/legionella urine antigen- negative  Blood cultures- negative  -3/6- 3/12 H&P PN- H&P- #Pneumonia  3/7-312 Pulmonary PN- lungs: Rales in right posterior lung field respirations unlabored   Aspiration PNA  -w/ h/o recent emesis, some of the changes on CT may be consistent w/ aspirated gastric contents  -cont zosyn      Risks: emesis, cough    Treatment: CT chest, IV zosyn, pulmonary consult, urine Ag strep/legionella, CXR, blood culture    Use of terms such as suspected, possible, or probable (associated with a specific diagnosis that is being evaluated, monitored, or treated as if it exists) are acceptable and can be coded in the inpatient setting, when documented at the time of discharge.     Please add any additional documentation to your progress note and continue to document this through discharge.    For questions, please contact clinical  Elizabeth Rosa RN, [phone]. Thank you.

## 2024-03-27 ENCOUNTER — HOSPITAL ENCOUNTER (INPATIENT)
Facility: HOSPITAL | Age: 44
LOS: 2 days | Discharge: HOME HEALTH | End: 2024-03-29
Attending: EMERGENCY MEDICINE | Admitting: HOSPITALIST
Payer: COMMERCIAL

## 2024-03-27 ENCOUNTER — APPOINTMENT (OUTPATIENT)
Dept: GENERAL RADIOLOGY | Facility: HOSPITAL | Age: 44
End: 2024-03-27
Attending: EMERGENCY MEDICINE
Payer: COMMERCIAL

## 2024-03-27 DIAGNOSIS — E87.1 HYPONATREMIA: Primary | ICD-10-CM

## 2024-03-27 DIAGNOSIS — E87.20 METABOLIC ACIDOSIS: ICD-10-CM

## 2024-03-27 DIAGNOSIS — G62.0 POLYNEUROPATHY DUE TO DRUGS (HCC): ICD-10-CM

## 2024-03-27 LAB
ALBUMIN SERPL-MCNC: 2.2 G/DL (ref 3.4–5)
ALBUMIN/GLOB SERPL: 0.8 {RATIO} (ref 1–2)
ALP LIVER SERPL-CCNC: 64 U/L
ALT SERPL-CCNC: 10 U/L
ANION GAP SERPL CALC-SCNC: 10 MMOL/L (ref 0–18)
AST SERPL-CCNC: 16 U/L (ref 15–37)
BASOPHILS # BLD AUTO: 0 X10(3) UL (ref 0–0.2)
BASOPHILS NFR BLD AUTO: 0 %
BILIRUB SERPL-MCNC: 0.6 MG/DL (ref 0.1–2)
BUN BLD-MCNC: 8 MG/DL (ref 9–23)
CALCIUM BLD-MCNC: 8.2 MG/DL (ref 8.5–10.1)
CHLORIDE SERPL-SCNC: 89 MMOL/L (ref 98–112)
CO2 SERPL-SCNC: 18 MMOL/L (ref 21–32)
CREAT BLD-MCNC: 0.45 MG/DL
EGFRCR SERPLBLD CKD-EPI 2021: 122 ML/MIN/1.73M2 (ref 60–?)
EOSINOPHIL # BLD AUTO: 0.06 X10(3) UL (ref 0–0.7)
EOSINOPHIL NFR BLD AUTO: 1.7 %
ERYTHROCYTE [DISTWIDTH] IN BLOOD BY AUTOMATED COUNT: 15.9 %
GLOBULIN PLAS-MCNC: 2.8 G/DL (ref 2.8–4.4)
GLUCOSE BLD-MCNC: 93 MG/DL (ref 70–99)
HCT VFR BLD AUTO: 23 %
HGB BLD-MCNC: 7.8 G/DL
IMM GRANULOCYTES # BLD AUTO: 0.05 X10(3) UL (ref 0–1)
IMM GRANULOCYTES NFR BLD: 1.4 %
LYMPHOCYTES # BLD AUTO: 0.15 X10(3) UL (ref 1–4)
LYMPHOCYTES NFR BLD AUTO: 4.2 %
MCH RBC QN AUTO: 29.3 PG (ref 26–34)
MCHC RBC AUTO-ENTMCNC: 33.9 G/DL (ref 31–37)
MCV RBC AUTO: 86.5 FL
MONOCYTES # BLD AUTO: 0.33 X10(3) UL (ref 0.1–1)
MONOCYTES NFR BLD AUTO: 9.2 %
NEUTROPHILS # BLD AUTO: 3.01 X10 (3) UL (ref 1.5–7.7)
NEUTROPHILS # BLD AUTO: 3.01 X10(3) UL (ref 1.5–7.7)
NEUTROPHILS NFR BLD AUTO: 83.5 %
NT-PROBNP SERPL-MCNC: ABNORMAL PG/ML (ref ?–125)
OSMOLALITY SERPL CALC.SUM OF ELEC: 242 MOSM/KG (ref 275–295)
OSMOLALITY UR: 112 MOSM/KG (ref 300–1300)
PLATELET # BLD AUTO: 171 10(3)UL (ref 150–450)
POTASSIUM SERPL-SCNC: 3.2 MMOL/L (ref 3.5–5.1)
PROT SERPL-MCNC: 5 G/DL (ref 6.4–8.2)
RBC # BLD AUTO: 2.66 X10(6)UL
SODIUM SERPL-SCNC: 117 MMOL/L (ref 136–145)
SODIUM SERPL-SCNC: 120 MMOL/L (ref 136–145)
SODIUM SERPL-SCNC: 18 MMOL/L
WBC # BLD AUTO: 3.6 X10(3) UL (ref 4–11)

## 2024-03-27 PROCEDURE — 99223 1ST HOSP IP/OBS HIGH 75: CPT | Performed by: INTERNAL MEDICINE

## 2024-03-27 PROCEDURE — 99255 IP/OBS CONSLTJ NEW/EST HI 80: CPT | Performed by: INTERNAL MEDICINE

## 2024-03-27 PROCEDURE — 71045 X-RAY EXAM CHEST 1 VIEW: CPT | Performed by: EMERGENCY MEDICINE

## 2024-03-27 RX ORDER — ZOLPIDEM TARTRATE 10 MG/1
10 TABLET ORAL NIGHTLY PRN
Status: DISCONTINUED | OUTPATIENT
Start: 2024-03-27 | End: 2024-03-29

## 2024-03-27 RX ORDER — CEPHALEXIN 500 MG/1
500 CAPSULE ORAL EVERY 6 HOURS SCHEDULED
Status: DISCONTINUED | OUTPATIENT
Start: 2024-03-27 | End: 2024-03-29

## 2024-03-27 RX ORDER — DULOXETIN HYDROCHLORIDE 30 MG/1
60 CAPSULE, DELAYED RELEASE ORAL DAILY
Status: DISCONTINUED | OUTPATIENT
Start: 2024-03-27 | End: 2024-03-29

## 2024-03-27 RX ORDER — ONDANSETRON 2 MG/ML
4 INJECTION INTRAMUSCULAR; INTRAVENOUS EVERY 6 HOURS PRN
Status: DISCONTINUED | OUTPATIENT
Start: 2024-03-27 | End: 2024-03-29

## 2024-03-27 RX ORDER — SODIUM CHLORIDE 9 MG/ML
INJECTION, SOLUTION INTRAVENOUS CONTINUOUS
Status: DISCONTINUED | OUTPATIENT
Start: 2024-03-27 | End: 2024-03-28

## 2024-03-27 RX ORDER — HYDROCODONE BITARTRATE AND ACETAMINOPHEN 5; 325 MG/1; MG/1
1 TABLET ORAL EVERY 6 HOURS PRN
Status: DISCONTINUED | OUTPATIENT
Start: 2024-03-27 | End: 2024-03-29

## 2024-03-27 RX ORDER — ENEMA 19; 7 G/133ML; G/133ML
1 ENEMA RECTAL ONCE AS NEEDED
Status: DISCONTINUED | OUTPATIENT
Start: 2024-03-27 | End: 2024-03-29

## 2024-03-27 RX ORDER — CEFADROXIL 500 MG/1
500 CAPSULE ORAL 2 TIMES DAILY
COMMUNITY

## 2024-03-27 RX ORDER — ECHINACEA PURPUREA EXTRACT 125 MG
1 TABLET ORAL
Status: DISCONTINUED | OUTPATIENT
Start: 2024-03-27 | End: 2024-03-29

## 2024-03-27 RX ORDER — ACETAMINOPHEN 500 MG
1000 TABLET ORAL EVERY 8 HOURS PRN
Status: DISCONTINUED | OUTPATIENT
Start: 2024-03-27 | End: 2024-03-29

## 2024-03-27 RX ORDER — BENZONATATE 200 MG/1
200 CAPSULE ORAL 3 TIMES DAILY PRN
Status: DISCONTINUED | OUTPATIENT
Start: 2024-03-27 | End: 2024-03-29

## 2024-03-27 RX ORDER — PROCHLORPERAZINE EDISYLATE 5 MG/ML
5 INJECTION INTRAMUSCULAR; INTRAVENOUS EVERY 8 HOURS PRN
Status: DISCONTINUED | OUTPATIENT
Start: 2024-03-27 | End: 2024-03-29

## 2024-03-27 RX ORDER — METOPROLOL SUCCINATE 50 MG/1
50 TABLET, EXTENDED RELEASE ORAL
Status: DISCONTINUED | OUTPATIENT
Start: 2024-03-27 | End: 2024-03-29

## 2024-03-27 RX ORDER — BISACODYL 10 MG
10 SUPPOSITORY, RECTAL RECTAL
Status: DISCONTINUED | OUTPATIENT
Start: 2024-03-27 | End: 2024-03-29

## 2024-03-27 RX ORDER — POLYETHYLENE GLYCOL 3350 17 G/17G
17 POWDER, FOR SOLUTION ORAL DAILY PRN
Status: DISCONTINUED | OUTPATIENT
Start: 2024-03-27 | End: 2024-03-29

## 2024-03-27 RX ORDER — SENNOSIDES 8.6 MG
17.2 TABLET ORAL NIGHTLY PRN
Status: DISCONTINUED | OUTPATIENT
Start: 2024-03-27 | End: 2024-03-29

## 2024-03-27 RX ORDER — POTASSIUM CHLORIDE 20 MEQ/1
40 TABLET, EXTENDED RELEASE ORAL ONCE
Status: COMPLETED | OUTPATIENT
Start: 2024-03-27 | End: 2024-03-27

## 2024-03-27 RX ORDER — MELATONIN
3 NIGHTLY PRN
Status: DISCONTINUED | OUTPATIENT
Start: 2024-03-27 | End: 2024-03-29

## 2024-03-27 RX ORDER — SODIUM CHLORIDE 9 MG/ML
INJECTION, SOLUTION INTRAVENOUS ONCE
Status: COMPLETED | OUTPATIENT
Start: 2024-03-27 | End: 2024-03-28

## 2024-03-27 RX ORDER — ENOXAPARIN SODIUM 100 MG/ML
40 INJECTION SUBCUTANEOUS NIGHTLY
Status: DISCONTINUED | OUTPATIENT
Start: 2024-03-27 | End: 2024-03-29

## 2024-03-27 NOTE — ED PROVIDER NOTES
Patient Seen in: [facility] Emergency Department      History     Chief Complaint   Patient presents with    Abnormal Labs     Stated Complaint: Pt in stating her Na is low with other abnormal labs    Subjective:   HPI      Alina Aceves is a 43 year old female with past medical history of breast cancer, Sjogren's syndrome, lupus , siadh, cardiorenal syndrome, CHF, is at the ED with abnormal labs.  Recently admitted for nausea vomiting, pneumonia and hyponatremia.  He has saw cardiology yesterday and repeat labs showing sodium 115.  She complains of fatigue but no headache no dizziness no mental status changes.  No seizures.  No chest pain shortness of breath.  No other associate symptoms.  No other complaints.    Objective:   Past Medical History:   Diagnosis Date    Breast cancer (HCC)     Congestive heart disease (HCC)     Gastritis     Lupus (HCC)     Personal history of antineoplastic chemotherapy     Sjogren's disease (HCC)               Past Surgical History:   Procedure Laterality Date    BREAST SURGERY Left      DELIVERY ONLY                  Social History     Socioeconomic History    Marital status:    Tobacco Use    Smoking status: Former     Types: Cigarettes     Quit date:      Years since quittin.2    Smokeless tobacco: Never    Tobacco comments:     social smoker   Vaping Use    Vaping Use: Never used   Substance and Sexual Activity    Alcohol use: Not Currently     Comment: occassional    Drug use: Never     Social Determinants of Health     Food Insecurity: No Food Insecurity (3/6/2024)    Food Insecurity     Food Insecurity: Never true   Transportation Needs: No Transportation Needs (3/6/2024)    Transportation Needs     Lack of Transportation: No   Housing Stability: Low Risk  (3/6/2024)    Housing Stability     Housing Instability: No   Recent Concern: Housing Stability - Medium Risk (2023)    Housing Stability     Housing Instability: Yes               Review of Systems    Positive for stated complaint: Pt in stating her Na is low with other abnormal labs  Other systems are as noted in HPI.  Constitutional and vital signs reviewed.      All other systems reviewed and negative except as noted above.    Physical Exam     ED Triage Vitals   BP 03/27/24 1106 112/88   Pulse 03/27/24 1105 90   Resp 03/27/24 1105 19   Temp --    Temp src --    SpO2 03/27/24 1105 99 %   O2 Device 03/27/24 1105 None (Room air)       Current:/90   Pulse 95   Resp 20   Ht 162.6 cm (5' 4\")   Wt 51.7 kg   LMP 08/28/2023 (Approximate)   SpO2 100%   BMI 19.57 kg/m²         Physical Exam  Vitals and nursing note reviewed.   Constitutional:       General: She is not in acute distress.     Appearance: She is well-developed. She is not toxic-appearing.   HENT:      Head: Normocephalic and atraumatic.   Eyes:      General: No scleral icterus.     Conjunctiva/sclera: Conjunctivae normal.   Cardiovascular:      Rate and Rhythm: Normal rate and regular rhythm.      Heart sounds: No murmur heard.  Pulmonary:      Effort: Pulmonary effort is normal. No respiratory distress.      Breath sounds: Normal breath sounds.   Abdominal:      General: There is no distension.   Musculoskeletal:         General: No tenderness. Normal range of motion.      Cervical back: Normal range of motion and neck supple.   Skin:     General: Skin is warm and dry.      Findings: No rash.   Neurological:      General: No focal deficit present.      Mental Status: She is alert and oriented to person, place, and time.      Motor: No abnormal muscle tone.      Coordination: Coordination normal.   Psychiatric:         Mood and Affect: Mood normal.         Behavior: Behavior normal.              ED Course     Labs Reviewed   COMP METABOLIC PANEL (14) - Abnormal; Notable for the following components:       Result Value    Sodium 117 (*)     Potassium 3.2 (*)     Chloride 89 (*)     CO2 18.0 (*)     BUN 8 (*)      Creatinine 0.45 (*)     Calcium, Total 8.2 (*)     Calculated Osmolality 242 (*)     ALT 10 (*)     Total Protein 5.0 (*)     Albumin 2.2 (*)     A/G Ratio 0.8 (*)     All other components within normal limits   PRO BETA NATRIURETIC PEPTIDE - Abnormal; Notable for the following components:    Pro-Beta Natriuretic Peptide 34,887 (*)     All other components within normal limits   CBC W/ DIFFERENTIAL - Abnormal; Notable for the following components:    WBC 3.6 (*)     RBC 2.66 (*)     HGB 7.8 (*)     HCT 23.0 (*)     Lymphocyte Absolute 0.15 (*)     All other components within normal limits   CBC WITH DIFFERENTIAL WITH PLATELET    Narrative:     The following orders were created for panel order CBC With Differential With Platelet.  Procedure                               Abnormality         Status                     ---------                               -----------         ------                     CBC W/ DIFFERENTIAL[[id number]]          Abnormal            Final result                 Please view results for these tests on the individual orders.                EKG: Rate 95, intervals per EKG report.  Normal sinus rhythm.  Nonspecific T wave abnormality                     MDM         -Tracing on cardiac monitor and pulse oximetry was reviewed by myself.   -The cardiac monitor revealed normal sinus rhythm as interpreted by me. The cardiac monitor was ordered to monitor the patient for dysrhythmia  -Pulse oximetry was interpreted by me and was normal.  Pulse oximeter was ordered to monitor patient for hypoxia.             -Comorbidities did add complexity to the management are mentioned in the HPI above        -I personally reviewed the prior external notes and the medical record to obtain additional history -discharge summary from March 2024 approximate last week when patient was admitted for pneumonia hyponatremia        -DDX: Includes but not limited to -dehydration, SIADH, hyponatremia        -I personally  reviewed the radiographs findings and they show multifocal opacities.  Please refer to radiology report for official interpretation      Labs Reviewed   COMP METABOLIC PANEL (14) - Abnormal; Notable for the following components:       Result Value    Sodium 117 (*)     Potassium 3.2 (*)     Chloride 89 (*)     CO2 18.0 (*)     BUN 8 (*)     Creatinine 0.45 (*)     Calcium, Total 8.2 (*)     Calculated Osmolality 242 (*)     ALT 10 (*)     Total Protein 5.0 (*)     Albumin 2.2 (*)     A/G Ratio 0.8 (*)     All other components within normal limits   PRO BETA NATRIURETIC PEPTIDE - Abnormal; Notable for the following components:    Pro-Beta Natriuretic Peptide 34,887 (*)     All other components within normal limits   CBC W/ DIFFERENTIAL - Abnormal; Notable for the following components:    WBC 3.6 (*)     RBC 2.66 (*)     HGB 7.8 (*)     HCT 23.0 (*)     Lymphocyte Absolute 0.15 (*)     All other components within normal limits   CBC WITH DIFFERENTIAL WITH PLATELET    Narrative:     The following orders were created for panel order CBC With Differential With Platelet.  Procedure                               Abnormality         Status                     ---------                               -----------         ------                     CBC W/ DIFFERENTIAL[[id number]]          Abnormal            Final result                 Please view results for these tests on the individual orders.     Labs reviewed white count 3.6.  Sodium 117 potassium 3.2 bicarb 18 and Anion gap is normal.  Creatinine 0.4.  BNP 34,000.    [facility] for further care.      Evaluated by nephrology.  Recommended starting  normal saline at 83 MLS per hour.  Will follow.    Mental status is normal.  No indication for hypertonic saline at this time.  No neurologic deficits.  Patient stable for the floor.  Admission disposition: 3/27/2024  2:07 PM                                         Medical Decision Making      Disposition and Plan     Clinical Impression:  1. Hyponatremia    2. Metabolic acidosis         Disposition:  Admit  3/27/2024  2:07 pm    Follow-up:  No follow-up provider specified.        Medications Prescribed:  Current Discharge Medication List                            Hospital Problems       Present on Admission  Date Reviewed: 12/16/2021            ICD-10-CM Noted POA    * (Principal) Hyponatremia E87.1 11/14/2015 Unknown

## 2024-03-27 NOTE — H&P
[facility]     Alina Aceves Patient Status:  Emergency    1980 MRN [id number]   Location [facility] EMERGENCY DEPARTMENT Attending Luly Lr MD   Hosp Day # 0 PCP HOLLY IBARRA     Chief Complaint: Abnormal labs    Subjective:    History of Present Illness:     Alina Aceves is a 43 year old female with past medical history of breast cancer undergoing radiation therapy, congestive heart failure presented to the emergency department with abnormal labs.    Patient sent to the emergency department by her cardiologist due to decreasing sodium.  Patient recently hospitalized and treated for pneumonia, completed antibiotics, is continuing to have a cough but has been more dry.  No fevers.  Last few days appetite and oral intake has been low.  Has self held her torsemide.  No fevers or chills.  No chest pain or difficulty breathing.    History/Other:    Past Medical History:  Past Medical History:   Diagnosis Date    Breast cancer (HCC)     Congestive heart disease (HCC)     Gastritis     Lupus (HCC)     Personal history of antineoplastic chemotherapy     Sjogren's disease (HCC)      Past Surgical History:   Past Surgical History:   Procedure Laterality Date    BREAST SURGERY Left      DELIVERY ONLY        Family History:   No family history on file.  Social History:    reports that she quit smoking about 14 years ago. Her smoking use included cigarettes. She has never used smokeless tobacco. She reports that she does not currently use alcohol. She reports that she does not use drugs.     Allergies:   Allergies   Allergen Reactions    Bactrim [Sulfamethoxazole W/Trimethoprim] RASH       Medications:    Current Facility-Administered Medications on File Prior to Encounter   Medication Dose Route Frequency Provider Last Rate Last Admin    [COMPLETED] potassium chloride (K-Dur) tab 40 mEq  40 mEq Oral Once Marya Caban MD   40 mEq at 24 0905    [COMPLETED]  magnesium oxide (Mag-Ox) tab 400 mg  400 mg Oral Once Jasiel Rubi, DO   400 mg at 03/11/24 0906    [COMPLETED] potassium chloride (K-Dur) tab 40 mEq  40 mEq Oral Once Jasiel Rubi, DO   40 mEq at 03/11/24 0906    [COMPLETED] potassium chloride (K-Dur) tab 40 mEq  40 mEq Oral Once Jasiel Rubi, DO   40 mEq at 03/10/24 0951    [COMPLETED] tolvaptan (Samsca) tab 15 mg  15 mg Oral Once Marya Caban MD   15 mg at 03/10/24 0953    [COMPLETED] iron sucrose (Venofer) 20 mg/mL injection 200 mg  200 mg Intravenous Daily Marya Caban MD   200 mg at 03/12/24 0926    [COMPLETED] tolvaptan (Samsca) tab 15 mg  15 mg Oral Once Marya Caban MD   15 mg at 03/09/24 1029    [COMPLETED] tolvaptan (Samsca) tab 15 mg  15 mg Oral Once Francisco Quarles MD   15 mg at 03/08/24 0655    [COMPLETED] potassium chloride (K-Dur) tab 40 mEq  40 mEq Oral Q4H Jasiel Rubi, DO   40 mEq at 03/08/24 1757    [COMPLETED] acetaminophen (Tylenol) tab 650 mg  650 mg Oral Once Luly Lr MD   650 mg at 03/06/24 1607    [COMPLETED] sodium chloride 0.9% infusion 1,000 mL  1,000 mL Intravenous Once Luly Lr MD   Stopped at 03/06/24 1913    [COMPLETED] potassium chloride (K-Dur) tab 40 mEq  40 mEq Oral Once Luly Lr MD   40 mEq at 03/06/24 1825    [COMPLETED] ondansetron (Zofran) 4 MG/2ML injection 4 mg  4 mg Intravenous Once Luly Lr MD   4 mg at 03/06/24 1825    [COMPLETED] piperacillin-tazobactam (Zosyn) 4.5 g in dextrose 5% 100 mL IVPB-ADDV  4.5 g Intravenous Once Luly Lr MD   Stopped at 03/06/24 2012    [COMPLETED] iopamidol 76% (ISOVUE-370) injection for power injector  100 mL Intravenous ONCE PRN Luly Lr MD   100 mL at 03/06/24 2014    [COMPLETED] potassium chloride (K-Dur) tab 40 mEq  40 mEq Oral Q4H Jessica Adhikari PA   40 mEq at 01/02/24 1156    [COMPLETED] magnesium oxide (Mag-Ox) tab 800 mg  800 mg Oral Once Jessica Adhikari PA   800 mg at 01/02/24 0815    [COMPLETED] potassium chloride (K-Dur) tab 40  mEq  40 mEq Oral Q4H Nicole Cartwright MD   40 mEq at 24 1301    [COMPLETED] potassium chloride (K-Dur) tab 40 mEq  40 mEq Oral Q4H Francisco Quarles MD   40 mEq at 23 1017    [COMPLETED] potassium chloride 20 mEq/100mL IVPB premix 20 mEq  20 mEq Intravenous Once Marya Caban MD 50 mL/hr at 23 1841 20 mEq at 23 1841    [COMPLETED] iohexol (OMNIPAQUE) 350 MG/ML injection 100 mL  100 mL Injection ONCE PRN Francisco Lee MD   95 mL at 23 1404    [COMPLETED] midazolam (Versed) 2 MG/2ML injection              Current Outpatient Medications on File Prior to Encounter   Medication Sig Dispense Refill    prochlorperazine (COMPAZINE) 10 mg tablet Take 1 tablet (10 mg total) by mouth every 6 (six) hours as needed for Nausea or vomiting.      HYDROcodone-acetaminophen 5-325 MG Oral Tab Take 1 tablet by mouth every 6 (six) hours as needed for Pain.      metoprolol succinate ER 50 MG Oral Tablet 24 Hr Take 1 tablet (50 mg total) by mouth 2x Daily(Beta Blocker). 180 tablet 3    torsemide 20 MG Oral Tab Take 1 tablet (20 mg total) by mouth daily. 90 tablet 3    ondansetron 4 MG Oral Tablet Dispersible Take 1 tablet (4 mg total) by mouth every 4 (four) hours as needed for Nausea. 10 tablet 0    Potassium Citrate ER 15 MEQ (1620 MG) Oral Tab CR Take 1 tablet by mouth in the morning, at noon, and at bedtime.      DULoxetine 30 MG Oral Cap DR Particles Take 2 capsules (60 mg total) by mouth daily.      lidocaine-prilocaine 2.5-2.5 % External Cream APPLY SMALL AMOUNT OVER PORT PRIOR TO ACCESS      zolpidem 10 MG Oral Tab Take 1 tablet (10 mg total) by mouth nightly as needed for Sleep.      [] amoxicillin clavulanate 875-125 MG Oral Tab Take 1 tablet by mouth 2 (two) times daily for 3 days. 6 tablet 0    Polyethylene Glycol 3350 17 g Oral Powd Pack Take 17 g by mouth daily as needed. 30 each 0       Review of Systems:   A comprehensive review of systems was completed.    Pertinent positives and  negatives noted in the HPI.    Objective:   Physical Exam:    /90   Pulse 95   Resp 20   Ht 5' 4\" (1.626 m)   Wt 114 lb (51.7 kg)   LMP 08/28/2023 (Approximate)   SpO2 100%   BMI 19.57 kg/m²   General: No acute distress, Alert  Respiratory: No rhonchi, no wheezes  Cardiovascular: S1, S2. Regular rate and rhythm  Abdomen: Soft, Non-tender, non-distended, positive bowel sounds  Neuro: No new focal deficits  Extremities: No edema    Results:    Labs:      Labs Last 24 Hours:    Recent Labs   Lab 03/27/24  1129   RBC 2.66*   HGB 7.8*   HCT 23.0*   MCV 86.5   MCH 29.3   MCHC 33.9   RDW 15.9   NEPRELIM 3.01   WBC 3.6*   .0       Recent Labs   Lab 03/27/24  1129   GLU 93   BUN 8*   CREATSERUM 0.45*   EGFRCR 122   CA 8.2*   ALB 2.2*   *   K 3.2*   CL 89*   CO2 18.0*   ALKPHO 64   AST 16   ALT 10*   BILT 0.6   TP 5.0*       Lab Results   Component Value Date    INR 1.18 03/10/2024    INR 1.26 (H) 12/28/2023    INR 1.43 (H) 12/23/2023       No results for input(s): \"TROP\", \"TROPHS\", \"CK\" in the last 168 hours.    No results for input(s): \"TROP\", \"PBNP\" in the last 168 hours.    No results for input(s): \"PCT\" in the last 168 hours.    Imaging: Imaging data reviewed in Epic.    Assessment & Plan:      #Acute on chronic Hyponatremia  #NAGMA  -poor PO intake   -Nephrology on consult     #Invasive ductal carcinoma of the left breast on chemotherapy  -Oncology consult    #HFrEF with EF of 20-25%  -compensated   -Hold Torsemide  -Metoprolol     #Neuropathy - chemo SE  -Cymbalta   -Norco    #Recent PNA  -treated     #Normocytic Anemia  -no reported bleeding  -monitor     #Mild Leukopenia     #SLE  #Sjogren's Syndrome         Plan of care discussed with patient     Deep Fine DO    Supplementary Documentation:     The 21st Century Cures Act makes medical notes like these available to patients in the interest of transparency. Please be advised this is a medical document. Medical documents are intended to  carry relevant information, facts as evident, and the clinical opinion of the practitioner. The medical note is intended as peer to peer communication and may appear blunt or direct. It is written in medical language and may contain abbreviations or verbiage that are unfamiliar.

## 2024-03-27 NOTE — CONSULTS
[facility]  Report of Consultation    Alina Aceves Patient Status:  Emergency    1980 MRN [id number]   Location [facility] EMERGENCY DEPARTMENT Attending Luly Lr MD   Hosp Day # 0 PCP OHLLY IBARRA     Reason for Consultation:  Hyponatremia      History of Present Illness:  Alina Aceves is a a(n) 43 year old woman known to our service with mult med probs incl SLE/Sjogrens, interstitial nephritis on prior renal bx, baseline normal renal function, stage 3 breast cancer, HFrEF with chronic hyponatremia who presented for labs and noted to have Na 117 meq/l.  She states she has had N/V/poor po intake or the past several days.    History:  Past Medical History:   Diagnosis Date    Breast cancer (HCC)     Congestive heart disease (HCC)     Gastritis     Lupus (HCC)     Personal history of antineoplastic chemotherapy     Sjogren's disease (HCC)      Past Surgical History:   Procedure Laterality Date    BREAST SURGERY Left      DELIVERY ONLY       No family history on file.   reports that she quit smoking about 14 years ago. Her smoking use included cigarettes. She has never used smokeless tobacco. She reports that she does not currently use alcohol. She reports that she does not use drugs.    Allergies:  Allergies   Allergen Reactions    Bactrim [Sulfamethoxazole W/Trimethoprim] RASH       Medications:    Current Facility-Administered Medications:     sodium chloride 0.9% infusion, , Intravenous, Once  Prior to Admission Medications   Medication Sig    prochlorperazine (COMPAZINE) 10 mg tablet Take 1 tablet (10 mg total) by mouth every 6 (six) hours as needed for Nausea or vomiting.    HYDROcodone-acetaminophen 5-325 MG Oral Tab Take 1 tablet by mouth every 6 (six) hours as needed for Pain.    metoprolol succinate ER 50 MG Oral Tablet 24 Hr Take 1 tablet (50 mg total) by mouth 2x Daily(Beta Blocker).    torsemide 20 MG Oral Tab Take 1 tablet (20 mg total) by mouth daily.    ondansetron 4  MG Oral Tablet Dispersible Take 1 tablet (4 mg total) by mouth every 4 (four) hours as needed for Nausea.    Potassium Citrate ER 15 MEQ (1620 MG) Oral Tab CR Take 1 tablet by mouth in the morning, at noon, and at bedtime.    DULoxetine 30 MG Oral Cap DR Particles Take 2 capsules (60 mg total) by mouth daily.    lidocaine-prilocaine 2.5-2.5 % External Cream APPLY SMALL AMOUNT OVER PORT PRIOR TO ACCESS    zolpidem 10 MG Oral Tab Take 1 tablet (10 mg total) by mouth nightly as needed for Sleep.    Polyethylene Glycol 3350 17 g Oral Powd Pack Take 17 g by mouth daily as needed.       Review of Systems:  Denies fever/chills  Denies wt loss/gain  Denies HA or visual changes  Denies CP or palpitations  Denies SOB/cough/hemoptysis  Denies abd or flank pain   N/V  Denies change in urinary habits or gross hematuria  Denies LE edema  Denies skin rashes/myalgias/arthralgias      Physical Exam:   /90   Pulse 95   Resp 20   Ht 5' 4\" (1.626 m)   Wt 114 lb (51.7 kg)   LMP 08/28/2023 (Approximate)   SpO2 100%   BMI 19.57 kg/m²   No data recorded.     No intake or output data in the 24 hours ending 03/27/24 1413  Last 3 Weights   03/27/24 1105 114 lb (51.7 kg)   03/11/24 0506 114 lb 3.2 oz (51.8 kg)   03/10/24 0813 103 lb 13.4 oz (47.1 kg)   03/09/24 0413 115 lb 15.4 oz (52.6 kg)   03/06/24 2200 108 lb 9.6 oz (49.3 kg)   03/06/24 1605 115 lb (52.2 kg)   01/02/24 0500 104 lb 15 oz (47.6 kg)   01/01/24 0436 101 lb 10.1 oz (46.1 kg)   12/31/23 0448 110 lb 3.7 oz (50 kg)   12/30/23 0518 105 lb 9.6 oz (47.9 kg)   12/28/23 0437 102 lb 11.8 oz (46.6 kg)   12/27/23 0316 114 lb 6.7 oz (51.9 kg)   12/26/23 0500 128 lb 3.2 oz (58.2 kg)   12/25/23 0408 135 lb 2.3 oz (61.3 kg)   12/24/23 0409 147 lb (66.7 kg)   12/23/23 0400 143 lb 8.3 oz (65.1 kg)   12/22/23 1100 147 lb 7.8 oz (66.9 kg)   12/21/23 0000 147 lb 0.8 oz (66.7 kg)   12/19/23 0538 150 lb 9.2 oz (68.3 kg)   12/18/23 0019 150 lb 3.2 oz (68.1 kg)   12/13/23 2326 124 lb  (56.2 kg)   12/13/23 1823 125 lb (56.7 kg)   11/13/23 1816 120 lb (54.4 kg)   12/16/21 1537 135 lb (61.2 kg)     General: Alert and oriented in no apparent distress.  HEENT: No scleral icterus, MMM  Neck: Supple, no KALYN or thyromegaly  Cardiac: Regular rate and rhythm, S1, S2 normal, no murmur or rub  Lungs: Clear without wheezes, rales, rhonchi.    Abdomen: Soft, non-tender. + bowel sounds, no palpable organomegaly  Extremities: tr edema.  Neurologic:  moving all extremities  Skin: Warm and dry, no rashes      Laboratory Data:  Lab Results   Component Value Date    WBC 3.6 03/27/2024    HGB 7.8 03/27/2024    HCT 23.0 03/27/2024    .0 03/27/2024    CREATSERUM 0.45 03/27/2024    BUN 8 03/27/2024     03/27/2024    K 3.2 03/27/2024    CL 89 03/27/2024    CO2 18.0 03/27/2024    GLU 93 03/27/2024    CA 8.2 03/27/2024    ALB 2.2 03/27/2024    ALKPHO 64 03/27/2024    BILT 0.6 03/27/2024    TP 5.0 03/27/2024    AST 16 03/27/2024    ALT 10 03/27/2024       BUN (mg/dL)   Date Value   03/27/2024 8 (L)   03/12/2024 26 (H)   03/11/2024 27 (H)     Creatinine (mg/dL)   Date Value   03/27/2024 0.45 (L)   03/12/2024 0.96   03/11/2024 1.08 (H)       No results found for: \"MICROALBCREA\"    Recent Labs   Lab 03/27/24  1129   WBC 3.6*   HGB 7.8*   MCV 86.5   .0       Recent Labs   Lab 03/27/24  1129   *   K 3.2*   CL 89*   CO2 18.0*   BUN 8*   CREATSERUM 0.45*   CA 8.2*   GLU 93       Recent Labs   Lab 03/27/24  1129   ALT 10*   AST 16   ALB 2.2*       No results for input(s): \"PGLU\" in the last 168 hours.        Imaging:  Cxr reviewed    Impression/Plan:    #1.  Hyponatremia- again presents with acute on chronic hypona which is likely due to hypovolemic hyponatremia given N/V and poor intake.  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  Today would favor gentle NS given her hx and will follow closely.  Ultimately would need to manage with fluid  restriction and loop diuretic (should likely take daily rather than prn given hyponatremia).  Will follow closely.  No need for hypertonic saline    #2.  HFrEF- appears stable from volume standpoint    #3.  Stage 3 breast cancer- mgmt per oncology    Thank you for allowing me to participate in the care of your patient. Please do not hesitate to call with any questions or concerns.       Francisco Quarles MD  3/27/2024  2:13 PM

## 2024-03-27 NOTE — PLAN OF CARE
NURSING ADMISSION NOTE      Patient admitted via Cart  Oriented to room.  Safety precautions initiated.  Bed in low position.  Call light in reach.    Patient is alert, denies pain. IVF per MAR. Patient reports her outpatient urine culture came back for E.coli and she has been taking cefadroxil 500 mg BID for 2-3 days, MD Santana notified - orders received.

## 2024-03-27 NOTE — ED INITIAL ASSESSMENT (HPI)
Cardiologist Dr. Villalba told patient to come to ER due to abnormal blood work stating her sodium was low as well as other levels. Patient states she's tired, patient being treated for breast cancer and doing radiation.

## 2024-03-27 NOTE — ED QUICK NOTES
Orders for admission, patient is aware of plan and ready to go upstairs. Any questions, please call ED RN Verónica at extension [phone].     Patient Covid vaccination status: Fully vaccinated     COVID Test Ordered in ED: None    COVID Suspicion at Admission: N/A    Running Infusions:  None    Mental Status/LOC at time of transport: A/Ox4    Other pertinent information:   CIWA score: N/A   NIH score:  N/A

## 2024-03-28 LAB
ADENOVIRUS PCR:: NOT DETECTED
ANION GAP SERPL CALC-SCNC: 10 MMOL/L (ref 0–18)
B PARAPERT DNA SPEC QL NAA+PROBE: NOT DETECTED
B PERT DNA SPEC QL NAA+PROBE: NOT DETECTED
BASOPHILS # BLD AUTO: 0.01 X10(3) UL (ref 0–0.2)
BASOPHILS NFR BLD AUTO: 0.3 %
BUN BLD-MCNC: 7 MG/DL (ref 9–23)
C PNEUM DNA SPEC QL NAA+PROBE: NOT DETECTED
CALCIUM BLD-MCNC: 7.8 MG/DL (ref 8.5–10.1)
CHLORIDE SERPL-SCNC: 96 MMOL/L (ref 98–112)
CO2 SERPL-SCNC: 17 MMOL/L (ref 21–32)
CORONAVIRUS 229E PCR:: NOT DETECTED
CORONAVIRUS HKU1 PCR:: NOT DETECTED
CORONAVIRUS NL63 PCR:: NOT DETECTED
CORONAVIRUS OC43 PCR:: NOT DETECTED
CREAT BLD-MCNC: 0.46 MG/DL
EGFRCR SERPLBLD CKD-EPI 2021: 122 ML/MIN/1.73M2 (ref 60–?)
EOSINOPHIL # BLD AUTO: 0.11 X10(3) UL (ref 0–0.7)
EOSINOPHIL NFR BLD AUTO: 3.5 %
ERYTHROCYTE [DISTWIDTH] IN BLOOD BY AUTOMATED COUNT: 16 %
FLUAV RNA SPEC QL NAA+PROBE: NOT DETECTED
FLUBV RNA SPEC QL NAA+PROBE: NOT DETECTED
GLUCOSE BLD-MCNC: 88 MG/DL (ref 70–99)
HCT VFR BLD AUTO: 23.9 %
HGB BLD-MCNC: 8.4 G/DL
IMM GRANULOCYTES # BLD AUTO: 0.04 X10(3) UL (ref 0–1)
IMM GRANULOCYTES NFR BLD: 1.3 %
LYMPHOCYTES # BLD AUTO: 0.25 X10(3) UL (ref 1–4)
LYMPHOCYTES NFR BLD AUTO: 8 %
MAGNESIUM SERPL-MCNC: 1.6 MG/DL (ref 1.6–2.6)
MCH RBC QN AUTO: 30.1 PG (ref 26–34)
MCHC RBC AUTO-ENTMCNC: 35.1 G/DL (ref 31–37)
MCV RBC AUTO: 85.7 FL
METAPNEUMOVIRUS PCR:: NOT DETECTED
MONOCYTES # BLD AUTO: 0.29 X10(3) UL (ref 0.1–1)
MONOCYTES NFR BLD AUTO: 9.3 %
MYCOPLASMA PNEUMONIA PCR:: NOT DETECTED
NEUTROPHILS # BLD AUTO: 2.43 X10 (3) UL (ref 1.5–7.7)
NEUTROPHILS # BLD AUTO: 2.43 X10(3) UL (ref 1.5–7.7)
NEUTROPHILS NFR BLD AUTO: 77.6 %
OSMOLALITY SERPL CALC.SUM OF ELEC: 253 MOSM/KG (ref 275–295)
PARAINFLUENZA 1 PCR:: NOT DETECTED
PARAINFLUENZA 2 PCR:: NOT DETECTED
PARAINFLUENZA 3 PCR:: NOT DETECTED
PARAINFLUENZA 4 PCR:: NOT DETECTED
PLATELET # BLD AUTO: 165 10(3)UL (ref 150–450)
POTASSIUM SERPL-SCNC: 3.2 MMOL/L (ref 3.5–5.1)
POTASSIUM SERPL-SCNC: 3.2 MMOL/L (ref 3.5–5.1)
PROCALCITONIN SERPL-MCNC: <0.05 NG/ML (ref ?–0.16)
RBC # BLD AUTO: 2.79 X10(6)UL
RHINOVIRUS/ENTERO PCR:: NOT DETECTED
RSV RNA SPEC QL NAA+PROBE: NOT DETECTED
SARS-COV-2 RNA NPH QL NAA+NON-PROBE: NOT DETECTED
SODIUM SERPL-SCNC: 123 MMOL/L (ref 136–145)
WBC # BLD AUTO: 3.1 X10(3) UL (ref 4–11)

## 2024-03-28 PROCEDURE — 99233 SBSQ HOSP IP/OBS HIGH 50: CPT | Performed by: INTERNAL MEDICINE

## 2024-03-28 PROCEDURE — 99232 SBSQ HOSP IP/OBS MODERATE 35: CPT | Performed by: INTERNAL MEDICINE

## 2024-03-28 RX ORDER — TORSEMIDE 20 MG/1
20 TABLET ORAL DAILY
Status: DISCONTINUED | OUTPATIENT
Start: 2024-03-28 | End: 2024-03-28

## 2024-03-28 RX ORDER — TORSEMIDE 5 MG/1
10 TABLET ORAL DAILY
Status: DISCONTINUED | OUTPATIENT
Start: 2024-03-29 | End: 2024-03-29

## 2024-03-28 RX ORDER — POTASSIUM CHLORIDE 20 MEQ/1
40 TABLET, EXTENDED RELEASE ORAL ONCE
Status: COMPLETED | OUTPATIENT
Start: 2024-03-28 | End: 2024-03-28

## 2024-03-28 RX ORDER — MAGNESIUM OXIDE 400 MG/1
400 TABLET ORAL ONCE
Status: COMPLETED | OUTPATIENT
Start: 2024-03-28 | End: 2024-03-28

## 2024-03-28 NOTE — PLAN OF CARE
Pt received A&Ox4. VSS. RA. Tele. Afebrile. Pt c/o generalized pain, norco given prn. Medications given per MAR. IVF. Na 120 - MD notified. Call light within reach. Fall precautions in place.

## 2024-03-28 NOTE — PROGRESS NOTES
[facility]   part of [facility]     Hospitalist Progress Note     Alina Aceves Patient Status:  Inpatient    1980 MRN [id number]   [facility] 4NW-A Attending Octavio Beasley MD   Hosp Day # 1 PCP HOLLY IBARRA     Chief Complaint: Abnormal labs    Subjective:     Patient feels well, no nausea     Objective:    Review of Systems:   A comprehensive review of systems was completed; pertinent positive and negatives stated in subjective.    Vital signs:  Temp:  [97.4 °F (36.3 °C)-97.8 °F (36.6 °C)] 97.7 °F (36.5 °C)  Pulse:  [90-98] 91  Resp:  [16-20] 16  BP: (106-121)/(81-96) 106/81  SpO2:  [98 %-100 %] 99 %    Physical Exam:    General: No acute distress  Respiratory: No wheezes, no rhonchi  Cardiovascular: S1, S2, regular rate and rhythm  Abdomen: Soft, Non-tender, non-distended, positive bowel sounds  Neuro: No new focal deficits.   Extremities: No edema      Diagnostic Data:    Labs:  Recent Labs   Lab 24  1129 24  0559   WBC 3.6* 3.1*   HGB 7.8* 8.4*   MCV 86.5 85.7   .0 165.0       Recent Labs   Lab 24  1129 24  1823 24  0559   GLU 93  --  88   BUN 8*  --  7*   CREATSERUM 0.45*  --  0.46*   CA 8.2*  --  7.8*   ALB 2.2*  --   --    * 120* 123*   K 3.2*  --  3.2*  3.2*   CL 89*  --  96*   CO2 18.0*  --  17.0*   ALKPHO 64  --   --    AST 16  --   --    ALT 10*  --   --    BILT 0.6  --   --    TP 5.0*  --   --        Estimated Creatinine Clearance: 130.9 mL/min (A) (based on SCr of 0.46 mg/dL (L)).    No results for input(s): \"TROP\", \"TROPHS\", \"CK\" in the last 168 hours.    No results for input(s): \"PTP\", \"INR\" in the last 168 hours.               Microbiology    No results found for this visit on 24.      Imaging: Reviewed in Epic.    Medications:    enoxaparin  40 mg Subcutaneous Nightly    DULoxetine  60 mg Oral Daily    metoprolol succinate ER  50 mg Oral 2x Daily(Beta Blocker)    cephalexin  500 mg Oral 4 times per day        Assessment & Plan:      #Acute on chronic Hyponatremia  #NAGMA  -poor PO intake   -Nephrology on consult      #Invasive ductal carcinoma of the left breast   -currently undergoing radiation therapy      #HFrEF with EF of 20-25%  -compensated   -Hold Torsemide  -Metoprolol     #Neuropathy - chemo SE  -Cymbalta   -Norco     #Recent PNA, residual cough   -imaging likely reflects recent infection  -treated with PO Antibiotics  -viral panel and procal negative      #Normocytic Anemia  -no reported bleeding  -Hb stable      #Mild Leukopenia  -no neutropenia      #SLE  #Sjogren's Syndrome       Deep Fine, DO    Supplementary Documentation:     Quality:  DVT Mechanical Prophylaxis:     Early ambuation  DVT Pharmacologic Prophylaxis   Medication    enoxaparin (Lovenox) 40 MG/0.4ML SUBQ injection 40 mg    heparin (Porcine) 100 Units/mL lock flush 500 Units                Code Status: Full Code  [place]: No urinary catheter in place    The 21st Century Cures Act makes medical notes like these available to patients in the interest of transparency. Please be advised this is a medical document. Medical documents are intended to carry relevant information, facts as evident, and the clinical opinion of the practitioner. The medical note is intended as peer to peer communication and may appear blunt or direct. It is written in medical language and may contain abbreviations or verbiage that are unfamiliar.

## 2024-03-28 NOTE — HOME CARE LIAISON
Patient is current with [facility] for RN PT services. Updated  Rachell JOHN order will be needed at [place]. Updated AVS with contact information.

## 2024-03-28 NOTE — PLAN OF CARE
Pt a/o x4. VSS, remained afebrile. Meds given per MAR. Norco given for BLE pain with relief. Tessalon given as needed with relief. Up to bathroom with SBA and walker. Fall precautions in place. Call light within reach.

## 2024-03-28 NOTE — DISCHARGE INSTRUCTIONS
Sometimes managing your health at home requires assistance.  The [facility] team has recognized your preference to use [facility].  They can be reached by phone at [phone].  The fax number for your reference is [phone].  A representative from the home health agency will contact you or your family to schedule your first visit.

## 2024-03-28 NOTE — PROGRESS NOTES
[facility]   part of [facility]     Nephrology Progress Note    Alina Aceves Patient Status:  Inpatient    1980 MRN [id number]   Location [place] 4NW-A Attending Octavio Beasley MD   Hosp Day # 1 PCP HOLLY IBARRA       SUBJECTIVE:  Feels much better today, no N/V        Physical Exam:   /85 (BP Location: Left arm)   Pulse 103   Temp 98.3 °F (36.8 °C) (Oral)   Resp 16   Ht 5' 4\" (1.626 m)   Wt 116 lb (52.6 kg)   LMP 2023 (Approximate)   SpO2 100%   BMI 19.91 kg/m²   Temp (24hrs), Av.8 °F (36.6 °C), Min:97.4 °F (36.3 °C), Max:98.3 °F (36.8 °C)       Intake/Output Summary (Last 24 hours) at 3/28/2024 0952  Last data filed at 3/28/2024 0852  Gross per 24 hour   Intake 1550 ml   Output 1225 ml   Net 325 ml     Last 3 Weights   24 0404 116 lb (52.6 kg)   24 1534 119 lb 3.2 oz (54.1 kg)   24 1105 114 lb (51.7 kg)   24 0506 114 lb 3.2 oz (51.8 kg)   03/10/24 0813 103 lb 13.4 oz (47.1 kg)   24 0413 115 lb 15.4 oz (52.6 kg)   24 2200 108 lb 9.6 oz (49.3 kg)   24 1605 115 lb (52.2 kg)   24 0500 104 lb 15 oz (47.6 kg)   24 0436 101 lb 10.1 oz (46.1 kg)   23 0448 110 lb 3.7 oz (50 kg)   23 0518 105 lb 9.6 oz (47.9 kg)   23 0437 102 lb 11.8 oz (46.6 kg)   23 0316 114 lb 6.7 oz (51.9 kg)   23 0500 128 lb 3.2 oz (58.2 kg)   23 0408 135 lb 2.3 oz (61.3 kg)   23 0409 147 lb (66.7 kg)   23 0400 143 lb 8.3 oz (65.1 kg)   23 1100 147 lb 7.8 oz (66.9 kg)   23 0000 147 lb 0.8 oz (66.7 kg)   23 0538 150 lb 9.2 oz (68.3 kg)   23 0019 150 lb 3.2 oz (68.1 kg)   23 2326 124 lb (56.2 kg)   23 1823 125 lb (56.7 kg)   23 1816 120 lb (54.4 kg)   21 1537 135 lb (61.2 kg)     General: Alert and oriented in no apparent distress.  HEENT: No scleral icterus, MMM  Neck: Supple, no KALNY or thyromegaly  Cardiac: Regular rate and rhythm, S1, S2 normal, no  murmur or rub  Lungs: Clear without wheezes, rales, rhonchi.    Abdomen: Soft, non-tender. + bowel sounds, no palpable organomegaly  Extremities: Without clubbing, cyanosis or edema.  Neurologic: Alert and oriented, cranial nerves grossly intact, moving all extremities  Skin: Warm and dry, no rash        Labs:     Recent Labs   Lab 03/27/24  1129 03/28/24  0559   WBC 3.6* 3.1*   HGB 7.8* 8.4*   MCV 86.5 85.7   .0 165.0       Recent Labs   Lab 03/27/24  1129 03/27/24  1823 03/28/24  0559   * 120* 123*   K 3.2*  --  3.2*  3.2*   CL 89*  --  96*   CO2 18.0*  --  17.0*   BUN 8*  --  7*   CREATSERUM 0.45*  --  0.46*   CA 8.2*  --  7.8*   MG  --   --  1.6   GLU 93  --  88       Recent Labs   Lab 03/27/24  1129   ALT 10*   AST 16   ALB 2.2*       No results for input(s): \"PGLU\" in the last 168 hours.    Meds:   Current Facility-Administered Medications   Medication Dose Route Frequency    HYDROcodone-acetaminophen (Norco) 5-325 MG per tab 1 tablet  1 tablet Oral Q6H PRN    zolpidem (Ambien) tab 10 mg  10 mg Oral Nightly PRN    acetaminophen (Tylenol Extra Strength) tab 1,000 mg  1,000 mg Oral Q8H PRN    melatonin tab 3 mg  3 mg Oral Nightly PRN    enoxaparin (Lovenox) 40 MG/0.4ML SUBQ injection 40 mg  40 mg Subcutaneous Nightly    ondansetron (Zofran) 4 MG/2ML injection 4 mg  4 mg Intravenous Q6H PRN    prochlorperazine (Compazine) 10 MG/2ML injection 5 mg  5 mg Intravenous Q8H PRN    polyethylene glycol (PEG 3350) (Miralax) 17 g oral packet 17 g  17 g Oral Daily PRN    sennosides (Senokot) tab 17.2 mg  17.2 mg Oral Nightly PRN    bisacodyl (Dulcolax) 10 MG rectal suppository 10 mg  10 mg Rectal Daily PRN    fleet enema (Fleet) 7-19 GM/118ML rectal enema 133 mL  1 enema Rectal Once PRN    benzonatate (Tessalon) cap 200 mg  200 mg Oral TID PRN    guaiFENesin (Robitussin) 100 MG/5 ML oral liquid 200 mg  200 mg Oral Q4H PRN    glycerin-hypromellose- (Artifical Tears) 0.2-0.2-1 % ophthalmic solution 1 drop   1 drop Both Eyes QID PRN    sodium chloride (Saline Mist) 0.65 % nasal solution 1 spray  1 spray Each Nare Q3H PRN    sodium chloride 0.9% infusion   Intravenous Continuous    DULoxetine (Cymbalta) DR cap 60 mg  60 mg Oral Daily    metoprolol succinate ER (Toprol XL) 24 hr tab 50 mg  50 mg Oral 2x Daily(Beta Blocker)    heparin (Porcine) 100 Units/mL lock flush 500 Units  5 mL Intravenous PRN    cephalexin (Keflex) cap 500 mg  500 mg Oral 4 times per day         Impression/Plan:        #1.  Hyponatremia- again presents with acute on chronic hypona which is likely due to hypovolemic hyponatremia given N/V and poor intake.  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  Na better with gentle NS and will stop today.  Ultimately would need to manage with fluid restriction and loop diuretic (should likely take daily rather than prn given hyponatremia).  Resume torsemide today     #2.  HFrEF- appears stable from volume standpoint, resume torsemide     #3.  Stage 3 breast cancer- mgmt per oncology    #4.  Hypokalemia- replace per protocol    Questions/concerns were discussed with patient and/or family by bedside.        Francisco Quarles MD  3/28/2024  9:58 AM

## 2024-03-28 NOTE — CM/SW NOTE
SW noted that patient is current with [facility] for RN and PT services. SW placed JOHN for MD mcdonough and notified [facility] liaison for continued services after DC.     Update: SW met with patient to inform her that JOHN was placed and she is agreeable.     SW will continue to follow for plan of care changes and remain available for any additional DC needs or concerns.     Rachell Rashid MSW, LSW  Discharge Planner   [phone]

## 2024-03-28 NOTE — PAYOR COMM NOTE
--------------  ADMISSION REVIEW     Payor: [employer]  Subscriber #:  [id number]  Authorization Number: [id number]    Admit date: 3/27/24  Admit time:  3:17 PM         Stated Complaint: Pt in stating her Na is low with other abnormal labs       43 year old female with past medical history of breast cancer, Sjogren's syndrome, lupus , siadh, cardiorenal syndrome, CHF,    ED with abnormal labs.  Recently admitted for nausea vomiting, pneumonia and hyponatremia.   saw cardiology yesterday and repeat labs showing sodium 115.  She complains of fatigue      Past Medical History:   Diagnosis Date    Breast cancer (HCC)     Congestive heart disease (HCC)     Gastritis     Lupus (HCC)     Personal history of antineoplastic chemotherapy     Sjogren's disease (HCC)        ED Triage Vitals   BP 03/27/24 1106 112/88   Pulse 03/27/24 1105 90   Resp 03/27/24 1105 19   Temp --    Temp src --    SpO2 03/27/24 1105 99 %   O2 Device 03/27/24 1105 None (Room air)       Labs Reviewed   COMP METABOLIC PANEL (14) - Abnormal; Notable for the following components:       Result Value    Sodium 117 (*)     Potassium 3.2 (*)     Chloride 89 (*)     CO2 18.0 (*)     BUN 8 (*)     Creatinine 0.45 (*)     Calcium, Total 8.2 (*)     Calculated Osmolality 242 (*)     ALT 10 (*)     Total Protein 5.0 (*)     Albumin 2.2 (*)     A/G Ratio 0.8 (*)     All other components within normal limits   PRO BETA NATRIURETIC PEPTIDE - Abnormal; Notable for the following components:    Pro-Beta Natriuretic Peptide 34,887 (*)     All other components within normal limits   CBC W/ DIFFERENTIAL - Abnormal; Notable for the following components:    WBC 3.6 (*)     RBC 2.66 (*)     HGB 7.8 (*)     HCT 23.0 (*)     Lymphocyte Absolute 0.15 (*)     All other components within normal limits   CBC WITH DIFFERENTIAL WITH PLATELET    Narrative:                    -DDX: Includes but not limited to -dehydration, SIADH, hyponatremia          Labs Reviewed   COMP  METABOLIC PANEL (14) - Abnormal; Notable for the following components:       Result Value    Sodium 117 (*)     Potassium 3.2 (*)     Chloride 89 (*)     CO2 18.0 (*)     BUN 8 (*)     Creatinine 0.45 (*)     Calcium, Total 8.2 (*)     Calculated Osmolality 242 (*)     ALT 10 (*)     Total Protein 5.0 (*)     Albumin 2.2 (*)     A/G Ratio 0.8 (*)     All other components within normal limits   PRO BETA NATRIURETIC PEPTIDE - Abnormal; Notable for the following components:    Pro-Beta Natriuretic Peptide 34,887 (*)     All other components within normal limits   CBC W/ DIFFERENTIAL - Abnormal; Notable for the following components:    WBC 3.6 (*)     RBC 2.66 (*)     HGB 7.8 (*)     HCT 23.0 (*)     Lymphocyte Absolute 0.15 (*)     All other components within normal limits   CBC WITH DIFFERENTIAL WITH PLATELET    Narrative:     The following orders were created for panel order CBC With Differential With Platelet.  Procedure                               Abnormality         Status                     ---------                               -----------         ------                     CBC W/ DIFFERENTIAL[[id number]]          Abnormal            Final result                 Please view results for these tests on the individual orders.     Labs reviewed white count 3.6.  Sodium 117 potassium 3.2 bicarb 18 and Anion gap is normal.  Creatinine 0.4.  BNP 34,000.    Discussed case with nephrology and [facility] patient will be admitted for further care.      Evaluated by nephrology.  Recommended starting  normal saline at 83 MLS per hour.  Will follow.    Mental status is normal.  No indication for hypertonic saline at this time.  No neurologic deficits.  Patient stable for the floor.  Admission disposition: 3/27/2024  2:07 PM       Assessment & Plan:   #Acute on chronic Hyponatremia  #NAGMA  -poor PO intake   -Nephrology on consult      #Invasive ductal carcinoma of the left breast on chemotherapy  -Oncology  consult     #HFrEF with EF of 20-25%     #Neuropathy - chemo SE  -   #Recent PNA  -treated     #Normocytic Anemia  -no reported bleeding           MEDICATIONS ADMINISTERED IN LAST 1 DAY:  benzonatate (Tessalon) cap 200 mg       Date Action Dose Route User    3/28/2024 1016 Given 200 mg Oral Andrea Soriano RN          cephalexin (Keflex) cap 500 mg       Date Action Dose Route User    3/28/2024 1144 Given 500 mg Oral Andrea Soriano RN    3/28/2024 0550 Given 500 mg Oral Mary Callejas RN    3/28/2024 0033 Given 500 mg Oral Mary Callejas RN    3/27/2024 2003 Given 500 mg Oral Mary Callejas RN                 potassium chloride (K-Dur) tab 40 mEq       Date Action Dose Route User    3/27/2024 2003 Given 40 mEq Oral Mary Callejas RN          sodium chloride 0.9% infusion       Date Action Dose Route User    3/27/2024 1432 New Bag (none) Intravenous Verónica Shah RN          sodium chloride 0.9% infusion       Date Action Dose Route User    3/27/2024 1825 Restarted (none) Intravenous Emelia Batista RN    3/27/2024 1730 New Bag (none) Intravenous Emelia Batista RN          torsemide (Demadex) tab 20 mg       Date Action Dose Route User    3/28/2024 1017 Given 10 mg Oral Andrea Soriano RN                   Vitals (last day)       Date/Time Temp Pulse Resp BP SpO2 Weight O2 Device O2 Flow Rate (L/min) [facility]    03/28/24 1239 98 °F (36.7 °C) 101 18 113/85 100 % -- None (Room air) -- MM    03/28/24 1100 -- 100 -- -- -- -- -- -- MM    03/28/24 0852 -- 103 -- -- -- -- -- -- MM    03/28/24 0804 98.3 °F (36.8 °C) 94 16 110/85 100 % -- None (Room air) -- MM    03/28/24 0404 97.7 °F (36.5 °C) 91 16 106/81 99 % 116 lb None (Room air) -- SF    03/27/24 2300 97.8 °F (36.6 °C) 97 18 114/87 98 % -- None (Room air) -- SF    03/27/24 1942 97.4 °F (36.3 °C) 98 16 121/91 98 % -- None (Room air) -- SF    03/27/24 1534 97.7 °F (36.5 °C) 92 18 120/91 -- 119 lb 3.2 oz None (Room air) -- MM    03/27/24 1433 -- 92 19 121/96  100 % -- None (Room air) -- AM    03/27/24 1259 -- 95 20 115/90 100 % -- None (Room air) -- AM    03/27/24 1118 -- -- -- -- -- -- None (Room air) -- AM    03/27/24 1106 -- -- -- 112/88 -- -- -- -- AM    03/27/24 1105 -- 90 19 -- 99 % 114 lb None (Room air) -- AM

## 2024-03-29 VITALS
BODY MASS INDEX: 19.6 KG/M2 | OXYGEN SATURATION: 97 % | WEIGHT: 114.81 LBS | HEIGHT: 64 IN | DIASTOLIC BLOOD PRESSURE: 86 MMHG | RESPIRATION RATE: 14 BRPM | SYSTOLIC BLOOD PRESSURE: 113 MMHG | TEMPERATURE: 98 F | HEART RATE: 88 BPM

## 2024-03-29 LAB
ANION GAP SERPL CALC-SCNC: 9 MMOL/L (ref 0–18)
BUN BLD-MCNC: 7 MG/DL (ref 9–23)
CALCIUM BLD-MCNC: 8.1 MG/DL (ref 8.5–10.1)
CHLORIDE SERPL-SCNC: 103 MMOL/L (ref 98–112)
CO2 SERPL-SCNC: 17 MMOL/L (ref 21–32)
CREAT BLD-MCNC: 0.72 MG/DL
EGFRCR SERPLBLD CKD-EPI 2021: 106 ML/MIN/1.73M2 (ref 60–?)
GLUCOSE BLD-MCNC: 88 MG/DL (ref 70–99)
MAGNESIUM SERPL-MCNC: 1.7 MG/DL (ref 1.6–2.6)
OSMOLALITY SERPL CALC.SUM OF ELEC: 265 MOSM/KG (ref 275–295)
POTASSIUM SERPL-SCNC: 3.8 MMOL/L (ref 3.5–5.1)
POTASSIUM SERPL-SCNC: 3.8 MMOL/L (ref 3.5–5.1)
SODIUM SERPL-SCNC: 129 MMOL/L (ref 136–145)

## 2024-03-29 PROCEDURE — 99239 HOSP IP/OBS DSCHRG MGMT >30: CPT | Performed by: INTERNAL MEDICINE

## 2024-03-29 PROCEDURE — 99233 SBSQ HOSP IP/OBS HIGH 50: CPT | Performed by: INTERNAL MEDICINE

## 2024-03-29 RX ORDER — BENZONATATE 200 MG/1
200 CAPSULE ORAL 3 TIMES DAILY PRN
Qty: 30 CAPSULE | Refills: 0 | Status: SHIPPED | OUTPATIENT
Start: 2024-03-29

## 2024-03-29 RX ORDER — HYDROCODONE BITARTRATE AND ACETAMINOPHEN 5; 325 MG/1; MG/1
1 TABLET ORAL EVERY 6 HOURS PRN
Qty: 10 TABLET | Refills: 0 | Status: SHIPPED | OUTPATIENT
Start: 2024-03-29

## 2024-03-29 NOTE — PLAN OF CARE
NURSING DISCHARGE NOTE    Discharged Home via Wheelchair.  Accompanied by Support staff  Belongings Taken by patient/family.    Pt a/o x4. VSS, remained afebrile. Meds given per MAR. Tessalon given for cough with relief. Ambulates with personal walker. AVS reviewed with patient. Port decannulated.

## 2024-03-29 NOTE — PLAN OF CARE
Pt received A&Ox4. VSS. RA. Tele. Afebrile. Pt c/o headache, medications given per MAR. Call light within reach. Fall precautions in place.

## 2024-03-29 NOTE — PROGRESS NOTES
[facility]   part of [facility]     Nephrology Progress Note    Alina Aceves Patient Status:  Inpatient    1980 MRN [id number]   Location [facility] 4NW-A Attending Octavio Beasley MD   Hosp Day # 2 PCP HO[facility] IBARRA       SUBJECTIVE:  Feels much better today        Physical Exam:   /83 (BP Location: Left arm)   Pulse 91   Temp 98.1 °F (36.7 °C) (Oral)   Resp 16   Ht 5' 4\" (1.626 m)   Wt 114 lb 12.8 oz (52.1 kg)   LMP 2023 (Approximate)   SpO2 98%   BMI 19.71 kg/m²   Temp (24hrs), Av.1 °F (36.7 °C), Min:97.5 °F (36.4 °C), Max:98.5 °F (36.9 °C)       Intake/Output Summary (Last 24 hours) at 3/29/2024 0837  Last data filed at 3/29/2024 0500  Gross per 24 hour   Intake 360 ml   Output 2025 ml   Net -1665 ml     Last 3 Weights   24 0500 114 lb 12.8 oz (52.1 kg)   24 0404 116 lb (52.6 kg)   24 1534 119 lb 3.2 oz (54.1 kg)   24 1105 114 lb (51.7 kg)   24 0506 114 lb 3.2 oz (51.8 kg)   03/10/24 0813 103 lb 13.4 oz (47.1 kg)   24 0413 115 lb 15.4 oz (52.6 kg)   24 2200 108 lb 9.6 oz (49.3 kg)   24 1605 115 lb (52.2 kg)   24 0500 104 lb 15 oz (47.6 kg)   24 0436 101 lb 10.1 oz (46.1 kg)   23 0448 110 lb 3.7 oz (50 kg)   23 0518 105 lb 9.6 oz (47.9 kg)   23 0437 102 lb 11.8 oz (46.6 kg)   23 0316 114 lb 6.7 oz (51.9 kg)   23 0500 128 lb 3.2 oz (58.2 kg)   23 0408 135 lb 2.3 oz (61.3 kg)   23 0409 147 lb (66.7 kg)   23 0400 143 lb 8.3 oz (65.1 kg)   23 1100 147 lb 7.8 oz (66.9 kg)   23 0000 147 lb 0.8 oz (66.7 kg)   23 0538 150 lb 9.2 oz (68.3 kg)   23 0019 150 lb 3.2 oz (68.1 kg)   23 2326 124 lb (56.2 kg)   23 1823 125 lb (56.7 kg)   23 1816 120 lb (54.4 kg)   21 1537 135 lb (61.2 kg)     General: Alert and oriented in no apparent distress.  HEENT: No scleral icterus, MMM  Neck: Supple, no KALYN or thyromegaly  Cardiac: Regular  rate and rhythm, S1, S2 normal, no murmur or rub  Lungs: Clear without wheezes, rales, rhonchi.    Abdomen: Soft, non-tender. + bowel sounds, no palpable organomegaly  Extremities: Without clubbing, cyanosis or edema.  Neurologic: Alert and oriented, cranial nerves grossly intact, moving all extremities  Skin: Warm and dry, no rash        Labs:     Recent Labs   Lab 03/27/24  1129 03/28/24  0559   WBC 3.6* 3.1*   HGB 7.8* 8.4*   MCV 86.5 85.7   .0 165.0       Recent Labs   Lab 03/27/24  1129 03/27/24  1823 03/28/24  0559 03/29/24  0514   * 120* 123* 129*   K 3.2*  --  3.2*  3.2* 3.8  3.8   CL 89*  --  96* 103   CO2 18.0*  --  17.0* 17.0*   BUN 8*  --  7* 7*   CREATSERUM 0.45*  --  0.46* 0.72   CA 8.2*  --  7.8* 8.1*   MG  --   --  1.6 1.7   GLU 93  --  88 88       Recent Labs   Lab 03/27/24  1129   ALT 10*   AST 16   ALB 2.2*       No results for input(s): \"PGLU\" in the last 168 hours.    Meds:           Impression/Plan:        #1.  Hyponatremia- again presents with acute on chronic hypona which is likely due to hypovolemic hyponatremia given N/V and poor intake.  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  Na much better since admit.  Ultimately would need to manage with fluid restriction and loop diuretic (should likely take daily rather than prn given hyponatremia).  Resume torsemide 10 mg daily      #2.  HFrEF- appears stable from volume standpoint, resume torsemide     #3.  Stage 3 breast cancer- mgmt per oncology    #4.  Hypokalemia- replace per protocol    Questions/concerns were discussed with patient and/or family by bedside.      OK for home from nephrology standpoint    Francisco Quarles MD  3/29/2024  8:44 AM

## 2024-03-29 NOTE — DISCHARGE SUMMARY
[facility]     Alina Aceves Patient Status:  Inpatient    1980 MRN [id number]   Location [facility] 4NW-A Attending Octavio Beasley MD   Hosp Day # 2 PCP HOLLY IBARRA     Date of Admission: 3/27/2024  Date of Discharge:  3/29/2024     Discharge Disposition: Home Health Care Services    Discharge Diagnosis:  #Acute on chronic Hyponatremia  #NAGMA  -poor PO intake   -Nephrology on consult      #Invasive ductal carcinoma of the left breast   -currently undergoing radiation therapy      #HFrEF with EF of 20-25%  -compensated   -Hold Torsemide  -Metoprolol     #Neuropathy - chemo SE  -Cymbalta   -Norco     #Recent PNA, residual cough   -imaging likely reflects recent infection  -treated with PO Antibiotics  -viral panel and procal negative      #Normocytic Anemia  -no reported bleeding  -Hb stable      #Mild Leukopenia  -no neutropenia      #SLE  #Sjogren's Syndrome     History of Present Illness:      Alina Aceves is a 43 year old female with past medical history of breast cancer undergoing radiation therapy, congestive heart failure presented to the emergency department with abnormal labs.     Patient sent to the emergency department by her cardiologist due to decreasing sodium.  Patient recently hospitalized and treated for pneumonia, completed antibiotics, is continuing to have a cough but has been more dry.  No fevers.  Last few days appetite and oral intake has been low.  Has self held her torsemide.  No fevers or chills.  No chest pain or difficulty breathing.    Brief Synopsis:     Patient is admitted, treated with IV fluids and Po lasix, nephrology following consultation, appropriate steady correction, patient discharged home in stable condition     Lace+ Score: 77  59-90 High Risk  29-58 Medium Risk  0-28   Low Risk       TCM Follow-Up Recommendation:  LACE > 58: High Risk of readmission after discharge from the hospital.      Procedures during hospitalization:    N/a    Incidental or significant findings and recommendations (brief descriptions):  N/a    Lab/Test results pending at Discharge:   N/a    Consultants:  Nephrology     Discharge Medication List:     Discharge Medications        START taking these medications        Instructions Prescription details   benzonatate 200 MG Caps  Commonly known as: Tessalon      Take 1 capsule (200 mg total) by mouth 3 (three) times daily as needed for cough.   Quantity: 30 capsule  Refills: 0            CONTINUE taking these medications        Instructions Prescription details   cefadroxil 500 MG Caps  Commonly known as: DURICEF      Take 1 capsule (500 mg total) by mouth 2 (two) times daily.   Refills: 0     DULoxetine 30 MG Cpep  Commonly known as: Cymbalta      Take 2 capsules (60 mg total) by mouth daily.   Refills: 0     HYDROcodone-acetaminophen 5-325 MG Tabs  Commonly known as: Norco      Take 1 tablet by mouth every 6 (six) hours as needed for Pain.   Quantity: 10 tablet  Refills: 0     lidocaine-prilocaine 2.5-2.5 % Crea  Commonly known as: Emla      APPLY SMALL AMOUNT OVER PORT PRIOR TO ACCESS   Refills: 0     metoprolol succinate ER 50 MG Tb24  Commonly known as: Toprol XL      Take 1 tablet (50 mg total) by mouth 2x Daily(Beta Blocker).   Quantity: 180 tablet  Refills: 3     ondansetron 4 MG Tbdp  Commonly known as: Zofran-ODT      Take 1 tablet (4 mg total) by mouth every 4 (four) hours as needed for Nausea.   Quantity: 10 tablet  Refills: 0     Polyethylene Glycol 3350 17 g Pack  Commonly known as: MIRALAX      Take 17 g by mouth daily as needed.   Quantity: 30 each  Refills: 0     Potassium Citrate ER 15 MEQ (1620 MG) Tbcr      Take 1 tablet by mouth in the morning, at noon, and at bedtime.   Refills: 0     prochlorperazine 10 mg tablet  Commonly known as: Compazine      Take 1 tablet (10 mg total) by mouth every 6 (six) hours as needed for Nausea or vomiting.   Refills: 0     torsemide 20 MG Tabs  Commonly known as:  Demadex      Take 1 tablet (20 mg total) by mouth daily.   Quantity: 90 tablet  Refills: 3     zolpidem 10 MG Tabs  Commonly known as: Ambien      Take 1 tablet (10 mg total) by mouth nightly as needed for Sleep.   Refills: 0            STOP taking these medications      amoxicillin clavulanate 875-125 MG Tabs  Commonly known as: Augmentin                  Where to Get Your Medications        These medications were sent to [id number] - [place], [place] - [address] AT [facility] [address], [phone], [phone]  [address], [facility] [address]      Phone: [phone]   benzonatate 200 MG Caps  HYDROcodone-acetaminophen 5-325 MG Tabs         [place]MP reviewed: n/a     Follow-up appointment:   No follow-up provider specified.  Appointments for Next 30 Days 3/29/2024 - 2024      None            Vital signs:  Temp:  [97.5 °F (36.4 °C)-98.5 °F (36.9 °C)] 98.1 °F (36.7 °C)  Pulse:  [88-97] 88  Resp:  [14-18] 14  BP: (106-121)/(83-90) 113/86  SpO2:  [96 %-100 %] 97 %    Physical Exam:    General: No acute distress   Lungs: clear to auscultation  Cardiovascular: S1, S2  Abdomen: Soft      -----------------------------------------------------------------------------------------------  PATIENT DISCHARGE INSTRUCTIONS: See electronic chart    Deep Fine DO    Total time spent on discharge plannin minutes     The  Cures Act makes medical notes like these available to patients in the interest of transparency. Please be advised this is a medical document. Medical documents are intended to carry relevant information, facts as evident, and the clinical opinion of the practitioner. The medical note is intended as peer to peer communication and may appear blunt or direct. It is written in medical language and may contain abbreviations or verbiage that are unfamiliar.

## 2024-03-31 LAB
ATRIAL RATE: 95 BPM
P AXIS: 68 DEGREES
P-R INTERVAL: 206 MS
Q-T INTERVAL: 354 MS
QRS DURATION: 82 MS
QTC CALCULATION (BEZET): 444 MS
R AXIS: 78 DEGREES
T AXIS: 106 DEGREES
VENTRICULAR RATE: 95 BPM

## 2024-04-01 NOTE — PAYOR COMM NOTE
--------------  DISCHARGE REVIEW    Payor: [employer]ER[employer] POS/MCNP  Subscriber #:  [id number]  Authorization Number: [id number]    Admit date: 3/27/24  Admit time:   3:17 PM  Discharge Date: 3/29/2024 11:35 AM     Admitting Physician: Octavio Beasley MD  Attending Physician:  Roro Castellon MD  Primary Care Physician: Stephon Ibarra          Discharge Summary Notes        Discharge Summary signed by Deep Fine DO at 3/29/2024  1:53 PM       Author: Deep Fine DO Specialty: HOSPITALIST, Internal Medicine Author Type: Physician    Filed: 3/29/2024  1:53 PM Date of Service: 3/29/2024  1:46 PM Status: Signed    : Deep Fine DO (Physician)           [facility]     Alina Aceves Patient Status:  Inpatient    1980 MRN [id number]   [facility] Attending Octavio Beasley MD   Hosp Day # 2 PCP STEPHON IBARRA     Date of Admission: 3/27/2024  Date of Discharge:  3/29/2024     Discharge Disposition: Home Health Care Services    Discharge Diagnosis:  #Acute on chronic Hyponatremia  #NAGMA  -poor PO intake   -Nephrology on consult      #Invasive ductal carcinoma of the left breast   -currently undergoing radiation therapy      #HFrEF with EF of 20-25%  -compensated   -Hold Torsemide  -Metoprolol     #Neuropathy - chemo SE  -Cymbalta   -Norco     #Recent PNA, residual cough   -imaging likely reflects recent infection  -treated with PO Antibiotics  -viral panel and procal negative      #Normocytic Anemia  -no reported bleeding  -Hb stable      #Mild Leukopenia  -no neutropenia      #SLE  #Sjogren's Syndrome     History of Present Illness:      Alina Aceves is a 43 year old female with past medical history of breast cancer undergoing radiation therapy, congestive heart failure presented to the emergency department with abnormal labs.     Patient sent to the emergency department by her cardiologist due to decreasing sodium.  Patient recently hospitalized and treated for  pneumonia, completed antibiotics, is continuing to have a cough but has been more dry.  No fevers.  Last few days appetite and oral intake has been low.  Has self held her torsemide.  No fevers or chills.  No chest pain or difficulty breathing.    Brief Synopsis:     Patient is admitted, treated with IV fluids and Po lasix, nephrology following consultation, appropriate steady correction, patient discharged home in stable condition     Lace+ Score: 77  59-90 High Risk  29-58 Medium Risk  0-28   Low Risk       TCM Follow-Up Recommendation:  LACE > 58: High Risk of readmission after discharge from the hospital.      Procedures during hospitalization:   N/a    Incidental or significant findings and recommendations (brief descriptions):  N/a    Lab/Test results pending at Discharge:   N/a    Consultants:  Nephrology     Discharge Medication List:     Discharge Medications        START taking these medications        Instructions Prescription details   benzonatate 200 MG Caps  Commonly known as: Tessalon      Take 1 capsule (200 mg total) by mouth 3 (three) times daily as needed for cough.   Quantity: 30 capsule  Refills: 0            CONTINUE taking these medications        Instructions Prescription details   cefadroxil 500 MG Caps  Commonly known as: DURICEF      Take 1 capsule (500 mg total) by mouth 2 (two) times daily.   Refills: 0     DULoxetine 30 MG Cpep  Commonly known as: Cymbalta      Take 2 capsules (60 mg total) by mouth daily.   Refills: 0     HYDROcodone-acetaminophen 5-325 MG Tabs  Commonly known as: Norco      Take 1 tablet by mouth every 6 (six) hours as needed for Pain.   Quantity: 10 tablet  Refills: 0     lidocaine-prilocaine 2.5-2.5 % Crea  Commonly known as: Emla      APPLY SMALL AMOUNT OVER PORT PRIOR TO ACCESS   Refills: 0     metoprolol succinate ER 50 MG Tb24  Commonly known as: Toprol XL      Take 1 tablet (50 mg total) by mouth 2x Daily(Beta Blocker).   Quantity: 180 tablet  Refills: 3      ondansetron 4 MG Tbdp  Commonly known as: Zofran-ODT      Take 1 tablet (4 mg total) by mouth every 4 (four) hours as needed for Nausea.   Quantity: 10 tablet  Refills: 0     Polyethylene Glycol 3350 17 g Pack  Commonly known as: MIRALAX      Take 17 g by mouth daily as needed.   Quantity: 30 each  Refills: 0     Potassium Citrate ER 15 MEQ (1620 MG) Tbcr      Take 1 tablet by mouth in the morning, at noon, and at bedtime.   Refills: 0     prochlorperazine 10 mg tablet  Commonly known as: Compazine      Take 1 tablet (10 mg total) by mouth every 6 (six) hours as needed for Nausea or vomiting.   Refills: 0     torsemide 20 MG Tabs  Commonly known as: Demadex      Take 1 tablet (20 mg total) by mouth daily.   Quantity: 90 tablet  Refills: 3     zolpidem 10 MG Tabs  Commonly known as: Ambien      Take 1 tablet (10 mg total) by mouth nightly as needed for Sleep.   Refills: 0            STOP taking these medications      amoxicillin clavulanate 875-125 MG Tabs  Commonly known as: Augmentin                  Where to Get Your Medications        These medications were sent to [id number] - [place], [place] - [address], [phone], [phone]  [address], [facility] [address]      Phone: [phone]   benzonatate 200 MG Caps  HYDROcodone-acetaminophen 5-325 MG Tabs         [place] reviewed[place] n[place]a     Follow-up appointment:   No follow-up provider specified.  Appointments for Next 30 Days 3/29/2024 - 4/28/2024      None            Vital signs:  Temp:  [97.5 °F (36.4 °C)-98.5 °F (36.9 °C)] 98.1 °F (36.7 °C)  Pulse:  [88-97] 88  Resp:  [14-18] 14  BP: (106-121)/(83-90) 113/86  SpO2:  [96 %-100 %] 97 %    Physical Exam:    General: No acute distress   Lungs: clear to auscultation  Cardiovascular: S1, S2  Abdomen: Soft      -----------------------------------------------------------------------------------------------  PATIENT DISCHARGE INSTRUCTIONS: See electronic chart    Deep  DO Rody    Total time spent on discharge plannin minutes     The  Century Cures Act makes medical notes like these available to patients in the interest of transparency. Please be advised this is a medical document. Medical documents are intended to carry relevant information, facts as evident, and the clinical opinion of the practitioner. The medical note is intended as peer to peer communication and may appear blunt or direct. It is written in medical language and may contain abbreviations or verbiage that are unfamiliar.       Electronically signed by Deep Fine DO on 3/29/2024  1:53 PM         REVIEWER COMMENTS

## 2024-04-10 ENCOUNTER — HOSPITAL ENCOUNTER (INPATIENT)
Facility: HOSPITAL | Age: 44
LOS: 1 days | Discharge: HOME | End: 2024-04-11
Attending: EMERGENCY MEDICINE | Admitting: INTERNAL MEDICINE
Payer: COMMERCIAL

## 2024-04-10 ENCOUNTER — HOSPITAL ENCOUNTER (OUTPATIENT)
Facility: HOSPITAL | Age: 44
Setting detail: OBSERVATION
Discharge: HOME HEALTH | End: 2024-04-11
Attending: EMERGENCY MEDICINE | Admitting: INTERNAL MEDICINE
Payer: COMMERCIAL

## 2024-04-10 DIAGNOSIS — E87.6 HYPOKALEMIA: Primary | ICD-10-CM

## 2024-04-10 PROBLEM — R79.89 AZOTEMIA: Status: ACTIVE | Noted: 2024-04-10

## 2024-04-10 PROBLEM — R73.9 HYPERGLYCEMIA: Status: ACTIVE | Noted: 2024-04-10

## 2024-04-10 LAB
ALBUMIN SERPL-MCNC: 2.3 G/DL (ref 3.4–5)
ALBUMIN/GLOB SERPL: 0.8 {RATIO} (ref 1–2)
ALP LIVER SERPL-CCNC: 60 U/L
ALT SERPL-CCNC: 12 U/L
ANION GAP SERPL CALC-SCNC: 6 MMOL/L (ref 0–18)
AST SERPL-CCNC: 24 U/L (ref 15–37)
BASOPHILS # BLD AUTO: 0 X10(3) UL (ref 0–0.2)
BASOPHILS NFR BLD AUTO: 0 %
BILIRUB SERPL-MCNC: 0.8 MG/DL (ref 0.1–2)
BUN BLD-MCNC: 12 MG/DL (ref 9–23)
CALCIUM BLD-MCNC: 8.2 MG/DL (ref 8.5–10.1)
CHLORIDE SERPL-SCNC: 100 MMOL/L (ref 98–112)
CO2 SERPL-SCNC: 25 MMOL/L (ref 21–32)
CREAT BLD-MCNC: 0.52 MG/DL
EGFRCR SERPLBLD CKD-EPI 2021: 118 ML/MIN/1.73M2 (ref 60–?)
EOSINOPHIL # BLD AUTO: 0.02 X10(3) UL (ref 0–0.7)
EOSINOPHIL NFR BLD AUTO: 0.4 %
ERYTHROCYTE [DISTWIDTH] IN BLOOD BY AUTOMATED COUNT: 16.8 %
GLOBULIN PLAS-MCNC: 2.9 G/DL (ref 2.8–4.4)
GLUCOSE BLD-MCNC: 110 MG/DL (ref 70–99)
HCT VFR BLD AUTO: 26 %
HGB BLD-MCNC: 8.6 G/DL
IMM GRANULOCYTES # BLD AUTO: 0.04 X10(3) UL (ref 0–1)
IMM GRANULOCYTES NFR BLD: 0.8 %
LYMPHOCYTES # BLD AUTO: 0.28 X10(3) UL (ref 1–4)
LYMPHOCYTES NFR BLD AUTO: 5.4 %
MAGNESIUM SERPL-MCNC: 1.2 MG/DL (ref 1.6–2.6)
MCH RBC QN AUTO: 30.2 PG (ref 26–34)
MCHC RBC AUTO-ENTMCNC: 33.1 G/DL (ref 31–37)
MCV RBC AUTO: 91.2 FL
MONOCYTES # BLD AUTO: 0.37 X10(3) UL (ref 0.1–1)
MONOCYTES NFR BLD AUTO: 7.1 %
NEUTROPHILS # BLD AUTO: 4.52 X10 (3) UL (ref 1.5–7.7)
NEUTROPHILS # BLD AUTO: 4.52 X10(3) UL (ref 1.5–7.7)
NEUTROPHILS NFR BLD AUTO: 86.3 %
OSMOLALITY SERPL CALC.SUM OF ELEC: 272 MOSM/KG (ref 275–295)
PLATELET # BLD AUTO: 161 10(3)UL (ref 150–450)
POTASSIUM SERPL-SCNC: 2.3 MMOL/L (ref 3.5–5.1)
PROT SERPL-MCNC: 5.2 G/DL (ref 6.4–8.2)
RBC # BLD AUTO: 2.85 X10(6)UL
SODIUM SERPL-SCNC: 131 MMOL/L (ref 136–145)
WBC # BLD AUTO: 5.2 X10(3) UL (ref 4–11)

## 2024-04-10 PROCEDURE — 99223 1ST HOSP IP/OBS HIGH 75: CPT | Performed by: HOSPITALIST

## 2024-04-10 RX ORDER — POLYETHYLENE GLYCOL 3350 17 G/17G
17 POWDER, FOR SOLUTION ORAL DAILY PRN
Status: DISCONTINUED | OUTPATIENT
Start: 2024-04-10 | End: 2024-04-11

## 2024-04-10 RX ORDER — BISACODYL 10 MG
10 SUPPOSITORY, RECTAL RECTAL
Status: DISCONTINUED | OUTPATIENT
Start: 2024-04-10 | End: 2024-04-11

## 2024-04-10 RX ORDER — DULOXETIN HYDROCHLORIDE 30 MG/1
60 CAPSULE, DELAYED RELEASE ORAL DAILY
Status: DISCONTINUED | OUTPATIENT
Start: 2024-04-11 | End: 2024-04-11

## 2024-04-10 RX ORDER — SENNOSIDES 8.6 MG
17.2 TABLET ORAL NIGHTLY PRN
Status: DISCONTINUED | OUTPATIENT
Start: 2024-04-10 | End: 2024-04-11

## 2024-04-10 RX ORDER — ZOLPIDEM TARTRATE 5 MG/1
10 TABLET ORAL NIGHTLY PRN
Status: DISCONTINUED | OUTPATIENT
Start: 2024-04-10 | End: 2024-04-11

## 2024-04-10 RX ORDER — SODIUM CHLORIDE 9 MG/ML
INJECTION, SOLUTION INTRAVENOUS CONTINUOUS
Status: DISCONTINUED | OUTPATIENT
Start: 2024-04-10 | End: 2024-04-11

## 2024-04-10 RX ORDER — TORSEMIDE 10 MG/1
10 TABLET ORAL DAILY
COMMUNITY

## 2024-04-10 RX ORDER — ACETAMINOPHEN 500 MG
500 TABLET ORAL EVERY 4 HOURS PRN
Status: DISCONTINUED | OUTPATIENT
Start: 2024-04-10 | End: 2024-04-11

## 2024-04-10 RX ORDER — ONDANSETRON 2 MG/ML
4 INJECTION INTRAMUSCULAR; INTRAVENOUS EVERY 6 HOURS PRN
Status: DISCONTINUED | OUTPATIENT
Start: 2024-04-10 | End: 2024-04-11

## 2024-04-10 RX ORDER — METOPROLOL SUCCINATE 50 MG/1
50 TABLET, EXTENDED RELEASE ORAL
Status: DISCONTINUED | OUTPATIENT
Start: 2024-04-10 | End: 2024-04-11

## 2024-04-10 RX ORDER — HEPARIN SODIUM 5000 [USP'U]/ML
5000 INJECTION, SOLUTION INTRAVENOUS; SUBCUTANEOUS EVERY 12 HOURS SCHEDULED
Status: DISCONTINUED | OUTPATIENT
Start: 2024-04-10 | End: 2024-04-11

## 2024-04-10 RX ORDER — ENEMA 19; 7 G/133ML; G/133ML
1 ENEMA RECTAL ONCE AS NEEDED
Status: DISCONTINUED | OUTPATIENT
Start: 2024-04-10 | End: 2024-04-11

## 2024-04-10 RX ORDER — MAGNESIUM SULFATE HEPTAHYDRATE 40 MG/ML
2 INJECTION, SOLUTION INTRAVENOUS ONCE
Status: COMPLETED | OUTPATIENT
Start: 2024-04-10 | End: 2024-04-10

## 2024-04-10 RX ORDER — MELATONIN
3 NIGHTLY PRN
Status: DISCONTINUED | OUTPATIENT
Start: 2024-04-10 | End: 2024-04-11

## 2024-04-10 RX ORDER — PROCHLORPERAZINE MALEATE 10 MG
10 TABLET ORAL EVERY 6 HOURS PRN
Status: DISCONTINUED | OUTPATIENT
Start: 2024-04-10 | End: 2024-04-11

## 2024-04-10 RX ORDER — POTASSIUM CHLORIDE 20 MEQ/1
40 TABLET, EXTENDED RELEASE ORAL ONCE
Status: COMPLETED | OUTPATIENT
Start: 2024-04-10 | End: 2024-04-10

## 2024-04-10 RX ORDER — PROCHLORPERAZINE EDISYLATE 5 MG/ML
5 INJECTION INTRAMUSCULAR; INTRAVENOUS EVERY 8 HOURS PRN
Status: DISCONTINUED | OUTPATIENT
Start: 2024-04-10 | End: 2024-04-11

## 2024-04-10 NOTE — ED INITIAL ASSESSMENT (HPI)
Patient sent to ER by her oncologist d/t abnormal potassium level of 2.3. cancer patient, last radiation treatment this AM

## 2024-04-11 VITALS
BODY MASS INDEX: 19.46 KG/M2 | WEIGHT: 114 LBS | HEART RATE: 100 BPM | RESPIRATION RATE: 18 BRPM | TEMPERATURE: 98 F | OXYGEN SATURATION: 99 % | DIASTOLIC BLOOD PRESSURE: 90 MMHG | SYSTOLIC BLOOD PRESSURE: 111 MMHG | HEIGHT: 64 IN

## 2024-04-11 LAB
ALBUMIN SERPL-MCNC: 2.2 G/DL (ref 3.4–5)
ALBUMIN/GLOB SERPL: 0.8 {RATIO} (ref 1–2)
ALP LIVER SERPL-CCNC: 61 U/L
ALT SERPL-CCNC: 13 U/L
ANION GAP SERPL CALC-SCNC: 9 MMOL/L (ref 0–18)
AST SERPL-CCNC: 18 U/L (ref 15–37)
ATRIAL RATE: 102 BPM
BASOPHILS # BLD AUTO: 0 X10(3) UL (ref 0–0.2)
BASOPHILS NFR BLD AUTO: 0 %
BILIRUB SERPL-MCNC: 0.9 MG/DL (ref 0.1–2)
BUN BLD-MCNC: 10 MG/DL (ref 9–23)
CALCIUM BLD-MCNC: 7.7 MG/DL (ref 8.5–10.1)
CHLORIDE SERPL-SCNC: 103 MMOL/L (ref 98–112)
CO2 SERPL-SCNC: 23 MMOL/L (ref 21–32)
CREAT BLD-MCNC: 0.48 MG/DL
EGFRCR SERPLBLD CKD-EPI 2021: 120 ML/MIN/1.73M2 (ref 60–?)
EOSINOPHIL # BLD AUTO: 0.03 X10(3) UL (ref 0–0.7)
EOSINOPHIL NFR BLD AUTO: 0.7 %
ERYTHROCYTE [DISTWIDTH] IN BLOOD BY AUTOMATED COUNT: 17.1 %
GLOBULIN PLAS-MCNC: 2.9 G/DL (ref 2.8–4.4)
GLUCOSE BLD-MCNC: 98 MG/DL (ref 70–99)
HCT VFR BLD AUTO: 23.7 %
HGB BLD-MCNC: 8.1 G/DL
IMM GRANULOCYTES # BLD AUTO: 0.02 X10(3) UL (ref 0–1)
IMM GRANULOCYTES NFR BLD: 0.5 %
LYMPHOCYTES # BLD AUTO: 0.22 X10(3) UL (ref 1–4)
LYMPHOCYTES NFR BLD AUTO: 5 %
MAGNESIUM SERPL-MCNC: 2.1 MG/DL (ref 1.6–2.6)
MCH RBC QN AUTO: 29.9 PG (ref 26–34)
MCHC RBC AUTO-ENTMCNC: 34.2 G/DL (ref 31–37)
MCV RBC AUTO: 87.5 FL
MONOCYTES # BLD AUTO: 0.27 X10(3) UL (ref 0.1–1)
MONOCYTES NFR BLD AUTO: 6.1 %
NEUTROPHILS # BLD AUTO: 3.86 X10 (3) UL (ref 1.5–7.7)
NEUTROPHILS # BLD AUTO: 3.86 X10(3) UL (ref 1.5–7.7)
NEUTROPHILS NFR BLD AUTO: 87.7 %
OSMOLALITY SERPL CALC.SUM OF ELEC: 279 MOSM/KG (ref 275–295)
P AXIS: 60 DEGREES
P-R INTERVAL: 182 MS
PHOSPHATE SERPL-MCNC: 2.2 MG/DL (ref 2.5–4.9)
PLATELET # BLD AUTO: 147 10(3)UL (ref 150–450)
POTASSIUM SERPL-SCNC: 2.9 MMOL/L (ref 3.5–5.1)
POTASSIUM SERPL-SCNC: 2.9 MMOL/L (ref 3.5–5.1)
POTASSIUM SERPL-SCNC: 3.6 MMOL/L (ref 3.5–5.1)
PROT SERPL-MCNC: 5.1 G/DL (ref 6.4–8.2)
Q-T INTERVAL: 306 MS
QRS DURATION: 84 MS
QTC CALCULATION (BEZET): 398 MS
R AXIS: 43 DEGREES
RBC # BLD AUTO: 2.71 X10(6)UL
SODIUM SERPL-SCNC: 135 MMOL/L (ref 136–145)
T AXIS: -46 DEGREES
VENTRICULAR RATE: 102 BPM
WBC # BLD AUTO: 4.4 X10(3) UL (ref 4–11)

## 2024-04-11 PROCEDURE — 99239 HOSP IP/OBS DSCHRG MGMT >30: CPT | Performed by: HOSPITALIST

## 2024-04-11 RX ORDER — POTASSIUM PHOS,M-BASIC-D-BASIC 3MMOL/ML
15 INTRAVENOUS SOLUTION INTRAVENOUS ONCE
Status: COMPLETED | OUTPATIENT
Start: 2024-04-11 | End: 2024-04-11

## 2024-04-11 RX ORDER — HYDROCODONE BITARTRATE AND ACETAMINOPHEN 5; 325 MG/1; MG/1
1 TABLET ORAL EVERY 6 HOURS PRN
Status: DISCONTINUED | OUTPATIENT
Start: 2024-04-11 | End: 2024-04-11

## 2024-04-11 RX ORDER — POTASSIUM CHLORIDE 14.9 MG/ML
20 INJECTION INTRAVENOUS ONCE
Status: COMPLETED | OUTPATIENT
Start: 2024-04-11 | End: 2024-04-11

## 2024-04-11 NOTE — PHYSICAL THERAPY NOTE
PHYSICAL THERAPY EVALUATION - INPATIENT     Room Number: 7610/7610-A  Evaluation Date: 4/11/2024  Type of Evaluation: Initial  Physician Order: PT Eval and Treat    Presenting Problem: hypokalemia, hyponatremia  Co-Morbidities : invasive ductal carcinoma L breast undergoing radiation therapy, iron deficiency anemia, HFrEF, lupus, Sjogrens, R foot drop  Reason for Therapy: Mobility Dysfunction and Discharge Planning    PHYSICAL THERAPY ASSESSMENT   Patient is currently functioning near baseline with bed mobility, transfers, gait, stair negotiation, maintaining seated position, and standing prolonged periods.  Prior to admission, patient's baseline is mod I with RW.  Patient is requiring stand-by assist and contact guard assist as a result of the following impairments: decreased functional strength, decreased endurance/aerobic capacity, impaired standing balance, decreased muscular endurance, and medical status.  Physical Therapy will continue to follow for duration of hospitalization.    Patient will benefit from continued skilled PT Services at discharge to promote functional independence and safety with additional support and return home with home health PT.    PLAN  PT Treatment Plan: Bed mobility;Body mechanics;Endurance;Energy conservation;Patient education;Family education;Gait training;Balance training;Transfer training;Strengthening;Neuromuscular re-educate  Rehab Potential : Fair  Frequency (Obs): 3x/week  Number of Visits to Meet Established Goals: 2      CURRENT GOALS    Goal #1 Patient is able to demonstrate supine - sit EOB @ level: modified independent  MET   Goal #2 Patient is able to demonstrate transfers EOB to/from Chair/Wheelchair at assistance level: modified independent     Goal #3 Patient is able to ambulate 150 feet with assist device: walker - rolling at assistance level: supervision     Goal #4    Goal #5    Goal #6    Goal Comments: Goals established on 4/11/2024      PHYSICAL THERAPY  MEDICAL/SOCIAL HISTORY  History related to current admission: Patient is a 43 year old female admitted on 4/10/2024 from home for hypokalemia, hyponatremia.      HOME SITUATION  Type of Home: House   Home Layout: Two level        Stairs to Bedroom: 8 (to daughter's bedroom)       Lives With: Daughter  Drives: No  Patient Owned Equipment: Rolling walker;Rollator;Hospital bed;Wheelchair (Commode)       Prior Level of [place]: Pt lives with 9yo daughter. Pt typically utilizes RW for mobility. Has a rollator and a wc. Hospital bed on main floor of the house, dtr's room is upstairs. Pt scoots up the stairs on her bottom. Mom available to help as needed but is currently in [place].     SUBJECTIVE  \"Thanks for coming\"       OBJECTIVE  Precautions: Bed/chair alarm  Fall Risk: Standard fall risk    WEIGHT BEARING RESTRICTION  Weight Bearing Restriction: None                PAIN ASSESSMENT  Ratin          COGNITION  Overall Cognitive Status:  WFL - within functional limits    RANGE OF MOTION AND STRENGTH ASSESSMENT  See OT note for UE assessment    Lower extremity ROM is within functional limits     Lower extremity strength is within functional limits except for the following:   R foot drop       BALANCE  Static Sitting: Good  Dynamic Sitting: Fair +  Static Standing: Fair -  Dynamic Standing: Poor +    ADDITIONAL TESTS                                    ACTIVITY TOLERANCE  Pulse: 100  Heart Rate Source: Monitor                   O2 WALK       NEUROLOGICAL FINDINGS                        AM-PAC '6-Clicks' INPATIENT SHORT FORM - BASIC MOBILITY  How much difficulty does the patient currently have...  Patient Difficulty: Turning over in bed (including adjusting bedclothes, sheets and blankets)?: None   Patient Difficulty: Sitting down on and standing up from a chair with arms (e.g., wheelchair, bedside commode, etc.): A Little   Patient Difficulty: Moving from lying on back to sitting on the side of the bed?: None   How  much help from another person does the patient currently need...   Help from Another: Moving to and from a bed to a chair (including a wheelchair)?: A Little   Help from Another: Need to walk in hospital room?: A Little   Help from Another: Climbing 3-5 steps with a railing?: A Little       AM-PAC Score:  Raw Score: 20   Approx Degree of Impairment: 35.83%   Standardized Score (AM-PAC Scale): 47.67   CMS Modifier (G-Code): CJ    FUNCTIONAL ABILITY STATUS  Gait Assessment   Functional Mobility/Gait Assessment  Gait Assistance: Supervision  Distance (ft): 150  Assistive Device: Rolling walker  Pattern: R Foot drop (dec gait speed)    Skilled Therapy Provided     Gait Training:   Pt cued on upright standing posture to improve alignment with UB; RW height adjusted to assist   Pt cued on gait sequencing with RW   Pt reports that she was waiting until she was done with chemo to get fitted for AFO. Pt educated on OTC AFO to assist with R foot drop in the meantime    Therapeutic Activity:   Pt cued on placement of UE and LEs for optimal force generation and safe STS transfer.   Pt cued on usage of arm rests for controlled descent into sitting        Bed Mobility:  Rolling: NT  Supine to sit: ind   Sit to supine: ind     Transfer Mobility:  Sit to stand: CGA   Stand to sit: CGA  Gait = CGA progressing to supervision    Therapist's Comments: RN cleared for session. Pt agreeable for therapy, received supine. Dtr present during session. Instructed to call for nursing staff for any needs and OOB mobility.     Exercise/Education Provided:  Bed mobility  Body mechanics  Energy conservation  Functional activity tolerated  Gait training  Posture  Strengthening  Transfer training    Patient End of Session: In bed;Needs met;Call light within reach;RN aware of session/findings;All patient questions and concerns addressed;Family present;Alarm set      Patient Evaluation Complexity Level:  History High - 3 or more personal factors and/or  co-morbidities   Examination of body systems Low - addressing 1-2 elements   Clinical Presentation Low - Stable   Clinical Decision Making Low - Stable       PT Session Time: 25 minutes  Gait Trainin minutes  Therapeutic Activity: 5 minutes

## 2024-04-11 NOTE — ED PROVIDER NOTES
Patient Seen in: [facility] 7ne-a      History     Chief Complaint   Patient presents with    Abnormal Labs     Stated Complaint: Low K    Subjective:   HPI    43-year-old woman history of breast cancer, CHF with EF of 25 to 30%, here for evaluation of abnormal labs, was told to report to the ER for low potassium, reportedly 2.3.  Patient states she has had slight cough recently, states she had not been taking her potassium tablets because she feels like they sometimes get stuck in her throat.  Denies any other concerns denies any recent vomiting diarrhea any other medication changes any new chest pain shortness of breath leg swelling fevers, any other concerns.    Objective:   Past Medical History:    Breast cancer (HCC)    Congestive heart disease (HCC)    Gastritis    Lupus (HCC)    Neuropathy    Personal history of antineoplastic chemotherapy    Sjogren's disease (HCC)              Past Surgical History:   Procedure Laterality Date    Breast surgery Left      delivery only                  Social History     Socioeconomic History    Marital status:    Tobacco Use    Smoking status: Former     Current packs/day: 0.00     Types: Cigarettes     Quit date:      Years since quittin.2    Smokeless tobacco: Never    Tobacco comments:     social smoker   Vaping Use    Vaping status: Never Used   Substance and Sexual Activity    Alcohol use: Not Currently    Drug use: Never     Social Determinants of Health     Food Insecurity: No Food Insecurity (4/10/2024)    Food Insecurity     Food Insecurity: Never true   Transportation Needs: No Transportation Needs (4/10/2024)    Transportation Needs     Lack of Transportation: No   Housing Stability: Low Risk  (4/10/2024)    Housing Stability     Housing Instability: No              Review of Systems    Positive for stated complaint: Low K  Other systems are as noted in HPI.  Constitutional and vital signs reviewed.      All other systems reviewed  and negative except as noted above.    Physical Exam     ED Triage Vitals [04/10/24 1648]   /89   Pulse 104   Resp 18   Temp 98.1 °F (36.7 °C)   Temp src Temporal   SpO2 95 %   O2 Device None (Room air)       Current:BP (!) 112/92 (BP Location: Left arm)   Pulse 96   Temp 97.7 °F (36.5 °C) (Oral)   Resp 20   Ht 162.6 cm (5' 4\")   Wt 51.7 kg   LMP 08/28/2023 (Approximate)   SpO2 97%   BMI 19.57 kg/m²         Physical Exam        Physical Exam  Vitals signs and nursing note reviewed.   General: Patient sitting up in bed in no acute distress  Head: Normocephalic and atraumatic.   HEENT:  Mucous membranes are moist.   Cardiovascular:  Normal rate and regular rhythm.  No Edema  Pulmonary:  Pulmonary effort is normal.  Normal breath sounds. No wheezing, rhonchi or rales.   Abdominal: Soft nontender nondistended, normal bowel sounds, no guarding no rebound tenderness  Skin: Warm and dry  Neurological: Awake alert, speech is normal      ED Course     Labs Reviewed   COMP METABOLIC PANEL (14) - Abnormal; Notable for the following components:       Result Value    Glucose 110 (*)     Sodium 131 (*)     Potassium 2.3 (*)     Creatinine 0.52 (*)     Calcium, Total 8.2 (*)     Calculated Osmolality 272 (*)     ALT 12 (*)     Total Protein 5.2 (*)     Albumin 2.3 (*)     A/G Ratio 0.8 (*)     All other components within normal limits   MAGNESIUM - Abnormal; Notable for the following components:    Magnesium 1.2 (*)     All other components within normal limits   CBC W/ DIFFERENTIAL - Abnormal; Notable for the following components:    RBC 2.85 (*)     HGB 8.6 (*)     HCT 26.0 (*)     Lymphocyte Absolute 0.28 (*)     All other components within normal limits   CBC WITH DIFFERENTIAL WITH PLATELET    Narrative:     The following orders were created for panel order CBC With Differential With Platelet.  Procedure                               Abnormality         Status                     ---------                                -----------         ------                     CBC W/ DIFFERENTIAL[[id number]]          Abnormal            Final result                 Please view results for these tests on the individual orders.   RAINBOW DRAW BLUE     EKG    Rate, intervals and axes as noted on EKG Report.  Rate: 102  Rhythm: Sinus Rhythm  Reading: Nonspecific changes no acute ischemic changes                  MDM      This is a 43-year-old woman here for evaluation of abnormal labs, found to have potassium of 2.3.  Differential includes hypokalemia, lab error, hypomagnesemia.  Patient reports she had been missing some of her supplements recently.  Repeat potassium is 2.3 patient has received 40 mill equivalents p.o., 40 mill equivalents IV of potassium chloride.  Magnesium also found to be deficient, this was also repleted.  Patient has no significant EKG changes or rate intervals are normal.  Will admit to the hospital to continue with repletion and recheck to ensure improvement.  Case endorsed to the [facility] Hospitalist who agrees with plan for admission.  Patient's daughter is at bedside.  Admission disposition: 4/10/2024  6:49 PM                                        Medical Decision Making      Disposition and Plan     Clinical Impression:  1. Hypokalemia         Disposition:  Admit  4/10/2024  6:49 pm    Follow-up:  No follow-up provider specified.        Medications Prescribed:  Current Discharge Medication List                            Hospital Problems       Present on Admission  Date Reviewed: 12/16/2021            ICD-10-CM Noted POA    * (Principal) Hypokalemia E87.6 11/14/2015 Unknown    Azotemia R79.89 4/10/2024 Yes    Hyperglycemia R73.9 4/10/2024 Yes

## 2024-04-11 NOTE — DISCHARGE SUMMARY
[place] HOSPITALIST  DISCHARGE SUMMARY     Alina Aceves Patient Status:  Inpatient    1980 MRN [id number]   Location [facility] 7NE-A Attending Deep Fine, DO   Hosp Day # 1 PCP HOLLY IBARRA     Date of Admission: 4/10/2024  Date of Discharge:   24    Discharge Disposition: Home Health Care Services    Discharge Diagnosis:  #Hypokalemia  #Hyponatremia   #Poor oral intake 2/2 oral sores  #Dehydration  #Hypomagnesia   -ivf  -replaced     #Weakness  -therapies to see     #Invasive ductal carcinoma of the left breast   -currently getting radiation therapy     #Iron deficiency anemia     #HFrEF  -LVEF 20-25%     #Lupus  #Sjogren's syndrome    History of Present Illness: Alina Aceves is a 43 year old female with Past medical history invasive ductal carcinoma of left breast currently undergoing radiation therapy, iron deficiency anemia, HFrEF, lupus, Sjogren's syndrome presents to the hospital today for \"hypokalemia\".  Unfortunately patient was hospitalized 3/27-3/29 for similar issues of poor oral intake.  Patient was advised by her oncologist to come into the hospital as she is unable to take her potassium pills at home.  Patient also takes torsemide for her underlying HFpEF but goes on to state that she has not been taking it regularly.  Patient also has had some issues of limited appetite and nausea but no emesis.  Patient otherwise denies any fevers or chills no nausea vomiting no diarrhea constipation.  Patient has been having a cough going on since his diagnosis of pneumonia last month.     Brief Synopsis: pt currently getting radiation for breast cancer. Resulting in oral sores and n/v. Has been eating but had a few days where she had trouble with her oral potassium. Went for labs and noted low K. Admitted and electrolytes repleted. K and mag given. Lytes better. Taking PO here and tolerated her PO potassium. Declined the powder version, prefers the pills. Will try furhter supplements like  ensure. Ok to DC home.     Lace+ Score: 77  59-90 High Risk  29-58 Medium Risk  0-28   Low Risk       TCM Follow-Up Recommendation:  LACE > 58: High Risk of readmission after discharge from the hospital.      Procedures during hospitalization:   none    Incidental or significant findings and recommendations (brief descriptions):  none    Lab/Test results pending at Discharge:   none    Consultants:  none    Discharge Medication List:     Discharge Medications        CONTINUE taking these medications        Instructions Prescription details   benzonatate 200 MG Caps  Commonly known as: Tessalon      Take 1 capsule (200 mg total) by mouth 3 (three) times daily as needed for cough.   Quantity: 30 capsule  Refills: 0     DULoxetine 30 MG Cpep  Commonly known as: Cymbalta      Take 2 capsules (60 mg total) by mouth daily.   Refills: 0     HYDROcodone-acetaminophen 5-325 MG Tabs  Commonly known as: Norco      Take 1 tablet by mouth every 6 (six) hours as needed for Pain.   Quantity: 10 tablet  Refills: 0     lidocaine-prilocaine 2.5-2.5 % Crea  Commonly known as: Emla      APPLY SMALL AMOUNT OVER PORT PRIOR TO ACCESS   Refills: 0     metoprolol succinate ER 50 MG Tb24  Commonly known as: Toprol XL      Take 1 tablet (50 mg total) by mouth 2x Daily(Beta Blocker).   Quantity: 180 tablet  Refills: 3     ondansetron 4 MG Tbdp  Commonly known as: Zofran-ODT      Take 1 tablet (4 mg total) by mouth every 4 (four) hours as needed for Nausea.   Quantity: 10 tablet  Refills: 0     Potassium Citrate ER 15 MEQ (1620 MG) Tbcr      Take 1 tablet by mouth in the morning, at noon, and at bedtime.   Refills: 0     prochlorperazine 10 mg tablet  Commonly known as: Compazine      Take 1 tablet (10 mg total) by mouth every 6 (six) hours as needed for Nausea or vomiting.   Refills: 0     torsemide 10 MG Tabs  Commonly known as: DEMADEX      Take 1 tablet (10 mg total) by mouth daily. PRN   Refills: 0     zolpidem 10 MG Tabs  Commonly known  as: Ambien      Take 1 tablet (10 mg total) by mouth nightly as needed for Sleep.   Refills: 0              [place]    Follow-up appointment:   Stephon Villeda [facility] [address]  [phone]    Schedule an appointment as soon as possible for a visit      Appointments for Next 30 Days 2024 - 2024      None            Vital signs:  Temp:  [97.6 °F (36.4 °C)-98.1 °F (36.7 °C)] 97.8 °F (36.6 °C)  Pulse:  [] 96  Resp:  [18-29] 18  BP: (111-126)/(89-92) 111/90  SpO2:  [95 %-100 %] 99 %    Physical Exam:    General: No acute distress   Lungs: clear to auscultation  Cardiovascular: S1, S2  Abdomen: Soft      -----------------------------------------------------------------------------------------------  PATIENT DISCHARGE INSTRUCTIONS: See electronic chart    Rachid Villanueva MD    Total time spent on discharge plannin minutes     The  Century Cures Act makes medical notes like these available to patients in the interest of transparency. Please be advised this is a medical document. Medical documents are intended to carry relevant information, facts as evident, and the clinical opinion of the practitioner. The medical note is intended as peer to peer communication and may appear blunt or direct. It is written in medical language and may contain abbreviations or verbiage that are unfamiliar.

## 2024-04-11 NOTE — PLAN OF CARE
NURSING DISCHARGE NOTE    Assumed care at 0730  Alert and oriented x4  RA. Nsr on tele. VSS  Pain from neuropathy in feet. Norco prn if needed  IVF and IV K replaced. Repeat lab draw at 1500  Phosphorus low-replaced before discharge  R port in place. De accessed before dc  Pt encouraged to increase oral intake  Isolation precautions for esbl in urine hx  Safety precautions in place    Medically cleared for discharge. IV removed. Tele removed. Discharge education given to pt at bedside. All questions answered. Verbalized understanding. Friend to  pt and her daughter    Discharged Home via Wheelchair.  Accompanied by Family member and RN  Belongings Taken by patient/family.

## 2024-04-11 NOTE — CM/SW NOTE
SW met with pt and dtr at bedside. Pt admitted under Onc advisement. Pt recently admitted at the end of March where she was set to continue with [facility]. SW inquired if current with services.     SW to remain available for any dc planning needs/recs.    ANN Mckenna

## 2024-04-11 NOTE — H&P
[facility]     Alina Aceves Patient Status:  Emergency    1980 MRN [id number]   [facility] EMERGENCY DEPARTMENT Attending Alonzo Ramachandran MD   Hosp Day # 0 PCP HOLLY IBARRA     Chief Complaint: \"my potassium is low\"    Subjective:    History of Present Illness:     Alina Aceves is a 43 year old female with Past medical history invasive ductal carcinoma of left breast currently undergoing radiation therapy, iron deficiency anemia, HFrEF, lupus, Sjogren's syndrome presents to the hospital today for \"hypokalemia\".  Unfortunately patient was hospitalized 3/27-3/29 for similar issues of poor oral intake.  Patient was advised by her oncologist to come into the hospital as she is unable to take her potassium pills at home.  Patient also takes torsemide for her underlying HFpEF but goes on to state that she has not been taking it regularly.  Patient also has had some issues of limited appetite and nausea but no emesis.  Patient otherwise denies any fevers or chills no nausea vomiting no diarrhea constipation.  Patient has been having a cough going on since his diagnosis of pneumonia last month.              History/Other:    Past Medical History:  Past Medical History:    Breast cancer (HCC)    Congestive heart disease (HCC)    Gastritis    Lupus (HCC)    Personal history of antineoplastic chemotherapy    Sjogren's disease (HCC)     Past Surgical History:   Past Surgical History:   Procedure Laterality Date    Breast surgery Left      delivery only        Family History:   No family history on file.  Social History:    reports that she quit smoking about 14 years ago. Her smoking use included cigarettes. She has never used smokeless tobacco. She reports that she does not currently use alcohol. She reports that she does not use drugs.     Allergies:   Allergies   Allergen Reactions    Bactrim [Sulfamethoxazole W/Trimethoprim] RASH    Adhesive Tape RASH        Medications:    Current Facility-Administered Medications on File Prior to Encounter   Medication Dose Route Frequency Provider Last Rate Last Admin    [COMPLETED] potassium chloride (K-Dur) tab 40 mEq  40 mEq Oral Once Francisco Quarles MD   40 mEq at 03/28/24 2021    [COMPLETED] magnesium oxide (Mag-Ox) tab 400 mg  400 mg Oral Once Francisco Quarles MD   400 mg at 03/28/24 2021    [COMPLETED] sodium chloride 0.9% infusion   Intravenous Once Luly Lr MD   Stopped at 03/28/24 1024    [COMPLETED] potassium chloride (K-Dur) tab 40 mEq  40 mEq Oral Once Deep Fine DO   40 mEq at 03/27/24 2003    [COMPLETED] potassium chloride (K-Dur) tab 40 mEq  40 mEq Oral Once Marya Caban MD   40 mEq at 03/12/24 0925    [COMPLETED] magnesium oxide (Mag-Ox) tab 400 mg  400 mg Oral Once Jasiel Rubi, DO   400 mg at 03/11/24 0906    [COMPLETED] potassium chloride (K-Dur) tab 40 mEq  40 mEq Oral Once Jasiel Rubi, DO   40 mEq at 03/11/24 0906    [COMPLETED] potassium chloride (K-Dur) tab 40 mEq  40 mEq Oral Once Jasiel Rubi, DO   40 mEq at 03/10/24 0951    [COMPLETED] tolvaptan (Samsca) tab 15 mg  15 mg Oral Once Marya Caban MD   15 mg at 03/10/24 0953    [COMPLETED] iron sucrose (Venofer) 20 mg/mL injection 200 mg  200 mg Intravenous Daily Marya Caban MD   200 mg at 03/12/24 0926    [COMPLETED] tolvaptan (Samsca) tab 15 mg  15 mg Oral Once Marya Caban MD   15 mg at 03/09/24 1029    [COMPLETED] tolvaptan (Samsca) tab 15 mg  15 mg Oral Once Francisco Quarles MD   15 mg at 03/08/24 0655    [COMPLETED] potassium chloride (K-Dur) tab 40 mEq  40 mEq Oral Q4H Jasiel Rubi, DO   40 mEq at 03/08/24 1757    [COMPLETED] acetaminophen (Tylenol) tab 650 mg  650 mg Oral Once Luly Lr MD   650 mg at 03/06/24 1607    [COMPLETED] sodium chloride 0.9% infusion 1,000 mL  1,000 mL Intravenous Once Luly Lr MD   Stopped at 03/06/24 1913    [COMPLETED] potassium chloride (K-Dur) tab 40 mEq  40 mEq Oral Once Adeline,  MD Luly   40 mEq at 03/06/24 1825    [COMPLETED] ondansetron (Zofran) 4 MG/2ML injection 4 mg  4 mg Intravenous Once Luly Lr MD   4 mg at 03/06/24 1825    [COMPLETED] piperacillin-tazobactam (Zosyn) 4.5 g in dextrose 5% 100 mL IVPB-ADDV  4.5 g Intravenous Once Luly Lr MD   Stopped at 03/06/24 2012    [COMPLETED] iopamidol 76% (ISOVUE-370) injection for power injector  100 mL Intravenous ONCE PRN Luly Lr MD   100 mL at 03/06/24 2014     Current Outpatient Medications on File Prior to Encounter   Medication Sig Dispense Refill    HYDROcodone-acetaminophen 5-325 MG Oral Tab Take 1 tablet by mouth every 6 (six) hours as needed for Pain. 10 tablet 0    benzonatate 200 MG Oral Cap Take 1 capsule (200 mg total) by mouth 3 (three) times daily as needed for cough. 30 capsule 0    cefadroxil 500 MG Oral Cap Take 1 capsule (500 mg total) by mouth 2 (two) times daily.      prochlorperazine (COMPAZINE) 10 mg tablet Take 1 tablet (10 mg total) by mouth every 6 (six) hours as needed for Nausea or vomiting.      metoprolol succinate ER 50 MG Oral Tablet 24 Hr Take 1 tablet (50 mg total) by mouth 2x Daily(Beta Blocker). 180 tablet 3    torsemide 20 MG Oral Tab Take 1 tablet (20 mg total) by mouth daily. 90 tablet 3    ondansetron 4 MG Oral Tablet Dispersible Take 1 tablet (4 mg total) by mouth every 4 (four) hours as needed for Nausea. 10 tablet 0    Polyethylene Glycol 3350 17 g Oral Powd Pack Take 17 g by mouth daily as needed. 30 each 0    Potassium Citrate ER 15 MEQ (1620 MG) Oral Tab CR Take 1 tablet by mouth in the morning, at noon, and at bedtime.      DULoxetine 30 MG Oral Cap DR Particles Take 2 capsules (60 mg total) by mouth daily.      lidocaine-prilocaine 2.5-2.5 % External Cream APPLY SMALL AMOUNT OVER PORT PRIOR TO ACCESS      zolpidem 10 MG Oral Tab Take 1 tablet (10 mg total) by mouth nightly as needed for Sleep.         Review of Systems:   A comprehensive review of systems was  completed.    Pertinent positives and negatives noted in the HPI.    Objective:   Physical Exam:    /89   Pulse 101   Temp 98.1 °F (36.7 °C) (Temporal)   Resp 24   Ht 5' 4\" (1.626 m)   Wt 114 lb (51.7 kg)   LMP 08/28/2023 (Approximate)   SpO2 99%   BMI 19.57 kg/m²   General: No acute distress, Alert  Respiratory: No rhonchi, no wheezes  Cardiovascular: S1, S2. Regular rate and rhythm  Abdomen: Soft, Non-tender, non-distended, positive bowel sounds  Neuro: No new focal deficits  Extremities: No edema      Results:    Labs:      Labs Last 24 Hours:    Recent Labs   Lab 04/10/24  1802   RBC 2.85*   HGB 8.6*   HCT 26.0*   MCV 91.2   MCH 30.2   MCHC 33.1   RDW 16.8   NEPRELIM 4.52   WBC 5.2   .0       Recent Labs   Lab 04/10/24  1802   *   BUN 12   CREATSERUM 0.52*   EGFRCR 118   CA 8.2*   ALB 2.3*   *   K 2.3*      CO2 25.0   ALKPHO 60   AST 24   ALT 12*   BILT 0.8   TP 5.2*       Lab Results   Component Value Date    INR 1.18 03/10/2024    INR 1.26 (H) 12/28/2023    INR 1.43 (H) 12/23/2023       No results for input(s): \"TROP\", \"TROPHS\", \"CK\" in the last 168 hours.    No results for input(s): \"TROP\", \"PBNP\" in the last 168 hours.    No results for input(s): \"PCT\" in the last 168 hours.    Imaging: Imaging data reviewed in Epic.    Assessment & Plan:      #Hypokalemia  #Hyponatremia   #Poor oral intake  #Dehydration  #Hypomagnesia   -ivf  -replace and monitor K & Mg    #Weakness  -therapies to see    #Invasive ductal carcinoma of the left breast   -currently getting radiation therapy    #Iron deficiency anemia    #HFrEF  -LVEF 20-25%    #Lupus  #Sjogren's syndrome        Plan of care discussed with ER physician & patient    Jasiel Rubi DO    Supplementary Documentation:     The 21st Century Cures Act makes medical notes like these available to patients in the interest of transparency. Please be advised this is a medical document. Medical documents are intended to carry relevant  information, facts as evident, and the clinical opinion of the practitioner. The medical note is intended as peer to peer communication and may appear blunt or direct. It is written in medical language and may contain abbreviations or verbiage that are unfamiliar.

## 2024-04-11 NOTE — PLAN OF CARE
NURSING ADMISSION NOTE      Patient admitted via Cart  Oriented to room.  Safety precautions initiated.  Bed in low position.  Call light in reach.    Assumed care of pt around 1900.  A/o x4. RA. ST on tele.   K replaced per protocol. Repeat 2.9, next replacement started.   BLE neuropathy adequate relief w/ PRN Norco.   IVF infusing.  Up to the bathroom w/ walker and 1 assist.   Resting in bed w/ call light within reach.

## 2024-04-11 NOTE — OCCUPATIONAL THERAPY NOTE
OCCUPATIONAL THERAPY EVALUATION - INPATIENT    Room Number: 7610/7610-A  Evaluation Date: 4/11/2024     Type of Evaluation: Initial  Presenting Problem: Hypokalemia    Physician Order: IP Consult to Occupational Therapy  Reason for Therapy:  ADL/IADL Dysfunction and Discharge Planning      OCCUPATIONAL THERAPY ASSESSMENT   Patient is a 43 year old female admitted on 4/10/2024 with Presenting Problem: Hypokalemia. Co-Morbidities : invasive ductal carcinoma L breast undergoing radiation therapy, iron deficiency anemia, HFrEF, lupus, Sjogrens, R foot drop  Patient is currently functioning {OT Functioning Baseline:05743} baseline with {OT ADLs:00620}.  Prior to admission, patient's baseline is ***.  Patient met all OT goals at *** level.  Patient reports no further questions/concerns at this time.     Patient will benefit from continued skilled OT Services {OT Services:50727}      WEIGHT BEARING RESTRICTION  Weight Bearing Restriction: None                Recommendations for nursing staff:   Transfers: ***  Toileting location: Toilet    EVALUATION SESSION:  Patient at start of session: ***  FUNCTIONAL TRANSFER ASSESSMENT     BED MOBILITY     BALANCE ASSESSMENT     FUNCTIONAL ADL ASSESSMENT       ACTIVITY TOLERANCE: ***                         O2 SATURATIONS       COGNITION  {Cognition:3840:::0}  COGNITION ASSESSMENTS       Upper Extremity:   ROM: within functional limits {OT UE ROM Exception to WFLs:5379:::0}  Strength: is within functional limits {OT UE Strength Exception to WFLs:4400:::0}  Coordination:  Gross motor: ***  Fine motor: ***  Sensation: {Sensation:3868:::0}    EDUCATION PROVIDED     Equipment used: ***  Demonstrates functional use    Therapist comments: ***    Patient End of Session: In bed;Needs met;RN aware of session/findings;Call light within reach;All patient questions and concerns addressed;Alarm set;Discussed recommendations with /    OCCUPATIONAL PROFILE    HOME  SITUATION  Type of Home: House  Home Layout: Two level  Lives With: Daughter                     Drives: No       Prior Level of Function: ***    SUBJECTIVE  ***    PAIN ASSESSMENT             OBJECTIVE  Precautions: Bed/chair alarm  Fall Risk: Standard fall risk    WEIGHT BEARING RESTRICTION  Weight Bearing Restriction: None                AM-PAC ‘6-Clicks’ Inpatient Daily Activity Short Form                      AM-PAC Score:               ADDITIONAL TESTS     NEUROLOGICAL FINDINGS        PLAN   Patient has been evaluated and presents with no skilled Occupational Therapy needs at this time.  Patient discharged from Occupational Therapy services.  Please re-order if a new functional limitation presents during this admission.      Patient Evaluation Complexity Level:   Occupational Profile/Medical History {Occupational Profile/Medical History:7260}   Specific performance deficits impacting engagement in ADL/IADL {Specific performance deficits impacting engagement in ADL/IADL:7261}   Client Assessment/Performance Deficits {Client Assessment/Performance Deficits:7262}   Clinical Decision Making {Clinical Decision Makin}   Overall Complexity {Overall Complexity:7264}     OT Session Time: *** minutes  Self-Care Home Management: *** minutes  Therapeutic Activity: *** minutes  Neuromuscular Re-education: *** minutes  Therapeutic Exercise: *** minutes  Cognitive Skills: *** minutes  Sensory Integrative: *** minutes  Orthotic Management and Training: *** minutes  Can add/delete any of these

## 2024-04-11 NOTE — ED QUICK NOTES
Orders for admission, patient is aware of plan and ready to go upstairs. Any questions, please call ED JULIETTE lopez at extension [phone].     Patient Covid vaccination status: Fully vaccinated     COVID Test Ordered in ED: None    COVID Suspicion at Admission: N/A    Running Infusions:  potassium, mag    Mental Status/LOC at time of transport: x3    Other pertinent information:   CIWA score: N/A   NIH score:  N/A

## 2024-04-12 NOTE — PAYOR COMM NOTE
--------------  ADMISSION REVIEW   4/10-4/11  Payor: RO PENNY/JUANA  Subscriber #:  [id number]  Authorization Number: [id number]    Admit date: 4/10/24  Admit time:  8:47 PM       REVIEW DOCUMENTATION:    ED Provider Notes signed by Alonzo Ramachandran MD at 4/10/2024 10:05 PM      Patient Seen in: [facility]    History     Chief Complaint   Patient presents with    Abnormal Labs     Stated Complaint: Low K    Subjective:   HPI    43-year-old woman history of breast cancer, CHF with EF of 25 to 30%, here for evaluation of abnormal labs, was told to report to the ER for low potassium, reportedly 2.3.  Patient states she has had slight cough recently, states she had not been taking her potassium tablets because she feels like they sometimes get stuck in her throat.  Denies any other concerns denies any recent vomiting diarrhea any other medication changes any new chest pain shortness of breath leg swelling fevers, any other concerns.    Objective:   Past Medical History:    Breast cancer (HCC)    Congestive heart disease (HCC)    Gastritis    Lupus (HCC)    Neuropathy    Personal history of antineoplastic chemotherapy    Sjogren's disease (HCC)     Positive for stated complaint: Low K  Other systems are as noted in HPI.  Constitutional and vital signs reviewed.      All other systems reviewed and negative except as noted above.    Physical Exam     ED Triage Vitals [04/10/24 1648]   /89   Pulse 104   Resp 18   Temp 98.1 °F (36.7 °C)   Temp src Temporal   SpO2 95 %   O2 Device None (Room air)       Current:BP (!) 112/92 (BP Location: Left arm)   Pulse 96   Temp 97.7 °F (36.5 °C) (Oral)   Resp 20   Ht 162.6 cm (5' 4\")   Wt 51.7 kg   LMP 08/28/2023 (Approximate)   SpO2 97%   BMI 19.57 kg/m²     Physical Exam  Vitals signs and nursing note reviewed.   General: Patient sitting up in bed in no acute distress  Head: Normocephalic and atraumatic.   HEENT:  Mucous membranes are moist.   Cardiovascular:   Normal rate and regular rhythm.  No Edema  Pulmonary:  Pulmonary effort is normal.  Normal breath sounds. No wheezing, rhonchi or rales.   Abdominal: Soft nontender nondistended, normal bowel sounds, no guarding no rebound tenderness  Skin: Warm and dry  Neurological: Awake alert, speech is normal      ED Course     Labs Reviewed   COMP METABOLIC PANEL (14) - Abnormal; Notable for the following components:       Result Value    Glucose 110 (*)     Sodium 131 (*)     Potassium 2.3 (*)     Creatinine 0.52 (*)     Calcium, Total 8.2 (*)     Calculated Osmolality 272 (*)     ALT 12 (*)     Total Protein 5.2 (*)     Albumin 2.3 (*)     A/G Ratio 0.8 (*)     All other components within normal limits   MAGNESIUM - Abnormal; Notable for the following components:    Magnesium 1.2 (*)     All other components within normal limits   CBC W/ DIFFERENTIAL - Abnormal; Notable for the following components:    RBC 2.85 (*)     HGB 8.6 (*)     HCT 26.0 (*)     Lymphocyte Absolute 0.28 (*)     All other components within normal limits   CBC WITH DIFFERENTIAL WITH PLATELET    Narrative:     The following orders were created for panel order CBC With Differential With Platelet.  Procedure                               Abnormality         Status                     ---------                               -----------         ------                     CBC W/ DIFFERENTIAL[[id number]]          Abnormal            Final result                 Please view results for these tests on the individual orders.   RAINBOW DRAW BLUE     EKG    Rate, intervals and axes as noted on EKG Report.  Rate: 102  Rhythm: Sinus Rhythm  Reading: Nonspecific changes no acute ischemic changes      MDM      This is a 43-year-old woman here for evaluation of abnormal labs, found to have potassium of 2.3.  Differential includes hypokalemia, lab error, hypomagnesemia.  Patient reports she had been missing some of her supplements recently.  Repeat potassium is 2.3 patient  has received 40 mill equivalents p.o., 40 mill equivalents IV of potassium chloride.  Magnesium also found to be deficient, this was also repleted.  Patient has no significant EKG changes or rate intervals are normal.  Will admit to the hospital to continue with repletion and recheck to ensure improvement.  Case endorsed to the [facility] hospitalist who agrees with plan for admission.  Patient's daughter is at bedside.    Admission disposition: 4/10/2024  6:49 PM    Disposition and Plan     Clinical Impression:  1. Hypokalemia         Disposition:  Admit  4/10/2024  6:49 pm    Hospital Problems       Present on Admission  Date Reviewed: 12/16/2021            ICD-10-CM Noted POA    * (Principal) Hypokalemia E87.6 11/14/2015 Unknown    Azotemia R79.89 4/10/2024 Yes    Hyperglycemia R73.9 4/10/2024 Yes               4/10 H&P  Chief Complaint: \"my potassium is low\"        Subjective:  History of Present Illness:      Alina Aceves is a 43 year old female with Past medical history invasive ductal carcinoma of left breast currently undergoing radiation therapy, iron deficiency anemia, HFrEF, lupus, Sjogren's syndrome presents to the hospital today for \"hypokalemia\".  Unfortunately patient was hospitalized 3/27-3/29 for similar issues of poor oral intake.  Patient was advised by her oncologist to come into the hospital as she is unable to take her potassium pills at home.  Patient also takes torsemide for her underlying HFpEF but goes on to state that she has not been taking it regularly.  Patient also has had some issues of limited appetite and nausea but no emesis.  Patient otherwise denies any fevers or chills no nausea vomiting no diarrhea constipation.  Patient has been having a cough going on since his diagnosis of pneumonia last month.       Assessment & Plan:  #Hypokalemia  #Hyponatremia   #Poor oral intake  #Dehydration  #Hypomagnesia   -ivf  -replace and monitor K & Mg     #Weakness  -therapies to see      #Invasive ductal carcinoma of the left breast   -currently getting radiation therapy     #Iron deficiency anemia     #HFrEF  -LVEF 20-25%     #Lupus  #Sjogren's syndrome              magnesium sulfate in sterile water for injection 2 g/50mL IVPB premix 2 g  Dose: 2 g  Freq: Once Route: IV  Last Dose: 2 g (04/10/24 1922)  Start: 04/10/24 1914 End: 04/10/24 2122   Order specific questions:        1922 FC-New Bag   2039 FC-Handoff           0800 TB-Hold      2026-D/C'd          potassium chloride (K-Dur) tab 40 mEq  Dose: 40 mEq  Freq: Once Route: OR  Start: 04/10/24 1849 End: 04/10/24 1916   Admin Instructions:   Do not crush     1916 FC-Given            potassium chloride 40 mEq in 250mL sodium chloride 0.9% IVPB premix  Dose: 40 mEq  Freq: Once Route: IV  Last Dose: 40 mEq (04/11/24 0556)  Start: 04/11/24 0445 End: 04/11/24 0956      0556 AC-New Bag          Followed by   potassium chloride 20 mEq/100mL IVPB premix 20 mEq  Dose: 20 mEq  Freq: Once Route: IV  Last Dose: 20 mEq (04/11/24 0950)  Start: 04/11/24 0834 End: 04/11/24 1150      0950 TB-New Bag          potassium chloride 40 mEq in 250mL sodium chloride 0.9% IVPB premix  Dose: 40 mEq  Freq: Once Route: IV  Last Dose: 40 mEq (04/10/24 1916)  Start: 04/10/24 1849 End: 04/10/24 2316     1916 FC-New Bag   2039 FC-Handoff          potassium phosphate 3 mmol/mL vial as ORAL solution 15 mmol  Dose: 15 mmol  Freq: Once Route: OR  Start: 04/11/24 1700 End: 04/11/24 1722   Admin Instructions:   Contains potassium 4.4 meq/ml      1722 KB-Given                   Medications 04/09/24 04/10/24 04/11/24   sodium chloride 0.9% infusion  Rate: 83 mL/hr  Freq: Continuous Route: IV  Start: 04/10/24 2100 End: 04/11/24 2026 2339 AC-New Bag        1222 KB-New Bag   2026-D/C'd                 Vitals (last day) before discharge       Date/Time Temp Pulse Resp BP SpO2 Weight O2 Device O2 Flow Rate (L/min) [facility]    04/11/24 1415 -- 100 -- -- -- -- -- -- NC    04/11/24 1145 97.8 °F  (36.6 °C) 96 18 111/90 99 % -- None (Room air) --     24 0852 97.6 °F (36.4 °C) 99 18 115/90 96 % -- None (Room air) --     24 0400 97.9 °F (36.6 °C) 106 24 119/91 100 % -- None (Room air) --     24 0030 97.9 °F (36.6 °C) 115 29 112/90 98 % -- None (Room air) --     04/10/24 2100 97.7 °F (36.5 °C) 96 20 112/92 97 % -- None (Room air) --     04/10/24 2056 -- -- -- -- -- 114 lb -- --     04/10/24 2015 -- 96 22 -- 100 % -- None (Room air) --     04/10/24 1845 -- 101 24 -- 99 % -- None (Room air) --     04/10/24 1648 98.1 °F (36.7 °C) 104 18 126/89 95 % 114 lb None (Room air) -- MG           --------------  DISCHARGE REVIEW    Payor: RO PENNY/JUANA  Subscriber #:  [id number]  Authorization Number: [id number]    Admit date: 4/10/24  Admit time:   8:47 PM  Discharge Date: 2024  6:26 PM     Admitting Physician: Deep Fine DO  Attending Physician:  No att. providers found  Primary Care Physician: Stephon Ibarra          Discharge Summary Notes        Discharge Summary signed by Rachid Villanueva MD at 2024  3:19 PM       Author: Rachid Villanueva MD Specialty: HOSPITALIST, Internal Medicine Author Type: Physician    Filed: 2024  3:19 PM Date of Service: 2024  3:15 PM Status: Signed    : Rachid Villanueva MD (Physician)           [facility]     Alina Aceves Patient Status:  Inpatient    1980 MRN [id number]   [facility] Attending Martini, Rami, DO   Hosp Day # 1 PCP STEPHON IBARRA     Date of Admission: 4/10/2024  Date of Discharge:   24    Discharge Disposition: Home Health Care Services    Discharge Diagnosis:  #Hypokalemia  #Hyponatremia   #Poor oral intake 2/2 oral sores  #Dehydration  #Hypomagnesia   -ivf  -replaced     #Weakness  -therapies to see     #Invasive ductal carcinoma of the left breast   -currently getting radiation therapy     #Iron deficiency anemia     #HFrEF  -LVEF 20-25%      #Lupus  #Sjogren's syndrome    History of Present Illness: Alina Aceves is a 43 year old female with Past medical history invasive ductal carcinoma of left breast currently undergoing radiation therapy, iron deficiency anemia, HFrEF, lupus, Sjogren's syndrome presents to the hospital today for \"hypokalemia\".  Unfortunately patient was hospitalized 3/27-3/29 for similar issues of poor oral intake.  Patient was advised by her oncologist to come into the hospital as she is unable to take her potassium pills at home.  Patient also takes torsemide for her underlying HFpEF but goes on to state that she has not been taking it regularly.  Patient also has had some issues of limited appetite and nausea but no emesis.  Patient otherwise denies any fevers or chills no nausea vomiting no diarrhea constipation.  Patient has been having a cough going on since his diagnosis of pneumonia last month.     Brief Synopsis: pt currently getting radiation for breast cancer. Resulting in oral sores and n/v. Has been eating but had a few days where she had trouble with her oral potassium. Went for labs and noted low K. Admitted and electrolytes repleted. K and mag given. Lytes better. Taking PO here and tolerated her PO potassium. Declined the powder version, prefers the pills. Will try furhter supplements like ensure. Ok to DC home.     Lace+ Score: 77  59-90 High Risk  29-58 Medium Risk  0-28   Low Risk       TCM Follow-Up Recommendation:  LACE > 58: High Risk of readmission after discharge from the hospital.      Procedures during hospitalization:   none    Incidental or significant findings and recommendations (brief descriptions):  none    Lab/Test results pending at Discharge:   none    Consultants:  none    Discharge Medication List:     Discharge Medications        CONTINUE taking these medications        Instructions Prescription details   benzonatate 200 MG Caps  Commonly known as: Tessalon      Take 1 capsule (200 mg total)  by mouth 3 (three) times daily as needed for cough.   Quantity: 30 capsule  Refills: 0     DULoxetine 30 MG Cpep  Commonly known as: Cymbalta      Take 2 capsules (60 mg total) by mouth daily.   Refills: 0     HYDROcodone-acetaminophen 5-325 MG Tabs  Commonly known as: Norco      Take 1 tablet by mouth every 6 (six) hours as needed for Pain.   Quantity: 10 tablet  Refills: 0     lidocaine-prilocaine 2.5-2.5 % Crea  Commonly known as: Emla      APPLY SMALL AMOUNT OVER PORT PRIOR TO ACCESS   Refills: 0     metoprolol succinate ER 50 MG Tb24  Commonly known as: Toprol XL      Take 1 tablet (50 mg total) by mouth 2x Daily(Beta Blocker).   Quantity: 180 tablet  Refills: 3     ondansetron 4 MG Tbdp  Commonly known as: Zofran-ODT      Take 1 tablet (4 mg total) by mouth every 4 (four) hours as needed for Nausea.   Quantity: 10 tablet  Refills: 0     Potassium Citrate ER 15 MEQ (1620 MG) Tbcr      Take 1 tablet by mouth in the morning, at noon, and at bedtime.   Refills: 0     prochlorperazine 10 mg tablet  Commonly known as: Compazine      Take 1 tablet (10 mg total) by mouth every 6 (six) hours as needed for Nausea or vomiting.   Refills: 0     torsemide 10 MG Tabs  Commonly known as: DEMADEX      Take 1 tablet (10 mg total) by mouth daily. PRN   Refills: 0     zolpidem 10 MG Tabs  Commonly known as: Ambien      Take 1 tablet (10 mg total) by mouth nightly as needed for Sleep.   Refills: 0              [place]    Follow-up appointment:   Stephon Villeda  [address] [place] [address] [id number] [phone]    Schedule an appointment as soon as possible for a visit      Appointments for Next 30 Days 4/11/2024 - 5/11/2024      None            Vital signs:  Temp:  [97.6 °F (36.4 °C)-98.1 °F (36.7 °C)] 97.8 °F (36.6 °C)  Pulse:  [] 96  Resp:  [18-29] 18  BP: (111-126)/(89-92) 111/90  SpO2:  [95 %-100 %] 99 %    Physical Exam:    General: No acute distress   Lungs: clear to  auscultation  Cardiovascular: S1, S2  Abdomen: Soft      -----------------------------------------------------------------------------------------------  PATIENT DISCHARGE INSTRUCTIONS: See electronic chart    Rachid Villanueva MD    Total time spent on discharge plannin minutes     The  [employer] Cures Act makes medical notes like these available to patients in the interest of transparency. Please be advised this is a medical document. Medical documents are intended to carry relevant information, facts as evident, and the clinical opinion of the practitioner. The medical note is intended as peer to peer communication and may appear blunt or direct. It is written in medical language and may contain abbreviations or verbiage that are unfamiliar.     Electronically signed by Rachid Villanueva MD on 2024  3:19 PM         REVIEWER COMMENTS

## 2024-05-24 ENCOUNTER — OFFICE VISIT (OUTPATIENT)
Dept: WOUND CARE | Facility: HOSPITAL | Age: 44
End: 2024-05-24
Attending: NURSE PRACTITIONER
Payer: COMMERCIAL

## 2024-05-24 VITALS
BODY MASS INDEX: 19.46 KG/M2 | RESPIRATION RATE: 14 BRPM | HEART RATE: 90 BPM | WEIGHT: 114 LBS | HEIGHT: 64 IN | DIASTOLIC BLOOD PRESSURE: 91 MMHG | TEMPERATURE: 98 F | SYSTOLIC BLOOD PRESSURE: 126 MMHG

## 2024-05-24 DIAGNOSIS — M32.9 SLE (SYSTEMIC LUPUS ERYTHEMATOSUS RELATED SYNDROME) (HCC): Primary | ICD-10-CM

## 2024-05-24 DIAGNOSIS — C50.912 MALIGNANT NEOPLASM OF LEFT BREAST IN FEMALE, ESTROGEN RECEPTOR NEGATIVE, UNSPECIFIED SITE OF BREAST (HCC): ICD-10-CM

## 2024-05-24 DIAGNOSIS — Z17.1 MALIGNANT NEOPLASM OF LEFT BREAST IN FEMALE, ESTROGEN RECEPTOR NEGATIVE, UNSPECIFIED SITE OF BREAST (HCC): ICD-10-CM

## 2024-05-24 DIAGNOSIS — T66.XXXD ADVERSE EFFECT OF RADIATION, SUBSEQUENT ENCOUNTER: ICD-10-CM

## 2024-05-24 PROCEDURE — 99213 OFFICE O/P EST LOW 20 MIN: CPT

## 2024-05-24 NOTE — PATIENT INSTRUCTIONS
Please return: 1 week    Check in with Aubree Putnam or Dr. Gant regarding your potassium still being low.  Check in with pharmacy regarding the pill to increase your appetite    Patient discharge and wound care instructions  Alina Aceves  5/24/2024     Soak the area with ANASEPT gauze.    You may shower and cleanse area with mild soap and water, try to \"scrub\" lightly with a gauze to remove the build up of draiange  rinse wound with ANASEPT cleanser,   dab dry with gauze and apply THE HYDROGEL SHEET DRESSING.  (If you like this dressing please let us know and we can order more for you)    Nutrition and blood sugar control:  Focus on the following:  Protein: Meats, beans, eggs, milk and yogurt particularly Greek yogurt), tofu, soy nuts, soy protein products (Follow the protein handout in your welcome folder)  Vitamin C: Citrus fruits and juices, strawberries, tomatoes, tomato juice, peppers, baked potatoes, spinach, broccoli, cauliflower, [place] sprouts, cabbage  Vitamin A: Dark green, leafy vegetables, orange or yellow vegetables, cantaloupe, fortified dairy products, liver, fortified cereals  Zinc: Fortified cereals, red meats, seafood  Consider supplementing with Vitor by [employer]. It can be purchased on amazon, [employer] website, or local pharmacy may be able to order it for you.  (These are essential branch chain amino acids that help with tissue building and wound healing).   When your blood sugar is consistently elevated greater than 180 your body can't heal or fight infection.     Concerns:  Signs of infection may include the following:  Increase in redness  Red \"streaks\" from wound  Increase in swelling  Fever  Unusual odor  Change in the amount of wound drainage     Should you experience any significant changes in your wound(s) or have any questions regarding your home care instructions please contact the [facility] center [facility] @ [phone] If after regular business hours, please call your  family doctor or local emergency room. The treatment plan has been discussed at length between you and your provider. Follow all instructions carefully, it is very important. If you do not follow all instructions you are at risk of your wound not healing, infection, possible loss of limb and even loss of life.

## 2024-05-24 NOTE — PROGRESS NOTES
.Weekly Wound Education Note    Teaching Provided To: Patient  Training Topics: Dressing;Cleasing and general instructions;Discharge instructions  Training Method: Explain/Verbal;Written  Training Response: Patient responds and understands        Notes: Inital visit for left breast wound. Start sample simpurity hydrogel absorbative sheet. Pt is to change dressing daily.

## 2024-05-24 NOTE — PROGRESS NOTES
CHIEF COMPLAINT:     Chief Complaint   Patient presents with    Wound Care     Patients is here for a follow up. Patients stated the wound is from radiation therapy. Her last last radiation therapy was April 18, 2024. Dressing veno lotion on the wound and ace-wrap. She has a walker and a wheelchair at home      HPI:   Information obtained from Patient and chart  5-24-24 INITIAL:  43 year old female with Past medical history invasive ductal carcinoma of left breast (dr [facility]) currently undergoing radiation therapy (dr kobi bran), leukocytoclastic vasculitis (treated with clobetasol ointment by dr. Walton) iron deficiency anemia, HFrEF (Ariane Putnam), lupus, Sjogren's syndrome.  Patient was recently admitted for oral sores and n/v and elctrolyte imbalance and hypokalemia.    Earlier this week she was seen in urgent care for uti symptoms and hematuria. She was treated with cefadroxil and recommended to see primary md. dr bran recommended patient to start yary, she states she is not regular with it. Patient has seen nutrition/dietician.  Patient is active with residential [facility].  Earlier this week she had to cancel physical therapy due to wound pain.  on 5-11 patient was started on potassium citrate x 10 days and patient was to repeat labs, which she did on 5-21 but her potassium was still 2.9. she states she is taking the potassium.  I will udpate ariane putnam and I asked the patient to contact her as they may want her to increase her potassium.  Patient states that initially she was utilizing aquaphor but then developed an itchy red rash, so now has just been leaving it dry or using aveeno. She is able to tolerate very minimal cleansing of the area.  I discussed with her how to do it as tolerated in the shower with anasept.  Will utilize hydrogel cool sheets, patient to return next week. There is not any s/s of infection    MEDICATIONS:     Current Outpatient Medications:     torsemide 10 MG Oral Tab,  Take 1 tablet (10 mg total) by mouth daily. PRN, Disp: , Rfl:     HYDROcodone-acetaminophen 5-325 MG Oral Tab, Take 1 tablet by mouth every 6 (six) hours as needed for Pain., Disp: 10 tablet, Rfl: 0    benzonatate 200 MG Oral Cap, Take 1 capsule (200 mg total) by mouth 3 (three) times daily as needed for cough., Disp: 30 capsule, Rfl: 0    prochlorperazine (COMPAZINE) 10 mg tablet, Take 1 tablet (10 mg total) by mouth every 6 (six) hours as needed for Nausea or vomiting., Disp: , Rfl:     metoprolol succinate ER 50 MG Oral Tablet 24 Hr, Take 1 tablet (50 mg total) by mouth 2x Daily(Beta Blocker)., Disp: 180 tablet, Rfl: 3    ondansetron 4 MG Oral Tablet Dispersible, Take 1 tablet (4 mg total) by mouth every 4 (four) hours as needed for Nausea., Disp: 10 tablet, Rfl: 0    Potassium Citrate ER 15 MEQ (1620 MG) Oral Tab CR, Take 1 tablet by mouth in the morning, at noon, and at bedtime., Disp: , Rfl:     DULoxetine 30 MG Oral Cap DR Particles, Take 2 capsules (60 mg total) by mouth daily., Disp: , Rfl:     lidocaine-prilocaine 2.5-2.5 % External Cream, APPLY SMALL AMOUNT OVER PORT PRIOR TO ACCESS, Disp: , Rfl:     zolpidem 10 MG Oral Tab, Take 1 tablet (10 mg total) by mouth nightly as needed for Sleep., Disp: , Rfl:   ALLERGIES:     Allergies   Allergen Reactions    Bactrim [Sulfamethoxazole W/Trimethoprim] RASH    Adhesive Tape RASH      REVIEW OF SYSTEMS:   This information was obtained from the patient/family and chart.    See HPI for pertinent positives, otherwise 10 pt ROS negative.  HISTORY:     Past Medical History:    Breast cancer (HCC)    Congestive heart disease (HCC)    Gastritis    Lupus (HCC)    Neuropathy    Personal history of antineoplastic chemotherapy    Sjogren's disease (HCC)     Past Surgical History:   Procedure Laterality Date    Breast surgery Left      delivery only        Social History     Socioeconomic History    Marital status:    Tobacco Use    Smoking status: Former      Current packs/day: 0.00     Types: Cigarettes     Quit date:      Years since quittin.4    Smokeless tobacco: Never    Tobacco comments:     social smoker   Vaping Use    Vaping status: Never Used   Substance and Sexual Activity    Alcohol use: Not Currently    Drug use: Never     Social Determinants of Health     Food Insecurity: No Food Insecurity (4/10/2024)    Food Insecurity     Food Insecurity: Never true   Transportation Needs: No Transportation Needs (4/10/2024)    Transportation Needs     Lack of Transportation: No   Housing Stability: Low Risk  (4/10/2024)    Housing Stability     Housing Instability: No     PHYSICAL EXAM:     Vitals:    24 0903   BP: (!) 126/91   Pulse: 90   Resp: 14   Temp: 98 °F (36.7 °C)   Weight: 114 lb (51.7 kg)   Height: 64\"      Estimated body mass index is 19.57 kg/m² as calculated from the following:    Height as of this encounter: 64\".    Weight as of this encounter: 114 lb (51.7 kg).   POC Glucose   Date Value Ref Range Status   2023 72 70 - 99 mg/dL Final   2023 125 (H) 70 - 99 mg/dL Final   2023 84 70 - 99 mg/dL Final       Vital signs reviewed.Appears stated age, well groomed.    Constitutional:  Bp slightly elevated, but wnl for patient's trend. Pulse Regular and wnl for patient. Respirations easy and unlabored. Temperature wnl. Weight normal for height. Appearance neat and clean. Appears in no acute distress. Well nourished and well developed.    Respiratory: Respiratory effort is easy and symmetric bilaterally. Rate is normal at rest and on room air.  Bilateral breath sounds are clear and equal w/ no wheezes, rales or rhonchi.    Cardiovascular:  Heart rhythm and rate tachy, without murmur or gallop.     Musculoskeletal:  Patient is in wheelchair propelled by others, able to transfer with assist  Integumentary:  refer to wound characteristics and images   Psychiatric:  Judgment and insight intact. Alert and oriented times 3. No evidence of  depression, anxiety, or agitation. Calm, cooperative, and communicative. Appropriate interactions and affect.  DIAGNOSTICS:     Lab Results   Component Value Date    BUN 10 04/11/2024    CREATSERUM 0.48 (L) 04/11/2024    ALB 2.2 (L) 04/11/2024    TP 5.1 (L) 04/11/2024       WOUND ASSESSMENT:     Wound 05/24/24 #1 Radiation therapy Breast Left (Active)   Date First Assessed/Time First Assessed: 05/24/24 0857    Wound Number (Wound Clinic Only): #1 Radiation therapy  Primary Wound Type: Other (comment)  Location: Breast  Wound Location Orientation: Left      Assessments 5/24/2024  8:59 AM   Wound Image      Drainage Amount Large   Drainage Description Serous;Yellow   Wound Length (cm) 15.5 cm   Wound Width (cm) 19 cm   Wound Surface Area (cm^2) 294.5 cm^2   Wound Depth (cm) 0.1 cm   Wound Volume (cm^3) 29.45 cm^3   Margins Well-defined edges   Non-staged Wound Description Full thickness   Alfreda-wound Assessment Moist;Edema   Wound Granulation Tissue Firm;Pink   Wound Bed Granulation (%) 20 %   Wound Bed Epithelium (%) 50 %   Wound Bed Slough (%) 30 %   Wound Odor None       No associated orders.          ASSESSMENT AND PLAN:    1. Adverse effect of radiation, subsequent encounter    2. SLE (systemic lupus erythematosus related syndrome) (HCC)    3. Malignant neoplasm of left breast in female, estrogen receptor negative, unspecified site of breast (HCC)        Discussed with patient the aspects of wound healing including:  wound bed optimization including moist wound healing, removal of necrosis, bioburden control, monitoring for infection, and finally the patient as a whole including nutrition and increased protein intake    Risks, benefits, and alternatives of current treatment plan discussed in detail.  Questions and concerns addressed. Red flags to RTC or ED reviewed.  Patient (or parent) agrees to plan.      NOTE TO PATIENT: The 21st Century Cures Act makes clinical notes like these available to patients in the  interest of transparency. Clinical notes are medical documents used by physicians and care providers to communicate with each other. These documents include medical language and terminology, abbreviations, and treatment information that may sound technical and at times possibly unfamiliar. In addition, at times, the verbiage may appear blunt or direct. These documents are one tool providers use to communicate relevant information and clinical opinions of the care providers in a way that allows common understanding of the clinical context.   Patient is new to clinic, has been seen by [place] providers previously.   I spent   40   minutes with the patient. This time included:    preparing to see the patient (eg, review notes and recent diagnostics),  seeing the patient, obtaining and/or reviewing separately obtained history, performing a medically appropriate examination and/or evaluation, counseling and educating the patient, documenting in the record   DISCHARGE:      Patient Instructions   Please return: 1 week    Check in with Aubree Putnam or Dr. Gant regarding your potassium still being low.  Check in with pharmacy regarding the pill to increase your appetite    Patient discharge and wound care instructions  Alina Aceves  5/24/2024     Soak the area with ANASEPT gauze.    You may shower and cleanse area with mild soap and water, try to \"scrub\" lightly with a gauze to remove the build up of draiange  rinse wound with ANASEPT cleanser,   dab dry with gauze and apply THE HYDROGEL SHEET DRESSING.  (If you like this dressing please let us know and we can order more for you)    Nutrition and blood sugar control:  Focus on the following:  Protein: Meats, beans, eggs, milk and yogurt particularly Greek yogurt), tofu, soy nuts, soy protein products (Follow the protein handout in your welcome folder)  Vitamin C: Citrus fruits and juices, strawberries, tomatoes, tomato juice, peppers, baked potatoes, spinach, broccoli,  cauliflower, [place] sprouts, cabbage  Vitamin A: Dark green, leafy vegetables, orange or yellow vegetables, cantaloupe, fortified dairy products, liver, fortified cereals  Zinc: Fortified cereals, red meats, seafood  Consider supplementing with Vitor [employer]. It can be purchased on amazon, [employer] website, or local pharmacy may be able to order it for you.  (These are essential branch chain amino acids that help with tissue building and wound healing).   When your blood sugar is consistently elevated greater than 180 your body can't heal or fight infection.     Concerns:  Signs of infection may include the following:  Increase in redness  Red \"streaks\" from wound  Increase in swelling  Fever  Unusual odor  Change in the amount of wound drainage     Should you experience any significant changes in your wound(s) or have any questions regarding your home care instructions please contact the [facility] @ [phone] If after regular business hours, please call your family doctor or local emergency room. The treatment plan has been discussed at length between you and your provider. Follow all instructions carefully, it is very important. If you do not follow all instructions you are at risk of your wound not healing, infection, possible loss of limb and even loss of life.                Anita Smallwood FNP-C, CWCN-AP, CFCN, CSWS, WCC, DWC  5/24/2024

## 2024-05-30 NOTE — PROGRESS NOTES
CHIEF COMPLAINT:     No chief complaint on file.    HPI:   Information obtained from Patient and chart  5-24-24 INITIAL:  43 year old female with Past medical history invasive ductal carcinoma of left breast (dr [facility]) currently undergoing radiation therapy (dr kobi bran), leukocytoclastic vasculitis (treated with clobetasol ointment by dr. Walton) iron deficiency anemia, HFrEF (Ariane Putnam), lupus, Sjogren's syndrome.  Patient was recently admitted for oral sores and n/v and elctrolyte imbalance and hypokalemia.    Earlier this week she was seen in urgent care for uti symptoms and hematuria. She was treated with cefadroxil and recommended to see primary md. dr bran recommended patient to start yary, she states she is not regular with it. Patient has seen nutrition/dietician.  Patient is active with [facility].  Earlier this week she had to cancel physical therapy due to wound pain.  on 5-11 patient was started on potassium citrate x 10 days and patient was to repeat labs, which she did on 5-21 but her potassium was still 2.9. she states she is taking the potassium.  I will udpate ariane putnam and I asked the patient to contact her as they may want her to increase her potassium.  Patient states that initially she was utilizing aquaphor but then developed an itchy red rash, so now has just been leaving it dry or using aveeno. She is able to tolerate very minimal cleansing of the area.  I discussed with her how to do it as tolerated in the shower with anasept.  Will utilize hydrogel cool sheets, patient to return next week. There is not any s/s of infection    5-31-24 patient returns, she has been cleansing with ***dressing with ****    MEDICATIONS:     Current Outpatient Medications:     torsemide 10 MG Oral Tab, Take 1 tablet (10 mg total) by mouth daily. PRN, Disp: , Rfl:     HYDROcodone-acetaminophen 5-325 MG Oral Tab, Take 1 tablet by mouth every 6 (six) hours as needed for Pain., Disp: 10  tablet, Rfl: 0    benzonatate 200 MG Oral Cap, Take 1 capsule (200 mg total) by mouth 3 (three) times daily as needed for cough., Disp: 30 capsule, Rfl: 0    prochlorperazine (COMPAZINE) 10 mg tablet, Take 1 tablet (10 mg total) by mouth every 6 (six) hours as needed for Nausea or vomiting., Disp: , Rfl:     metoprolol succinate ER 50 MG Oral Tablet 24 Hr, Take 1 tablet (50 mg total) by mouth 2x Daily(Beta Blocker)., Disp: 180 tablet, Rfl: 3    ondansetron 4 MG Oral Tablet Dispersible, Take 1 tablet (4 mg total) by mouth every 4 (four) hours as needed for Nausea., Disp: 10 tablet, Rfl: 0    Potassium Citrate ER 15 MEQ (1620 MG) Oral Tab CR, Take 1 tablet by mouth in the morning, at noon, and at bedtime., Disp: , Rfl:     DULoxetine 30 MG Oral Cap DR Particles, Take 2 capsules (60 mg total) by mouth daily., Disp: , Rfl:     lidocaine-prilocaine 2.5-2.5 % External Cream, APPLY SMALL AMOUNT OVER PORT PRIOR TO ACCESS, Disp: , Rfl:     zolpidem 10 MG Oral Tab, Take 1 tablet (10 mg total) by mouth nightly as needed for Sleep., Disp: , Rfl:   ALLERGIES:     Allergies   Allergen Reactions    Bactrim [Sulfamethoxazole W/Trimethoprim] RASH    Adhesive Tape RASH      REVIEW OF SYSTEMS:   This information was obtained from the patient/family and chart.    See HPI for pertinent positives, otherwise 10 pt ROS negative.  HISTORY:   Past medical, surgical, family and social history updated where appropriate.    PHYSICAL EXAM:     There were no vitals filed for this visit.     Estimated body mass index is 19.57 kg/m² as calculated from the following:    Height as of 5/24/24: 64\".    Weight as of 5/24/24: 114 lb (51.7 kg).   POC Glucose   Date Value Ref Range Status   12/19/2023 72 70 - 99 mg/dL Final   12/18/2023 125 (H) 70 - 99 mg/dL Final   12/18/2023 84 70 - 99 mg/dL Final       Vital signs reviewed.Appears stated age, well groomed.    Constitutional:  Bp slightly*** elevated, but wnl for patient's trend. Pulse Regular and wnl  for patient. Respirations easy and unlabored. Temperature wnl. Weight normal for height. Appearance neat and clean. Appears in no acute distress. Well nourished and well developed.    Musculoskeletal:  Patient is in wheelchair propelled by others, able to transfer with assist  Integumentary:  refer to wound characteristics and images   Psychiatric:  Judgment and insight intact. Alert and oriented times 3. No evidence of depression, anxiety, or agitation. Calm, cooperative, and communicative.  DIAGNOSTICS:     Lab Results   Component Value Date    BUN 10 04/11/2024    CREATSERUM 0.48 (L) 04/11/2024    ALB 2.2 (L) 04/11/2024    TP 5.1 (L) 04/11/2024       WOUND ASSESSMENT:     Wound 05/24/24 #1 Radiation therapy Breast Left (Active)   Date First Assessed/Time First Assessed: 05/24/24 0857    Wound Number (Wound Clinic Only): #1 Radiation therapy  Primary Wound Type: Other (comment)  Location: Breast  Wound Location Orientation: Left      Assessments 5/24/2024  8:59 AM   Wound Image      Drainage Amount Large   Drainage Description Serous;Yellow   Wound Length (cm) 15.5 cm   Wound Width (cm) 19 cm   Wound Surface Area (cm^2) 294.5 cm^2   Wound Depth (cm) 0.1 cm   Wound Volume (cm^3) 29.45 cm^3   Margins Well-defined edges   Non-staged Wound Description Full thickness   Alfreda-wound Assessment Moist;Edema   Wound Granulation Tissue Firm;Pink   Wound Bed Granulation (%) 20 %   Wound Bed Epithelium (%) 50 %   Wound Bed Slough (%) 30 %   Wound Odor None       No associated orders.          ASSESSMENT AND PLAN:    There are no diagnoses linked to this encounter.      Risks, benefits, and alternatives of current treatment plan discussed in detail.  Questions and concerns addressed. Red flags to RTC or ED reviewed.  Patient (or parent) agrees to plan.      NOTE TO PATIENT: The 21st Century Cures Act makes clinical notes like these available to patients in the interest of transparency. Clinical notes are medical documents used  by physicians and care providers to communicate with each other. These documents include medical language and terminology, abbreviations, and treatment information that may sound technical and at times possibly unfamiliar. In addition, at times, the verbiage may appear blunt or direct. These documents are one tool providers use to communicate relevant information and clinical opinions of the care providers in a way that allows common understanding of the clinical context.    I spent  ***  minutes with the patient. This time included:    preparing to see the patient (eg, review notes and recent diagnostics),  seeing the patient, obtaining and/or reviewing separately obtained history, performing a medically appropriate examination and/or evaluation, counseling and educating the patient, documenting in the record   DISCHARGE:      There are no Patient Instructions on file for this visit.   Anita Smallwood FNP-C, CWSEAN-AP, CFCN, CSWS, WCC, DWC  5/31/2024

## 2024-05-31 ENCOUNTER — APPOINTMENT (OUTPATIENT)
Dept: WOUND CARE | Facility: HOSPITAL | Age: 44
End: 2024-05-31
Attending: NURSE PRACTITIONER
Payer: COMMERCIAL

## 2024-06-03 ENCOUNTER — APPOINTMENT (OUTPATIENT)
Dept: WOUND CARE | Facility: HOSPITAL | Age: 44
End: 2024-06-03
Attending: INTERNAL MEDICINE
Payer: COMMERCIAL

## 2024-07-21 ENCOUNTER — APPOINTMENT (OUTPATIENT)
Dept: GENERAL RADIOLOGY | Facility: HOSPITAL | Age: 44
End: 2024-07-21
Attending: EMERGENCY MEDICINE
Payer: COMMERCIAL

## 2024-07-21 ENCOUNTER — HOSPITAL ENCOUNTER (INPATIENT)
Facility: HOSPITAL | Age: 44
LOS: 2 days | Discharge: HOME HEALTH | End: 2024-07-23
Attending: EMERGENCY MEDICINE | Admitting: HOSPITALIST
Payer: COMMERCIAL

## 2024-07-21 DIAGNOSIS — E87.6 HYPOKALEMIA: Primary | ICD-10-CM

## 2024-07-21 PROBLEM — D69.6 THROMBOCYTOPENIA: Status: ACTIVE | Noted: 2024-07-21

## 2024-07-21 PROBLEM — D69.6 THROMBOCYTOPENIA (HCC): Status: ACTIVE | Noted: 2024-07-21

## 2024-07-21 LAB
ALBUMIN SERPL-MCNC: 3.1 G/DL (ref 3.2–4.8)
ALBUMIN/GLOB SERPL: 0.9 {RATIO} (ref 1–2)
ALP LIVER SERPL-CCNC: 152 U/L
ALT SERPL-CCNC: 30 U/L
ANION GAP SERPL CALC-SCNC: 9 MMOL/L (ref 0–18)
AST SERPL-CCNC: 27 U/L (ref ?–34)
BASOPHILS # BLD AUTO: 0.01 X10(3) UL (ref 0–0.2)
BASOPHILS NFR BLD AUTO: 0.1 %
BILIRUB SERPL-MCNC: 1.7 MG/DL (ref 0.3–1.2)
BILIRUB UR QL STRIP.AUTO: NEGATIVE
BUN BLD-MCNC: 45 MG/DL (ref 9–23)
C DIFF TOX B STL QL: NEGATIVE
CALCIUM BLD-MCNC: 10.5 MG/DL (ref 8.7–10.4)
CHLORIDE SERPL-SCNC: 102 MMOL/L (ref 98–112)
CHLORIDE UR-SCNC: 26 MMOL/L
CHOLEST SERPL-MCNC: 132 MG/DL (ref ?–200)
CO2 SERPL-SCNC: 20 MMOL/L (ref 21–32)
COLOR UR AUTO: YELLOW
CREAT BLD-MCNC: 0.99 MG/DL
CREAT UR-SCNC: 35 MG/DL
EGFRCR SERPLBLD CKD-EPI 2021: 72 ML/MIN/1.73M2 (ref 60–?)
EOSINOPHIL # BLD AUTO: 0 X10(3) UL (ref 0–0.7)
EOSINOPHIL NFR BLD AUTO: 0 %
ERYTHROCYTE [DISTWIDTH] IN BLOOD BY AUTOMATED COUNT: 18.7 %
GLOBULIN PLAS-MCNC: 3.5 G/DL (ref 2.8–4.4)
GLUCOSE BLD-MCNC: 93 MG/DL (ref 70–99)
GLUCOSE UR STRIP.AUTO-MCNC: NORMAL MG/DL
HCT VFR BLD AUTO: 27.9 %
HDLC SERPL-MCNC: 20 MG/DL (ref 40–59)
HGB BLD-MCNC: 9 G/DL
HYALINE CASTS #/AREA URNS AUTO: PRESENT /LPF
IMM GRANULOCYTES # BLD AUTO: 0.19 X10(3) UL (ref 0–1)
IMM GRANULOCYTES NFR BLD: 1.4 %
KETONES UR STRIP.AUTO-MCNC: NEGATIVE MG/DL
LDLC SERPL CALC-MCNC: 88 MG/DL (ref ?–100)
LEUKOCYTE ESTERASE UR QL STRIP.AUTO: 25
LYMPHOCYTES # BLD AUTO: 0.56 X10(3) UL (ref 1–4)
LYMPHOCYTES NFR BLD AUTO: 4.2 %
MAGNESIUM SERPL-MCNC: 1.7 MG/DL (ref 1.6–2.6)
MAGNESIUM SERPL-MCNC: 1.8 MG/DL (ref 1.6–2.6)
MCH RBC QN AUTO: 27 PG (ref 26–34)
MCHC RBC AUTO-ENTMCNC: 32.3 G/DL (ref 31–37)
MCV RBC AUTO: 83.8 FL
MONOCYTES # BLD AUTO: 0.65 X10(3) UL (ref 0.1–1)
MONOCYTES NFR BLD AUTO: 4.9 %
NEUTROPHILS # BLD AUTO: 11.93 X10 (3) UL (ref 1.5–7.7)
NEUTROPHILS # BLD AUTO: 11.93 X10(3) UL (ref 1.5–7.7)
NEUTROPHILS NFR BLD AUTO: 89.4 %
NITRITE UR QL STRIP.AUTO: NEGATIVE
NONHDLC SERPL-MCNC: 112 MG/DL (ref ?–130)
OSMOLALITY SERPL CALC.SUM OF ELEC: 283 MOSM/KG (ref 275–295)
PH UR STRIP.AUTO: 6 [PH] (ref 5–8)
PHOSPHATE SERPL-MCNC: 2.3 MG/DL (ref 2.4–5.1)
PLATELET # BLD AUTO: 104 10(3)UL (ref 150–450)
PLATELETS.RETICULATED NFR BLD AUTO: 14.8 % (ref 0–7)
POTASSIUM SERPL-SCNC: 1 MMOL/L (ref 3.5–5.1)
POTASSIUM SERPL-SCNC: 2.1 MMOL/L (ref 3.5–5.1)
POTASSIUM UR-SCNC: 25.7 MMOL/L
PROT SERPL-MCNC: 6.6 G/DL (ref 5.7–8.2)
PROT UR STRIP.AUTO-MCNC: 70 MG/DL
RBC # BLD AUTO: 3.33 X10(6)UL
SODIUM SERPL-SCNC: 131 MMOL/L (ref 136–145)
SP GR UR STRIP.AUTO: 1.01 (ref 1–1.03)
T3FREE SERPL-MCNC: 1.48 PG/ML (ref 2.4–4.2)
T4 FREE SERPL-MCNC: 1.5 NG/DL (ref 0.8–1.7)
TRIGL SERPL-MCNC: 130 MG/DL (ref 30–149)
TROPONIN I SERPL HS-MCNC: 191 NG/L
TROPONIN I SERPL HS-MCNC: 206 NG/L
TSI SER-ACNC: 0.52 MIU/ML (ref 0.55–4.78)
UROBILINOGEN UR STRIP.AUTO-MCNC: 2 MG/DL
VLDLC SERPL CALC-MCNC: 21 MG/DL (ref 0–30)
WBC # BLD AUTO: 13.3 X10(3) UL (ref 4–11)

## 2024-07-21 PROCEDURE — 71045 X-RAY EXAM CHEST 1 VIEW: CPT | Performed by: EMERGENCY MEDICINE

## 2024-07-21 PROCEDURE — 99223 1ST HOSP IP/OBS HIGH 75: CPT | Performed by: HOSPITALIST

## 2024-07-21 RX ORDER — DULOXETIN HYDROCHLORIDE 60 MG/1
60 CAPSULE, DELAYED RELEASE ORAL DAILY
Status: DISCONTINUED | OUTPATIENT
Start: 2024-07-21 | End: 2024-07-23

## 2024-07-21 RX ORDER — METOPROLOL SUCCINATE 50 MG/1
50 TABLET, EXTENDED RELEASE ORAL
Status: DISCONTINUED | OUTPATIENT
Start: 2024-07-21 | End: 2024-07-23

## 2024-07-21 RX ORDER — POTASSIUM CHLORIDE 20 MEQ/1
40 TABLET, EXTENDED RELEASE ORAL ONCE
Status: COMPLETED | OUTPATIENT
Start: 2024-07-21 | End: 2024-07-21

## 2024-07-21 RX ORDER — POLYETHYLENE GLYCOL 3350 17 G/17G
17 POWDER, FOR SOLUTION ORAL DAILY PRN
Status: DISCONTINUED | OUTPATIENT
Start: 2024-07-21 | End: 2024-07-23

## 2024-07-21 RX ORDER — ZOLPIDEM TARTRATE 5 MG/1
10 TABLET ORAL NIGHTLY PRN
Status: DISCONTINUED | OUTPATIENT
Start: 2024-07-21 | End: 2024-07-23

## 2024-07-21 RX ORDER — POTASSIUM CHLORIDE 29.8 MG/ML
40 INJECTION INTRAVENOUS ONCE
Status: COMPLETED | OUTPATIENT
Start: 2024-07-21 | End: 2024-07-21

## 2024-07-21 RX ORDER — SODIUM CHLORIDE 9 MG/ML
INJECTION, SOLUTION INTRAVENOUS CONTINUOUS
Status: DISCONTINUED | OUTPATIENT
Start: 2024-07-21 | End: 2024-07-21

## 2024-07-21 RX ORDER — ONDANSETRON 2 MG/ML
4 INJECTION INTRAMUSCULAR; INTRAVENOUS EVERY 6 HOURS PRN
Status: DISCONTINUED | OUTPATIENT
Start: 2024-07-21 | End: 2024-07-23

## 2024-07-21 RX ORDER — POTASSIUM CHLORIDE 20 MEQ/1
40 TABLET, EXTENDED RELEASE ORAL 2 TIMES DAILY
Status: COMPLETED | OUTPATIENT
Start: 2024-07-21 | End: 2024-07-22

## 2024-07-21 RX ORDER — HEPARIN SODIUM 5000 [USP'U]/ML
5000 INJECTION, SOLUTION INTRAVENOUS; SUBCUTANEOUS EVERY 8 HOURS SCHEDULED
Status: DISCONTINUED | OUTPATIENT
Start: 2024-07-21 | End: 2024-07-22

## 2024-07-21 RX ORDER — PROCHLORPERAZINE EDISYLATE 5 MG/ML
5 INJECTION INTRAMUSCULAR; INTRAVENOUS EVERY 8 HOURS PRN
Status: DISCONTINUED | OUTPATIENT
Start: 2024-07-21 | End: 2024-07-23

## 2024-07-21 RX ORDER — ACETAMINOPHEN 500 MG
500 TABLET ORAL EVERY 4 HOURS PRN
Status: DISCONTINUED | OUTPATIENT
Start: 2024-07-21 | End: 2024-07-23

## 2024-07-21 RX ORDER — POTASSIUM CHLORIDE 14.9 MG/ML
20 INJECTION INTRAVENOUS ONCE
Status: COMPLETED | OUTPATIENT
Start: 2024-07-21 | End: 2024-07-21

## 2024-07-21 RX ORDER — MELATONIN
3 NIGHTLY PRN
Status: DISCONTINUED | OUTPATIENT
Start: 2024-07-21 | End: 2024-07-23

## 2024-07-21 NOTE — ED QUICK NOTES
Orders for admission, patient is aware of plan and ready to go upstairs. Any questions, please call ED RN Alondra at extension [phone].     Patient Covid vaccination status: Fully vaccinated     COVID Test Ordered in ED: None    COVID Suspicion at Admission: N/A    Running Infusions:      Mental Status/LOC at time of transport: A+O x4    Other pertinent information:   CIWA score: N/A   NIH score:  N/A

## 2024-07-21 NOTE — ED INITIAL ASSESSMENT (HPI)
Pt c/o feeling fatigue in the last 2 wks. States \"feeling dizzy like I'm going to pass out every time I get up.\" Also c/o FLORES, she denies CP. Pt denies n/v.     Pt reports she was told by her Oncologist to come to ED 2-3 wks ago for a low K which was 1.7 on 7/2, \"can't come, no one can take care of my daughter.\"  Pt with hx Breast CA, finished radiation in April. BP 81/59 on arrival

## 2024-07-21 NOTE — PLAN OF CARE
Problem: METABOLIC/FLUID AND ELECTROLYTES - ADULT  Goal: Electrolytes maintained within normal limits  Description: INTERVENTIONS:  - Monitor labs and rhythm and assess patient for signs and symptoms of electrolyte imbalances  - Administer electrolyte replacement as ordered  - Monitor response to electrolyte replacements, including rhythm and repeat lab results as appropriate  - Fluid restriction as ordered  - Instruct patient on fluid and nutrition restrictions as appropriate  Outcome: Not Progressing   Potassium replaced but still low,Md is aware,Followed [facility] protocol,Patient is very weak,Has fair appetite,Denies any pain, Has healing radiation burn on left breast area  Radiating to left back,Has abrasion noted on left leg,Assisted needs, Rt. Peter cath w/ good blood return,Up to bathroom w/ walker w/ stand by assist. Will continue to monitor the electrolytes level.

## 2024-07-21 NOTE — ED PROVIDER NOTES
Patient Seen in: [facility] Emergency Department      History     Chief Complaint   Patient presents with    Fatigue     Stated Complaint: Severe fatigue    Subjective:   HPI    44-year-old with a history of lupus, Sjogren's, HFrEF, invasive ductal carcinoma of the left breast presents for weakness.  Patient states that she had labs at the start of the month and her potassium was 1.7, was told to go to the hospital but she didn't have anyone to watch her kids so she never went. The last couple weeks she has been really weak, has no strength. Sometimes feels dizzy like she might pass out. No chest pain. Does feel short of breath at times, since starting metoprolol for her heart. No nausea or vomiting. No fever. No hematuria or dysuria but has noticed frequency. No back pain.   Records reviewed including office visit note from .  Potassium that day was 1.7.  CO2 was 17.4.  This notes that she had a palpable left breast mass noted in 2023, she underwent chemo, mastectomy and radiation to the left breast which finished in 2024.  She was admitted back in 2024 for nausea and vomiting and had an elevated troponin.  Consult from cardiology notes mild troponin elevation with normal coronaries, troponin elevation thought to be of no clinical significance  Objective:   Past Medical History:    Breast cancer (HCC)    Congestive heart disease (HCC)    Gastritis    Lupus (HCC)    Neuropathy    Personal history of antineoplastic chemotherapy    Sjogren's disease (HCC)              Past Surgical History:   Procedure Laterality Date    Breast surgery Left      delivery only                  Social History     Socioeconomic History    Marital status:    Tobacco Use    Smoking status: Former     Current packs/day: 0.00     Types: Cigarettes     Quit date:      Years since quittin.5    Smokeless tobacco: Never    Tobacco comments:     social smoker   Vaping Use    Vaping status:  Never Used   Substance and Sexual Activity    Alcohol use: Not Currently    Drug use: Never     Social Determinants of Health     Food Insecurity: No Food Insecurity (4/10/2024)    Food Insecurity     Food Insecurity: Never true   Transportation Needs: No Transportation Needs (4/10/2024)    Transportation Needs     Lack of Transportation: No   Housing Stability: Low Risk  (4/10/2024)    Housing Stability     Housing Instability: No              Review of Systems    Positive for stated Chief Complaint: Fatigue    Other systems are as noted in HPI.  Constitutional and vital signs reviewed.      All other systems reviewed and negative except as noted above.    Physical Exam     ED Triage Vitals [07/21/24 0001]   BP (!) 81/59   Pulse 72   Resp 18   Temp 97 °F (36.1 °C)   Temp src Temporal   SpO2 97 %   O2 Device None (Room air)       Current Vitals:   Vital Signs  BP: 113/78  Pulse: 74  Resp: 16  Temp: 97 °F (36.1 °C)  Temp src: Temporal    Oxygen Therapy  SpO2: 100 %  O2 Device: None (Room air)            Physical Exam    General: Patient is awake, alert in no acute distress.   HEENT:   Sclera are not icteric.  Conjunctivae within normal limits.  Mucous members are mildly dry.   Cardiovascular: Regular rate and rhythm, normal S1-S2.  Chest: Port in the right chest wall  Respiratory: Lungs are clear to auscultation bilaterally.   Abdomen: Soft, nontender, nondistended.  Extremities: No edema.  No unilateral calf swelling or calf tenderness to palpation.  Skin: warm and dry, no diaphoresis    ED Course     Labs Reviewed   COMP METABOLIC PANEL (14) - Abnormal; Notable for the following components:       Result Value    Sodium 131 (*)     Potassium 1.0 (*)     CO2 20.0 (*)     BUN 45 (*)     Calcium, Total 10.5 (*)     Alkaline Phosphatase 152 (*)     Bilirubin, Total 1.7 (*)     Albumin 3.1 (*)     A/G Ratio 0.9 (*)     All other components within normal limits   TROPONIN I HIGH SENSITIVITY - Abnormal; Notable for the  following components:    Troponin I (High Sensitivity) 191 (*)     All other components within normal limits   CBC W/ DIFFERENTIAL - Abnormal; Notable for the following components:    WBC 13.3 (*)     RBC 3.33 (*)     HGB 9.0 (*)     HCT 27.9 (*)     .0 (*)     Immature Platelet Fraction 14.8 (*)     Neutrophil Absolute Prelim 11.93 (*)     Neutrophil Absolute 11.93 (*)     Lymphocyte Absolute 0.56 (*)     All other components within normal limits   MAGNESIUM - Normal   CBC WITH DIFFERENTIAL WITH PLATELET    Narrative:     The following orders were created for panel order CBC With Differential With Platelet.  Procedure                               Abnormality         Status                     ---------                               -----------         ------                     CBC W/ DIFFERENTIAL[[id number]]          Abnormal            Final result                 Please view results for these tests on the individual orders.   URINALYSIS WITH CULTURE REFLEX   LIPID PANEL   RAINBOW DRAW BLUE     EKG    Rate, intervals and axes as noted on EKG Report.  Rate: 72  Rhythm: Sinus Rhythm  Reading: LVH changes.  QTc prolonged at 490.  Repolarization abnormalities worse compared with prior.    Chest x-ray: I personally reviewed the radiographs and my individual interpretation shows no consolidation.  I also reviewed the official report which showed R subclavian port.          Fluids ordered         MDM          Previous records reviewed as noted in HPI    Differential includes, but is not limited to, hypokalemia, renal failure, STEMI    Review of any laboratory testing: CBC with mild leukocytosis which is nonspecific.  Anemia approximately at baseline.  Thrombocytopenia slightly worse compared with prior.  Magnesium normal.  CMP with severe hypokalemia, potassium 1.0.  Troponin persistently elevated at 191     Review of any radiographic studies: Chest x-ray unremarkable    Shared decision making with the patient.   Given patient's weakness and severe hypokalemia with EKG changes she will be admitted for replacement.  Doubt ACS, patient denies any chest pain and has had chronically elevated troponin in the past.    Consultants utilized:   Dr. Rodriguez, hospitalist                                 Medical Decision Making      Disposition and Plan     Clinical Impression:  1. Hypokalemia         Disposition:  Admit  7/21/2024  2:49 am    Follow-up:  No follow-up provider specified.        Medications Prescribed:  Current Discharge Medication List                            Hospital Problems       Present on Admission  Date Reviewed: 5/24/2024            ICD-10-CM Noted POA    * (Principal) Hypokalemia E87.6 11/14/2015 Unknown    Thrombocytopenia (HCC) D69.6 7/21/2024 Yes

## 2024-07-21 NOTE — H&P
[facility]     Alina Aceves Patient Status:  Emergency    1980 MRN [id number]   Location [facility] EMERGENCY DEPARTMENT Attending Lucrecia Manzano MD   Hosp Day # 0 PCP HOLLY IBARRA     Chief Complaint: Low potassium    Subjective:    History of Present Illness:     Alina Aceves is a 44 year old female with invasive ductal carcinoma of the left breast, lupus, Sjogren's, heart failure reduced ejection fraction.  Patient had completed chemoradiation back in April of this year.  Patient has had poor appetite and was told at the start of the month that her potassium was 1.7 which will go hospital to be admitted but did not have any watch her kids and never went.  Patient has been increasingly weak over the last couple weeks having some dizziness feels like she is going to pass out but denies syncope.  No fevers, chills, nausea, vomiting, diarrhea or constipation.  Patient denies any palpitations, muscle twitches or cramping.    History/Other:    Past Medical History:  Past Medical History:    Breast cancer (HCC)    Congestive heart disease (HCC)    Gastritis    Lupus (HCC)    Neuropathy    Personal history of antineoplastic chemotherapy    Sjogren's disease (HCC)     Past Surgical History:   Past Surgical History:   Procedure Laterality Date    Breast surgery Left      delivery only        Family History:   Family History   Problem Relation Age of Onset    Hypertension Mother     Cancer Other      Social History:    reports that she quit smoking about 14 years ago. Her smoking use included cigarettes. She has never used smokeless tobacco. She reports that she does not currently use alcohol. She reports that she does not use drugs.     Allergies:   Allergies   Allergen Reactions    Bactrim [Sulfamethoxazole W/Trimethoprim] RASH    Adhesive Tape RASH       Medications:    No current facility-administered medications on file prior to encounter.     Current  Outpatient Medications on File Prior to Encounter   Medication Sig Dispense Refill    torsemide 10 MG Oral Tab Take 1 tablet (10 mg total) by mouth daily. PRN      HYDROcodone-acetaminophen 5-325 MG Oral Tab Take 1 tablet by mouth every 6 (six) hours as needed for Pain. 10 tablet 0    benzonatate 200 MG Oral Cap Take 1 capsule (200 mg total) by mouth 3 (three) times daily as needed for cough. 30 capsule 0    prochlorperazine (COMPAZINE) 10 mg tablet Take 1 tablet (10 mg total) by mouth every 6 (six) hours as needed for Nausea or vomiting.      metoprolol succinate ER 50 MG Oral Tablet 24 Hr Take 1 tablet (50 mg total) by mouth 2x Daily(Beta Blocker). 180 tablet 3    ondansetron 4 MG Oral Tablet Dispersible Take 1 tablet (4 mg total) by mouth every 4 (four) hours as needed for Nausea. 10 tablet 0    Potassium Citrate ER 15 MEQ (1620 MG) Oral Tab CR Take 1 tablet by mouth in the morning, at noon, and at bedtime.      DULoxetine 30 MG Oral Cap DR Particles Take 2 capsules (60 mg total) by mouth daily.      lidocaine-prilocaine 2.5-2.5 % External Cream APPLY SMALL AMOUNT OVER PORT PRIOR TO ACCESS      zolpidem 10 MG Oral Tab Take 1 tablet (10 mg total) by mouth nightly as needed for Sleep.         Review of Systems:   A comprehensive review of systems was completed.    Pertinent positives and negatives noted in the HPI.    Objective:   Physical Exam:    /78   Pulse 74   Temp 97 °F (36.1 °C) (Temporal)   Resp 16   Ht 5' 4\" (1.626 m)   Wt 87 lb (39.5 kg)   LMP 08/28/2023 (Approximate)   SpO2 100%   BMI 14.93 kg/m²   General: No acute distress, Alert  Respiratory: No rhonchi, no wheezes  Cardiovascular: S1, S2. Regular rate and rhythm  Abdomen: Soft, Non-tender, non-distended, positive bowel sounds  Neuro: No new focal deficits  Extremities: No edema      Results:    Labs:      Labs Last 24 Hours:    Recent Labs   Lab 07/21/24  0133   RBC 3.33*   HGB 9.0*   HCT 27.9*   MCV 83.8   MCH 27.0   MCHC 32.3   RDW  18.7   NEPRELIM 11.93*   WBC 13.3*   .0*       Recent Labs   Lab 07/21/24  0133   GLU 93   BUN 45*   CREATSERUM 0.99   EGFRCR 72   CA 10.5*   ALB 3.1*   *   K 1.0*      CO2 20.0*   ALKPHO 152*   AST 27   ALT 30   BILT 1.7*   TP 6.6       Lab Results   Component Value Date    INR 1.18 03/10/2024    INR 1.26 (H) 12/28/2023    INR 1.43 (H) 12/23/2023       Recent Labs   Lab 07/21/24  0133   TROPHS 191*       No results for input(s): \"TROP\", \"PBNP\" in the last 168 hours.    No results for input(s): \"PCT\" in the last 168 hours.    Imaging: Imaging data reviewed in Epic.    Assessment & Plan:      # Hypokalemia  - Replacing in ED  - Will continue on replacement  - Repeat K+ in the am.    # Chronic HFrEF  - will continue on metoprol and torsemide    # Neuropathy  - will continue cymbalta.    # Elevated troponin  -No clinical significance was evaluated in the past by cardiology for mild troponin elevation with normal coronaries.  No further workup      Plan of care discussed with patient at bedside.    Mesfin Rodriguez, DO    Supplementary Documentation:     The 21st Century Cures Act makes medical notes like these available to patients in the interest of transparency. Please be advised this is a medical document. Medical documents are intended to carry relevant information, facts as evident, and the clinical opinion of the practitioner. The medical note is intended as peer to peer communication and may appear blunt or direct. It is written in medical language and may contain abbreviations or verbiage that are unfamiliar.

## 2024-07-21 NOTE — PLAN OF CARE
NURSING ADMISSION NOTE      Patient admitted via cart.  Oriented to room.  Safety precautions initiated.  Bed in low position.  Call light in reach.    Admitted patient from ED due to Hypokalemia. Electrolyte replacement started in ED. Alert and oriented x4. Afebrile. Room air. No SOB, O2 sats above 95%. No complaints of pain. No nausea and vomiting. Patient reported diarrhea from home, placed on Enteric Isolation to R/O C-diff, waiting for stool specimen.     Received admitting orders from Dr. Rodriguez.

## 2024-07-22 LAB
ANION GAP SERPL CALC-SCNC: 9 MMOL/L (ref 0–18)
ATRIAL RATE: 72 BPM
BASOPHILS # BLD AUTO: 0 X10(3) UL (ref 0–0.2)
BASOPHILS NFR BLD AUTO: 0 %
BUN BLD-MCNC: 32 MG/DL (ref 9–23)
CALCIUM BLD-MCNC: 9.7 MG/DL (ref 8.7–10.4)
CHLORIDE SERPL-SCNC: 114 MMOL/L (ref 98–112)
CO2 SERPL-SCNC: 17 MMOL/L (ref 21–32)
CREAT BLD-MCNC: 0.69 MG/DL
EGFRCR SERPLBLD CKD-EPI 2021: 110 ML/MIN/1.73M2 (ref 60–?)
EOSINOPHIL # BLD AUTO: 0 X10(3) UL (ref 0–0.7)
EOSINOPHIL NFR BLD AUTO: 0 %
ERYTHROCYTE [DISTWIDTH] IN BLOOD BY AUTOMATED COUNT: 18.6 %
GLUCOSE BLD-MCNC: 77 MG/DL (ref 70–99)
HCT VFR BLD AUTO: 22.5 %
HGB BLD-MCNC: 7.1 G/DL
IMM GRANULOCYTES # BLD AUTO: 0.07 X10(3) UL (ref 0–1)
IMM GRANULOCYTES NFR BLD: 0.9 %
LYMPHOCYTES # BLD AUTO: 0.52 X10(3) UL (ref 1–4)
LYMPHOCYTES NFR BLD AUTO: 6.5 %
MAGNESIUM SERPL-MCNC: 1.7 MG/DL (ref 1.6–2.6)
MCH RBC QN AUTO: 27.4 PG (ref 26–34)
MCHC RBC AUTO-ENTMCNC: 31.6 G/DL (ref 31–37)
MCV RBC AUTO: 86.9 FL
MONOCYTES # BLD AUTO: 0.45 X10(3) UL (ref 0.1–1)
MONOCYTES NFR BLD AUTO: 5.6 %
NEUTROPHILS # BLD AUTO: 6.98 X10 (3) UL (ref 1.5–7.7)
NEUTROPHILS # BLD AUTO: 6.98 X10(3) UL (ref 1.5–7.7)
NEUTROPHILS NFR BLD AUTO: 87 %
OSMOLALITY SERPL CALC.SUM OF ELEC: 296 MOSM/KG (ref 275–295)
P AXIS: 84 DEGREES
P-R INTERVAL: 194 MS
PHOSPHATE SERPL-MCNC: 3.2 MG/DL (ref 2.4–5.1)
PLATELET # BLD AUTO: 166 10(3)UL (ref 150–450)
POTASSIUM SERPL-SCNC: 2.2 MMOL/L (ref 3.5–5.1)
POTASSIUM SERPL-SCNC: 2.2 MMOL/L (ref 3.5–5.1)
POTASSIUM SERPL-SCNC: 3 MMOL/L (ref 3.5–5.1)
Q-T INTERVAL: 448 MS
QRS DURATION: 98 MS
QTC CALCULATION (BEZET): 490 MS
R AXIS: 67 DEGREES
RBC # BLD AUTO: 2.59 X10(6)UL
SODIUM SERPL-SCNC: 140 MMOL/L (ref 136–145)
T AXIS: 251 DEGREES
VENTRICULAR RATE: 72 BPM
WBC # BLD AUTO: 8 X10(3) UL (ref 4–11)

## 2024-07-22 PROCEDURE — 99232 SBSQ HOSP IP/OBS MODERATE 35: CPT | Performed by: HOSPITALIST

## 2024-07-22 RX ORDER — POTASSIUM CHLORIDE 14.9 MG/ML
20 INJECTION INTRAVENOUS ONCE
Status: COMPLETED | OUTPATIENT
Start: 2024-07-22 | End: 2024-07-23

## 2024-07-22 RX ORDER — MELATONIN
100 DAILY
Status: DISCONTINUED | OUTPATIENT
Start: 2024-07-22 | End: 2024-07-23

## 2024-07-22 RX ORDER — POTASSIUM CHLORIDE 14.9 MG/ML
20 INJECTION INTRAVENOUS ONCE
Status: COMPLETED | OUTPATIENT
Start: 2024-07-22 | End: 2024-07-22

## 2024-07-22 RX ORDER — ASCORBIC ACID 500 MG
250 TABLET ORAL DAILY
Status: DISCONTINUED | OUTPATIENT
Start: 2024-07-22 | End: 2024-07-23

## 2024-07-22 RX ORDER — MAGNESIUM SULFATE HEPTAHYDRATE 40 MG/ML
2 INJECTION, SOLUTION INTRAVENOUS ONCE
Status: COMPLETED | OUTPATIENT
Start: 2024-07-22 | End: 2024-07-22

## 2024-07-22 RX ORDER — ENOXAPARIN SODIUM 100 MG/ML
30 INJECTION SUBCUTANEOUS DAILY
Status: DISCONTINUED | OUTPATIENT
Start: 2024-07-23 | End: 2024-07-23

## 2024-07-22 RX ORDER — MULTIPLE VITAMINS W/ MINERALS TAB 9MG-400MCG
1 TAB ORAL DAILY
Status: DISCONTINUED | OUTPATIENT
Start: 2024-07-22 | End: 2024-07-23

## 2024-07-22 RX ORDER — ENOXAPARIN SODIUM 100 MG/ML
40 INJECTION SUBCUTANEOUS DAILY
Status: DISCONTINUED | OUTPATIENT
Start: 2024-07-22 | End: 2024-07-22

## 2024-07-22 NOTE — DIETARY MALNUTRITION NOTE
[facility]   part of [facility]    NUTRITION ASSESSMENT    Pt meets severe malnutrition criteria at this time.    CRITERIA FOR MALNUTRITION DIAGNOSIS:  Criteria for severe malnutrition diagnosis: chronic illness related to wt loss greater than 7.5% in 3 months, energy intake less than 75% for greater than 1 month, body fat severe depletion, and muscle mass severe depletion      NUTRITION INTERVENTION:    RD nutrition Care Plan- Liberalized diet, Initiated ONS (oral nutritional supplements), Ordered multivitamin, Ordered thiamin, and Ordered Vitamin C  Meal and Snacks - Monitor and encourage adequate PO intake.   Medical Food Supplements - Vonjour 1.0 Daily. Rationale/use for oral supplements discussed.  Nutrition Education - discussed with pt eating smaller more frequent meals and role of ONS and MVI, Thiamine and vit C . Pt/family receptive to education, no barriers noted. Handouts provided.   Vitamin and Mineral Supplements - adding Multivitamin with minerals, Vitamin C, and Thiamine        PATIENT STATUS: 07/22/24 low BMI 14.5 and at risk     44 year old female admitted with weakness, dizziness hypokalemia 1.7.  pt with h/o invasive ductal carcinoma which she received chemo radiation in April 2024,  lupus, Sjogrens, heart failure.    Met with pt at bedside and she reports eating 2 meals per day and drinking Protein shake at home.  Pt with severe muscle and fat depletion through out body.  She has multiple wounds/sores on legs that she reports she picks so they are not healing.       ANTHROPOMETRICS:  Ht: 162.6 cm (5' 4\")  Wt: 38.3 kg (84 lb 8 oz).   BMI: Body mass index is 14.5 kg/m².  IBW: 54.5 kg      WEIGHT HISTORY:   Weight loss: Yes, Severe Wt loss of 30  lbs, 26%, over 2 months     Wt Readings from Last 10 Encounters:   07/21/24 38.3 kg (84 lb 8 oz)   05/24/24 51.7 kg (114 lb)   04/10/24 51.7 kg (114 lb)   03/29/24 52.1 kg (114 lb 12.8 oz)   03/11/24 51.8 kg (114 lb 3.2 oz)   01/02/24 47.6 kg  (104 lb 15 oz)   11/13/23 54.4 kg (120 lb)   12/16/21 61.2 kg (135 lb)   09/22/20 61.7 kg (136 lb)   08/15/20 63.5 kg (140 lb)        NUTRITION:  Diet:       Procedures    Cardiac diet Cardiac; Is Patient on Accuchecks? No      Food Allergies: No  Cultural/Ethnic/Catholic Preferences Addressed: Yes    Percent Meals Eaten (last 3 days)       Date/Time Percent Meals Eaten (%)    07/21/24 1155 50 %            GI system review: WNL Last BM: 7/22  Skin and wounds: non healing wounds on legs     NUTRITION RELATED PHYSICAL FINDINGS:     1. Body Fat/Muscle Mass: severe depletion body fat Orbital fat pad, Buccal fat pad, Triceps, and Midaxillary line and severe muscle depletion Temple region, Clavicle region, Shoulder/Acromion process, Scapula region, Dorsal hand region, Thigh region, and Calf region     2. Fluid Accumulation: none     NUTRITION PRESCRIPTION: 54.5kg  Calories: 0619-6728 calories/day (30-35 kcal/kg)  Protein: 65-82 grams protein/day (1.2-1.5 grams protein per kg)  Fluid: ~1 ml/kcal or per MD discretion    NUTRITION DIAGNOSIS/PROBLEM:  Malnutrition related to physiological causes, insufficient appetite resulting in inadequate nutrition intake, and increased nutritional demands for healing as evidenced by documented/reported insufficient oral intake, documented/reported unintentional weight loss, loss of fat mass, loss of muscle mass, and low BMI      MONITOR AND EVALUATE/NUTRITION GOALS:  PO intake of 75% of meals TID - New  PO intake of 75% of oral nutrition supplement/s - New  Gradual weight gain of at least .5 lbs per week - New      MEDICATIONS:  Mg Sulfate 2g ,  Kcl 40mEQ bid oral, Kcl 40 mEq + 20mEq     LABS:  K+ 2.2, BUN 32, Mg 1.7, Phos 3.2    Pt is at High nutrition risk    Mercedez Greer RD,LDN  Clinical Dietitian  [id number]

## 2024-07-22 NOTE — PROGRESS NOTES
[facility]   part of [facility]     Hospitalist Progress Note     Alina Aceves Patient Status:  Inpatient    1980 MRN [id number]   Location [facility] 4NW-A Attending Octavio Beasley MD   Hosp Day # 1 PCP HOLLY IBARRA     Chief Complaint: weakness    Subjective:     Patient feels a little better    Objective:    Review of Systems:   ROS completed; pertinent positive and negatives stated in subjective.    Vital signs:  Temp:  [97 °F (36.1 °C)-98 °F (36.7 °C)] 98 °F (36.7 °C)  Pulse:  [74-93] 82  Resp:  [16-18] 16  BP: (110-129)/(75-83) 115/81  SpO2:  [98 %-100 %] 100 %    Physical Exam:    General: No acute distress  Respiratory: Clear to auscultation bilaterally  Cardiovascular: S1, S2.  Abdomen: Soft  Neuro: No new focal deficits      Diagnostic Data:    Labs:  Recent Labs   Lab 24  0133 24  0550   WBC 13.3* 8.0   HGB 9.0* 7.1*   MCV 83.8 86.9   .0* 166.0       Recent Labs   Lab 24  0133 24  1300 24  0550   GLU 93  --  77   BUN 45*  --  32*   CREATSERUM 0.99  --  0.69   CA 10.5*  --  9.7   ALB 3.1*  --   --    *  --  140   K 1.0* 2.1* 2.2*  2.2*     --  114*   CO2 20.0*  --  17.0*   ALKPHO 152*  --   --    AST 27  --   --    ALT 30  --   --    BILT 1.7*  --   --    TP 6.6  --   --        Estimated Creatinine Clearance: 62.9 mL/min (based on SCr of 0.69 mg/dL).     Recent Labs   Lab 24  0133 24  1300   TROPHS 191* 206*       No results for input(s): \"PTP\", \"INR\" in the last 168 hours.           Imaging: Imaging data reviewed in Epic.    Medications:    potassium chloride  20 mEq Intravenous Once    magnesium sulfate  2 g Intravenous Once    DULoxetine  60 mg Oral Daily    metoprolol succinate ER  50 mg Oral 2x Daily(Beta Blocker)    heparin  5,000 Units Subcutaneous Q8H KHALIDA    potassium chloride  40 mEq Oral BID       Assessment & Plan:     # Hypokalemia  Cont. To replace and monitor closely    #Acidosis  Due to severe  hypokalemia  monitor     # Chronic HFrEF, EF: 20-25%  Cont. 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 Aceves is expected to be discharge to: home    The 21st Century Cures Act makes medical notes like these available to patients in the interest of transparency. Please be advised this is a medical document. Medical documents are intended to carry relevant information, facts as evident, and the clinical opinion of the practitioner. The medical note is intended as peer to peer communication and may appear blunt or direct. It is written in medical language and may contain abbreviations or verbiage that are unfamiliar.

## 2024-07-22 NOTE — PAYOR COMM NOTE
--------------  ADMISSION REVIEW     Payor: [employer]/MC[employer]  Subscriber #:  [id number]  Authorization Number: [id number]    Admit date: 7/21/24  Admit time:  6:04 AM         History   HPI  44-year-old with a history of lupus, Sjogren's, HFrEF, invasive ductal carcinoma of the left breast presents for weakness.  Patient states that she had labs at the start of the month and her potassium was 1.7, was told to go to the hospital but she didn't have anyone to watch her kids so she never went. The last couple weeks she has been really weak, has no strength. Sometimes feels dizzy like she might pass out. No chest pain. Does feel short of breath at times, since starting metoprolol for her heart. No nausea or vomiting. No fever. No hematuria or dysuria but has noticed frequency. No back pain.   Records reviewed including office visit note from 7/2.  Potassium that day was 1.7.  CO2 was 17.4.  This notes that she had a palpable left breast mass noted in March 2023, she underwent chemo, mastectomy and radiation to the left breast which finished in April 2024.  She was admitted back in March 2024 for nausea and vomiting and had an elevated troponin.  Consult from cardiology notes mild troponin elevation with normal coronaries, troponin elevation thought to be of no clinical significance  ED Triage Vitals [07/21/24 0001]   BP (!) 81/59   Pulse 72   Resp 18   Temp 97 °F (36.1 °C)   Temp src Temporal   SpO2 97 %   O2 Device None (Room air)     HEENT:   Sclera are not icteric.  Conjunctivae within normal limits.  Mucous members are mildly dry.   Cardiovascular: Regular rate and rhythm, normal S1-S2.  Chest: Port in the right chest wall  Respiratory: Lungs are clear to auscultation bilaterally.   Abdomen: Soft, nontender, nondistended.  Extremities: No edema.  No unilateral calf swelling or calf tenderness to palpation.  Skin: warm and dry, no diaphoresis  Labs Reviewed   COMP METABOLIC PANEL (14) - Abnormal; Notable for the  following components:       Result Value    Sodium 131 (*)     Potassium 1.0 (*)     CO2 20.0 (*)     BUN 45 (*)     Calcium, Total 10.5 (*)     Alkaline Phosphatase 152 (*)     Bilirubin, Total 1.7 (*)     Albumin 3.1 (*)     A/G Ratio 0.9 (*)     All other components within normal limits   TROPONIN I HIGH SENSITIVITY - Abnormal; Notable for the following components:    Troponin I (High Sensitivity) 191 (*)     All other components within normal limits   CBC W/ DIFFERENTIAL - Abnormal; Notable for the following components:    WBC 13.3 (*)     RBC 3.33 (*)     HGB 9.0 (*)     HCT 27.9 (*)     .0 (*)     Immature Platelet Fraction 14.8 (*)     Neutrophil Absolute Prelim 11.93 (*)     Neutrophil Absolute 11.93 (*)     Lymphocyte Absolute 0.56 (*)    EKG  Rate, intervals and axes as noted on EKG Report.  Rate: 72  Rhythm: Sinus Rhythm  Reading: LVH changes.  QTc prolonged at 490.  Repolarization abnormalities worse compared with prior.  Chest x-ray: I personally reviewed the radiographs and my individual interpretation shows no consolidation.  I also reviewed the official report which showed R subclavian port.  Disposition and Plan     Clinical Impression:  1. Hypokalemia       Disposition:  Admit  7/21/2024  2:49 am      History and Physical   History of Present Illness:    Alina Aceves is a 44 year old female with invasive ductal carcinoma of the left breast, lupus, Sjogren's, heart failure reduced ejection fraction.  Patient had completed chemoradiation back in April of this year.  Patient has had poor appetite and was told at the start of the month that her potassium was 1.7 which will go hospital to be admitted but did not have any watch her kids and never went.  Patient has been increasingly weak over the last couple weeks having some dizziness feels like she is going to pass out but denies syncope.  No fevers, chills, nausea, vomiting, diarrhea or constipation.  Patient denies any palpitations, muscle  twitches or cramping.  /78   Pulse 74   Temp 97 °F (36.1 °C) (Temporal)   Resp 16   Ht 5' 4\" (1.626 m)   Wt 87 lb (39.5 kg)   LMP 08/28/2023 (Approximate)   SpO2 100%   BMI 14.93 kg/m²   General: Alert  Respiratory: No rhonchi, no wheezes  Cardiovascular: S1, S2. Regular rate and rhythm  Abdomen: Soft, Non-tender, non-distended, positive bowel sounds  Neuro: No new focal deficits  Extremities: No edema     Lab 07/21/24  0133   RBC 3.33*   HGB 9.0*   HCT 27.9*   MCV 83.8   MCH 27.0   MCHC 32.3   RDW 18.7   NEPRELIM 11.93*   WBC 13.3*   .0*      Lab 07/21/24  0133   GLU 93   BUN 45*   CREATSERUM 0.99   EGFRCR 72   CA 10.5*   ALB 3.1*   *   K 1.0*      CO2 20.0*   ALKPHO 152*   AST 27   ALT 30   BILT 1.7*   TP 6.6      Lab 07/21/24  0133   TROPHS 191*    Assessment & Plan:       # Hypokalemia  - Replacing in ED  - Will continue on replacement  - Repeat K+ in the am.     # Chronic HFrEF  - will continue on metoprol and torsemide     # Neuropathy  - will continue cymbalta.     # Elevated troponin  -No clinical significance was evaluated in the past by cardiology for mild troponin elevation with normal coronaries.  No further workup       7/22/24    Lab 07/21/24  0133 07/22/24  0550   WBC 13.3* 8.0   HGB 9.0* 7.1*   MCV 83.8 86.9   .0* 166.0      Lab 07/21/24  0133 07/21/24  1300 07/22/24  0550   GLU 93  --  77   BUN 45*  --  32*   CREATSERUM 0.99  --  0.69   CA 10.5*  --  9.7   ALB 3.1*  --   --    *  --  140   K 1.0* 2.1* 2.2*  2.2*     --  114*   CO2 20.0*  --  17.0*   ALKPHO 152*  --   --    AST 27  --   --    ALT 30  --   --    BILT 1.7*  --   --    TP 6.6  --   --       Lab 07/21/24  0133 07/21/24  1300   TROPHS 191* 206*       Medications:    potassium chloride  20 mEq Intravenous Once    magnesium sulfate  2 g Intravenous Once    DULoxetine  60 mg Oral Daily    metoprolol succinate ER  50 mg Oral 2x Daily(Beta Blocker)    heparin  5,000 Units Subcutaneous Q8H  KHALIDA    potassium chloride  40 mEq Oral BID         Assessment & Plan:      # Hypokalemia  Cont. To replace and monitor closely     #Acidosis  Due to severe hypokalemia  monitor     # Chronic HFrEF, EF: 20-25%  Cont. Toprol  Holding diuretic  compensated     # Neuropathy  cymbalta     #L BRCA  Port in place  In remission     # Tropnin elevation of no clinical significance          MEDICATIONS ADMINISTERED IN LAST 1 DAY:  DULoxetine (Cymbalta) DR cap 60 mg       Date Action Dose Route User    7/22/2024 1248 Given 60 mg Oral Amber Tirnidad RN          metoprolol succinate ER (Toprol XL) 24 hr tab 50 mg       Date Action Dose Route User    7/22/2024 0543 Given 50 mg Oral Nazia Echevarria RN    7/21/2024 1931 Given 50 mg Oral Lucila Finn RN          potassium chloride 20 mEq/100mL IVPB premix 20 mEq       Date Action Dose Route User    7/22/2024 1252 New Bag 20 mEq Intravenous Amber Trinidad RN          potassium chloride 40 mEq in 250mL sodium chloride 0.9% IVPB premix       Date Action Dose Route User    7/21/2024 2013 New Bag 40 mEq Intravenous Nazia Echevarria RN          potassium chloride 40 mEq in 250mL sodium chloride 0.9% IVPB premix       Date Action Dose Route User    7/22/2024 0805 New Bag 40 mEq Intravenous Amber Trinidad RN          potassium chloride (Klor-Con M20) tab 40 mEq       Date Action Dose Route User    7/22/2024 1248 Given 40 mEq Oral Amber Trinidad RN    7/21/2024 2136 Given 40 mEq Oral Nazia Echevarria RN          potassium phosphate dibasic 15 mmol in sodium chloride 0.9% 250 mL IVPB       Date Action Dose Route User    7/21/2024 1608 New Bag 15 mmol Intravenous Lucila Finn RN            Vitals (last day)       Date/Time Temp Pulse Resp BP SpO2 Weight O2 Device O2 Flow Rate (L/min) [facility]    07/22/24 1221 98 °F (36.7 °C) 82 16 115/81 100 % -- None (Room air) --     07/22/24 0808 97.5 °F (36.4 °C) 76 18 129/83 100 % -- None (Room air) --     07/22/24 0511 97 °F  (36.1 °C) 79 16 120/83 100 % -- None (Room air) 0 L/min     07/22/24 0100 97.3 °F (36.3 °C) 74 16 115/81 100 % -- None (Room air) 0 L/min     07/22/24 0034 97.3 °F (36.3 °C) 76 16 110/76 100 % -- None (Room air) -- SG    07/21/24 1630 97.6 °F (36.4 °C) 86 18 122/75 100 % -- None (Room air) 0 L/min     07/21/24 1155 97.6 °F (36.4 °C) 81 18 116/73 100 % -- None (Room air) 0 L/min     07/21/24 0807 97.2 °F (36.2 °C) 85 18 119/76 100 % -- None (Room air) 0 L/min     07/21/24 0001 97 °F (36.1 °C) 72 18 81/59 97 % 87 lb (39.5 kg) None (Room air) -- EC

## 2024-07-22 NOTE — PLAN OF CARE
A&Ox4, VSS and afebrile  Pt w/urgencies and unable to urinate. Bladder scan 674, straight cath 700.  No c/o N/V, diarrhea or pain  Meds given as per MAR  Safety precautions in place, call light within pt's reach.     Update: Potassium low - protocol ordered.    Problem: METABOLIC/FLUID AND ELECTROLYTES - ADULT  Goal: Electrolytes maintained within normal limits  Description: INTERVENTIONS:  - Monitor labs and rhythm and assess patient for signs and symptoms of electrolyte imbalances  - Administer electrolyte replacement as ordered  - Monitor response to electrolyte replacements, including rhythm and repeat lab results as appropriate  - Fluid restriction as ordered  - Instruct patient on fluid and nutrition restrictions as appropriate  Outcome: Progressing     Problem: CARDIOVASCULAR - ADULT  Goal: Maintains optimal cardiac output and hemodynamic stability  Description: INTERVENTIONS:  - Monitor vital signs, rhythm, and trends  - Monitor for bleeding, hypotension and signs of decreased cardiac output  - Evaluate effectiveness of vasoactive medications to optimize hemodynamic stability  - Monitor arterial and/or venous puncture sites for bleeding and/or hematoma  - Assess quality of pulses, skin color and temperature  - Assess for signs of decreased coronary artery perfusion - ex. Angina  - Evaluate fluid balance, assess for edema, trend weights  Outcome: Progressing

## 2024-07-23 VITALS
HEART RATE: 107 BPM | OXYGEN SATURATION: 100 % | BODY MASS INDEX: 14.43 KG/M2 | HEIGHT: 64 IN | RESPIRATION RATE: 16 BRPM | WEIGHT: 84.5 LBS | SYSTOLIC BLOOD PRESSURE: 121 MMHG | TEMPERATURE: 97 F | DIASTOLIC BLOOD PRESSURE: 91 MMHG

## 2024-07-23 LAB — POTASSIUM SERPL-SCNC: 3.6 MMOL/L (ref 3.5–5.1)

## 2024-07-23 PROCEDURE — 99239 HOSP IP/OBS DSCHRG MGMT >30: CPT | Performed by: HOSPITALIST

## 2024-07-23 RX ORDER — HYDROCODONE BITARTRATE AND ACETAMINOPHEN 5; 325 MG/1; MG/1
1 TABLET ORAL EVERY 6 HOURS PRN
Status: DISCONTINUED | OUTPATIENT
Start: 2024-07-23 | End: 2024-07-23

## 2024-07-23 RX ORDER — POTASSIUM CHLORIDE 750 MG/1
10 TABLET, FILM COATED, EXTENDED RELEASE ORAL DAILY
Qty: 30 TABLET | Refills: 0 | Status: SHIPPED | OUTPATIENT
Start: 2024-07-23

## 2024-07-23 RX ORDER — POTASSIUM CHLORIDE 20 MEQ/1
40 TABLET, EXTENDED RELEASE ORAL EVERY 4 HOURS
Status: COMPLETED | OUTPATIENT
Start: 2024-07-23 | End: 2024-07-23

## 2024-07-23 NOTE — PROGRESS NOTES
Pt A&Ox4 on room air, complaints of mild pain but declines any pain medication. Poor appetite, replaced potassium through the IV and gave oral potassium pills. Recheck potassium in the AM. Monitoring urine output. Call light within reach, frequent checks made, needs met.

## 2024-07-23 NOTE — PLAN OF CARE
Problem: METABOLIC/FLUID AND ELECTROLYTES - ADULT  Goal: Electrolytes maintained within normal limits  Description: INTERVENTIONS:  - Monitor labs and rhythm and assess patient for signs and symptoms of electrolyte imbalances  - Administer electrolyte replacement as ordered  - Monitor response to electrolyte replacements, including rhythm and repeat lab results as appropriate  - Fluid restriction as ordered  - Instruct patient on fluid and nutrition restrictions as appropriate  Outcome: Progressing     Problem: Patient/Family Goals  Goal: Patient/Family Long Term Goal  Description: Patient's Long Term Goal:     Interventions:  -   - See additional Care Plan goals for specific interventions  Outcome: Progressing  Goal: Patient/Family Short Term Goal  Description: Patient's Short Term Goal:     Interventions:   -   - See additional Care Plan goals for specific interventions  Outcome: Progressing     Problem: CARDIOVASCULAR - ADULT  Goal: Maintains optimal cardiac output and hemodynamic stability  Description: INTERVENTIONS:  - Monitor vital signs, rhythm, and trends  - Monitor for bleeding, hypotension and signs of decreased cardiac output  - Evaluate effectiveness of vasoactive medications to optimize hemodynamic stability  - Monitor arterial and/or venous puncture sites for bleeding and/or hematoma  - Assess quality of pulses, skin color and temperature  - Assess for signs of decreased coronary artery perfusion - ex. Angina  - Evaluate fluid balance, assess for edema, trend weights  Outcome: Progressing  Goal: Absence of cardiac arrhythmias or at baseline  Description: INTERVENTIONS:  - Continuous cardiac monitoring, monitor vital signs, obtain 12 lead EKG if indicated  - Evaluate effectiveness of antiarrhythmic and heart rate control medications as ordered  - Initiate emergency measures for life threatening arrhythmias  - Monitor electrolytes and administer replacement therapy as ordered  Outcome: Progressing      Problem: GASTROINTESTINAL - ADULT  Goal: Maintains or returns to baseline bowel function  Description: INTERVENTIONS:  - Assess bowel function  - Maintain adequate hydration with IV or PO as ordered and tolerated  - Evaluate effectiveness of GI medications  - Encourage mobilization and activity  - Obtain nutritional consult as needed  - Establish a toileting routine/schedule  - Consider collaborating with pharmacy to review patient's medication profile  Outcome: Progressing     Problem: GENITOURINARY - ADULT  Goal: Absence of urinary retention  Description: INTERVENTIONS:  - Assess patient’s ability to void and empty bladder  - Monitor intake/output and perform bladder scan as needed  - Follow urinary retention protocol/standard of care  - Consider collaborating with pharmacy to review patient's medication profile  - Implement strategies to promote bladder emptying  Outcome: Progressing

## 2024-07-23 NOTE — CM/SW NOTE
SW was notified by RN that patient's anticipated therapy need for after DC is home health services. SW sent referral for home health via [employer] and will follow up regarding choice list once it is available.     SW will continue to follow for plan of care changes and remain available for any additional DC needs or concerns.     Rachell Rashid MSW, LSW  Discharge Planner   [phone]

## 2024-07-23 NOTE — DISCHARGE INSTRUCTIONS
Take potassium 10 KCL daily  BMP in 1 week to monitor electrolytes and kidney function  Torsemide as needed only.  If you develop swelling in your legs.  If you take a torsemide then take an extra dose of the potassium supplment  Follow up with your PCP, oncologist and cardiologist      Sometimes managing your health at home requires assistance.  The [facility] team has recognized your preference to use [facility].  They can be reached by phone at [phone].  The fax number for your reference is [phone].. A representative from the home health agency will contact you or your family to schedule your first visit.

## 2024-07-23 NOTE — PLAN OF CARE
Pt Aox4. VSS. RA.  Pain in the groin area/related to recent fall per pt.   No complain of N/V.  LBMx2.   Potassium replaced per protocol.  Very poor appetite.  Up to the chair/bedside commode x1 with a walker.        NURSING DISCHARGE NOTE    Discharged Home via Wheelchair.  Accompanied by Friend  Belongings Taken by patient/family.  Discharge teaching provided and verbalized understanding.    Problem: METABOLIC/FLUID AND ELECTROLYTES - ADULT  Goal: Electrolytes maintained within normal limits  Description: INTERVENTIONS:  - Monitor labs and rhythm and assess patient for signs and symptoms of electrolyte imbalances  - Administer electrolyte replacement as ordered  - Monitor response to electrolyte replacements, including rhythm and repeat lab results as appropriate  - Fluid restriction as ordered  - Instruct patient on fluid and nutrition restrictions as appropriate  Outcome: Adequate for Discharge

## 2024-07-23 NOTE — OCCUPATIONAL THERAPY NOTE
OCCUPATIONAL THERAPY EVALUATION - INPATIENT     Room Number: 425/425-A  Evaluation Date: 7/23/2024  Type of Evaluation: Initial  Presenting Problem: hypokalemia    Physician Order: IP Consult to Occupational Therapy  Reason for Therapy: ADL/IADL Dysfunction and Discharge Planning    OCCUPATIONAL THERAPY ASSESSMENT   Patient is currently functioning near baseline with toileting, lower body dressing, bed mobility, transfers, static sitting balance, dynamic sitting balance, static standing balance, dynamic standing balance, and functional standing tolerance. Prior to admission, patient's baseline is modified independent using RW with self-care, mobility and light IADLs. Has been sponge bathing due to becoming too fatigued when taking showers.  Has assistance from mother or sister. Lives with 9 y/o daughter.  Patient's sister works and her mother , who has been out of state, is arriving today and will be able to assist at home. Patient has had recent falls at home due to feet catching on carpet and due to weakness. Her daughter has been helping her get in/out of bed. Patient is requiring supervision as a result of the following impairments: decreased functional strength, decreased functional reach, decreased endurance, pain, impaired standing balance, medical status, and decreased sensation. Occupational Therapy will continue to follow for duration of hospitalization.    Patient will benefit from continued skilled OT Services at discharge to promote prior level of function and safety with additional support and return home with home health OT      History Related to Current Admission: Patient is a 44 year old female admitted on 7/21/2024 with Presenting Problem: hypokalemia. Co-Morbidities : neuropathy, left breast CA, cardiomyopathy, HFrEF, CKDIII    Patient with breast CA s/p CT and RT finishing in April 2024 admitted from home with hypokalemia  Recent admissions:  4/10--4/11  hypokalemia, DC HHPT  3/6---3/12    anemia, DC home      WEIGHT BEARING RESTRICTION  Weight Bearing Restriction: None                Recommendations for nursing staff:   Transfers: 1 person, SBA/CGA with RW  Toileting location:     EVALUATION SESSION:  Patient Start of Session: supine  FUNCTIONAL TRANSFER ASSESSMENT  Sit to Stand: Edge of Bed  Edge of Bed: Stand-by Assist    BED MOBILITY  Supine to Sit : Stand-by Assist    BALANCE ASSESSMENT     FUNCTIONAL ADL ASSESSMENT       ACTIVITY TOLERANCE: stable on room air  Pulse: 97                      O2 SATURATIONS       COGNITION  Arousal/Alertness:  appropriate responses to stimuli  Attention Span:  appears intact  Orientation Level:  oriented x4  Following Commands:  follows one step commands with increased time  Initiation: appears intact  Appears to have adequate problem solving and safety awareness    Upper Extremity   ROM: within functional limits AROM intact  Strength: BUEs 4/5  Coordination  Gross motor: wfl  Fine motor: wfl  Sensation: reports some neuropathy to R hand at times    EDUCATION PROVIDED  Patient: Role of Occupational Therapy; Plan of Care; Discharge Recommendations; Adaptive Equipment Recommendations; Compensatory ADL Techniques  Patient's Response to Education: Verbalized Understanding    Equipment used: gait belt, RW  Demonstrates functional use, Would benefit from additional trial yes     Therapist comments: Patient was instructed to elevate head of bed to allow for increased independence with supine to sit. She completed transfer with SBA. Stood up with CGA/SBA. Patient was able to transfer to recliner with SBA. OT educated her on benefit of reacher and sock aide and issued ADL resource list. Understanding was voiced.     Patient End of Session: All patient questions and concerns addressed;RN aware of session/findings;Call light within reach;Needs met;Up in chair;Alarm set    OCCUPATIONAL PROFILE    HOME SITUATION  Type of Home: House  Home Layout: Two level;Able to live on main  level  Lives With: Daughter (8 years old)    Toilet and Equipment: Standard height toilet;3-in-1 commode  Shower/Tub and Equipment:  (is currently spongebathing)  Other Equipment: Hospital bed (1st floor)    Occupation/Status: not working  Hand Dominance: Right  Drives: Yes (but has not been driving recently due to declining physical condition)  Patient Regularly Uses: Glasses    Prior Level of Function: Lives with 9 y/o dtr in a 2 level house with hospital bed on 1st floor. Drives. Has a sister in the area who works. Patient's mother is also able to assist . Patient has been scooting on stairs on her bottom. Reported a few recent falls, one at home when using bathroom where slippers caught on carpet and another at a farmer[place] market.    SUBJECTIVE   \"I'd rather save the energy I have for going home\"    PAIN ASSESSMENT  Rating: Unable to rate  Location: R side of back  Management Techniques: Body mechanics;Repositioning    OBJECTIVE  Precautions: Bed/chair alarm;Limb alert - left  Fall Risk: High fall risk      ASSESSMENTS    AM-PAC ‘6-Clicks’ Inpatient Daily Activity Short Form  -   Putting on and taking off regular lower body clothing?: A Little  -   Bathing (including washing, rinsing, drying)?: A Little  -   Toileting, which includes using toilet, bedpan or urinal? : A Little  -   Putting on and taking off regular upper body clothing?: A Little  -   Taking care of personal grooming such as brushing teeth?: None  -   Eating meals?: None    AM-PAC Score:  Score: 20  Approx Degree of Impairment: 38.32%  Standardized Score (AM-PAC Scale): 42.03    ADDITIONAL TESTS     NEUROLOGICAL FINDINGS      COGNITION ASSESSMENTS       PLAN  OT Treatment Plan: Endurance training;UE strengthening/ROM;Functional transfer training;ADL training;Energy conservation/work simplification techniques;Patient/Family education;Patient/Family training;Compensatory technique education;Equipment eval/education  Rehab Potential : Fair  Frequency:  3-5x/week  Number of Visits to Meet Established Goals: 3    ADL Goals   Patient will perform grooming: with supervision and while standing at sink  Patient will perform upper body dressing:  with setup, with supervision, and while in chair  Patient will perform lower body dressing:  with setup, with supervision, and with adaptive equipment PRN  Patient will perform toileting: with supervision    Functional Transfer Goals  Patient will transfer to toilet:  with supervision    Additional Goals  PHQ9 screening--patient declined 7/23    Patient Evaluation Complexity Level:   Occupational Profile/Medical History MODERATE - Expanded review of history including review of medical or therapy record   Specific performance deficits impacting engagement in ADL/IADL MODERATE  3 - 5 performance deficits   Client Assessment/Performance Deficits MODERATE - Comorbidities and min to mod modifications of tasks    Clinical Decision Making MODERATE - Analysis of occupational profile, detailed assessments, several treatment options    Overall Complexity MODERATE     OT Session Time: 30 minutes  Self-Care Home Management: 10 minutes  Therapeutic Activity: 15 minutes

## 2024-07-23 NOTE — PHYSICAL THERAPY NOTE
PHYSICAL THERAPY EVALUATION - INPATIENT     Room Number: 425/425-A  Evaluation Date: 7/23/2024  Type of Evaluation: Initial  Physician Order: PT Eval and Treat    Presenting Problem: Hypokalemia  Co-Morbidities : neuropathy, left breast CA, cardiomyopathy, HFrEF, CKDIII  Reason for Therapy: Mobility Dysfunction and Discharge Planning    PHYSICAL THERAPY ASSESSMENT   Patient is currently functioning below baseline with bed mobility, transfers, gait, stair negotiation, standing prolonged periods, and performing household tasks.  Prior to admission, patient's baseline is modified independent ambulation with use of a rolling walker.  Patient is requiring minimal assist as a result of the following impairments: decreased functional strength, pain, impaired sitting and standing balance, decreased muscular endurance, and medical status. Physical therapy will continue to follow for duration of hospitalization.      Patient will benefit from continued skilled PT Services at discharge to promote prior level of function and safety with additional support and return home with home health PT.    PLAN  PT Treatment Plan: Bed mobility;Body mechanics;Endurance;Patient education;Family education;Gait training;Strengthening;Transfer training;Neuromuscular re-educate  Rehab Potential : Fair  Frequency (Obs): 3-5x/week  Number of Visits to Meet Established Goals: 3      CURRENT GOALS    Goal #1 Patient is able to demonstrate supine - sit EOB @ level: modified independent     Goal #2 Patient is able to demonstrate transfers Sit to/from Stand at assistance level: modified independent     Goal #3 Patient is able to ambulate 150 feet with assist device: walker - rolling at assistance level: supervision     Goal #4    Goal #5    Goal #6    Goal Comments: Goals established on 7/23/2024      PHYSICAL THERAPY MEDICAL/SOCIAL HISTORY  History related to current admission: Patient is a 44 year old female who presented to the ED on 7/21/2024 from  home for increased weakness.  Pt diagnosed with hypokalemia, acidosis, chronic heart failure with reduced EF 20-25%, and neuropathy.      HOME SITUATION  Type of Home: House   Home Layout: Two level;Able to live on main level  Stairs to Enter : 2  Railing: No          Lives With: Daughter (8 years old)  Drives: Yes (but has not been driving recently due to declining physical condition)  Patient Owned Equipment: Hospital bed;Rolling walker (Commode)  Patient Regularly Uses: Glasses    Prior Level of [place]: The pt reports that she ambulates with the use of a rolling walker at all times.  Pt states that her 8 year old daughter has been assisting her to get in/out of bed.  The pt admits to two falls in the past 2 months, one at the [facility] and once at home, tripping.  The pt's mother is traveling from [place] today to live with and assist the pt and her daughter.    SUBJECTIVE  \"I need to save my energy to get home.\"      OBJECTIVE  Precautions: Bed/chair alarm;Limb alert - left  Fall Risk: High fall risk    WEIGHT BEARING RESTRICTION  Weight Bearing Restriction: None                PAIN ASSESSMENT  Rating:  (\"annoying\")  Location: L breast scab  Management Techniques: Relaxation    COGNITION  Overall Cognitive Status:  WFL - within functional limits  Arousal/Alertness:  appropriate responses to stimuli  Orientation Level:  oriented x4  Memory:  appears intact  Following Commands:  follows all commands and directions without difficulty    RANGE OF MOTION AND STRENGTH ASSESSMENT  Upper extremity ROM and strength are within functional limits     Lower extremity ROM is within functional limits except for the following:   Right Dorsiflexion :Pt reports foot drop  Left Dorsiflexion :Pt reports foot drop    Lower extremity strength is within functional limits except for the following:    Right Hip flexion  4/5  Left Hip flexion  4/5  Right Knee extension  4/5  Left Knee extension  4/5  Right Knee flexion   4+/5  Left Knee flexion  4+/5  Right Dorsiflexion  1/5  Left Dorsiflexion  3-/5      BALANCE  Static Sitting: Fair +  Dynamic Sitting: Fair -  Static Standing: Poor +  Dynamic Standing: Poor +    INTEGUMENTARY  Pt has scabbed abrasions on L shin and R knee from recent falls    Pt provided waffle cushion in chair due to c/o ischial pain with sitting    ACTIVITY TOLERANCE  Pulse: 97  Heart Rate Source: Monitor                     AM-PAC '6-Clicks' INPATIENT SHORT FORM - BASIC MOBILITY  How much difficulty does the patient currently have...  Patient Difficulty: Turning over in bed (including adjusting bedclothes, sheets and blankets)?: A Little   Patient Difficulty: Sitting down on and standing up from a chair with arms (e.g., wheelchair, bedside commode, etc.): A Little   Patient Difficulty: Moving from lying on back to sitting on the side of the bed?: A Little   How much help from another person does the patient currently need...   Help from Another: Moving to and from a bed to a chair (including a wheelchair)?: A Little   Help from Another: Need to walk in hospital room?: A Little   Help from Another: Climbing 3-5 steps with a railing?: A Little       AM-PAC Score:  Raw Score: 18   Approx Degree of Impairment: 46.58%   Standardized Score (AM-PAC Scale): 43.63   CMS Modifier (G-Code): CK    FUNCTIONAL ABILITY STATUS  Gait Assessment   Functional Mobility/Gait Assessment  Gait Assistance: Contact guard assist  Distance (ft): 3  Assistive Device: None  Pattern:  (step-through gait, decreased deshawn and gait speed)    Skilled Therapy Provided     Bed Mobility:  Rolling: SBA  Supine to sit: SBA with HOB elevated   Sit to supine: NT     Transfer Mobility:  Sit to stand: CGA, with increased time, verbal cues for hand placement   Stand to sit: CGA, with increased time, verbal cues for hand placement  Gait = CGA, verbal cues for posture    Therapist's Comments: Pt declined further activity secondary to energy conservation  in preparation for potential discharge home.    Exercise/Education Provided:  Bed mobility  Body mechanics  Energy conservation  Functional activity tolerated  Gait training  Neuromuscular re-educate  Posture  Strengthening  Transfer training    Patient End of Session: Up in chair;Needs met;Call light within reach;RN aware of session/findings;All patient questions and concerns addressed;Alarm set      Patient Evaluation Complexity Level:  History High - 3 or more personal factors and/or co-morbidities   Examination of body systems Moderate - addressing a total of 3 or more elements   Clinical Presentation  Moderate - Evolving   Clinical Decision Making Moderate Complexity

## 2024-07-23 NOTE — CM/SW NOTE
SW met with patient at bedside to discuss choice list for home health services. Patient is agreeable to using [facility] being that [facility] was unable to accept. Patient stated she has all necessary DME and will have support at home. Her friend plans to transport her home once she is discharged.     AVS updated and [facility] reserved in [facility].     SW will continue to follow for plan of care changes and remain available for any additional DC needs or concerns.     Rachell Rashid MSW, LSW  Discharge Planner   [phone]

## 2024-07-23 NOTE — PAYOR COMM NOTE
--------------  CONTINUED STAY REVIEW    Payor: [employer] POS/MCNP  Subscriber #:  [id number]  Authorization Number: [id number]    Admit date: 7/21/24  Admit time:  6:04 AM    7/22/24  Temp:  [97 °F (36.1 °C)-98 °F (36.7 °C)] 98 °F (36.7 °C)  Pulse:  [74-93] 82  Resp:  [16-18] 16  BP: (110-129)/(75-83) 115/81  SpO2:  [98 %-100 %] 100 %     Physical Exam:    Respiratory: Clear to auscultation bilaterally  Cardiovascular: S1, S2.  Abdomen: Soft  Neuro: No new focal deficits  Lab 07/21/24 0133 07/22/24  0550   WBC 13.3* 8.0   HGB 9.0* 7.1*   MCV 83.8 86.9   .0* 166.0      Lab 07/21/24 0133 07/21/24  1300 07/22/24  0550   GLU 93  --  77   BUN 45*  --  32*   CREATSERUM 0.99  --  0.69   CA 10.5*  --  9.7   ALB 3.1*  --   --    *  --  140   K 1.0* 2.1* 2.2*  2.2*     --  114*   CO2 20.0*  --  17.0*   ALKPHO 152*  --   --    AST 27  --   --    ALT 30  --   --    BILT 1.7*  --   --    TP 6.6  --   --       Lab 07/21/24  0133 07/21/24  1300   TROPHS 191* 206*      Medications:    potassium chloride  20 mEq Intravenous Once    magnesium sulfate  2 g Intravenous Once    DULoxetine  60 mg Oral Daily    metoprolol succinate ER  50 mg Oral 2x Daily(Beta Blocker)    heparin  5,000 Units Subcutaneous Q8H KHALIDA    potassium chloride  40 mEq Oral BID       Assessment & Plan:   # Hypokalemia  Cont. To replace and monitor closely     #Acidosis  Due to severe hypokalemia  monitor     # Chronic HFrEF, EF: 20-25%  Cont. Toprol  Holding diuretic  compensated     # Neuropathy  cymbalta     #L BRCA  Port in place  In remission     # Tropnin elevation of no clinical significance     MEDICATIONS ADMINISTERED IN LAST 1 DAY:  ascorbic acid (Vitamin C) tab 250 mg       Date Action Dose Route User    7/23/2024 0824 Given 250 mg Oral Lexi Brewster RN    7/22/2024 1630 Given 250 mg Oral Amber Trinidad, JULIETTE          DULoxetine (Cymbalta) DR cap 60 mg       Date Action Dose Route User    7/23/2024 0930 Given 60 mg Oral  Lexi Brewster RN          enoxaparin (Lovenox) 30 MG/0.3ML SUBQ injection 30 mg       Date Action Dose Route User    7/23/2024 0825 Given 30 mg Subcutaneous (Left Upper Abdomen) Lexi Brewster RN          HYDROcodone-acetaminophen (Norco) 5-325 MG per tab 1 tablet       Date Action Dose Route User    7/23/2024 1320 Given 1 tablet Oral Lexi Brewster RN          metoprolol succinate ER (Toprol XL) 24 hr tab 50 mg       Date Action Dose Route User    7/23/2024 0504 Given 50 mg Oral Mary Alarcon RN    7/22/2024 1704 Given 50 mg Oral Amber Trinidad RN          potassium chloride 20 mEq/100mL IVPB premix 20 mEq       Date Action Dose Route User    7/23/2024 0052 New Bag 20 mEq Intravenous Mary Alarcon RN          potassium chloride 40 mEq in 250mL sodium chloride 0.9% IVPB premix       Date Action Dose Route User    7/22/2024 2027 New Bag 40 mEq Intravenous Mary Alarcon RN          potassium chloride (Klor-Con M20) tab 40 mEq       Date Action Dose Route User    7/22/2024 2027 Given 40 mEq Oral Mary Alarcon RN          potassium chloride (Klor-Con M20) tab 40 mEq       Date Action Dose Route User    7/23/2024 0824 Given 40 mEq Oral Lexi Brewster RN          multivitamin with minerals (Thera M Plus) tab 1 tablet       Date Action Dose Route User    7/23/2024 0824 Given 1 tablet Oral Lexi Brewster RN    7/22/2024 1704 Given 1 tablet Oral Amber Trinidad RN          thiamine (Vitamin B1) tab 100 mg       Date Action Dose Route User    7/23/2024 0824 Given 100 mg Oral Lexi Brewster RN    7/22/2024 1704 Given 100 mg Oral Amber Trinidad RN            Vitals (last day)       Date/Time Temp Pulse Resp BP SpO2 Weight O2 Device O2 Flow Rate (L/min) Who    07/23/24 1210 97.4 °F (36.3 °C) 107 16 121/91 100 % -- None (Room air) -- AN    07/23/24 0830 97.6 °F (36.4 °C) 97 18 125/87 100 % -- None (Room air) -- AN    07/23/24 0524 97.3 °F (36.3 °C) 94 19 118/83 -- -- -- -- OG     07/23/24 0029 97.6 °F (36.4 °C) 87 12 120/83 100 % -- None (Room air) -- LM    07/22/24 2103 98.3 °F (36.8 °C) 90 16 121/88 100 % -- None (Room air) -- LM    07/22/24 1221 98 °F (36.7 °C) 82 16 115/81 100 % -- None (Room air) -- LV    07/22/24 0511 97 °F (36.1 °C) 79 16 120/83 100 % -- None (Room air) 0 L/min     07/22/24 0100 97.3 °F (36.3 °C) 74 16 115/81 100 % -- None (Room air) 0 L/min     07/22/24 0034 97.3 °F (36.3 °C) 76 16 110/76 100 % -- None (Room air) -- SG

## 2024-07-23 NOTE — DIETARY NOTE
[facility]   part of [facility]    NUTRITION ASSESSMENT    Pt meets severe malnutrition criteria at this time.    CRITERIA FOR MALNUTRITION DIAGNOSIS:  Criteria for severe malnutrition diagnosis: chronic illness related to wt loss greater than 7.5% in 3 months, energy intake less than 75% for greater than 1 month, body fat severe depletion, and muscle mass severe depletion      NUTRITION INTERVENTION:    RD nutrition Care Plan- Liberalized diet, Initiated ONS (oral nutritional supplements), Ordered multivitamin, Ordered thiamin, and Ordered Vitamin C  Meal and Snacks - Monitor and encourage adequate PO intake.   Medical Food Supplements - [employer] 1.0 Daily. Rationale/use for oral supplements discussed.  Nutrition Education - discussed with pt eating smaller more frequent meals and role of ONS and MVI, Thiamine and vit C . Pt/family receptive to education, no barriers noted. Handouts provided.   Vitamin and Mineral Supplements - adding Multivitamin with minerals, Vitamin C, and Thiamine        PATIENT STATUS: 07/22/24 low BMI 14.5 and at risk     7/23- pt eating minimal seems disinterested in eating would benefit for appetite stimulate. Also, recommend outpatient nutrition visits to help her with meal planning. Pt would benefit from ongoing frequent meals with high potassium foods (4-5 meals) + ONS and MVI with zinc, vitamin C and additional Thiamine 100mg per day.  Ordered pt grilled cheese sandwich for lunch yesterday with banana and pt ate half of grilled cheese and no banana. This morning at RD visit pt had not ordered breakfast yet and it was almost 10am. RD ordered yogurt parfait and Ensure Plus High protein .  Also, provided her with high potassium food list.     44 year old female admitted with weakness, dizziness hypokalemia 1.7.  pt with h/o invasive ductal carcinoma which she received chemo radiation in April 2024,  lupus, Sjogrens, heart failure.    Met with pt at bedside and she reports  eating 2 meals per day and drinking Protein shake at home.  Pt with severe muscle and fat depletion through out body.  She has multiple wounds/sores on legs that she reports she picks so they are not healing.       ANTHROPOMETRICS:  Ht: 162.6 cm (5' 4\")  Wt: 38.3 kg (84 lb 8 oz).   BMI: Body mass index is 14.5 kg/m².  IBW: 54.5 kg      WEIGHT HISTORY:   Weight loss: Yes, Severe Wt loss of 30  lbs, 26%, over 2 months     Wt Readings from Last 10 Encounters:   07/21/24 38.3 kg (84 lb 8 oz)   05/24/24 51.7 kg (114 lb)   04/10/24 51.7 kg (114 lb)   03/29/24 52.1 kg (114 lb 12.8 oz)   03/11/24 51.8 kg (114 lb 3.2 oz)   01/02/24 47.6 kg (104 lb 15 oz)   11/13/23 54.4 kg (120 lb)   12/16/21 61.2 kg (135 lb)   09/22/20 61.7 kg (136 lb)   08/15/20 63.5 kg (140 lb)        NUTRITION:  Diet:       Procedures    Cardiac diet Cardiac; Is Patient on Accuchecks? No      Food Allergies: No  Cultural/Ethnic/Religion Preferences Addressed: Yes    Percent Meals Eaten (last 3 days)       Date/Time Percent Meals Eaten (%)    07/21/24 1155 50 %            GI system review: WNL Last BM: 7/22  Skin and wounds: non healing wounds on legs     NUTRITION RELATED PHYSICAL FINDINGS:     1. Body Fat/Muscle Mass: severe depletion body fat Orbital fat pad, Buccal fat pad, Triceps, and Midaxillary line and severe muscle depletion Temple region, Clavicle region, Shoulder/Acromion process, Scapula region, Dorsal hand region, Thigh region, and Calf region     2. Fluid Accumulation: none     NUTRITION PRESCRIPTION: 54.5kg  Calories: 3978-8643 calories/day (30-35 kcal/kg)  Protein: 65-82 grams protein/day (1.2-1.5 grams protein per kg)  Fluid: ~1 ml/kcal or per MD discretion    NUTRITION DIAGNOSIS/PROBLEM:  Malnutrition related to physiological causes, insufficient appetite resulting in inadequate nutrition intake, and increased nutritional demands for healing as evidenced by documented/reported insufficient oral intake, documented/reported unintentional  weight loss, loss of fat mass, loss of muscle mass, and low BMI      MONITOR AND EVALUATE/NUTRITION GOALS:  PO intake of 75% of meals TID - Not met, Continues  PO intake of 75% of oral nutrition supplement/s - Not met, Continues  Gradual weight gain of at least .5 lbs per week - Ongoing      MEDICATIONS:  Mg Sulfate 2g ,  Kcl 40mEQ bid oral, Kcl 40 mEq + 20mEq     LABS:  K+ 3.7    Pt is at High nutrition risk    Mercedez Greer RD,LDN  Clinical Dietitian  [id number]

## 2024-07-24 NOTE — CDS QUERY
Dear Doctor Ramos,     Please provide a nutritional status, if known, related to the clinical information below     [ x  ] Severe Protein-Calorie Malnutrition  [   ]  Other Please specify :______________      Clinical Indicators: 7/21 ED 44 Y/F- severe fatigue, dizziness. K was 1.7 last month, did not come for treatment    7/22 Dietary PN- Pt meets severe malnutrition criteria at this time.     CRITERIA FOR MALNUTRITION DIAGNOSIS:  Criteria for severe malnutrition diagnosis: chronic illness related to wt loss greater than 7.5% in 3 months, energy intake less than 75% for greater than 1 month, body fat severe depletion, and muscle mass severe depletion    ANTHROPOMETRICS:  Ht: 162.6 cm (5' 4\") Wt: 38.3 kg (84 lb 8 oz). BMI: Body mass index is 14.5 kg/m².IBW: 54.5 kg        WEIGHT HISTORY:   Weight loss: Yes, Severe Wt loss of 30  lbs, 26%, over 2 months   Skin and wounds: non healing wounds on legs      NUTRITION RELATED PHYSICAL FINDINGS:  1. Body Fat/Muscle Mass: severe depletion body fat Orbital fat pad, Buccal fat pad, Triceps, and Midaxillary line and severe muscle depletion Temple region, Clavicle region, Shoulder/Acromion process, Scapula region, Dorsal hand region, Thigh region, and Calf region  2. Fluid Accumulation: none    NUTRITION DIAGNOSIS/PROBLEM:  Malnutrition related to physiological causes, insufficient appetite resulting in inadequate nutrition intake, and increased nutritional demands for healing as evidenced by documented/reported insufficient oral intake, documented/reported unintentional weight loss, loss of fat mass, loss of muscle mass, and low BMI     Pt is at High nutrition risk       Possible Risk Factors: BMI- 14.50 kg/m2, weight loss    Treatment: Dietary consult, dietary nutritional supplements daily, I/Os           Use of terms such as suspected, possible, or probable (associated with a specific diagnosis that is being evaluated, monitored, or treated as if it exists) are acceptable  and can be coded in the inpatient setting, when documented at the time of discharge.     Please add any additional documentation to your progress note and continue to document this through discharge.    For questions, please contact clinical  Elizabeth Rosa [phone].  Thank you.    THIS FORM IS A PERMANENT PART OF THE MEDICAL RECORD

## 2024-07-25 NOTE — DISCHARGE SUMMARY
[facility]     Alina Aceves Patient Status:  Inpatient    1980 MRN [id number]   Location [facility] 4NW-A Attending Evelio Brewer MD   Hosp Day # 2 PCP HOLLY IBARRA     Date of Admission:  2024  Date of Discharge:   2024    Discharge Disposition: Home or Self Care    Discharge Diagnosis:    # Hypokalemia  #Acidosis  # Chronic HFrEF, EF: 20-25%  # Neuropathy  #L BRCA  # Tropnin elevation of no clinical significance  # Severe Protein-Calorie Malnutrition          History of Present Illness:    Alina Aceves is a 44 year old female with invasive ductal carcinoma of the left breast, lupus, Sjogren's, heart failure reduced ejection fraction.  Patient had completed chemoradiation back in April of this year.  Patient has had poor appetite and was told at the start of the month that her potassium was 1.7 which will go hospital to be admitted but did not have any watch her kids and never went.  Patient has been increasingly weak over the last couple weeks having some dizziness feels like she is going to pass out but denies syncope.  No fevers, chills, nausea, vomiting, diarrhea or constipation.  Patient denies any palpitations, muscle twitches or cramping.       Brief Synopsis:    The patient was admitted due to hypokalemia.  This is felt to be likely secondary to medications.  Diuretics were held.  Potassium supplements were started with steady improvement in her potassium.  She was eventually stable for discharge.  She will remain on potassium supplementation and have repeat abortal as outpatient to monitor.  She is to follow-up with primary care doctor she is also to follow-up with a cardiologist regarding further management of her cardiomyopathy.    All diagnosis' and recommendations discussed with patient and/or family in detail.      Lace+ Score: 77  59-90 High Risk  29-58 Medium Risk  0-28   Low Risk       TCM Follow-Up Recommendation:  LACE > 58: High Risk of  readmission after discharge from the hospital.      Discharge Medication List:     Discharge Medications        START taking these medications        Instructions Prescription details   Potassium Chloride ER 10 MEQ Tbcr      Take 1 tablet (10 mEq total) by mouth daily.   Quantity: 30 tablet  Refills: 0            CONTINUE taking these medications        Instructions Prescription details   benzonatate 200 MG Caps  Commonly known as: Tessalon      Take 1 capsule (200 mg total) by mouth 3 (three) times daily as needed for cough.   Quantity: 30 capsule  Refills: 0     DULoxetine 30 MG Cpep  Commonly known as: Cymbalta      Take 2 capsules (60 mg total) by mouth daily.   Refills: 0     HYDROcodone-acetaminophen 5-325 MG Tabs  Commonly known as: Norco      Take 1 tablet by mouth every 6 (six) hours as needed for Pain.   Quantity: 10 tablet  Refills: 0     lidocaine-prilocaine 2.5-2.5 % Crea  Commonly known as: Emla      APPLY SMALL AMOUNT OVER PORT PRIOR TO ACCESS   Refills: 0     metoprolol succinate ER 50 MG Tb24  Commonly known as: Toprol XL      Take 1 tablet (50 mg total) by mouth 2x Daily(Beta Blocker).   Quantity: 180 tablet  Refills: 3     ondansetron 4 MG Tbdp  Commonly known as: Zofran-ODT      Take 1 tablet (4 mg total) by mouth every 4 (four) hours as needed for Nausea.   Quantity: 10 tablet  Refills: 0     prochlorperazine 10 mg tablet  Commonly known as: Compazine      Take 1 tablet (10 mg total) by mouth every 6 (six) hours as needed for Nausea or vomiting.   Refills: 0     zolpidem 10 MG Tabs  Commonly known as: Ambien      Take 1 tablet (10 mg total) by mouth nightly as needed for Sleep.   Refills: 0            STOP taking these medications      Potassium Citrate ER 15 MEQ (1620 MG) Tbcr        torsemide 10 MG Tabs  Commonly known as: DEMADEX                  Where to Get Your Medications        These medications were sent to [id number] - [place], [place] - [facility] [address] AT [facility]  [address], [phone], [phone]  [address], [facility] [address]      Phone: [phone]   Potassium Chloride ER 10 MEQ Tbcr         [facility] reviewed: yes    Follow-up appointment:   Stephon Villeda [facility] [address] [phone]    Follow up in 1 week(s)  Follow up      Vital signs:       Physical Exam:    General: No acute distress   Lungs: clear to auscultation  Cardiovascular: S1, S2  Abdomen: Soft      -----------------------------------------------------------------------------------------------  PATIENT DISCHARGE INSTRUCTIONS: See electronic chart    Octavio Beasley MD    Total minutes spent on discharge plannin      The 21st Century Cures Act makes medical notes like these available to patients in the interest of transparency. Please be advised this is a medical document. Medical documents are intended to carry relevant information, facts as evident, and the clinical opinion of the practitioner. The medical note is intended as peer to peer communication and may appear blunt or direct. It is written in medical language and may contain abbreviations or verbiage that are unfamiliar.

## 2024-08-14 NOTE — PROGRESS NOTES
CHIEF COMPLAINT:     Chief Complaint   Patient presents with    Wound Recheck     Pt here for follow up arrives with hydrocolloid dressing to wound. New wounds noted below breast to back, started as blisters, believes it was shingles She has been applying antibiotic ointment and telfa pads     HPI:   Information obtained from Patient and chart  5-24-24 INITIAL:  43 year old female with Past medical history invasive ductal carcinoma of left breast (dr kaiser camara) currently undergoing radiation therapy (dr kobi bran), leukocytoclastic vasculitis (treated with clobetasol ointment by dr. Walton) iron deficiency anemia, HFrEF (Ariane Putnam), lupus, Sjogren's syndrome.  Patient was recently admitted for oral sores and n/v and elctrolyte imbalance and hypokalemia.    Earlier this week she was seen in urgent care for uti symptoms and hematuria. She was treated with cefadroxil and recommended to see primary md. dr bran recommended patient to start yary, she states she is not regular with it. Patient has seen nutrition/dietician.  Patient is active with [facility].  Earlier this week she had to cancel physical therapy due to wound pain.  on 5-11 patient was started on potassium citrate x 10 days and patient was to repeat labs, which she did on 5-21 but her potassium was still 2.9. she states she is taking the potassium.  I will udpate ariane bernardhardik and I asked the patient to contact her as they may want her to increase her potassium.  Patient states that initially she was utilizing aquaphor but then developed an itchy red rash, so now has just been leaving it dry or using aveeno. She is able to tolerate very minimal cleansing of the area.  I discussed with her how to do it as tolerated in the shower with anasept.  Will utilize hydrogel cool sheets, patient to return next week. There is not any s/s of infection    8-15-24 patient returns. I have not seen her in the last 3 mo. Noted [facility] note from  July that was assisting her with supportive resources and financial assistance. She is  and has an 9 yo at home and 19 yo away at college, her sister lives 2 hours away and remaining family lives out of state, mom is in [place].  She saw dr bran (radiation oncology) in June 2024 and it was documented that she had remaining areas of open lesions to the breast as well as open lesions due to vasculitis to her bilateral lower extremities.  She recently was admitted from 7-21 to 7-24 for hypokalemia. She has a f/u with dr. Gant sept 13.  She has been dressing areas with over the counter abx ointment. She has a let flank scattered ulcerations in a dermatome pattern that patient states started as vessicles, she was treated with valtrax by urgent care.  She also indeed has lower extremity ulcerations on the right knee and left lateral tibia.  The breast wound has deterorated and is significantly deeper with necrosis. No malodor.  There was a small vessel clip that was spitting that I removed with a forceps. We discussed this small clip could be the reason the wound opened up, but I am also concerned about recurrence.  Will have her dress with honey gauze to the breast and hydrogel sheets to all other areas (flank and legs). Will order her dressings. Patient states her appetite is getting better lately. No acute s/s of infection.    MEDICATIONS:     Current Outpatient Medications:     Potassium Chloride ER 10 MEQ Oral Tab CR, Take 1 tablet (10 mEq total) by mouth daily., Disp: 30 tablet, Rfl: 0    metoprolol succinate ER 50 MG Oral Tablet 24 Hr, Take 1 tablet (50 mg total) by mouth 2x Daily(Beta Blocker)., Disp: 180 tablet, Rfl: 3    DULoxetine 30 MG Oral Cap DR Particles, Take 2 capsules (60 mg total) by mouth daily., Disp: , Rfl:     HYDROcodone-acetaminophen 5-325 MG Oral Tab, Take 1 tablet by mouth every 6 (six) hours as needed for Pain. (Patient not taking: Reported on 8/15/2024), Disp: 10 tablet, Rfl: 0     benzonatate 200 MG Oral Cap, Take 1 capsule (200 mg total) by mouth 3 (three) times daily as needed for cough. (Patient not taking: Reported on 8/15/2024), Disp: 30 capsule, Rfl: 0    prochlorperazine (COMPAZINE) 10 mg tablet, Take 1 tablet (10 mg total) by mouth every 6 (six) hours as needed for Nausea or vomiting. (Patient not taking: Reported on 8/15/2024), Disp: , Rfl:     ondansetron 4 MG Oral Tablet Dispersible, Take 1 tablet (4 mg total) by mouth every 4 (four) hours as needed for Nausea. (Patient not taking: Reported on 8/15/2024), Disp: 10 tablet, Rfl: 0    lidocaine-prilocaine 2.5-2.5 % External Cream, APPLY SMALL AMOUNT OVER PORT PRIOR TO ACCESS, Disp: , Rfl:     zolpidem 10 MG Oral Tab, Take 1 tablet (10 mg total) by mouth nightly as needed for Sleep., Disp: , Rfl:   ALLERGIES:     Allergies   Allergen Reactions    Bactrim [Sulfamethoxazole W/Trimethoprim] RASH    Adhesive Tape RASH      REVIEW OF SYSTEMS:   This information was obtained from the patient/family and chart.    See HPI for pertinent positives, otherwise 10 pt ROS negative.  HISTORY:   Past medical, surgical, family and social history updated where appropriate.      PHYSICAL EXAM:     Vitals:    08/15/24 1100   BP: 119/81   Pulse: 91   Resp: 14   Temp: 97.7 °F (36.5 °C)        Estimated body mass index is 14.5 kg/m² as calculated from the following:    Height as of 7/21/24: 64\".    Weight as of 7/21/24: 84 lb 8 oz (38.3 kg).   POC Glucose   Date Value Ref Range Status   12/19/2023 72 70 - 99 mg/dL Final   12/18/2023 125 (H) 70 - 99 mg/dL Final   12/18/2023 84 70 - 99 mg/dL Final       Vital signs reviewed.Appears stated age, well groomed.    Constitutional:  Bp wnl. Pulse Regular and wnl for patient. Respirations easy and unlabored. Temperature wnl. Patient is very thin in appearance. Appearance neat and clean. Appears in no acute distress.   Lower extremities  DP/PT palpable bilaterally with strong pulsatile signals at the dp/pt/distal  hallux.  Extremities free of varicosities, edema. Capillary refill < 3 seconds. Digits are warm. toenails are wnl for color, thickness and hygeine. Skin hydration wnl. + hairgrowth on legs.     Musculoskeletal:  Patient ambulation is stable  Integumentary:  refer to wound characteristics and images   Psychiatric:  Judgment and insight intact. Alert and oriented times 3. No evidence of depression, anxiety, or agitation. Calm, cooperative, and communicative. Appropriate interactions and affect.  DIAGNOSTICS:     Lab Results   Component Value Date    BUN 32 (H) 07/22/2024    CREATSERUM 0.69 07/22/2024    ALB 3.1 (L) 07/21/2024    TP 6.6 07/21/2024       WOUND ASSESSMENT:     Wound 05/24/24 #1 Radiation therapy Breast Left (Active)   Date First Assessed/Time First Assessed: 05/24/24 0857    Wound Number (Wound Clinic Only): #1 Radiation therapy  Primary Wound Type: Other (comment)  Location: Breast  Wound Location Orientation: Left      Assessments 5/24/2024  8:59 AM 8/15/2024  2:00 PM   Wound Image        Drainage Amount Large Scant   Drainage Description Serous;Yellow Serous;Yellow   Wound Length (cm) 15.5 cm 7.1 cm   Wound Width (cm) 19 cm 1.4 cm   Wound Surface Area (cm^2) 294.5 cm^2 9.94 cm^2   Wound Depth (cm) 0.1 cm 1 cm   Wound Volume (cm^3) 29.45 cm^3 9.94 cm^3   Wound Healing % -- 66   Margins Well-defined edges Well-defined edges   Non-staged Wound Description Full thickness Full thickness   Alfreda-wound Assessment Moist;Edema Excoriated;Fragile   Wound Granulation Tissue Firm;Pink Pink;Firm   Wound Bed Granulation (%) 20 % 5 %   Wound Bed Epithelium (%) 50 % 40 %   Wound Bed Slough (%) 30 % 55 %   Wound Odor None None   Shape -- clustered       No associated orders.       Wound 08/15/24 #2 Flank Left;Anterior;Posterior (Active)   Date First Assessed/Time First Assessed: 08/15/24 1406    Wound Number (Wound Clinic Only): #2  Primary Wound Type: (c) Other (comment)  Location: Flank  Wound Location Orientation:  Left;Anterior;Posterior      Assessments 8/15/2024  2:07 PM   Wound Image      Closure Not approximated   Drainage Amount Scant   Drainage Description Serous;Yellow   Wound Length (cm) 2.5 cm   Wound Width (cm) 32.1 cm   Wound Surface Area (cm^2) 80.25 cm^2   Wound Depth (cm) 0.2 cm   Wound Volume (cm^3) 16.05 cm^3   Alfreda-wound Assessment Painful;Pink;Fragile   Wound Granulation Tissue Pink;Spongy   Wound Bed Granulation (%) 20 %   Wound Bed Epithelium (%) 50 %   Wound Bed Slough (%) 30 %   Wound Odor None   Tunneling? No   Undermining? No   Sinus Tracts? No       No associated orders.       Wound 08/15/24 #3 Knee Right (Active)   Date First Assessed/Time First Assessed: 08/15/24 1417    Wound Number (Wound Clinic Only): #3  Primary Wound Type: Auto-immune  Location: Knee  Wound Location Orientation: Right      Assessments 8/15/2024  2:20 PM   Wound Image     Drainage Amount Unable to assess   Wound Length (cm) 5 cm   Wound Width (cm) 0.9 cm   Wound Surface Area (cm^2) 4.5 cm^2   Wound Depth (cm) 0.1 cm   Wound Volume (cm^3) 0.45 cm^3   Alfreda-wound Assessment Dry;Blanchable erythema   Wound Granulation Tissue Firm   Wound Bed Granulation (%) 5 %   Wound Bed Epithelium (%) 30 %   Shape Clustered, 65% scab   Tunneling? No   Undermining? No       No associated orders.       Wound 08/15/24 #4 Leg Left;Anterior (Active)   Date First Assessed/Time First Assessed: 08/15/24 1418    Wound Number (Wound Clinic Only): #4  Primary Wound Type: Auto-immune  Location: Leg  Wound Location Orientation: Left;Anterior      Assessments 8/15/2024  2:21 PM   Wound Image      Drainage Amount Unable to assess   Wound Length (cm) 21.5 cm   Wound Width (cm) 0.9 cm   Wound Surface Area (cm^2) 19.35 cm^2   Wound Depth (cm) 0.2 cm   Wound Volume (cm^3) 3.87 cm^3   Margins Well-defined edges   Non-staged Wound Description Full thickness   Alfreda-wound Assessment Dry;Clean   Wound Granulation Tissue Red;Firm   Wound Bed Granulation (%) 20 %   Wound  Bed Epithelium (%) 60 %   Shape 20% scab   Tunneling? No   Undermining? No   Sinus Tracts? No       No associated orders.          ASSESSMENT AND PLAN:    1. 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 Adverse effect of radiation, subsequent encounter    3. SLE (systemic lupus erythematosus related syndrome) (HCC)    4. Malignant neoplasm of left breast in female, estrogen receptor negative, unspecified site of breast (HCC)    5. 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 Leukocytoclastic vasculitis (HCC)          Risks, benefits, and alternatives of current treatment plan discussed in detail.  Questions and concerns addressed. Red flags to RTC or ED reviewed.  Patient (or parent) agrees to plan.      NOTE TO PATIENT: The 21st Century Cures Act makes clinical notes like these available to patients in the interest of transparency. Clinical notes are medical documents used by physicians and care providers to communicate with each other. These documents include medical language and terminology, abbreviations, and treatment information that may sound technical and at times possibly unfamiliar. In addition, at times, the verbiage may appear blunt or direct. These documents are one tool providers use to communicate relevant information and clinical opinions of the care providers in a way that allows common understanding of the clinical context.   I spent 50 minutes with the patient. This time included:    preparing to see the patient (eg, review notes and recent diagnostics),  seeing the patient, obtaining and/or reviewing separately obtained history, performing a medically appropriate examination and/or evaluation, counseling and educating the patient, documenting in the record, communication with dr bill and dr camara  DISCHARGE:      Patient Instructions   Please return:2 week-call if you don't receive the dressings    Check in with   Maximiliano and  Dr. Gant regarding your breast wound opening  Check in with pharmacy regarding the pill to increase your appetite    Patient discharge and wound care instructions  Alina Aceves  8/15/2024        Soak the area with ANASEPT gauze.    You may shower and cleanse area with mild soap and water, try to \"scrub\" lightly with a gauze to remove the build up of draiange  rinse wound with ANASEPT cleanser,   dab dry with gauze and apply     Left breast:  apply a piece of honey gauze into the wound and cover with bordered silicone foam  Left side and lower extremities:  THE HYDROGEL SHEET DRESSING.  (If you like this dressing please let us know and we can order more for you)    Nutrition and blood sugar control:  Focus on the following:  Protein: Meats, beans, eggs, milk and yogurt particularly Greek yogurt), tofu, soy nuts, soy protein products (Follow the protein handout in your welcome folder)  Vitamin C: Citrus fruits and juices, strawberries, tomatoes, tomato juice, peppers, baked potatoes, spinach, broccoli, cauliflower, [place] sprouts, cabbage  Vitamin A: Dark green, leafy vegetables, orange or yellow vegetables, cantaloupe, fortified dairy products, liver, fortified cereals  Zinc: Fortified cereals, red meats, seafood  Consider supplementing with Vitor by webtide. It can be purchased on amazon, [employer] website, or local pharmacy may be able to order it for you.  (These are essential branch chain amino acids that help with tissue building and wound healing).   When your blood sugar is consistently elevated greater than 180 your body can't heal or fight infection.     Concerns:  Signs of infection may include the following:  Increase in redness  Red \"streaks\" from wound  Increase in swelling  Fever  Unusual odor  Change in the amount of wound drainage     Should you experience any significant changes in your wound(s) or have any questions regarding your home care instructions please contact the wound  [facility] @ [phone] If after regular business hours, please call your family doctor or local emergency room. The treatment plan has been discussed at length between you and your provider. Follow all instructions carefully, it is very important. If you do not follow all instructions you are at risk of your wound not healing, infection, possible loss of limb and even loss of life.              Anita Smallwood FNP-C, CWCN-AP, CFCN, CSWS, WCC, DWC  8/15/2024

## 2024-08-15 ENCOUNTER — OFFICE VISIT (OUTPATIENT)
Dept: WOUND CARE | Facility: HOSPITAL | Age: 44
End: 2024-08-15
Attending: NURSE PRACTITIONER
Payer: COMMERCIAL

## 2024-08-15 VITALS
RESPIRATION RATE: 14 BRPM | SYSTOLIC BLOOD PRESSURE: 119 MMHG | TEMPERATURE: 98 F | HEART RATE: 91 BPM | DIASTOLIC BLOOD PRESSURE: 81 MMHG

## 2024-08-15 DIAGNOSIS — Z17.1 MALIGNANT NEOPLASM OF LEFT BREAST IN FEMALE, ESTROGEN RECEPTOR NEGATIVE, UNSPECIFIED SITE OF BREAST (HCC): ICD-10-CM

## 2024-08-15 DIAGNOSIS — C50.912 MALIGNANT NEOPLASM OF LEFT BREAST IN FEMALE, ESTROGEN RECEPTOR NEGATIVE, UNSPECIFIED SITE OF BREAST (HCC): ICD-10-CM

## 2024-08-15 DIAGNOSIS — T66.XXXD ADVERSE EFFECT OF RADIATION, SUBSEQUENT ENCOUNTER: ICD-10-CM

## 2024-08-15 DIAGNOSIS — L98.492 NON-PRESSURE CHRONIC ULCER OF SKIN OF OTHER SITES WITH FAT LAYER EXPOSED (HCC): Primary | ICD-10-CM

## 2024-08-15 DIAGNOSIS — M32.9 SLE (SYSTEMIC LUPUS ERYTHEMATOSUS RELATED SYNDROME) (HCC): ICD-10-CM

## 2024-08-15 DIAGNOSIS — L97.922 NON-PRESSURE CHRONIC ULCER OF LEFT LOWER LEG WITH FAT LAYER EXPOSED (HCC): ICD-10-CM

## 2024-08-15 DIAGNOSIS — M31.0 LEUKOCYTOCLASTIC VASCULITIS (HCC): ICD-10-CM

## 2024-08-15 PROCEDURE — 99215 OFFICE O/P EST HI 40 MIN: CPT | Performed by: NURSE PRACTITIONER

## 2024-08-15 NOTE — PROGRESS NOTES
.Weekly Wound Education Note    Teaching Provided To: Patient  Training Topics: Dressing;Cleasing and general instructions;Discharge instructions  Training Method: Explain/Verbal;Written  Training Response: Patient responds and understands        Notes: New wounds to left flank, left leg and right knee. Start hydrogel sheets to new wounds. Honey gel, honey gauze, and silicone vac drape applied in clinic. Dressing changed to honey gauze and bordered foam to breast wound. Will order supplies this visit.

## 2024-08-15 NOTE — PATIENT INSTRUCTIONS
Please return:2 week-call if you don't receive the dressings    Check in with Dr. Viera and  Dr. Gant regarding your breast wound opening  Check in with pharmacy regarding the pill to increase your appetite    Patient discharge and wound care instructions  lAina Aceves  8/15/2024        Soak the area with ANASEPT gauze.    You may shower and cleanse area with mild soap and water, try to \"scrub\" lightly with a gauze to remove the build up of draiange  rinse wound with ANASEPT cleanser,   dab dry with gauze and apply     Left breast:  apply a piece of honey gauze into the wound and cover with bordered silicone foam  Left side and lower extremities:  THE HYDROGEL SHEET DRESSING.  (If you like this dressing please let us know and we can order more for you)    Nutrition and blood sugar control:  Focus on the following:  Protein: Meats, beans, eggs, milk and yogurt particularly Greek yogurt), tofu, soy nuts, soy protein products (Follow the protein handout in your welcome folder)  Vitamin C: Citrus fruits and juices, strawberries, tomatoes, tomato juice, peppers, baked potatoes, spinach, broccoli, cauliflower, [place] sprouts, cabbage  Vitamin A: Dark green, leafy vegetables, orange or yellow vegetables, cantaloupe, fortified dairy products, liver, fortified cereals  Zinc: Fortified cereals, red meats, seafood  Consider supplementing with Vitor by [employer]. It can be purchased on amazon, [employer] website, or local pharmacy may be able to order it for you.  (These are essential branch chain amino acids that help with tissue building and wound healing).   When your blood sugar is consistently elevated greater than 180 your body can't heal or fight infection.     Concerns:  Signs of infection may include the following:  Increase in redness  Red \"streaks\" from wound  Increase in swelling  Fever  Unusual odor  Change in the amount of wound drainage     Should you experience any significant changes in your wound(s) or  have any questions regarding your home care instructions please contact the wound center [facility] @ [phone] If after regular business hours, please call your family doctor or local emergency room. The treatment plan has been discussed at length between you and your provider. Follow all instructions carefully, it is very important. If you do not follow all instructions you are at risk of your wound not healing, infection, possible loss of limb and even loss of life.

## 2024-08-28 NOTE — PROGRESS NOTES
CHIEF COMPLAINT:     Chief Complaint   Patient presents with    Wound Care     Patients is here for a follow up. Patients denies any concern      HPI:   Information obtained from Patient and chart  5-24-24 INITIAL:  43 year old female with Past medical history invasive ductal carcinoma of left breast (dr kaiser camara) currently undergoing radiation therapy (dr kobi bran), leukocytoclastic vasculitis (treated with clobetasol ointment by dr. Walton) iron deficiency anemia, HFrEF (Ariane Putnam), lupus, Sjogren's syndrome.  Patient was recently admitted for oral sores and n/v and elctrolyte imbalance and hypokalemia.    Earlier this week she was seen in urgent care for uti symptoms and hematuria. She was treated with cefadroxil and recommended to see primary md. dr bran recommended patient to start yary, she states she is not regular with it. Patient has seen nutrition/dietician.  Patient is active with [facility].  Earlier this week she had to cancel physical therapy due to wound pain.  on 5-11 patient was started on potassium citrate x 10 days and patient was to repeat labs, which she did on 5-21 but her potassium was still 2.9. she states she is taking the potassium.  I will udpate ariane putnam and I asked the patient to contact her as they may want her to increase her potassium.  Patient states that initially she was utilizing aquaphor but then developed an itchy red rash, so now has just been leaving it dry or using aveeno. She is able to tolerate very minimal cleansing of the area.  I discussed with her how to do it as tolerated in the shower with anasept.  Will utilize hydrogel cool sheets, patient to return next week. There is not any s/s of infection    8-15-24 patient returns. I have not seen her in the last 3 mo. Noted [facility] oncology msw note from July that was assisting her with supportive resources and financial assistance. She is  and has an 9 yo at home and 21 yo away at college,  her sister lives 2 hours away and remaining family lives out of state, mom is in [place].  She saw dr bran (radiation oncology) in June 2024 and it was documented that she had remaining areas of open lesions to the breast as well as open lesions due to vasculitis to her bilateral lower extremities.  She recently was admitted from 7-21 to 7-24 for hypokalemia. She has a f/u with dr. Camara sept 13.  She has been dressing areas with over the counter abx ointment. She has a let flank scattered ulcerations in a dermatome pattern that patient states started as vessicles, she was treated with valtrax by urgent care.  She also indeed has lower extremity ulcerations on the right knee and left lateral tibia.  The breast wound has deterorated and is significantly deeper with necrosis. No malodor.  There was a small vessel clip that was spitting that I removed with a forceps. We discussed this small clip could be the reason the wound opened up, but I am also concerned about recurrence.  Will have her dress with honey gauze to the breast and hydrogel sheets to all other areas (flank and legs). Will order her dressings. Patient states her appetite is getting better lately. No acute s/s of infection.    8-29-24 patient returns.  She did see surgeon, dr pinedo, and mri of the breast (scheduled for sept 16), her appointment with dr camara is sept 13.  I did reach out to dr pinedo via [employer] chat.  Per patient dr. Pinedo d/w her taking her to the operating room to clean the wound and biopsy.  I let her know that I would agree with this as patient is too sensitive to do in clinic debridements.  She has been dressing the wound with honey gauze. We ordered dressings for her lower extremity wounds (hydrogel) received, she did receive them.  The breast wound has 3 spitting staples again.  Will have patient pack with vashe 2-3 x a day and I will see her again before her mri to remove any further potential spitting clips.  Patient states she is  eating better.  We discussed keeping her skin on lower legs moisturized.     MEDICATIONS:     Current Outpatient Medications:     Potassium Chloride ER 10 MEQ Oral Tab CR, Take 1 tablet (10 mEq total) by mouth daily., Disp: 30 tablet, Rfl: 0    HYDROcodone-acetaminophen 5-325 MG Oral Tab, Take 1 tablet by mouth every 6 (six) hours as needed for Pain. (Patient not taking: Reported on 8/15/2024), Disp: 10 tablet, Rfl: 0    benzonatate 200 MG Oral Cap, Take 1 capsule (200 mg total) by mouth 3 (three) times daily as needed for cough. (Patient not taking: Reported on 8/15/2024), Disp: 30 capsule, Rfl: 0    prochlorperazine (COMPAZINE) 10 mg tablet, Take 1 tablet (10 mg total) by mouth every 6 (six) hours as needed for Nausea or vomiting. (Patient not taking: Reported on 8/15/2024), Disp: , Rfl:     metoprolol succinate ER 50 MG Oral Tablet 24 Hr, Take 1 tablet (50 mg total) by mouth 2x Daily(Beta Blocker)., Disp: 180 tablet, Rfl: 3    ondansetron 4 MG Oral Tablet Dispersible, Take 1 tablet (4 mg total) by mouth every 4 (four) hours as needed for Nausea. (Patient not taking: Reported on 8/15/2024), Disp: 10 tablet, Rfl: 0    DULoxetine 30 MG Oral Cap DR Particles, Take 2 capsules (60 mg total) by mouth daily., Disp: , Rfl:     lidocaine-prilocaine 2.5-2.5 % External Cream, APPLY SMALL AMOUNT OVER PORT PRIOR TO ACCESS, Disp: , Rfl:     zolpidem 10 MG Oral Tab, Take 1 tablet (10 mg total) by mouth nightly as needed for Sleep., Disp: , Rfl:   ALLERGIES:     Allergies   Allergen Reactions    Bactrim [Sulfamethoxazole W/Trimethoprim] RASH    Adhesive Tape RASH      REVIEW OF SYSTEMS:   This information was obtained from the patient/family and chart.    See HPI for pertinent positives, otherwise 10 pt ROS negative.  HISTORY:   Past medical, surgical, family and social history updated where appropriate.      PHYSICAL EXAM:     Vitals:    08/29/24 1047   BP: 107/68   Pulse: 103   Resp: 16   Temp: 97.9 °F (36.6 °C)     Estimated  body mass index is 14.5 kg/m² as calculated from the following:    Height as of 7/21/24: 64\".    Weight as of 7/21/24: 84 lb 8 oz (38.3 kg).   POC Glucose   Date Value Ref Range Status   12/19/2023 72 70 - 99 mg/dL Final   12/18/2023 125 (H) 70 - 99 mg/dL Final   12/18/2023 84 70 - 99 mg/dL Final       Vital signs reviewed.Appears stated age, well groomed.    Constitutional:  Bp wnl. Pulse Regular and wnl for patient. Respirations easy and unlabored. Temperature wnl. Patient is very thin in appearance. Appearance neat and clean. Appears in no acute distress.   Lower extremities  DP/PT palpable bilaterally.  Extremities free of varicosities, edema, ble with petechia on lower legs and feet. Capillary refill < 3 seconds. Digits are warm. toenails are wnl for color, thickness and hygeine. Skin hydration wnl. + hairgrowth on legs.     Musculoskeletal:  Patient ambulation is stable  Integumentary:  refer to wound characteristics and images   Psychiatric:  Judgment and insight intact. Alert and oriented times 3. No evidence of depression, anxiety, or agitation. Calm, cooperative, and communicative. Appropriate interactions and affect.  DIAGNOSTICS:     Lab Results   Component Value Date    BUN 32 (H) 07/22/2024    CREATSERUM 0.69 07/22/2024    ALB 3.1 (L) 07/21/2024    TP 6.6 07/21/2024       WOUND ASSESSMENT:     Wound 05/24/24 #1 Radiation therapy Breast Left (Active)   Date First Assessed/Time First Assessed: 05/24/24 0857    Wound Number (Wound Clinic Only): #1 Radiation therapy  Primary Wound Type: Other (comment)  Location: Breast  Wound Location Orientation: Left      Assessments 5/24/2024  8:59 AM 8/29/2024 10:52 AM   Wound Image        Drainage Amount Large Scant   Drainage Description Serous;Yellow Serous;Yellow   Wound Length (cm) 15.5 cm 1.1 cm   Wound Width (cm) 19 cm 6.1 cm   Wound Surface Area (cm^2) 294.5 cm^2 6.71 cm^2   Wound Depth (cm) 0.1 cm 0.6 cm   Wound Volume (cm^3) 29.45 cm^3 4.026 cm^3   Wound  Healing % -- 86   Margins Well-defined edges Well-defined edges   Non-staged Wound Description Full thickness Full thickness   Alfreda-wound Assessment Moist;Edema Fragile;Pink   Wound Granulation Tissue Firm;Pink Firm;Pink   Wound Bed Granulation (%) 20 % 5 %   Wound Bed Epithelium (%) 50 % 45 %   Wound Bed Slough (%) 30 % 50 %   Wound Odor None None   Shape -- clustered       No associated orders.       Wound 08/15/24 #2 Flank Left;Anterior;Posterior (Active)   Date First Assessed/Time First Assessed: 08/15/24 1406    Wound Number (Wound Clinic Only): #2  Primary Wound Type: (c) Other (comment)  Location: Flank  Wound Location Orientation: Left;Anterior;Posterior      Assessments 8/15/2024  2:07 PM 8/29/2024 10:55 AM   Wound Image          Closure Not approximated --   Drainage Amount Scant Scant   Drainage Description Serous;Yellow Serosanguineous   Wound Length (cm) 2.5 cm 2.5 cm   Wound Width (cm) 32.1 cm 33 cm   Wound Surface Area (cm^2) 80.25 cm^2 82.5 cm^2   Wound Depth (cm) 0.2 cm 0.1 cm   Wound Volume (cm^3) 16.05 cm^3 8.25 cm^3   Wound Healing % -- 49   Margins -- Well-defined edges   Non-staged Wound Description -- Full thickness   Alfreda-wound Assessment Painful;Pink;Fragile Pink;Fragile   Wound Granulation Tissue Pink;Spongy Firm;Red   Wound Bed Granulation (%) 20 % 30 %   Wound Bed Epithelium (%) 50 % 60 %   Wound Bed Slough (%) 30 % 10 %   Wound Odor None None   Tunneling? No --   Undermining? No --   Sinus Tracts? No --       No associated orders.       Wound 08/15/24 #3 Knee Right (Active)   Date First Assessed/Time First Assessed: 08/15/24 1417    Wound Number (Wound Clinic Only): #3  Primary Wound Type: Auto-immune  Location: Knee  Wound Location Orientation: Right      Assessments 8/15/2024  2:20 PM 8/29/2024 10:51 AM   Wound Image       Drainage Amount Unable to assess Unable to assess   Wound Length (cm) 5 cm 0.6 cm   Wound Width (cm) 0.9 cm 0.2 cm   Wound Surface Area (cm^2) 4.5 cm^2 0.12 cm^2    Wound Depth (cm) 0.1 cm 0 cm   Wound Volume (cm^3) 0.45 cm^3 0 cm^3   Wound Healing % -- 100   Margins -- Well-defined edges   Non-staged Wound Description -- Full thickness   Alfreda-wound Assessment Dry;Blanchable erythema Dry   Wound Granulation Tissue Firm --   Wound Bed Granulation (%) 5 % --   Wound Bed Epithelium (%) 30 % --   Wound Odor -- None   Shape Clustered, 65% scab 100% scabbed   Tunneling? No --   Undermining? No --       No associated orders.       Wound 08/15/24 #4 Leg Left;Anterior (Active)   Date First Assessed/Time First Assessed: 08/15/24 1418    Wound Number (Wound Clinic Only): #4  Primary Wound Type: Auto-immune  Location: Leg  Wound Location Orientation: Left;Anterior      Assessments 8/15/2024  2:21 PM 8/29/2024 10:49 AM   Wound Image        Drainage Amount Unable to assess Unable to assess   Wound Length (cm) 21.5 cm 18.5 cm   Wound Width (cm) 0.9 cm 0.2 cm   Wound Surface Area (cm^2) 19.35 cm^2 3.7 cm^2   Wound Depth (cm) 0.2 cm --   Wound Volume (cm^3) 3.87 cm^3 --   Margins Well-defined edges Well-defined edges   Non-staged Wound Description Full thickness Full thickness   Alfreda-wound Assessment Dry;Clean Clean   Wound Granulation Tissue Red;Firm --   Wound Bed Granulation (%) 20 % --   Wound Bed Epithelium (%) 60 % 80 %   Wound Odor -- None   Shape 20% scab 20% scab   Tunneling? No --   Undermining? No --   Sinus Tracts? No --       No associated orders.          ASSESSMENT AND PLAN:    1. 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 Adverse effect of radiation, subsequent encounter    3. SLE (systemic lupus erythematosus related syndrome) (HCC)    4. Malignant neoplasm of left breast in female, estrogen receptor negative, unspecified site of breast (HCC)    5. 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 Leukocytoclastic vasculitis (HCC)            Risks, benefits, and alternatives of current treatment plan  discussed in detail.  Questions and concerns addressed. Red flags to RTC or ED reviewed.  Patient (or parent) agrees to plan.      NOTE TO PATIENT: The 21st Century Cures Act makes clinical notes like these available to patients in the interest of transparency. Clinical notes are medical documents used by physicians and care providers to communicate with each other. These documents include medical language and terminology, abbreviations, and treatment information that may sound technical and at times possibly unfamiliar. In addition, at times, the verbiage may appear blunt or direct. These documents are one tool providers use to communicate relevant information and clinical opinions of the care providers in a way that allows common understanding of the clinical context.   I spent 40 minutes with the patient. This time included:    preparing to see the patient (eg, review notes and recent diagnostics),  seeing the patient, obtaining and/or reviewing separately obtained history, performing a medically appropriate examination and/or evaluation, counseling and educating the patient, documenting in the record,   DISCHARGE:      Patient Instructions   Please return: 1-1.5 weeks (any day of the week)    Dr. Gant September 13  MRI Sept 16      Patient discharge and wound care instructions  Alina Aceves  8/29/2024       Left breast:  moisten a 2x2 gauze with VASHE, pack into wound and cover 3 x a day  Left side:  THE HYDROcolloid SHEET DRESSING.   Lower extremities: moisturize multiple times a day    Nutrition and blood sugar control:  Focus on the following:  Protein: Meats, beans, eggs, milk and yogurt particularly Greek yogurt), tofu, soy nuts, soy protein products (Follow the protein handout in your welcome folder)  Vitamin C: Citrus fruits and juices, strawberries, tomatoes, tomato juice, peppers, baked potatoes, spinach, broccoli, cauliflower, [place] sprouts, cabbage  Vitamin A: Dark green, leafy vegetables,  orange or yellow vegetables, cantaloupe, fortified dairy products, liver, fortified cereals  Zinc: Fortified cereals, red meats, seafood  Consider supplementing with Vitor by Dr. Scribbles. It can be purchased on amazon, [employer] website, or local pharmacy may be able to order it for you.  (These are essential branch chain amino acids that help with tissue building and wound healing).   When your blood sugar is consistently elevated greater than 180 your body can't heal or fight infection.     Concerns:  Signs of infection may include the following:  Increase in redness  Red \"streaks\" from wound  Increase in swelling  Fever  Unusual odor  Change in the amount of wound drainage     Should you experience any significant changes in your wound(s) or have any questions regarding your home care instructions please contact the [facility] @ [phone] If after regular business hours, please call your family doctor or local emergency room. The treatment plan has been discussed at length between you and your provider. Follow all instructions carefully, it is very important. If you do not follow all instructions you are at risk of your wound not healing, infection, possible loss of limb and even loss of life.            Anita Smallwood FNP-C, CWCN-AP, CFCN, CSWS, WCC, DWC  8/29/2024

## 2024-08-29 ENCOUNTER — OFFICE VISIT (OUTPATIENT)
Dept: WOUND CARE | Facility: HOSPITAL | Age: 44
End: 2024-08-29
Attending: NURSE PRACTITIONER
Payer: COMMERCIAL

## 2024-08-29 VITALS
HEART RATE: 103 BPM | SYSTOLIC BLOOD PRESSURE: 107 MMHG | TEMPERATURE: 98 F | DIASTOLIC BLOOD PRESSURE: 68 MMHG | RESPIRATION RATE: 16 BRPM

## 2024-08-29 DIAGNOSIS — M32.9 SLE (SYSTEMIC LUPUS ERYTHEMATOSUS RELATED SYNDROME) (HCC): ICD-10-CM

## 2024-08-29 DIAGNOSIS — M31.0 LEUKOCYTOCLASTIC VASCULITIS (HCC): ICD-10-CM

## 2024-08-29 DIAGNOSIS — C50.912 MALIGNANT NEOPLASM OF LEFT BREAST IN FEMALE, ESTROGEN RECEPTOR NEGATIVE, UNSPECIFIED SITE OF BREAST (HCC): ICD-10-CM

## 2024-08-29 DIAGNOSIS — T66.XXXD ADVERSE EFFECT OF RADIATION, SUBSEQUENT ENCOUNTER: ICD-10-CM

## 2024-08-29 DIAGNOSIS — Z17.1 MALIGNANT NEOPLASM OF LEFT BREAST IN FEMALE, ESTROGEN RECEPTOR NEGATIVE, UNSPECIFIED SITE OF BREAST (HCC): ICD-10-CM

## 2024-08-29 DIAGNOSIS — L98.492 NON-PRESSURE CHRONIC ULCER OF SKIN OF OTHER SITES WITH FAT LAYER EXPOSED (HCC): Primary | ICD-10-CM

## 2024-08-29 DIAGNOSIS — L97.922 NON-PRESSURE CHRONIC ULCER OF LEFT LOWER LEG WITH FAT LAYER EXPOSED (HCC): ICD-10-CM

## 2024-08-29 PROCEDURE — 99215 OFFICE O/P EST HI 40 MIN: CPT | Performed by: NURSE PRACTITIONER

## 2024-08-29 NOTE — PROGRESS NOTES
.Weekly Wound Education Note    Teaching Provided To: Patient  Training Topics: Dressing;Cleasing and general instructions;Discharge instructions  Training Method: Explain/Verbal;Written  Training Response: Patient responds and understands        Notes: Per provider, leg wounds healed. Pt is to keep area moisturized. Flank wounds improving, Continue thin duoderm to areas. Breast wound stable. Dressing  changed to vashe moist 2x2 gauze, and bordered foam. Extra supplies sent with patient.

## 2024-08-29 NOTE — PATIENT INSTRUCTIONS
Please return: 1-1.5 weeks (any day of the week)    Dr. Gant September 13  MRI Sept 16      Patient discharge and wound care instructions  Alina Aceves  8/29/2024       Left breast:  moisten a 2x2 gauze with VASHE, pack into wound and cover 3 x a day  Left side:  THE HYDROcolloid SHEET DRESSING.   Lower extremities: moisturize multiple times a day    Nutrition and blood sugar control:  Focus on the following:  Protein: Meats, beans, eggs, milk and yogurt particularly Greek yogurt), tofu, soy nuts, soy protein products (Follow the protein handout in your welcome folder)  Vitamin C: Citrus fruits and juices, strawberries, tomatoes, tomato juice, peppers, baked potatoes, spinach, broccoli, cauliflower, [place] sprouts, cabbage  Vitamin A: Dark green, leafy vegetables, orange or yellow vegetables, cantaloupe, fortified dairy products, liver, fortified cereals  Zinc: Fortified cereals, red meats, seafood  Consider supplementing with Vitor by [employer]ric. It can be purchased on amazon, [employer] website, or local pharmacy may be able to order it for you.  (These are essential branch chain amino acids that help with tissue building and wound healing).   When your blood sugar is consistently elevated greater than 180 your body can't heal or fight infection.     Concerns:  Signs of infection may include the following:  Increase in redness  Red \"streaks\" from wound  Increase in swelling  Fever  Unusual odor  Change in the amount of wound drainage     Should you experience any significant changes in your wound(s) or have any questions regarding your home care instructions please contact the [facility] @ [phone] If after regular business hours, please call your family doctor or local emergency room. The treatment plan has been discussed at length between you and your provider. Follow all instructions carefully, it is very important. If you do not follow all instructions you are at risk of your wound not  healing, infection, possible loss of limb and even loss of life.

## 2024-09-05 NOTE — PROGRESS NOTES
CHIEF COMPLAINT:     Chief Complaint   Patient presents with    Wound Care     Patient arrives for follow up visit. Dressings changed today. Reports increase in pain to breast wound.     HPI:   Information obtained from Patient and chart  5-24-24 INITIAL:  43 year old female with Past medical history invasive ductal carcinoma of left breast (dr kaiser camara) currently undergoing radiation therapy (dr kobi bran), leukocytoclastic vasculitis (treated with clobetasol ointment by dr. Walton) iron deficiency anemia, HFrEF (Ariane Putnam), lupus, Sjogren's syndrome.  Patient was recently admitted for oral sores and n/v and elctrolyte imbalance and hypokalemia.    Earlier this week she was seen in urgent care for uti symptoms and hematuria. She was treated with cefadroxil and recommended to see primary md. dr bran recommended patient to start yary, she states she is not regular with it. Patient has seen nutrition/dietician.  Patient is active with [facility].  Earlier this week she had to cancel physical therapy due to wound pain.  on 5-11 patient was started on potassium citrate x 10 days and patient was to repeat labs, which she did on 5-21 but her potassium was still 2.9. she states she is taking the potassium.  I will udpate ariane bernardhardik and I asked the patient to contact her as they may want her to increase her potassium.  Patient states that initially she was utilizing aquaphor but then developed an itchy red rash, so now has just been leaving it dry or using aveeno. She is able to tolerate very minimal cleansing of the area.  I discussed with her how to do it as tolerated in the shower with anasept.  Will utilize hydrogel cool sheets, patient to return next week. There is not any s/s of infection    8-15-24 patient returns. I have not seen her in the last 3 mo. Noted [facility] oncology msw note from July that was assisting her with supportive resources and financial assistance. She is  and has an 8  yo at home and 19 yo away at college, her sister lives 2 hours away and remaining family lives out of state, mom is in [place].  She saw dr bran (radiation oncology) in June 2024 and it was documented that she had remaining areas of open lesions to the breast as well as open lesions due to vasculitis to her bilateral lower extremities.  She recently was admitted from 7-21 to 7-24 for hypokalemia. She has a f/u with dr. Camara sept 13.  She has been dressing areas with over the counter abx ointment. She has a let flank scattered ulcerations in a dermatome pattern that patient states started as vessicles, she was treated with valtrax by urgent care.  She also indeed has lower extremity ulcerations on the right knee and left lateral tibia.  The breast wound has deterorated and is significantly deeper with necrosis. No malodor.  There was a small vessel clip that was spitting that I removed with a forceps. We discussed this small clip could be the reason the wound opened up, but I am also concerned about recurrence.  Will have her dress with honey gauze to the breast and hydrogel sheets to all other areas (flank and legs). Will order her dressings. Patient states her appetite is getting better lately. No acute s/s of infection.    8-29-24 patient returns.  She did see surgeon, dr pinedo, and mri of the breast (scheduled for sept 16), her appointment with dr camara is sept 13.  I did reach out to dr pinedo via [employer] chat.  Per patient dr. Pinedo d/w her taking her to the operating room to clean the wound and biopsy.  I let her know that I would agree with this as patient is too sensitive to do in clinic debridements.  She has been dressing the wound with honey gauze. We ordered dressings for her lower extremity wounds (hydrogel) received, she did receive them.  The breast wound has 3 spitting staples again.  Will have patient pack with vashe 2-3 x a day and I will see her again before her mri to remove any further potential  spitting clips.  Patient states she is eating better.  We discussed keeping her skin on lower legs moisturized.     9-6-24 patient returns.  Patient has been dressing the breast wound with vashe and the flank/abdomen with hydrocolloid.  The flank/abdomen is improved. The breast wound is  with less necrosis, patient states less drainage, no malodor.  Her legs remain resolved. She states she is using the yary.  Her mri is the 16th and dr camara the 13th.  She will f/u with dr viera after mri.  There are not any surgical clips in the wound bed today.  Even with less necrosis it still remains significant and she does not tolerate debridement.  I informed her that once she gets the mri and plan can be determined with dr. Viera, we can see her back after debridement. I mentioned the wound vac as an option after debridement. Patient to schedule an appointment for 1 month and she can move it sooner/later as needed. Continue with current poc at this time.    MEDICATIONS:     Current Outpatient Medications:     Potassium Chloride ER 10 MEQ Oral Tab CR, Take 1 tablet (10 mEq total) by mouth daily., Disp: 30 tablet, Rfl: 0    HYDROcodone-acetaminophen 5-325 MG Oral Tab, Take 1 tablet by mouth every 6 (six) hours as needed for Pain. (Patient not taking: Reported on 8/15/2024), Disp: 10 tablet, Rfl: 0    benzonatate 200 MG Oral Cap, Take 1 capsule (200 mg total) by mouth 3 (three) times daily as needed for cough. (Patient not taking: Reported on 8/15/2024), Disp: 30 capsule, Rfl: 0    prochlorperazine (COMPAZINE) 10 mg tablet, Take 1 tablet (10 mg total) by mouth every 6 (six) hours as needed for Nausea or vomiting. (Patient not taking: Reported on 8/15/2024), Disp: , Rfl:     metoprolol succinate ER 50 MG Oral Tablet 24 Hr, Take 1 tablet (50 mg total) by mouth 2x Daily(Beta Blocker)., Disp: 180 tablet, Rfl: 3    ondansetron 4 MG Oral Tablet Dispersible, Take 1 tablet (4 mg total) by mouth every 4 (four) hours as needed  for Nausea. (Patient not taking: Reported on 8/15/2024), Disp: 10 tablet, Rfl: 0    DULoxetine 30 MG Oral Cap DR Particles, Take 2 capsules (60 mg total) by mouth daily., Disp: , Rfl:     lidocaine-prilocaine 2.5-2.5 % External Cream, APPLY SMALL AMOUNT OVER PORT PRIOR TO ACCESS, Disp: , Rfl:     zolpidem 10 MG Oral Tab, Take 1 tablet (10 mg total) by mouth nightly as needed for Sleep., Disp: , Rfl:   ALLERGIES:     Allergies   Allergen Reactions    Bactrim [Sulfamethoxazole W/Trimethoprim] RASH    Adhesive Tape RASH      REVIEW OF SYSTEMS:   This information was obtained from the patient/family and chart.    See HPI for pertinent positives, otherwise 10 pt ROS negative.  HISTORY:   Past medical, surgical, family and social history updated where appropriate.      PHYSICAL EXAM:     Vitals:    09/06/24 1100   BP: 99/71   Pulse: 92   Resp: 14   Temp: 97.2 °F (36.2 °C)       Estimated body mass index is 14.5 kg/m² as calculated from the following:    Height as of 7/21/24: 64\".    Weight as of 7/21/24: 84 lb 8 oz (38.3 kg).   POC Glucose   Date Value Ref Range Status   12/19/2023 72 70 - 99 mg/dL Final   12/18/2023 125 (H) 70 - 99 mg/dL Final   12/18/2023 84 70 - 99 mg/dL Final       Vital signs reviewed.Appears stated age, well groomed.    Constitutional:  Bp wnl. Pulse Regular and wnl for patient. Respirations easy and unlabored. Temperature wnl. Patient is very thin in appearance. Appearance neat and clean. Appears in no acute distress.     Musculoskeletal:  Patient ambulation is stable  Integumentary:  refer to wound characteristics and images   Psychiatric:  Judgment and insight intact. Alert and oriented times 3. No evidence of depression, anxiety, or agitation. Calm, cooperative, and communicative. Appropriate interactions and affect.  DIAGNOSTICS:     Lab Results   Component Value Date    BUN 32 (H) 07/22/2024    CREATSERUM 0.69 07/22/2024    ALB 3.1 (L) 07/21/2024    TP 6.6 07/21/2024       WOUND ASSESSMENT:      Wound 05/24/24 #1 Radiation therapy Breast Left (Active)   Date First Assessed/Time First Assessed: 05/24/24 0857    Wound Number (Wound Clinic Only): #1 Radiation therapy  Primary Wound Type: Other (comment)  Location: Breast  Wound Location Orientation: Left      Assessments 5/24/2024  8:59 AM 9/6/2024  2:39 PM   Wound Image        Drainage Amount Large Moderate   Drainage Description Serous;Yellow Yellow;Serous   Wound Length (cm) 15.5 cm 1.9 cm   Wound Width (cm) 19 cm 1.1 cm   Wound Surface Area (cm^2) 294.5 cm^2 2.09 cm^2   Wound Depth (cm) 0.1 cm 1 cm   Wound Volume (cm^3) 29.45 cm^3 2.09 cm^3   Wound Healing % -- 93   Margins Well-defined edges Well-defined edges   Non-staged Wound Description Full thickness Full thickness   Alfreda-wound Assessment Moist;Edema Fragile;Pink   Wound Granulation Tissue Firm;Pink Firm;Pink;Pale Grey   Wound Bed Granulation (%) 20 % 50 %   Wound Bed Epithelium (%) 50 % 10 %   Wound Bed Slough (%) 30 % 40 %   Wound Odor None None   Shape -- clustered       No associated orders.       Wound 08/15/24 #2 Flank Left;Anterior;Posterior (Active)   Date First Assessed/Time First Assessed: 08/15/24 1406    Wound Number (Wound Clinic Only): #2  Primary Wound Type: (c) Other (comment)  Location: Flank  Wound Location Orientation: Left;Anterior;Posterior      Assessments 8/15/2024  2:07 PM 9/6/2024  2:43 PM   Wound Image         Closure Not approximated --   Drainage Amount Scant Scant   Drainage Description Serous;Yellow Serous;Yellow   Wound Length (cm) 2.5 cm 1.5 cm   Wound Width (cm) 32.1 cm 32.5 cm   Wound Surface Area (cm^2) 80.25 cm^2 48.75 cm^2   Wound Depth (cm) 0.2 cm 0.1 cm   Wound Volume (cm^3) 16.05 cm^3 4.875 cm^3   Wound Healing % -- 70   Margins -- Well-defined edges   Non-staged Wound Description -- Full thickness   Alfreda-wound Assessment Painful;Pink;Fragile Pink;Fragile;Dry   Wound Granulation Tissue Pink;Spongy Pink;Spongy   Wound Bed Granulation (%) 20 % 25 %   Wound  Bed Epithelium (%) 50 % 75 %   Wound Bed Slough (%) 30 % --   Wound Odor None None   Tunneling? No No   Undermining? No No   Sinus Tracts? No No       No associated orders.          ASSESSMENT AND PLAN:    1. 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 Adverse effect of radiation, subsequent encounter    3. SLE (systemic lupus erythematosus related syndrome) (HCC)    4. Malignant neoplasm of left breast in female, estrogen receptor negative, unspecified site of breast (HCC)              Risks, benefits, and alternatives of current treatment plan discussed in detail.  Questions and concerns addressed. Red flags to RTC or ED reviewed.  Patient (or parent) agrees to plan.      NOTE TO PATIENT: The 21st Century Cures Act makes clinical notes like these available to patients in the interest of transparency. Clinical notes are medical documents used by physicians and care providers to communicate with each other. These documents include medical language and terminology, abbreviations, and treatment information that may sound technical and at times possibly unfamiliar. In addition, at times, the verbiage may appear blunt or direct. These documents are one tool providers use to communicate relevant information and clinical opinions of the care providers in a way that allows common understanding of the clinical context.   I spent 25 minutes with the patient. This time included:    preparing to see the patient (eg, review notes and recent diagnostics),  seeing the patient, obtaining and/or reviewing separately obtained history, performing a medically appropriate examination and/or evaluation, counseling and educating the patient, documenting in the record,   DISCHARGE:      Patient Instructions   Please return: 4 weeks (call if you need sooner appointment)    Dr. Gant September 13  MRI Sept 16      Patient discharge and wound care instructions  Alina Aceves  9/6/2024         Left  breast:  moisten a 2x2 gauze with VASHE, pack into wound and cover     3 x a day  Left side:  THE HYDROcolloid SHEET DRESSING.   Lower extremities: moisturize multiple times a day    Nutrition and blood sugar control:  Focus on the following:  Protein: Meats, beans, eggs, milk and yogurt particularly Greek yogurt), tofu, soy nuts, soy protein products (Follow the protein handout in your welcome folder)  Vitamin C: Citrus fruits and juices, strawberries, tomatoes, tomato juice, peppers, baked potatoes, spinach, broccoli, cauliflower, [place] sprouts, cabbage  Vitamin A: Dark green, leafy vegetables, orange or yellow vegetables, cantaloupe, fortified dairy products, liver, fortified cereals  Zinc: Fortified cereals, red meats, seafood  Consider supplementing with Vitor by [employer]. It can be purchased on amazon, [employer] website, or local pharmacy may be able to order it for you.  (These are essential branch chain amino acids that help with tissue building and wound healing).   When your blood sugar is consistently elevated greater than 180 your body can't heal or fight infection.     Concerns:  Signs of infection may include the following:  Increase in redness  Red \"streaks\" from wound  Increase in swelling  Fever  Unusual odor  Change in the amount of wound drainage     Should you experience any significant changes in your wound(s) or have any questions regarding your home care instructions please contact the [facility] @ [phone] If after regular business hours, please call your family doctor or local emergency room. The treatment plan has been discussed at length between you and your provider. Follow all instructions carefully, it is very important. If you do not follow all instructions you are at risk of your wound not healing, infection, possible loss of limb and even loss of life.          Anita Smallwood FNP-C, CWCN-AP, CFCN, CSWS, WCC, DWC  9/6/2024

## 2024-09-06 ENCOUNTER — OFFICE VISIT (OUTPATIENT)
Dept: WOUND CARE | Facility: HOSPITAL | Age: 44
End: 2024-09-06
Attending: NURSE PRACTITIONER
Payer: COMMERCIAL

## 2024-09-06 VITALS
SYSTOLIC BLOOD PRESSURE: 99 MMHG | TEMPERATURE: 97 F | DIASTOLIC BLOOD PRESSURE: 71 MMHG | HEART RATE: 92 BPM | RESPIRATION RATE: 14 BRPM

## 2024-09-06 DIAGNOSIS — M32.9 SLE (SYSTEMIC LUPUS ERYTHEMATOSUS RELATED SYNDROME) (HCC): ICD-10-CM

## 2024-09-06 DIAGNOSIS — L98.492 NON-PRESSURE CHRONIC ULCER OF SKIN OF OTHER SITES WITH FAT LAYER EXPOSED (HCC): Primary | ICD-10-CM

## 2024-09-06 DIAGNOSIS — C50.912 MALIGNANT NEOPLASM OF LEFT BREAST IN FEMALE, ESTROGEN RECEPTOR NEGATIVE, UNSPECIFIED SITE OF BREAST (HCC): ICD-10-CM

## 2024-09-06 DIAGNOSIS — T66.XXXD ADVERSE EFFECT OF RADIATION, SUBSEQUENT ENCOUNTER: ICD-10-CM

## 2024-09-06 DIAGNOSIS — Z17.1 MALIGNANT NEOPLASM OF LEFT BREAST IN FEMALE, ESTROGEN RECEPTOR NEGATIVE, UNSPECIFIED SITE OF BREAST (HCC): ICD-10-CM

## 2024-09-06 PROCEDURE — 99213 OFFICE O/P EST LOW 20 MIN: CPT | Performed by: NURSE PRACTITIONER

## 2024-09-06 NOTE — PROGRESS NOTES
.Weekly Wound Education Note    Teaching Provided To: Patient  Training Topics: Discharge instructions;Dressing;Cleasing and general instructions;Test/procedures  Training Method: Explain/Verbal;Written  Training Response: Patient responds and understands            MRI ordered for 9/16/24.  Continue VASHE wet to dry to breast daily and cover with dry dressing.  Thin duoderm to flank.  Supplies ordered this visit.

## 2024-09-06 NOTE — PATIENT INSTRUCTIONS
Please return: 4 weeks (call if you need sooner appointment)    Dr. Gant September 13  MRI Sept 16      Patient discharge and wound care instructions  Alina Aceves  9/6/2024         Left breast:  moisten a 2x2 gauze with VASHE, pack into wound and cover     3 x a day  Left side:  THE HYDROcolloid SHEET DRESSING.   Lower extremities: moisturize multiple times a day    Nutrition and blood sugar control:  Focus on the following:  Protein: Meats, beans, eggs, milk and yogurt particularly Greek yogurt), tofu, soy nuts, soy protein products (Follow the protein handout in your welcome folder)  Vitamin C: Citrus fruits and juices, strawberries, tomatoes, tomato juice, peppers, baked potatoes, spinach, broccoli, cauliflower, [place] sprouts, cabbage  Vitamin A: Dark green, leafy vegetables, orange or yellow vegetables, cantaloupe, fortified dairy products, liver, fortified cereals  Zinc: Fortified cereals, red meats, seafood  Consider supplementing with Vitor by [employer]. It can be purchased on amazon, [employer] website, or local pharmacy may be able to order it for you.  (These are essential branch chain amino acids that help with tissue building and wound healing).   When your blood sugar is consistently elevated greater than 180 your body can't heal or fight infection.     Concerns:  Signs of infection may include the following:  Increase in redness  Red \"streaks\" from wound  Increase in swelling  Fever  Unusual odor  Change in the amount of wound drainage     Should you experience any significant changes in your wound(s) or have any questions regarding your home care instructions please contact the [facility] @ [phone] If after regular business hours, please call your family doctor or local emergency room. The treatment plan has been discussed at length between you and your provider. Follow all instructions carefully, it is very important. If you do not follow all instructions you are at risk  of your wound not healing, infection, possible loss of limb and even loss of life.

## 2024-09-10 ENCOUNTER — APPOINTMENT (OUTPATIENT)
Dept: WOUND CARE | Facility: HOSPITAL | Age: 44
End: 2024-09-10
Attending: NURSE PRACTITIONER
Payer: COMMERCIAL

## 2024-09-26 ENCOUNTER — TELEPHONE (OUTPATIENT)
Dept: WOUND CARE | Facility: HOSPITAL | Age: 44
End: 2024-09-26

## 2024-09-26 NOTE — TELEPHONE ENCOUNTER
Per provider call patient:  Has patient seen her surgeon or scheduled an appointment to be seen? Patient states she does have an appointment with her surgeon on 9/30/24.  Can wound care appointment be rescheduled for following week instead of 10/2? Since she will be seeing surgeon on 9/30/24, she is ok/agreeable with rescheduling for week of 10/7. Transferred her to the  to schedule.     Provider made aware of call and patient's appointment with surgeon.

## 2024-09-27 ENCOUNTER — TELEPHONE (OUTPATIENT)
Dept: WOUND CARE | Facility: HOSPITAL | Age: 44
End: 2024-09-27

## 2024-10-02 ENCOUNTER — APPOINTMENT (OUTPATIENT)
Dept: WOUND CARE | Facility: HOSPITAL | Age: 44
End: 2024-10-02
Attending: NURSE PRACTITIONER
Payer: COMMERCIAL

## 2024-10-08 NOTE — PROGRESS NOTES
CHIEF COMPLAINT:     Chief Complaint   Patient presents with    Wound Care     Patient arrives for a wound care appointment. Patient arrives with vashe soaked gauze to the breast wound. Patient has band aids to new leg wounds. Patient states she has mild pain. There is nothing on the flank wound. Patient recently had an MRI and the doctor stated there is some necrosis to the breast wound.      HPI:   Information obtained from Patient and chart  5-24-24 INITIAL:  43 year old female with Past medical history invasive ductal carcinoma of left breast (dr kaiser camara) currently undergoing radiation therapy (dr kobi bran), leukocytoclastic vasculitis (treated with clobetasol ointment by dr. Walton) iron deficiency anemia, HFrEF (Ariane Putnam), lupus, Sjogren's syndrome.  Patient was recently admitted for oral sores and n/v and elctrolyte imbalance and hypokalemia.    Earlier this week she was seen in urgent care for uti symptoms and hematuria. She was treated with cefadroxil and recommended to see primary md. dr bran recommended patient to start yary, she states she is not regular with it. Patient has seen nutrition/dietician.  Patient is active with residential [facility].  Earlier this week she had to cancel physical therapy due to wound pain.  on 5-11 patient was started on potassium citrate x 10 days and patient was to repeat labs, which she did on 5-21 but her potassium was still 2.9. she states she is taking the potassium.  I will udpate ariane putnam and I asked the patient to contact her as they may want her to increase her potassium.  Patient states that initially she was utilizing aquaphor but then developed an itchy red rash, so now has just been leaving it dry or using aveeno. She is able to tolerate very minimal cleansing of the area.  I discussed with her how to do it as tolerated in the shower with anasept.  Will utilize hydrogel cool sheets, patient to return next week. There is not any s/s of  infection    8-15-24 patient returns. I have not seen her in the last 3 mo. Noted [facility] note from July that was assisting her with supportive resources and financial assistance. She is  and has an 9 yo at home and 19 yo away at college, her sister lives 2 hours away and remaining family lives out of state, mom is in [place].  She saw dr bran (radiation oncology) in June 2024 and it was documented that she had remaining areas of open lesions to the breast as well as open lesions due to vasculitis to her bilateral lower extremities.  She recently was admitted from 7-21 to 7-24 for hypokalemia. She has a f/u with dr. Camara sept 13.  She has been dressing areas with over the counter abx ointment. She has a let flank scattered ulcerations in a dermatome pattern that patient states started as vessicles, she was treated with valtrax by urgent care.  She also indeed has lower extremity ulcerations on the right knee and left lateral tibia.  The breast wound has deterorated and is significantly deeper with necrosis. No malodor.  There was a small vessel clip that was spitting that I removed with a forceps. We discussed this small clip could be the reason the wound opened up, but I am also concerned about recurrence.  Will have her dress with honey gauze to the breast and hydrogel sheets to all other areas (flank and legs). Will order her dressings. Patient states her appetite is getting better lately. No acute s/s of infection.    8-29-24 patient returns.  She did see surgeon, dr pinedo, and mri of the breast (scheduled for sept 16), her appointment with dr camara is sept 13.  I did reach out to dr pinedo via [email or web address] chat.  Per patient dr. Pinedo d/w her taking her to the operating room to clean the wound and biopsy.  I let her know that I would agree with this as patient is too sensitive to do in clinic debridements.  She has been dressing the wound with honey gauze. We ordered dressings for her lower extremity  wounds (hydrogel) received, she did receive them.  The breast wound has 3 spitting staples again.  Will have patient pack with vashe 2-3 x a day and I will see her again before her mri to remove any further potential spitting clips.  Patient states she is eating better.  We discussed keeping her skin on lower legs moisturized.     9-6-24 patient returns.  Patient has been dressing the breast wound with vashe and the flank/abdomen with hydrocolloid.  The flank/abdomen is improved. The breast wound is  with less necrosis, patient states less drainage, no malodor.  Her legs remain resolved. She states she is using the yary.  Her mri is the 16th and dr camara the 13th.  She will f/u with dr viera after mri.  There are not any surgical clips in the wound bed today.  Even with less necrosis it still remains significant and she does not tolerate debridement.  I informed her that once she gets the mri and plan can be determined with dr. Viera, we can see her back after debridement. I mentioned the wound vac as an option after debridement. Patient to schedule an appointment for 1 month and she can move it sooner/later as needed. Continue with current poc at this time.    10-9-24 patient returns. She saw dr camara/oncology, She followed up with cardiology and her metoprolol was reduced and she is to schedule an echo.  She had her mri and  dr viera let her know that know suspicious findings were noted, just inflammation.  She saw surgeon on 9-30-24. she jhas continued to dress the wound with vashe wtd. The wound is improved. There is a small staple trying to spit again in the base of the wound however I am unable to lift it to remove it.  The wound is slightly dry, less necrosis. We discussed switching back to honey.  Her shingles areas are improved, but dry, no c/o pain. Patient has new areas open on her left lower leg that she states she pulled the skin and she started bleeding.  We also discussed Hbo it's  advantages, time commitment, how it works. Writtne information given to patient and she will consider it. No s/s of infection.    MEDICATIONS:     Current Outpatient Medications:     Potassium Chloride ER 10 MEQ Oral Tab CR, Take 1 tablet (10 mEq total) by mouth daily., Disp: 30 tablet, Rfl: 0    HYDROcodone-acetaminophen 5-325 MG Oral Tab, Take 1 tablet by mouth every 6 (six) hours as needed for Pain. (Patient not taking: Reported on 8/15/2024), Disp: 10 tablet, Rfl: 0    benzonatate 200 MG Oral Cap, Take 1 capsule (200 mg total) by mouth 3 (three) times daily as needed for cough. (Patient not taking: Reported on 8/15/2024), Disp: 30 capsule, Rfl: 0    prochlorperazine (COMPAZINE) 10 mg tablet, Take 1 tablet (10 mg total) by mouth every 6 (six) hours as needed for Nausea or vomiting. (Patient not taking: Reported on 8/15/2024), Disp: , Rfl:     metoprolol succinate ER 50 MG Oral Tablet 24 Hr, Take 1 tablet (50 mg total) by mouth 2x Daily(Beta Blocker)., Disp: 180 tablet, Rfl: 3    ondansetron 4 MG Oral Tablet Dispersible, Take 1 tablet (4 mg total) by mouth every 4 (four) hours as needed for Nausea. (Patient not taking: Reported on 8/15/2024), Disp: 10 tablet, Rfl: 0    DULoxetine 30 MG Oral Cap DR Particles, Take 2 capsules (60 mg total) by mouth daily., Disp: , Rfl:     lidocaine-prilocaine 2.5-2.5 % External Cream, APPLY SMALL AMOUNT OVER PORT PRIOR TO ACCESS, Disp: , Rfl:     zolpidem 10 MG Oral Tab, Take 1 tablet (10 mg total) by mouth nightly as needed for Sleep., Disp: , Rfl:   ALLERGIES:     Allergies   Allergen Reactions    Bactrim [Sulfamethoxazole W/Trimethoprim] RASH    Adhesive Tape RASH      REVIEW OF SYSTEMS:   This information was obtained from the patient/family and chart.    See HPI for pertinent positives, otherwise 10 pt ROS negative.  HISTORY:   Past medical, surgical, family and social history updated where appropriate.      PHYSICAL EXAM:     Vitals:    10/09/24 0902   BP: 115/77   Pulse: 98    Resp: 16   Temp: 98 °F (36.7 °C)         Estimated body mass index is 14.5 kg/m² as calculated from the following:    Height as of 7/21/24: 64\".    Weight as of 7/21/24: 84 lb 8 oz (38.3 kg).   POC Glucose   Date Value Ref Range Status   12/19/2023 72 70 - 99 mg/dL Final   12/18/2023 125 (H) 70 - 99 mg/dL Final   12/18/2023 84 70 - 99 mg/dL Final       Vital signs reviewed.Appears stated age, well groomed.    Constitutional:  Bp wnl. Pulse Regular and wnl for patient. Respirations easy and unlabored. Temperature wnl. Patient is very thin in appearance. Appearance neat and clean. Appears in no acute distress.    Left lower extremity:  DP Palpable, digits warm, toenails wnl for color, length, thickness and hygiene, + hair growth. Purpura noted on anterior ankle and dorsal forefoot.  Musculoskeletal:  Patient ambulation is stable  Integumentary:  refer to wound characteristics and images   Psychiatric:  Judgment and insight intact. Alert and oriented times 3. No evidence of depression, anxiety, or agitation. Calm, cooperative, and communicative. Appropriate interactions and affect.  DIAGNOSTICS:     Lab Results   Component Value Date    BUN 32 (H) 07/22/2024    CREATSERUM 0.69 07/22/2024    ALB 3.1 (L) 07/21/2024    TP 6.6 07/21/2024       WOUND ASSESSMENT:     Wound 05/24/24 #1 Radiation therapy Breast Left (Active)   Date First Assessed/Time First Assessed: 05/24/24 0857    Wound Number (Wound Clinic Only): #1 Radiation therapy  Primary Wound Type: Other (comment)  Location: Breast  Wound Location Orientation: Left      Assessments 5/24/2024  8:59 AM 10/9/2024  8:47 AM   Wound Image         Drainage Amount Large None   Drainage Description Serous;Yellow --   Wound Length (cm) 15.5 cm 1.5 cm   Wound Width (cm) 19 cm 1.9 cm   Wound Surface Area (cm^2) 294.5 cm^2 2.85 cm^2   Wound Depth (cm) 0.1 cm 1.1 cm   Wound Volume (cm^3) 29.45 cm^3 3.135 cm^3   Wound Healing % -- 89   Margins Well-defined edges Well-defined  edges   Non-staged Wound Description Full thickness Full thickness   Alfreda-wound Assessment Moist;Edema Fragile;Pink;Dry;Excoriated   Wound Granulation Tissue Firm;Pink Pink;Firm   Wound Bed Granulation (%) 20 % 60 %   Wound Bed Epithelium (%) 50 % --   Wound Bed Slough (%) 30 % 40 %   Wound Odor None None   Tunneling? -- No   Undermining? -- No   Sinus Tracts? -- No       No associated orders.       Wound 08/15/24 #2 Flank Left;Anterior;Posterior (Active)   Date First Assessed/Time First Assessed: 08/15/24 1406    Wound Number (Wound Clinic Only): #2  Primary Wound Type: (c) Other (comment)  Location: Flank  Wound Location Orientation: Left;Anterior;Posterior      Assessments 8/15/2024  2:07 PM 10/9/2024  8:51 AM   Wound Image          Closure Not approximated --   Drainage Amount Scant Unable to assess   Drainage Description Serous;Yellow --   Wound Length (cm) 2.5 cm 0.7 cm   Wound Width (cm) 32.1 cm 0.5 cm   Wound Surface Area (cm^2) 80.25 cm^2 0.35 cm^2   Wound Depth (cm) 0.2 cm 0.1 cm   Wound Volume (cm^3) 16.05 cm^3 0.035 cm^3   Wound Healing % -- 100   Margins -- Well-defined edges   Non-staged Wound Description -- Full thickness   Alfreda-wound Assessment Painful;Pink;Fragile Dry;Fragile   Wound Granulation Tissue Pink;Spongy Pink;Firm   Wound Bed Granulation (%) 20 % 100 %   Wound Bed Epithelium (%) 50 % --   Wound Bed Slough (%) 30 % --   Wound Odor None None   Tunneling? No No   Undermining? No No   Sinus Tracts? No No       No associated orders.       Wound 10/09/24 #3 Leg Left;Anterior (Active)   Date First Assessed/Time First Assessed: 10/09/24 0856    Wound Number (Wound Clinic Only): #3  Primary Wound Type: Vasculitis  Location: Leg  Wound Location Orientation: Left;Anterior      Assessments 10/9/2024  8:57 AM   Wound Image      Drainage Amount Scant   Drainage Description Serosanguineous   Wound Length (cm) 12.5 cm   Wound Width (cm) 4 cm   Wound Surface Area (cm^2) 50 cm^2   Wound Depth (cm) 0.1 cm    Wound Volume (cm^3) 5 cm^3   Margins Well-defined edges   Non-staged Wound Description Full thickness   Alfreda-wound Assessment Clean;Dry   Wound Granulation Tissue Red;Firm   Wound Bed Granulation (%) 25 %   Wound Bed Epithelium (%) 75 %   Wound Odor None   Tunneling? No   Undermining? No   Sinus Tracts? No       No associated orders.          ASSESSMENT AND PLAN:    1. 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 Adverse effect of radiation, subsequent encounter    3. 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 SLE (systemic lupus erythematosus related syndrome) (HCC)    5. Leukocytoclastic vasculitis (HCC)      Risks, benefits, and alternatives of current treatment plan discussed in detail.  Questions and concerns addressed. Red flags to RTC or ED reviewed.  Patient (or parent) agrees to plan.      NOTE TO PATIENT: The 21st Century Cures Act makes clinical notes like these available to patients in the interest of transparency. Clinical notes are medical documents used by physicians and care providers to communicate with each other. These documents include medical language and terminology, abbreviations, and treatment information that may sound technical and at times possibly unfamiliar. In addition, at times, the verbiage may appear blunt or direct. These documents are one tool providers use to communicate relevant information and clinical opinions of the care providers in a way that allows common understanding of the clinical context.   I spent40 minutes with the patient. This time included:    preparing to see the patient (eg, review notes and recent diagnostics),  seeing the patient, obtaining and/or reviewing separately obtained history, performing a medically appropriate examination and/or evaluation, counseling and educating the patient, documenting in the record, new wounds to legs, discussion about hbo  DISCHARGE:       Patient Instructions   Please return:2-3 weeks        Patient discharge and wound care instructions  Alina Aceves  10/9/2024          Soak the area with ANASEPT gauze.    You may shower and cleanse area with mild soap and water, try to \"scrub\" lightly with a gauze to remove the build up of draiange  rinse wound with ANASEPT cleanser,   dab dry with gauze and apply     Left breast:  apply a piece of honey alginate into the wound and cover with bordered silicone foam  Left side and lower extremities:  THE HYDROGEL SHEET DRESSING. Will order more for you    20-30mmhg calamine wrap (remove in 1 week)    Nutrition and blood sugar control:  Focus on the following:  Protein: Meats, beans, eggs, milk and yogurt particularly Greek yogurt), tofu, soy nuts, soy protein products (Follow the protein handout in your welcome folder)  Vitamin C: Citrus fruits and juices, strawberries, tomatoes, tomato juice, peppers, baked potatoes, spinach, broccoli, cauliflower, [place] sprouts, cabbage  Vitamin A: Dark green, leafy vegetables, orange or yellow vegetables, cantaloupe, fortified dairy products, liver, fortified cereals  Zinc: Fortified cereals, red meats, seafood  Consider supplementing with Vitor by [employer]. It can be purchased on amazon, [employer] website, or local pharmacy may be able to order it for you.  (These are essential branch chain amino acids that help with tissue building and wound healing).   When your blood sugar is consistently elevated greater than 180 your body can't heal or fight infection.     Concerns:  Signs of infection may include the following:  Increase in redness  Red \"streaks\" from wound  Increase in swelling  Fever  Unusual odor  Change in the amount of wound drainage     Should you experience any significant changes in your wound(s) or have any questions regarding your home care instructions please contact the [facility] @ [phone] If after regular business hours, please  call your family doctor or local emergency room. The treatment plan has been discussed at length between you and your provider. Follow all instructions carefully, it is very important. If you do not follow all instructions you are at risk of your wound not healing, infection, possible loss of limb and even loss of life.            Anita Smallwood FNP-C, CWCN-AP, CFCN, CSWS, WCC, DWC  10/9/2024

## 2024-10-09 ENCOUNTER — OFFICE VISIT (OUTPATIENT)
Dept: WOUND CARE | Facility: HOSPITAL | Age: 44
End: 2024-10-09
Attending: NURSE PRACTITIONER
Payer: COMMERCIAL

## 2024-10-09 VITALS
HEART RATE: 98 BPM | RESPIRATION RATE: 16 BRPM | SYSTOLIC BLOOD PRESSURE: 115 MMHG | TEMPERATURE: 98 F | DIASTOLIC BLOOD PRESSURE: 77 MMHG

## 2024-10-09 DIAGNOSIS — M31.0 LEUKOCYTOCLASTIC VASCULITIS (HCC): ICD-10-CM

## 2024-10-09 DIAGNOSIS — M32.9 SLE (SYSTEMIC LUPUS ERYTHEMATOSUS RELATED SYNDROME) (HCC): ICD-10-CM

## 2024-10-09 DIAGNOSIS — L98.492 NON-PRESSURE CHRONIC ULCER OF SKIN OF OTHER SITES WITH FAT LAYER EXPOSED (HCC): Primary | ICD-10-CM

## 2024-10-09 DIAGNOSIS — T66.XXXD ADVERSE EFFECT OF RADIATION, SUBSEQUENT ENCOUNTER: ICD-10-CM

## 2024-10-09 DIAGNOSIS — L97.822 NON-PRESSURE CHRONIC ULCER OF OTHER PART OF LEFT LOWER LEG WITH FAT LAYER EXPOSED (HCC): ICD-10-CM

## 2024-10-09 PROCEDURE — 99215 OFFICE O/P EST HI 40 MIN: CPT | Performed by: NURSE PRACTITIONER

## 2024-10-09 NOTE — PATIENT INSTRUCTIONS
Please return:2-3 weeks        Patient discharge and wound care instructions  Alina Aceves  10/9/2024          Soak the area with ANASEPT gauze.    You may shower and cleanse area with mild soap and water, try to \"scrub\" lightly with a gauze to remove the build up of draiange  rinse wound with ANASEPT cleanser,   dab dry with gauze and apply     Left breast:  apply a piece of honey alginate into the wound and cover with bordered silicone foam  Left side and lower extremities:  THE HYDROGEL SHEET DRESSING. Will order more for you    20-30mmhg calamine wrap (remove in 1 week)    Nutrition and blood sugar control:  Focus on the following:  Protein: Meats, beans, eggs, milk and yogurt particularly Greek yogurt), tofu, soy nuts, soy protein products (Follow the protein handout in your welcome folder)  Vitamin C: Citrus fruits and juices, strawberries, tomatoes, tomato juice, peppers, baked potatoes, spinach, broccoli, cauliflower, [place] sprouts, cabbage  Vitamin A: Dark green, leafy vegetables, orange or yellow vegetables, cantaloupe, fortified dairy products, liver, fortified cereals  Zinc: Fortified cereals, red meats, seafood  Consider supplementing with Vitor by [email or web address]. It can be purchased on amazon, [employer] website, or local pharmacy may be able to order it for you.  (These are essential branch chain amino acids that help with tissue building and wound healing).   When your blood sugar is consistently elevated greater than 180 your body can't heal or fight infection.     Concerns:  Signs of infection may include the following:  Increase in redness  Red \"streaks\" from wound  Increase in swelling  Fever  Unusual odor  Change in the amount of wound drainage     Should you experience any significant changes in your wound(s) or have any questions regarding your home care instructions please contact the [facility] @ [phone] If after regular business hours, please call your family doctor or  local emergency room. The treatment plan has been discussed at length between you and your provider. Follow all instructions carefully, it is very important. If you do not follow all instructions you are at risk of your wound not healing, infection, possible loss of limb and even loss of life.

## 2024-10-09 NOTE — PROGRESS NOTES
.Weekly Wound Education Note    Teaching Provided To: Patient  Training Topics: Discharge instructions;Dressing;Edema control;Compression;Cleasing and general instructions  Training Method: Explain/Verbal;Written  Training Response: Patient responds and understands            Honey alginate to breast wound, change every other day and cover with dry dressing.  Hydrogel sheets to flank, may use Aquaphor to healing areas.  Oil emulsion sheets to new leg wounds, calamine unna boot 20-30mmHg.  Patient will remove wrap in a week.  Supplies ordered from [employer].

## 2024-10-29 NOTE — PROGRESS NOTES
CHIEF COMPLAINT:     Chief Complaint   Patient presents with    Wound Care     Patients is here for a follow up. Patients stated no issue at this moment      HPI:   Information obtained from Patient and chart  5-24-24 INITIAL:  43 year old female with Past medical history invasive ductal carcinoma of left breast (dr kaiser camara) currently undergoing radiation therapy (dr kobi bran), leukocytoclastic vasculitis (treated with clobetasol ointment by dr. Walton) iron deficiency anemia, HFrEF (Ariane Putnam), lupus, Sjogren's syndrome.  Patient was recently admitted for oral sores and n/v and elctrolyte imbalance and hypokalemia.    Earlier this week she was seen in urgent care for uti symptoms and hematuria. She was treated with cefadroxil and recommended to see primary md. dr bran recommended patient to start yary, she states she is not regular with it. Patient has seen nutrition/dietician.  Patient is active with .  Earlier this week she had to cancel physical therapy due to wound pain.  on 5-11 patient was started on potassium citrate x 10 days and patient was to repeat labs, which she did on 5-21 but her potassium was still 2.9. she states she is taking the potassium.  I will udpate ariane putnam and I asked the patient to contact her as they may want her to increase her potassium.  Patient states that initially she was utilizing aquaphor but then developed an itchy red rash, so now has just been leaving it dry or using aveeno. She is able to tolerate very minimal cleansing of the area.  I discussed with her how to do it as tolerated in the shower with anasept.  Will utilize hydrogel cool sheets, patient to return next week. There is not any s/s of infection    8-15-24 patient returns. I have not seen her in the last 3 mo. Noted [facility] oncology msw note from July that was assisting her with supportive resources and financial assistance. She is  and has an 9 yo at home and 21 yo away at  college, her sister lives 2 hours away and remaining family lives out of state, mom is in [place].  She saw dr bran (radiation oncology) in June 2024 and it was documented that she had remaining areas of open lesions to the breast as well as open lesions due to vasculitis to her bilateral lower extremities.  She recently was admitted from 7-21 to 7-24 for hypokalemia. She has a f/u with dr. Camara sept 13.  She has been dressing areas with over the counter abx ointment. She has a let flank scattered ulcerations in a dermatome pattern that patient states started as vessicles, she was treated with valtrax by urgent care.  She also indeed has lower extremity ulcerations on the right knee and left lateral tibia.  The breast wound has deterorated and is significantly deeper with necrosis. No malodor.  There was a small vessel clip that was spitting that I removed with a forceps. We discussed this small clip could be the reason the wound opened up, but I am also concerned about recurrence.  Will have her dress with honey gauze to the breast and hydrogel sheets to all other areas (flank and legs). Will order her dressings. Patient states her appetite is getting better lately. No acute s/s of infection.    8-29-24 patient returns.  She did see surgeon, dr pinedo, and mri of the breast (scheduled for sept 16), her appointment with dr camara is sept 13.  I did reach out to dr pinedo via [employer] chat.  Per patient dr. Pinedo d/w her taking her to the operating room to clean the wound and biopsy.  I let her know that I would agree with this as patient is too sensitive to do in clinic debridements.  She has been dressing the wound with honey gauze. We ordered dressings for her lower extremity wounds (hydrogel) received, she did receive them.  The breast wound has 3 spitting staples again.  Will have patient pack with vashe 2-3 x a day and I will see her again before her mri to remove any further potential spitting clips.  Patient states  she is eating better.  We discussed keeping her skin on lower legs moisturized.     9-6-24 patient returns.  Patient has been dressing the breast wound with vashe and the flank/abdomen with hydrocolloid.  The flank/abdomen is improved. The breast wound is  with less necrosis, patient states less drainage, no malodor.  Her legs remain resolved. She states she is using the yary.  Her mri is the 16th and dr camara the 13th.  She will f/u with dr viera after mri.  There are not any surgical clips in the wound bed today.  Even with less necrosis it still remains significant and she does not tolerate debridement.  I informed her that once she gets the mri and plan can be determined with dr. Viera, we can see her back after debridement. I mentioned the wound vac as an option after debridement. Patient to schedule an appointment for 1 month and she can move it sooner/later as needed. Continue with current poc at this time.    10-9-24 patient returns. She saw dr camara/oncology, She followed up with cardiology and her metoprolol was reduced and she is to schedule an echo.  She had her mri and  dr viera let her know that know suspicious findings were noted, just inflammation.  She saw surgeon on 9-30-24. she jhas continued to dress the wound with vashe wtd. The wound is improved. There is a small staple trying to spit again in the base of the wound however I am unable to lift it to remove it.  The wound is slightly dry, less necrosis. We discussed switching back to honey.  Her shingles areas are improved, but dry, no c/o pain. Patient has new areas open on her left lower leg that she states she pulled the skin and she started bleeding.  We also discussed Hbo it's advantages, time commitment, how it works. Writtne information given to patient and she will consider it. No s/s of infection.    10-30-24 Patient returns. Her lower extremity wounds and the left lateral side are dry, but essentially resolved we discussed  the importance of moisturization and not pulling on dry skin. she has been dressing the breast wound with honey gel pad (she ran out of honey alginate).  There is not any s/s of infection, the breast wound is still painful to touch. We discussed the mri in relation to \"infection/inflammation\".  Continue with the honey alginate to the breast wound. She prefers the honey gel sheet as the cover dressing instead of a border dressing as she states her skin does better with that than the border silicones.    MEDICATIONS:     Current Outpatient Medications:     Potassium Chloride ER 10 MEQ Oral Tab CR, Take 1 tablet (10 mEq total) by mouth daily., Disp: 30 tablet, Rfl: 0    HYDROcodone-acetaminophen 5-325 MG Oral Tab, Take 1 tablet by mouth every 6 (six) hours as needed for Pain. (Patient not taking: Reported on 8/15/2024), Disp: 10 tablet, Rfl: 0    benzonatate 200 MG Oral Cap, Take 1 capsule (200 mg total) by mouth 3 (three) times daily as needed for cough. (Patient not taking: Reported on 8/15/2024), Disp: 30 capsule, Rfl: 0    prochlorperazine (COMPAZINE) 10 mg tablet, Take 1 tablet (10 mg total) by mouth every 6 (six) hours as needed for Nausea or vomiting. (Patient not taking: Reported on 8/15/2024), Disp: , Rfl:     metoprolol succinate ER 50 MG Oral Tablet 24 Hr, Take 1 tablet (50 mg total) by mouth 2x Daily(Beta Blocker)., Disp: 180 tablet, Rfl: 3    ondansetron 4 MG Oral Tablet Dispersible, Take 1 tablet (4 mg total) by mouth every 4 (four) hours as needed for Nausea. (Patient not taking: Reported on 8/15/2024), Disp: 10 tablet, Rfl: 0    DULoxetine 30 MG Oral Cap DR Particles, Take 2 capsules (60 mg total) by mouth daily., Disp: , Rfl:     lidocaine-prilocaine 2.5-2.5 % External Cream, APPLY SMALL AMOUNT OVER PORT PRIOR TO ACCESS, Disp: , Rfl:     zolpidem 10 MG Oral Tab, Take 1 tablet (10 mg total) by mouth nightly as needed for Sleep., Disp: , Rfl:   ALLERGIES:     Allergies   Allergen Reactions    Bactrim  [Sulfamethoxazole W/Trimethoprim] RASH    Adhesive Tape RASH      REVIEW OF SYSTEMS:   This information was obtained from the patient/family and chart.    See HPI for pertinent positives, otherwise 10 pt ROS negative.  HISTORY:   Past medical, surgical, family and social history updated where appropriate.      PHYSICAL EXAM:     Vitals:    10/30/24 1129   BP: 111/76   Pulse: 90   Resp: 16   Temp: 97.8 °F (36.6 °C)           Estimated body mass index is 14.5 kg/m² as calculated from the following:    Height as of 7/21/24: 64\".    Weight as of 7/21/24: 84 lb 8 oz (38.3 kg).   POC Glucose   Date Value Ref Range Status   12/19/2023 72 70 - 99 mg/dL Final   12/18/2023 125 (H) 70 - 99 mg/dL Final   12/18/2023 84 70 - 99 mg/dL Final       Vital signs reviewed.Appears stated age, well groomed.    Constitutional:  Bp wnl. Pulse Regular and wnl for patient. Respirations easy and unlabored. Temperature wnl. Patient is very thin in appearance. Appearance neat and clean. Appears in no acute distress.     Musculoskeletal:  Patient ambulation is stable  Integumentary:  refer to wound characteristics and images   Psychiatric:  Judgment and insight intact. Alert and oriented times 3. No evidence of depression, anxiety, or agitation. Calm, cooperative, and communicative. Appropriate interactions and affect.  DIAGNOSTICS:     Lab Results   Component Value Date    BUN 32 (H) 07/22/2024    CREATSERUM 0.69 07/22/2024    ALB 3.1 (L) 07/21/2024    TP 6.6 07/21/2024       WOUND ASSESSMENT:     Wound 05/24/24 #1 Radiation therapy Breast Left (Active)   Date First Assessed/Time First Assessed: 05/24/24 0857    Wound Number (Wound Clinic Only): #1 Radiation therapy  Primary Wound Type: Other (comment)  Location: Breast  Wound Location Orientation: Left      Assessments 5/24/2024  8:59 AM 10/30/2024 11:33 AM   Wound Image        Drainage Amount Large Scant   Drainage Description Serous;Yellow Serous;Clear   Wound Length (cm) 15.5 cm 1.6 cm    Wound Width (cm) 19 cm 1 cm   Wound Surface Area (cm^2) 294.5 cm^2 1.6 cm^2   Wound Depth (cm) 0.1 cm 1 cm   Wound Volume (cm^3) 29.45 cm^3 1.6 cm^3   Wound Healing % -- 95   Margins Well-defined edges Well-defined edges   Non-staged Wound Description Full thickness Full thickness   Alfreda-wound Assessment Moist;Edema Pink;Moist   Wound Granulation Tissue Firm;Pink Pale Grey;Pink;Firm   Wound Bed Granulation (%) 20 % 100 %   Wound Bed Epithelium (%) 50 % --   Wound Bed Slough (%) 30 % --   Wound Odor None None       No associated orders.       Wound 08/15/24 #2 Flank Left;Anterior;Posterior (Active)   Date First Assessed/Time First Assessed: 08/15/24 1406    Wound Number (Wound Clinic Only): #2  Primary Wound Type: (c) Other (comment)  Location: Flank  Wound Location Orientation: Left;Anterior;Posterior      Assessments 8/15/2024  2:07 PM 10/30/2024 11:32 AM   Wound Image        Closure Not approximated --   Drainage Amount Scant Unable to assess   Drainage Description Serous;Yellow --   Wound Length (cm) 2.5 cm 0.3 cm   Wound Width (cm) 32.1 cm 0.4 cm   Wound Surface Area (cm^2) 80.25 cm^2 0.12 cm^2   Wound Depth (cm) 0.2 cm 0 cm   Wound Volume (cm^3) 16.05 cm^3 0 cm^3   Wound Healing % -- 100   Margins -- Well-defined edges   Non-staged Wound Description -- Full thickness   Alfreda-wound Assessment Painful;Pink;Fragile Dry   Wound Granulation Tissue Pink;Spongy --   Wound Bed Granulation (%) 20 % --   Wound Bed Epithelium (%) 50 % --   Wound Bed Slough (%) 30 % --   Wound Odor None None   Shape -- 100% scabbed   Tunneling? No --   Undermining? No --   Sinus Tracts? No --       No associated orders.       Wound 10/09/24 #3 Leg Left;Anterior (Active)   Date First Assessed/Time First Assessed: 10/09/24 0856    Wound Number (Wound Clinic Only): #3  Primary Wound Type: Vasculitis  Location: Leg  Wound Location Orientation: Left;Anterior      Assessments 10/9/2024  8:57 AM 10/30/2024 11:30 AM   Wound Image        Drainage  Amount Scant Unable to assess   Drainage Description Serosanguineous --   Treatments Compression --   Wound Length (cm) 12.5 cm 0.4 cm   Wound Width (cm) 4 cm 0.4 cm   Wound Surface Area (cm^2) 50 cm^2 0.16 cm^2   Wound Depth (cm) 0.1 cm 0.1 cm   Wound Volume (cm^3) 5 cm^3 0.016 cm^3   Wound Healing % -- 100   Margins Well-defined edges Well-defined edges   Non-staged Wound Description Full thickness Full thickness   Alfreda-wound Assessment Clean;Dry Dry;Edema   Wound Granulation Tissue Red;Firm --   Wound Bed Granulation (%) 25 % --   Wound Bed Epithelium (%) 75 % --   Wound Odor None None   Shape -- 100% scabbed   Tunneling? No --   Undermining? No --   Sinus Tracts? No --       No associated orders.       Compression Wrap 10/09/24 Leg Anterior;Left (Active)   Placement Date/Time: 10/09/24 0950   Location: Leg  Wound Location Orientation: Anterior;Left      Assessments 10/9/2024  9:50 AM   Response to Treatment Well tolerated   Compression Layers Multilayer   Compression Product Type Unna Boot   Dressing Applied Yes   Compression Wrap Location Toes to Knee   Compression Wrap Status Dry;Clean       No associated orders.          ASSESSMENT AND PLAN:    1. 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 Adverse effect of radiation, subsequent encounter    3. SLE (systemic lupus erythematosus related syndrome) (HCC)    4. Malignant neoplasm of left breast in female, estrogen receptor negative, unspecified site of breast (HCC)        Risks, benefits, and alternatives of current treatment plan discussed in detail.  Questions and concerns addressed. Red flags to RTC or ED reviewed.  Patient (or parent) agrees to plan.      NOTE TO PATIENT: The 21st Century Cures Act makes clinical notes like these available to patients in the interest of transparency. Clinical notes are medical documents used by physicians and care providers to communicate with each other. These documents include  medical language and terminology, abbreviations, and treatment information that may sound technical and at times possibly unfamiliar. In addition, at times, the verbiage may appear blunt or direct. These documents are one tool providers use to communicate relevant information and clinical opinions of the care providers in a way that allows common understanding of the clinical context.   I [id number] with the patient. This time included:    preparing to see the patient (eg, review notes and recent diagnostics),  seeing the patient, obtaining and/or reviewing separately obtained history, performing a medically appropriate examination and/or evaluation, counseling and educating the patient, documenting in the record,  DISCHARGE:      Patient Instructions   Please return:2-3 weeks        Patient discharge and wound care instructions  Alina Aceves  10/30/2024      Soak the area with ANASEPT gauze.    You may shower and cleanse area with mild soap and water, try to \"scrub\" lightly with a gauze to remove the build up of draiange  rinse wound with ANASEPT cleanser,   dab dry with gauze and apply     Left breast:  apply a piece of honey alginate into the wound and cover with honey hydrogel sheet   Left side and lower extremities:  moisturize, moisturize, moisturize    Nutrition and blood sugar control:  Focus on the following:  Protein: Meats, beans, eggs, milk and yogurt particularly Greek yogurt), tofu, soy nuts, soy protein products (Follow the protein handout in your welcome folder)  Vitamin C: Citrus fruits and juices, strawberries, tomatoes, tomato juice, peppers, baked potatoes, spinach, broccoli, cauliflower, [place] sprouts, cabbage  Vitamin A: Dark green, leafy vegetables, orange or yellow vegetables, cantaloupe, fortified dairy products, liver, fortified cereals  Zinc: Fortified cereals, red meats, seafood  Consider supplementing with Vitor by [employer]. It can be purchased on amazon, [employer] website, or  local pharmacy may be able to order it for you.  (These are essential branch chain amino acids that help with tissue building and wound healing).   When your blood sugar is consistently elevated greater than 180 your body can't heal or fight infection.     Concerns:  Signs of infection may include the following:  Increase in redness  Red \"streaks\" from wound  Increase in swelling  Fever  Unusual odor  Change in the amount of wound drainage     Should you experience any significant changes in your wound(s) or have any questions regarding your home care instructions please contact the [facility] @ [phone] If after regular business hours, please call your family doctor or local emergency room. The treatment plan has been discussed at length between you and your provider. Follow all instructions carefully, it is very important. If you do not follow all instructions you are at risk of your wound not healing, infection, possible loss of limb and even loss of life.          Anita Smallwood FNP-C, CWCN-AP, CFCN, CSWS, WCC, DWC  10/30/2024

## 2024-10-30 ENCOUNTER — OFFICE VISIT (OUTPATIENT)
Dept: WOUND CARE | Facility: HOSPITAL | Age: 44
End: 2024-10-30
Attending: NURSE PRACTITIONER
Payer: COMMERCIAL

## 2024-10-30 VITALS
DIASTOLIC BLOOD PRESSURE: 76 MMHG | HEART RATE: 90 BPM | TEMPERATURE: 98 F | SYSTOLIC BLOOD PRESSURE: 111 MMHG | RESPIRATION RATE: 16 BRPM

## 2024-10-30 DIAGNOSIS — C50.912 MALIGNANT NEOPLASM OF LEFT BREAST IN FEMALE, ESTROGEN RECEPTOR NEGATIVE, UNSPECIFIED SITE OF BREAST (HCC): ICD-10-CM

## 2024-10-30 DIAGNOSIS — T66.XXXD ADVERSE EFFECT OF RADIATION, SUBSEQUENT ENCOUNTER: ICD-10-CM

## 2024-10-30 DIAGNOSIS — L98.492 NON-PRESSURE CHRONIC ULCER OF SKIN OF OTHER SITES WITH FAT LAYER EXPOSED (HCC): Primary | ICD-10-CM

## 2024-10-30 DIAGNOSIS — M32.9 SLE (SYSTEMIC LUPUS ERYTHEMATOSUS RELATED SYNDROME) (HCC): ICD-10-CM

## 2024-10-30 DIAGNOSIS — Z17.1 MALIGNANT NEOPLASM OF LEFT BREAST IN FEMALE, ESTROGEN RECEPTOR NEGATIVE, UNSPECIFIED SITE OF BREAST (HCC): ICD-10-CM

## 2024-10-30 PROCEDURE — 99214 OFFICE O/P EST MOD 30 MIN: CPT | Performed by: NURSE PRACTITIONER

## 2024-10-30 NOTE — PROGRESS NOTES
.Weekly Wound Education Note    Teaching Provided To: Patient  Training Topics: Discharge instructions;Dressing;Cleasing and general instructions  Training Method: Explain/Verbal;Written  Training Response: Patient responds and understands            Patient to moisturize legs and flank area daily, wounds are healed.  Honey alginate to wound, cover with border foam.  Supplies ordered this visit.

## 2024-10-30 NOTE — PATIENT INSTRUCTIONS
Please return:2-3 weeks        Patient discharge and wound care instructions  Alina Aceves  10/30/2024      Soak the area with ANASEPT gauze.    You may shower and cleanse area with mild soap and water, try to \"scrub\" lightly with a gauze to remove the build up of draiange  rinse wound with ANASEPT cleanser,   dab dry with gauze and apply     Left breast:  apply a piece of honey alginate into the wound and cover with honey hydrogel sheet   Left side and lower extremities:  moisturize, moisturize, moisturize    Nutrition and blood sugar control:  Focus on the following:  Protein: Meats, beans, eggs, milk and yogurt particularly Greek yogurt), tofu, soy nuts, soy protein products (Follow the protein handout in your welcome folder)  Vitamin C: Citrus fruits and juices, strawberries, tomatoes, tomato juice, peppers, baked potatoes, spinach, broccoli, cauliflower, [place] sprouts, cabbage  Vitamin A: Dark green, leafy vegetables, orange or yellow vegetables, cantaloupe, fortified dairy products, liver, fortified cereals  Zinc: Fortified cereals, red meats, seafood  Consider supplementing with Vitor by [employer]. It can be purchased on amazon, [employer] website, or local pharmacy may be able to order it for you.  (These are essential branch chain amino acids that help with tissue building and wound healing).   When your blood sugar is consistently elevated greater than 180 your body can't heal or fight infection.     Concerns:  Signs of infection may include the following:  Increase in redness  Red \"streaks\" from wound  Increase in swelling  Fever  Unusual odor  Change in the amount of wound drainage     Should you experience any significant changes in your wound(s) or have any questions regarding your home care instructions please contact the [facility] @ [phone] If after regular business hours, please call your family doctor or local emergency room. The treatment plan has been discussed at  length between you and your provider. Follow all instructions carefully, it is very important. If you do not follow all instructions you are at risk of your wound not healing, infection, possible loss of limb and even loss of life.

## 2024-11-20 NOTE — PROGRESS NOTES
CHIEF COMPLAINT:     Chief Complaint   Patient presents with    Wound Care     Patient arrives for follow-up. Denies new concerns.     HPI:   Information obtained from Patient and chart  5-24-24 INITIAL:  43 year old female with Past medical history invasive ductal carcinoma of left breast (dr kaiser camara) currently undergoing radiation therapy (dr kobi bran), leukocytoclastic vasculitis (treated with clobetasol ointment by dr. Walton) iron deficiency anemia, HFrEF (Ariane Putnam), lupus, Sjogren's syndrome.  Patient was recently admitted for oral sores and n/v and elctrolyte imbalance and hypokalemia.    Earlier this week she was seen in urgent care for uti symptoms and hematuria. She was treated with cefadroxil and recommended to see primary md. dr bran recommended patient to start yary, she states she is not regular with it. Patient has seen nutrition/dietician.  Patient is active with [facility].  Earlier this week she had to cancel physical therapy due to wound pain.  on 5-11 patient was started on potassium citrate x 10 days and patient was to repeat labs, which she did on 5-21 but her potassium was still 2.9. she states she is taking the potassium.  I will udpate ariane putnam and I asked the patient to contact her as they may want her to increase her potassium.  Patient states that initially she was utilizing aquaphor but then developed an itchy red rash, so now has just been leaving it dry or using aveeno. She is able to tolerate very minimal cleansing of the area.  I discussed with her how to do it as tolerated in the shower with anasept.  Will utilize hydrogel cool sheets, patient to return next week. There is not any s/s of infection    HPI PRIOR TO NOVEMBER 2024 SEE INDIVIDUAL PROGRESS NOTES  9-6-24 patient returns.  Patient has been dressing the breast wound with vashe and the flank/abdomen with hydrocolloid.  The flank/abdomen is improved. The breast wound is  with less necrosis,  patient states less drainage, no malodor.  Her legs remain resolved. She states she is using the yary.  Her mri is the 16th and dr camara the 13th.  She will f/u with dr viera after mri.  There are not any surgical clips in the wound bed today.  Even with less necrosis it still remains significant and she does not tolerate debridement.  I informed her that once she gets the mri and plan can be determined with dr. Viera, we can see her back after debridement. I mentioned the wound vac as an option after debridement. Patient to schedule an appointment for 1 month and she can move it sooner/later as needed. Continue with current poc at this time.    10-9-24 patient returns. She saw dr camara/oncology, She followed up with cardiology and her metoprolol was reduced and she is to schedule an echo.  She had her mri and  dr viera let her know that know suspicious findings were noted, just inflammation.  She saw surgeon on 9-30-24. she jhas continued to dress the wound with vashe wtd. The wound is improved. There is a small staple trying to spit again in the base of the wound however I am unable to lift it to remove it.  The wound is slightly dry, less necrosis. We discussed switching back to honey.  Her shingles areas are improved, but dry, no c/o pain. Patient has new areas open on her left lower leg that she states she pulled the skin and she started bleeding.  We also discussed Hbo it's advantages, time commitment, how it works. Writtne information given to patient and she will consider it. No s/s of infection.    10-30-24 Patient returns. Her lower extremity wounds and the left lateral side are dry, but essentially resolved we discussed the importance of moisturization and not pulling on dry skin. she has been dressing the breast wound with honey gel pad (she ran out of honey alginate).  There is not any s/s of infection, the breast wound is still painful to touch. We discussed the mri in relation to  \"infection/inflammation\".  Continue with the honey alginate to the breast wound. She prefers the honey gel sheet as the cover dressing instead of a border dressing as she states her skin does better with that than the border silicones.    11-21-24 patient returns. She feels the honey alginate was too hard and causing the wound to enlarge, the wound is measuring larger, but tissue is improved with more soft pink/red and less fibrotic tissue. She states she is gaining weight and getting stronger, she wants to be able to drive again. We discussed hbo and she states the time commitment is too much. She asked about operative debridement. We discussed pros/cons and I let her know it would be up to dr sunshine. Will utilize silver alginate (hoping the softness will be less painful for packing of the wound). No acute s/s of infection. There is a dusky area at 6 oclock on the wound bed, however she states it has always been there. There is not any malodor.    MEDICATIONS:     Current Outpatient Medications:     Potassium Chloride ER 10 MEQ Oral Tab CR, Take 1 tablet (10 mEq total) by mouth daily., Disp: 30 tablet, Rfl: 0    HYDROcodone-acetaminophen 5-325 MG Oral Tab, Take 1 tablet by mouth every 6 (six) hours as needed for Pain. (Patient not taking: Reported on 8/15/2024), Disp: 10 tablet, Rfl: 0    benzonatate 200 MG Oral Cap, Take 1 capsule (200 mg total) by mouth 3 (three) times daily as needed for cough. (Patient not taking: Reported on 8/15/2024), Disp: 30 capsule, Rfl: 0    prochlorperazine (COMPAZINE) 10 mg tablet, Take 1 tablet (10 mg total) by mouth every 6 (six) hours as needed for Nausea or vomiting. (Patient not taking: Reported on 8/15/2024), Disp: , Rfl:     metoprolol succinate ER 50 MG Oral Tablet 24 Hr, Take 1 tablet (50 mg total) by mouth 2x Daily(Beta Blocker)., Disp: 180 tablet, Rfl: 3    ondansetron 4 MG Oral Tablet Dispersible, Take 1 tablet (4 mg total) by mouth every 4 (four) hours as needed for  Nausea. (Patient not taking: Reported on 8/15/2024), Disp: 10 tablet, Rfl: 0    DULoxetine 30 MG Oral Cap DR Particles, Take 2 capsules (60 mg total) by mouth daily., Disp: , Rfl:     lidocaine-prilocaine 2.5-2.5 % External Cream, APPLY SMALL AMOUNT OVER PORT PRIOR TO ACCESS, Disp: , Rfl:     zolpidem 10 MG Oral Tab, Take 1 tablet (10 mg total) by mouth nightly as needed for Sleep., Disp: , Rfl:   ALLERGIES:     Allergies   Allergen Reactions    Bactrim [Sulfamethoxazole W/Trimethoprim] RASH    Adhesive Tape RASH      REVIEW OF SYSTEMS:   This information was obtained from the patient/family and chart.    See HPI for pertinent positives, otherwise 10 pt ROS negative.  HISTORY:   Past medical, surgical, family and social history updated where appropriate.      PHYSICAL EXAM:     Vitals:    11/21/24 0958   BP: 112/70   Pulse: 102   Resp: 14   Temp: 97.9 °F (36.6 °C)     Estimated body mass index is 14.5 kg/m² as calculated from the following:    Height as of 7/21/24: 64\".    Weight as of 7/21/24: 84 lb 8 oz (38.3 kg).   POC Glucose   Date Value Ref Range Status   12/19/2023 72 70 - 99 mg/dL Final   12/18/2023 125 (H) 70 - 99 mg/dL Final   12/18/2023 84 70 - 99 mg/dL Final       Vital signs reviewed.Appears stated age, well groomed.    Constitutional:  Bp wnl. Pulse Regular and wnl for patient. Respirations easy and unlabored. Temperature wnl. Patient is very thin in appearance. Appearance neat and clean. Appears in no acute distress.     Musculoskeletal:  Patient ambulation is stable  Integumentary:  refer to wound characteristics and images   Psychiatric:  Judgment and insight intact. Alert and oriented times 3. No evidence of depression, anxiety, or agitation. Calm, cooperative, and communicative. Appropriate interactions and affect.  DIAGNOSTICS:     Lab Results   Component Value Date    BUN 32 (H) 07/22/2024    CREATSERUM 0.69 07/22/2024    ALB 3.1 (L) 07/21/2024    TP 6.6 07/21/2024       WOUND ASSESSMENT:      Wound 05/24/24 #1 Radiation therapy Breast Left (Active)   Date First Assessed/Time First Assessed: 05/24/24 0857    Wound Number (Wound Clinic Only): #1 Radiation therapy  Primary Wound Type: Other (comment)  Location: Breast  Wound Location Orientation: Left      Assessments 5/24/2024  8:59 AM 11/21/2024 10:00 AM   Wound Image        Drainage Amount Large Scant   Drainage Description Serous;Yellow Yellow   Wound Length (cm) 15.5 cm 1.7 cm   Wound Width (cm) 19 cm 1.6 cm   Wound Surface Area (cm^2) 294.5 cm^2 2.72 cm^2   Wound Depth (cm) 0.1 cm 1 cm   Wound Volume (cm^3) 29.45 cm^3 2.72 cm^3   Wound Healing % -- 91   Margins Well-defined edges Well-defined edges   Non-staged Wound Description Full thickness Full thickness   Alfreda-wound Assessment Moist;Edema Pink   Wound Granulation Tissue Firm;Pink Pink;Firm   Wound Bed Granulation (%) 20 % 100 %   Wound Bed Epithelium (%) 50 % --   Wound Bed Slough (%) 30 % --   Wound Odor None None   Shape -- dusky area to wound bed   Tunneling? -- No   Undermining? -- Yes   Number of Undermines -- 1   Undermine 1 Start Position -- 11   Undermine 1 End Position -- 1       No associated orders.          ASSESSMENT AND PLAN:    1. 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 Adverse effect of radiation, subsequent encounter    3. SLE (systemic lupus erythematosus related syndrome) (HCC)          Risks, benefits, and alternatives of current treatment plan discussed in detail.  Questions and concerns addressed. Red flags to RTC or ED reviewed.  Patient (or parent) agrees to plan.      NOTE TO PATIENT: The 21st Century Cures Act makes clinical notes like these available to patients in the interest of transparency. Clinical notes are medical documents used by physicians and care providers to communicate with each other. These documents include medical language and terminology, abbreviations, and treatment information that may sound technical  and at times possibly unfamiliar. In addition, at times, the verbiage may appear blunt or direct. These documents are one tool providers use to communicate relevant information and clinical opinions of the care providers in a way that allows common understanding of the clinical context.   I [id number] with the patient. This time included:    preparing to see the patient (eg, review notes and recent diagnostics),  seeing the patient, obtaining and/or reviewing separately obtained history, performing a medically appropriate examination and/or evaluation, counseling and educating the patient, documenting in the record,  DISCHARGE:      Patient Instructions   Please return: 2 WEEKS (any day of the week)         Patient discharge and wound care instructions  Alina Aceves  11/21/2024         Soak the area with ANASEPT gauze.    You may shower and cleanse area with mild soap and water, try to \"scrub\" lightly with a gauze to remove the build up of draiange  rinse wound with ANASEPT cleanser,   dab dry with gauze and apply     Left breast:   apply a piece of SILVER ALGINATE into the wound and cover with honey hydrogel sheet   Left side and lower extremities:  moisturize, moisturize, moisturize    Nutrition and blood sugar control:  Focus on the following:  Protein: Meats, beans, eggs, milk and yogurt particularly Greek yogurt), tofu, soy nuts, soy protein products (Follow the protein handout in your welcome folder)  Vitamin C: Citrus fruits and juices, strawberries, tomatoes, tomato juice, peppers, baked potatoes, spinach, broccoli, cauliflower, [place] sprouts, cabbage  Vitamin A: Dark green, leafy vegetables, orange or yellow vegetables, cantaloupe, fortified dairy products, liver, fortified cereals  Zinc: Fortified cereals, red meats, seafood  Consider supplementing with Vitor by [employer]. It can be purchased on amazon, [employer], or local pharmacy may be able to order it for you.  (These are essential  branch chain amino acids that help with tissue building and wound healing).   When your blood sugar is consistently elevated greater than 180 your body can't heal or fight infection.     Concerns:  Signs of infection may include the following:  Increase in redness  Red \"streaks\" from wound  Increase in swelling  Fever  Unusual odor  Change in the amount of wound drainage     Should you experience any significant changes in your wound(s) or have any questions regarding your home care instructions please contact the [facility] @ [phone] If after regular business hours, please call your family doctor or local emergency room. The treatment plan has been discussed at length between you and your provider. Follow all instructions carefully, it is very important. If you do not follow all instructions you are at risk of your wound not healing, infection, possible loss of limb and even loss of life.        Anita Smallwood FNP-C, CWCN-AP, CFCN, CSWS, WCC, DWC  11/21/2024

## 2024-11-21 ENCOUNTER — OFFICE VISIT (OUTPATIENT)
Dept: WOUND CARE | Facility: HOSPITAL | Age: 44
End: 2024-11-21
Attending: NURSE PRACTITIONER
Payer: COMMERCIAL

## 2024-11-21 VITALS
TEMPERATURE: 98 F | RESPIRATION RATE: 14 BRPM | HEART RATE: 102 BPM | SYSTOLIC BLOOD PRESSURE: 112 MMHG | DIASTOLIC BLOOD PRESSURE: 70 MMHG

## 2024-11-21 DIAGNOSIS — M32.9 SLE (SYSTEMIC LUPUS ERYTHEMATOSUS RELATED SYNDROME) (HCC): ICD-10-CM

## 2024-11-21 DIAGNOSIS — L98.492 NON-PRESSURE CHRONIC ULCER OF SKIN OF OTHER SITES WITH FAT LAYER EXPOSED (HCC): Primary | ICD-10-CM

## 2024-11-21 DIAGNOSIS — T66.XXXD ADVERSE EFFECT OF RADIATION, SUBSEQUENT ENCOUNTER: ICD-10-CM

## 2024-11-21 PROCEDURE — 99213 OFFICE O/P EST LOW 20 MIN: CPT

## 2024-11-21 NOTE — PROGRESS NOTES
.Weekly Wound Education Note    Teaching Provided To: Patient  Training Topics: Dressing;Cleasing and general instructions;Discharge instructions  Training Method: Explain/Verbal;Written  Training Response: Patient responds and understands        Notes: Wound stable. Dressing changed to silver alginate strip, and bordered foam. Will run for skin subs this visit.

## 2024-11-21 NOTE — PATIENT INSTRUCTIONS
Please return: 2 WEEKS (any day of the week)         Patient discharge and wound care instructions  Alina Aceves  11/21/2024         Soak the area with ANASEPT gauze.    You may shower and cleanse area with mild soap and water, try to \"scrub\" lightly with a gauze to remove the build up of draiange  rinse wound with ANASEPT cleanser,   dab dry with gauze and apply     Left breast:   apply a piece of SILVER ALGINATE into the wound and cover with honey hydrogel sheet   Left side and lower extremities:  moisturize, moisturize, moisturize    Nutrition and blood sugar control:  Focus on the following:  Protein: Meats, beans, eggs, milk and yogurt particularly Greek yogurt), tofu, soy nuts, soy protein products (Follow the protein handout in your welcome folder)  Vitamin C: Citrus fruits and juices, strawberries, tomatoes, tomato juice, peppers, baked potatoes, spinach, broccoli, cauliflower, [place] sprouts, cabbage  Vitamin A: Dark green, leafy vegetables, orange or yellow vegetables, cantaloupe, fortified dairy products, liver, fortified cereals  Zinc: Fortified cereals, red meats, seafood  Consider supplementing with Vitor by [employer]. It can be purchased on amazon, [employer] website, or local pharmacy may be able to order it for you.  (These are essential branch chain amino acids that help with tissue building and wound healing).   When your blood sugar is consistently elevated greater than 180 your body can't heal or fight infection.     Concerns:  Signs of infection may include the following:  Increase in redness  Red \"streaks\" from wound  Increase in swelling  Fever  Unusual odor  Change in the amount of wound drainage     Should you experience any significant changes in your wound(s) or have any questions regarding your home care instructions please contact the [facility] @ [phone] If after regular business hours, please call your family doctor or local emergency room. The treatment plan  has been discussed at length between you and your provider. Follow all instructions carefully, it is very important. If you do not follow all instructions you are at risk of your wound not healing, infection, possible loss of limb and even loss of life.

## 2024-12-04 NOTE — PROGRESS NOTES
CHIEF COMPLAINT:     Chief Complaint   Patient presents with    Wound Care     Follow up for left breast wound. No complaints at this time.      HPI:   Information obtained from Patient and chart  5-24-24 INITIAL:  43 year old female with Past medical history invasive ductal carcinoma of left breast (dr kaiser camara) currently undergoing radiation therapy (dr kobi bran), leukocytoclastic vasculitis (treated with clobetasol ointment by dr. Walton) iron deficiency anemia, HFrEF (Ariane Putnam), lupus, Sjogren's syndrome.  Patient was recently admitted for oral sores and n/v and elctrolyte imbalance and hypokalemia.    Earlier this week she was seen in urgent care for uti symptoms and hematuria. She was treated with cefadroxil and recommended to see primary md. dr bran recommended patient to start yary, she states she is not regular with it. Patient has seen nutrition/dietician.  Patient is active with [facility].  Earlier this week she had to cancel physical therapy due to wound pain.  on 5-11 patient was started on potassium citrate x 10 days and patient was to repeat labs, which she did on 5-21 but her potassium was still 2.9. she states she is taking the potassium.  I will udpate ariane putnam and I asked the patient to contact her as they may want her to increase her potassium.  Patient states that initially she was utilizing aquaphor but then developed an itchy red rash, so now has just been leaving it dry or using aveeno. She is able to tolerate very minimal cleansing of the area.  I discussed with her how to do it as tolerated in the shower with anasept.  Will utilize hydrogel cool sheets, patient to return next week. There is not any s/s of infection    HPI PRIOR TO NOVEMBER 2024 SEE INDIVIDUAL PROGRESS NOTES  9-6-24 patient returns.  Patient has been dressing the breast wound with vashe and the flank/abdomen with hydrocolloid.  The flank/abdomen is improved. The breast wound is  with less  necrosis, patient states less drainage, no malodor.  Her legs remain resolved. She states she is using the yary.  Her mri is the 16th and dr camara the 13th.  She will f/u with dr viera after mri.  There are not any surgical clips in the wound bed today.  Even with less necrosis it still remains significant and she does not tolerate debridement.  I informed her that once she gets the mri and plan can be determined with dr. Viera, we can see her back after debridement. I mentioned the wound vac as an option after debridement. Patient to schedule an appointment for 1 month and she can move it sooner/later as needed. Continue with current poc at this time.    10-9-24 patient returns. She saw dr camara/oncology, She followed up with cardiology and her metoprolol was reduced and she is to schedule an echo.  She had her mri and  dr viera let her know that know suspicious findings were noted, just inflammation.  She saw surgeon on 9-30-24. she jhas continued to dress the wound with vashe wtd. The wound is improved. There is a small staple trying to spit again in the base of the wound however I am unable to lift it to remove it.  The wound is slightly dry, less necrosis. We discussed switching back to honey.  Her shingles areas are improved, but dry, no c/o pain. Patient has new areas open on her left lower leg that she states she pulled the skin and she started bleeding.  We also discussed Hbo it's advantages, time commitment, how it works. Writtne information given to patient and she will consider it. No s/s of infection.    10-30-24 Patient returns. Her lower extremity wounds and the left lateral side are dry, but essentially resolved we discussed the importance of moisturization and not pulling on dry skin. she has been dressing the breast wound with honey gel pad (she ran out of honey alginate).  There is not any s/s of infection, the breast wound is still painful to touch. We discussed the mri in relation to  \"infection/inflammation\".  Continue with the honey alginate to the breast wound. She prefers the honey gel sheet as the cover dressing instead of a border dressing as she states her skin does better with that than the border silicones.    11-21-24 patient returns. She feels the honey alginate was too hard and causing the wound to enlarge, the wound is measuring larger, but tissue is improved with more soft pink/red and less fibrotic tissue. She states she is gaining weight and getting stronger, she wants to be able to drive again. We discussed hbo and she states the time commitment is too much. She asked about operative debridement. We discussed pros/cons and I let her know it would be up to dr sunshine. Will utilize silver alginate (hoping the softness will be less painful for packing of the wound). No acute s/s of infection. There is a dusky area at 6 oclock on the wound bed, however she states it has always been there. There is not any malodor.    12-5-24 patient returns. She is seeing her oncologist later this week and dr sunshine next Monday. She has been using silver alginate.  The wound bed is more healthy in appearance however the epiboly remains with slight undermining on that edge. We discussed that the top edge would need to be debrided. We also discussed wound vac placement if we do that here in clinic. Shew ill get input from dr sunshine and oncology.  If she wants to go forward with debridement in clinic, she should call clinic and we will order apria vac to place after debridement. No s/s of infection. The area is tender to touch.  MEDICATIONS:     Current Outpatient Medications:     Potassium Chloride ER 10 MEQ Oral Tab CR, Take 1 tablet (10 mEq total) by mouth daily., Disp: 30 tablet, Rfl: 0    HYDROcodone-acetaminophen 5-325 MG Oral Tab, Take 1 tablet by mouth every 6 (six) hours as needed for Pain. (Patient not taking: Reported on 8/15/2024), Disp: 10 tablet, Rfl: 0    benzonatate 200 MG Oral Cap, Take  1 capsule (200 mg total) by mouth 3 (three) times daily as needed for cough. (Patient not taking: Reported on 8/15/2024), Disp: 30 capsule, Rfl: 0    prochlorperazine (COMPAZINE) 10 mg tablet, Take 1 tablet (10 mg total) by mouth every 6 (six) hours as needed for Nausea or vomiting. (Patient not taking: Reported on 8/15/2024), Disp: , Rfl:     metoprolol succinate ER 50 MG Oral Tablet 24 Hr, Take 1 tablet (50 mg total) by mouth 2x Daily(Beta Blocker)., Disp: 180 tablet, Rfl: 3    ondansetron 4 MG Oral Tablet Dispersible, Take 1 tablet (4 mg total) by mouth every 4 (four) hours as needed for Nausea. (Patient not taking: Reported on 8/15/2024), Disp: 10 tablet, Rfl: 0    DULoxetine 30 MG Oral Cap DR Particles, Take 2 capsules (60 mg total) by mouth daily., Disp: , Rfl:     lidocaine-prilocaine 2.5-2.5 % External Cream, APPLY SMALL AMOUNT OVER PORT PRIOR TO ACCESS, Disp: , Rfl:     zolpidem 10 MG Oral Tab, Take 1 tablet (10 mg total) by mouth nightly as needed for Sleep., Disp: , Rfl:   ALLERGIES:     Allergies   Allergen Reactions    Bactrim [Sulfamethoxazole W/Trimethoprim] RASH    Adhesive Tape RASH      REVIEW OF SYSTEMS:   This information was obtained from the patient/family and chart.    See HPI for pertinent positives, otherwise 10 pt ROS negative.  HISTORY:   Past medical, surgical, family and social history updated where appropriate.      PHYSICAL EXAM:     Vitals:    12/05/24 0700   BP: 109/73   Pulse: 86   Resp: 14   Temp: 98.4 °F (36.9 °C)       Estimated body mass index is 14.5 kg/m² as calculated from the following:    Height as of 7/21/24: 64\".    Weight as of 7/21/24: 84 lb 8 oz (38.3 kg).   POC Glucose   Date Value Ref Range Status   12/19/2023 72 70 - 99 mg/dL Final   12/18/2023 125 (H) 70 - 99 mg/dL Final   12/18/2023 84 70 - 99 mg/dL Final       Vital signs reviewed.Appears stated age, well groomed.    Constitutional:  Bp wnl. Pulse Regular and wnl for patient. Respirations easy and unlabored.  Temperature wnl. Patient is thin in appearance, but appears as she is putting on some weight. Appearance neat and clean. Appears in no acute distress.     Musculoskeletal:  Patient ambulation is stable  Integumentary:  refer to wound characteristics and images   Psychiatric:  Judgment and insight intact. Alert and oriented times 3. No evidence of depression, anxiety, or agitation. Calm, cooperative, and communicative. Appropriate interactions and affect.  DIAGNOSTICS:     Lab Results   Component Value Date    BUN 32 (H) 07/22/2024    CREATSERUM 0.69 07/22/2024    ALB 3.1 (L) 07/21/2024    TP 6.6 07/21/2024       WOUND ASSESSMENT:     Wound 05/24/24 #1 Radiation therapy Breast Left (Active)   Date First Assessed/Time First Assessed: 05/24/24 0857    Wound Number (Wound Clinic Only): #1 Radiation therapy  Primary Wound Type: Other (comment)  Location: Breast  Wound Location Orientation: Left      Assessments 5/24/2024  8:59 AM 12/5/2024 10:01 AM   Wound Image        Drainage Amount Large Small   Drainage Description Serous;Yellow Yellow;Tan   Wound Length (cm) 15.5 cm 1.7 cm   Wound Width (cm) 19 cm 1.6 cm   Wound Surface Area (cm^2) 294.5 cm^2 2.72 cm^2   Wound Depth (cm) 0.1 cm 1 cm   Wound Volume (cm^3) 29.45 cm^3 2.72 cm^3   Wound Healing % -- 91   Margins Well-defined edges Well-defined edges   Non-staged Wound Description Full thickness Full thickness   Alfreda-wound Assessment Moist;Edema Pink   Wound Granulation Tissue Firm;Pink Pink;Firm   Wound Bed Granulation (%) 20 % 100 %   Wound Bed Epithelium (%) 50 % --   Wound Bed Slough (%) 30 % --   Wound Odor None None   Undermining? -- Yes   Number of Undermines -- 1   Undermine 1 Start Position -- 12   Undermine 1 End Position -- 1       No associated orders.          ASSESSMENT AND PLAN:    1. 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 Adverse effect of radiation, subsequent encounter    3. SLE (systemic lupus erythematosus  related syndrome) (HCC)    4. Leukocytoclastic vasculitis (HCC)            Risks, benefits, and alternatives of current treatment plan discussed in detail.  Questions and concerns addressed. Red flags to RTC or ED reviewed.  Patient (or parent) agrees to plan.      NOTE TO PATIENT: The 21st Century Cures Act makes clinical notes like these available to patients in the interest of transparency. Clinical notes are medical documents used by physicians and care providers to communicate with each other. These documents include medical language and terminology, abbreviations, and treatment information that may sound technical and at times possibly unfamiliar. In addition, at times, the verbiage may appear blunt or direct. These documents are one tool providers use to communicate relevant information and clinical opinions of the care providers in a way that allows common understanding of the clinical context.   I [id number] with the patient. This time included:    preparing to see the patient (eg, review notes and recent diagnostics),  seeing the patient, obtaining and/or reviewing separately obtained history, performing a medically appropriate examination and/or evaluation, counseling and educating the patient, documenting in the record,  DISCHARGE:      Patient Instructions   Please return: 2 WEEKS (any day of the week)     IF YOU DECIDE TO HAVE WOUND CLINIC DEBRIDE (and not go to surgery with dr sunshine), THEN CALL US AND LET US KNOW.  (RN! If patient calls please order the apria wound vac)        Patient discharge and wound care instructions  Alina Aceves  12/5/2024           Soak the area with ANASEPT gauze.    You may shower and cleanse area with mild soap and water, try to \"scrub\" lightly with a gauze to remove the build up of draiange  rinse wound with ANASEPT cleanser,   dab dry with gauze and apply     Left breast:   apply a piece of SILVER ALGINATE into the wound and cover   Left side and lower  extremities:  moisturize, moisturize, moisturize    Nutrition and blood sugar control:  Focus on the following:  Protein: Meats, beans, eggs, milk and yogurt particularly Greek yogurt), tofu, soy nuts, soy protein products (Follow the protein handout in your welcome folder)  Vitamin C: Citrus fruits and juices, strawberries, tomatoes, tomato juice, peppers, baked potatoes, spinach, broccoli, cauliflower, [place] sprouts, cabbage  Vitamin A: Dark green, leafy vegetables, orange or yellow vegetables, cantaloupe, fortified dairy products, liver, fortified cereals  Zinc: Fortified cereals, red meats, seafood  Consider supplementing with Vitor by Astley [employer]. It can be purchased on amazon, [employer] website, or local pharmacy may be able to order it for you.  (These are essential branch chain amino acids that help with tissue building and wound healing).   When your blood sugar is consistently elevated greater than 180 your body can't heal or fight infection.     Concerns:  Signs of infection may include the following:  Increase in redness  Red \"streaks\" from wound  Increase in swelling  Fever  Unusual odor  Change in the amount of wound drainage     Should you experience any significant changes in your wound(s) or have any questions regarding your home care instructions please contact the [facility] @ [phone] If after regular business hours, please call your family doctor or local emergency room. The treatment plan has been discussed at length between you and your provider. Follow all instructions carefully, it is very important. If you do not follow all instructions you are at risk of your wound not healing, infection, possible loss of limb and even loss of life.      Anita Smallwood FNP-C, CWCN-AP, CFCN, CSWS, WCC, DWC  12/5/2024

## 2024-12-05 ENCOUNTER — OFFICE VISIT (OUTPATIENT)
Dept: WOUND CARE | Facility: HOSPITAL | Age: 44
End: 2024-12-05
Attending: NURSE PRACTITIONER
Payer: MEDICAID

## 2024-12-05 VITALS
HEART RATE: 86 BPM | TEMPERATURE: 98 F | SYSTOLIC BLOOD PRESSURE: 109 MMHG | DIASTOLIC BLOOD PRESSURE: 73 MMHG | RESPIRATION RATE: 14 BRPM

## 2024-12-05 DIAGNOSIS — M32.9 SLE (SYSTEMIC LUPUS ERYTHEMATOSUS RELATED SYNDROME) (HCC): ICD-10-CM

## 2024-12-05 DIAGNOSIS — M31.0 LEUKOCYTOCLASTIC VASCULITIS (HCC): ICD-10-CM

## 2024-12-05 DIAGNOSIS — T66.XXXD ADVERSE EFFECT OF RADIATION, SUBSEQUENT ENCOUNTER: ICD-10-CM

## 2024-12-05 DIAGNOSIS — L98.492 NON-PRESSURE CHRONIC ULCER OF SKIN OF OTHER SITES WITH FAT LAYER EXPOSED (HCC): Primary | ICD-10-CM

## 2024-12-05 PROCEDURE — 99214 OFFICE O/P EST MOD 30 MIN: CPT | Performed by: NURSE PRACTITIONER

## 2024-12-05 NOTE — PATIENT INSTRUCTIONS
Please return: 2 WEEKS (any day of the week)     IF YOU DECIDE TO HAVE WOUND CLINIC DEBRIDE (and not go to surgery with dr sunshine), THEN CALL US AND LET US KNOW.  (RN! If patient calls please order the apria wound vac)        Patient discharge and wound care instructions  Alina Aceves  12/5/2024           Soak the area with ANASEPT gauze.    You may shower and cleanse area with mild soap and water, try to \"scrub\" lightly with a gauze to remove the build up of draiange  rinse wound with ANASEPT cleanser,   dab dry with gauze and apply     Left breast:   apply a piece of SILVER ALGINATE into the wound and cover   Left side and lower extremities:  moisturize, moisturize, moisturize    Nutrition and blood sugar control:  Focus on the following:  Protein: Meats, beans, eggs, milk and yogurt particularly Greek yogurt), tofu, soy nuts, soy protein products (Follow the protein handout in your welcome folder)  Vitamin C: Citrus fruits and juices, strawberries, tomatoes, tomato juice, peppers, baked potatoes, spinach, broccoli, cauliflower, [place] sprouts, cabbage  Vitamin A: Dark green, leafy vegetables, orange or yellow vegetables, cantaloupe, fortified dairy products, liver, fortified cereals  Zinc: Fortified cereals, red meats, seafood  Consider supplementing with Vitor by [employer]. It can be purchased on amazon, [employer] website, or local pharmacy may be able to order it for you.  (These are essential branch chain amino acids that help with tissue building and wound healing).   When your blood sugar is consistently elevated greater than 180 your body can't heal or fight infection.     Concerns:  Signs of infection may include the following:  Increase in redness  Red \"streaks\" from wound  Increase in swelling  Fever  Unusual odor  Change in the amount of wound drainage     Should you experience any significant changes in your wound(s) or have any questions regarding your home care instructions please contact the  wound center [facility] @ [phone] If after regular business hours, please call your family doctor or local emergency room. The treatment plan has been discussed at length between you and your provider. Follow all instructions carefully, it is very important. If you do not follow all instructions you are at risk of your wound not healing, infection, possible loss of limb and even loss of life.

## 2024-12-05 NOTE — PROGRESS NOTES
.Weekly Wound Education Note    Teaching Provided To: Patient  Training Topics: Dressing;Cleasing and general instructions;Discharge instructions  Training Method: Explain/Verbal;Written  Training Response: Patient responds and understands        Notes: Wound stable. Continue silver alginate, and bordered foam. Pt is to consider options regarding removal of rolled edge and NPWT.

## 2024-12-18 NOTE — PROGRESS NOTES
CHIEF COMPLAINT:     No chief complaint on file.    HPI:   Information obtained from Patient and chart  5-24-24 INITIAL:  43 year old female with Past medical history invasive ductal carcinoma of left breast (dr kaiser camara) currently undergoing radiation therapy (dr kobi bran), leukocytoclastic vasculitis (treated with clobetasol ointment by dr. Walton) iron deficiency anemia, HFrEF (Ariane Putnam), lupus, Sjogren's syndrome.  Patient was recently admitted for oral sores and n/v and elctrolyte imbalance and hypokalemia.    Earlier this week she was seen in urgent care for uti symptoms and hematuria. She was treated with cefadroxil and recommended to see primary md. dr bran recommended patient to start yary, she states she is not regular with it. Patient has seen nutrition/dietician.  Patient is active with .  Earlier this week she had to cancel physical therapy due to wound pain.  on 5-11 patient was started on potassium citrate x 10 days and patient was to repeat labs, which she did on 5-21 but her potassium was still 2.9. she states she is taking the potassium.  I will udpate ariane putnam and I asked the patient to contact her as they may want her to increase her potassium.  Patient states that initially she was utilizing aquaphor but then developed an itchy red rash, so now has just been leaving it dry or using aveeno. She is able to tolerate very minimal cleansing of the area.  I discussed with her how to do it as tolerated in the shower with anasept.  Will utilize hydrogel cool sheets, patient to return next week. There is not any s/s of infection    HPI PRIOR TO NOVEMBER 2024 SEE INDIVIDUAL PROGRESS NOTES  9-6-24 patient returns.  Patient has been dressing the breast wound with vashe and the flank/abdomen with hydrocolloid.  The flank/abdomen is improved. The breast wound is  with less necrosis, patient states less drainage, no malodor.  Her legs remain resolved. She states she  is using the yary.  Her mri is the 16th and dr camara the 13th.  She will f/u with dr viera after mri.  There are not any surgical clips in the wound bed today.  Even with less necrosis it still remains significant and she does not tolerate debridement.  I informed her that once she gets the mri and plan can be determined with dr. Viera, we can see her back after debridement. I mentioned the wound vac as an option after debridement. Patient to schedule an appointment for 1 month and she can move it sooner/later as needed. Continue with current poc at this time.    10-9-24 patient returns. She saw dr camara/oncology, She followed up with cardiology and her metoprolol was reduced and she is to schedule an echo.  She had her mri and  dr viera let her know that know suspicious findings were noted, just inflammation.  She saw surgeon on 9-30-24. she jhas continued to dress the wound with vashe wtd. The wound is improved. There is a small staple trying to spit again in the base of the wound however I am unable to lift it to remove it.  The wound is slightly dry, less necrosis. We discussed switching back to honey.  Her shingles areas are improved, but dry, no c/o pain. Patient has new areas open on her left lower leg that she states she pulled the skin and she started bleeding.  We also discussed Hbo it's advantages, time commitment, how it works. Writtne information given to patient and she will consider it. No s/s of infection.    10-30-24 Patient returns. Her lower extremity wounds and the left lateral side are dry, but essentially resolved we discussed the importance of moisturization and not pulling on dry skin. she has been dressing the breast wound with honey gel pad (she ran out of honey alginate).  There is not any s/s of infection, the breast wound is still painful to touch. We discussed the mri in relation to \"infection/inflammation\".  Continue with the honey alginate to the breast wound. She prefers the  honey gel sheet as the cover dressing instead of a border dressing as she states her skin does better with that than the border silicones.    11-21-24 patient returns. She feels the honey alginate was too hard and causing the wound to enlarge, the wound is measuring larger, but tissue is improved with more soft pink/red and less fibrotic tissue. She states she is gaining weight and getting stronger, she wants to be able to drive again. We discussed hbo and she states the time commitment is too much. She asked about operative debridement. We discussed pros/cons and I let her know it would be up to dr sunshine. Will utilize silver alginate (hoping the softness will be less painful for packing of the wound). No acute s/s of infection. There is a dusky area at 6 oclock on the wound bed, however she states it has always been there. There is not any malodor.    12-5-24 patient returns. She is seeing her oncologist later this week and dr sunshine next Monday. She has been using silver alginate.  The wound bed is more healthy in appearance however the epiboly remains with slight undermining on that edge. We discussed that the top edge would need to be debrided. We also discussed wound vac placement if we do that here in clinic. Shew ill get input from dr sunshine and oncology.  If she wants to go forward with debridement in clinic, she should call clinic and we will order apria vac to place after debridement. No s/s of infection. The area is tender to touch.    12-19-24 patient returns.  She followed up with oncology on 12-6.  Mri of liver planned for monitoring prior liver lesion identified. Continue cymbalta for neuropathy.  She is to f/u in 3 mo.  She also saw Dr. Sunshine and they discussed excision of the area surgically but patient is to increase protein intake, restart yary, see nutritionist, see dr bran and then f/u in January.   She has continued to dress with silver alginate***  MEDICATIONS:     Current Outpatient  retroperitoneal bleeding improved clinically with less abd pain and stable hg  oliguric - likely will need chronic hd  needs PC; eventual AVF Medications:     Potassium Chloride ER 10 MEQ Oral Tab CR, Take 1 tablet (10 mEq total) by mouth daily., Disp: 30 tablet, Rfl: 0    HYDROcodone-acetaminophen 5-325 MG Oral Tab, Take 1 tablet by mouth every 6 (six) hours as needed for Pain. (Patient not taking: Reported on 8/15/2024), Disp: 10 tablet, Rfl: 0    benzonatate 200 MG Oral Cap, Take 1 capsule (200 mg total) by mouth 3 (three) times daily as needed for cough. (Patient not taking: Reported on 8/15/2024), Disp: 30 capsule, Rfl: 0    prochlorperazine (COMPAZINE) 10 mg tablet, Take 1 tablet (10 mg total) by mouth every 6 (six) hours as needed for Nausea or vomiting. (Patient not taking: Reported on 8/15/2024), Disp: , Rfl:     metoprolol succinate ER 50 MG Oral Tablet 24 Hr, Take 1 tablet (50 mg total) by mouth 2x Daily(Beta Blocker)., Disp: 180 tablet, Rfl: 3    ondansetron 4 MG Oral Tablet Dispersible, Take 1 tablet (4 mg total) by mouth every 4 (four) hours as needed for Nausea. (Patient not taking: Reported on 8/15/2024), Disp: 10 tablet, Rfl: 0    DULoxetine 30 MG Oral Cap DR Particles, Take 2 capsules (60 mg total) by mouth daily., Disp: , Rfl:     lidocaine-prilocaine 2.5-2.5 % External Cream, APPLY SMALL AMOUNT OVER PORT PRIOR TO ACCESS, Disp: , Rfl:     zolpidem 10 MG Oral Tab, Take 1 tablet (10 mg total) by mouth nightly as needed for Sleep., Disp: , Rfl:   ALLERGIES:     Allergies   Allergen Reactions    Bactrim [Sulfamethoxazole W/Trimethoprim] RASH    Adhesive Tape RASH      REVIEW OF SYSTEMS:   This information was obtained from the patient/family and chart.    See HPI for pertinent positives, otherwise 10 pt ROS negative.  HISTORY:   Past medical, surgical, family and social history updated where appropriate.      PHYSICAL EXAM:     There were no vitals filed for this visit.      Estimated body mass index is 14.5 kg/m² as calculated from the following:    Height as of 7/21/24: 64\".    Weight as of 7/21/24: 84 lb 8 oz (38.3 kg).   POC Glucose    Date Value Ref Range Status   12/19/2023 72 70 - 99 mg/dL Final   12/18/2023 125 (H) 70 - 99 mg/dL Final   12/18/2023 84 70 - 99 mg/dL Final       Vital signs reviewed.Appears stated age, well groomed.    Constitutional:  Bp ***wnl. Pulse Regular and wnl for patient. Respirations easy and unlabored. Temperature wnl. Patient is thin in appearance, but appears as she is putting on some weight. Appearance neat and clean. Appears in no acute distress.     Musculoskeletal:  Patient ambulation is stable  Integumentary:  refer to wound characteristics and images   Psychiatric:  Judgment and insight intact. Alert and oriented times 3. No evidence of depression, anxiety, or agitation. Calm, cooperative, and communicative. Appropriate interactions and affect.  DIAGNOSTICS:     Lab Results   Component Value Date    BUN 32 (H) 07/22/2024    CREATSERUM 0.69 07/22/2024    ALB 3.1 (L) 07/21/2024    TP 6.6 07/21/2024       WOUND ASSESSMENT:     Wound 05/24/24 #1 Radiation therapy Breast Left (Active)   Date First Assessed/Time First Assessed: 05/24/24 0857    Wound Number (Wound Clinic Only): #1 Radiation therapy  Primary Wound Type: Other (comment)  Location: Breast  Wound Location Orientation: Left      Assessments 5/24/2024  8:59 AM 12/5/2024 10:01 AM   Wound Image        Drainage Amount Large Small   Drainage Description Serous;Yellow Yellow;Tan   Wound Length (cm) 15.5 cm 1.7 cm   Wound Width (cm) 19 cm 1.6 cm   Wound Surface Area (cm^2) 294.5 cm^2 2.72 cm^2   Wound Depth (cm) 0.1 cm 1 cm   Wound Volume (cm^3) 29.45 cm^3 2.72 cm^3   Wound Healing % -- 91   Margins Well-defined edges Well-defined edges   Non-staged Wound Description Full thickness Full thickness   Alfreda-wound Assessment Moist;Edema Pink   Wound Granulation Tissue Firm;Pink Pink;Firm   Wound Bed Granulation (%) 20 % 100 %   Wound Bed Epithelium (%) 50 % --   Wound Bed Slough (%) 30 % --   Wound Odor None None   Undermining? -- Yes   Number of Undermines -- 1    Undermine 1 Start Position -- 12   Undermine 1 End Position -- 1       No associated orders.          ASSESSMENT AND PLAN:    There are no diagnoses linked to this encounter.            Risks, benefits, and alternatives of current treatment plan discussed in detail.  Questions and concerns addressed. Red flags to RTC or ED reviewed.  Patient (or parent) agrees to plan.      NOTE TO PATIENT: The 21st Century Cures Act makes clinical notes like these available to patients in the interest of transparency. Clinical notes are medical documents used by physicians and care providers to communicate with each other. These documents include medical language and terminology, abbreviations, and treatment information that may sound technical and at times possibly unfamiliar. In addition, at times, the verbiage may appear blunt or direct. These documents are one tool providers use to communicate relevant information and clinical opinions of the care providers in a way that allows common understanding of the clinical context.   I spent***minutes with the patient. This time included:    preparing to see the patient (eg, review notes and recent diagnostics),  seeing the patient, obtaining and/or reviewing separately obtained history, performing a medically appropriate examination and/or evaluation, counseling and educating the patient, documenting in the record,  DISCHARGE:      There are no Patient Instructions on file for this visit.   Anita Smallwood FNP-C, CWCN-AP, CFCN, CSWS, WCC, DWC  12/19/2024

## 2024-12-19 ENCOUNTER — APPOINTMENT (OUTPATIENT)
Dept: WOUND CARE | Facility: HOSPITAL | Age: 44
End: 2024-12-19
Attending: NURSE PRACTITIONER
Payer: MEDICAID

## 2025-01-01 NOTE — PROGRESS NOTES
CHIEF COMPLAINT:     Chief Complaint   Patient presents with    Wound Care     Patient is here for a wound care follow up. She denies any pain or new wound concerns.      HPI:   Information obtained from Patient and chart  5-24-24 INITIAL:  43 year old female with Past medical history invasive ductal carcinoma of left breast (dr kaiser camara) currently undergoing radiation therapy (dr kobi bran), leukocytoclastic vasculitis (treated with clobetasol ointment by dr. Walton) iron deficiency anemia, HFrEF (Ariane Putnam), lupus, Sjogren's syndrome.  Patient was recently admitted for oral sores and n/v and elctrolyte imbalance and hypokalemia.    Earlier this week she was seen in urgent care for uti symptoms and hematuria. She was treated with cefadroxil and recommended to see primary md. dr bran recommended patient to start yary, she states she is not regular with it. Patient has seen nutrition/dietician.  Patient is active with [facility].  Earlier this week she had to cancel physical therapy due to wound pain.  on 5-11 patient was started on potassium citrate x 10 days and patient was to repeat labs, which she did on 5-21 but her potassium was still 2.9. she states she is taking the potassium.  I will udpate ariane korinsigifredohardik and I asked the patient to contact her as they may want her to increase her potassium.  Patient states that initially she was utilizing aquaphor but then developed an itchy red rash, so now has just been leaving it dry or using aveeno. She is able to tolerate very minimal cleansing of the area.  I discussed with her how to do it as tolerated in the shower with anasept.  Will utilize hydrogel cool sheets, patient to return next week. There is not any s/s of infection    HPI PRIOR TO JANUARY 2025 SEE INDIVIDUAL PROGRESS NOTES  12-5-24 patient returns. She is seeing her oncologist later this week and dr sunshine next Monday. She has been using silver alginate.  The wound bed is more healthy in  appearance however the epiboly remains with slight undermining on that edge. We discussed that the top edge would need to be debrided. We also discussed wound vac placement if we do that here in clinic. Shew ill get input from dr sunshine and oncology.  If she wants to go forward with debridement in clinic, she should call clinic and we will order apria vac to place after debridement. No s/s of infection. The area is tender to touch.    1-2-25 patient returns.  She followed up with oncology on 12-6.  Mri of liver planned for monitoring of  prior liver lesion identified. Continue cymbalta for neuropathy.  She is to f/u in 3 mo.  She also saw Dr. Sunshine and they discussed excision of the area surgically but patient is to increase protein intake, restart yary, see nutritionist, see dr bran and then f/u in January.   She has continued to dress with silver alginate.  Her insurance changed so she can no longer see dr sunshine,  she has an appointment with dr graf for the end of January.  The wound is improved. She states she is supplementing with prenatal vitamin.  She looks more  healthy with more color in her skin and more energy in her overall posture. Will add rick and also run insurance for CTP  MEDICATIONS:     Current Outpatient Medications:     Potassium Chloride ER 10 MEQ Oral Tab CR, Take 1 tablet (10 mEq total) by mouth daily., Disp: 30 tablet, Rfl: 0    HYDROcodone-acetaminophen 5-325 MG Oral Tab, Take 1 tablet by mouth every 6 (six) hours as needed for Pain. (Patient not taking: Reported on 8/15/2024), Disp: 10 tablet, Rfl: 0    benzonatate 200 MG Oral Cap, Take 1 capsule (200 mg total) by mouth 3 (three) times daily as needed for cough. (Patient not taking: Reported on 8/15/2024), Disp: 30 capsule, Rfl: 0    prochlorperazine (COMPAZINE) 10 mg tablet, Take 1 tablet (10 mg total) by mouth every 6 (six) hours as needed for Nausea or vomiting. (Patient not taking: Reported on 8/15/2024), Disp: , Rfl:      ondansetron 4 MG Oral Tablet Dispersible, Take 1 tablet (4 mg total) by mouth every 4 (four) hours as needed for Nausea. (Patient not taking: Reported on 8/15/2024), Disp: 10 tablet, Rfl: 0    DULoxetine 30 MG Oral Cap DR Particles, Take 2 capsules (60 mg total) by mouth daily., Disp: , Rfl:     lidocaine-prilocaine 2.5-2.5 % External Cream, APPLY SMALL AMOUNT OVER PORT PRIOR TO ACCESS, Disp: , Rfl:     zolpidem 10 MG Oral Tab, Take 1 tablet (10 mg total) by mouth nightly as needed for Sleep., Disp: , Rfl:   ALLERGIES:     Allergies   Allergen Reactions    Bactrim [Sulfamethoxazole W/Trimethoprim] RASH    Adhesive Tape RASH      REVIEW OF SYSTEMS:   This information was obtained from the patient/family and chart.    See HPI for pertinent positives, otherwise 10 pt ROS negative.  HISTORY:   Past medical, surgical, family and social history updated where appropriate.      PHYSICAL EXAM:     Vitals:    01/02/25 1131   BP: 114/75   Pulse: 82   Resp: 16   Temp: 98.6 °F (37 °C)         Estimated body mass index is 14.5 kg/m² as calculated from the following:    Height as of 7/21/24: 64\".    Weight as of 7/21/24: 84 lb 8 oz (38.3 kg).   POC Glucose   Date Value Ref Range Status   12/19/2023 72 70 - 99 mg/dL Final   12/18/2023 125 (H) 70 - 99 mg/dL Final   12/18/2023 84 70 - 99 mg/dL Final       Vital signs reviewed.Appears stated age, well groomed.    Constitutional:  Bp wnl. Pulse Regular and wnl for patient. Respirations easy and unlabored. Temperature wnl. Patient's skin is less pale, she is gaining weight, less ill appearing. Appearance neat and clean. Appears in no acute distress.     Musculoskeletal:  Patient ambulation is stable  Integumentary:  refer to wound characteristics and images   Psychiatric:  Judgment and insight intact. Alert and oriented times 3. No evidence of depression, anxiety, or agitation. Calm, cooperative, and communicative. Appropriate interactions and affect.  DIAGNOSTICS:     Lab Results    Component Value Date    BUN 32 (H) 07/22/2024    CREATSERUM 0.69 07/22/2024    ALB 3.1 (L) 07/21/2024    TP 6.6 07/21/2024       WOUND ASSESSMENT:     Wound 05/24/24 #1 Radiation therapy Breast Left (Active)   Date First Assessed/Time First Assessed: 05/24/24 0857    Wound Number (Wound Clinic Only): #1 Radiation therapy  Primary Wound Type: Other (comment)  Location: Breast  Wound Location Orientation: Left      Assessments 5/24/2024  8:59 AM 1/2/2025 11:29 AM   Wound Image        Drainage Amount Large Small   Drainage Description Serous;Yellow Serous;Yellow   Wound Length (cm) 15.5 cm 1 cm   Wound Width (cm) 19 cm 1 cm   Wound Surface Area (cm^2) 294.5 cm^2 1 cm^2   Wound Depth (cm) 0.1 cm 0.7 cm   Wound Volume (cm^3) 29.45 cm^3 0.7 cm^3   Wound Healing % -- 98   Margins Well-defined edges Well-defined edges;Epibole (Rolled edges)   Non-staged Wound Description Full thickness Full thickness   Alfreda-wound Assessment Moist;Edema Clean   Wound Granulation Tissue Firm;Pink Firm;Pink   Wound Bed Granulation (%) 20 % 100 %   Wound Bed Epithelium (%) 50 % --   Wound Bed Slough (%) 30 % --   Wound Odor None None   Tunneling? -- No   Undermining? -- No   Sinus Tracts? -- No       No associated orders.          ASSESSMENT AND PLAN:    1. 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 Adverse effect of radiation, subsequent encounter    3. SLE (systemic lupus erythematosus related syndrome) (HCC)              Risks, benefits, and alternatives of current treatment plan discussed in detail.  Questions and concerns addressed. Red flags to RTC or ED reviewed.  Patient (or parent) agrees to plan.      NOTE TO PATIENT: The 21st Century Cures Act makes clinical notes like these available to patients in the interest of transparency. Clinical notes are medical documents used by physicians and care providers to communicate with each other. These documents include medical language and terminology,  abbreviations, and treatment information that may sound technical and at times possibly unfamiliar. In addition, at times, the verbiage may appear blunt or direct. These documents are one tool providers use to communicate relevant information and clinical opinions of the care providers in a way that allows common understanding of the clinical context.   I [id number] with the patient. This time included:    preparing to see the patient (eg, review notes and recent diagnostics),  seeing the patient, obtaining and/or reviewing separately obtained history, performing a medically appropriate examination and/or evaluation, counseling and educating the patient, documenting in the record,  DISCHARGE:      Patient Instructions   Please return: 2 WEEKS January 21            Patient discharge and wound care instructions  Alina Aceves  1/2/2025              Soak the area with ANASEPT gauze.    You may shower and cleanse area with mild soap and water, try to \"scrub\" lightly with a gauze to remove the build up of draiange  rinse wound with ANASEPT cleanser,   dab dry with gauze and apply     Left breast:   rick collagen into wound base then apply a piece of SILVER ALGINATE into the wound and cover   Left side and lower extremities:  moisturize, moisturize, moisturize    Nutrition and blood sugar control:  Focus on the following:  Protein: Meats, beans, eggs, milk and yogurt particularly Greek yogurt), tofu, soy nuts, soy protein products (Follow the protein handout in your welcome folder)  Vitamin C: Citrus fruits and juices, strawberries, tomatoes, tomato juice, peppers, baked potatoes, spinach, broccoli, cauliflower, [place] sprouts, cabbage  Vitamin A: Dark green, leafy vegetables, orange or yellow vegetables, cantaloupe, fortified dairy products, liver, fortified cereals  Zinc: Fortified cereals, red meats, seafood  Consider supplementing with Vitor by Spime. It can be purchased on amazon, [employer] website, or  local pharmacy may be able to order it for you.  (These are essential branch chain amino acids that help with tissue building and wound healing).   When your blood sugar is consistently elevated greater than 180 your body can't heal or fight infection.     Concerns:  Signs of infection may include the following:  Increase in redness  Red \"streaks\" from wound  Increase in swelling  Fever  Unusual odor  Change in the amount of wound drainage     Should you experience any significant changes in your wound(s) or have any questions regarding your home care instructions please contact the [facility] @ [phone] If after regular business hours, please call your family doctor or local emergency room. The treatment plan has been discussed at length between you and your provider. Follow all instructions carefully, it is very important. If you do not follow all instructions you are at risk of your wound not healing, infection, possible loss of limb and even loss of life.    Anita Smallwood FNP-C, CWCN-AP, CFCN, CSWS, WCC, DWC  1/2/2025

## 2025-01-02 ENCOUNTER — OFFICE VISIT (OUTPATIENT)
Dept: WOUND CARE | Facility: HOSPITAL | Age: 45
End: 2025-01-02
Attending: NURSE PRACTITIONER
Payer: MEDICAID

## 2025-01-02 VITALS
DIASTOLIC BLOOD PRESSURE: 75 MMHG | HEART RATE: 82 BPM | SYSTOLIC BLOOD PRESSURE: 114 MMHG | RESPIRATION RATE: 16 BRPM | TEMPERATURE: 99 F

## 2025-01-02 DIAGNOSIS — M32.9 SLE (SYSTEMIC LUPUS ERYTHEMATOSUS RELATED SYNDROME) (HCC): ICD-10-CM

## 2025-01-02 DIAGNOSIS — L98.492 NON-PRESSURE CHRONIC ULCER OF SKIN OF OTHER SITES WITH FAT LAYER EXPOSED (HCC): Primary | ICD-10-CM

## 2025-01-02 DIAGNOSIS — T66.XXXD ADVERSE EFFECT OF RADIATION, SUBSEQUENT ENCOUNTER: ICD-10-CM

## 2025-01-02 PROCEDURE — 99213 OFFICE O/P EST LOW 20 MIN: CPT

## 2025-01-02 NOTE — PATIENT INSTRUCTIONS
Please return: 2 WEEKS January 21            Patient discharge and wound care instructions  Alina Aceves  1/2/2025              Soak the area with ANASEPT gauze.    You may shower and cleanse area with mild soap and water, try to \"scrub\" lightly with a gauze to remove the build up of draiange  rinse wound with ANASEPT cleanser,   dab dry with gauze and apply     Left breast:   rick collagen into wound base then apply a piece of SILVER ALGINATE into the wound and cover   Left side and lower extremities:  moisturize, moisturize, moisturize    Nutrition and blood sugar control:  Focus on the following:  Protein: Meats, beans, eggs, milk and yogurt particularly Greek yogurt), tofu, soy nuts, soy protein products (Follow the protein handout in your welcome folder)  Vitamin C: Citrus fruits and juices, strawberries, tomatoes, tomato juice, peppers, baked potatoes, spinach, broccoli, cauliflower, [place] sprouts, cabbage  Vitamin A: Dark green, leafy vegetables, orange or yellow vegetables, cantaloupe, fortified dairy products, liver, fortified cereals  Zinc: Fortified cereals, red meats, seafood  Consider supplementing with Vitor by [employer]. It can be purchased on amazon, [employer] website, or local pharmacy may be able to order it for you.  (These are essential branch chain amino acids that help with tissue building and wound healing).   When your blood sugar is consistently elevated greater than 180 your body can't heal or fight infection.     Concerns:  Signs of infection may include the following:  Increase in redness  Red \"streaks\" from wound  Increase in swelling  Fever  Unusual odor  Change in the amount of wound drainage     Should you experience any significant changes in your wound(s) or have any questions regarding your home care instructions please contact the [facility] @ [phone] If after regular business hours, please call your family doctor or local emergency room. The  treatment plan has been discussed at length between you and your provider. Follow all instructions carefully, it is very important. If you do not follow all instructions you are at risk of your wound not healing, infection, possible loss of limb and even loss of life.

## 2025-01-02 NOTE — PROGRESS NOTES
.Weekly Wound Education Note    Teaching Provided To: Patient  Training Topics: Dressing;Cleasing and general instructions;Discharge instructions  Training Method: Explain/Verbal;Written  Training Response: Patient responds and understands        Notes: Wound improving. Dressing changed to rick, silver alginate, and bordered foam.

## 2025-01-20 NOTE — PROGRESS NOTES
CHIEF COMPLAINT:     No chief complaint on file.    HPI:   Information obtained from Patient and chart  5-24-24 INITIAL:  43 year old female with Past medical history invasive ductal carcinoma of left breast (dr kaiser camara) currently undergoing radiation therapy (dr kobi bran), leukocytoclastic vasculitis (treated with clobetasol ointment by dr. Walton) iron deficiency anemia, HFrEF (Ariane Putnam), lupus, Sjogren's syndrome.  Patient was recently admitted for oral sores and n/v and elctrolyte imbalance and hypokalemia.    Earlier this week she was seen in urgent care for uti symptoms and hematuria. She was treated with cefadroxil and recommended to see primary md. dr bran recommended patient to start yary, she states she is not regular with it. Patient has seen nutrition/dietician.  Patient is active with [facility].  Earlier this week she had to cancel physical therapy due to wound pain.  on 5-11 patient was started on potassium citrate x 10 days and patient was to repeat labs, which she did on 5-21 but her potassium was still 2.9. she states she is taking the potassium.  I will udpate ariane putnam and I asked the patient to contact her as they may want her to increase her potassium.  Patient states that initially she was utilizing aquaphor but then developed an itchy red rash, so now has just been leaving it dry or using aveeno. She is able to tolerate very minimal cleansing of the area.  I discussed with her how to do it as tolerated in the shower with anasept.  Will utilize hydrogel cool sheets, patient to return next week. There is not any s/s of infection    HPI PRIOR TO JANUARY 2025 SEE INDIVIDUAL PROGRESS NOTES  12-5-24 patient returns. She is seeing her oncologist later this week and dr sunshine next Monday. She has been using silver alginate.  The wound bed is more healthy in appearance however the epiboly remains with slight undermining on that edge. We discussed that the top edge would need  to be debrided. We also discussed wound vac placement if we do that here in clinic. Shew ill get input from dr sunshine and oncology.  If she wants to go forward with debridement in clinic, she should call clinic and we will order apria vac to place after debridement. No s/s of infection. The area is tender to touch.    1-2-25 patient returns.  She followed up with oncology on 12-6.  Mri of liver planned for monitoring of  prior liver lesion identified. Continue cymbalta for neuropathy.  She is to f/u in 3 mo.  She also saw Dr. Sunshine and they discussed excision of the area surgically but patient is to increase protein intake, restart yary, see nutritionist, see dr bran and then f/u in January.   She has continued to dress with silver alginate.  Her insurance changed so she can no longer see dr sunshine,  she has an appointment with dr graf for the end of January.  The wound is improved. She states she is supplementing with prenatal vitamin.  She looks more  healthy with more color in her skin and more energy in her overall posture. Will add rick and also run insurance for CTP.    1-21-25 patient returns.  Ctp were not approved by insurance.  She has been utilizing rick and silver alginate. The wound is ***  MEDICATIONS:     Current Outpatient Medications:     Potassium Chloride ER 10 MEQ Oral Tab CR, Take 1 tablet (10 mEq total) by mouth daily., Disp: 30 tablet, Rfl: 0    HYDROcodone-acetaminophen 5-325 MG Oral Tab, Take 1 tablet by mouth every 6 (six) hours as needed for Pain. (Patient not taking: Reported on 8/15/2024), Disp: 10 tablet, Rfl: 0    benzonatate 200 MG Oral Cap, Take 1 capsule (200 mg total) by mouth 3 (three) times daily as needed for cough. (Patient not taking: Reported on 8/15/2024), Disp: 30 capsule, Rfl: 0    prochlorperazine (COMPAZINE) 10 mg tablet, Take 1 tablet (10 mg total) by mouth every 6 (six) hours as needed for Nausea or vomiting. (Patient not taking: Reported on 8/15/2024), Disp:  , Rfl:     ondansetron 4 MG Oral Tablet Dispersible, Take 1 tablet (4 mg total) by mouth every 4 (four) hours as needed for Nausea. (Patient not taking: Reported on 8/15/2024), Disp: 10 tablet, Rfl: 0    DULoxetine 30 MG Oral Cap DR Particles, Take 2 capsules (60 mg total) by mouth daily., Disp: , Rfl:     lidocaine-prilocaine 2.5-2.5 % External Cream, APPLY SMALL AMOUNT OVER PORT PRIOR TO ACCESS, Disp: , Rfl:     zolpidem 10 MG Oral Tab, Take 1 tablet (10 mg total) by mouth nightly as needed for Sleep., Disp: , Rfl:   ALLERGIES:     Allergies   Allergen Reactions    Bactrim [Sulfamethoxazole W/Trimethoprim] RASH    Adhesive Tape RASH      REVIEW OF SYSTEMS:   This information was obtained from the patient/family and chart.    See HPI for pertinent positives, otherwise 10 pt ROS negative.  HISTORY:   Past medical, surgical, family and social history updated where appropriate.      PHYSICAL EXAM:     There were no vitals filed for this visit.        Estimated body mass index is 14.5 kg/m² as calculated from the following:    Height as of 7/21/24: 64\".    Weight as of 7/21/24: 84 lb 8 oz (38.3 kg).   POC Glucose   Date Value Ref Range Status   12/19/2023 72 70 - 99 mg/dL Final   12/18/2023 125 (H) 70 - 99 mg/dL Final   12/18/2023 84 70 - 99 mg/dL Final       Vital signs reviewed.Appears stated age, well groomed.    Constitutional:  Bp ***wnl. Pulse Regular and wnl for patient. Respirations easy and unlabored. Temperature wnl. Patient's skin is less pale, ***she is gaining weight, less*** ill appearing. Appearance neat and clean. Appears in no acute distress.     Musculoskeletal:  Patient ambulation is stable  Integumentary:  refer to wound characteristics and images   Psychiatric:  Judgment and insight intact. Alert and oriented times 3. No evidence of depression, anxiety, or agitation. Calm, cooperative, and communicative. Appropriate interactions and affect.  DIAGNOSTICS:     Lab Results   Component Value Date     BUN 32 (H) 07/22/2024    CREATSERUM 0.69 07/22/2024    ALB 3.1 (L) 07/21/2024    TP 6.6 07/21/2024       WOUND ASSESSMENT:     Wound 05/24/24 #1 Radiation therapy Breast Left (Active)   Date First Assessed/Time First Assessed: 05/24/24 0857    Wound Number (Wound Clinic Only): #1 Radiation therapy  Primary Wound Type: Other (comment)  Location: Breast  Wound Location Orientation: Left      Assessments 5/24/2024  8:59 AM 1/2/2025 11:29 AM   Wound Image        Drainage Amount Large Small   Drainage Description Serous;Yellow Serous;Yellow   Wound Length (cm) 15.5 cm 1 cm   Wound Width (cm) 19 cm 1 cm   Wound Surface Area (cm^2) 294.5 cm^2 1 cm^2   Wound Depth (cm) 0.1 cm 0.7 cm   Wound Volume (cm^3) 29.45 cm^3 0.7 cm^3   Wound Healing % -- 98   Margins Well-defined edges Well-defined edges;Epibole (Rolled edges)   Non-staged Wound Description Full thickness Full thickness   Alfreda-wound Assessment Moist;Edema Clean   Wound Granulation Tissue Firm;Pink Firm;Pink   Wound Bed Granulation (%) 20 % 100 %   Wound Bed Epithelium (%) 50 % --   Wound Bed Slough (%) 30 % --   Wound Odor None None   Tunneling? -- No   Undermining? -- No   Sinus Tracts? -- No       No associated orders.          ASSESSMENT AND PLAN:    There are no diagnoses linked to this encounter.              Risks, benefits, and alternatives of current treatment plan discussed in detail.  Questions and concerns addressed. Red flags to RTC or ED reviewed.  Patient (or parent) agrees to plan.      NOTE TO PATIENT: The 21st Century Cures Act makes clinical notes like these available to patients in the interest of transparency. Clinical notes are medical documents used by physicians and care providers to communicate with each other. These documents include medical language and terminology, abbreviations, and treatment information that may sound technical and at times possibly unfamiliar. In addition, at times, the verbiage may appear blunt or direct. These  documents are one tool providers use to communicate relevant information and clinical opinions of the care providers in a way that allows common understanding of the clinical context.   I spent***minutes with the patient. This time included:    preparing to see the patient (eg, review notes and recent diagnostics),  seeing the patient, obtaining and/or reviewing separately obtained history, performing a medically appropriate examination and/or evaluation, counseling and educating the patient, documenting in the record,  DISCHARGE:      There are no Patient Instructions on file for this visit.   Anita Smallwood FNP-C, CWCN-AP, CFCN, CSWS, WCC, DWC  1/21/2025

## 2025-01-21 ENCOUNTER — TELEPHONE (OUTPATIENT)
Dept: WOUND CARE | Facility: HOSPITAL | Age: 45
End: 2025-01-21

## 2025-01-21 ENCOUNTER — APPOINTMENT (OUTPATIENT)
Dept: WOUND CARE | Facility: HOSPITAL | Age: 45
End: 2025-01-21
Attending: NURSE PRACTITIONER
Payer: MEDICAID

## 2025-01-27 ENCOUNTER — TELEPHONE (OUTPATIENT)
Dept: SURGERY | Facility: CLINIC | Age: 45
End: 2025-01-27

## 2025-01-27 NOTE — PROGRESS NOTES
CHIEF COMPLAINT:     Chief Complaint   Patient presents with    Wound Care     Follow up for left breast wound. No complaints at this time.      HPI:   Information obtained from Patient and chart  5-24-24 INITIAL:  43 year old female with Past medical history invasive ductal carcinoma of left breast (dr kaiser [facility]ara) currently undergoing radiation therapy (dr kobi bran), leukocytoclastic vasculitis (treated with clobetasol ointment by dr. Walton) iron deficiency anemia, HFrEF (Ariane Putnam), lupus, Sjogren's syndrome.  Patient was recently admitted for oral sores and n/v and elctrolyte imbalance and hypokalemia.    Earlier this week she was seen in urgent care for uti symptoms and hematuria. She was treated with cefadroxil and recommended to see primary md. dr bran recommended patient to start yary, she states she is not regular with it. Patient has seen nutrition/dietician.  Patient is active with [facility].  Earlier this week she had to cancel physical therapy due to wound pain.  on 5-11 patient was started on potassium citrate x 10 days and patient was to repeat labs, which she did on 5-21 but her potassium was still 2.9. she states she is taking the potassium.  I will udpate ariane putnam and I asked the patient to contact her as they may want her to increase her potassium.  Patient states that initially she was utilizing aquaphor but then developed an itchy red rash, so now has just been leaving it dry or using aveeno. She is able to tolerate very minimal cleansing of the area.  I discussed with her how to do it as tolerated in the shower with anasept.  Will utilize hydrogel cool sheets, patient to return next week. There is not any s/s of infection    HPI PRIOR TO JANUARY 2025 SEE INDIVIDUAL PROGRESS NOTES  12-5-24 patient returns. She is seeing her oncologist later this week and dr sunshine next Monday. She has been using silver alginate.  The wound bed is more healthy in appearance however the  epiboly remains with slight undermining on that edge. We discussed that the top edge would need to be debrided. We also discussed wound vac placement if we do that here in clinic. Shew ill get input from dr sunshine and oncology.  If she wants to go forward with debridement in clinic, she should call clinic and we will order apria vac to place after debridement. No s/s of infection. The area is tender to touch.    1-2-25 patient returns.  She followed up with oncology on 12-6.  Mri of liver planned for monitoring of  prior liver lesion identified. Continue cymbalta for neuropathy.  She is to f/u in 3 mo.  She also saw Dr. Sunshine and they discussed excision of the area surgically but patient is to increase protein intake, restart yary, see nutritionist, see dr bran and then f/u in January.   She has continued to dress with silver alginate.  Her insurance changed so she can no longer see dr sunshine,  she has an appointment with dr graf for the end of January.  The wound is improved. She states she is supplementing with prenatal vitamin.  She looks more  healthy with more color in her skin and more energy in her overall posture. Will add rick and also run insurance for CTP.    1-28-25 patient returns.  Ctp were not approved by insurance.  She has been utilizing rick and silver alginate.she had her appointment with dr graf later today, but they have not received all the imaging from [facility] yet, so her appointment was pushed back. The wound is about the same size, but is more healthy granulation and it appears that there is titi-epi advancing from the superior rolled edge. Will continue with the rick and alginate. Patient continues to improve overall with her weight, energy etc.  MEDICATIONS:     Current Outpatient Medications:     Potassium Chloride ER 10 MEQ Oral Tab CR, Take 1 tablet (10 mEq total) by mouth daily., Disp: 30 tablet, Rfl: 0    HYDROcodone-acetaminophen 5-325 MG Oral Tab, Take 1 tablet by mouth  every 6 (six) hours as needed for Pain. (Patient not taking: Reported on 8/15/2024), Disp: 10 tablet, Rfl: 0    benzonatate 200 MG Oral Cap, Take 1 capsule (200 mg total) by mouth 3 (three) times daily as needed for cough. (Patient not taking: Reported on 8/15/2024), Disp: 30 capsule, Rfl: 0    prochlorperazine (COMPAZINE) 10 mg tablet, Take 1 tablet (10 mg total) by mouth every 6 (six) hours as needed for Nausea or vomiting. (Patient not taking: Reported on 8/15/2024), Disp: , Rfl:     ondansetron 4 MG Oral Tablet Dispersible, Take 1 tablet (4 mg total) by mouth every 4 (four) hours as needed for Nausea. (Patient not taking: Reported on 8/15/2024), Disp: 10 tablet, Rfl: 0    DULoxetine 30 MG Oral Cap DR Particles, Take 2 capsules (60 mg total) by mouth daily., Disp: , Rfl:     lidocaine-prilocaine 2.5-2.5 % External Cream, APPLY SMALL AMOUNT OVER PORT PRIOR TO ACCESS, Disp: , Rfl:     zolpidem 10 MG Oral Tab, Take 1 tablet (10 mg total) by mouth nightly as needed for Sleep., Disp: , Rfl:   ALLERGIES:     Allergies   Allergen Reactions    Bactrim [Sulfamethoxazole W/Trimethoprim] RASH    Adhesive Tape RASH      REVIEW OF SYSTEMS:   This information was obtained from the patient/family and chart.    See HPI for pertinent positives, otherwise 10 pt ROS negative.  HISTORY:   Past medical, surgical, family and social history updated where appropriate.      PHYSICAL EXAM:     Vitals:    01/28/25 0700   BP: 104/76   Pulse: 97   Resp: 14   Temp: 97.2 °F (36.2 °C)           Estimated body mass index is 14.5 kg/m² as calculated from the following:    Height as of 7/21/24: 64\".    Weight as of 7/21/24: 84 lb 8 oz (38.3 kg).   POC Glucose   Date Value Ref Range Status   12/19/2023 72 70 - 99 mg/dL Final   12/18/2023 125 (H) 70 - 99 mg/dL Final   12/18/2023 84 70 - 99 mg/dL Final       Vital signs reviewed.Appears stated age, well groomed.    Constitutional:  Bp wnl. Pulse Regular and wnl for patient. Respirations easy and  unlabored. Temperature wnl. Weight wnl for height. Appearance neat and clean. Appears in no acute distress.     Musculoskeletal:  Patient ambulation is stable with walker  Integumentary:  refer to wound characteristics and images   Psychiatric:  Judgment and insight intact. Alert and oriented times 3. No evidence of depression, anxiety, or agitation. Calm, cooperative, and communicative. Appropriate interactions and affect.  DIAGNOSTICS:     Lab Results   Component Value Date    BUN 32 (H) 07/22/2024    CREATSERUM 0.69 07/22/2024    ALB 3.1 (L) 07/21/2024    TP 6.6 07/21/2024       WOUND ASSESSMENT:     Wound 05/24/24 #1 Radiation therapy Breast Left (Active)   Date First Assessed/Time First Assessed: 05/24/24 0857    Wound Number (Wound Clinic Only): #1 Radiation therapy  Primary Wound Type: Other (comment)  Location: Breast  Wound Location Orientation: Left      Assessments 5/24/2024  8:59 AM 1/28/2025  9:42 AM   Wound Image        Drainage Amount Large Scant   Drainage Description Serous;Yellow Serous;Yellow   Wound Length (cm) 15.5 cm 1.1 cm   Wound Width (cm) 19 cm 1.3 cm   Wound Surface Area (cm^2) 294.5 cm^2 1.43 cm^2   Wound Depth (cm) 0.1 cm 0.5 cm   Wound Volume (cm^3) 29.45 cm^3 0.715 cm^3   Wound Healing % -- 98   Margins Well-defined edges Well-defined edges;Epibole (Rolled edges)   Non-staged Wound Description Full thickness Full thickness   Alfreda-wound Assessment Moist;Edema Clean   Wound Granulation Tissue Firm;Pink Pink;Pale Grey;Firm   Wound Bed Granulation (%) 20 % 100 %   Wound Bed Epithelium (%) 50 % --   Wound Bed Slough (%) 30 % --   Wound Odor None None       No associated orders.          ASSESSMENT AND PLAN:    1. 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 Adverse effect of radiation, subsequent encounter    3. SLE (systemic lupus erythematosus related syndrome) (HCC)    4. Malignant neoplasm of left breast in female, estrogen receptor negative,  unspecified site of breast (HCC)      Risks, benefits, and alternatives of current treatment plan discussed in detail.  Questions and concerns addressed. Red flags to RTC or ED reviewed.  Patient (or parent) agrees to plan.      NOTE TO PATIENT: The 21st Century Cures Act makes clinical notes like these available to patients in the interest of transparency. Clinical notes are medical documents used by physicians and care providers to communicate with each other. These documents include medical language and terminology, abbreviations, and treatment information that may sound technical and at times possibly unfamiliar. In addition, at times, the verbiage may appear blunt or direct. These documents are one tool providers use to communicate relevant information and clinical opinions of the care providers in a way that allows common understanding of the clinical context.   I [id number] with the patient. This time included:    preparing to see the patient (eg, review notes and recent diagnostics),  seeing the patient, obtaining and/or reviewing separately obtained history, performing a medically appropriate examination and/or evaluation, counseling and educating the patient, documenting in the record,  DISCHARGE:      Patient Instructions   Please return: 2 WEEKS     Patient discharge and wound care instructions  Alina Aceves  1/28/2025         Soak the area with ANASEPT gauze.    You may shower and cleanse area with mild soap and water, try to \"scrub\" lightly with a gauze to remove the build up of draiange  rinse wound with ANASEPT cleanser,   dab dry with gauze and apply     Left breast:   (agno3 applied in clinic)>rick collagen into wound base then apply a piece of SILVER ALGINATE into the wound and cover   Left side and lower extremities:  moisturize, moisturize, moisturize    Nutrition and blood sugar control:  Focus on the following:  Protein: Meats, beans, eggs, milk and yogurt particularly Greek yogurt),  tofu, soy nuts, soy protein products (Follow the protein handout in your welcome folder)  Vitamin C: Citrus fruits and juices, strawberries, tomatoes, tomato juice, peppers, baked potatoes, spinach, broccoli, cauliflower, [place] sprouts, cabbage  Vitamin A: Dark green, leafy vegetables, orange or yellow vegetables, cantaloupe, fortified dairy products, liver, fortified cereals  Zinc: Fortified cereals, red meats, seafood  Consider supplementing with Vitor by [employer]. It can be purchased on amazon, [employer] website, or local pharmacy may be able to order it for you.  (These are essential branch chain amino acids that help with tissue building and wound healing).   When your blood sugar is consistently elevated greater than 180 your body can't heal or fight infection.     Concerns:  Signs of infection may include the following:  Increase in redness  Red \"streaks\" from wound  Increase in swelling  Fever  Unusual odor  Change in the amount of wound drainage     Should you experience any significant changes in your wound(s) or have any questions regarding your home care instructions please contact the wound center [facility] @ [phone] If after regular business hours, please call your family doctor or local emergency room. The treatment plan has been discussed at length between you and your provider. Follow all instructions carefully, it is very important. If you do not follow all instructions you are at risk of your wound not healing, infection, possible loss of limb and even loss of life.    Anita Smallwood FNP-C, CWCN-AP, CFCN, CSWS, WCC, DWC  1/28/2025

## 2025-01-27 NOTE — TELEPHONE ENCOUNTER
Patient called stating that she has an appointment scheduled with Dr. Perdomo for tomorrow. Patient stated that she is trying to get her most recent imaging from [facility] and it is just taking some time. So patient wants to reschedule her appointment. Patient was offered March 7,at 0830 for a new consult with Dr. Perdomo patient accepted the appointment. Patients questions were answered to the best of my ability. Patient verbalized understanding.

## 2025-01-28 ENCOUNTER — OFFICE VISIT (OUTPATIENT)
Dept: WOUND CARE | Facility: HOSPITAL | Age: 45
End: 2025-01-28
Attending: NURSE PRACTITIONER
Payer: MEDICAID

## 2025-01-28 VITALS
TEMPERATURE: 97 F | DIASTOLIC BLOOD PRESSURE: 76 MMHG | RESPIRATION RATE: 14 BRPM | SYSTOLIC BLOOD PRESSURE: 104 MMHG | HEART RATE: 97 BPM

## 2025-01-28 DIAGNOSIS — T66.XXXD ADVERSE EFFECT OF RADIATION, SUBSEQUENT ENCOUNTER: ICD-10-CM

## 2025-01-28 DIAGNOSIS — C50.912 MALIGNANT NEOPLASM OF LEFT BREAST IN FEMALE, ESTROGEN RECEPTOR NEGATIVE, UNSPECIFIED SITE OF BREAST (HCC): ICD-10-CM

## 2025-01-28 DIAGNOSIS — L98.492 NON-PRESSURE CHRONIC ULCER OF SKIN OF OTHER SITES WITH FAT LAYER EXPOSED (HCC): Primary | ICD-10-CM

## 2025-01-28 DIAGNOSIS — Z17.1 MALIGNANT NEOPLASM OF LEFT BREAST IN FEMALE, ESTROGEN RECEPTOR NEGATIVE, UNSPECIFIED SITE OF BREAST (HCC): ICD-10-CM

## 2025-01-28 DIAGNOSIS — M32.9 SLE (SYSTEMIC LUPUS ERYTHEMATOSUS RELATED SYNDROME) (HCC): ICD-10-CM

## 2025-01-28 PROCEDURE — 99214 OFFICE O/P EST MOD 30 MIN: CPT | Performed by: NURSE PRACTITIONER

## 2025-01-28 NOTE — PROGRESS NOTES
.Weekly Wound Education Note    Teaching Provided To: Patient  Training Topics: Dressing;Cleasing and general instructions;Discharge instructions  Training Method: Explain/Verbal;Written  Training Response: Patient responds and understands        Notes: Wound improving. Continue rick, silver alginate, and bordered foam to wound.

## 2025-01-28 NOTE — PATIENT INSTRUCTIONS
Please return: 2 WEEKS     Patient discharge and wound care instructions  Alina Aceves  1/28/2025         Soak the area with ANASEPT gauze.    You may shower and cleanse area with mild soap and water, try to \"scrub\" lightly with a gauze to remove the build up of draiange  rinse wound with ANASEPT cleanser,   dab dry with gauze and apply     Left breast:   (agno3 applied in clinic)>rick collagen into wound base then apply a piece of SILVER ALGINATE into the wound and cover   Left side and lower extremities:  moisturize, moisturize, moisturize    Nutrition and blood sugar control:  Focus on the following:  Protein: Meats, beans, eggs, milk and yogurt particularly Greek yogurt), tofu, soy nuts, soy protein products (Follow the protein handout in your welcome folder)  Vitamin C: Citrus fruits and juices, strawberries, tomatoes, tomato juice, peppers, baked potatoes, spinach, broccoli, cauliflower, [place] sprouts, cabbage  Vitamin A: Dark green, leafy vegetables, orange or yellow vegetables, cantaloupe, fortified dairy products, liver, fortified cereals  Zinc: Fortified cereals, red meats, seafood  Consider supplementing with Vitor by [employer]. It can be purchased on amazon, [employer] website, or local pharmacy may be able to order it for you.  (These are essential branch chain amino acids that help with tissue building and wound healing).   When your blood sugar is consistently elevated greater than 180 your body can't heal or fight infection.     Concerns:  Signs of infection may include the following:  Increase in redness  Red \"streaks\" from wound  Increase in swelling  Fever  Unusual odor  Change in the amount of wound drainage     Should you experience any significant changes in your wound(s) or have any questions regarding your home care instructions please contact the [facility] @ [phone] If after regular business hours, please call your family doctor or local emergency room. The  treatment plan has been discussed at length between you and your provider. Follow all instructions carefully, it is very important. If you do not follow all instructions you are at risk of your wound not healing, infection, possible loss of limb and even loss of life.

## 2025-02-07 ENCOUNTER — ORDER TRANSCRIPTION (OUTPATIENT)
Dept: PHYSICAL THERAPY | Facility: HOSPITAL | Age: 45
End: 2025-02-07

## 2025-02-07 DIAGNOSIS — D64.9 ANEMIA: ICD-10-CM

## 2025-02-07 DIAGNOSIS — S21.102D OPEN WOUND OF CHEST WALL, LEFT, SUBSEQUENT ENCOUNTER: ICD-10-CM

## 2025-02-07 DIAGNOSIS — C50.412 MALIGNANT NEOPLASM OF UPPER-OUTER QUADRANT OF LEFT FEMALE BREAST (HCC): Primary | ICD-10-CM

## 2025-02-07 DIAGNOSIS — G62.9 NEUROPATHY: ICD-10-CM

## 2025-02-07 DIAGNOSIS — M21.379 FOOT DROP: ICD-10-CM

## 2025-02-10 ENCOUNTER — TELEPHONE (OUTPATIENT)
Dept: PHYSICAL THERAPY | Facility: HOSPITAL | Age: 45
End: 2025-02-10

## 2025-02-10 NOTE — PROGRESS NOTES
CHIEF COMPLAINT:     Chief Complaint   Patient presents with    Wound Recheck     Patient arrives on foot w/ walker to wound care appointment. She denies pain in her wound this morning. Denies pain to wound today. Wonders which medication/product was used on her wound at her last visit - she feels it has had a positive impact on the tissue.     HPI:   Information obtained from Patient and chart  5-24-24 INITIAL:  43 year old female with Past medical history invasive ductal carcinoma of left breast (dr kaiser camara) currently undergoing radiation therapy (dr kobi bran), leukocytoclastic vasculitis (treated with clobetasol ointment by dr. Walton) iron deficiency anemia, HFrEF (Arianecase Putnam), lupus, Sjogren's syndrome.  Patient was recently admitted for oral sores and n/v and elctrolyte imbalance and hypokalemia.    Earlier this week she was seen in urgent care for uti symptoms and hematuria. She was treated with cefadroxil and recommended to see primary md. dr bran recommended patient to start yary, she states she is not regular with it. Patient has seen nutrition/dietician.  Patient is active with [facility].  Earlier this week she had to cancel physical therapy due to wound pain.  on 5-11 patient was started on potassium citrate x 10 days and patient was to repeat labs, which she did on 5-21 but her potassium was still 2.9. she states she is taking the potassium.  I will udpate ariane putnam and I asked the patient to contact her as they may want her to increase her potassium.  Patient states that initially she was utilizing aquaphor but then developed an itchy red rash, so now has just been leaving it dry or using aveeno. She is able to tolerate very minimal cleansing of the area.  I discussed with her how to do it as tolerated in the shower with anasept.  Will utilize hydrogel cool sheets, patient to return next week. There is not any s/s of infection    HPI PRIOR TO JANUARY 2025 SEE INDIVIDUAL  PROGRESS NOTES  12-5-24 patient returns. She is seeing her oncologist later this week and dr sunshine next Monday. She has been using silver alginate.  The wound bed is more healthy in appearance however the epiboly remains with slight undermining on that edge. We discussed that the top edge would need to be debrided. We also discussed wound vac placement if we do that here in clinic. Shew ill get input from dr sunshine and oncology.  If she wants to go forward with debridement in clinic, she should call clinic and we will order apria vac to place after debridement. No s/s of infection. The area is tender to touch.    1-2-25 patient returns.  She followed up with oncology on 12-6.  Mri of liver planned for monitoring of  prior liver lesion identified. Continue cymbalta for neuropathy.  She is to f/u in 3 mo.  She also saw Dr. Sunshine and they discussed excision of the area surgically but patient is to increase protein intake, restart yary, see nutritionist, see dr bran and then f/u in January.   She has continued to dress with silver alginate.  Her insurance changed so she can no longer see dr sunshine,  she has an appointment with dr graf for the end of January.  The wound is improved. She states she is supplementing with prenatal vitamin.  She looks more  healthy with more color in her skin and more energy in her overall posture. Will add rick and also run insurance for CTP.    1-28-25 patient returns.  Ctp were not approved by insurance.  She has been utilizing rick and silver alginate.she had her appointment with dr graf later today, but they have not received all the imaging from [facility] yet, so her appointment was pushed back. The wound is about the same size, but is more healthy granulation and it appears that there is titi-epi advancing from the superior rolled edge. Will continue with the rick and alginate. Patient continues to improve overall with her weight, energy etc.    2-11-25 patient returns. Her  appointment with dr graf is now march 7 and with heme/onc tomorrow (although she is nervous about the inclement weather forcast).  She has continued to dress with rick and silver alginate.  Wound is significantly smaller. She states she is supplementing with prenatal vitamin. She states that the dressing change after her last appointment she could see a visible difference in the size. She states she did not have any burning or pain after agno3 used at last visit.  I stimulated the wound again with agno3. She will continue with the rick and alginate. Will see if the wound continues to progress, but we may need to remove the upper rolled edge. No acute s/s of infection or c/o pain.  MEDICATIONS:     Current Outpatient Medications:     Potassium Chloride ER 10 MEQ Oral Tab CR, Take 1 tablet (10 mEq total) by mouth daily., Disp: 30 tablet, Rfl: 0    HYDROcodone-acetaminophen 5-325 MG Oral Tab, Take 1 tablet by mouth every 6 (six) hours as needed for Pain. (Patient not taking: Reported on 8/15/2024), Disp: 10 tablet, Rfl: 0    benzonatate 200 MG Oral Cap, Take 1 capsule (200 mg total) by mouth 3 (three) times daily as needed for cough. (Patient not taking: Reported on 8/15/2024), Disp: 30 capsule, Rfl: 0    prochlorperazine (COMPAZINE) 10 mg tablet, Take 1 tablet (10 mg total) by mouth every 6 (six) hours as needed for Nausea or vomiting. (Patient not taking: Reported on 8/15/2024), Disp: , Rfl:     ondansetron 4 MG Oral Tablet Dispersible, Take 1 tablet (4 mg total) by mouth every 4 (four) hours as needed for Nausea. (Patient not taking: Reported on 8/15/2024), Disp: 10 tablet, Rfl: 0    DULoxetine 30 MG Oral Cap DR Particles, Take 2 capsules (60 mg total) by mouth daily., Disp: , Rfl:     lidocaine-prilocaine 2.5-2.5 % External Cream, APPLY SMALL AMOUNT OVER PORT PRIOR TO ACCESS, Disp: , Rfl:     zolpidem 10 MG Oral Tab, Take 1 tablet (10 mg total) by mouth nightly as needed for Sleep., Disp: , Rfl:   ALLERGIES:      Allergies   Allergen Reactions    Bactrim [Sulfamethoxazole W/Trimethoprim] RASH    Adhesive Tape RASH      REVIEW OF SYSTEMS:   This information was obtained from the patient/family and chart.    See HPI for pertinent positives, otherwise 10 pt ROS negative.  HISTORY:   Past medical, surgical, family and social history updated where appropriate.      PHYSICAL EXAM:     Vitals:    02/11/25 0939   BP: 115/73   Pulse: 89   Resp: 16   Temp: 97 °F (36.1 °C)       Estimated body mass index is 14.5 kg/m² as calculated from the following:    Height as of 7/21/24: 64\".    Weight as of 7/21/24: 84 lb 8 oz (38.3 kg).   POC Glucose   Date Value Ref Range Status   12/19/2023 72 70 - 99 mg/dL Final   12/18/2023 125 (H) 70 - 99 mg/dL Final   12/18/2023 84 70 - 99 mg/dL Final       Vital signs reviewed.Appears stated age, well groomed.    Constitutional:  Bp wnl. Pulse Regular and wnl for patient. Respirations easy and unlabored. Temperature wnl. Weight wnl for height. Appearance neat and clean. Appears in no acute distress.     Musculoskeletal:  Patient ambulation is stable with walker  Integumentary:  refer to wound characteristics and images   Psychiatric:  Judgment and insight intact. Alert and oriented times 3. No evidence of depression, anxiety, or agitation. Calm, cooperative, and communicative. Appropriate interactions and affect.  DIAGNOSTICS:     Lab Results   Component Value Date    BUN 32 (H) 07/22/2024    CREATSERUM 0.69 07/22/2024    ALB 3.1 (L) 07/21/2024    TP 6.6 07/21/2024       WOUND ASSESSMENT:     Wound 05/24/24 #1 Radiation therapy Breast Left (Active)   Date First Assessed/Time First Assessed: 05/24/24 0857    Wound Number (Wound Clinic Only): #1 Radiation therapy  Primary Wound Type: Other (comment)  Location: Breast  Wound Location Orientation: Left      Assessments 5/24/2024  8:59 AM 2/11/2025  9:41 AM   Wound Image        Drainage Amount Large Scant   Drainage Description Serous;Yellow Serous;Yellow    Wound Length (cm) 15.5 cm 0.6 cm   Wound Width (cm) 19 cm 0.6 cm   Wound Surface Area (cm^2) 294.5 cm^2 0.36 cm^2   Wound Depth (cm) 0.1 cm 0.3 cm   Wound Volume (cm^3) 29.45 cm^3 0.108 cm^3   Wound Healing % -- 100   Margins Well-defined edges Well-defined edges;Epibole (Rolled edges)   Non-staged Wound Description Full thickness Full thickness   Alfreda-wound Assessment Moist;Edema Clean   Wound Granulation Tissue Firm;Pink Pink;Spongy   Wound Bed Granulation (%) 20 % 100 %   Wound Bed Epithelium (%) 50 % --   Wound Bed Slough (%) 30 % --   Wound Odor None None   Tunneling? -- No   Undermining? -- No   Sinus Tracts? -- No       No associated orders.          ASSESSMENT AND PLAN:    1. 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 Adverse effect of radiation, subsequent encounter    3. SLE (systemic lupus erythematosus related syndrome) (HCC)        Risks, benefits, and alternatives of current treatment plan discussed in detail.  Questions and concerns addressed. Red flags to RTC or ED reviewed.  Patient (or parent) agrees to plan.      NOTE TO PATIENT: The 21st Century Cures Act makes clinical notes like these available to patients in the interest of transparency. Clinical notes are medical documents used by physicians and care providers to communicate with each other. These documents include medical language and terminology, abbreviations, and treatment information that may sound technical and at times possibly unfamiliar. In addition, at times, the verbiage may appear blunt or direct. These documents are one tool providers use to communicate relevant information and clinical opinions of the care providers in a way that allows common understanding of the clinical context.   I [id number] with the patient. This time included:    preparing to see the patient (eg, review notes and recent diagnostics),  seeing the patient, obtaining and/or reviewing separately obtained  history, performing a medically appropriate examination and/or evaluation, counseling and educating the patient, documenting in the record,  DISCHARGE:      Patient Instructions   Please return: 2 WEEKS     Patient discharge and wound care instructions  Alina Aceves  2/11/2025           Soak the area with ANASEPT gauze.    You may shower and cleanse area with mild soap and water, try to \"scrub\" lightly with a gauze to remove the build up of draiange  rinse wound with ANASEPT cleanser,   dab dry with gauze and apply   Left breast:   (agno3 applied in clinic)>rick collagen into wound base then apply a piece of SILVER ALGINATE into the wound and cover   Left side and lower extremities:  moisturize, moisturize, moisturize    Nutrition and blood sugar control:  Focus on the following:  Protein: Meats, beans, eggs, milk and yogurt particularly Greek yogurt), tofu, soy nuts, soy protein products (Follow the protein handout in your welcome folder)  Vitamin C: Citrus fruits and juices, strawberries, tomatoes, tomato juice, peppers, baked potatoes, spinach, broccoli, cauliflower, [place] sprouts, cabbage  Vitamin A: Dark green, leafy vegetables, orange or yellow vegetables, cantaloupe, fortified dairy products, liver, fortified cereals  Zinc: Fortified cereals, red meats, seafood  Consider supplementing with Vitor by [employer]. It can be purchased on amazon, [employer] website, or local pharmacy may be able to order it for you.  (These are essential branch chain amino acids that help with tissue building and wound healing).   When your blood sugar is consistently elevated greater than 180 your body can't heal or fight infection.     Concerns:  Signs of infection may include the following:  Increase in redness  Red \"streaks\" from wound  Increase in swelling  Fever  Unusual odor  Change in the amount of wound drainage     Should you experience any significant changes in your wound(s) or have any questions regarding your  home care instructions please contact the wound center [facility] @ [phone] If after regular business hours, please call your family doctor or local emergency room. The treatment plan has been discussed at length between you and your provider. Follow all instructions carefully, it is very important. If you do not follow all instructions you are at risk of your wound not healing, infection, possible loss of limb and even loss of life.    Anita Smallwood FNP-C, CWCN-AP, CFCN, CSWS, WCC, DWC  2/11/2025

## 2025-02-11 ENCOUNTER — OFFICE VISIT (OUTPATIENT)
Dept: WOUND CARE | Facility: HOSPITAL | Age: 45
End: 2025-02-11
Attending: NURSE PRACTITIONER
Payer: MEDICAID

## 2025-02-11 ENCOUNTER — TELEPHONE (OUTPATIENT)
Age: 45
End: 2025-02-11

## 2025-02-11 VITALS
TEMPERATURE: 97 F | RESPIRATION RATE: 16 BRPM | HEART RATE: 89 BPM | DIASTOLIC BLOOD PRESSURE: 73 MMHG | SYSTOLIC BLOOD PRESSURE: 115 MMHG

## 2025-02-11 DIAGNOSIS — L98.492 NON-PRESSURE CHRONIC ULCER OF SKIN OF OTHER SITES WITH FAT LAYER EXPOSED (HCC): Primary | ICD-10-CM

## 2025-02-11 DIAGNOSIS — T66.XXXD ADVERSE EFFECT OF RADIATION, SUBSEQUENT ENCOUNTER: ICD-10-CM

## 2025-02-11 DIAGNOSIS — M32.9 SLE (SYSTEMIC LUPUS ERYTHEMATOSUS RELATED SYNDROME) (HCC): ICD-10-CM

## 2025-02-11 PROCEDURE — 99214 OFFICE O/P EST MOD 30 MIN: CPT | Performed by: NURSE PRACTITIONER

## 2025-02-11 NOTE — PROGRESS NOTES
>Weekly Wound Education Note    Teaching Provided To: Patient  Training Topics: Dressing;Cleasing and general instructions;Discharge instructions  Training Method: Explain/Verbal;Written  Training Response: Patient responds and understands        Notes: Wound improving. Continue rick, silver alginate wick, and bordered foam.

## 2025-02-11 NOTE — PATIENT INSTRUCTIONS
Please return: 2 WEEKS     Patient discharge and wound care instructions  Alina Aceves  2/11/2025           Soak the area with ANASEPT gauze.    You may shower and cleanse area with mild soap and water, try to \"scrub\" lightly with a gauze to remove the build up of draiange  rinse wound with ANASEPT cleanser,   dab dry with gauze and apply   Left breast:   (agno3 applied in clinic)>rick collagen into wound base then apply a piece of SILVER ALGINATE into the wound and cover   Left side and lower extremities:  moisturize, moisturize, moisturize    Nutrition and blood sugar control:  Focus on the following:  Protein: Meats, beans, eggs, milk and yogurt particularly Greek yogurt), tofu, soy nuts, soy protein products (Follow the protein handout in your welcome folder)  Vitamin C: Citrus fruits and juices, strawberries, tomatoes, tomato juice, peppers, baked potatoes, spinach, broccoli, cauliflower, [place] sprouts, cabbage  Vitamin A: Dark green, leafy vegetables, orange or yellow vegetables, cantaloupe, fortified dairy products, liver, fortified cereals  Zinc: Fortified cereals, red meats, seafood  Consider supplementing with Vitor by [employer]. It can be purchased on amazon, [employer] website, or local pharmacy may be able to order it for you.  (These are essential branch chain amino acids that help with tissue building and wound healing).   When your blood sugar is consistently elevated greater than 180 your body can't heal or fight infection.     Concerns:  Signs of infection may include the following:  Increase in redness  Red \"streaks\" from wound  Increase in swelling  Fever  Unusual odor  Change in the amount of wound drainage     Should you experience any significant changes in your wound(s) or have any questions regarding your home care instructions please contact the [facility] @ [phone] If after regular business hours, please call your family doctor or local emergency room. The  treatment plan has been discussed at length between you and your provider. Follow all instructions carefully, it is very important. If you do not follow all instructions you are at risk of your wound not healing, infection, possible loss of limb and even loss of life.

## 2025-02-11 NOTE — TELEPHONE ENCOUNTER
Pt called to see if Dr. Rouse has any available appts tomorrow morning. Pt states she is concerned about the weather and she's on a walker    Please give the pt a call back

## 2025-02-12 ENCOUNTER — OFFICE VISIT (OUTPATIENT)
Age: 45
End: 2025-02-12
Attending: INTERNAL MEDICINE
Payer: MEDICAID

## 2025-02-12 VITALS
WEIGHT: 114 LBS | HEART RATE: 93 BPM | OXYGEN SATURATION: 100 % | SYSTOLIC BLOOD PRESSURE: 131 MMHG | TEMPERATURE: 98 F | BODY MASS INDEX: 19.46 KG/M2 | RESPIRATION RATE: 16 BRPM | HEIGHT: 64.02 IN | DIASTOLIC BLOOD PRESSURE: 84 MMHG

## 2025-02-12 DIAGNOSIS — R74.8 ELEVATED ALKALINE PHOSPHATASE LEVEL: ICD-10-CM

## 2025-02-12 DIAGNOSIS — E87.6 HYPOKALEMIA: ICD-10-CM

## 2025-02-12 DIAGNOSIS — M32.9 SLE (SYSTEMIC LUPUS ERYTHEMATOSUS RELATED SYNDROME) (HCC): ICD-10-CM

## 2025-02-12 DIAGNOSIS — M35.06 SJOGREN SYNDROME WITH PERIPHERAL NERVOUS SYSTEM INVOLVEMENT (HCC): ICD-10-CM

## 2025-02-12 DIAGNOSIS — Z17.1 MALIGNANT NEOPLASM OF OVERLAPPING SITES OF LEFT BREAST IN FEMALE, ESTROGEN RECEPTOR NEGATIVE (HCC): Primary | ICD-10-CM

## 2025-02-12 DIAGNOSIS — E83.52 HYPERCALCEMIA: ICD-10-CM

## 2025-02-12 DIAGNOSIS — D63.8 ANEMIA, CHRONIC DISEASE: ICD-10-CM

## 2025-02-12 DIAGNOSIS — G62.9 NEUROPATHY: ICD-10-CM

## 2025-02-12 DIAGNOSIS — C50.812 MALIGNANT NEOPLASM OF OVERLAPPING SITES OF LEFT BREAST IN FEMALE, ESTROGEN RECEPTOR NEGATIVE (HCC): Primary | ICD-10-CM

## 2025-02-12 DIAGNOSIS — Z95.828 PORT-A-CATH IN PLACE: ICD-10-CM

## 2025-02-12 RX ORDER — MELATONIN
1000 DAILY
COMMUNITY

## 2025-02-12 RX ORDER — TORSEMIDE 20 MG/1
20 TABLET ORAL DAILY
COMMUNITY

## 2025-02-12 RX ORDER — DIPHENHYDRAMINE HYDROCHLORIDE 25 MG/1
25 CAPSULE ORAL DAILY
COMMUNITY

## 2025-02-12 RX ORDER — MULTIVIT,IRON,MINERALS/LUTEIN
TABLET ORAL
COMMUNITY

## 2025-02-12 RX ORDER — METOPROLOL SUCCINATE 50 MG/1
50 TABLET, EXTENDED RELEASE ORAL DAILY
COMMUNITY

## 2025-02-12 NOTE — PROGRESS NOTES
Patient here for new consult/transfer of care from [facility]. Patient completed AC-taxol in 2023. Patient completed lumpectomy in 11/2023. Patient is currently seeing wound care for left side breat wound, that has been ongoing. Patient currently has drop foot d/t bilateral lower extremity neuropathy. Patient seeing hem at [place] for MGUS diagnosis.     Education Record    Learner:  Patient and Family Member    Disease / Diagnosis: malignant neoplasm of female breast    Barriers / Limitations:  None   Comments:    Method:  Discussion   Comments:    General Topics:  Medication, Side effects and symptom management, and Plan of care reviewed   Comments:    Outcome:  Shows understanding   Comments:

## 2025-02-12 NOTE — CONSULTS
Cancer Center Report of Consultation    Patient Name: Alina Aceves   YOB: 1980   Medical Record Number: [id number]   CSN: [id number]   Consulting Physician: Ravindra Rouse MD  Referring Physician(s): No ref. provider found  Date of Consultation: 2/12/2025     Reason for Consultation:  Alina Aceves was seen today in the Cancer Center for evaluation and management of a diagnosis of triple negative left breast cancer.    History of Present Illness  Alina Aceves is a 44 year old female with triple negative breast cancer and lupus who presents for consultation concerning her cancer treatment and associated complications.    She was diagnosed with left breast cancer in April 2023 after discovering a mass in February 2023. Initial mammogram was negative, but subsequent ultrasound and biopsy confirmed triple negative invasive ductal carcinoma, grade 3. She began chemotherapy in May 2023 with Adriamycin and Cytoxan every two weeks for four cycles, followed by weekly paclitaxel for twelve weeks. Due to neuropathy and drop foot, the intensity of paclitaxel was reduced. She completed chemotherapy in October 2023, followed by a lumpectomy in November 2023, which showed residual invasive ductal carcinoma but negative lymph nodes. Radiation therapy was complicated by left chest ulceration, and wound care is ongoing.    She has a history of lupus primarily manifesting as Sjogren's syndrome, with symptoms exacerbated during cancer treatment. She experienced significant fatigue, skin sensitivity, and neuropathy during chemotherapy. She also has a history of vasculitis and leukocytopenia, with skin rashes and leg swelling. A bone marrow biopsy in October 2020 showed normal results, but she has been monitored for monoclonal protein with both a monoclonal IgA and IgM.    In December 2023, she was hospitalized for pneumonia and developed congestive heart failure, with an echocardiogram showing an ejection fraction  of 20-25%. She experienced significant leg swelling and weakness, requiring physical therapy. She continues to experience fatigue, neuropathy, and drop foot, impacting her ability to drive and perform daily activities.    She has a history of anemia and low potassium, managed with potassium citrate and chloride. Recent labs in 2024 showed hemoglobin of 9.8, white count of 3.3, and normal potassium levels. She has experienced significant weight loss, previously dropping to 85 pounds, and is focusing on maintaining nutrition.    Past Medical History:  Past Medical History:    Congestive heart disease (HCC)    Gastritis    Left Breast Cancer [facility]    Neoadjuvant ddAC/weekly Taxol    Neuropathy    Personal history of antineoplastic chemotherapy    Sjogren's disease (HCC)    SLE       Past Surgical History:  Past Surgical History:   Procedure Laterality Date    Breast surgery Left 2023     delivery only      Hc port a cath access         Family Medical History:  Family History   Problem Relation Age of Onset    Hypertension Mother     Cancer Other        Gyne History:  OB History   No obstetric history on file.       Psychosocial History:  Social History     Socioeconomic History    Marital status:      Spouse name: Not on file    Number of children: 2    Years of education: Not on file    Highest education level: Not on file   Occupational History    Not on file   Tobacco Use    Smoking status: Former     Current packs/day: 0.00     Types: Cigarettes     Quit date:      Years since quitting: 15.1    Smokeless tobacco: Never    Tobacco comments:     social smoker   Vaping Use    Vaping status: Never Used   Substance and Sexual Activity    Alcohol use: Not Currently    Drug use: Never    Sexual activity: Not on file   Other Topics Concern    Not on file   Social History Narrative    Not on file     Social Drivers of Health     Food Insecurity: Patient Declined (2025)    Received from  [facility]    Hunger Vital Sign     Worried About Running Out of Food in the Last Year: Patient declined     Ran Out of Food in the Last Year: Patient declined   Transportation Needs: Unmet Transportation Needs (1/9/2025)    Received from [facility]    PRAPARE - Transportation     Lack of Transportation (Medical): Yes     Lack of Transportation (Non-Medical): Patient declined   Housing Stability: High Risk (1/9/2025)    Received from [facility]    Housing Stability Vital Sign     Unable to Pay for Housing in the Last Year: Yes     Number of Times Moved in the Last Year: Not on file     Homeless in the Last Year: Not on file       Allergies:   Allergies[1]    Current Medications:    Current Outpatient Medications:     Prenatal Vit-Fe Fumarate-FA (MULTI PRENATAL) 27-0.8 MG Oral Tab, Take by mouth., Disp: , Rfl:     Pyridoxine HCl (VITAMIN B-6) 25 MG Oral Tab, Take 1 tablet (25 mg total) by mouth daily., Disp: , Rfl:     cyanocobalamin 1000 MCG Oral Tab, Take 1 tablet (1,000 mcg total) by mouth daily., Disp: , Rfl:     metoprolol succinate ER 50 MG Oral Tablet 24 Hr, Take 1 tablet (50 mg total) by mouth daily., Disp: , Rfl:     torsemide 20 MG Oral Tab, Take 1 tablet (20 mg total) by mouth daily., Disp: , Rfl:     Potassium Chloride ER 10 MEQ Oral Tab CR, Take 1 tablet (10 mEq total) by mouth daily., Disp: 30 tablet, Rfl: 0    DULoxetine 30 MG Oral Cap DR Particles, Take 2 capsules (60 mg total) by mouth daily., Disp: , Rfl:     zolpidem 10 MG Oral Tab, Take 1 tablet (10 mg total) by mouth nightly as needed for Sleep., Disp: , Rfl:     Review of Systems:      Constitutional: Negative for fevers, chills, night sweats.  Eyes: Negative for visual disturbance, irritation and redness.  Respiratory: Negative for cough, hemoptysis, chest pain, or dyspnea.  Cardiovascular: Negative for angina, orthopnea or palpitations.  Hematologic/lymphatic: Negative for easy  bruising, bleeding, and lymphadenopathy.  Genitourinary: Negative for dysuria or hematuria  Psychiatric: The patient's mood was calm and appropriate for this visit.    The pertinent positives and negatives were described in the HPI and above. All other systems were negative.      Vital Signs:  Height: 162.6 cm (5' 4.02\") (02/12 1317)  Weight: 51.7 kg (114 lb) (02/12 1317)  BSA (Calculated - sq m): 1.54 sq meters (02/12 1317)  Pulse: 93 (02/12 1317)  BP: 131/84 (02/12 1317)  Temp: 97.5 °F (36.4 °C) (02/12 1317)  Do Not Use - Resp Rate: --  SpO2: 100 % (02/12 1317)    Physical Examination:    Physical Exam  Constitutional: Patient is alert and oriented x 3, not in acute distress.  HEENT: Eyes white, sclera normal. Tongue dry.  Lymphatics: There is no palpable lymphadenopathy throughout in the cervical, supraclavicular, or axillary regions. Neck is supple.  CHEST: Lungs clear to auscultation, no wheezing or crackles. Percussion of lungs normal.  CARDIOVASCULAR: Heart sounds normal, rhythm good, no murmur detected.  ABDOMEN: Liver small and normal. No tenderness upon palpation.  EXTREMITIES: Ankles and calf muscles without swelling or tenderness. No reflexes in lower extremities. Numbness in legs up to knees with pins and needles sensation.  NEUROLOGICAL: Normal eye movements. Normal pupillary response to light. Tingling sensation in right hand, no tingling in left hand.  SKIN: Hypopigmentation and scarring under the breast, described as vitiligo and old red scarring. No lumps palpated in breast tissue. Nipples not inverted. Wound on superior aspect of left breast with bandage, not fully closed but improved.        Labs reviewed at this visit:  Lab Results   Component Value Date    WBC 4.1 02/12/2025    RBC 3.48 (L) 02/12/2025    HGB 10.7 (L) 02/12/2025    HCT 31.5 (L) 02/12/2025    MCV 90.5 02/12/2025    MCH 30.7 02/12/2025    MCHC 34.0 02/12/2025    RDW 15.3 02/12/2025    .0 02/12/2025     Lab Results    Component Value Date     02/12/2025    K 2.9 (LL) 02/12/2025     02/12/2025    CO2 20.0 (L) 02/12/2025    BUN 21 02/12/2025    CREATSERUM 1.03 (H) 02/12/2025    GLU 83 02/12/2025    CA 10.9 (H) 02/12/2025    ALKPHO 132 (H) 02/12/2025    ALT 23 02/12/2025    AST 28 02/12/2025    BILT 0.3 02/12/2025    ALB 4.5 02/12/2025    TP 8.9 (H) 02/12/2025       Results  LABS  CBC: Hb 9.8, WBC 3.3, , Neutrophils 2.08 (12/06/2024)  Chemistry panel: Glucose normal, K 4.1, Alkaline phosphatase 120, AST 42, ALT 39, Bilirubin 0.29, Albumin 4.1 (12/06/2024)  Monoclonal protein study: Multiple monoclonal bands identified in immune fixation. Monoclonal IgA kappa present. Two monoclonal IgM kappa bands present. (12/2023)    RADIOLOGY  Skeletal bone survey: No evidence of myeloma findings. (01/2021)  Diagnostic mammogram: 1.9 cm mass in the left breast at the 12 o'clock position. Two adjacent masses in that area. (03/2023)  Ultrasound: Lymph nodes in the left axilla, some normal, others thickened and suspicious. (03/2023)  MRI: Left breast upper central breast: 2.4 cm mass with two biopsy clips. Several prominent lymph nodes, not definitely abnormal. (04/17/2023)  CT chest, abdomen, and pelvis: Residual surgery findings in the left breast. Small lung nodule in the right lung. 0.7 cm density in the liver. 0.7 cm density in the lumbar spine, stable. (09/26/2024)  MRI liver: 0.9 cm lesion in hepatic segment 8, suggestive of a benign cyst. (12/27/2024)  PET scan: Small, mildly hypermetabolic left breast lesion. Large left axillary lymph node with some activity. No other evidence of cancer. (05/05/2023)    DIAGNOSTIC  Echocardiogram: Severe diffuse hypokinesis with a left ventricular ejection fraction of 20-25%. (12/17/2023)    PATHOLOGY  Bone marrow biopsy: Normal cellular bone marrow for age. Normal trilineage hematopoiesis. Plasma cells 2%. No increase in blasts. Iron stores present. (10/09/2020)  Biopsy: Left breast  first site: invasive ductal carcinoma grade 3. Left breast second site: invasive ductal carcinoma grade 3. Left axillary lymph nodes: fragments of lymph nodes, no definite cancer. (04/05/2023)     Assessment & Plan  Triple Negative Breast Cancer  Diagnosed in April 2023. Completed chemotherapy (Adriamycin, Cytoxan, paclitaxel) in October 2023 with significant side effects (neuropathy, drop foot). Lumpectomy in November 2023 revealed 2mm residual invasive ductal carcinoma, negative lymph nodes. Radiation therapy caused ulceration. Currently in remission. Immune therapy not given due to lupus; capecitabine discontinued due to severe mouth sores. Emphasized recovery and quality of life over frequent surveillance.  - Continue observation for recurrence  - Follow-up with wound care for radiation-induced ulceration  - Order port removal through radiology  - She wants to transfer oncology care to me. I will see her in three months with repeat labs. We will get radiologic imaging based on her symptoms.    Neuropathy and Drop Foot  Developed post-chemotherapy with symptoms of numbness, tingling, and weakness, particularly in the right leg. Paclitaxel dose reduced due to nerve side effects.  - Refer to neurology for further evaluation and management  - Continue physical therapy to improve strength and mobility    Lupus and Sjogren's Syndrome  Lupus primarily manifesting as Sjogren's syndrome with high inflammation, rashes, and vasculitis. Recent exacerbation possibly due to cancer treatment. Impacted treatment options and side effects.  - Follow-up with rheumatologist for ongoing management  - Consider referral to local rheumatologist for convenience    Congestive Heart Failure  Diagnosed during hospitalization for pneumonia in December 2023. Echocardiogram showed severe diffuse hypokinesis with an ejection fraction of 20-25%.  - Continue follow-up with cardiologist  - Monitor for symptoms of heart  failure    Anemia  Persistent anemia with hemoglobin of 9.8 in December 2024, likely multifactorial due to chronic illness and recent treatments.  - repeat CBC today with improved HGB, now 10.7  - Repeat CBC in three months  - Monitor hemoglobin levels    Hypercalcemia and elevated alkaline phosphatase level:    Albumin is 4.5 so calcium level is borderline. I recommend repeating the labs in three months and will decide on further follow up based on the repeat labs. The recent CT scan of the CAP was overall stable.    Hypokalemia on labs today:    - We contacted her with labs after she left. She will double her potassium supplement daily dose and she will contact her PCP and/or cardiologist for management.    MGUS (Monoclonal Gammopathy of Undetermined Significance)  Multiple monoclonal bands identified. Followed by hematologist with annual monitoring.  - Order monoclonal protein study and quantitative immunoglobulins today.  - If monoclonal studies today are stable compared to last year then will continue to monitor these labs yearly.    General Health Maintenance  Focus on improving overall health and nutrition post-treatment.  - Focus on nutrition to maintain weight  - Continue physical therapy to improve strength  - Monitor for any new symptoms or changes in health    Follow-up  - Schedule follow-up appointment in three months  - Repeat blood work including CBC, chemistry panel, LDH, and monoclonal protein study.      Ravindra Rouse MD        [1]   Allergies  Allergen Reactions    Bactrim [Sulfamethoxazole W/Trimethoprim] RASH    Adhesive Tape RASH

## 2025-02-17 ENCOUNTER — TELEPHONE (OUTPATIENT)
Dept: SURGERY | Age: 45
End: 2025-02-17

## 2025-02-21 ENCOUNTER — TELEPHONE (OUTPATIENT)
Dept: SURGERY | Age: 45
End: 2025-02-21

## 2025-02-24 NOTE — PROGRESS NOTES
CHIEF COMPLAINT:     Chief Complaint   Patient presents with    Wound Care     Arrives for follow-up. Showered this morning so only came in with non-adherent dressing. Has noticed only minimal drainage.     HPI:   Information obtained from Patient and chart  5-24-24 INITIAL:  43 year old female with Past medical history invasive ductal carcinoma of left breast (dr [facility]) currently undergoing radiation therapy (dr kobi bran), leukocytoclastic vasculitis (treated with clobetasol ointment by dr. Walton) iron deficiency anemia, HFrEF (Ariane Putnam), lupus, Sjogren's syndrome.  Patient was recently admitted for oral sores and n/v and elctrolyte imbalance and hypokalemia.    Earlier this week she was seen in urgent care for uti symptoms and hematuria. She was treated with cefadroxil and recommended to see primary md. dr bran recommended patient to start yary, she states she is not regular with it. Patient has seen nutrition/dietician.  Patient is active with residential [facility].  Earlier this week she had to cancel physical therapy due to wound pain.  on 5-11 patient was started on potassium citrate x 10 days and patient was to repeat labs, which she did on 5-21 but her potassium was still 2.9. she states she is taking the potassium.  I will udpate arianecase putnam and I asked the patient to contact her as they may want her to increase her potassium.  Patient states that initially she was utilizing aquaphor but then developed an itchy red rash, so now has just been leaving it dry or using aveeno. She is able to tolerate very minimal cleansing of the area.  I discussed with her how to do it as tolerated in the shower with anasept.  Will utilize hydrogel cool sheets, patient to return next week. There is not any s/s of infection    HPI PRIOR TO JANUARY 2025 SEE INDIVIDUAL PROGRESS NOTES  12-5-24 patient returns. She is seeing her oncologist later this week and dr sunshine next Monday. She has been using silver  alginate.  The wound bed is more healthy in appearance however the epiboly remains with slight undermining on that edge. We discussed that the top edge would need to be debrided. We also discussed wound vac placement if we do that here in clinic. Shew ill get input from dr sunshine and oncology.  If she wants to go forward with debridement in clinic, she should call clinic and we will order apria vac to place after debridement. No s/s of infection. The area is tender to touch.    1-2-25 patient returns.  She followed up with oncology on 12-6.  Mri of liver planned for monitoring of  prior liver lesion identified. Continue cymbalta for neuropathy.  She is to f/u in 3 mo.  She also saw Dr. Sunshine and they discussed excision of the area surgically but patient is to increase protein intake, restart yary, see nutritionist, see dr bran and then f/u in January.   She has continued to dress with silver alginate.  Her insurance changed so she can no longer see dr sunshine,  she has an appointment with dr graf for the end of January.  The wound is improved. She states she is supplementing with prenatal vitamin.  She looks more  healthy with more color in her skin and more energy in her overall posture. Will add rick and also run insurance for CTP.    1-28-25 patient returns.  Ctp were not approved by insurance.  She has been utilizing rick and silver alginate.she had her appointment with dr graf later today, but they have not received all the imaging from [facility] yet, so her appointment was pushed back. The wound is about the same size, but is more healthy granulation and it appears that there is titi-epi advancing from the superior rolled edge. Will continue with the rick and alginate. Patient continues to improve overall with her weight, energy etc.    2-11-25 patient returns. Her appointment with dr graf is now march 7 and with heme/onc tomorrow (although she is nervous about the inclement weather forcast).  She has  continued to dress with rick and silver alginate.  Wound is significantly smaller. She states she is supplementing with prenatal vitamin. She states that the dressing change after her last appointment she could see a visible difference in the size. She states she did not have any burning or pain after agno3 used at last visit.  I stimulated the wound again with agno3. She will continue with the rick and alginate. Will see if the wound continues to progress, but we may need to remove the upper rolled edge. No acute s/s of infection or c/o pain.    2-25-25 patient returns. It has been 2 weeks since last visit. She saw dr cota (heme/onc) and will f/u with him again in 3 weeks.  She has continued to dress the wound with rick and silver alginate. The last 2 visits I have stimulated/injured the wound bed with agno3 which has helped with contraction of the wound.  Today, wound is smaller, still with divot. Minimal drainage, will utilize triad paste. Patient to f/u in 2 weeks.  MEDICATIONS:     Current Outpatient Medications:     Prenatal Vit-Fe Fumarate-FA (MULTI PRENATAL) 27-0.8 MG Oral Tab, Take by mouth., Disp: , Rfl:     Pyridoxine HCl (VITAMIN B-6) 25 MG Oral Tab, Take 1 tablet (25 mg total) by mouth daily., Disp: , Rfl:     cyanocobalamin 1000 MCG Oral Tab, Take 1 tablet (1,000 mcg total) by mouth daily., Disp: , Rfl:     metoprolol succinate ER 50 MG Oral Tablet 24 Hr, Take 1 tablet (50 mg total) by mouth daily., Disp: , Rfl:     torsemide 20 MG Oral Tab, Take 1 tablet (20 mg total) by mouth daily., Disp: , Rfl:     Potassium Chloride ER 10 MEQ Oral Tab CR, Take 1 tablet (10 mEq total) by mouth daily., Disp: 30 tablet, Rfl: 0    DULoxetine 30 MG Oral Cap DR Particles, Take 2 capsules (60 mg total) by mouth daily., Disp: , Rfl:     zolpidem 10 MG Oral Tab, Take 1 tablet (10 mg total) by mouth nightly as needed for Sleep., Disp: , Rfl:   ALLERGIES:     Allergies   Allergen Reactions    Bactrim [Sulfamethoxazole  W/Trimethoprim] RASH    Adhesive Tape RASH      REVIEW OF SYSTEMS:   This information was obtained from the patient/family and chart.    See HPI for pertinent positives, otherwise 10 pt ROS negative.  HISTORY:   Past medical, surgical, family and social history updated where appropriate.      PHYSICAL EXAM:     Vitals:    02/25/25 1024   BP: 107/72   Pulse: 88   Resp: 18   Temp: 98.2 °F (36.8 °C)         Estimated body mass index is 19.56 kg/m² as calculated from the following:    Height as of 2/12/25: 64.02\".    Weight as of 2/12/25: 114 lb (51.7 kg).   POC Glucose   Date Value Ref Range Status   12/19/2023 72 70 - 99 mg/dL Final   12/18/2023 125 (H) 70 - 99 mg/dL Final   12/18/2023 84 70 - 99 mg/dL Final       Vital signs reviewed.Appears stated age, well groomed.    Constitutional:  Bp wnl. Pulse Regular and wnl for patient. Respirations easy and unlabored. Temperature wnl. Weight wnl for height. Appearance neat and clean. Appears in no acute distress.     Musculoskeletal:  Patient ambulation is stable with walker  Integumentary:  refer to wound characteristics and images   Psychiatric:  Judgment and insight intact. Alert and oriented times 3. No evidence of depression, anxiety, or agitation. Calm, cooperative, and communicative. Appropriate interactions and affect.  DIAGNOSTICS:     Lab Results   Component Value Date    BUN 21 02/12/2025    CREATSERUM 1.03 (H) 02/12/2025    ALB 4.5 02/12/2025    ALB 4.17 02/12/2025    TP 8.9 (H) 02/12/2025    TP 8.4 (H) 02/12/2025       WOUND ASSESSMENT:     Wound 05/24/24 #1 Radiation therapy Breast Left (Active)   Date First Assessed/Time First Assessed: 05/24/24 0857    Wound Number (Wound Clinic Only): #1 Radiation therapy  Primary Wound Type: Other (comment)  Location: Breast  Wound Location Orientation: Left      Assessments 5/24/2024  8:59 AM 2/25/2025 10:20 AM   Wound Image        Drainage Amount Large None   Drainage Description Serous;Yellow --   Wound Length (cm) 15.5  cm 0.5 cm   Wound Width (cm) 19 cm 0.5 cm   Wound Surface Area (cm^2) 294.5 cm^2 0.25 cm^2   Wound Depth (cm) 0.1 cm 0.3 cm   Wound Volume (cm^3) 29.45 cm^3 0.075 cm^3   Wound Healing % -- 100   Margins Well-defined edges Well-defined edges;Epibole (Rolled edges)   Non-staged Wound Description Full thickness Full thickness   Alfreda-wound Assessment Moist;Edema Clean   Wound Granulation Tissue Firm;Pink Pink;Firm   Wound Bed Granulation (%) 20 % 100 %   Wound Bed Epithelium (%) 50 % --   Wound Bed Slough (%) 30 % --   Wound Odor None None   Tunneling? -- No   Undermining? -- No   Sinus Tracts? -- No       No associated orders.          ASSESSMENT AND PLAN:    1. 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 Adverse effect of radiation, subsequent encounter    3. SLE (systemic lupus erythematosus related syndrome) (HCC)    4. Malignant neoplasm of left breast in female, estrogen receptor negative, unspecified site of breast (HCC)          Risks, benefits, and alternatives of current treatment plan discussed in detail.  Questions and concerns addressed. Red flags to RTC or ED reviewed.  Patient (or parent) agrees to plan.      NOTE TO PATIENT: The 21st Century Cures Act makes clinical notes like these available to patients in the interest of transparency. Clinical notes are medical documents used by physicians and care providers to communicate with each other. These documents include medical language and terminology, abbreviations, and treatment information that may sound technical and at times possibly unfamiliar. In addition, at times, the verbiage may appear blunt or direct. These documents are one tool providers use to communicate relevant information and clinical opinions of the care providers in a way that allows common understanding of the clinical context.   I [id number] with the patient. This time included:    preparing to see the patient (eg, review notes and recent  diagnostics),  seeing the patient, obtaining and/or reviewing separately obtained history, performing a medically appropriate examination and/or evaluation, counseling and educating the patient, documenting in the record,  DISCHARGE:      Patient Instructions   Please return: 2 WEEKS     Patient discharge and wound care instructions  Alina Aceves  2/25/2025    You may shower and cleanse area with mild soap and water.  rinse wound with ANASEPT cleanser,   dab dry with gauze and apply COLOPLAST TRIAD PASTE>cover with bandage or gauze as needed.  If there is remnants of paste, you do not need to \"scrub\" out, just reapply    Nutrition and blood sugar control:  Focus on the following:  Protein: Meats, beans, eggs, milk and yogurt particularly Greek yogurt), tofu, soy nuts, soy protein products (Follow the protein handout in your welcome folder)  Vitamin C: Citrus fruits and juices, strawberries, tomatoes, tomato juice, peppers, baked potatoes, spinach, broccoli, cauliflower, [place] sprouts, cabbage  Vitamin A: Dark green, leafy vegetables, orange or yellow vegetables, cantaloupe, fortified dairy products, liver, fortified cereals  Zinc: Fortified cereals, red meats, seafood  Consider supplementing with Vitor by [employer]. It can be purchased on amazon, [employer] website, or local pharmacy may be able to order it for you.  (These are essential branch chain amino acids that help with tissue building and wound healing).   When your blood sugar is consistently elevated greater than 180 your body can't heal or fight infection.     Concerns:  Signs of infection may include the following:  Increase in redness  Red \"streaks\" from wound  Increase in swelling  Fever  Unusual odor  Change in the amount of wound drainage     Should you experience any significant changes in your wound(s) or have any questions regarding your home care instructions please contact the [facility] @ [phone] If after regular  business hours, please call your family doctor or local emergency room. The treatment plan has been discussed at length between you and your provider. Follow all instructions carefully, it is very important. If you do not follow all instructions you are at risk of your wound not healing, infection, possible loss of limb and even loss of life.    Anita Smallwood FNP-C, CWCN-AP, CFCN, CSWS, WCC, DWC  2/25/2025

## 2025-02-25 ENCOUNTER — OFFICE VISIT (OUTPATIENT)
Dept: WOUND CARE | Facility: HOSPITAL | Age: 45
End: 2025-02-25
Attending: NURSE PRACTITIONER
Payer: MEDICAID

## 2025-02-25 VITALS
TEMPERATURE: 98 F | RESPIRATION RATE: 18 BRPM | SYSTOLIC BLOOD PRESSURE: 107 MMHG | HEART RATE: 88 BPM | DIASTOLIC BLOOD PRESSURE: 72 MMHG

## 2025-02-25 DIAGNOSIS — T66.XXXD ADVERSE EFFECT OF RADIATION, SUBSEQUENT ENCOUNTER: ICD-10-CM

## 2025-02-25 DIAGNOSIS — C50.912 MALIGNANT NEOPLASM OF LEFT BREAST IN FEMALE, ESTROGEN RECEPTOR NEGATIVE, UNSPECIFIED SITE OF BREAST (HCC): ICD-10-CM

## 2025-02-25 DIAGNOSIS — L98.492 NON-PRESSURE CHRONIC ULCER OF SKIN OF OTHER SITES WITH FAT LAYER EXPOSED (HCC): Primary | ICD-10-CM

## 2025-02-25 DIAGNOSIS — Z17.1 MALIGNANT NEOPLASM OF LEFT BREAST IN FEMALE, ESTROGEN RECEPTOR NEGATIVE, UNSPECIFIED SITE OF BREAST (HCC): ICD-10-CM

## 2025-02-25 DIAGNOSIS — M32.9 SLE (SYSTEMIC LUPUS ERYTHEMATOSUS RELATED SYNDROME) (HCC): ICD-10-CM

## 2025-02-25 PROCEDURE — 99213 OFFICE O/P EST LOW 20 MIN: CPT | Performed by: NURSE PRACTITIONER

## 2025-02-25 NOTE — PROGRESS NOTES
.Weekly Wound Education Note    Teaching Provided To: Patient  Training Topics: Dressing;Cleasing and general instructions;Discharge instructions  Training Method: Explain/Verbal;Written  Training Response: Patient responds and understands        Notes: Wound improving. Dressing changed to Traid paste and bordered gauze.

## 2025-02-25 NOTE — PATIENT INSTRUCTIONS
Please return: 2 WEEKS     Patient discharge and wound care instructions  Alina Aceves  2/25/2025    You may shower and cleanse area with mild soap and water.  rinse wound with ANASEPT cleanser,   dab dry with gauze and apply COLOPLAST TRIAD PASTE>cover with bandage or gauze as needed.  If there is remnants of paste, you do not need to \"scrub\" out, just reapply    Nutrition and blood sugar control:  Focus on the following:  Protein: Meats, beans, eggs, milk and yogurt particularly Greek yogurt), tofu, soy nuts, soy protein products (Follow the protein handout in your welcome folder)  Vitamin C: Citrus fruits and juices, strawberries, tomatoes, tomato juice, peppers, baked potatoes, spinach, broccoli, cauliflower, [place] sprouts, cabbage  Vitamin A: Dark green, leafy vegetables, orange or yellow vegetables, cantaloupe, fortified dairy products, liver, fortified cereals  Zinc: Fortified cereals, red meats, seafood  Consider supplementing with Vitor by [employer]. It can be purchased on amazon, [employer] website, or local pharmacy may be able to order it for you.  (These are essential branch chain amino acids that help with tissue building and wound healing).   When your blood sugar is consistently elevated greater than 180 your body can't heal or fight infection.     Concerns:  Signs of infection may include the following:  Increase in redness  Red \"streaks\" from wound  Increase in swelling  Fever  Unusual odor  Change in the amount of wound drainage     Should you experience any significant changes in your wound(s) or have any questions regarding your home care instructions please contact the wound center [facility] @ [phone] If after regular business hours, please call your family doctor or local emergency room. The treatment plan has been discussed at length between you and your provider. Follow all instructions carefully, it is very important. If you do not follow all instructions you are at risk of  your wound not healing, infection, possible loss of limb and even loss of life.

## 2025-02-28 ENCOUNTER — OFFICE VISIT (OUTPATIENT)
Dept: PHYSICAL THERAPY | Facility: HOSPITAL | Age: 45
End: 2025-02-28
Attending: INTERNAL MEDICINE
Payer: MEDICAID

## 2025-02-28 DIAGNOSIS — S21.102D OPEN WOUND OF CHEST WALL, LEFT, SUBSEQUENT ENCOUNTER: ICD-10-CM

## 2025-02-28 DIAGNOSIS — D64.9 ANEMIA, UNSPECIFIED TYPE: ICD-10-CM

## 2025-02-28 DIAGNOSIS — M21.372 BILATERAL FOOT-DROP: ICD-10-CM

## 2025-02-28 DIAGNOSIS — M21.371 BILATERAL FOOT-DROP: ICD-10-CM

## 2025-02-28 DIAGNOSIS — G62.9 NEUROPATHY: ICD-10-CM

## 2025-02-28 DIAGNOSIS — C50.412 MALIGNANT NEOPLASM OF UPPER-OUTER QUADRANT OF LEFT FEMALE BREAST, UNSPECIFIED ESTROGEN RECEPTOR STATUS (HCC): Primary | ICD-10-CM

## 2025-02-28 PROCEDURE — 97110 THERAPEUTIC EXERCISES: CPT

## 2025-02-28 PROCEDURE — 97163 PT EVAL HIGH COMPLEX 45 MIN: CPT

## 2025-02-28 NOTE — PROGRESS NOTES
NEUROLOGICAL EVALUATION:     Diagnosis:   Malignant neoplasm of upper-outer quadrant of left female breast (HCC) (C50.412)  Anemia (D64.9)  Open wound of chest wall, left, subsequent encounter (S21.102D)  Foot drop (M21.379)  Neuropathy (G62.9) Patient:  Alina Aceves (44 year old, female)        Referring Provider: Akin Gant  Today's Date   2/28/2025    Precautions:  Cancer   Date of Evaluation: 02/28/25  Next MD visit: No data recorded  Date of Surgery: No data recorded     PATIENT SUMMARY   Summary of chief complaints: drop foot and difficulties ambulating  History of current condition: Pt had chemo for breast cancer treatment. Following the treatment she developed drop foot and neuropathy. Initially was just in the R foot. She was still able to walk and drive at this point with some compensations. She also sprained the R ankle at some point. In December 2023, she got pneumonia and was in the hospitla for a few weeks. Following this she had great difficulty moving legs. She did have HH PT which helped a little bit. She has gotten stronger but still not where she wants to be. Using rolling walker for ambulation and cannot drive. Will be following up with neurology too once they call with an appointment. In the past year she has had at least 6 falls, typically can get up by herself but sometimes needs assistance. Pt does have 9 stairs at home, and split level house with stairs throughout.   Pain level: current 0 /10 (not chief complaint)  Description of symptoms: numbness in feet   Occupation: stay at home mom   Leisure activities/Hobbies:     Prior level of function: prior to chemo, no physical restrictions  Current limitations: unable to drive or walk safely w/o AD  Pt goals: improve strength in BLE and get back to driving if able  Pt denies diplopia, dysarthria, dysphasia, dizziness, drop attacks, bowel/bladder changes, saddle anesthesia, and EVELINA LE N/T.  Past medical history was reviewed with Jaja   Significant findings include:    Imaging/Tests:   No results found.   Alina  has a past medical history of Congestive heart disease (HCC), Gastritis, Left Breast Cancer TNBC (2023), Neuropathy, Personal history of antineoplastic chemotherapy, Sjogren's disease (HCC), and SLE.  She  has a past surgical history that includes  delivery only; Breast Surgery (Left, 2023); and hc port a cath access.    ASSESSMENT  Alina presents to physical therapy evaluation with primary c/o drop foot and difficulties ambulating. The results of the objective tests and measures show decreased ankle strength bilaterally, decreased AROM of R ankle, dimished sensation BLE, increased fall risk based on TUG score, steppage gait with RLE. Functional deficits include but are not limited to unable to drive or walk safely w/o AD. Signs and symptoms are consistent with diagnosis of Malignant neoplasm of upper-outer quadrant of left female breast (HCC) (C50.412)  Anemia (D64.9)  Open wound of chest wall, left, subsequent encounter (S21.102D)  Foot drop (M21.379)  Neuropathy (G62.9). Pt and PT discussed evaluation findings, pathology, POC and HEP.  Pt voiced understanding and performs HEP correctly without reported pain. Skilled Physical Therapy is medically necessary to address the above impairments and reach functional goals.    OBJECTIVE:   Musculoskeletal:  Observation/Posture: ambulates into session with standard walker     EVELINA ROM WNL and Strength (5/5) except below:  (* denotes performed with pain)  Hip   ROM MMT (-/5)    R L R L     Flex (L2)     5 5     ,   Knee   ROM MMT (-/5)    R L R L     Flex             Ext (L3)     5 5     ,   Ankle/Foot   ROM MMT (-/5)    R L R L     PF     4 5     DF (L4)     2+ 3     Inversion     3+ 4-     Eversion     3- 4-     Grt Toe Ext (L5)             ,   Myotome MMT   MMT (-/5)    R L   Hip Flex (L2) 5 5   Knee Ext (L3) 5 5   Ankle DF (L4) 2+ 3   Ankle PF (S1) 4 5   Grt Toe Ext (L5)          Ankle ROM: full PROM bilaterally, limited ankle AROM R>L       Neurological:  Coordination:  Finger to Nose: NT   Pronation/Supination: NT   Toe Tap: NT     Sensation:  Light Touch: impaired   Sharp:  NT   Vibration:   NT  Proprioception:   NT     Deep Tendon Reflexes: WNL   Tone: WNL     Pathological Reflexes:  Babinski: NT   Clonus: absent   Pappas's Sign: NT          Balance and Functional Mobility:  Mobility / Transfers Level of Assistance   Bed Mobility     Supine --> Sit     Sit --> Supine     Sit --> Stand MOD I   Stand --> Sit MOD I   Chair --> Chair MOD I     Postural Control:  Romberg EO: level surface 30 sec    Romberg EC: level surface 15 sec  Tandem Stance: R back: 0 sec; L back: 0 sec; Fall Risk: Yes  SLS: R: 0 sec; L: 0 sec; Fall Risk: Yes    Gait: pt ambulates on level ground with -- (steppage gait on RLE, bilateral decreased heel strike and foot drop)  Timed Up and Go (AD,time): 21 sec (Standard walker); Fall Risk: Yes    *Further testing to be done at next session due to late start of session due to ride difficulties.     Today's Treatment and Response:   Pt education was provided on exam findings, treatment diagnosis, treatment plan, expectations, and prognosis.  Today's Treatment       2/28/2025   PT Treatment   Therapeutic Exercise Pt education: HEP for home, POC for therapy and possible trial of NMES for foot drop    Therapeutic Exercise Min 10   Evaluation Min 25   Total of Timed Procedures 10   Total of Service Based 25   Total Treatment Time 35         Patient was instructed in and issued a HEP for: Access Code: [email or web address]  URL: [email or web address]  Date: 02/28/2025  Prepared by: Trupti Salazar    Exercises  - Supine Ankle Pumps  - 1 x daily - 7 x weekly - 2 sets - 10 reps  - Supine Ankle Inversion Eversion AROM  - 1 x daily - 7 x weekly - 2 sets - 10 reps  - Sit to Stand with Counter Support  - 3 x daily - 7 x weekly - 1 sets - 3 reps    Charges:  PT EVAL: High  Complexity, High complexity eval 1 TE 1  In agreement with evaluation findings and clinical rationale, this evaluation involved HIGH COMPLEXITY decision making due to 3 or more personal factors/comorbidities, 4 or more body structures involved/activity limitations, and unstable symptoms as documented in the evaluation.                                                        PLAN OF CARE:    Goals: (to be met in 12 visits)   Goals       Therapy Goals      Not Met Progress Toward Partially Met Met   Pt will demonstrate improved tandem stance to 5 sec or more to be able to safely negotiate narrow spaces such as the bathroom.  [] [] [] []   Pt will perform TUG in <18 seconds with least restrictive AD, demonstrating improved gait speed for community ambulation. [] [] [] []   Pt will be able to perform STS with no UE support to be able to easily rise from chair without loss of balance. [] [] [] []   Pt will be able to squat to  light objects around the house without loss of stability. [] [] [] []   Pt will be able to ambulate 200 ft with least restrictive AD to be able to access multiple rooms in house safely  [] [] [] []   Pt will be independent and compliant with comprehensive HEP to maintain progress achieved in PT. [] [] [] []                    Frequency / Duration: Patient will be seen 2x/week or a total of 12 visits over a 90 day period. Treatment will include: Gait training; Manual Therapy; Neuromuscular Re-education; Therapeutic Activities; Therapeutic Exercise; Home Exercise Program instruction; Electrical stimulation (unattended); Other (use comment) (NMES)    Education or treatment limitation: None   Rehab Potential: good       Patient/Family/Caregiver was advised of these findings, precautions, and treatment options and has agreed to actively participate in planning and for this course of care.    Thank you for your referral. Please co-sign or sign and return this letter via fax as soon as possible to  [phone]. If you have any questions, please contact me at Dept: [phone]    Sincerely,  Electronically signed by therapist: Trupti Salazar PT, DPT  Physician's certification required: Yes  I certify the need for these services furnished under this plan of treatment and while under my care.    X___________________________________________________ Date____________________    Certification From: 2/28/2025  To:5/29/2025

## 2025-03-03 ENCOUNTER — OFFICE VISIT (OUTPATIENT)
Dept: PHYSICAL THERAPY | Facility: HOSPITAL | Age: 45
End: 2025-03-03
Attending: INTERNAL MEDICINE
Payer: MEDICAID

## 2025-03-03 PROCEDURE — 97112 NEUROMUSCULAR REEDUCATION: CPT

## 2025-03-03 PROCEDURE — 97110 THERAPEUTIC EXERCISES: CPT

## 2025-03-03 NOTE — PROGRESS NOTES
Patient: Alina Aceves (44 year old, female) Referring Provider:  Insurance:   Diagnosis: Malignant neoplasm of upper-outer quadrant of left female breast (HCC) (C50.412)  Anemia (D64.9)  Open wound of chest wall, left, subsequent encounter (S21.102D)  Foot drop (M21.379)  Neuropathy (G62.9) No ref. provider found  MEDICAID   Date of Surgery: No data recorded Next MD visit:  N/A   Precautions:  Cancer No data recorded Referral Information:    Date of Evaluation: Req: 0, Auth: 0, Exp:     02/28/25 POC Auth Visits:          Today's Date   3/3/2025    Subjective  Pt reports feeling the same from the time of her evaluation.       Pain: 0/10     Objective  standing eyes closed 20 sec; eyes closed head turns 5 reps w/CGA to min A and need to open eyes d/t instability. Standing eyes closed looking up/down             Assessment  Standing balance eyes closed consistent with neuropathy.Skilled PT interventions performed in order to address patient dysfunction, including walking with ease.  Patient was educated and instructed on new Therapeutic ex's and N-M education interventions to minimize compensations and maximize desired muscle activation. Interventions performed focused on improving patient's ability to walk safely.  HEP was reviewed and/or upgraded to maximize compliance and increase patient's ability to make functional gains at home.     Goals (to be met in 12 visits)   Goals       Therapy Goals      Not Met Progress Toward Partially Met Met   Pt will demonstrate improved tandem stance to 5 sec or more to be able to safely negotiate narrow spaces such as the bathroom.  [] [] [] []   Pt will perform TUG in <18 seconds with least restrictive AD, demonstrating improved gait speed for community ambulation. [] [] [] []   Pt will be able to perform STS with no UE support to be able to easily rise from chair without loss of balance. [] [] [] []   Pt will be able to squat to  light objects around the house without  loss of stability. [] [] [] []   Pt will be able to ambulate 200 ft with least restrictive AD to be able to access multiple rooms in house safely  [] [] [] []   Pt will be independent and compliant with comprehensive HEP to maintain progress achieved in PT. [] [] [] []                  Plan  Improve balance, strength, functional endurance    Treatment Last 4 Visits       2/28/2025 3/3/2025   PT Treatment   Treatment Day  2   Therapeutic Exercise Pt education: HEP for home, POC for therapy and possible trial of NMES for foot drop  -bridges x 10  -HL hip abduc red TB x15, unilateral and bilateral  -clam ER and IR red TB 2x10  -SB roll in/out x 15   Neuro Re-Ed  -standing balance eyes open and closed with need for CGA to min A for instability, especially with LOB posterior  -seated on dynadisc with pilates ring UE squeezes in/down and LE hip abduc/adduc x 15 each   Therapeutic Exercise Min 10 15   Neuro Re-Ed Min  15   Evaluation Min 25    Total of Timed Procedures 10 30   Total of Service Based 25 0   Total Treatment Time 35 30         HEP  Access Code: [email or web address]  URL: [email or web address]  Date: 02/28/2025  Prepared by: Trupti Salazar    Exercises  - Supine Ankle Pumps  - 1 x daily - 7 x weekly - 2 sets - 10 reps  - Supine Ankle Inversion Eversion AROM  - 1 x daily - 7 x weekly - 2 sets - 10 reps  - Sit to Stand with Counter Support  - 3 x daily - 7 x weekly - 1 sets - 3 reps    Charges     1 TE and 1 NM

## 2025-03-05 ENCOUNTER — OFFICE VISIT (OUTPATIENT)
Dept: PHYSICAL THERAPY | Facility: HOSPITAL | Age: 45
End: 2025-03-05
Attending: INTERNAL MEDICINE
Payer: MEDICAID

## 2025-03-05 PROCEDURE — 97112 NEUROMUSCULAR REEDUCATION: CPT

## 2025-03-05 PROCEDURE — 97110 THERAPEUTIC EXERCISES: CPT

## 2025-03-05 NOTE — PROGRESS NOTES
Patient: Alina Aceves (44 year old, female) Referring Provider:  Insurance:   Diagnosis: Malignant neoplasm of upper-outer quadrant of left female breast (HCC) (C50.412)  Anemia (D64.9)  Open wound of chest wall, left, subsequent encounter (S21.102D)  Foot drop (M21.379)  Neuropathy (G62.9) No ref. provider found  MEDICAID   Date of Surgery: No data recorded Next MD visit:  N/A   Precautions:  Cancer No data recorded Referral Information:    Date of Evaluation: Req: 0, Auth: 0, Exp:     02/28/25 POC Auth Visits:  12       Today's Date   3/5/2025    Subjective  Pt is doing well today, no excess fatigue or soreness following last session. Feels like she is moving things that have not been used in a while.       Pain: 0/10 (not chief complaint)     Objective  See tx flow sheet for progressions.       Assessment  Pt has good tolerance for session today with multiple rest breaks taken to prevent excessive fatigue. Completed eccentric lowering from dorsiflexion, focusing on RLE. Pt able to hold in DF for a short amount of time, cued to try and slowly lower from position for strengthening. Tightness noted in calf on RLE thus limiting DF range, utilized stretching strap to reduce tension. Pt able to complete hurdles in // bars, slight circumduction on LLE noted, but corrects with verbal cues. Continue per plan of care; possible trial of NMES for anterior tib strengthening at next session.    Goals (to be met in 12 visits)             Not Met Progress Toward Partially Met Met   Pt will demonstrate improved tandem stance to 5 sec or more to be able to safely negotiate narrow spaces such as the bathroom.  []  []  []  []    Pt will perform TUG in <18 seconds with least restrictive AD, demonstrating improved gait speed for community ambulation. []  []  []  []    Pt will be able to perform STS with no UE support to be able to easily rise from chair without loss of balance. []  []  []  []    Pt will be able to squat to   light objects around the house without loss of stability. []  []  []  []    Pt will be able to ambulate 200 ft with least restrictive AD to be able to access multiple rooms in house safely  []  []  []  []    Pt will be independent and compliant with comprehensive HEP to maintain progress achieved in PT. []  []  []  []                 Plan  Continue per plan of care to address balance, strength and endurance deficits.    Treatment Last 4 Visits       2/28/2025 3/3/2025 3/5/2025   PT Treatment   Treatment Day  2 3   Therapeutic Exercise Pt education: HEP for home, POC for therapy and possible trial of NMES for foot drop  -bridges x 10  -HL hip abduc red TB x15, unilateral and bilateral  -clam ER and IR red TB 2x10  -SB roll in/out x 15    Neuro Re-Ed  -standing balance eyes open and closed with need for CGA to min A for instability, especially with LOB posterior  -seated on dynadisc with pilates ring UE squeezes in/down and LE hip abduc/adduc x 15 each    Therapeutic Exercise Min 10 15    Neuro Re-Ed Min  15    Evaluation Min 25     Total of Timed Procedures 10 30    Total of Service Based 25 0    Total Treatment Time 35 30           3/3/2025 3/5/2025   Neuro Treatment   Treatment Day 2 3   Therapeutic Exercise  Nustep warm up, level 3, 5 min   SB roll in/out x 20   Bridges x 10   Long sitting calf stretch w/ strap, 4x15 sec bilat  HL hip abduc red TB x15, unilateral          Neuro Re-Education  Eccentric lowering from DF on RLE 2x8  Hurdles BUE support, fwd/lateral, 2 laps each  Sitting on dynadisc, pilates ring push, x10 3 sec holds  Sitting on dynadisc, pilates ring adduction, x10        Therapeutic Exercise Minutes  25   Neuro Re-Educ Minutes  15   Total Time Of Timed Procedures  40   Total Time Of Service-Based Procedures  0   Total Treatment Time  40        HEP   Access Code: [email or web address]  URL: [email or web address]  Date: 02/28/2025  Prepared by: Trupti Salazar     Exercises  - Supine Ankle Pumps   - 1 x daily - 7 x weekly - 2 sets - 10 reps  - Supine Ankle Inversion Eversion AROM  - 1 x daily - 7 x weekly - 2 sets - 10 reps  - Sit to Stand with Counter Support  - 3 x daily - 7 x weekly - 1 sets - 3 reps    Charges  TE 2 NR 1

## 2025-03-07 ENCOUNTER — LAB ENCOUNTER (OUTPATIENT)
Dept: LAB | Age: 45
End: 2025-03-07
Attending: INTERNAL MEDICINE
Payer: MEDICAID

## 2025-03-07 ENCOUNTER — OFFICE VISIT (OUTPATIENT)
Dept: SURGERY | Facility: CLINIC | Age: 45
End: 2025-03-07
Payer: MEDICAID

## 2025-03-07 VITALS
DIASTOLIC BLOOD PRESSURE: 66 MMHG | BODY MASS INDEX: 20.14 KG/M2 | WEIGHT: 118 LBS | RESPIRATION RATE: 16 BRPM | HEIGHT: 64 IN | SYSTOLIC BLOOD PRESSURE: 97 MMHG | TEMPERATURE: 98 F | HEART RATE: 81 BPM | OXYGEN SATURATION: 100 %

## 2025-03-07 DIAGNOSIS — M32.9 SLE (SYSTEMIC LUPUS ERYTHEMATOSUS RELATED SYNDROME) (HCC): Primary | ICD-10-CM

## 2025-03-07 DIAGNOSIS — N64.4 BREAST PAIN, RIGHT: ICD-10-CM

## 2025-03-07 DIAGNOSIS — C50.919 MALIGNANT NEOPLASM OF FEMALE BREAST, UNSPECIFIED ESTROGEN RECEPTOR STATUS, UNSPECIFIED LATERALITY, UNSPECIFIED SITE OF BREAST (HCC): ICD-10-CM

## 2025-03-07 DIAGNOSIS — Z85.3 HISTORY OF LEFT BREAST CANCER: Primary | ICD-10-CM

## 2025-03-07 DIAGNOSIS — R92.30 DENSE BREAST: ICD-10-CM

## 2025-03-07 LAB
ALBUMIN SERPL-MCNC: 4.4 G/DL (ref 3.2–4.8)
ANION GAP SERPL CALC-SCNC: 8 MMOL/L (ref 0–18)
BASOPHILS # BLD AUTO: 0.02 X10(3) UL (ref 0–0.2)
BASOPHILS NFR BLD AUTO: 0.5 %
BUN BLD-MCNC: 22 MG/DL (ref 9–23)
C3 SERPL-MCNC: 84.1 MG/DL (ref 90–170)
C4 SERPL-MCNC: <1.5 MG/DL (ref 12–36)
CALCIUM BLD-MCNC: 9.7 MG/DL (ref 8.7–10.6)
CHLORIDE SERPL-SCNC: 107 MMOL/L (ref 98–112)
CO2 SERPL-SCNC: 25 MMOL/L (ref 21–32)
CREAT BLD-MCNC: 0.94 MG/DL
CREAT UR-SCNC: 36.3 MG/DL
CREAT UR-SCNC: 36.3 MG/DL
EGFRCR SERPLBLD CKD-EPI 2021: 77 ML/MIN/1.73M2 (ref 60–?)
EOSINOPHIL # BLD AUTO: 0.15 X10(3) UL (ref 0–0.7)
EOSINOPHIL NFR BLD AUTO: 3.6 %
ERYTHROCYTE [DISTWIDTH] IN BLOOD BY AUTOMATED COUNT: 13.2 %
GLUCOSE BLD-MCNC: 80 MG/DL (ref 70–99)
HCT VFR BLD AUTO: 32.7 %
HGB BLD-MCNC: 10.8 G/DL
IMM GRANULOCYTES # BLD AUTO: 0.01 X10(3) UL (ref 0–1)
IMM GRANULOCYTES NFR BLD: 0.2 %
LYMPHOCYTES # BLD AUTO: 1 X10(3) UL (ref 1–4)
LYMPHOCYTES NFR BLD AUTO: 24.3 %
MCH RBC QN AUTO: 30.2 PG (ref 26–34)
MCHC RBC AUTO-ENTMCNC: 33 G/DL (ref 31–37)
MCV RBC AUTO: 91.3 FL
MICROALBUMIN UR-MCNC: <0.3 MG/DL
MONOCYTES # BLD AUTO: 0.43 X10(3) UL (ref 0.1–1)
MONOCYTES NFR BLD AUTO: 10.4 %
NEUTROPHILS # BLD AUTO: 2.51 X10 (3) UL (ref 1.5–7.7)
NEUTROPHILS # BLD AUTO: 2.51 X10(3) UL (ref 1.5–7.7)
NEUTROPHILS NFR BLD AUTO: 61 %
OSMOLALITY SERPL CALC.SUM OF ELEC: 292 MOSM/KG (ref 275–295)
PHOSPHATE SERPL-MCNC: 3.9 MG/DL (ref 2.4–5.1)
PLATELET # BLD AUTO: 255 10(3)UL (ref 150–450)
POTASSIUM SERPL-SCNC: 3.2 MMOL/L (ref 3.5–5.1)
PROT UR-MCNC: 18.8 MG/DL (ref ?–14)
PROT/CREAT UR-RTO: 0.52
RBC # BLD AUTO: 3.58 X10(6)UL
SODIUM SERPL-SCNC: 140 MMOL/L (ref 136–145)
WBC # BLD AUTO: 4.1 X10(3) UL (ref 4–11)

## 2025-03-07 PROCEDURE — 85025 COMPLETE CBC W/AUTO DIFF WBC: CPT

## 2025-03-07 PROCEDURE — 80069 RENAL FUNCTION PANEL: CPT

## 2025-03-07 PROCEDURE — 82043 UR ALBUMIN QUANTITATIVE: CPT

## 2025-03-07 PROCEDURE — 86160 COMPLEMENT ANTIGEN: CPT

## 2025-03-07 PROCEDURE — 82570 ASSAY OF URINE CREATININE: CPT

## 2025-03-07 PROCEDURE — 36415 COLL VENOUS BLD VENIPUNCTURE: CPT

## 2025-03-07 PROCEDURE — 84156 ASSAY OF PROTEIN URINE: CPT

## 2025-03-07 NOTE — PROGRESS NOTES
Breast Surgery New Patient Consultation    This is the first visit for this 44 year old woman, referred by Dr. Villeda, who presents for evaluation of history of breast cancer with chronic wound to left chest wall.    History of Present Illness:   Ms. Alina Aceves is a 44 year old woman who presents with a personal history of left breast cancer to establish care due to insurance change.  Patient has no known family history of breast cancer.  She is treated for her breast cancer back in 2020 3/2024.  She underwent chemotherapy as well as a lumpectomy followed by radiation.  She does have an extensive history of Sjogren's, lupus disease and vasculitis.  She did not heal well and has had a persistent wound on the left chest wall that is currently being managed under the direction of the wound care service.  She had a right diagnostic evaluation in 2024 which showed extremely dense breast tissue with stable findings.  She was unable to have left breast imaging surveillance due to the open wound.  She did have an MRI of bilateral breast in 2024 that showed posttreatment changes with enhancement and edema along the lateral margin of the left breast open wound left possibly be inflammatory changes for which clinical surveillance was recommended.  She denies any nipple discharge or axillary symptoms.  She recently establish care with our medical oncology team at [place].  She is here today for evaluation and recommendations for further therapy.        Past Medical History:    Congestive heart disease (HCC)    Gastritis    Left Breast Cancer TNBC    Neoadjuvant ddAC/weekly Taxol    Neuropathy    Personal history of antineoplastic chemotherapy    Sjogren's disease (HCC)    SLE       Past Surgical History:   Procedure Laterality Date    Breast surgery Left 2023     delivery only      Hc port a cath access         Gynecological History:  Pt is a   Pt was 23 years old at time of first pregnancy.     She denies any cumulative breastfeeding history of 3 months.  She achieved menarche at age 11 and LMP 8/28/2023  Age of Menopause: 43  Type: chemotherapy  She denies any history of hormone replacement therapy   She denies any history of oral contraceptive use   She denies infertility treatment to achieve pregnancy.    Medications:    No outpatient medications have been marked as taking for the 3/7/25 encounter (Appointment) with Rosa Elena Perdomo MD.       Allergies:    Allergies[1]    Family History:   Family History   Problem Relation Age of Onset    Hypertension Mother     Cancer Other        She is not of Ashkenazi Sikh ancestry.    Social History:  History   Alcohol Use Not Currently       History   Smoking Status    Former    Types: Cigarettes   Smokeless Tobacco    Never     Ms. Alina Aceves is  with 2 children. She has 3 siblings. She is currently Disabled    Review of Systems:  General:   The patient denies, fever, chills, night sweats, +fatigue, +generalized weakness, change in appetite or weight loss.    HEENT:     The patient denies eye irritation, cataracts, redness, glaucoma, yellowing of the eyes, change in vision, color blindness, or +wearing contacts/glasses. The patient denies hearing loss, ringing in the ears, ear drainage, earaches, nasal congestion, nose bleeds, snoring, pain in mouth/throat, hoarseness, change in voice, facial trauma.    Respiratory:  The patient denies chronic cough, phlegm, hemoptysis, pleurisy/chest pain, pneumonia, asthma, wheezing, difficulty in breathing with exertion, emphysema, chronic bronchitis, shortness of breath or abnormal sound when breathing.     Cardiovascular:  There is no history of chest pain, chest pressure/discomfort, palpitations, irregular heartbeat, fainting or near-fainting, difficulty breathing when lying flat, SOB/Coughing at night, +swelling of the legs or chest pain while walking.    Breasts:  See history of present  illness    Gastrointestinal:     There is no history of difficulty or pain with swallowing, reflux symptoms, vomiting, dark or bloody stools, constipation, yellowing of the skin, indigestion, nausea, change in bowel habits, diarrhea, abdominal pain or vomiting blood.     Genitourinary:  The patient denies frequent urination, +needing to get up at night to urinate, urinary hesitancy or retaining urine, painful urination, urinary incontinence, decreased urine stream, blood in the urine or vaginal/penile discharge.    Skin:    The patient denies +rash, +itching, skin lesions, +dry skin, change in skin color or change in moles.     Hematologic/Lymphatic:  The patient denies easily bruising or bleeding or persistent swollen glands or lymph nodes.     Musculoskeletal:  The patient denies +muscle aches/pain, +joint pain, +stiff joints, neck pain, back pain or bone pain.    Neuropsychiatric:  There is no history of migraines or severe headaches, seizure/epilepsy, speech problems, coordination problems, trembling/tremors, fainting/black outs, dizziness, memory problems, loss of sensation/numbness, problems walking, weakness, tingling or burning in hands/feet. There is no history of abusive relationship, bipolar disorder, sleep disturbance, anxiety, depression or feeling of despair.    Endocrine:    There is no history of +poor/slow wound healing, weight loss/gain, fertility or hormone problems, +cold intolerance, thyroid disease.     Allergic/Immunologic:  There is no history of hives, hay fever, angioedema or anaphylaxis.    BP 97/66 (BP Location: Left arm, Patient Position: Sitting, Cuff Size: adult)   Pulse 81   Temp 98 °F (36.7 °C) (Temporal)   Resp 16   Ht 1.626 m (5' 4\")   Wt 53.5 kg (118 lb)   SpO2 100%   BMI 20.25 kg/m²     Physical Exam:  The patient is an alert, oriented, well-nourished and  well-developed woman who appears her stated age. Her speech patterns and movements are normal. Her affect is  appropriate.    HEENT: The head is normocephalic. The neck is supple. The thyroid is not enlarged and is without palpable masses/nodules. There are no palpable masses. The trachea is in the midline. Conjunctiva are clear, non-icteric.    Chest: The chest expands symmetrically. The lungs are clear to auscultation.    Heart: The rhythm is regular.  There are no murmurs, rubs, gallops or thrills.    Breasts:  Her breasts are symmetrical with a cup size 36B.  Right breast: The skin, nipple ,and areola appear normal. There is no skin dimpling with movement of the pectoralis. There is no nipple retraction. No nipple discharge can be elicited. The parenchyma is mildly nodular. There are no dominant masses in the breast. The axillary tail is normal.  Left breast:   The skin shows a 5 mm opening the superior central breast underlying extensive scar tissue.  There are no suspicious palpable masses, expressible nipple discharge or other concerns noted.  There is no erythema or underlying fluctuance.    Abdomen:  The abdomen is soft, flat and non tender. The liver is not enlarged. There are no palpable masses.    Lymph Nodes:  The supraclavicular, axillary and cervical regions are free of significant lymphadenopathy.    Back: There is no vertebral column tenderness.    Skin: The skin appears normal. There are no suspicious appearing rashes or lesions.    Extremities: The extremities are without deformity, cyanosis or edema.    Impression:   Ms. Alina Aceves is a 44 year old woman presents with personal history of left breast cancer with chronic left breast wound.    Discussion and Plan:  I had a discussion with the Patient regarding her breast exam. On exam today I found a very tiny 5 mm pinpoint area of concern that has not healed with no other suspicious concerns.  I reviewed her most recent imaging and we discussed this at length.  I believe the enhancement seen on the prior MRI on the left was related to her ongoing  wound care issues.  She will be meeting with medical oncology in May 2025 to discuss systemic surveillance and management.  She will continue to follow in the wound care service for recommendations regarding dressings to the left chest wound.  I do believe she is healed enough to have a bilateral diagnostic evaluation now and anticipating that is within normal limits and concordant with her clinical exam we will plan to repeat her MRI in September 2025 for optimal surveillance with a clinical exam to follow.  She was given ample opportunity for questions and those questions were answered to her satisfaction. She has been  encouraged to contact the office with any questions or concerns prior to her next appointment.     This note was created by Dragon voice recognition. Errors in content may be related to improper recognition by the system; efforts to review and correct have been done but errors may still exist. Please be advised the primary purpose of this note is for me to communicate medical care. Standard sentence structure is not always used. Medical terminology and medical abbreviations may be used. There may be grammatical, typographical, and automated fill ins that may have errors missed in proofreading.           [1]   Allergies  Allergen Reactions    Bactrim [Sulfamethoxazole W/Trimethoprim] RASH    Adhesive Tape RASH

## 2025-03-09 NOTE — PROGRESS NOTES
CHIEF COMPLAINT:     Chief Complaint   Patient presents with    Wound Recheck     Arrives for wound care follow-up appointment on foot. Denies pain to wound. No new questions or concerns this visit.     HPI:   Information obtained from Patient and chart  5-24-24 INITIAL:  43 year old female with Past medical history invasive ductal carcinoma of left breast (dr [facility]) currently undergoing radiation therapy (dr kobi bran), leukocytoclastic vasculitis (treated with clobetasol ointment by dr. Walton) iron deficiency anemia, HFrEF (Ariane Putnam), lupus, Sjogren's syndrome.  Patient was recently admitted for oral sores and n/v and elctrolyte imbalance and hypokalemia.    Earlier this week she was seen in urgent care for uti symptoms and hematuria. She was treated with cefadroxil and recommended to see primary md. dr bran recommended patient to start yary, she states she is not regular with it. Patient has seen nutrition/dietician.  Patient is active with residential [facility].  Earlier this week she had to cancel physical therapy due to wound pain.  on 5-11 patient was started on potassium citrate x 10 days and patient was to repeat labs, which she did on 5-21 but her potassium was still 2.9. she states she is taking the potassium.  I will udpate arianecase putnam and I asked the patient to contact her as they may want her to increase her potassium.  Patient states that initially she was utilizing aquaphor but then developed an itchy red rash, so now has just been leaving it dry or using aveeno. She is able to tolerate very minimal cleansing of the area.  I discussed with her how to do it as tolerated in the shower with anasept.  Will utilize hydrogel cool sheets, patient to return next week. There is not any s/s of infection    HPI PRIOR TO JANUARY 2025 SEE INDIVIDUAL PROGRESS NOTES  12-5-24 patient returns. She is seeing her oncologist later this week and dr sunshine next Monday. She has been using silver alginate.   The wound bed is more healthy in appearance however the epiboly remains with slight undermining on that edge. We discussed that the top edge would need to be debrided. We also discussed wound vac placement if we do that here in clinic. Shew ill get input from dr sunshine and oncology.  If she wants to go forward with debridement in clinic, she should call clinic and we will order apria vac to place after debridement. No s/s of infection. The area is tender to touch.    1-2-25 patient returns.  She followed up with oncology on 12-6.  Mri of liver planned for monitoring of  prior liver lesion identified. Continue cymbalta for neuropathy.  She is to f/u in 3 mo.  She also saw Dr. Sunshine and they discussed excision of the area surgically but patient is to increase protein intake, restart yary, see nutritionist, see dr bran and then f/u in January.   She has continued to dress with silver alginate.  Her insurance changed so she can no longer see dr sunshine,  she has an appointment with dr graf for the end of January.  The wound is improved. She states she is supplementing with prenatal vitamin.  She looks more  healthy with more color in her skin and more energy in her overall posture. Will add rick and also run insurance for CTP.    1-28-25 patient returns.  Ctp were not approved by insurance.  She has been utilizing rick and silver alginate.she had her appointment with dr graf later today, but they have not received all the imaging from [facility] yet, so her appointment was pushed back. The wound is about the same size, but is more healthy granulation and it appears that there is titi-epi advancing from the superior rolled edge. Will continue with the rick and alginate. Patient continues to improve overall with her weight, energy etc.    2-11-25 patient returns. Her appointment with dr graf is now march 7 and with heme/onc tomorrow (although she is nervous about the inclement weather forcast).  She has continued  to dress with rick and silver alginate.  Wound is significantly smaller. She states she is supplementing with prenatal vitamin. She states that the dressing change after her last appointment she could see a visible difference in the size. She states she did not have any burning or pain after agno3 used at last visit.  I stimulated the wound again with agno3. She will continue with the rick and alginate. Will see if the wound continues to progress, but we may need to remove the upper rolled edge. No acute s/s of infection or c/o pain.    2-25-25 patient returns. It has been 2 weeks since last visit. She saw dr cota (heme/onc) and will f/u with him again in 3 weeks.  She has continued to dress the wound with rick and silver alginate. The last 2 visits I have stimulated/injured the wound bed with agno3 which has helped with contraction of the wound.  Today, wound is smaller, still with divot. Minimal drainage, will utilize triad paste. Patient to f/u in 2 weeks.    3-11-25 patient returns.  She did see dr graf last week and she has orders for continued f/u monitoring with mammo/mri. She has been utilizing triad paste to the wound, the original wound resolved however there is some tape stripping inferior to the wound.  We discussed utilizing honey hydrogel sheet to his area and monitoring the original wound for any drainage.  We discussed that the original wound scar is not going to \"fill in\" to surrounding tissue level. Overtime it may become less noticeable, but most likely she will always have a small depression there.  No s/s of infection or c/o pain.  MEDICATIONS:     Current Outpatient Medications:     Prenatal Vit-Fe Fumarate-FA (MULTI PRENATAL) 27-0.8 MG Oral Tab, Take by mouth., Disp: , Rfl:     Pyridoxine HCl (VITAMIN B-6) 25 MG Oral Tab, Take 1 tablet (25 mg total) by mouth daily., Disp: , Rfl:     cyanocobalamin 1000 MCG Oral Tab, Take 1 tablet (1,000 mcg total) by mouth daily., Disp: , Rfl:      metoprolol succinate ER 50 MG Oral Tablet 24 Hr, Take 1 tablet (50 mg total) by mouth daily., Disp: , Rfl:     torsemide 20 MG Oral Tab, Take 1 tablet (20 mg total) by mouth daily., Disp: , Rfl:     Potassium Chloride ER 10 MEQ Oral Tab CR, Take 1 tablet (10 mEq total) by mouth daily., Disp: 30 tablet, Rfl: 0    DULoxetine 30 MG Oral Cap DR Particles, Take 2 capsules (60 mg total) by mouth daily., Disp: , Rfl:     zolpidem 10 MG Oral Tab, Take 1 tablet (10 mg total) by mouth nightly as needed for Sleep., Disp: , Rfl:   ALLERGIES:     Allergies   Allergen Reactions    Bactrim [Sulfamethoxazole W/Trimethoprim] RASH    Adhesive Tape RASH      REVIEW OF SYSTEMS:   This information was obtained from the patient/family and chart.    See HPI for pertinent positives, otherwise 10 pt ROS negative.  HISTORY:   Past medical, surgical, family and social history updated where appropriate.      PHYSICAL EXAM:     Vitals:    03/11/25 0904   BP: 100/66   Pulse: 89   Resp: 16   Temp: 98.2 °F (36.8 °C)           Estimated body mass index is 20.25 kg/m² as calculated from the following:    Height as of 3/7/25: 64\".    Weight as of 3/7/25: 118 lb (53.5 kg).   POC Glucose   Date Value Ref Range Status   12/19/2023 72 70 - 99 mg/dL Final   12/18/2023 125 (H) 70 - 99 mg/dL Final   12/18/2023 84 70 - 99 mg/dL Final       Vital signs reviewed.Appears stated age, well groomed.    Constitutional:  Bp wnl. Pulse Regular and wnl for patient. Respirations easy and unlabored. Temperature wnl. Weight wnl for height. Appearance neat and clean. Appears in no acute distress.     Musculoskeletal:  Patient ambulation is stable with walker  Integumentary:  refer to wound characteristics and images   Psychiatric:  Judgment and insight intact. Alert and oriented times 3. No evidence of depression, anxiety, or agitation. Calm, cooperative, and communicative. Appropriate interactions and affect.  DIAGNOSTICS:     Lab Results   Component Value Date    BUN 22  03/07/2025    CREATSERUM 0.94 03/07/2025    ALB 4.4 03/07/2025    TP 8.9 (H) 02/12/2025    TP 8.4 (H) 02/12/2025       WOUND ASSESSMENT:     Wound 05/24/24 #1 Radiation therapy Breast Left (Active)   Date First Assessed/Time First Assessed: 05/24/24 0857    Wound Number (Wound Clinic Only): #1 Radiation therapy  Primary Wound Type: Other (comment)  Location: Breast  Wound Location Orientation: Left      Assessments 5/24/2024  8:59 AM 3/11/2025  9:06 AM   Wound Image        Drainage Amount Large None   Drainage Description Serous;Yellow --   Wound Length (cm) 15.5 cm 0.4 cm   Wound Width (cm) 19 cm 0.4 cm   Wound Surface Area (cm^2) 294.5 cm^2 0.16 cm^2   Wound Depth (cm) 0.1 cm 0.1 cm   Wound Volume (cm^3) 29.45 cm^3 0.016 cm^3   Wound Healing % -- 100   Margins Well-defined edges Well-defined edges;Epibole (Rolled edges)   Non-staged Wound Description Full thickness Full thickness   Alfreda-wound Assessment Moist;Edema Clean   Wound Granulation Tissue Firm;Pink Pink;Firm;Pale Grey   Wound Bed Granulation (%) 20 % 100 %   Wound Bed Epithelium (%) 50 % --   Wound Bed Slough (%) 30 % --   Wound Odor None None   Tunneling? -- No   Undermining? -- No   Sinus Tracts? -- No       No associated orders.          ASSESSMENT AND PLAN:    1. 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 Adverse effect of radiation, subsequent encounter    3. SLE (systemic lupus erythematosus related syndrome) (HCC)    4. Malignant neoplasm of left breast in female, estrogen receptor negative, unspecified site of breast (HCC)      Risks, benefits, and alternatives of current treatment plan discussed in detail.  Questions and concerns addressed. Red flags to RTC or ED reviewed.  Patient (or parent) agrees to plan.      NOTE TO PATIENT: The 21st Century Cures Act makes clinical notes like these available to patients in the interest of transparency. Clinical notes are medical documents used by physicians and  care providers to communicate with each other. These documents include medical language and terminology, abbreviations, and treatment information that may sound technical and at times possibly unfamiliar. In addition, at times, the verbiage may appear blunt or direct. These documents are one tool providers use to communicate relevant information and clinical opinions of the care providers in a way that allows common understanding of the clinical context.   I [id number] with the patient. This time included:    preparing to see the patient (eg, review notes and recent diagnostics),  seeing the patient, obtaining and/or reviewing separately obtained history, performing a medically appropriate examination and/or evaluation, counseling and educating the patient, documenting in the record,  DISCHARGE:      Patient Instructions   Please return: 3 WEEKS     Patient discharge and wound care instructions  Alina Aceves  3/11/2025      You may shower and cleanse area with mild soap and water.  rinse wound with ANASEPT cleanser,   dab dry with gauze and apply honey gel sheet  If you notice drainage from the \"original\" wound then apply paste again  If there is remnants of paste, you do not need to \"scrub\" out, just reapply    Nutrition and blood sugar control:  Focus on the following:  Protein: Meats, beans, eggs, milk and yogurt particularly Greek yogurt), tofu, soy nuts, soy protein products (Follow the protein handout in your welcome folder)  Vitamin C: Citrus fruits and juices, strawberries, tomatoes, tomato juice, peppers, baked potatoes, spinach, broccoli, cauliflower, [place] sprouts, cabbage  Vitamin A: Dark green, leafy vegetables, orange or yellow vegetables, cantaloupe, fortified dairy products, liver, fortified cereals  Zinc: Fortified cereals, red meats, seafood  Consider supplementing with Vitor by [employer]. It can be purchased on amazon, [employer] website, or local pharmacy may be able to order it for you.   (These are essential branch chain amino acids that help with tissue building and wound healing).   When your blood sugar is consistently elevated greater than 180 your body can't heal or fight infection.     Concerns:  Signs of infection may include the following:  Increase in redness  Red \"streaks\" from wound  Increase in swelling  Fever  Unusual odor  Change in the amount of wound drainage     Should you experience any significant changes in your wound(s) or have any questions regarding your home care instructions please contact the [facility] @ [phone] If after regular business hours, please call your family doctor or local emergency room. The treatment plan has been discussed at length between you and your provider. Follow all instructions carefully, it is very important. If you do not follow all instructions you are at risk of your wound not healing, infection, possible loss of limb and even loss of life.    Anita Smallwood FNP-C, CWCN-AP, CFCN, CSWS, WCC, DWC  3/11/2025

## 2025-03-11 ENCOUNTER — OFFICE VISIT (OUTPATIENT)
Dept: WOUND CARE | Facility: HOSPITAL | Age: 45
End: 2025-03-11
Attending: NURSE PRACTITIONER
Payer: MEDICAID

## 2025-03-11 ENCOUNTER — OFFICE VISIT (OUTPATIENT)
Dept: PHYSICAL THERAPY | Facility: HOSPITAL | Age: 45
End: 2025-03-11
Attending: INTERNAL MEDICINE
Payer: MEDICAID

## 2025-03-11 VITALS
SYSTOLIC BLOOD PRESSURE: 100 MMHG | DIASTOLIC BLOOD PRESSURE: 66 MMHG | HEART RATE: 89 BPM | RESPIRATION RATE: 16 BRPM | TEMPERATURE: 98 F

## 2025-03-11 DIAGNOSIS — L98.492 NON-PRESSURE CHRONIC ULCER OF SKIN OF OTHER SITES WITH FAT LAYER EXPOSED (HCC): Primary | ICD-10-CM

## 2025-03-11 DIAGNOSIS — T66.XXXD ADVERSE EFFECT OF RADIATION, SUBSEQUENT ENCOUNTER: ICD-10-CM

## 2025-03-11 DIAGNOSIS — M32.9 SLE (SYSTEMIC LUPUS ERYTHEMATOSUS RELATED SYNDROME) (HCC): ICD-10-CM

## 2025-03-11 DIAGNOSIS — C50.912 MALIGNANT NEOPLASM OF LEFT BREAST IN FEMALE, ESTROGEN RECEPTOR NEGATIVE, UNSPECIFIED SITE OF BREAST (HCC): ICD-10-CM

## 2025-03-11 DIAGNOSIS — Z17.1 MALIGNANT NEOPLASM OF LEFT BREAST IN FEMALE, ESTROGEN RECEPTOR NEGATIVE, UNSPECIFIED SITE OF BREAST (HCC): ICD-10-CM

## 2025-03-11 PROCEDURE — 99214 OFFICE O/P EST MOD 30 MIN: CPT | Performed by: NURSE PRACTITIONER

## 2025-03-11 PROCEDURE — 97112 NEUROMUSCULAR REEDUCATION: CPT

## 2025-03-11 PROCEDURE — 97110 THERAPEUTIC EXERCISES: CPT

## 2025-03-11 NOTE — PROGRESS NOTES
.Weekly Wound Education Note    Teaching Provided To: Patient  Training Topics: Dressing, Cleasing and general instructions, Discharge instructions  Training Method: Explain/Verbal, Written  Training Response: Patient responds and understands        Notes: Wound improving. Dressing changed to honey hydrogel sheet. Sample dressing applied.

## 2025-03-11 NOTE — PATIENT INSTRUCTIONS
Please return: 3 WEEKS     Patient discharge and wound care instructions  Alina Aceves  3/11/2025      You may shower and cleanse area with mild soap and water.  rinse wound with ANASEPT cleanser,   dab dry with gauze and apply honey gel sheet  If you notice drainage from the \"original\" wound then apply paste again  If there is remnants of paste, you do not need to \"scrub\" out, just reapply    Nutrition and blood sugar control:  Focus on the following:  Protein: Meats, beans, eggs, milk and yogurt particularly Greek yogurt), tofu, soy nuts, soy protein products (Follow the protein handout in your welcome folder)  Vitamin C: Citrus fruits and juices, strawberries, tomatoes, tomato juice, peppers, baked potatoes, spinach, broccoli, cauliflower, [place] sprouts, cabbage  Vitamin A: Dark green, leafy vegetables, orange or yellow vegetables, cantaloupe, fortified dairy products, liver, fortified cereals  Zinc: Fortified cereals, red meats, seafood  Consider supplementing with Vitor by [employer]. It can be purchased on amazon, [employer] website, or local pharmacy may be able to order it for you.  (These are essential branch chain amino acids that help with tissue building and wound healing).   When your blood sugar is consistently elevated greater than 180 your body can't heal or fight infection.     Concerns:  Signs of infection may include the following:  Increase in redness  Red \"streaks\" from wound  Increase in swelling  Fever  Unusual odor  Change in the amount of wound drainage     Should you experience any significant changes in your wound(s) or have any questions regarding your home care instructions please contact the wound center [facility] @ [phone] If after regular business hours, please call your family doctor or local emergency room. The treatment plan has been discussed at length between you and your provider. Follow all instructions carefully, it is very important. If you do not follow all  instructions you are at risk of your wound not healing, infection, possible loss of limb and even loss of life.

## 2025-03-12 NOTE — PROGRESS NOTES
Patient: Alina Aceves (44 year old, female) Referring Provider:  Insurance:   Diagnosis: Malignant neoplasm of upper-outer quadrant of left female breast (HCC) (C50.412)  Anemia (D64.9)  Open wound of chest wall, left, subsequent encounter (S21.102D)  Foot drop (M21.379)  Neuropathy (G62.9) [place]kin Gant  MEDICAID   Date of Surgery: No data recorded Next MD visit:  N/A   Precautions:  Cancer No data recorded Referral Information:    Date of Evaluation: Req: 0, Auth: 0, Exp:     02/28/25 POC Auth Visits:  12       Today's Date   3/11/2025    Subjective  Pt reports no pain following last session. A little bit of soreness but nothing too bad.       Pain: 0/10 (not chief complaint)     Objective  See tx flow sheet          Assessment  Trial of NMES for anterior tib activation bilaterally completed with Fieldglass unit. Able to stimulate sensory fibers bilaterally with unit. On RLE, able to achieve inversion muscle activation and noted muscle twitch in anterior tib, however no ankle DF. Will continue to reassess with various placement of electrodes for improving RLE DF strength and muscle activation. LLE able to stimulate motor and sensory fibers and complete toe raises in long sitting bilaterally. Pt education provided on findings of NMES trial and how we will continue to incorporate into session.    Goals (to be met in 12 visits)             Not Met Progress Toward Partially Met Met   Pt will demonstrate improved tandem stance to 5 sec or more to be able to safely negotiate narrow spaces such as the bathroom.  []  []  []  []    Pt will perform TUG in <18 seconds with least restrictive AD, demonstrating improved gait speed for community ambulation. []  []  []  []    Pt will be able to perform STS with no UE support to be able to easily rise from chair without loss of balance. []  []  []  []    Pt will be able to squat to  light objects around the house without loss of stability. []  []  []  []    Pt will be  able to ambulate 200 ft with least restrictive AD to be able to access multiple rooms in house safely  []  []  []  []    Pt will be independent and compliant with comprehensive HEP to maintain progress achieved in PT. []  []  []  []                     Plan  Continue per plan of care.    Treatment Last 4 Visits       2/28/2025 3/3/2025 3/5/2025 3/11/2025   PT Treatment   Treatment Day  2 3 4   Therapeutic Exercise Pt education: HEP for home, POC for therapy and possible trial of NMES for foot drop  -bridges x 10  -HL hip abduc red TB x15, unilateral and bilateral  -clam ER and IR red TB 2x10  -SB roll in/out x 15     Neuro Re-Ed  -standing balance eyes open and closed with need for CGA to min A for instability, especially with LOB posterior  -seated on dynadisc with pilates ring UE squeezes in/down and LE hip abduc/adduc x 15 each     Therapeutic Exercise Min 10 15     Neuro Re-Ed Min  15     Evaluation Min 25      Total of Timed Procedures 10 30     Total of Service Based 25 0     Total Treatment Time 35 30            3/3/2025 3/5/2025 3/11/2025   Neuro Treatment   Treatment Day 2 3 4   Therapeutic Exercise  Nustep warm up, level 3, 5 min   SB roll in/out x 20   Bridges x 10   Long sitting calf stretch w/ strap, 4x15 sec bilat  HL hip abduc red TB x15, unilateral        NuStep, level 3, 5 min   Bridges, 2x8  SB roll in/out x 20   Long sitting calf stretch w/ strap, 4x15 sec bilat   HL hip abduc red TB 2x10     Neuro Re-Education  Eccentric lowering from DF on RLE 2x8  Hurdles BUE support, fwd/lateral, 2 laps each  Sitting on dynadisc, pilates ring push, x10 3 sec holds  Sitting on dynadisc, pilates ring adduction, x10      Trial of NMES for bilateral foot drop  - Toe raises, LLE 2x10, therapist assisting motion on RLE       Therapeutic Exercise Minutes  25 20   Neuro Re-Educ Minutes  15 25   Total Time Of Timed Procedures  40 45   Total Time Of Service-Based Procedures  0 0   Total Treatment Time  40 45         HEP   Access Code: [email or web address]  URL: [email or web address]  Date: 02/28/2025  Prepared by: Trupti Salazar     Exercises  - Supine Ankle Pumps  - 1 x daily - 7 x weekly - 2 sets - 10 reps  - Supine Ankle Inversion Eversion AROM  - 1 x daily - 7 x weekly - 2 sets - 10 reps  - Sit to Stand with Counter Support  - 3 x daily - 7 x weekly - 1 sets - 3 reps    Charges  NR 2 TE 1

## 2025-03-13 ENCOUNTER — OFFICE VISIT (OUTPATIENT)
Dept: PHYSICAL THERAPY | Facility: HOSPITAL | Age: 45
End: 2025-03-13
Attending: INTERNAL MEDICINE
Payer: MEDICAID

## 2025-03-13 PROCEDURE — 97112 NEUROMUSCULAR REEDUCATION: CPT

## 2025-03-13 PROCEDURE — 97110 THERAPEUTIC EXERCISES: CPT

## 2025-03-13 NOTE — PROGRESS NOTES
Patient: Alina Aceves (44 year old, female) Referring Provider:  Insurance:   Diagnosis: Malignant neoplasm of upper-outer quadrant of left female breast (HCC) (C50.412)  Anemia (D64.9)  Open wound of chest wall, left, subsequent encounter (S21.102D)  Foot drop (M21.379)  Neuropathy (G62.9) Akin Gant  MEDICAID   Date of Surgery: No data recorded Next MD visit:  N/A   Precautions:  Cancer No data recorded Referral Information:    Date of Evaluation: Req: 0, Auth: 0, Exp:     02/28/25 POC Auth Visits:  12       Today's Date   3/13/2025    Subjective  Pt doing well today, no changes to report since last visit. Did notice after last session with NMES her calf muscles felt sore like she worked them out       Pain:  not chief complaint     Objective  See tx flow sheet; continued trials of NMES with varying parameters          Assessment  Continued trials of NMES for anterior tib on RLE. Pt able to get strong sensory response, however unable to move into stronger phase for muscle contraction. However, with eccentric lowering from DF, pt has more control and muscle contraction noted at anterior tib on R side when palpated using NMES. Pt able to complete ankle 3 way with band and assist from therapist as needed for motion. Cues provided throughout for continuing to visualize motion for brain/body connection. Continue per plan of care.    Goals (to be met in 12 visits)             Not Met Progress Toward Partially Met Met   Pt will demonstrate improved tandem stance to 5 sec or more to be able to safely negotiate narrow spaces such as the bathroom.  []  []  []  []    Pt will perform TUG in <18 seconds with least restrictive AD, demonstrating improved gait speed for community ambulation. []  []  []  []    Pt will be able to perform STS with no UE support to be able to easily rise from chair without loss of balance. []  []  []  []    Pt will be able to squat to  light objects around the house without loss of  stability. []  []  []  []    Pt will be able to ambulate 200 ft with least restrictive AD to be able to access multiple rooms in house safely  []  []  []  []    Pt will be independent and compliant with comprehensive HEP to maintain progress achieved in PT. []  []  []  []                         Plan  Continue per plan of care.    Treatment Last 4 Visits       3/3/2025 3/5/2025 3/11/2025 3/13/2025   PT Treatment   Treatment Day 2 3 4 5   Therapeutic Exercise -bridges x 10  -HL hip abduc red TB x15, unilateral and bilateral  -clam ER and IR red TB 2x10  -SB roll in/out x 15      Neuro Re-Ed -standing balance eyes open and closed with need for CGA to min A for instability, especially with LOB posterior  -seated on dynadisc with pilates ring UE squeezes in/down and LE hip abduc/adduc x 15 each      Therapeutic Exercise Min 15      Neuro Re-Ed Min 15      Total of Timed Procedures 30      Total of Service Based 0      Total Treatment Time 30             3/3/2025 3/5/2025 3/11/2025 3/13/2025   Neuro Treatment   Treatment Day 2 3 4 5   Therapeutic Exercise  Nustep warm up, level 3, 5 min   SB roll in/out x 20   Bridges x 10   Long sitting calf stretch w/ strap, 4x15 sec bilat  HL hip abduc red TB x15, unilateral        NuStep, level 3, 5 min   Bridges, 2x8  SB roll in/out x 20   Long sitting calf stretch w/ strap, 4x15 sec bilat   HL hip abduc red TB 2x10   NuStep, level 4, 5 min   3 way ankle, yellow band, x10 each bilat (no DF)   Side stepping w/ yellow band at knees, 2 laps   Pilates ring press, sitting, 12x 3 sec holds     Neuro Re-Education  Eccentric lowering from DF on RLE 2x8  Hurdles BUE support, fwd/lateral, 2 laps each  Sitting on dynadisc, pilates ring push, x10 3 sec holds  Sitting on dynadisc, pilates ring adduction, x10      Trial of NMES for bilateral foot drop  - Toe raises, LLE 2x10, therapist assisting motion on RLE     NMES:  - Eccentric lowering from DF  - Eccentric lowering from inversion  Hurdles,  alternating R/L leading, 2 laps  Lateral hurdles, 2 laps   Single leg tap to 4 in step, x10 bilat   Therapeutic Exercise Minutes  25 20 15   Neuro Re-Educ Minutes  15 25 25   Total Time Of Timed Procedures  40 45 40   Total Time Of Service-Based Procedures  0 0 0   Total Treatment Time  40 45 40        HEP   Access Code: [email or web address]  URL: [email or web address]  Date: 02/28/2025  Prepared by: Trupti Salazar     Exercises  - Supine Ankle Pumps  - 1 x daily - 7 x weekly - 2 sets - 10 reps  - Supine Ankle Inversion Eversion AROM  - 1 x daily - 7 x weekly - 2 sets - 10 reps  - Sit to Stand with Counter Support  - 3 x daily - 7 x weekly - 1 sets - 3 reps    Charges  NR 2 TE 1

## 2025-03-18 ENCOUNTER — OFFICE VISIT (OUTPATIENT)
Dept: SURGERY | Age: 45
End: 2025-03-18
Attending: SURGERY

## 2025-03-18 ENCOUNTER — APPOINTMENT (OUTPATIENT)
Dept: PHYSICAL THERAPY | Facility: HOSPITAL | Age: 45
End: 2025-03-18
Attending: INTERNAL MEDICINE
Payer: MEDICAID

## 2025-03-18 VITALS
OXYGEN SATURATION: 100 % | TEMPERATURE: 98.4 F | DIASTOLIC BLOOD PRESSURE: 79 MMHG | RESPIRATION RATE: 18 BRPM | BODY MASS INDEX: 20.14 KG/M2 | HEIGHT: 64 IN | WEIGHT: 118 LBS | HEART RATE: 82 BPM | SYSTOLIC BLOOD PRESSURE: 115 MMHG

## 2025-03-18 DIAGNOSIS — Z17.1 MALIGNANT NEOPLASM OF UPPER-OUTER QUADRANT OF LEFT BREAST IN FEMALE, ESTROGEN RECEPTOR NEGATIVE (CMD): Primary | ICD-10-CM

## 2025-03-18 DIAGNOSIS — C50.412 MALIGNANT NEOPLASM OF UPPER-OUTER QUADRANT OF LEFT BREAST IN FEMALE, ESTROGEN RECEPTOR NEGATIVE (CMD): Primary | ICD-10-CM

## 2025-03-18 PROCEDURE — 99203 OFFICE O/P NEW LOW 30 MIN: CPT | Performed by: SURGERY

## 2025-03-18 RX ORDER — POTASSIUM CITRATE 15 MEQ/1
15 TABLET, EXTENDED RELEASE ORAL DAILY
COMMUNITY

## 2025-03-18 RX ORDER — DULOXETIN HYDROCHLORIDE 30 MG/1
30 CAPSULE, DELAYED RELEASE ORAL DAILY
COMMUNITY

## 2025-03-18 RX ORDER — TORSEMIDE 20 MG/1
1 TABLET ORAL DAILY
COMMUNITY

## 2025-03-18 RX ORDER — ZOLPIDEM TARTRATE 10 MG/1
10 TABLET ORAL
COMMUNITY

## 2025-03-18 RX ORDER — METOPROLOL SUCCINATE 50 MG/1
1 TABLET, EXTENDED RELEASE ORAL DAILY
COMMUNITY

## 2025-03-18 ASSESSMENT — PAIN SCALES - GENERAL: PAINLEVEL: 0

## 2025-03-18 ASSESSMENT — PATIENT HEALTH QUESTIONNAIRE - PHQ9
SUM OF ALL RESPONSES TO PHQ9 QUESTIONS 1 AND 2: 0
2. FEELING DOWN, DEPRESSED OR HOPELESS: NOT AT ALL
1. LITTLE INTEREST OR PLEASURE IN DOING THINGS: NOT AT ALL
CLINICAL INTERPRETATION OF PHQ2 SCORE: NO FURTHER SCREENING NEEDED
SUM OF ALL RESPONSES TO PHQ9 QUESTIONS 1 AND 2: 0

## 2025-03-18 ASSESSMENT — ENCOUNTER SYMPTOMS
FATIGUE: 1
DIZZINESS: 1
NUMBNESS: 1

## 2025-03-19 ENCOUNTER — OFFICE VISIT (OUTPATIENT)
Dept: PHYSICAL THERAPY | Facility: HOSPITAL | Age: 45
End: 2025-03-19
Attending: INTERNAL MEDICINE
Payer: MEDICAID

## 2025-03-19 PROCEDURE — 97112 NEUROMUSCULAR REEDUCATION: CPT

## 2025-03-19 PROCEDURE — 97110 THERAPEUTIC EXERCISES: CPT

## 2025-03-19 NOTE — PROGRESS NOTES
Patient: Alina Aceves (44 year old, female) Referring Provider:  Insurance:   Diagnosis: Malignant neoplasm of upper-outer quadrant of left female breast (HCC) (C50.412)  Anemia (D64.9)  Open wound of chest wall, left, subsequent encounter (S21.102D)  Foot drop (M21.379)  Neuropathy (G62.9) Akin Gant  MEDICAID   Date of Surgery: No data recorded Next MD visit:  N/A   Precautions:  Cancer No data recorded Referral Information:    Date of Evaluation: Req: 0, Auth: 0, Exp:     02/28/25 POC Auth Visits:  12       Today's Date   3/19/2025    Subjective  A little sore around ankle area after 4 way ankle exercise but no changes otherwise. Pt also complained of having a headache, was consistently 5/10 throughout the session.       Pain: 0/10 (not chief complaint)     Objective  Senior Balance was assessed. Improvements seen in 4 way ankle with R inversion and L eversion and B PF.       Senior Balance Scale     Item Description Score (0 worst-4 best)    4 1. Sitting to standing  4 2. Standing unsupported   4 3. Sitting unsupported   4 4. Standing to sitting   4 5. Transfers   3 6. Standing with eyes closed   4 7. Standing with feet together   3 8. Reaching forward with outstretched arm 15 cm  3 9. Retrieving object from floor   4 10. Turning to look behind   2 11. Turning 360 degrees   2 12. Placing alternate foot on stool   3 13. Standing with one foot in front L foot behind  2 14. Standing on one foot L foot    Total 46/56 (less than 46/56 = fall risk)      Assessment  Pt completed the Senior Balance Scale and scored a 46/56 deeming her a low fall risk. The items she struggled with were turning 360 degrees, placing alternating feet on step while standing unsupported, and standing on one leg. Most of the struggles came from being fearful and unsure of completing the movement. Pt also completed 3 way ankle. Pt had significant improvements in completing R inversion and L eversion. Additionally, B PF looked stronger compared  to last session. Active assisted DF was also completed B with cues of \"think about really bringing your foot up\" from the SPT. Pt also completed lateral walks with the yellow band and was cued from SPT to keep her toes forward due to her out-toeing when completing the exercise.    Goals (to be met in 12 visits)       Not Met Progress Toward Partially Met Met   Pt will demonstrate improved tandem stance to 5 sec or more to be able to safely negotiate narrow spaces such as the bathroom.  []  []  []  []    Pt will perform TUG in <18 seconds with least restrictive AD, demonstrating improved gait speed for community ambulation. []  []  []  []    Pt will be able to perform STS with no UE support to be able to easily rise from chair without loss of balance. []  []  []  []    Pt will be able to squat to  light objects around the house without loss of stability. []  []  []  []    Pt will be able to ambulate 200 ft with least restrictive AD to be able to access multiple rooms in house safely  []  []  []  []    Pt will be independent and compliant with comprehensive HEP to maintain progress achieved in PT. []  []  []  []                    Plan  Continue with skilled physical therapy to complete plan of care.    Treatment Last 4 Visits       3/5/2025 3/11/2025 3/13/2025 3/19/2025   PT Treatment   Treatment Day 3 4 5 5          3/5/2025 3/11/2025 3/13/2025 3/19/2025   Neuro Treatment   Treatment Day 3 4 5 5   Therapeutic Exercise Nustep warm up, level 3, 5 min   SB roll in/out x 20   Bridges x 10   Long sitting calf stretch w/ strap, 4x15 sec bilat  HL hip abduc red TB x15, unilateral        NuStep, level 3, 5 min   Bridges, 2x8  SB roll in/out x 20   Long sitting calf stretch w/ strap, 4x15 sec bilat   HL hip abduc red TB 2x10   NuStep, level 4, 5 min   3 way ankle, yellow band, x10 each bilat (no DF)   Side stepping w/ yellow band at knees, 2 laps   Pilates ring press, sitting, 12x 3 sec holds   NuStep, level 4, 5 min    3 way ankle, yellow band, x10 each bilat (no DF)   Side stepping w/ yellow band at knees, 2 laps   Multiple rest breaks provided to prevent excessive fatigue       Neuro Re-Education Eccentric lowering from DF on RLE 2x8  Hurdles BUE support, fwd/lateral, 2 laps each  Sitting on dynadisc, pilates ring push, x10 3 sec holds  Sitting on dynadisc, pilates ring adduction, x10      Trial of NMES for bilateral foot drop  - Toe raises, LLE 2x10, therapist assisting motion on RLE     NMES:  - Eccentric lowering from DF  - Eccentric lowering from inversion  Hurdles, alternating R/L leading, 2 laps  Lateral hurdles, 2 laps   Single leg tap to 4 in step, x10 bilat Senior Balance Scale results in objective. Education given with results and fall risk.       Therapeutic Exercise Minutes 25 20 15 25   Neuro Re-Educ Minutes 15 25 25 20   Total Time Of Timed Procedures 40 45 40 45   Total Time Of Service-Based Procedures 0 0 0 0   Total Treatment Time 40 45 40 45        HEP  See eval note.      Charges  1 NE 2 TE       This treatment was provided by CHRISTOS Martinez under the direct and constant direction and supervision of a licensed therapist, who provided consultation regarding skilled judgements, treatment, and assessment of patient care.

## 2025-03-20 ENCOUNTER — OFFICE VISIT (OUTPATIENT)
Dept: PHYSICAL THERAPY | Facility: HOSPITAL | Age: 45
End: 2025-03-20
Attending: INTERNAL MEDICINE
Payer: MEDICAID

## 2025-03-20 PROCEDURE — 97110 THERAPEUTIC EXERCISES: CPT

## 2025-03-20 PROCEDURE — 97112 NEUROMUSCULAR REEDUCATION: CPT

## 2025-03-20 NOTE — PROGRESS NOTES
Patient: Alina Aceves (44 year old, female) Referring Provider:  Insurance:   Diagnosis: Malignant neoplasm of upper-outer quadrant of left female breast (HCC) (C50.412)  Anemia (D64.9)  Open wound of chest wall, left, subsequent encounter (S21.102D)  Foot drop (M21.379)  Neuropathy (G62.9) Akin Gant  MEDICAID   Date of Surgery: No data recorded Next MD visit:  N/A   Precautions:  Cancer No data recorded Referral Information:    Date of Evaluation: Req: 0, Auth: 0, Exp:     02/28/25 POC Auth Visits:  12       Today's Date   3/20/2025    Subjective  Pt states \"my neuropathy feels much better at night when I had physical therapy earlier that day\"       Pain: 0/10 (not chief complaint.)     Objective  NMES was used for active assisted DF. 4 way ankle is becoming smoother with each motion on the L. R continues to need aid in  DF. Tapping and SPT hand resistance was given to R DF to encourage mvmt. SL balance saw minimal sway.            Assessment  Pt completed NMES active assisted DF on R LE. Pads were put onto the R Anterior Tibialis. Pt was instructed by SPT to close her eyes and visualize her foot going up. SPT gave tapping cueing to the anterior tibialis. SPT was able to feel a slight contraction from the Anterior Tib. Pt also completed B 4 way ankle with YTB. SPT gave tapping cuing over the Anterior Tibialis and placed hand as resistance for R DF instead of YTB.  Pt also completed 2 laps of going over 4 hurdles with B UE support. Pt did not catch her foot and was able to successfully complete the task without catching her foot. Pt also completed SL balance B for 2 bouts of 30 seconds. For first set B pt used open palm support, for second set B pt used B pointer finger support. Pt completed all 4 with minimal sway.    Goals (to be met in 12 visits)       Not Met Progress Toward Partially Met Met   Pt will demonstrate improved tandem stance to 5 sec or more to be able to safely negotiate narrow spaces such as  the bathroom.  []  []  []  []    Pt will perform TUG in <18 seconds with least restrictive AD, demonstrating improved gait speed for community ambulation. []  []  []  []    Pt will be able to perform STS with no UE support to be able to easily rise from chair without loss of balance. []  []  []  []    Pt will be able to squat to  light objects around the house without loss of stability. []  []  []  []    Pt will be able to ambulate 200 ft with least restrictive AD to be able to access multiple rooms in house safely  []  []  []  []    Pt will be independent and compliant with comprehensive HEP to maintain progress achieved in PT. []  []  []  []                        Plan  Continue with skilled physical therapy to complete plan of care.    Treatment Last 4 Visits       3/11/2025 3/13/2025 3/19/2025 3/20/2025   PT Treatment   Treatment Day 4 5 5 6          3/11/2025 3/13/2025 3/19/2025 3/20/2025   Neuro Treatment   Treatment Day 4 5 5 6   Therapeutic Exercise NuStep, level 3, 5 min   Bridges, 2x8  SB roll in/out x 20   Long sitting calf stretch w/ strap, 4x15 sec bilat   HL hip abduc red TB 2x10   NuStep, level 4, 5 min   3 way ankle, yellow band, x10 each bilat (no DF)   Side stepping w/ yellow band at knees, 2 laps   Pilates ring press, sitting, 12x 3 sec holds   NuStep, level 4, 5 min   3 way ankle, yellow band, x10 each bilat (no DF)   Side stepping w/ yellow band at knees, 2 laps   Multiple rest breaks provided to prevent excessive fatigue     NuStep level 4, 5 minutes  4 way ankle B with YTB except R DF, R DF used AAROM, handheld resistance and tapping assistance      Neuro Re-Education Trial of NMES for bilateral foot drop  - Toe raises, LLE 2x10, therapist assisting motion on RLE     NMES:  - Eccentric lowering from DF  - Eccentric lowering from inversion  Hurdles, alternating R/L leading, 2 laps  Lateral hurdles, 2 laps   Single leg tap to 4 in step, x10 bilat Senior Balance Scale results in objective.  Education given with results and fall risk.     2 laps of 4 hurdles using BUE support.   B SL Balance 4 sets, 2 sets with open palm support, 2 steps with B pointer finger support  NMES active assisted DF on R UE multiple bouts   Therapeutic Exercise Minutes 20 15 25 20   Neuro Re-Educ Minutes 25 25 20 25   Total Time Of Timed Procedures 45 40 45 45   Total Time Of Service-Based Procedures 0 0 0 0   Total Treatment Time 45 40 45 45        HEP    Access Code: [email or web address]  URL: [email or web address]  Date: 02/28/2025  Prepared by: Trupti Salazar     Exercises  - Supine Ankle Pumps  - 1 x daily - 7 x weekly - 2 sets - 10 reps  - Supine Ankle Inversion Eversion AROM  - 1 x daily - 7 x weekly - 2 sets - 10 reps  - Sit to Stand with Counter Support  - 3 x daily - 7 x weekly - 1 sets - 3 reps    Charges  1 TE, 2 NR       This treatment was provided by CHRISTOS Martinez under the direct and constant direction and supervision of a licensed therapist, who provided consultation regarding skilled judgements, treatment, and assessment of patient care.

## 2025-03-24 ENCOUNTER — OFFICE VISIT (OUTPATIENT)
Dept: PHYSICAL THERAPY | Facility: HOSPITAL | Age: 45
End: 2025-03-24
Attending: INTERNAL MEDICINE
Payer: MEDICAID

## 2025-03-24 PROCEDURE — 97110 THERAPEUTIC EXERCISES: CPT

## 2025-03-24 PROCEDURE — 97112 NEUROMUSCULAR REEDUCATION: CPT

## 2025-03-24 NOTE — PROGRESS NOTES
Patient: Alina Aceves (44 year old, female) Referring Provider:  Insurance:   Diagnosis: Malignant neoplasm of upper-outer quadrant of left female breast (HCC) (C50.412)  Anemia (D64.9)  Open wound of chest wall, left, subsequent encounter (S21.102D)  Foot drop (M21.379)  Neuropathy (G62.9) Akin Gant  MEDICAID   Date of Surgery: No data recorded Next MD visit:  N/A   Precautions:  Cancer No data recorded Referral Information:    Date of Evaluation: Req: 0, Auth: 0, Exp:     02/28/25 POC Auth Visits:  12       Today's Date   3/24/2025    Subjective  Pt states she is doing well, no major changes since last session. She states she was a \"little sore\" on the lateral foot/ankle from the NMES.       Pain: 0/10 (not chief complaint.)     Objective  See tx flow sheet, focus on TE and neuromuscular re-ed      Only one seated rest break required today during activities, otherwise able to stand for rest between activities.        Assessment  Pt responded well and was motivated to complete treatment. During 4 way ankle pt needed aid with R DF and Eversion so SPT switched to AAROM. Pt can initiate movement but cannot go past neutral in eversion movement. DF sees minimal muscular contraction. SPT gave aid and tapping to complete AAROM for both movements. During forward hurdles pt used open palm support and caught her R foot 3 times during first set. ML shanda toe taps showed minimal control B. SPT gave reminders to gently place toes on ground to avoid foot slap. With alternating foot taps, pt showed some control but would still slap foot onto cone on the R.    Goals (to be met in 12 visits)       Not Met Progress Toward Partially Met Met   Pt will demonstrate improved tandem stance to 5 sec or more to be able to safely negotiate narrow spaces such as the bathroom.  []  []  []  []    Pt will perform TUG in <18 seconds with least restrictive AD, demonstrating improved gait speed for community ambulation. []  []  []  []    Pt  will be able to perform STS with no UE support to be able to easily rise from chair without loss of balance. []  []  []  []    Pt will be able to squat to  light objects around the house without loss of stability. []  []  []  []    Pt will be able to ambulate 200 ft with least restrictive AD to be able to access multiple rooms in house safely  []  []  []  []    Pt will be independent and compliant with comprehensive HEP to maintain progress achieved in PT. []  []  []  []                            Plan  Continue with skilled physical therapy to complete plan of care.    Treatment Last 4 Visits       3/13/2025 3/19/2025 3/20/2025 3/24/2025   PT Treatment   Treatment Day 5 5 6 7          3/13/2025 3/19/2025 3/20/2025 3/24/2025   Neuro Treatment   Treatment Day 5 5 6 7   Therapeutic Exercise NuStep, level 4, 5 min   3 way ankle, yellow band, x10 each bilat (no DF)   Side stepping w/ yellow band at knees, 2 laps   Pilates ring press, sitting, 12x 3 sec holds   NuStep, level 4, 5 min   3 way ankle, yellow band, x10 each bilat (no DF)   Side stepping w/ yellow band at knees, 2 laps   Multiple rest breaks provided to prevent excessive fatigue     NuStep level 4, 5 minutes  4 way ankle B with YTB except R DF, R DF used AAROM, handheld resistance and tapping assistance    NuStep Level 4 5 mins  4 way ankle B except R DF and Eversion  R AAROM DF and Eversion  Calf Stretch 3x30 seconds     Neuro Re-Education NMES:  - Eccentric lowering from DF  - Eccentric lowering from inversion  Hurdles, alternating R/L leading, 2 laps  Lateral hurdles, 2 laps   Single leg tap to 4 in step, x10 bilat Senior Balance Scale results in objective. Education given with results and fall risk.     2 laps of 4 hurdles using BUE support.   B SL Balance 4 sets, 2 sets with open palm support, 2 steps with B pointer finger support  NMES active assisted DF on R UE multiple bouts Pilates ring press sitting on dynadisc 2x10 5 second holds  B Forward  toe taps over shanda 2x30 seconds  B Lateral toe taps over shanda 2x30 seconds  Alt Foot Taps on cone 2x30 seconds   Therapeutic Exercise Minutes 15 25 20 20   Neuro Re-Educ Minutes 25 20 25 25   Total Time Of Timed Procedures 40 45 45 45   Total Time Of Service-Based Procedures 0 0 0 0   Total Treatment Time 40 45 45 45        HEP    Access Code: [email or web address]  URL: [email or web address]  Date: 02/28/2025  Prepared by: Trupti Salazar     Exercises  - Supine Ankle Pumps  - 1 x daily - 7 x weekly - 2 sets - 10 reps  - Supine Ankle Inversion Eversion AROM  - 1 x daily - 7 x weekly - 2 sets - 10 reps  - Sit to Stand with Counter Support  - 3 x daily - 7 x weekly - 1 sets - 3 reps     Charges  1 TE 2 NR       This treatment was provided by CHRISTOS Martinez under the direct and constant direction and supervision of a licensed therapist, who provided consultation regarding skilled judgements, treatment, and assessment of patient care.

## 2025-03-25 ENCOUNTER — APPOINTMENT (OUTPATIENT)
Dept: PHYSICAL THERAPY | Facility: HOSPITAL | Age: 45
End: 2025-03-25
Attending: INTERNAL MEDICINE
Payer: MEDICAID

## 2025-03-27 ENCOUNTER — TELEPHONE (OUTPATIENT)
Dept: PHYSICAL THERAPY | Facility: HOSPITAL | Age: 45
End: 2025-03-27

## 2025-03-27 ENCOUNTER — APPOINTMENT (OUTPATIENT)
Dept: PHYSICAL THERAPY | Facility: HOSPITAL | Age: 45
End: 2025-03-27
Attending: INTERNAL MEDICINE
Payer: MEDICAID

## 2025-03-28 NOTE — PAT NURSING NOTE
PreOp Instructions     You are scheduled for: an Interventional Radiology Procedure     Date of Procedure: 04/17/25. Check in at 10:30 am     Diet Instructions: Do not eat or drink anything after midnight including gum, mints, candy, etc.     Medications: Medications you are allowed to take can be taken with a sip of water the morning of your procedure     Medications to Stop: Hold herbal supplements and vitamins morning of procedure     Skin Prep : Shower with antibacterial soap using a clean washcloth, prior to procedure. Once dried off, no lotions/powders/creams/ointments, etc.     Driving After Procedure: Sedation will be given so you WILL NOT be able to drive home. You will need a responsible adult  to drive you home. You can NOT take uber or taxi unless approved by your physician in advance.     Discharge Teaching: Your nurse will give you specific instructions before discharge, Most people can resume normal activities in 2-3 days, Any questions, please call the physician's office

## 2025-03-31 ENCOUNTER — OFFICE VISIT (OUTPATIENT)
Dept: PHYSICAL THERAPY | Facility: HOSPITAL | Age: 45
End: 2025-03-31
Attending: INTERNAL MEDICINE
Payer: MEDICAID

## 2025-03-31 PROCEDURE — 97110 THERAPEUTIC EXERCISES: CPT

## 2025-03-31 PROCEDURE — 97116 GAIT TRAINING THERAPY: CPT

## 2025-03-31 NOTE — PROGRESS NOTES
CHIEF COMPLAINT:     Chief Complaint   Patient presents with    Wound Care     Patient here for follow up wound care visit to left breast. Pt denies any pain in wound at this time. Pt denies any concerns or issues.      HPI:   Information obtained from Patient and chart  5-24-24 INITIAL:  43 year old female with Past medical history invasive ductal carcinoma of left breast (dr [facility]) currently undergoing radiation therapy (dr kobi bran), leukocytoclastic vasculitis (treated with clobetasol ointment by dr. Walton) iron deficiency anemia, HFrEF (Ariane Putnam), lupus, Sjogren's syndrome.  Patient was recently admitted for oral sores and n/v and elctrolyte imbalance and hypokalemia.    Earlier this week she was seen in urgent care for uti symptoms and hematuria. She was treated with cefadroxil and recommended to see primary md. dr bran recommended patient to start yary, she states she is not regular with it. Patient has seen nutrition/dietician.  Patient is active with residential [facility].  Earlier this week she had to cancel physical therapy due to wound pain.  on 5-11 patient was started on potassium citrate x 10 days and patient was to repeat labs, which she did on 5-21 but her potassium was still 2.9. she states she is taking the potassium.  I will udpate arianecase putnam and I asked the patient to contact her as they may want her to increase her potassium.  Patient states that initially she was utilizing aquaphor but then developed an itchy red rash, so now has just been leaving it dry or using aveeno. She is able to tolerate very minimal cleansing of the area.  I discussed with her how to do it as tolerated in the shower with anasept.  Will utilize hydrogel cool sheets, patient to return next week. There is not any s/s of infection    HPI PRIOR TO JANUARY 2025 SEE INDIVIDUAL PROGRESS NOTES  12-5-24 patient returns. She is seeing her oncologist later this week and dr sunshine next Monday. She has been using  silver alginate.  The wound bed is more healthy in appearance however the epiboly remains with slight undermining on that edge. We discussed that the top edge would need to be debrided. We also discussed wound vac placement if we do that here in clinic. Shew ill get input from dr sunshine and oncology.  If she wants to go forward with debridement in clinic, she should call clinic and we will order apria vac to place after debridement. No s/s of infection. The area is tender to touch.    1-2-25 patient returns.  She followed up with oncology on 12-6.  Mri of liver planned for monitoring of  prior liver lesion identified. Continue cymbalta for neuropathy.  She is to f/u in 3 mo.  She also saw Dr. Sunshine and they discussed excision of the area surgically but patient is to increase protein intake, restart yary, see nutritionist, see dr bran and then f/u in January.   She has continued to dress with silver alginate.  Her insurance changed so she can no longer see dr sunshine,  she has an appointment with dr graf for the end of January.  The wound is improved. She states she is supplementing with prenatal vitamin.  She looks more  healthy with more color in her skin and more energy in her overall posture. Will add rick and also run insurance for CTP.    1-28-25 patient returns.  Ctp were not approved by insurance.  She has been utilizing rick and silver alginate.she had her appointment with dr graf later today, but they have not received all the imaging from [facility] yet, so her appointment was pushed back. The wound is about the same size, but is more healthy granulation and it appears that there is titi-epi advancing from the superior rolled edge. Will continue with the rick and alginate. Patient continues to improve overall with her weight, energy etc.    2-11-25 patient returns. Her appointment with dr graf is now march 7 and with heme/onc tomorrow (although she is nervous about the inclement weather forcast).   She has continued to dress with rick and silver alginate.  Wound is significantly smaller. She states she is supplementing with prenatal vitamin. She states that the dressing change after her last appointment she could see a visible difference in the size. She states she did not have any burning or pain after agno3 used at last visit.  I stimulated the wound again with agno3. She will continue with the rick and alginate. Will see if the wound continues to progress, but we may need to remove the upper rolled edge. No acute s/s of infection or c/o pain.    2-25-25 patient returns. It has been 2 weeks since last visit. She saw dr cota (heme/onc) and will f/u with him again in 3 weeks.  She has continued to dress the wound with rick and silver alginate. The last 2 visits I have stimulated/injured the wound bed with agno3 which has helped with contraction of the wound.  Today, wound is smaller, still with divot. Minimal drainage, will utilize triad paste. Patient to f/u in 2 weeks.    3-11-25 patient returns.  She did see dr graf last week and she has orders for continued f/u monitoring with mammo/mri. She has been utilizing triad paste to the wound, the original wound resolved however there is some tape stripping inferior to the wound.  We discussed utilizing honey hydrogel sheet to his area and monitoring the original wound for any drainage.  We discussed that the original wound scar is not going to \"fill in\" to surrounding tissue level. Overtime it may become less noticeable, but most likely she will always have a small depression there.  No s/s of infection or c/o pain.    4-1-25 patient returns.  She continues with physical therapy for her foot drop.  She also saw dr. Diaz with [facility] and she is to f/u with her in 4 month and get bilateral mammogram in may.  Last visit the main/initial wound was closed but she had some epidermal stripping related to bandages infereior to that.  She has been utilizing  silver alginate, but she states each time she tries to clean it it from drainage (which had increased) the inferior skin edge seems to be lifting more.  No new c/o pain.  Wound is larger. We cultured and will start on topical abx ointment and treat po based on results and sensitivities. Patient is concerned due to this set back. She was encouraged will get it rehealed. ,  MEDICATIONS:     Current Outpatient Medications:     gentamicin 0.1 % External Ointment, Apply 1 Application topically in the morning and 1 Application before bedtime. Do all this for 14 days., Disp: 30 g, Rfl: 0    Prenatal Vit-Fe Fumarate-FA (MULTI PRENATAL) 27-0.8 MG Oral Tab, Take by mouth., Disp: , Rfl:     metoprolol succinate ER 50 MG Oral Tablet 24 Hr, Take 1 tablet (50 mg total) by mouth daily., Disp: , Rfl:     Potassium Chloride ER 10 MEQ Oral Tab CR, Take 1 tablet (10 mEq total) by mouth daily., Disp: 30 tablet, Rfl: 0    DULoxetine 30 MG Oral Cap DR Particles, Take 1 capsule (30 mg total) by mouth daily. Takes one tablet, Disp: , Rfl:     zolpidem 10 MG Oral Tab, Take 1 tablet (10 mg total) by mouth nightly as needed for Sleep., Disp: , Rfl:   ALLERGIES:     Allergies   Allergen Reactions    Bactrim [Sulfamethoxazole W/Trimethoprim] RASH    Adhesive Tape RASH      REVIEW OF SYSTEMS:   This information was obtained from the patient/family and chart.    See HPI for pertinent positives, otherwise 10 pt ROS negative.  HISTORY:   Past medical, surgical, family and social history updated where appropriate.      PHYSICAL EXAM:     Vitals:    04/01/25 0700   BP: 113/76   Pulse: 101   Resp: 16   Temp: 97.9 °F (36.6 °C)             Estimated body mass index is 20.25 kg/m² as calculated from the following:    Height as of 3/7/25: 64\".    Weight as of 3/7/25: 118 lb (53.5 kg).   POC Glucose   Date Value Ref Range Status   12/19/2023 72 70 - 99 mg/dL Final   12/18/2023 125 (H) 70 - 99 mg/dL Final   12/18/2023 84 70 - 99 mg/dL Final       Vital  signs reviewed.Appears stated age, well groomed.    Constitutional:  Bp wnl. Pulse Regular and wnl for patient. Respirations easy and unlabored. Temperature wnl. Weight wnl for height. Appearance neat and clean. Appears in no acute distress.     Musculoskeletal:  Patient ambulation is stable with walker  Integumentary:  refer to wound characteristics and images   Psychiatric:  Judgment and insight intact. Alert and oriented times 3. No evidence of depression, anxiety, or agitation. Calm, cooperative, and communicative. Appropriate interactions and affect.  DIAGNOSTICS:     Lab Results   Component Value Date    BUN 22 03/07/2025    CREATSERUM 0.94 03/07/2025    ALB 4.4 03/07/2025    TP 8.9 (H) 02/12/2025    TP 8.4 (H) 02/12/2025       WOUND ASSESSMENT:     Wound 05/24/24 #1 Radiation therapy Breast Left (Active)   Date First Assessed/Time First Assessed: 05/24/24 0857    Wound Number (Wound Clinic Only): #1 Radiation therapy  Primary Wound Type: Other (comment)  Location: Breast  Wound Location Orientation: Left      Assessments 5/24/2024  8:59 AM 4/1/2025  9:45 AM   Wound Image        Drainage Amount Large Scant   Drainage Description Serous;Yellow Serous;Yellow   Wound Length (cm) 15.5 cm 1 cm   Wound Width (cm) 19 cm 1 cm   Wound Surface Area (cm^2) 294.5 cm^2 1 cm^2   Wound Depth (cm) 0.1 cm 0.3 cm   Wound Volume (cm^3) 29.45 cm^3 0.3 cm^3   Wound Healing % -- 99   Margins Well-defined edges Well-defined edges;Epibole (Rolled edges)   Non-staged Wound Description Full thickness Full thickness   Alfreda-wound Assessment Moist;Edema Clean   Wound Granulation Tissue Firm;Pink Pink;Firm;Pale Grey   Wound Bed Granulation (%) 20 % 100 %   Wound Bed Epithelium (%) 50 % --   Wound Bed Slough (%) 30 % --   Wound Odor None None       No associated orders.          ASSESSMENT AND PLAN:    1. Non-pressure chronic ulcer of skin of other sites with fat layer exposed (HCC)  - Aerobic Bacterial Culture    2. Adverse effect of  radiation, subsequent encounter  - Aerobic Bacterial Culture    3. SLE (systemic lupus erythematosus related syndrome) (HCC)        Risks, benefits, and alternatives of current treatment plan discussed in detail.  Questions and concerns addressed. Red flags to RTC or ED reviewed.  Patient (or parent) agrees to plan.      NOTE TO PATIENT: The 21st Century Cures Act makes clinical notes like these available to patients in the interest of transparency. Clinical notes are medical documents used by physicians and care providers to communicate with each other. These documents include medical language and terminology, abbreviations, and treatment information that may sound technical and at times possibly unfamiliar. In addition, at times, the verbiage may appear blunt or direct. These documents are one tool providers use to communicate relevant information and clinical opinions of the care providers in a way that allows common understanding of the clinical context.   I [id number] with the patient. This time included:    preparing to see the patient (eg, review notes and recent diagnostics),  seeing the patient, obtaining and/or reviewing separately obtained history, performing a medically appropriate examination and/or evaluation, counseling and educating the patient, documenting in the record, cx, rx  DISCHARGE:      Patient Instructions   Please return: 1.5 weeks      Laboratory (blood draw) orders:  Orders Placed This Encounter   Procedures    Aerobic Bacterial Culture       Meds & Refills for this Visit:  Requested Prescriptions     Signed Prescriptions Disp Refills    gentamicin 0.1 % External Ointment 30 g 0     Sig: Apply 1 Application topically in the morning and 1 Application before bedtime. Do all this for 14 days.         Patient discharge and wound care instructions  Alina Aceves  4/1/2025        You may shower and cleanse area with mild soap and water.  rinse wound with ANASEPT cleanser,   dab dry with  gauze   4.  Apply small amount of gentamicin to adaptic, place on wound and cover with sensitive skin bandage        Nutrition and blood sugar control:  Focus on the following:  Protein: Meats, beans, eggs, milk and yogurt particularly Greek yogurt), tofu, soy nuts, soy protein products (Follow the protein handout in your welcome folder)  Vitamin C: Citrus fruits and juices, strawberries, tomatoes, tomato juice, peppers, baked potatoes, spinach, broccoli, cauliflower, [place] sprouts, cabbage  Vitamin A: Dark green, leafy vegetables, orange or yellow vegetables, cantaloupe, fortified dairy products, liver, fortified cereals  Zinc: Fortified cereals, red meats, seafood  Consider supplementing with Vitor by [employer]. It can be purchased on amazon, [employer] website, or local pharmacy may be able to order it for you.  (These are essential branch chain amino acids that help with tissue building and wound healing).   When your blood sugar is consistently elevated greater than 180 your body can't heal or fight infection.     Concerns:  Signs of infection may include the following:  Increase in redness  Red \"streaks\" from wound  Increase in swelling  Fever  Unusual odor  Change in the amount of wound drainage     Should you experience any significant changes in your wound(s) or have any questions regarding your home care instructions please contact the [facility] @ [phone] If after regular business hours, please call your family doctor or local emergency room. The treatment plan has been discussed at length between you and your provider. Follow all instructions carefully, it is very important. If you do not follow all instructions you are at risk of your wound not healing, infection, possible loss of limb and even loss of life.    Anita Smallwood FNP-C, CWCN-AP, CFCN, CSWS, WCC, DWC  4/1/2025

## 2025-03-31 NOTE — PROGRESS NOTES
Patient: Alina Aceves (44 year old, female) Referring Provider:  Insurance:   Diagnosis: Malignant neoplasm of upper-outer quadrant of left female breast (HCC) (C50.412)  Anemia (D64.9)  Open wound of chest wall, left, subsequent encounter (S21.102D)  Foot drop (M21.379)  Neuropathy (G62.9) No ref. provider found  MEDICAID   Date of Surgery: No data recorded Next MD visit:  N/A   Precautions:  Cancer No data recorded Referral Information:    Date of Evaluation: Req: 0, Auth: 0, Exp:     02/28/25 POC Auth Visits:  12       Today's Date   3/31/2025    Subjective  Pt reports she was a little sore but has been doing well since last session.       Pain: pain not reported     Objective  see tx flow sheet.            Assessment  Pt responded well to treatment. Pt required AAROM for R DF and Eversion to complete exercise. Pt was given an updated HEP. Supine bridges was one of the exercises given and the patient was able to correctly demonstrate this exercise and Gastroc stretches. Pt was intiially given gastroc stretch with feet on the wall but the pt struggled to place R foot on the wall. Pt will attempt with stairs at home. If unable to do so PT gave her a standing foot flat gastroc stretch. Pt completed gait training with a NBQC using a 2 point modified gait pattern with ease. She struggled to get the synchronization right initially but was able to complete safetly. Pt was unable to determine which hand for the cane was better but was able to complete both hand placements safetly. PT informed the pt she could try this at home. SPT also educated the pt on the importance of not using the cane to lower self into sitting to prevent fall risk.    Goals (to be met in 12 visits)       Not Met Progress Toward Partially Met Met   Pt will demonstrate improved tandem stance to 5 sec or more to be able to safely negotiate narrow spaces such as the bathroom.  []  []  []  []    Pt will perform TUG in <18 seconds with least  restrictive AD, demonstrating improved gait speed for community ambulation. []  []  []  []    Pt will be able to perform STS with no UE support to be able to easily rise from chair without loss of balance. []  []  []  []    Pt will be able to squat to  light objects around the house without loss of stability. []  []  []  []    Pt will be able to ambulate 200 ft with least restrictive AD to be able to access multiple rooms in house safely  []  []  []  []    Pt will be independent and compliant with comprehensive HEP to maintain progress achieved in PT. []  []  []  []                            Plan  Continue with skilled physical therapy to complete plan of care.    Treatment Last 4 Visits  Treatment Day: 8       3/19/2025 3/20/2025 3/24/2025 3/31/2025   Neuro Treatment   Therapeutic Exercise NuStep, level 4, 5 min   3 way ankle, yellow band, x10 each bilat (no DF)   Side stepping w/ yellow band at knees, 2 laps   Multiple rest breaks provided to prevent excessive fatigue     NuStep level 4, 5 minutes  4 way ankle B with YTB except R DF, R DF used AAROM, handheld resistance and tapping assistance    NuStep Level 4 5 mins  4 way ankle B except R DF and Eversion  R AAROM DF and Eversion  Calf Stretch 3x30 seconds   Nustep Level 5 5 minutes  4 way ankle B except R DF and Eversion 1x12  R AAROM DF and Eversion 1x12  Calf Stretch 3x30 seconds   Supine Bridges 2x10     Neuro Re-Education Senior Balance Scale results in objective. Education given with results and fall risk.     2 laps of 4 hurdles using BUE support.   B SL Balance 4 sets, 2 sets with open palm support, 2 steps with B pointer finger support  NMES active assisted DF on R UE multiple bouts Pilates ring press sitting on dynadisc 2x10 5 second holds  B Forward toe taps over shanda 2x30 seconds  B Lateral toe taps over shanda 2x30 seconds  Alt Foot Taps on cone 2x30 seconds    Gait Training    Ambulate with a [facility] cane 4x30ft, Standby assistance, instructed  on a modified 2 point gait pattern. Pt was instructed on how to properly raise and lower themself from sitting.    Therapeutic Exercise Minutes 25 20 20 26   Neuro Re-Educ Minutes 20 25 25    Gait Training Minutes    12   Total Time Of Timed Procedures 45 45 45 38   Total Time Of Service-Based Procedures 0 0 0 0   Total Treatment Time 45 45 45 38   HEP    Access Code: [email or web address] URL: [email or web address] Date: 03/31/2025 Prepared by: Trupti Hedin Exercises - Supine Ankle Pumps  - 1 x daily - 7 x weekly - 2 sets - 10 reps - Supine Ankle Inversion Eversion AROM  - 1 x daily - 7 x weekly - 2 sets - 10 reps - Sit to Stand with Counter Support  - 3 x daily - 7 x weekly - 1 sets - 3 reps - Gastroc Stretch with Foot at Wall  - 1 x daily - 7 x weekly - 3 sets - 20 hold - Single Leg Stance with Support  - 1 x daily - 7 x weekly - 3 sets - 15 hold - Supine Bridge  - 1 x daily - 7 x weekly - 3 sets - 8 reps           HEP  Access Code: [email or web address] URL: [email or web address] Date: 03/31/2025 Prepared by: Trupti Hedin Exercises - Supine Ankle Pumps  - 1 x daily - 7 x weekly - 2 sets - 10 reps - Supine Ankle Inversion Eversion AROM  - 1 x daily - 7 x weekly - 2 sets - 10 reps - Sit to Stand with Counter Support  - 3 x daily - 7 x weekly - 1 sets - 3 reps - Gastroc Stretch with Foot at Wall  - 1 x daily - 7 x weekly - 3 sets - 20 hold - Single Leg Stance with Support  - 1 x daily - 7 x weekly - 3 sets - 15 hold - Supine Bridge  - 1 x daily - 7 x weekly - 3 sets - 8 reps       Charges  1 GT, 2 TE           This treatment was provided by CHRISTOS Martinez under the direct and constant direction and supervision of a licensed therapist, who provided consultation regarding skilled judgements, treatment, and assessment of patient care.

## 2025-04-01 ENCOUNTER — OFFICE VISIT (OUTPATIENT)
Dept: WOUND CARE | Facility: HOSPITAL | Age: 45
End: 2025-04-01
Attending: NURSE PRACTITIONER
Payer: MEDICAID

## 2025-04-01 VITALS
SYSTOLIC BLOOD PRESSURE: 113 MMHG | DIASTOLIC BLOOD PRESSURE: 76 MMHG | HEART RATE: 101 BPM | TEMPERATURE: 98 F | RESPIRATION RATE: 16 BRPM

## 2025-04-01 DIAGNOSIS — M32.9 SLE (SYSTEMIC LUPUS ERYTHEMATOSUS RELATED SYNDROME) (HCC): ICD-10-CM

## 2025-04-01 DIAGNOSIS — L98.492 NON-PRESSURE CHRONIC ULCER OF SKIN OF OTHER SITES WITH FAT LAYER EXPOSED (HCC): Primary | ICD-10-CM

## 2025-04-01 DIAGNOSIS — T66.XXXD ADVERSE EFFECT OF RADIATION, SUBSEQUENT ENCOUNTER: ICD-10-CM

## 2025-04-01 PROCEDURE — 99214 OFFICE O/P EST MOD 30 MIN: CPT | Performed by: NURSE PRACTITIONER

## 2025-04-01 RX ORDER — GENTAMICIN SULFATE 1 MG/G
1 OINTMENT TOPICAL 2 TIMES DAILY
Qty: 30 G | Refills: 0 | Status: SHIPPED | OUTPATIENT
Start: 2025-04-01 | End: 2025-04-15

## 2025-04-01 NOTE — PROGRESS NOTES
.Weekly Wound Education Note    Teaching Provided To: Patient  Training Topics: Dressing, Cleasing and general instructions, Discharge instructions  Training Method: Explain/Verbal, Written  Training Response: Patient responds and understands        Notes: Wound not improving. Culture collected this visit. Dressing changed to gentamicin, adaptic and bordered gauze. Bacitracin used in clinic.

## 2025-04-01 NOTE — PATIENT INSTRUCTIONS
Please return: 1.5 weeks      Laboratory (blood draw) orders:  Orders Placed This Encounter   Procedures    Aerobic Bacterial Culture       Meds & Refills for this Visit:  Requested Prescriptions     Signed Prescriptions Disp Refills    gentamicin 0.1 % External Ointment 30 g 0     Sig: Apply 1 Application topically in the morning and 1 Application before bedtime. Do all this for 14 days.         Patient discharge and wound care instructions  Alina Aceves  4/1/2025        You may shower and cleanse area with mild soap and water.  rinse wound with ANASEPT cleanser,   dab dry with gauze   4.  Apply small amount of gentamicin to adaptic, place on wound and cover with sensitive skin bandage        Nutrition and blood sugar control:  Focus on the following:  Protein: Meats, beans, eggs, milk and yogurt particularly Greek yogurt), tofu, soy nuts, soy protein products (Follow the protein handout in your welcome folder)  Vitamin C: Citrus fruits and juices, strawberries, tomatoes, tomato juice, peppers, baked potatoes, spinach, broccoli, cauliflower, [place] sprouts, cabbage  Vitamin A: Dark green, leafy vegetables, orange or yellow vegetables, cantaloupe, fortified dairy products, liver, fortified cereals  Zinc: Fortified cereals, red meats, seafood  Consider supplementing with Vitor by [employer]. It can be purchased on amazon, [employer] website, or local pharmacy may be able to order it for you.  (These are essential branch chain amino acids that help with tissue building and wound healing).   When your blood sugar is consistently elevated greater than 180 your body can't heal or fight infection.     Concerns:  Signs of infection may include the following:  Increase in redness  Red \"streaks\" from wound  Increase in swelling  Fever  Unusual odor  Change in the amount of wound drainage     Should you experience any significant changes in your wound(s) or have any questions regarding your home care instructions please  contact the wound center [facility] @ [phone] If after regular business hours, please call your family doctor or local emergency room. The treatment plan has been discussed at length between you and your provider. Follow all instructions carefully, it is very important. If you do not follow all instructions you are at risk of your wound not healing, infection, possible loss of limb and even loss of life.

## 2025-04-02 ENCOUNTER — OFFICE VISIT (OUTPATIENT)
Dept: PHYSICAL THERAPY | Facility: HOSPITAL | Age: 45
End: 2025-04-02
Attending: INTERNAL MEDICINE
Payer: MEDICAID

## 2025-04-02 PROCEDURE — 97112 NEUROMUSCULAR REEDUCATION: CPT

## 2025-04-02 PROCEDURE — 97110 THERAPEUTIC EXERCISES: CPT

## 2025-04-02 NOTE — PROGRESS NOTES
Patient: Alina Aceves (44 year old, female) Referring Provider:  Insurance:   Diagnosis: Malignant neoplasm of upper-outer quadrant of left female breast (HCC) (C50.412)  Anemia (D64.9)  Open wound of chest wall, left, subsequent encounter (S21.102D)  Foot drop (M21.379)  Neuropathy (G62.9) No ref. provider found  MEDICAID   Date of Surgery: No data recorded Next MD visit:  N/A   Precautions:  Cancer No data recorded Referral Information:    Date of Evaluation: Req: 0, Auth: 0, Exp:     02/28/25 POC Auth Visits:  12       Today's Date   4/2/2025    Subjective  Pt reports she felt really great after last session.       Pain: pain not reported     Objective  see tx flow sheet      Stepping over weight to simulate moving foot to drive: pt achieves good hip flexion bilaterally to clear foot over weight, however continued drop foot noted on RLE        Assessment  Pt responded well to treatment. Utilized NMES on atrophy setting, first completed assisted dorsiflexion with pt pulling up on yellow band, cues to try and lower foot slowly to work on eccentric control. Pt completed inversion with a YTB and NMES over Ant Tib. Eversion had a similar set up but the patient was able to get more of a contraction than without NMES, movement against the YTB aided in strengthening the eversion musculature. Last bout of NMES assisted R DF, pt was asked to close eyes and imagine foot going up and to DF B. There was a noticeable contraction in Ant Tib on the R but not sufficient enoguh to raise foot without SPT aid. Bridges were added to get more WB through heels and get slight CKC dorsiflexion B. Hip flexion over weight was added by PT to simulate driving mechanics while also having strengthening.    Goals (to be met in 12 visits)       Not Met Progress Toward Partially Met Met   Pt will demonstrate improved tandem stance to 5 sec or more to be able to safely negotiate narrow spaces such as the bathroom.  []  []  []  []    Pt will  perform TUG in <18 seconds with least restrictive AD, demonstrating improved gait speed for community ambulation. []  []  []  []    Pt will be able to perform STS with no UE support to be able to easily rise from chair without loss of balance. []  []  []  []    Pt will be able to squat to  light objects around the house without loss of stability. []  []  []  []    Pt will be able to ambulate 200 ft with least restrictive AD to be able to access multiple rooms in house safely  []  []  []  []    Pt will be independent and compliant with comprehensive HEP to maintain progress achieved in PT. []  []  []  []                                Plan  Continue with skilled physical therapy to complete plan of care.    Treatment Last 4 Visits  Treatment Day: 9       3/20/2025 3/24/2025 3/31/2025 4/2/2025   Neuro Treatment   Therapeutic Exercise NuStep level 4, 5 minutes  4 way ankle B with YTB except R DF, R DF used AAROM, handheld resistance and tapping assistance    NuStep Level 4 5 mins  4 way ankle B except R DF and Eversion  R AAROM DF and Eversion  Calf Stretch 3x30 seconds   Nustep Level 5 5 minutes  4 way ankle B except R DF and Eversion 1x12  R AAROM DF and Eversion 1x12  Calf Stretch 3x30 seconds   Supine Bridges 2x10   Nustep, level 5, 5 min  Supine Bridges 2x12  Stepping over weight in sitting; simulating lifting leg to switch pedels while driving 2x12   Neuro Re-Education 2 laps of 4 hurdles using BUE support.   B SL Balance 4 sets, 2 sets with open palm support, 2 steps with B pointer finger support  NMES active assisted DF on R UE multiple bouts Pilates ring press sitting on dynadisc 2x10 5 second holds  B Forward toe taps over shanda 2x30 seconds  B Lateral toe taps over shanda 2x30 seconds  Alt Foot Taps on cone 2x30 seconds  R NMES assisted inversion and eversion YTB 3x15  R NMES assisted DF numerous bouts   Gait Training   Ambulate with a [facility] cane 4x30ft, Standby assistance, instructed on a modified 2  point gait pattern. Pt was instructed on how to properly raise and lower themself from sitting.     Therapeutic Exercise Minutes 20 20 26 12   Neuro Re-Educ Minutes 25 25  30   Gait Training Minutes   12    Total Time Of Timed Procedures 45 45 38 42   Total Time Of Service-Based Procedures 0 0 0 0   Total Treatment Time 45 45 38 42   HEP   Access Code: [email or web address] URL: [email or web address] Date: 03/31/2025 Prepared by: Trupti Hedin Exercises - Supine Ankle Pumps  - 1 x daily - 7 x weekly - 2 sets - 10 reps - Supine Ankle Inversion Eversion AROM  - 1 x daily - 7 x weekly - 2 sets - 10 reps - Sit to Stand with Counter Support  - 3 x daily - 7 x weekly - 1 sets - 3 reps - Gastroc Stretch with Foot at Wall  - 1 x daily - 7 x weekly - 3 sets - 20 hold - Single Leg Stance with Support  - 1 x daily - 7 x weekly - 3 sets - 15 hold - Supine Bridge  - 1 x daily - 7 x weekly - 3 sets - 8 reps            HEP  Access Code: [email or web address] URL: [email or web address] Date: 03/31/2025 Prepared by: Trupti Hedin Exercises - Supine Ankle Pumps  - 1 x daily - 7 x weekly - 2 sets - 10 reps - Supine Ankle Inversion Eversion AROM  - 1 x daily - 7 x weekly - 2 sets - 10 reps - Sit to Stand with Counter Support  - 3 x daily - 7 x weekly - 1 sets - 3 reps - Gastroc Stretch with Foot at Wall  - 1 x daily - 7 x weekly - 3 sets - 20 hold - Single Leg Stance with Support  - 1 x daily - 7 x weekly - 3 sets - 15 hold - Supine Bridge  - 1 x daily - 7 x weekly - 3 sets - 8 reps       Charges  2 NR 1 TE         This treatment was provided by CHRISTOS Martinez under the direct and constant direction and supervision of a licensed therapist, who provided consultation regarding skilled judgements, treatment, and assessment of patient care.

## 2025-04-03 DIAGNOSIS — Z22.322 METHICILLIN RESISTANT STAPH AUREUS CULTURE POSITIVE: Primary | ICD-10-CM

## 2025-04-03 RX ORDER — DOXYCYCLINE 100 MG/1
100 CAPSULE ORAL 2 TIMES DAILY
Qty: 20 CAPSULE | Refills: 0 | Status: SHIPPED | OUTPATIENT
Start: 2025-04-03 | End: 2025-04-13

## 2025-04-03 NOTE — PROGRESS NOTES
Called and notified patient of recent culture results. Informed patient that provider states \"Please tell patient that she did grow MRSA.  She should shower 1-2x a week with hibiclens wash, continue the topical gentamicin and I am going to start her on po doxycycline.  This is most likely why the wound was getting bigger.  Go over with her MRSA precautions with family (ie no sharing towels, bedding etc.).  tell her not to take the prenatal vitamin with the doxy.\" Patient states understanding, RN reviewed MRSA precautions with patient.       Patient informed RN that her port removal is scheduled on the 17th and she is scheduled for a follow up with wound care provider on 4/10/25, pt was wondering if she needs to reschedule her port removal appointment- RN informed patient that at her next provider visit provider and patient can come up with a plan for moving forward with treatments.

## 2025-04-07 ENCOUNTER — OFFICE VISIT (OUTPATIENT)
Dept: PHYSICAL THERAPY | Facility: HOSPITAL | Age: 45
End: 2025-04-07
Attending: INTERNAL MEDICINE
Payer: MEDICAID

## 2025-04-07 PROCEDURE — 97110 THERAPEUTIC EXERCISES: CPT

## 2025-04-07 PROCEDURE — 97112 NEUROMUSCULAR REEDUCATION: CPT

## 2025-04-07 NOTE — PROGRESS NOTES
Patient: Alina Aceves (44 year old, female) Referring Provider:  Insurance:   Diagnosis: Malignant neoplasm of upper-outer quadrant of left female breast (HCC) (C50.412)  Anemia (D64.9)  Open wound of chest wall, left, subsequent encounter (S21.102D)  Foot drop (M21.379)  Neuropathy (G62.9) No ref. provider found  MEDICAID   Date of Surgery: No data recorded Next MD visit:  N/A   Precautions:  Cancer No data recorded Referral Information:    Date of Evaluation: Req: 0, Auth: 0, Exp:     02/28/25 POC Auth Visits:  12       Today's Date   4/7/2025    Subjective  Pt reports shes doing well.       Pain: pain not reported     Objective  see tx flow sheet.       LEFS: 47.5     Assessment  Pt responded well to treatment. Verbal cueing was given during bridges to encourage pt to push through heels and engage glutes and abdominals. SPT gave tactile cueing during SLR to ensure proper activation of Quadriceps. AROM of DF saw more activation of anterior tibialis compared to prior sessions but still not significant enough to complete DF movement. During DLHR in parallel bars patient was leaning forward during movement to lift heels as compensation. Movement was then moved to shuttle with 35lb resistance and pt was able to complete movement with proper activation of gastrocs. Demonstration and verbal cueing was given to have pt keep knees extended. LEFS was also given to the patient to complete.    Goals (to be met in 12 visits)       Not Met Progress Toward Partially Met Met   Pt will demonstrate improved tandem stance to 5 sec or more to be able to safely negotiate narrow spaces such as the bathroom.  []  []  []  []    Pt will perform TUG in <18 seconds with least restrictive AD, demonstrating improved gait speed for community ambulation. []  []  []  []    Pt will be able to perform STS with no UE support to be able to easily rise from chair without loss of balance. []  []  []  []    Pt will be able to squat to  light  objects around the house without loss of stability. []  []  []  []    Pt will be able to ambulate 200 ft with least restrictive AD to be able to access multiple rooms in house safely  []  []  []  []    Pt will be independent and compliant with comprehensive HEP to maintain progress achieved in PT. []  []  []  []                                Plan  Continue with skilled physical therapy to complete plan of care.    Treatment Last 4 Visits  Treatment Day: 10       3/24/2025 3/31/2025 4/2/2025 4/7/2025   Neuro Treatment   Therapeutic Exercise NuStep Level 4 5 mins  4 way ankle B except R DF and Eversion  R AAROM DF and Eversion  Calf Stretch 3x30 seconds   Nustep Level 5 5 minutes  4 way ankle B except R DF and Eversion 1x12  R AAROM DF and Eversion 1x12  Calf Stretch 3x30 seconds   Supine Bridges 2x10   Supine Bridges 2x12  B Hip flexion over weight 2x12   Nustep, level 5, 5 min  Supine Bridges 2x12  B 4 Way Ankle 1x15  R DF and Eversion AAROM/AROM 1x10  SLR 2x10  DLHR in parallel bars and shuttle 3x12 (37#)     Neuro Re-Education Pilates ring press sitting on dynadisc 2x10 5 second holds  B Forward toe taps over shanda 2x30 seconds  B Lateral toe taps over shanda 2x30 seconds  Alt Foot Taps on cone 2x30 seconds  R NMES assisted inversion and eversion YTB 3x15  R NMES assisted DF numerous bouts B Toe tap over shanda AP and ML focus on DF and PF 2x15  B AP Toe Taps on Cone 2x15     Gait Training  Ambulate with a [facility] cane 4x30ft, Standby assistance, instructed on a modified 2 point gait pattern. Pt was instructed on how to properly raise and lower themself from sitting.      Therapeutic Exercise Minutes 20 26 12 32   Neuro Re-Educ Minutes 25  30 13   Gait Training Minutes  12     Total Time Of Timed Procedures 45 38 42 45   Total Time Of Service-Based Procedures 0 0 0 0   Total Treatment Time 45 38 42 45   HEP  Access Code: [email or web address] URL: [email or web address] Date: 03/31/2025 Prepared by: Trupti  Hedin Exercises - Supine Ankle Pumps  - 1 x daily - 7 x weekly - 2 sets - 10 reps - Supine Ankle Inversion Eversion AROM  - 1 x daily - 7 x weekly - 2 sets - 10 reps - Sit to Stand with Counter Support  - 3 x daily - 7 x weekly - 1 sets - 3 reps - Gastroc Stretch with Foot at Wall  - 1 x daily - 7 x weekly - 3 sets - 20 hold - Single Leg Stance with Support  - 1 x daily - 7 x weekly - 3 sets - 15 hold - Supine Bridge  - 1 x daily - 7 x weekly - 3 sets - 8 reps             HEP  Access Code: [email or web address] URL: [email or web address] Date: 03/31/2025 Prepared by: Trupti Hedin Exercises - Supine Ankle Pumps  - 1 x daily - 7 x weekly - 2 sets - 10 reps - Supine Ankle Inversion Eversion AROM  - 1 x daily - 7 x weekly - 2 sets - 10 reps - Sit to Stand with Counter Support  - 3 x daily - 7 x weekly - 1 sets - 3 reps - Gastroc Stretch with Foot at Wall  - 1 x daily - 7 x weekly - 3 sets - 20 hold - Single Leg Stance with Support  - 1 x daily - 7 x weekly - 3 sets - 15 hold - Supine Bridge  - 1 x daily - 7 x weekly - 3 sets - 8 reps       Charges  1 NR, 2 TE         This treatment was provided by CHRISTOS Martinez under the direct and constant direction and supervision of a licensed therapist, who provided consultation regarding skilled judgements, treatment, and assessment of patient care.

## 2025-04-09 ENCOUNTER — OFFICE VISIT (OUTPATIENT)
Dept: PHYSICAL THERAPY | Facility: HOSPITAL | Age: 45
End: 2025-04-09
Attending: INTERNAL MEDICINE
Payer: MEDICAID

## 2025-04-09 PROCEDURE — 97112 NEUROMUSCULAR REEDUCATION: CPT

## 2025-04-09 PROCEDURE — 97110 THERAPEUTIC EXERCISES: CPT

## 2025-04-09 PROCEDURE — 97116 GAIT TRAINING THERAPY: CPT

## 2025-04-09 NOTE — PROGRESS NOTES
CHIEF COMPLAINT:     Chief Complaint   Patient presents with    Wound Care     Patients is here for a follow up. Patients stated no issues at this moment      HPI:   Information obtained from Patient and chart  5-24-24 INITIAL:  43 year old female with Past medical history invasive ductal carcinoma of left breast (dr kaiser camara) currently undergoing radiation therapy (dr kobi bran), leukocytoclastic vasculitis (treated with clobetasol ointment by dr. Walton) iron deficiency anemia, HFrEF (Ariane Putnam), lupus, Sjogren's syndrome.  Patient was recently admitted for oral sores and n/v and elctrolyte imbalance and hypokalemia.    Earlier this week she was seen in urgent care for uti symptoms and hematuria. She was treated with cefadroxil and recommended to see primary md. dr bran recommended patient to start yary, she states she is not regular with it. Patient has seen nutrition/dietician.  Patient is active with [facility].  Earlier this week she had to cancel physical therapy due to wound pain.  on 5-11 patient was started on potassium citrate x 10 days and patient was to repeat labs, which she did on 5-21 but her potassium was still 2.9. she states she is taking the potassium.  I will udpate ariane néstor and I asked the patient to contact her as they may want her to increase her potassium.  Patient states that initially she was utilizing aquaphor but then developed an itchy red rash, so now has just been leaving it dry or using aveeno. She is able to tolerate very minimal cleansing of the area.  I discussed with her how to do it as tolerated in the shower with anasept.  Will utilize hydrogel cool sheets, patient to return next week. There is not any s/s of infection    HPI PRIOR TO APRIL 2025 SEE INDIVIDUAL PROGRESS NOTES  3-11-25 patient returns.  She did see dr graf last week and she has orders for continued f/u monitoring with mammo/mri. She has been utilizing triad paste to the wound, the  original wound resolved however there is some tape stripping inferior to the wound.  We discussed utilizing honey hydrogel sheet to his area and monitoring the original wound for any drainage.  We discussed that the original wound scar is not going to \"fill in\" to surrounding tissue level. Overtime it may become less noticeable, but most likely she will always have a small depression there.  No s/s of infection or c/o pain.    4-1-25 patient returns.  She continues with physical therapy for her foot drop.  She also saw dr. Diaz with [facility] and she is to f/u with her in 4 month and get bilateral mammogram in may.  Last visit the main/initial wound was closed but she had some epidermal stripping related to bandages infereior to that.  She has been utilizing silver alginate, but she states each time she tries to clean it it from drainage (which had increased) the inferior skin edge seems to be lifting more.  No new c/o pain.  Wound is larger. We cultured and will start on topical abx ointment and treat po based on results and sensitivities. Patient is concerned due to this set back. She was encouraged will get it rehealed.     4-10-25 patient returns. Patient followed up with [facility]ology at [facility] yesterday and she will be restarting on hydroxychloroquinilone Culture from last visit was + for MRSA, she was to continue the gent topical and I started her on po doxy.  I also asked her to utilize hibiclens 1-2x a week to assist with overal body declonization. She is scheduled to get her port removed next week the wound is improved. Due to port being removed, will extend the doxy. Will stop the gentamicin and transition to triad paste.    MEDICATIONS:     Current Outpatient Medications:     doxycycline 100 MG Oral Cap, Take 1 capsule (100 mg total) by mouth 2 (two) times daily for 7 days., Disp: 14 capsule, Rfl: 0    gentamicin 0.1 % External Ointment, Apply 1 Application topically in the morning and 1 Application  before bedtime. Do all this for 14 days., Disp: 30 g, Rfl: 0    Prenatal Vit-Fe Fumarate-FA (MULTI PRENATAL) 27-0.8 MG Oral Tab, Take by mouth., Disp: , Rfl:     metoprolol succinate ER 50 MG Oral Tablet 24 Hr, Take 1 tablet (50 mg total) by mouth daily., Disp: , Rfl:     Potassium Chloride ER 10 MEQ Oral Tab CR, Take 1 tablet (10 mEq total) by mouth daily., Disp: 30 tablet, Rfl: 0    DULoxetine 30 MG Oral Cap DR Particles, Take 1 capsule (30 mg total) by mouth daily. Takes one tablet, Disp: , Rfl:     zolpidem 10 MG Oral Tab, Take 1 tablet (10 mg total) by mouth nightly as needed for Sleep., Disp: , Rfl:   ALLERGIES:     Allergies   Allergen Reactions    Bactrim [Sulfamethoxazole W/Trimethoprim] RASH    Adhesive Tape RASH      REVIEW OF SYSTEMS:   This information was obtained from the patient/family and chart.    See HPI for pertinent positives, otherwise 10 pt ROS negative.  HISTORY:   Past medical, surgical, family and social history updated where appropriate.      PHYSICAL EXAM:     Vitals:    04/10/25 1100   BP: 115/73   Pulse: 73   Resp: 16   Temp: 98.4 °F (36.9 °C)       Estimated body mass index is 20.25 kg/m² as calculated from the following:    Height as of 3/7/25: 64\".    Weight as of 3/7/25: 118 lb (53.5 kg).   POC Glucose   Date Value Ref Range Status   12/19/2023 72 70 - 99 mg/dL Final   12/18/2023 125 (H) 70 - 99 mg/dL Final   12/18/2023 84 70 - 99 mg/dL Final       Vital signs reviewed.Appears stated age, well groomed.    Constitutional:  Bp wnl. Pulse Regular and wnl for patient. Respirations easy and unlabored. Temperature wnl. Weight wnl for height. Appearance neat and clean. Appears in no acute distress.     Musculoskeletal:  Patient ambulation is stable with walker  Integumentary:  refer to wound characteristics and images   Psychiatric:  Judgment and insight intact. Alert and oriented times 3. No evidence of depression, anxiety, or agitation. Calm, cooperative, and communicative. Appropriate  interactions and affect.  DIAGNOSTICS:     Lab Results   Component Value Date    BUN 22 03/07/2025    CREATSERUM 0.94 03/07/2025    ALB 4.4 03/07/2025    TP 8.9 (H) 02/12/2025    TP 8.4 (H) 02/12/2025       WOUND ASSESSMENT:     Wound 05/24/24 #1 Radiation therapy Breast Left (Active)   Date First Assessed/Time First Assessed: 05/24/24 0857    Wound Number (Wound Clinic Only): #1 Radiation therapy  Primary Wound Type: Other (comment)  Location: Breast  Wound Location Orientation: Left      Assessments 5/24/2024  8:59 AM 4/10/2025  1:35 PM   Wound Image        Drainage Amount Large None   Drainage Description Serous;Yellow --   Wound Length (cm) 15.5 cm 0.9 cm   Wound Width (cm) 19 cm 1 cm   Wound Surface Area (cm^2) 294.5 cm^2 0.71 cm^2   Wound Depth (cm) 0.1 cm 0.2 cm   Wound Volume (cm^3) 29.45 cm^3 0.094 cm^3   Wound Healing % -- 100   Margins Well-defined edges Well-defined edges;Epibole (Rolled edges)   Non-staged Wound Description Full thickness Full thickness   Alfreda-wound Assessment Moist;Edema Clean   Wound Granulation Tissue Firm;Pink Red;Firm   Wound Bed Granulation (%) 20 % 100 %   Wound Bed Epithelium (%) 50 % --   Wound Bed Slough (%) 30 % --   Wound Odor None None       No associated orders.          ASSESSMENT AND PLAN:    1. Methicillin resistant Staph aureus culture positive    2. 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 Adverse effect of radiation, subsequent encounter    4. SLE (systemic lupus erythematosus related syndrome) (HCC)          Risks, benefits, and alternatives of current treatment plan discussed in detail.  Questions and concerns addressed. Red flags to RTC or ED reviewed.  Patient (or parent) agrees to plan.      NOTE TO PATIENT: The 21st Century Cures Act makes clinical notes like these available to patients in the interest of transparency. Clinical notes are medical documents used by physicians and care providers to communicate with each  other. These documents include medical language and terminology, abbreviations, and treatment information that may sound technical and at times possibly unfamiliar. In addition, at times, the verbiage may appear blunt or direct. These documents are one tool providers use to communicate relevant information and clinical opinions of the care providers in a way that allows common understanding of the clinical context.   I [id number] with the patient. This time included:    preparing to see the patient (eg, review notes and recent diagnostics),  seeing the patient, obtaining and/or reviewing separately obtained history, performing a medically appropriate examination and/or evaluation, counseling and educating the patient, documenting in the record, rx  DISCHARGE:      Patient Instructions   Please return: 1.5 WEEKS       Patient discharge and wound care instructions  Alina Aceves  4/10/2025           You may shower and cleanse area with HIBICLENS and water.  rinse wound with ANASEPT cleanser,   dab dry with gauze   4.  Apply small amount of COLOPLAST TRIAD HYDROPHILLIC PASTE        Nutrition and blood sugar control:  Focus on the following:  Protein: Meats, beans, eggs, milk and yogurt particularly Greek yogurt), tofu, soy nuts, soy protein products (Follow the protein handout in your welcome folder)  Vitamin C: Citrus fruits and juices, strawberries, tomatoes, tomato juice, peppers, baked potatoes, spinach, broccoli, cauliflower, [place] sprouts, cabbage  Vitamin A: Dark green, leafy vegetables, orange or yellow vegetables, cantaloupe, fortified dairy products, liver, fortified cereals  Zinc: Fortified cereals, red meats, seafood  Consider supplementing with Vitor by saperatec. It can be purchased on amazon, [employer] website, or local pharmacy may be able to order it for you.  (These are essential branch chain amino acids that help with tissue building and wound healing).   When your blood sugar is consistently  elevated greater than 180 your body can't heal or fight infection.     Concerns:  Signs of infection may include the following:  Increase in redness  Red \"streaks\" from wound  Increase in swelling  Fever  Unusual odor  Change in the amount of wound drainage     Should you experience any significant changes in your wound(s) or have any questions regarding your home care instructions please contact the [facility] @ [phone] If after regular business hours, please call your family doctor or local emergency room. The treatment plan has been discussed at length between you and your provider. Follow all instructions carefully, it is very important. If you do not follow all instructions you are at risk of your wound not healing, infection, possible loss of limb and even loss of life.    Anita Smallwood FNP-C, CWCN-AP, CFCN, CSWS, WCC, DWC  4/10/2025

## 2025-04-09 NOTE — PROGRESS NOTES
Patient: Alina Aceves (44 year old, female) Referring Provider:  Insurance:   Diagnosis: Malignant neoplasm of upper-outer quadrant of left female breast (HCC) (C50.412)  Anemia (D64.9)  Open wound of chest wall, left, subsequent encounter (S21.102D)  Foot drop (M21.379)  Neuropathy (G62.9) No ref. provider found  MEDICAID   Date of Surgery: No data recorded Next MD visit:  N/A   Precautions:  Cancer No data recorded Referral Information:    Date of Evaluation: Req: 0, Auth: 0, Exp:     02/28/25 POC Auth Visits:  12       Today's Date   4/9/2025    Subjective  Pt states she is doing well and brought in new cane.       Pain: pain not reported     Objective  Pt was able to complete modified 2 point gait pattern with cane. See tx sheet.            Assessment  Pt responded well to treatment. NMES was given to activate the Anterior Tibialis and gain DF. The pt was instructed by SPT to close eyes and imagine foot going up. Tactile cueing of tapping was given to try and activate Ant Tib. Pt was able to gain some activiation but not sufficient activation to gain AROM DF. AAROM was also given in DF to encourage muscle activation. With gait training, pt tried cane in both L and R hand to decided which hand she preferred. When using modified 2 point gait pattern she required demonstration and verbal cueing to move cane with opposite leg.    Goals (to be met in 12 visits)         Not Met Progress Toward Partially Met Met   Pt will demonstrate improved tandem stance to 5 sec or more to be able to safely negotiate narrow spaces such as the bathroom.  []  []  []  []    Pt will perform TUG in <18 seconds with least restrictive AD, demonstrating improved gait speed for community ambulation. []  []  []  []    Pt will be able to perform STS with no UE support to be able to easily rise from chair without loss of balance. []  []  []  []    Pt will be able to squat to  light objects around the house without loss of stability.  []  []  []  []    Pt will be able to ambulate 200 ft with least restrictive AD to be able to access multiple rooms in house safely  []  []  []  []    Pt will be independent and compliant with comprehensive HEP to maintain progress achieved in PT. []  []  []  []                                Plan  Continue with skilled physical therapy to complete plan of care.    Treatment Last 4 Visits  Treatment Day: 11       3/31/2025 4/2/2025 4/7/2025 4/9/2025   Neuro Treatment   Therapeutic Exercise Nustep Level 5 5 minutes  4 way ankle B except R DF and Eversion 1x12  R AAROM DF and Eversion 1x12  Calf Stretch 3x30 seconds   Supine Bridges 2x10   Supine Bridges 2x12  B Hip flexion over weight 2x12 Supine Bridges 2x12  B 4 Way Ankle 1x15  R DF and Eversion AAROM/AROM 1x10  SLR 2x10  DLHR in parallel bars and shuttle 3x12   Nustep Level 5 5 minutes  Walking in gym with cane, multiple laps, x50 ft  SBA     Neuro Re-Education  R NMES assisted inversion and eversion YTB 3x15  R NMES assisted DF numerous bouts B Toe tap over shanda AP and ML focus on DF and PF 2x15  B AP Toe Taps on Cone 2x15   NMES Assisted AROM DF   NMES Assisted AAROM DF   Gait Training Ambulate with a NBQC cane 4x30ft, Standby assistance, instructed on a modified 2 point gait pattern. Pt was instructed on how to properly raise and lower themself from sitting.       Therapeutic Exercise Minutes 26 12 32 15   Neuro Re-Educ Minutes  30 13 24   Gait Training Minutes 12      Total Time Of Timed Procedures 38 42 45 39   Total Time Of Service-Based Procedures 0 0 0 0   Total Treatment Time 38 42 45 39   HEP Access Code: [email or web address] URL: [email or web address] Date: 03/31/2025 Prepared by: Trupti Salazar Exercises - Supine Ankle Pumps  - 1 x daily - 7 x weekly - 2 sets - 10 reps - Supine Ankle Inversion Eversion AROM  - 1 x daily - 7 x weekly - 2 sets - 10 reps - Sit to Stand with Counter Support  - 3 x daily - 7 x weekly - 1 sets - 3 reps - Gastroc  Stretch with Foot at Wall  - 1 x daily - 7 x weekly - 3 sets - 20 hold - Single Leg Stance with Support  - 1 x daily - 7 x weekly - 3 sets - 15 hold - Supine Bridge  - 1 x daily - 7 x weekly - 3 sets - 8 reps              HEP  Access Code: [email or web address] URL: [email or web address] Date: 03/31/2025 Prepared by: Trupti Salazar Exercises - Supine Ankle Pumps  - 1 x daily - 7 x weekly - 2 sets - 10 reps - Supine Ankle Inversion Eversion AROM  - 1 x daily - 7 x weekly - 2 sets - 10 reps - Sit to Stand with Counter Support  - 3 x daily - 7 x weekly - 1 sets - 3 reps - Gastroc Stretch with Foot at Wall  - 1 x daily - 7 x weekly - 3 sets - 20 hold - Single Leg Stance with Support  - 1 x daily - 7 x weekly - 3 sets - 15 hold - Supine Bridge  - 1 x daily - 7 x weekly - 3 sets - 8 reps       Charges  2 NR, 1 TE           This treatment was provided by CHRISTOS Martinez under the direct and constant direction and supervision of a licensed therapist, who provided consultation regarding skilled judgements, treatment, and assessment of patient care.

## 2025-04-10 ENCOUNTER — APPOINTMENT (OUTPATIENT)
Dept: WOUND CARE | Facility: HOSPITAL | Age: 45
End: 2025-04-10
Attending: NURSE PRACTITIONER
Payer: MEDICAID

## 2025-04-10 VITALS
RESPIRATION RATE: 16 BRPM | TEMPERATURE: 98 F | SYSTOLIC BLOOD PRESSURE: 115 MMHG | DIASTOLIC BLOOD PRESSURE: 73 MMHG | HEART RATE: 73 BPM

## 2025-04-10 DIAGNOSIS — L98.492 NON-PRESSURE CHRONIC ULCER OF SKIN OF OTHER SITES WITH FAT LAYER EXPOSED (HCC): ICD-10-CM

## 2025-04-10 DIAGNOSIS — Z22.322 METHICILLIN RESISTANT STAPH AUREUS CULTURE POSITIVE: Primary | ICD-10-CM

## 2025-04-10 DIAGNOSIS — T66.XXXD ADVERSE EFFECT OF RADIATION, SUBSEQUENT ENCOUNTER: ICD-10-CM

## 2025-04-10 DIAGNOSIS — M32.9 SLE (SYSTEMIC LUPUS ERYTHEMATOSUS RELATED SYNDROME) (HCC): ICD-10-CM

## 2025-04-10 PROCEDURE — 99214 OFFICE O/P EST MOD 30 MIN: CPT | Performed by: NURSE PRACTITIONER

## 2025-04-10 RX ORDER — DOXYCYCLINE 100 MG/1
100 CAPSULE ORAL 2 TIMES DAILY
Qty: 14 CAPSULE | Refills: 0 | Status: SHIPPED | OUTPATIENT
Start: 2025-04-10 | End: 2025-04-17

## 2025-04-10 NOTE — PROGRESS NOTES
.Weekly Wound Education Note    Teaching Provided To: Patient  Training Topics: Dressing, Cleasing and general instructions, Discharge instructions  Training Method: Explain/Verbal, Written  Training Response: Patient responds and understands        Notes: Wound improving. ABT extented for 7 more days. Dressing changed to triad paste and bordered gauze.

## 2025-04-10 NOTE — PATIENT INSTRUCTIONS
Please return: 1.5 WEEKS       Patient discharge and wound care instructions  Alina Aceves  4/10/2025           You may shower and cleanse area with HIBICLENS and water.  rinse wound with ANASEPT cleanser,   dab dry with gauze   4.  Apply small amount of COLOPLAST TRIAD HYDROPHILLIC PASTE        Nutrition and blood sugar control:  Focus on the following:  Protein: Meats, beans, eggs, milk and yogurt particularly Greek yogurt), tofu, soy nuts, soy protein products (Follow the protein handout in your welcome folder)  Vitamin C: Citrus fruits and juices, strawberries, tomatoes, tomato juice, peppers, baked potatoes, spinach, broccoli, cauliflower, [place] sprouts, cabbage  Vitamin A: Dark green, leafy vegetables, orange or yellow vegetables, cantaloupe, fortified dairy products, liver, fortified cereals  Zinc: Fortified cereals, red meats, seafood  Consider supplementing with Vitor by [employer]. It can be purchased on amazon, [employer] website, or local pharmacy may be able to order it for you.  (These are essential branch chain amino acids that help with tissue building and wound healing).   When your blood sugar is consistently elevated greater than 180 your body can't heal or fight infection.     Concerns:  Signs of infection may include the following:  Increase in redness  Red \"streaks\" from wound  Increase in swelling  Fever  Unusual odor  Change in the amount of wound drainage     Should you experience any significant changes in your wound(s) or have any questions regarding your home care instructions please contact the [facility] @ [phone] If after regular business hours, please call your family doctor or local emergency room. The treatment plan has been discussed at length between you and your provider. Follow all instructions carefully, it is very important. If you do not follow all instructions you are at risk of your wound not healing, infection, possible loss of limb and even loss  of life.

## 2025-04-11 ENCOUNTER — OFFICE VISIT (OUTPATIENT)
Dept: PHYSICAL THERAPY | Facility: HOSPITAL | Age: 45
End: 2025-04-11
Attending: INTERNAL MEDICINE
Payer: MEDICAID

## 2025-04-11 PROCEDURE — 97110 THERAPEUTIC EXERCISES: CPT

## 2025-04-11 PROCEDURE — 97112 NEUROMUSCULAR REEDUCATION: CPT

## 2025-04-11 NOTE — PROGRESS NOTES
Patient: Alina Aceves (44 year old, female) Referring Provider:  Insurance:   Diagnosis: Malignant neoplasm of upper-outer quadrant of left female breast (HCC) (C50.412)  Anemia (D64.9)  Open wound of chest wall, left, subsequent encounter (S21.102D)  Foot drop (M21.379)  Neuropathy (G62.9) No ref. provider found  MEDICAID   Date of Surgery: No data recorded Next MD visit:  N/A   Precautions:  Cancer No data recorded Referral Information:    Date of Evaluation: Req: 0, Auth: 0, Exp:     02/28/25 POC Auth Visits:  12       Today's Date   4/11/2025    Subjective  Pt states she is doing well.       Pain: pain not reported     ProgressSummary  Pt has attended 12 visits in Physical Therapy.       Objective  Pt was able to completed modified 2 point gait pattern with cane. See tx sheet.      Posture observation: Pt has been standing with upright standing posture and less slouching.   Endurance: Less rest breaks have been taken during sessions, and rest breaks have been taken in standing, as opposed to sitting.   ,          Ankle/Foot    ROM MMT (-/5)    R L R L     PF   4 5     DF (L4)   2+ 4-     Inversion   3+ 4     Eversion   3 4-     Grt Toe Ext (L5)          Ankle ROM: full PROM bilaterally, limited ankle AROM R>L            Balance and Functional Mobility:  Mobility / Transfers Level of Assistance   Bed Mobility    Supine --> Sit    Sit --> Supine    Sit --> Stand MOD I   Stand --> Sit MOD I   Chair --> Chair MOD I        Gait: pt ambulates on level ground with -- steppage gait on RLE with hurry cane and 2 point modified gait pattern, no LOB, SBA from therapist for safety      Assessment  Pt had great participation within treatment. She walked with a 2 point modified gait pattern with a hurry cane with more ease during the session. Her speed also increased throughout the session. During her time in therapy, the pt has made numerous improvements. Her strength has seen improvements and some movements of her ankle  have become smoother. Additionally, she has become less fatigued. In prior sessions she needed to take more sitting rest breaks which would take up to a minute. Now, she is able to take a 20 second rest break while standing, showing her endurance to treatment has improved. Her posture while navigating her walker has also improved, she stands up right with eyes forward compared to prior where she had a hunched trunk and would watch her feet to ensure they wouldn't catch on anything. Recently she has been practicing in therapy with a hurry cane and has gained confidence in doing so and has not had any LOB. However, skilled physical therapy is still required to treat numerous deficits. She still does not have sufficient DF and eversion strength in her R ankle. This lack of DF causes her to still compensate in her hip to clear the foot from catching. She additionally still needs SBA from PT to ensure she is being safe while ambulating with a cane. The pt is also still a fall risk due to her balance, strength and ambulation deficits, which requires further intervention from physical therapy to potentially prevent falls in the future.    Goals (to be met in 12 visits)         Not Met Progress Toward Partially Met Met   Pt will demonstrate improved tandem stance to 5 sec or more to be able to safely negotiate narrow spaces such as the bathroom.  []  [x]  []  []    Pt will perform TUG in <18 seconds with least restrictive AD, demonstrating improved gait speed for community ambulation. []  [x]  []  []    Pt will be able to perform STS with no UE support to be able to easily rise from chair without loss of balance. []  [x]  []  []    Pt will be able to squat to  light objects around the house without loss of stability. []  [x]  []  []    Pt will be able to ambulate 200 ft with least restrictive AD to be able to access multiple rooms in house safely  []  [x]  []  []    Pt will be independent and compliant with  comprehensive HEP to maintain progress achieved in PT. []  []  [x]  []                   Treatment Last 4 Visits  Treatment Day: 12       4/2/2025 4/7/2025 4/9/2025 4/11/2025   Neuro Treatment   Therapeutic Exercise Supine Bridges 2x12  B Hip flexion over weight 2x12 Supine Bridges 2x12  B 4 Way Ankle 1x15  R DF and Eversion AAROM/AROM 1x10  SLR 2x10  DLHR in parallel bars and shuttle 3x12   Nustep Level 5 5 minutes   Nustep 5 mins 5 load  B 4 way ankle YTB 1x15  R AAROM DF and Eversion 1x15  Walking with hurry cane 100ft  DLHR on shuttle 2x12 #25lb  B SLHR on shuttle 2x12 #13lb     Neuro Re-Education R NMES assisted inversion and eversion YTB 3x15  R NMES assisted DF numerous bouts B Toe tap over shanda AP and ML focus on DF and PF 2x15  B AP Toe Taps on Cone 2x15   NMES Assisted AROM DF   NMES Assisted AAROM DF Alternating Toe taps on cone 3x30 seconds  B Step ups onto sand dune 2x30 seconds  Tandem Walking on airex 2 laps   Gait Training   Modified 2 point gait pattern with cane 2 laps  Cane sizing    Therapeutic Exercise Minutes 12 32 5 29   Neuro Re-Educ Minutes 30 13 24 15   Gait Training Minutes   10    Total Time Of Timed Procedures 42 45 39 44   Total Time Of Service-Based Procedures 0 0 0 0   Total Treatment Time 42 45 39 44        HEP  Access Code: [email or web address] URL: [email or web address] Date: 03/31/2025 Prepared by: Trupti Salazar Exercises - Supine Ankle Pumps  - 1 x daily - 7 x weekly - 2 sets - 10 reps - Supine Ankle Inversion Eversion AROM  - 1 x daily - 7 x weekly - 2 sets - 10 reps - Sit to Stand with Counter Support  - 3 x daily - 7 x weekly - 1 sets - 3 reps - Gastroc Stretch with Foot at Wall  - 1 x daily - 7 x weekly - 3 sets - 20 hold - Single Leg Stance with Support  - 1 x daily - 7 x weekly - 3 sets - 15 hold - Supine Bridge  - 1 x daily - 7 x weekly - 3 sets - 8 reps       Charges  2 TE, 1 NR         LEFS Score  LEFS Score: (Patient-Rptd) 47.5 % (4/7/2025 12:33 PM)    This  treatment was provided by CHRISTOS Martinez under the direct and constant direction and supervision of a licensed therapist, who provided consultation regarding skilled judgements, treatment, and assessment of patient care.       Plan: Continue skilled Physical Therapy 1-2 x/week or a total of 20 visits over a 90 day period.     Patient/Family/Caregiver was advised of these findings, precautions, and treatment options and has agreed to actively participate in planning and for this course of care.    Thank you for your referral. If you have any questions, please contact me at Dept: [phone].    Sincerely,  Electronically signed by therapist: CHRISTOS Martinez and Trupti Salazar PT, DPT  Please co-sign or sign and return this letter via fax as soon as possible to [phone].   I certify the need for these services furnished under this plan of treatment and while under my care.    X___________________________________________________ Date____________________    Certification From: 4/11/2025  To:7/10/2025

## 2025-04-15 ENCOUNTER — OFFICE VISIT (OUTPATIENT)
Dept: PHYSICAL THERAPY | Facility: HOSPITAL | Age: 45
End: 2025-04-15
Attending: INTERNAL MEDICINE
Payer: MEDICAID

## 2025-04-15 PROCEDURE — 97112 NEUROMUSCULAR REEDUCATION: CPT

## 2025-04-15 PROCEDURE — 97110 THERAPEUTIC EXERCISES: CPT

## 2025-04-15 NOTE — PROGRESS NOTES
Patient: Alina Aceves (44 year old, female) Referring Provider:  Insurance:   Diagnosis: Malignant neoplasm of upper-outer quadrant of left female breast (HCC) (C50.412)  Anemia (D64.9)  Open wound of chest wall, left, subsequent encounter (S21.102D)  Foot drop (M21.379)  Neuropathy (G62.9) No ref. provider found  MEDICAID   Date of Surgery: No data recorded Next MD visit:  N/A   Precautions:  Cancer No data recorded Referral Information:    Date of Evaluation: Req: 0, Auth: 0, Exp:     02/28/25 POC Auth Visits:  20       Today's Date   4/15/2025    Subjective  Pt reports she is doing well today. She was pretty sore after session last week and then walking around the mall with just the cane. Presents today with cane for ambulation.       Pain: pain not reported     Objective  Presents to session with single point cane and ambulates in session with no loss of balance noted.          Assessment  Excellent participation in session today, seated and standing rest breaks provided for recovery. Pt able to complete A/P board taps for weight shifting, smooth motion noted with light UE support throughout. Pt able to complete 3 way hip, most difficulty due to toe dragging on RLE. Cues provided to focus on knee extension with exercise for added quad strengthening. Pt able to complete foward/lateral step ups to 6 in step w/ light UE support. Pt able to complete with good stability throughout, however fatiguing by end of exercise.    Goals (to be met in 20 visits)         Not Met Progress Toward Partially Met Met   Pt will demonstrate improved tandem stance to 5 sec or more to be able to safely negotiate narrow spaces such as the bathroom.  []  [x]  []  []    Pt will perform TUG in <18 seconds with least restrictive AD, demonstrating improved gait speed for community ambulation. []  [x]  []  []    Pt will be able to perform STS with no UE support to be able to easily rise from chair without loss of balance. []  [x]  []  []     Pt will be able to squat to  light objects around the house without loss of stability. []  [x]  []  []    Pt will be able to ambulate 200 ft with least restrictive AD to be able to access multiple rooms in house safely  []  [x]  []  []    Pt will be independent and compliant with comprehensive HEP to maintain progress achieved in PT. []  []  [x]  []                  Plan  Continue per plan of care.    Treatment Last 4 Visits  Treatment Day: 20       4/7/2025 4/9/2025 4/11/2025 4/15/2025   Neuro Treatment   Therapeutic Exercise Supine Bridges 2x12  B 4 Way Ankle 1x15  R DF and Eversion AAROM/AROM 1x10  SLR 2x10  DLHR in parallel bars and shuttle 3x12   Nustep Level 5 5 minutes   Nustep 5 mins 5 load  B 4 way ankle YTB 1x15  R AAROM DF and Eversion 1x15  Walking with hurry cane 100ft  DLHR on shuttle 2x12 #25lb  B SLHR on shuttle 2x12 #13lb   Nustep, level 5, 5 min  3 way hip, 2x5 laps bilat   Side stepping with yellow band, 2 laps   LAQ 1.5# ankle weights, 2x10   Step ups to 6 in step, x10 bilat  Lat step overs to 6 in step, x10      Neuro Re-Education B Toe tap over shanda AP and ML focus on DF and PF 2x15  B AP Toe Taps on Cone 2x15   NMES Assisted AROM DF   NMES Assisted AAROM DF Alternating Toe taps on cone 3x30 seconds  B Step ups onto sand dune 2x30 seconds  Tandem Walking on airex 2 laps Tandem Walking on airex 2 laps   Side stepping on airex, 2 laps   A/P board taps, 2x1 min, open UE support       Gait Training  Modified 2 point gait pattern with cane 2 laps  Cane sizing     Therapeutic Exercise Minutes 32 5 29 30   Neuro Re-Educ Minutes 13 24 15 10   Gait Training Minutes  10     Total Time Of Timed Procedures 45 39 44 40   Total Time Of Service-Based Procedures 0 0 0 0   Total Treatment Time 45 39 44 40        HEP  Access Code: [email or web address] URL: [email or web address] Date: 03/31/2025 Prepared by: Trputi Salazar Exercises - Supine Ankle Pumps  - 1 x daily - 7 x weekly - 2 sets - 10 reps  - Supine Ankle Inversion Eversion AROM  - 1 x daily - 7 x weekly - 2 sets - 10 reps - Sit to Stand with Counter Support  - 3 x daily - 7 x weekly - 1 sets - 3 reps - Gastroc Stretch with Foot at Wall  - 1 x daily - 7 x weekly - 3 sets - 20 hold - Single Leg Stance with Support  - 1 x daily - 7 x weekly - 3 sets - 15 hold - Supine Bridge  - 1 x daily - 7 x weekly - 3 sets - 8 reps       Charges  TE 2 NR 1

## 2025-04-17 ENCOUNTER — HOSPITAL ENCOUNTER (OUTPATIENT)
Dept: INTERVENTIONAL RADIOLOGY/VASCULAR | Facility: HOSPITAL | Age: 45
Discharge: HOME | End: 2025-04-17
Attending: INTERNAL MEDICINE
Payer: MEDICAID

## 2025-04-18 ENCOUNTER — OFFICE VISIT (OUTPATIENT)
Dept: PHYSICAL THERAPY | Facility: HOSPITAL | Age: 45
End: 2025-04-18
Attending: INTERNAL MEDICINE
Payer: MEDICAID

## 2025-04-18 PROCEDURE — 97112 NEUROMUSCULAR REEDUCATION: CPT

## 2025-04-18 NOTE — PROGRESS NOTES
Patient: Alina Aceves (44 year old, female) Referring Provider:  Insurance:   Diagnosis: Malignant neoplasm of upper-outer quadrant of left female breast (HCC) (C50.412)  Anemia (D64.9)  Open wound of chest wall, left, subsequent encounter (S21.102D)  Foot drop (M21.379)  Neuropathy (G62.9) No ref. provider found  MEDICAID   Date of Surgery: No data recorded Next MD visit:  N/A   Precautions:  Cancer No data recorded Referral Information:    Date of Evaluation: Req: 0, Auth: 0, Exp:     02/28/25 POC Auth Visits:  20       Today's Date   4/18/2025    Subjective  Pt reports she's doing well today and felt good after last session. She was using the cane again today.       Pain: pain not reported     Objective  Pt had no losses of balance during session. Used a TB to induce DF while completing exercises and did well while doing so.            Assessment  Pt had great participation within session. Pt came a bit later due to her ride being late. TB was added to induce DF for some exercises. Pt used single UE support due to having to reach and hold band for DF. No catching of foot was noted during hurdles both AP and ML. Taps on step was noted to have less hip flexion due to ability to clear foot with TB assist. AAROM DF with DF holds had minimal Ant Tib contraction during holds. Pt was instructed by SPT to close eyes and think about foot staying up during holds.    Goals (to be met in 20 visits)         Not Met Progress Toward Partially Met Met   Pt will demonstrate improved tandem stance to 5 sec or more to be able to safely negotiate narrow spaces such as the bathroom.  []  []  []  []    Pt will perform TUG in <18 seconds with least restrictive AD, demonstrating improved gait speed for community ambulation. []  []  []  []    Pt will be able to perform STS with no UE support to be able to easily rise from chair without loss of balance. []  []  []  []    Pt will be able to squat to  light objects around the  house without loss of stability. []  []  []  []    Pt will be able to ambulate 200 ft with least restrictive AD to be able to access multiple rooms in house safely  []  []  []  []    Pt will be independent and compliant with comprehensive HEP to maintain progress achieved in PT. []  []  []  []                                    Plan  Continue per plan of care.    Treatment Last 4 Visits  Treatment Day: 14       4/9/2025 4/11/2025 4/15/2025 4/18/2025   Neuro Treatment   Therapeutic Exercise Nustep Level 5 5 minutes   Nustep 5 mins 5 load  B 4 way ankle YTB 1x15  R AAROM DF and Eversion 1x15  Walking with hurry cane 100ft  DLHR on shuttle 2x12 #25lb  B SLHR on shuttle 2x12 #13lb   Nustep, level 5, 5 min  3 way hip, 2x5 laps bilat   Side stepping with yellow band, 2 laps   LAQ 1.5# ankle weights, 2x10   Step ups to 6 in step, x10 bilat  Lat step overs to 6 in step, x10    AAROM DF with DF holds 2x15       Neuro Re-Education NMES Assisted AROM DF   NMES Assisted AAROM DF Alternating Toe taps on cone 3x30 seconds  B Step ups onto sand dune 2x30 seconds  Tandem Walking on airex 2 laps Tandem Walking on airex 2 laps   Side stepping on airex, 2 laps   A/P board taps, 2x1 min, open UE support     TB assisted hurdles ML and AP 2 laps each  Tandem Walking on airex TB assist 3 laps  AP taps on BB 2x1 min  Taps on step TB assist 3x30 seconds   Gait Training Modified 2 point gait pattern with cane 2 laps  Cane sizing      Therapeutic Exercise Minutes 5 29 30 5   Neuro Re-Educ Minutes 24 15 10 29   Gait Training Minutes 10      Total Time Of Timed Procedures 39 44 40 34   Total Time Of Service-Based Procedures 0 0 0 0   Total Treatment Time 39 44 40 34        HEP  Access Code: [email or web address] URL: [email or web address] Date: 03/31/2025 Prepared by: Trupti Salazar Exercises - Supine Ankle Pumps  - 1 x daily - 7 x weekly - 2 sets - 10 reps - Supine Ankle Inversion Eversion AROM  - 1 x daily - 7 x weekly - 2 sets - 10 reps  - Sit to Stand with Counter Support  - 3 x daily - 7 x weekly - 1 sets - 3 reps - Gastroc Stretch with Foot at Wall  - 1 x daily - 7 x weekly - 3 sets - 20 hold - Single Leg Stance with Support  - 1 x daily - 7 x weekly - 3 sets - 15 hold - Supine Bridge  - 1 x daily - 7 x weekly - 3 sets - 8 reps       Charges  2 NR       This treatment was provided by CHRISTOS Martinez under the direct and constant direction and supervision of a licensed therapist, who provided consultation regarding skilled judgements, treatment, and assessment of patient care.

## 2025-04-21 NOTE — PROGRESS NOTES
CHIEF COMPLAINT:     Chief Complaint   Patient presents with    Wound Care     Patients is here for a follow up. Patients stated no issue at this moment and She having a appointment at mammography at 10:00 am      HPI:   Information obtained from Patient and chart  5-24-24 INITIAL:  43 year old female with Past medical history invasive ductal carcinoma of left breast (dr [facility]) currently undergoing radiation therapy (dr kobi bran), leukocytoclastic vasculitis (treated with clobetasol ointment by dr. Walton) iron deficiency anemia, HFrEF (Ariane Putnam), lupus, Sjogren's syndrome.  Patient was recently admitted for oral sores and n/v and elctrolyte imbalance and hypokalemia.    Earlier this week she was seen in urgent care for uti symptoms and hematuria. She was treated with cefadroxil and recommended to see primary md. dr bran recommended patient to start yary, she states she is not regular with it. Patient has seen nutrition/dietician.  Patient is active with residential [facility].  Earlier this week she had to cancel physical therapy due to wound pain.  on 5-11 patient was started on potassium citrate x 10 days and patient was to repeat labs, which she did on 5-21 but her potassium was still 2.9. she states she is taking the potassium.  I will udpate ariane putnam and I asked the patient to contact her as they may want her to increase her potassium.  Patient states that initially she was utilizing aquaphor but then developed an itchy red rash, so now has just been leaving it dry or using aveeno. She is able to tolerate very minimal cleansing of the area.  I discussed with her how to do it as tolerated in the shower with anasept.  Will utilize hydrogel cool sheets, patient to return next week. There is not any s/s of infection    HPI PRIOR TO APRIL 2025 SEE INDIVIDUAL PROGRESS NOTES  3-11-25 patient returns.  She did see dr graf last week and she has orders for continued f/u monitoring with mammo/mri. She  has been utilizing triad paste to the wound, the original wound resolved however there is some tape stripping inferior to the wound.  We discussed utilizing honey hydrogel sheet to his area and monitoring the original wound for any drainage.  We discussed that the original wound scar is not going to \"fill in\" to surrounding tissue level. Overtime it may become less noticeable, but most likely she will always have a small depression there.  No s/s of infection or c/o pain.    4-1-25 patient returns.  She continues with physical therapy for her foot drop.  She also saw dr. Diaz with [facility] and she is to f/u with her in 4 month and get bilateral mammogram in may.  Last visit the main/initial wound was closed but she had some epidermal stripping related to bandages infereior to that.  She has been utilizing silver alginate, but she states each time she tries to clean it it from drainage (which had increased) the inferior skin edge seems to be lifting more.  No new c/o pain.  Wound is larger. We cultured and will start on topical abx ointment and treat po based on results and sensitivities. Patient is concerned due to this set back. She was encouraged will get it rehealed.     4-10-25 patient returns. Patient followed up with rheumatology at [facility] yesterday and she will be restarting on hydroxychloroquinilone Culture from last visit was + for MRSA, she was to continue the gent topical and I started her on po doxy.  I also asked her to utilize hibiclens 1-2x a week to assist with overal body declonization. She is scheduled to get her port removed next week the wound is improved. Due to port being removed, will extend the doxy. Will stop the gentamicin and transition to triad paste.      4-22-25 patient returns. Her port removal did get rescheduled for 5-1-25.  She has completed the po doxy.  She was utilizing triad paste, but felt as though the drainage was accumulating under it. Wound is the same size and  characteristics.  She asked about potential surgical closure we had a discussion if she was going to do that I would recommend treatment with hbo before and after to increase the success of surgical intervention in an irradiated field.  We will utilize rick collagen and silver alginate. Patient's insurance will be changing again soon. No acute s/s of infection or c/o pain.  MEDICATIONS:       Current Outpatient Medications:     Prenatal Vit-Fe Fumarate-FA (MULTI PRENATAL) 27-0.8 MG Oral Tab, Take by mouth., Disp: , Rfl:     metoprolol succinate ER 50 MG Oral Tablet 24 Hr, Take 1 tablet (50 mg total) by mouth daily., Disp: , Rfl:     Potassium Chloride ER 10 MEQ Oral Tab CR, Take 1 tablet (10 mEq total) by mouth daily., Disp: 30 tablet, Rfl: 0    DULoxetine 30 MG Oral Cap DR Particles, Take 1 capsule (30 mg total) by mouth daily. Takes one tablet, Disp: , Rfl:     zolpidem 10 MG Oral Tab, Take 1 tablet (10 mg total) by mouth nightly as needed for Sleep., Disp: , Rfl:   ALLERGIES:     Allergies   Allergen Reactions    Bactrim [Sulfamethoxazole W/Trimethoprim] RASH    Adhesive Tape RASH      REVIEW OF SYSTEMS:   This information was obtained from the patient/family and chart.    See HPI for pertinent positives, otherwise 10 pt ROS negative.  HISTORY:   Past medical, surgical, family and social history updated where appropriate.      PHYSICAL EXAM:     Vitals:    04/22/25 0847   BP: 124/76   Pulse: 71   Temp: 98 °F (36.7 °C)         Estimated body mass index is 20.25 kg/m² as calculated from the following:    Height as of 3/7/25: 64\".    Weight as of 3/7/25: 118 lb (53.5 kg).   POC Glucose   Date Value Ref Range Status   12/19/2023 72 70 - 99 mg/dL Final   12/18/2023 125 (H) 70 - 99 mg/dL Final   12/18/2023 84 70 - 99 mg/dL Final       Vital signs reviewed.Appears stated age, well groomed.    Constitutional:  Bp wnl. Pulse Regular and wnl for patient. Respirations easy and unlabored. Temperature wnl. Weight wnl for  height. Appearance neat and clean. Appears in no acute distress.     Musculoskeletal:  Patient ambulation is stable with walker  Integumentary:  refer to wound characteristics and images   Psychiatric:  Judgment and insight intact. Alert and oriented times 3. No evidence of depression, anxiety, or agitation. Calm, cooperative, and communicative. Appropriate interactions and affect.  DIAGNOSTICS:     Lab Results   Component Value Date    BUN 22 03/07/2025    CREATSERUM 0.94 03/07/2025    ALB 4.4 03/07/2025    TP 8.9 (H) 02/12/2025    TP 8.4 (H) 02/12/2025       WOUND ASSESSMENT:     Wound 05/24/24 #1 Radiation therapy Breast Left (Active)   Date First Assessed/Time First Assessed: 05/24/24 0857    Wound Number (Wound Clinic Only): #1 Radiation therapy  Primary Wound Type: Other (comment)  Location: Breast  Wound Location Orientation: Left      Assessments 5/24/2024  8:59 AM 4/22/2025  8:45 AM   Wound Image        Drainage Amount Large None   Drainage Description Serous;Yellow --   Wound Length (cm) 15.5 cm 0.9 cm   Wound Width (cm) 19 cm 0.8 cm   Wound Surface Area (cm^2) 294.5 cm^2 0.57 cm^2   Wound Depth (cm) 0.1 cm 0.2 cm   Wound Volume (cm^3) 29.45 cm^3 0.075 cm^3   Wound Healing % -- 100   Margins Well-defined edges Well-defined edges   Non-staged Wound Description Full thickness Full thickness   Alfreda-wound Assessment Moist;Edema Clean   Wound Granulation Tissue Firm;Pink Pink;Firm   Wound Bed Granulation (%) 20 % 100 %   Wound Bed Epithelium (%) 50 % --   Wound Bed Slough (%) 30 % --   Wound Odor None None   Tunneling? -- No   Undermining? -- No   Sinus Tracts? -- No       No associated orders.          ASSESSMENT AND PLAN:    1. Methicillin resistant Staph aureus culture positive    2. 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 Adverse effect of radiation, subsequent encounter    4. SLE (systemic lupus erythematosus related syndrome) ([facility])            Risks, benefits,  and alternatives of current treatment plan discussed in detail.  Questions and concerns addressed. Red flags to RTC or ED reviewed.  Patient (or parent) agrees to plan.      NOTE TO PATIENT: The 21st Century Cures Act makes clinical notes like these available to patients in the interest of transparency. Clinical notes are medical documents used by physicians and care providers to communicate with each other. These documents include medical language and terminology, abbreviations, and treatment information that may sound technical and at times possibly unfamiliar. In addition, at times, the verbiage may appear blunt or direct. These documents are one tool providers use to communicate relevant information and clinical opinions of the care providers in a way that allows common understanding of the clinical context.   I [id number] with the patient. This time included:    preparing to see the patient (eg, review notes and recent diagnostics),  seeing the patient, obtaining and/or reviewing separately obtained history, performing a medically appropriate examination and/or evaluation, counseling and educating the patient, documenting in the record,  DISCHARGE:      Patient Instructions   Please return: 1.5 WEEKS       Patient discharge and wound care instructions  Alina Aceves  4/22/2025             You may shower and cleanse area with HIBICLENS and water.  rinse wound with ANASEPT cleanser,   dab dry with gauze   4.  Apply small amount of rick>silver alginate>bordered gauze        Nutrition and blood sugar control:  Focus on the following:  Protein: Meats, beans, eggs, milk and yogurt particularly Greek yogurt), tofu, soy nuts, soy protein products (Follow the protein handout in your welcome folder)  Vitamin C: Citrus fruits and juices, strawberries, tomatoes, tomato juice, peppers, baked potatoes, spinach, broccoli, cauliflower, [place] sprouts, cabbage  Vitamin A: Dark green, leafy vegetables, orange or yellow  vegetables, cantaloupe, fortified dairy products, liver, fortified cereals  Zinc: Fortified cereals, red meats, seafood  Consider supplementing with Vitor by [employer]. It can be purchased on amazon, [employer] website, or local pharmacy may be able to order it for you.  (These are essential branch chain amino acids that help with tissue building and wound healing).   When your blood sugar is consistently elevated greater than 180 your body can't heal or fight infection.     Concerns:  Signs of infection may include the following:  Increase in redness  Red \"streaks\" from wound  Increase in swelling  Fever  Unusual odor  Change in the amount of wound drainage     Should you experience any significant changes in your wound(s) or have any questions regarding your home care instructions please contact the [facility] @ [phone] If after regular business hours, please call your family doctor or local emergency room. The treatment plan has been discussed at length between you and your provider. Follow all instructions carefully, it is very important. If you do not follow all instructions you are at risk of your wound not healing, infection, possible loss of limb and even loss of life.    Anita Smallwood FNP-C, CWCN-AP, CFCN, CSWS, WCC, DWC  4/22/2025

## 2025-04-22 ENCOUNTER — HOSPITAL ENCOUNTER (OUTPATIENT)
Dept: MAMMOGRAPHY | Facility: HOSPITAL | Age: 45
Discharge: HOME | End: 2025-04-22
Attending: SURGERY
Payer: MEDICAID

## 2025-04-22 ENCOUNTER — OFFICE VISIT (OUTPATIENT)
Dept: WOUND CARE | Facility: HOSPITAL | Age: 45
End: 2025-04-22
Attending: NURSE PRACTITIONER
Payer: MEDICAID

## 2025-04-22 VITALS — SYSTOLIC BLOOD PRESSURE: 124 MMHG | DIASTOLIC BLOOD PRESSURE: 76 MMHG | HEART RATE: 71 BPM | TEMPERATURE: 98 F

## 2025-04-22 DIAGNOSIS — N64.4 BREAST PAIN, RIGHT: ICD-10-CM

## 2025-04-22 DIAGNOSIS — T66.XXXD ADVERSE EFFECT OF RADIATION, SUBSEQUENT ENCOUNTER: ICD-10-CM

## 2025-04-22 DIAGNOSIS — Z85.3 HISTORY OF LEFT BREAST CANCER: ICD-10-CM

## 2025-04-22 DIAGNOSIS — M32.9 SLE (SYSTEMIC LUPUS ERYTHEMATOSUS RELATED SYNDROME) (HCC): ICD-10-CM

## 2025-04-22 DIAGNOSIS — L98.492 NON-PRESSURE CHRONIC ULCER OF SKIN OF OTHER SITES WITH FAT LAYER EXPOSED (HCC): ICD-10-CM

## 2025-04-22 DIAGNOSIS — Z22.322 METHICILLIN RESISTANT STAPH AUREUS CULTURE POSITIVE: Primary | ICD-10-CM

## 2025-04-22 PROCEDURE — 77066 DX MAMMO INCL CAD BI: CPT | Performed by: SURGERY

## 2025-04-22 PROCEDURE — 77062 BREAST TOMOSYNTHESIS BI: CPT | Performed by: SURGERY

## 2025-04-22 PROCEDURE — 76642 ULTRASOUND BREAST LIMITED: CPT | Performed by: SURGERY

## 2025-04-22 PROCEDURE — 99214 OFFICE O/P EST MOD 30 MIN: CPT | Performed by: NURSE PRACTITIONER

## 2025-04-22 NOTE — PATIENT INSTRUCTIONS
Please return: 1.5 WEEKS       Patient discharge and wound care instructions  Alina Aceves  4/22/2025             You may shower and cleanse area with HIBICLENS and water.  rinse wound with ANASEPT cleanser,   dab dry with gauze   4.  Apply small amount of rick>silver alginate>bordered gauze        Nutrition and blood sugar control:  Focus on the following:  Protein: Meats, beans, eggs, milk and yogurt particularly Greek yogurt), tofu, soy nuts, soy protein products (Follow the protein handout in your welcome folder)  Vitamin C: Citrus fruits and juices, strawberries, tomatoes, tomato juice, peppers, baked potatoes, spinach, broccoli, cauliflower, Winter sprouts, cabbage  Vitamin A: Dark green, leafy vegetables, orange or yellow vegetables, cantaloupe, fortified dairy products, liver, fortified cereals  Zinc: Fortified cereals, red meats, seafood  Consider supplementing with Vitor by [employer]. It can be purchased on amazon, [employer] website, or local pharmacy may be able to order it for you.  (These are essential branch chain amino acids that help with tissue building and wound healing).   When your blood sugar is consistently elevated greater than 180 your body can't heal or fight infection.     Concerns:  Signs of infection may include the following:  Increase in redness  Red \"streaks\" from wound  Increase in swelling  Fever  Unusual odor  Change in the amount of wound drainage     Should you experience any significant changes in your wound(s) or have any questions regarding your home care instructions please contact the wound center [facility] @ [phone] If after regular business hours, please call your family doctor or local emergency room. The treatment plan has been discussed at length between you and your provider. Follow all instructions carefully, it is very important. If you do not follow all instructions you are at risk of your wound not healing, infection, possible loss of limb and even  loss of life.

## 2025-04-22 NOTE — PROGRESS NOTES
.Weekly Wound Education Note    Teaching Provided To: Patient  Training Topics: Dressing, Cleasing and general instructions, Discharge instructions  Training Method: Explain/Verbal, Written  Training Response: Patient responds and understands        Notes: Wound stable. Dressing changed to rick, silver alginate and bordered gauze. Pt is getting mammogram after appointment, wound covered with bordered gauze and pt will apply dressing at home.

## 2025-04-23 ENCOUNTER — APPOINTMENT (OUTPATIENT)
Dept: WOUND CARE | Facility: HOSPITAL | Age: 45
End: 2025-04-23
Attending: NURSE PRACTITIONER
Payer: MEDICAID

## 2025-04-23 ENCOUNTER — OFFICE VISIT (OUTPATIENT)
Dept: PHYSICAL THERAPY | Facility: HOSPITAL | Age: 45
End: 2025-04-23
Attending: INTERNAL MEDICINE
Payer: MEDICAID

## 2025-04-23 PROCEDURE — 97112 NEUROMUSCULAR REEDUCATION: CPT

## 2025-04-23 NOTE — PROGRESS NOTES
Patient: Alina Aceves (44 year old, female) Referring Provider:  Insurance:   Diagnosis: Malignant neoplasm of upper-outer quadrant of left female breast (HCC) (C50.412)  Anemia (D64.9)  Open wound of chest wall, left, subsequent encounter (S21.102D)  Foot drop (M21.379)  Neuropathy (G62.9) No ref. provider found  MEDICAID   Date of Surgery: No data recorded Next MD visit:  N/A   Precautions:  Cancer No data recorded Referral Information:    Date of Evaluation: Req: 0, Auth: 0, Exp:     02/28/25 POC Auth Visits:  20       Today's Date   4/23/2025    Subjective  Pt reports shes doing well. However, she did have a fall on Friday, she had no major injuries and was able to get up after.       Pain: pain not reported     Objective  During session there was no losses of balance. NMES was used to assist during exercises. See tx sheet.            Assessment  Pt had great participation within session. NMES was used to assist during some exercises to help obtain DF. Pt was given reminders to keep proper posture and eyes ahead during ambulation with cane. Pt was able to complete the obstacle course with standby assist from SPT, some hesitation was noted due to using cane with exercise, but she was able to complete with no losses of balance or staggering. Cone  also required standby assist from SPT and pt was able to complete exercise well with use of cane.    Goals (to be met in 20 visits)         Not Met Progress Toward Partially Met Met   Pt will demonstrate improved tandem stance to 5 sec or more to be able to safely negotiate narrow spaces such as the bathroom.  []  []  []  []    Pt will perform TUG in <18 seconds with least restrictive AD, demonstrating improved gait speed for community ambulation. []  []  []  []    Pt will be able to perform STS with no UE support to be able to easily rise from chair without loss of balance. []  []  []  []    Pt will be able to squat to  light objects around the house  without loss of stability. []  []  []  []    Pt will be able to ambulate 200 ft with least restrictive AD to be able to access multiple rooms in house safely  []  []  []  []    Pt will be independent and compliant with comprehensive HEP to maintain progress achieved in PT. []  []  []  []                                        Plan  Continue per plan of care.    Treatment Last 4 Visits  Treatment Day: 15       4/11/2025 4/15/2025 4/18/2025 4/23/2025   Neuro Treatment   Therapeutic Exercise Nustep 5 mins 5 load  B 4 way ankle YTB 1x15  R AAROM DF and Eversion 1x15  Walking with hurry cane 100ft  DLHR on shuttle 2x12 #25lb  B SLHR on shuttle 2x12 #13lb   Nustep, level 5, 5 min  3 way hip, 2x5 laps bilat   Side stepping with yellow band, 2 laps   LAQ 1.5# ankle weights, 2x10   Step ups to 6 in step, x10 bilat  Lat step overs to 6 in step, x10    AAROM DF with DF holds 2x15     Nustep Level 5 5 minutes     Neuro Re-Education Alternating Toe taps on cone 3x30 seconds  B Step ups onto sand dune 2x30 seconds  Tandem Walking on airex 2 laps Tandem Walking on airex 2 laps   Side stepping on airex, 2 laps   A/P board taps, 2x1 min, open UE support     TB DF assisted hurdles ML and AP 2 laps each  Tandem Walking on airex TB DF assist 3 laps  AP taps on BB 2x1 min  Taps on step TB DF assist 3x30 seconds NMES assisted ambulation with cane 2 laps  NMES assisted obstacle course with cane 4 laps  Cone  4 laps  NMES assisted step ups 4 in step 2x12   Therapeutic Exercise Minutes 29 30 5 5   Neuro Re-Educ Minutes 15 10 29 34   Total Time Of Timed Procedures 44 40 34 39   Total Time Of Service-Based Procedures 0 0 0 0   Total Treatment Time 44 40 34 39        HEP  Access Code: [email or web address] URL: [email or web address] Date: 03/31/2025 Prepared by: Trupti Salazar Exercises - Supine Ankle Pumps  - 1 x daily - 7 x weekly - 2 sets - 10 reps - Supine Ankle Inversion Eversion AROM  - 1 x daily - 7 x weekly - 2 sets - 10  reps - Sit to Stand with Counter Support  - 3 x daily - 7 x weekly - 1 sets - 3 reps - Gastroc Stretch with Foot at Wall  - 1 x daily - 7 x weekly - 3 sets - 20 hold - Single Leg Stance with Support  - 1 x daily - 7 x weekly - 3 sets - 15 hold - Supine Bridge  - 1 x daily - 7 x weekly - 3 sets - 8 reps       Charges  2 NR       This treatment was provided by CHRISTOS Martinez under the direct and constant direction and supervision of a licensed therapist, who provided consultation regarding skilled judgements, treatment, and assessment of patient care.

## 2025-04-25 ENCOUNTER — APPOINTMENT (OUTPATIENT)
Dept: PHYSICAL THERAPY | Facility: HOSPITAL | Age: 45
End: 2025-04-25
Attending: INTERNAL MEDICINE
Payer: MEDICAID

## 2025-04-29 NOTE — PROGRESS NOTES
CHIEF COMPLAINT:     Chief Complaint   Patient presents with    Wound Care     Patient is here for a wound care follow up. She denies any pain or new wound concerns. She feels that the silver alginate was making the wound deeper, so she switched to the collagen and border gauze.      HPI:   Information obtained from Patient and chart  5-24-24 INITIAL:  43 year old female with Past medical history invasive ductal carcinoma of left breast (dr [facility]) currently undergoing radiation therapy (dr kobi bran), leukocytoclastic vasculitis (treated with clobetasol ointment by dr. Walton) iron deficiency anemia, HFrEF (Ariane Putnam), lupus, Sjogren's syndrome.  Patient was recently admitted for oral sores and n/v and elctrolyte imbalance and hypokalemia.    Earlier this week she was seen in urgent care for uti symptoms and hematuria. She was treated with cefadroxil and recommended to see primary md. dr bran recommended patient to start yary, she states she is not regular with it. Patient has seen nutrition/dietician.  Patient is active with residential [facility].  Earlier this week she had to cancel physical therapy due to wound pain.  on 5-11 patient was started on potassium citrate x 10 days and patient was to repeat labs, which she did on 5-21 but her potassium was still 2.9. she states she is taking the potassium.  I will udpate ariane puntam and I asked the patient to contact her as they may want her to increase her potassium.  Patient states that initially she was utilizing aquaphor but then developed an itchy red rash, so now has just been leaving it dry or using aveeno. She is able to tolerate very minimal cleansing of the area.  I discussed with her how to do it as tolerated in the shower with anasept.  Will utilize hydrogel cool sheets, patient to return next week. There is not any s/s of infection    HPI PRIOR TO APRIL 2025 SEE INDIVIDUAL PROGRESS NOTES  3-11-25 patient returns.  She did see dr homar yu  week and she has orders for continued f/u monitoring with mammo/mri. She has been utilizing triad paste to the wound, the original wound resolved however there is some tape stripping inferior to the wound.  We discussed utilizing honey hydrogel sheet to his area and monitoring the original wound for any drainage.  We discussed that the original wound scar is not going to \"fill in\" to surrounding tissue level. Overtime it may become less noticeable, but most likely she will always have a small depression there.  No s/s of infection or c/o pain.    4-1-25 patient returns.  She continues with physical therapy for her foot drop.  She also saw dr. Diaz with [facility] and she is to f/u with her in 4 month and get bilateral mammogram in may.  Last visit the main/initial wound was closed but she had some epidermal stripping related to bandages infereior to that.  She has been utilizing silver alginate, but she states each time she tries to clean it it from drainage (which had increased) the inferior skin edge seems to be lifting more.  No new c/o pain.  Wound is larger. We cultured and will start on topical abx ointment and treat po based on results and sensitivities. Patient is concerned due to this set back. She was encouraged will get it rehealed.     4-10-25 patient returns. Patient followed up with rheumatology at [facility] yesterday and she will be restarting on hydroxychloroquinilone Culture from last visit was + for MRSA, she was to continue the gent topical and I started her on po doxy.  I also asked her to utilize hibiclens 1-2x a week to assist with overal body declonization. She is scheduled to get her port removed next week the wound is improved. Due to port being removed, will extend the doxy. Will stop the gentamicin and transition to triad paste.      4-22-25 patient returns. Her port removal did get rescheduled for 5-1-25.  She has completed the po doxy.  She was utilizing triad paste, but felt as though the  drainage was accumulating under it. Wound is the same size and characteristics.  She asked about potential surgical closure we had a discussion if she was going to do that I would recommend treatment with hbo before and after to increase the success of surgical intervention in an irradiated field.  We will utilize rick collagen and silver alginate. Patient's insurance will be changing again soon. No acute s/s of infection or c/o pain.    4-30-25 patient returns. She states that when she was using the silver alginate she felt like it was leaving an indentation, so she stopped using it and just has been applying rick and bordered gauze. The wound is slightly smaller, the wound bed remains grey and fibriny. She states she has not been able to see her original surgeon (with [facility]) yet. We discussed the wound bed. Will utilize honey at least for the next week to try and encouraged granulation and less of a \"woody\" wound bed.  Patient can return to [facility] in a week if the wound bed is more pink/red in appearance. No acute s/s of infection. Or c/o pain. She is scheduled again to get her port removed and is headed to get pre-op labs done.  MEDICATIONS:       Current Outpatient Medications:     Prenatal Vit-Fe Fumarate-FA (MULTI PRENATAL) 27-0.8 MG Oral Tab, Take by mouth., Disp: , Rfl:     metoprolol succinate ER 50 MG Oral Tablet 24 Hr, Take 1 tablet (50 mg total) by mouth daily., Disp: , Rfl:     Potassium Chloride ER 10 MEQ Oral Tab CR, Take 1 tablet (10 mEq total) by mouth daily., Disp: 30 tablet, Rfl: 0    DULoxetine 30 MG Oral Cap DR Particles, Take 1 capsule (30 mg total) by mouth daily. Takes one tablet, Disp: , Rfl:     zolpidem 10 MG Oral Tab, Take 1 tablet (10 mg total) by mouth nightly as needed for Sleep., Disp: , Rfl:   ALLERGIES:     Allergies   Allergen Reactions    Bactrim [Sulfamethoxazole W/Trimethoprim] RASH    Adhesive Tape RASH      REVIEW OF SYSTEMS:   This information was obtained from the  patient/family and chart.    See HPI for pertinent positives, otherwise 10 pt ROS negative.  HISTORY:   Past medical, surgical, family and social history updated where appropriate.      PHYSICAL EXAM:     Vitals:    04/30/25 1015   BP: 108/67   Pulse: 75   Resp: 14   Temp: 98.2 °F (36.8 °C)     Estimated body mass index is 20.25 kg/m² as calculated from the following:    Height as of 3/7/25: 64\".    Weight as of 3/7/25: 118 lb (53.5 kg).   POC Glucose   Date Value Ref Range Status   12/19/2023 72 70 - 99 mg/dL Final   12/18/2023 125 (H) 70 - 99 mg/dL Final   12/18/2023 84 70 - 99 mg/dL Final       Vital signs reviewed.Appears stated age, well groomed.    Constitutional:  Bp wnl. Pulse Regular and wnl for patient. Respirations easy and unlabored. Temperature wnl. Weight wnl for height. Appearance neat and clean. Appears in no acute distress.     Musculoskeletal:  Patient ambulation is stable with walker  Integumentary:  refer to wound characteristics and images   Psychiatric:  Judgment and insight intact. Alert and oriented times 3. No evidence of depression, anxiety, or agitation. Calm, cooperative, and communicative. Appropriate interactions and affect.  DIAGNOSTICS:     Lab Results   Component Value Date    BUN 22 03/07/2025    CREATSERUM 0.94 03/07/2025    ALB 4.4 03/07/2025    TP 8.9 (H) 02/12/2025    TP 8.4 (H) 02/12/2025       WOUND ASSESSMENT:     Wound 05/24/24 #1 Radiation therapy Breast Left (Active)   Date First Assessed/Time First Assessed: 05/24/24 0857    Wound Number (Wound Clinic Only): #1 Radiation therapy  Primary Wound Type: Other (comment)  Location: Breast  Wound Location Orientation: Left      Assessments 5/24/2024  8:59 AM 4/30/2025 10:19 AM   Wound Image        Drainage Amount Large Small   Drainage Description Serous;Yellow Serous;Yellow   Wound Length (cm) 15.5 cm 0.5 cm   Wound Width (cm) 19 cm 0.6 cm   Wound Surface Area (cm^2) 294.5 cm^2 0.24 cm^2   Wound Depth (cm) 0.1 cm 0.1 cm   Wound  Volume (cm^3) 29.45 cm^3 0.016 cm^3   Wound Healing % -- 100   Margins Well-defined edges Well-defined edges   Non-staged Wound Description Full thickness Full thickness   Alfreda-wound Assessment Moist;Edema Clean   Wound Granulation Tissue Firm;Pink --   Wound Bed Granulation (%) 20 % --   Wound Bed Epithelium (%) 50 % --   Wound Bed Slough (%) 30 % 100 %   Wound Odor None None   Undermining? -- No   Sinus Tracts? -- No       No associated orders.          ASSESSMENT AND PLAN:    1. 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 Adverse effect of radiation, subsequent encounter    3. SLE (systemic lupus erythematosus related syndrome) (HCC)      Risks, benefits, and alternatives of current treatment plan discussed in detail.  Questions and concerns addressed. Red flags to RTC or ED reviewed.  Patient (or parent) agrees to plan.      NOTE TO PATIENT: The 21st Century Cures Act makes clinical notes like these available to patients in the interest of transparency. Clinical notes are medical documents used by physicians and care providers to communicate with each other. These documents include medical language and terminology, abbreviations, and treatment information that may sound technical and at times possibly unfamiliar. In addition, at times, the verbiage may appear blunt or direct. These documents are one tool providers use to communicate relevant information and clinical opinions of the care providers in a way that allows common understanding of the clinical context.   I [id number] with the patient. This time included:    preparing to see the patient (eg, review notes and recent diagnostics),  seeing the patient, obtaining and/or reviewing separately obtained history, performing a medically appropriate examination and/or evaluation, counseling and educating the patient, documenting in the record,  DISCHARGE:      Patient Instructions   Please return: 2 WEEKS        Patient  discharge and wound care instructions  Alina Aceves  4/30/2025    You may shower and cleanse area with HIBICLENS and water.  rinse wound with ANASEPT cleanser,   dab dry with gauze   4.  Apply small amount of honey gel>bordered gauze (for at least 1 week, then you can change to rick if you want)        Nutrition and blood sugar control:  Focus on the following:  Protein: Meats, beans, eggs, milk and yogurt particularly Greek yogurt), tofu, soy nuts, soy protein products (Follow the protein handout in your welcome folder)  Vitamin C: Citrus fruits and juices, strawberries, tomatoes, tomato juice, peppers, baked potatoes, spinach, broccoli, cauliflower, [place] sprouts, cabbage  Vitamin A: Dark green, leafy vegetables, orange or yellow vegetables, cantaloupe, fortified dairy products, liver, fortified cereals  Zinc: Fortified cereals, red meats, seafood  Consider supplementing with Vitor by [employer]. It can be purchased on amazon, [employer] website, or local pharmacy may be able to order it for you.  (These are essential branch chain amino acids that help with tissue building and wound healing).   When your blood sugar is consistently elevated greater than 180 your body can't heal or fight infection.     Concerns:  Signs of infection may include the following:  Increase in redness  Red \"streaks\" from wound  Increase in swelling  Fever  Unusual odor  Change in the amount of wound drainage     Should you experience any significant changes in your wound(s) or have any questions regarding your home care instructions please contact the wound center [facility] @ [phone] If after regular business hours, please call your family doctor or local emergency room. The treatment plan has been discussed at length between you and your provider. Follow all instructions carefully, it is very important. If you do not follow all instructions you are at risk of your wound not healing, infection, possible loss of limb and  even loss of life.    Anita Smallwood FNP-C, CWCN-AP, CFCN, CSWS, WCC, DWC  4/30/2025

## 2025-04-30 ENCOUNTER — LAB ENCOUNTER (OUTPATIENT)
Dept: LAB | Age: 45
End: 2025-04-30
Attending: INTERNAL MEDICINE
Payer: MEDICAID

## 2025-04-30 ENCOUNTER — OFFICE VISIT (OUTPATIENT)
Dept: PHYSICAL THERAPY | Facility: HOSPITAL | Age: 45
End: 2025-04-30
Attending: INTERNAL MEDICINE
Payer: MEDICAID

## 2025-04-30 ENCOUNTER — OFFICE VISIT (OUTPATIENT)
Dept: WOUND CARE | Facility: HOSPITAL | Age: 45
End: 2025-04-30
Attending: NURSE PRACTITIONER
Payer: MEDICAID

## 2025-04-30 VITALS
SYSTOLIC BLOOD PRESSURE: 108 MMHG | HEART RATE: 75 BPM | TEMPERATURE: 98 F | DIASTOLIC BLOOD PRESSURE: 67 MMHG | RESPIRATION RATE: 14 BRPM

## 2025-04-30 DIAGNOSIS — L98.492 NON-PRESSURE CHRONIC ULCER OF SKIN OF OTHER SITES WITH FAT LAYER EXPOSED (HCC): Primary | ICD-10-CM

## 2025-04-30 DIAGNOSIS — Z01.818 PRE-OP TESTING: ICD-10-CM

## 2025-04-30 DIAGNOSIS — T66.XXXD ADVERSE EFFECT OF RADIATION, SUBSEQUENT ENCOUNTER: ICD-10-CM

## 2025-04-30 DIAGNOSIS — M32.9 SLE (SYSTEMIC LUPUS ERYTHEMATOSUS RELATED SYNDROME) (HCC): ICD-10-CM

## 2025-04-30 LAB
BASOPHILS # BLD AUTO: 0.01 X10(3) UL (ref 0–0.2)
BASOPHILS NFR BLD AUTO: 0.2 %
EOSINOPHIL # BLD AUTO: 0.16 X10(3) UL (ref 0–0.7)
EOSINOPHIL NFR BLD AUTO: 3.8 %
ERYTHROCYTE [DISTWIDTH] IN BLOOD BY AUTOMATED COUNT: 14.1 %
HCT VFR BLD AUTO: 32.9 % (ref 35–48)
HGB BLD-MCNC: 10.7 G/DL (ref 12–16)
IMM GRANULOCYTES # BLD AUTO: 0.01 X10(3) UL (ref 0–1)
IMM GRANULOCYTES NFR BLD: 0.2 %
LYMPHOCYTES # BLD AUTO: 0.93 X10(3) UL (ref 1–4)
LYMPHOCYTES NFR BLD AUTO: 22.3 %
MCH RBC QN AUTO: 29.7 PG (ref 26–34)
MCHC RBC AUTO-ENTMCNC: 32.5 G/DL (ref 31–37)
MCV RBC AUTO: 91.4 FL (ref 80–100)
MONOCYTES # BLD AUTO: 0.46 X10(3) UL (ref 0.1–1)
MONOCYTES NFR BLD AUTO: 11 %
NEUTROPHILS # BLD AUTO: 2.6 X10 (3) UL (ref 1.5–7.7)
NEUTROPHILS # BLD AUTO: 2.6 X10(3) UL (ref 1.5–7.7)
NEUTROPHILS NFR BLD AUTO: 62.5 %
PLATELET # BLD AUTO: 228 10(3)UL (ref 150–450)
RBC # BLD AUTO: 3.6 X10(6)UL (ref 3.8–5.3)
WBC # BLD AUTO: 4.2 X10(3) UL (ref 4–11)

## 2025-04-30 PROCEDURE — 85025 COMPLETE CBC W/AUTO DIFF WBC: CPT

## 2025-04-30 PROCEDURE — 99214 OFFICE O/P EST MOD 30 MIN: CPT | Performed by: NURSE PRACTITIONER

## 2025-04-30 PROCEDURE — 97112 NEUROMUSCULAR REEDUCATION: CPT

## 2025-04-30 PROCEDURE — 36415 COLL VENOUS BLD VENIPUNCTURE: CPT

## 2025-04-30 PROCEDURE — 97110 THERAPEUTIC EXERCISES: CPT

## 2025-04-30 NOTE — PROGRESS NOTES
.Weekly Wound Education Note    Teaching Provided To: Patient  Training Topics: Discharge instructions, Dressing, Cleasing and general instructions  Training Method: Explain/Verbal, Written  Training Response: Patient responds and understands            Dressing changed to honey gel, cover with dry dressing daily.

## 2025-04-30 NOTE — PROGRESS NOTES
Patient: Alina Aceves (44 year old, female) Referring Provider:  Insurance:   Diagnosis: Malignant neoplasm of upper-outer quadrant of left female breast (HCC) (C50.412)  Anemia (D64.9)  Open wound of chest wall, left, subsequent encounter (S21.102D)  Foot drop (M21.379)  Neuropathy (G62.9) No ref. provider found  MEDICAID   Date of Surgery: No data recorded Next MD visit:  N/A   Precautions:  Cancer No data recorded Referral Information:    Date of Evaluation: Req: 0, Auth: 0, Exp:     02/28/25 POC Auth Visits:  20       Today's Date   4/30/2025    Subjective  Pt twisted her R knee and ankle while gardening and is having some pain due to it. She says she also has some L knee swelling and ankle stiffness. She also noted low back pain that had been going on prior to session. Pt did follow up at urgent care and x-rays all negative for knees and lumbar spine.        Pain: pain not reported     Objective  Pt presented with a rolling walker due to pain from knee and ankle. Great toe extension was measured B and graded 1 on both sides, palpable contraction however no AROM noted. PROM was WNL.            Assessment  Pt had good participation within session. 3 way hip was difficult for the pt on the R due to being unable to  R foot. Tandem walking, side stepping, and hurdles used BUE support. No losses of balance were noted during the session. Pt was given a thoracic/lumbar stretch to help stretch out low back.  Education was also given about resting while still staying active, compression and use of short duration ice to help alleviate pain.    Goals (to be met in 20 visits)         Not Met Progress Toward Partially Met Met   Pt will demonstrate improved tandem stance to 5 sec or more to be able to safely negotiate narrow spaces such as the bathroom.  []  []  []  []    Pt will perform TUG in <18 seconds with least restrictive AD, demonstrating improved gait speed for community ambulation. []  []  []  []    Pt  will be able to perform STS with no UE support to be able to easily rise from chair without loss of balance. []  []  []  []    Pt will be able to squat to  light objects around the house without loss of stability. []  []  []  []    Pt will be able to ambulate 200 ft with least restrictive AD to be able to access multiple rooms in house safely  []  []  []  []    Pt will be independent and compliant with comprehensive HEP to maintain progress achieved in PT. []  []  []  []                                            Plan  Continue per plan of care.    Treatment Last 4 Visits  Treatment Day: 16       4/15/2025 4/18/2025 4/23/2025 4/30/2025   Neuro Treatment   Therapeutic Exercise Nustep, level 5, 5 min  3 way hip, 2x5 laps bilat   Side stepping with yellow band, 2 laps   LAQ 1.5# ankle weights, 2x10   Step ups to 6 in step, x10 bilat  Lat step overs to 6 in step, x10    AAROM DF with DF holds 2x15     Nustep Level 5 5 minutes   Nustep Lvl 5 5 mins  3 way hip YTB 2x8 B  Monster walks 2 laps YTB  Thoracic/lumbar stretch x5  ICE and compression education   Neuro Re-Education Tandem Walking on airex 2 laps   Side stepping on airex, 2 laps   A/P board taps, 2x1 min, open UE support     TB DF assisted hurdles ML and AP 2 laps each  Tandem Walking on airex TB DF assist 3 laps  AP taps on BB 2x1 min  Taps on step TB DF assist 3x30 seconds NMES assisted ambulation with cane 2 laps  NMES assisted obstacle course with cane 4 laps  Cone  4 laps  NMES assisted step ups 4 in step 2x12 Side stepping on airex 1 lap  Tandem walking on airex 2 laps  BB taps AP/ML 1 min each  Hurdles 2 laps   Therapeutic Exercise Minutes 30 5 5 20   Neuro Re-Educ Minutes 10 29 34 25   Total Time Of Timed Procedures 40 34 39 45   Total Time Of Service-Based Procedures 0 0 0 0   Total Treatment Time 40 34 39 45        HEP  Access Code: [email or web address] URL: [email or web address] Date: 03/31/2025 Prepared by: Trupti Richardson -  Supine Ankle Pumps  - 1 x daily - 7 x weekly - 2 sets - 10 reps - Supine Ankle Inversion Eversion AROM  - 1 x daily - 7 x weekly - 2 sets - 10 reps - Sit to Stand with Counter Support  - 3 x daily - 7 x weekly - 1 sets - 3 reps - Gastroc Stretch with Foot at Wall  - 1 x daily - 7 x weekly - 3 sets - 20 hold - Single Leg Stance with Support  - 1 x daily - 7 x weekly - 3 sets - 15 hold - Supine Bridge  - 1 x daily - 7 x weekly - 3 sets - 8 reps       Charges  1 TE, 2 NR         This treatment was provided by CHRISTOS Martinez under the direct and constant direction and supervision of a licensed therapist, who provided consultation regarding skilled judgements, treatment, and assessment of patient care.                  Pt c/o intermittent mid abdominal pain with intermittent nausea and diarrhea, since May. Denies past medical history.  Patient A&OX4.  Breathing non-labored.  Denies CP, no SOB noted.  Patient denies Abdominal pain at this time.  Labs sent.  Left IVL 20G in place and tolerated well.  Will continue to monitor.

## 2025-04-30 NOTE — PATIENT INSTRUCTIONS
Please return: 2 WEEKS        Patient discharge and wound care instructions  Alina Aceves  4/30/2025    You may shower and cleanse area with HIBICLENS and water.  rinse wound with ANASEPT cleanser,   dab dry with gauze   4.  Apply small amount of honey gel>bordered gauze (for at least 1 week, then you can change to rick if you want)        Nutrition and blood sugar control:  Focus on the following:  Protein: Meats, beans, eggs, milk and yogurt particularly Greek yogurt), tofu, soy nuts, soy protein products (Follow the protein handout in your welcome folder)  Vitamin C: Citrus fruits and juices, strawberries, tomatoes, tomato juice, peppers, baked potatoes, spinach, broccoli, cauliflower, [place] sprouts, cabbage  Vitamin A: Dark green, leafy vegetables, orange or yellow vegetables, cantaloupe, fortified dairy products, liver, fortified cereals  Zinc: Fortified cereals, red meats, seafood  Consider supplementing with Vitor by [employer]. It can be purchased on amazon, [employer] website, or local pharmacy may be able to order it for you.  (These are essential branch chain amino acids that help with tissue building and wound healing).   When your blood sugar is consistently elevated greater than 180 your body can't heal or fight infection.     Concerns:  Signs of infection may include the following:  Increase in redness  Red \"streaks\" from wound  Increase in swelling  Fever  Unusual odor  Change in the amount of wound drainage     Should you experience any significant changes in your wound(s) or have any questions regarding your home care instructions please contact the [facility] @ [phone] If after regular business hours, please call your family doctor or local emergency room. The treatment plan has been discussed at length between you and your provider. Follow all instructions carefully, it is very important. If you do not follow all instructions you are at risk of your wound not healing,  infection, possible loss of limb and even loss of life.

## 2025-05-01 ENCOUNTER — HOSPITAL ENCOUNTER (OUTPATIENT)
Dept: INTERVENTIONAL RADIOLOGY/VASCULAR | Facility: HOSPITAL | Age: 45
Discharge: HOME | End: 2025-05-01
Attending: INTERNAL MEDICINE | Admitting: INTERNAL MEDICINE
Payer: MEDICAID

## 2025-05-01 VITALS
RESPIRATION RATE: 15 BRPM | TEMPERATURE: 97 F | HEART RATE: 67 BPM | OXYGEN SATURATION: 99 % | DIASTOLIC BLOOD PRESSURE: 69 MMHG | SYSTOLIC BLOOD PRESSURE: 108 MMHG

## 2025-05-01 DIAGNOSIS — Z01.818 PRE-OP TESTING: Primary | ICD-10-CM

## 2025-05-01 DIAGNOSIS — C50.812 MALIGNANT NEOPLASM OF OVERLAPPING SITES OF LEFT BREAST IN FEMALE, ESTROGEN RECEPTOR NEGATIVE (HCC): ICD-10-CM

## 2025-05-01 DIAGNOSIS — Z95.828 PORT-A-CATH IN PLACE: ICD-10-CM

## 2025-05-01 DIAGNOSIS — Z17.1 MALIGNANT NEOPLASM OF OVERLAPPING SITES OF LEFT BREAST IN FEMALE, ESTROGEN RECEPTOR NEGATIVE (HCC): ICD-10-CM

## 2025-05-01 LAB — INR: 1 (ref 0.8–1.3)

## 2025-05-01 PROCEDURE — 99152 MOD SED SAME PHYS/QHP 5/>YRS: CPT | Performed by: RADIOLOGY

## 2025-05-01 PROCEDURE — 85610 PROTHROMBIN TIME: CPT | Performed by: RADIOLOGY

## 2025-05-01 PROCEDURE — 36590 REMOVAL TUNNELED CV CATH: CPT | Performed by: RADIOLOGY

## 2025-05-01 PROCEDURE — 77001 FLUOROGUIDE FOR VEIN DEVICE: CPT | Performed by: RADIOLOGY

## 2025-05-01 RX ORDER — SODIUM CHLORIDE 9 MG/ML
INJECTION, SOLUTION INTRAVENOUS CONTINUOUS
Status: DISCONTINUED | OUTPATIENT
Start: 2025-05-01 | End: 2025-05-01

## 2025-05-01 RX ORDER — CEFAZOLIN SODIUM 1 G/3ML
INJECTION, POWDER, FOR SOLUTION INTRAMUSCULAR; INTRAVENOUS
Status: COMPLETED
Start: 2025-05-01 | End: 2025-05-01

## 2025-05-01 RX ORDER — VANCOMYCIN HYDROCHLORIDE 1 G/20ML
INJECTION, POWDER, LYOPHILIZED, FOR SOLUTION INTRAVENOUS
Status: COMPLETED
Start: 2025-05-01 | End: 2025-05-01

## 2025-05-01 RX ORDER — MIDAZOLAM HYDROCHLORIDE 1 MG/ML
INJECTION INTRAMUSCULAR; INTRAVENOUS
Status: COMPLETED
Start: 2025-05-01 | End: 2025-05-01

## 2025-05-01 RX ORDER — LIDOCAINE HYDROCHLORIDE AND EPINEPHRINE BITARTRATE 20; .01 MG/ML; MG/ML
INJECTION, SOLUTION SUBCUTANEOUS
Status: COMPLETED
Start: 2025-05-01 | End: 2025-05-01

## 2025-05-01 RX ORDER — LIDOCAINE HYDROCHLORIDE 10 MG/ML
INJECTION, SOLUTION INFILTRATION; PERINEURAL
Status: COMPLETED
Start: 2025-05-01 | End: 2025-05-01

## 2025-05-01 NOTE — PROGRESS NOTES
Pt s/p right chest PAC removal. Right upper chest site soft, aquacel dressing in place and is c/d/I. VSS. Pt awake and tolerating PO. After recovery completed, discharge instructions reviewed with patient, copy given and all questions answered. IV removed. Pt discharged via wheelchair to [facility], sister to drive patient home.

## 2025-05-04 ENCOUNTER — APPOINTMENT (OUTPATIENT)
Dept: ULTRASOUND IMAGING | Facility: HOSPITAL | Age: 45
End: 2025-05-04
Attending: EMERGENCY MEDICINE
Payer: MEDICAID

## 2025-05-04 ENCOUNTER — HOSPITAL ENCOUNTER (EMERGENCY)
Facility: HOSPITAL | Age: 45
Discharge: HOME | End: 2025-05-04
Attending: EMERGENCY MEDICINE
Payer: MEDICAID

## 2025-05-04 ENCOUNTER — APPOINTMENT (OUTPATIENT)
Dept: GENERAL RADIOLOGY | Facility: HOSPITAL | Age: 45
End: 2025-05-04
Attending: EMERGENCY MEDICINE
Payer: MEDICAID

## 2025-05-04 VITALS
HEIGHT: 64 IN | HEART RATE: 84 BPM | WEIGHT: 118 LBS | SYSTOLIC BLOOD PRESSURE: 135 MMHG | TEMPERATURE: 98 F | DIASTOLIC BLOOD PRESSURE: 81 MMHG | OXYGEN SATURATION: 97 % | BODY MASS INDEX: 20.14 KG/M2 | RESPIRATION RATE: 18 BRPM

## 2025-05-04 DIAGNOSIS — M79.662 PAIN OF LEFT CALF: Primary | ICD-10-CM

## 2025-05-04 PROCEDURE — 93971 EXTREMITY STUDY: CPT | Performed by: EMERGENCY MEDICINE

## 2025-05-04 PROCEDURE — 99284 EMERGENCY DEPT VISIT MOD MDM: CPT

## 2025-05-04 PROCEDURE — 73590 X-RAY EXAM OF LOWER LEG: CPT | Performed by: EMERGENCY MEDICINE

## 2025-05-04 NOTE — ED PROVIDER NOTES
Patient Seen in: [facility] Emergency Department      History     Chief Complaint   Patient presents with    Leg or Foot Injury     Stated Complaint: injured left leg or poss dvt per pt. left leg swelling x2 days ago    Subjective:   HPI      History of Present Illness     This is a 44-year-old female who states that she had some left leg pain that started last Tuesday.  The patient describes the feeling as feeling a tightness the patient states that couple days prior to that she had fallen and injured her right knee.  She normally has a foot drop on the right from rate chemotherapy she has a history of breast cancer.  The patient states that she usually uses a cane on the right side and wonders if she might of strained the left side at that same time or but she has noticed the pain is primarily at 3 spots in her left tib-fib area.  Mostly in the lower tib-fib.  The patient states that she does not have any chest pain or shortness of breath she does have history of neuropathy with right foot drop.  Previous history of breast cancer she is finished with radiation, chemo.  She is 40         Objective:     Past Medical History:    Congestive heart disease (HCC)    Gastritis    Left Breast Cancer TNBC    Neoadjuvant ddAC/weekly Taxol    Neuropathy    Personal history of antineoplastic chemotherapy    Sjogren's disease (HCC)    SLE              Past Surgical History:   Procedure Laterality Date    Breast surgery Left 2023     delivery only      Chemotherapy          Hc port a cath access      Lumpectomy left Left 2023    [facility]    Needle biopsy left Left 2023    3 sites US guided biopsy -[facility]    Needle biopsy right Right 10/2015    US guided biopsy - benign abcess    Radiation left Left                 Social History     Socioeconomic History    Marital status:     Number of children: 2   Tobacco Use    Smoking status: Former     Current packs/day: 0.00     Types: Cigarettes     Quit  date: 2010     Years since quitting: 15.3     Passive exposure: Past    Smokeless tobacco: Never    Tobacco comments:     social smoker   Vaping Use    Vaping status: Never Used   Substance and Sexual Activity    Alcohol use: Not Currently    Drug use: Never     Social Drivers of Health     Food Insecurity: Patient Declined (4/7/2025)    Received from [facility]    Hunger Vital Sign     Worried About Running Out of Food in the Last Year: Patient declined     Ran Out of Food in the Last Year: Patient declined   Transportation Needs: Patient Declined (4/7/2025)    Received from [facility] - Transportation     Lack of Transportation (Medical): Patient declined     Lack of Transportation (Non-Medical): Patient declined   Recent Concern: Transportation Needs - Unmet Transportation Needs (1/9/2025)    Received from [facility] - Transportation     Lack of Transportation (Medical): Yes     Lack of Transportation (Non-Medical): Patient declined   Housing Stability: High Risk (4/7/2025)    Received from [facility]    Housing Stability Vital Sign     Unable to Pay for Housing in the Last Year: Yes                                Physical Exam     ED Triage Vitals [05/04/25 1355]   /81   Pulse 84   Resp 18   Temp 98.3 °F (36.8 °C)   Temp src Oral   SpO2 97 %   O2 Device None (Room air)       Current Vitals:   Vital Signs  BP: 135/81  Pulse: 84  Resp: 18  Temp: 98.3 °F (36.8 °C)  Temp src: Oral  MAP (mmHg): 99    Oxygen Therapy  SpO2: 97 %  O2 Device: None (Room air)        Physical Exam     Physical Exam         General: Female she is here with her daughter she is in no respiratory distress.  The patient is in no respiratory distress    HEENT: There is no signs of trauma.  Oral mucosa is wet.    Lungs: Clear to auscultation without wheezing or retractions    Cardiovascular: Regular without murmurs    Extremities:  Good pulses bilaterally.  The left leg is mildly tender in the distal tib-fib area there is no redness or cellulitis there is good pulses noted there is no signs of compartment syndrome the calves are soft.  There is no bony tenderness on the knee.  On the right knee she does have a little bruise on the right knee but she said she already had x-rays of the right knee and there is no effusion or cellulitis noted.  She has good pulses bilaterally.  No other bony tenderness is noted.    Neuro: Alert and oriented.  The patient is moving all extremities there is no focal findings.      ED Course   Labs Reviewed - No data to display       Results     rays were done to rule out fracture ultrasound was done to rule out DVT.      I personally reviewed the radiographs and my individual interpretation shows  No obvious fracture, dislocation.      Also reviewed official report and it shows     XR TIBIA + FIBULA (2 VIEWS), LEFT (CPT=73590)  Result Date: 5/4/2025  CONCLUSION:  No acute finding or specific abnormality.  Continued clinical correlation recommended.   LOCATION:  [facility]   Dictated by (CST): Lee Lundberg MD on 5/04/2025 at 4:02 PM     Finalized by (CST): Lee Lundberg MD on 5/04/2025 at 4:03 PM       US VENOUS DOPPLER LEG LEFT - DIAG IMG (CPT=93971)  Result Date: 5/4/2025  CONCLUSION:  Negative DVT study.   LOCATION:  [facility]    Dictated by (CST): Lee Lundberg MD on 5/04/2025 at 2:57 PM     Finalized by (CST): Lee Lundberg MD on 5/04/2025 at 2:58 PM              Possibilities include that this patient may have had the injury to the right knee and in light of the fact the patient also has neuropathy and foot drop may have been straining her left side there is no obvious DVT.  Discussed the importance of close follow-up she has no complaints of chest pain shortness of breath no findings of cellulitis good pulses noted to feel that she can go home but I recommend still close follow-up with her primary MD if she has any  persistent symptoms and return here for further evaluation possibly for repeat ultrasound.    MDM      I discussed that it may be it is ligamental strain she normally walks with a walker or cane.  And she has her walker with her I discussed if the pain gets worse to follow-up with her primary MD and orthopedic surgeon if she has increasing pain or severe pain to return here.        Medical Decision Making      Disposition and Plan     Clinical Impression:  1. Pain of left calf         Disposition:  Discharge  5/4/2025  4:07 pm    Follow-up:  Stephon Villeda  [address] [place] [id number] [phone]    Follow up in 2 day(s)      Beto Arzola MD  [address] [facility] [address]  [facility] [id number] [phone]    Follow up in 2 day(s)            Medications Prescribed:  Current Discharge Medication List          Supplementary Documentation:

## 2025-05-04 NOTE — DISCHARGE INSTRUCTIONS
Take Motrin, Tylenol, follow-up with your primary care physician return if increasing pain discomfort fevers or chills    You were seen in the emergency room in a limited time.  There is a possibility that although we do not see any acute process at this present time that things can change with time.  Is therefore imperative that you follow-up with primary care physician for close follow-up.  If there is any significant progression of your pain  or other symptoms you to return immediately to the emergency room.

## 2025-05-04 NOTE — ED INITIAL ASSESSMENT (HPI)
Patient reporting left leg pain that started last Tuesday. Describes pain as feeling \"tight\" that goes down the back of her left calf. Patient feels that left leg is swollen.   History of drop foot and neuropathy.

## 2025-05-07 ENCOUNTER — APPOINTMENT (OUTPATIENT)
Dept: PHYSICAL THERAPY | Facility: HOSPITAL | Age: 45
End: 2025-05-07
Attending: INTERNAL MEDICINE
Payer: MEDICAID

## 2025-05-09 ENCOUNTER — APPOINTMENT (OUTPATIENT)
Dept: PHYSICAL THERAPY | Facility: HOSPITAL | Age: 45
End: 2025-05-09
Attending: INTERNAL MEDICINE
Payer: MEDICAID

## 2025-05-12 ENCOUNTER — APPOINTMENT (OUTPATIENT)
Age: 45
End: 2025-05-12
Attending: INTERNAL MEDICINE
Payer: MEDICAID

## 2025-05-13 NOTE — PROGRESS NOTES
CHIEF COMPLAINT:     No chief complaint on file.    HPI:   Information obtained from Patient and chart  5-24-24 INITIAL:  43 year old female with Past medical history invasive ductal carcinoma of left breast (dr [facility]) currently undergoing radiation therapy (dr kobi bran), leukocytoclastic vasculitis (treated with clobetasol ointment by dr. Walton) iron deficiency anemia, HFrEF (Ariane Putnam), lupus, Sjogren's syndrome.  Patient was recently admitted for oral sores and n/v and elctrolyte imbalance and hypokalemia.    Earlier this week she was seen in urgent care for uti symptoms and hematuria. She was treated with cefadroxil and recommended to see primary md. dr bran recommended patient to start yary, she states she is not regular with it. Patient has seen nutrition/dietician.  Patient is active with [facility].  Earlier this week she had to cancel physical therapy due to wound pain.  on 5-11 patient was started on potassium citrate x 10 days and patient was to repeat labs, which she did on 5-21 but her potassium was still 2.9. she states she is taking the potassium.  I will udpate ariane putnam and I asked the patient to contact her as they may want her to increase her potassium.  Patient states that initially she was utilizing aquaphor but then developed an itchy red rash, so now has just been leaving it dry or using aveeno. She is able to tolerate very minimal cleansing of the area.  I discussed with her how to do it as tolerated in the shower with anasept.  Will utilize hydrogel cool sheets, patient to return next week. There is not any s/s of infection    HPI PRIOR TO APRIL 2025 SEE INDIVIDUAL PROGRESS NOTES  3-11-25 patient returns.  She did see dr graf last week and she has orders for continued f/u monitoring with mammo/mri. She has been utilizing triad paste to the wound, the original wound resolved however there is some tape stripping inferior to the wound.  We discussed utilizing  honey hydrogel sheet to his area and monitoring the original wound for any drainage.  We discussed that the original wound scar is not going to \"fill in\" to surrounding tissue level. Overtime it may become less noticeable, but most likely she will always have a small depression there.  No s/s of infection or c/o pain.    4-1-25 patient returns.  She continues with physical therapy for her foot drop.  She also saw dr. Diaz with [facility] and she is to f/u with her in 4 month and get bilateral mammogram in may.  Last visit the main/initial wound was closed but she had some epidermal stripping related to bandages infereior to that.  She has been utilizing silver alginate, but she states each time she tries to clean it it from drainage (which had increased) the inferior skin edge seems to be lifting more.  No new c/o pain.  Wound is larger. We cultured and will start on topical abx ointment and treat po based on results and sensitivities. Patient is concerned due to this set back. She was encouraged will get it rehealed.     4-10-25 patient returns. Patient followed up with rheumatology at [facility] yesterday and she will be restarting on hydroxychloroquinilone Culture from last visit was + for MRSA, she was to continue the gent topical and I started her on po doxy.  I also asked her to utilize hibiclens 1-2x a week to assist with overal body declonization. She is scheduled to get her port removed next week the wound is improved. Due to port being removed, will extend the doxy. Will stop the gentamicin and transition to triad paste.      4-22-25 patient returns. Her port removal did get rescheduled for 5-1-25.  She has completed the po doxy.  She was utilizing triad paste, but felt as though the drainage was accumulating under it. Wound is the same size and characteristics.  She asked about potential surgical closure we had a discussion if she was going to do that I would recommend treatment with hbo before and after to  increase the success of surgical intervention in an irradiated field.  We will utilize rick collagen and silver alginate. Patient's insurance will be changing again soon. No acute s/s of infection or c/o pain.    4-30-25 patient returns. She states that when she was using the silver alginate she felt like it was leaving an indentation, so she stopped using it and just has been applying rick and bordered gauze. The wound is slightly smaller, the wound bed remains grey and fibriny. She states she has not been able to see her original surgeon (with [facility]) yet. We discussed the wound bed. Will utilize honey at least for the next week to try and encouraged granulation and less of a \"woody\" wound bed.  Patient can return to rick in a week if the wound bed is more pink/red in appearance. No acute s/s of infection. Or c/o pain. She is scheduled again to get her port removed and is headed to get pre-op labs done.    5-14-25 patient returns.  She got her port removed on 5-1. She was also seen in [facility] for left leg pain: xray and venous us were negative. She has been dressing the breast wound with *** wound bed?***  MEDICATIONS:       Current Outpatient Medications:     Prenatal Vit-Fe Fumarate-FA (MULTI PRENATAL) 27-0.8 MG Oral Tab, Take by mouth., Disp: , Rfl:     metoprolol succinate ER 50 MG Oral Tablet 24 Hr, Take 1 tablet (50 mg total) by mouth in the morning., Disp: , Rfl:     Potassium Chloride ER 10 MEQ Oral Tab CR, Take 1 tablet (10 mEq total) by mouth daily., Disp: 30 tablet, Rfl: 0    DULoxetine 30 MG Oral Cap DR Particles, Take 1 capsule (30 mg total) by mouth in the morning. Takes one tablet., Disp: , Rfl:     zolpidem 10 MG Oral Tab, Take 1 tablet (10 mg total) by mouth nightly as needed for Sleep., Disp: , Rfl:   ALLERGIES:     Allergies   Allergen Reactions    Bactrim [Sulfamethoxazole W/Trimethoprim] RASH    Adhesive Tape RASH      REVIEW OF SYSTEMS:   This information was obtained from the  patient/family and chart.    See HPI for pertinent positives, otherwise 10 pt ROS negative.  HISTORY:   Past medical, surgical, family and social history updated where appropriate.      PHYSICAL EXAM:     There were no vitals filed for this visit.    Estimated body mass index is 20.25 kg/m² as calculated from the following:    Height as of 5/4/25: 64\".    Weight as of 5/4/25: 118 lb (53.5 kg).   POC Glucose   Date Value Ref Range Status   12/19/2023 72 70 - 99 mg/dL Final   12/18/2023 125 (H) 70 - 99 mg/dL Final   12/18/2023 84 70 - 99 mg/dL Final       Vital signs reviewed.Appears stated age, well groomed.    Constitutional:  Bp ***wnl. Pulse Regular and wnl for patient. Respirations easy and unlabored. Temperature wnl. Weight wnl for height. Appearance neat and clean. Appears in no acute distress.     Musculoskeletal:  Patient ambulation is stable with walker  Integumentary:  refer to wound characteristics and images   Psychiatric:  Judgment and insight intact. Alert and oriented times 3. No evidence of depression, anxiety, or agitation. Calm, cooperative, and communicative. Appropriate interactions and affect.  DIAGNOSTICS:     Lab Results   Component Value Date    BUN 22 03/07/2025    CREATSERUM 0.94 03/07/2025    ALB 4.4 03/07/2025    TP 8.9 (H) 02/12/2025    TP 8.4 (H) 02/12/2025       WOUND ASSESSMENT:     Wound 05/24/24 #1 Radiation therapy Breast Left (Active)   Date First Assessed/Time First Assessed: 05/24/24 0857    Wound Number (Wound Clinic Only): #1 Radiation therapy  Primary Wound Type: Other (comment)  Location: Breast  Wound Location Orientation: Left      Assessments 5/24/2024  8:59 AM 5/1/2025 12:00 PM   Wound Image       Site Assessment -- Unable to assess   Drainage Amount Large None   Drainage Description Serous;Yellow --   Dressing Status -- Clean;Dry;Intact   Wound Length (cm) 15.5 cm --   Wound Width (cm) 19 cm --   Wound Surface Area (cm^2) 294.5 cm^2 --   Wound Depth (cm) 0.1 cm --    Wound Volume (cm^3) 29.45 cm^3 --   Margins Well-defined edges --   Non-staged Wound Description Full thickness --   Alfreda-wound Assessment Moist;Edema --   Wound Granulation Tissue Firm;Pink --   Wound Bed Granulation (%) 20 % --   Wound Bed Epithelium (%) 50 % --   Wound Bed Slough (%) 30 % --   Wound Odor None --       No associated orders.       Wound 05/01/25 Chest Right;Upper (Active)   Date First Assessed/Time First Assessed: 05/01/25 1200   Location: Chest  Wound Location Orientation: Right;Upper  Wound Description (Comments): PAC removal site      Assessments 5/1/2025 12:00 PM 5/1/2025  1:00 PM   Site Assessment Unable to assess Unable to assess   Closure Steri-strips Steri-strips   Drainage Amount None None   Dressing Aquacel Aquacel   Dressing Changed New New   Dressing Status Clean;Dry;Intact Clean;Dry;Intact       No associated orders.          ASSESSMENT AND PLAN:    There are no diagnoses linked to this encounter.      Risks, benefits, and alternatives of current treatment plan discussed in detail.  Questions and concerns addressed. Red flags to RTC or ED reviewed.  Patient (or parent) agrees to plan.      NOTE TO PATIENT: The 21st Century Cures Act makes clinical notes like these available to patients in the interest of transparency. Clinical notes are medical documents used by physicians and care providers to communicate with each other. These documents include medical language and terminology, abbreviations, and treatment information that may sound technical and at times possibly unfamiliar. In addition, at times, the verbiage may appear blunt or direct. These documents are one tool providers use to communicate relevant information and clinical opinions of the care providers in a way that allows common understanding of the clinical context.   I spen***minutes with the patient. This time included:    preparing to see the patient (eg, review notes and recent diagnostics),  seeing the patient,  obtaining and/or reviewing separately obtained history, performing a medically appropriate examination and/or evaluation, counseling and educating the patient, documenting in the record,  DISCHARGE:      There are no Patient Instructions on file for this visit.   Anita Smallwood FNP-C, CWCN-AP, CFCN, CSWS, WCC, DWC  5/14/2025

## 2025-05-14 ENCOUNTER — APPOINTMENT (OUTPATIENT)
Dept: PHYSICAL THERAPY | Facility: HOSPITAL | Age: 45
End: 2025-05-14
Attending: INTERNAL MEDICINE
Payer: MEDICAID

## 2025-05-14 ENCOUNTER — APPOINTMENT (OUTPATIENT)
Dept: WOUND CARE | Facility: HOSPITAL | Age: 45
End: 2025-05-14
Attending: NURSE PRACTITIONER
Payer: MEDICAID

## 2025-05-14 ENCOUNTER — TELEPHONE (OUTPATIENT)
Dept: WOUND CARE | Facility: HOSPITAL | Age: 45
End: 2025-05-14

## 2025-05-16 ENCOUNTER — OFFICE VISIT (OUTPATIENT)
Dept: PHYSICAL THERAPY | Facility: HOSPITAL | Age: 45
End: 2025-05-16
Attending: INTERNAL MEDICINE
Payer: MEDICAID

## 2025-05-16 PROCEDURE — 97112 NEUROMUSCULAR REEDUCATION: CPT

## 2025-05-16 PROCEDURE — 97110 THERAPEUTIC EXERCISES: CPT

## 2025-05-16 NOTE — PROGRESS NOTES
Patient: Alina Aceves (44 year old, female) Referring Provider:  Insurance:   Diagnosis: Malignant neoplasm of upper-outer quadrant of left female breast (HCC) (C50.412)  Anemia (D64.9)  Open wound of chest wall, left, subsequent encounter (S21.102D)  Foot drop (M21.379)  Neuropathy (G62.9) No ref. provider found  MEDICAID   Date of Surgery: N/a Next MD visit:  N/A   Precautions:  Cancer No data recorded Referral Information:    Date of Evaluation: Req: 0, Auth: 0, Exp:     No data recorded POC Auth Visits:  20       Today's Date   5/16/2025    Subjective  Pt says she's been doing well. Her insurance issues resolved and she got her port removed. She noted some pain and swelling on L foot due to injury.       Pain: pain not reported     Objective  Pt presented to therapy with a cane. Pt was able to complete EC Romberg for 30 seconds with use of 2 finger support. see tx sheet.            Assessment  Pt had great participation within session. Pt was given verbal cues to help with sequencing in step up and step downs off step. The pt was able to complete ML hurdles with no catches of her foot or loss of balance. SLS was completed with 2 finger support and minimal sway. DLHR was noted to have more range of motion compared to prior sessions.    Goals (to be met in 20 visits)         Not Met Progress Toward Partially Met Met   Pt will demonstrate improved tandem stance to 5 sec or more to be able to safely negotiate narrow spaces such as the bathroom.  []  []  []  []    Pt will perform TUG in <18 seconds with least restrictive AD, demonstrating improved gait speed for community ambulation. []  []  []  []    Pt will be able to perform STS with no UE support to be able to easily rise from chair without loss of balance. []  []  []  []    Pt will be able to squat to  light objects around the house without loss of stability. []  []  []  []    Pt will be able to ambulate 200 ft with least restrictive AD to be able to  access multiple rooms in house safely  []  []  []  []    Pt will be independent and compliant with comprehensive HEP to maintain progress achieved in PT. []  []  []  []                                            Plan  Continue per plan of care.    Treatment Last 4 Visits  Treatment Day: 17 4/18/2025 4/23/2025 4/30/2025 5/16/2025   Neuro Treatment   Therapeutic Exercise AAROM DF with DF holds 2x15     Nustep Level 5 5 minutes   Nustep Lvl 5 5 mins  3 way hip YTB 2x8 B  Monster walks 2 laps YTB  Thoracic/lumbar stretch x5  ICE and compression education Nustep lvl 5 5 mins  Step up and Step downs off step 2x12  DLHR 2x10  Standing march with arm in air 2lb weight 1 UE support for balance 2x10   Neuro Re-Education TB DF assisted hurdles ML and AP 2 laps each  Tandem Walking on airex TB DF assist 3 laps  AP taps on BB 2x1 min  Taps on step TB DF assist 3x30 seconds NMES assisted ambulation with cane 2 laps  NMES assisted obstacle course with cane 4 laps  [facility]  4 laps  NMES assisted step ups 4 in step 2x12 Side stepping on airex 1 lap  Tandem walking on airex 2 laps  BB taps AP/ML 1 min each  Hurdles 2 laps ML Hurdles 2 laps  Taps AP/ML 1 min each  EC Romberg on airex 2 finger support 3x30 seconds  SLS 1x30 seconds each leg 2 finger support   Therapeutic Exercise Minutes 5 5 20 20   Neuro Re-Educ Minutes 29 34 25 25   Total Time Of Timed Procedures 34 39 45 45   Total Time Of Service-Based Procedures 0 0 0 0   Total Treatment Time 34 39 45 45        HEP  Access Code: [email or web address] URL: [email or web address] Date: 03/31/2025 Prepared by: Trupti Salazar Exercises - Supine Ankle Pumps  - 1 x daily - 7 x weekly - 2 sets - 10 reps - Supine Ankle Inversion Eversion AROM  - 1 x daily - 7 x weekly - 2 sets - 10 reps - Sit to Stand with Counter Support  - 3 x daily - 7 x weekly - 1 sets - 3 reps - Gastroc Stretch with Foot at Wall  - 1 x daily - 7 x weekly - 3 sets - 20 hold - Single Leg Stance with  Support  - 1 x daily - 7 x weekly - 3 sets - 15 hold - Supine Bridge  - 1 x daily - 7 x weekly - 3 sets - 8 reps       Charges  1 TE, 2 NR       This treatment was provided by CHRISTOS Martinez under the direct and constant direction and supervision of a licensed therapist, who provided consultation regarding skilled judgements, treatment, and assessment of patient care.

## 2025-05-20 NOTE — PROGRESS NOTES
CHIEF COMPLAINT:     Chief Complaint   Patient presents with    Wound Care     Patients is here for a follow up. Patients stated no issue at this moment      HPI:   Information obtained from Patient and chart  5-24-24 INITIAL:  43 year old female with Past medical history invasive ductal carcinoma of left breast (dr [facility]) currently undergoing radiation therapy (dr kobi bran), leukocytoclastic vasculitis (treated with clobetasol ointment by dr. Walton) iron deficiency anemia, HFrEF (Ariane Putnam), lupus, Sjogren's syndrome.  Patient was recently admitted for oral sores and n/v and elctrolyte imbalance and hypokalemia.    Earlier this week she was seen in urgent care for uti symptoms and hematuria. She was treated with cefadroxil and recommended to see primary md. dr bran recommended patient to start yary, she states she is not regular with it. Patient has seen nutrition/dietician.  Patient is active with [facility].  Earlier this week she had to cancel physical therapy due to wound pain.  on 5-11 patient was started on potassium citrate x 10 days and patient was to repeat labs, which she did on 5-21 but her potassium was still 2.9. she states she is taking the potassium.  I will udpate ariane néstor and I asked the patient to contact her as they may want her to increase her potassium.  Patient states that initially she was utilizing aquaphor but then developed an itchy red rash, so now has just been leaving it dry or using aveeno. She is able to tolerate very minimal cleansing of the area.  I discussed with her how to do it as tolerated in the shower with anasept.  Will utilize hydrogel cool sheets, patient to return next week. There is not any s/s of infection    HPI PRIOR TO APRIL 2025 SEE INDIVIDUAL PROGRESS NOTES  3-11-25 patient returns.  She did see dr graf last week and she has orders for continued f/u monitoring with mammo/mri. She has been utilizing triad paste to the wound, the  original wound resolved however there is some tape stripping inferior to the wound.  We discussed utilizing honey hydrogel sheet to his area and monitoring the original wound for any drainage.  We discussed that the original wound scar is not going to \"fill in\" to surrounding tissue level. Overtime it may become less noticeable, but most likely she will always have a small depression there.  No s/s of infection or c/o pain.    4-1-25 patient returns.  She continues with physical therapy for her foot drop.  She also saw dr. Diaz with [facility] and she is to f/u with her in 4 month and get bilateral mammogram in may.  Last visit the main/initial wound was closed but she had some epidermal stripping related to bandages infereior to that.  She has been utilizing silver alginate, but she states each time she tries to clean it it from drainage (which had increased) the inferior skin edge seems to be lifting more.  No new c/o pain.  Wound is larger. We cultured and will start on topical abx ointment and treat po based on results and sensitivities. Patient is concerned due to this set back. She was encouraged will get it rehealed.     4-10-25 patient returns. Patient followed up with rheumatology at [facility] yesterday and she will be restarting on hydroxychloroquinilone Culture from last visit was + for MRSA, she was to continue the gent topical and I started her on po doxy.  I also asked her to utilize hibiclens 1-2x a week to assist with overal body declonization. She is scheduled to get her port removed next week the wound is improved. Due to port being removed, will extend the doxy. Will stop the gentamicin and transition to triad paste.      4-22-25 patient returns. Her port removal did get rescheduled for 5-1-25.  She has completed the po doxy.  She was utilizing triad paste, but felt as though the drainage was accumulating under it. Wound is the same size and characteristics.  She asked about potential surgical closure  we had a discussion if she was going to do that I would recommend treatment with hbo before and after to increase the success of surgical intervention in an irradiated field.  We will utilize rick collagen and silver alginate. Patient's insurance will be changing again soon. No acute s/s of infection or c/o pain.    4-30-25 patient returns. She states that when she was using the silver alginate she felt like it was leaving an indentation, so she stopped using it and just has been applying rick and bordered gauze. The wound is slightly smaller, the wound bed remains grey and fibriny. She states she has not been able to see her original surgeon (with [facility]) yet. We discussed the wound bed. Will utilize honey at least for the next week to try and encouraged granulation and less of a \"woody\" wound bed.  Patient can return to rick in a week if the wound bed is more pink/red in appearance. No acute s/s of infection. Or c/o pain. She is scheduled again to get her port removed and is headed to get pre-op labs done.    5-21-25 patient returns.  She got her port removed on 5-1. She was also seen in ed for left leg pain: xray and venous us were negative. She had an appointment last week but there was an issue with her insurance so she ws rescheduled to this week.  She has been dressing the breast wound with honey and then transitioned to triad and then collagen.  The wound is resolved, no active drainage. We discussed moisturization, being cognizant of clothing choices that she wears as to not cause friction on the irradiated/fragile area. Patient dc'd from clinic to f/u as needed.   MEDICATIONS:       Current Outpatient Medications:     Prenatal Vit-Fe Fumarate-FA (MULTI PRENATAL) 27-0.8 MG Oral Tab, Take by mouth., Disp: , Rfl:     metoprolol succinate ER 50 MG Oral Tablet 24 Hr, Take 1 tablet (50 mg total) by mouth in the morning., Disp: , Rfl:     Potassium Chloride ER 10 MEQ Oral Tab CR, Take 1 tablet (10 mEq  total) by mouth daily., Disp: 30 tablet, Rfl: 0    DULoxetine 30 MG Oral Cap DR Particles, Take 1 capsule (30 mg total) by mouth in the morning. Takes one tablet., Disp: , Rfl:     zolpidem 10 MG Oral Tab, Take 1 tablet (10 mg total) by mouth nightly as needed for Sleep., Disp: , Rfl:   ALLERGIES:     Allergies   Allergen Reactions    Bactrim [Sulfamethoxazole W/Trimethoprim] RASH    Adhesive Tape RASH      REVIEW OF SYSTEMS:   This information was obtained from the patient/family and chart.    See HPI for pertinent positives, otherwise 10 pt ROS negative.  HISTORY:   Past medical, surgical, family and social history updated where appropriate.      PHYSICAL EXAM:     Vitals:    05/21/25 0846   BP: 118/82   Pulse: 84   Resp: 16   Temp: 98 °F (36.7 °C)       Estimated body mass index is 20.25 kg/m² as calculated from the following:    Height as of 5/4/25: 64\".    Weight as of 5/4/25: 118 lb (53.5 kg).   POC Glucose   Date Value Ref Range Status   12/19/2023 72 70 - 99 mg/dL Final   12/18/2023 125 (H) 70 - 99 mg/dL Final   12/18/2023 84 70 - 99 mg/dL Final       Vital signs reviewed.Appears stated age, well groomed.    Constitutional:  Bp wnl. Pulse Regular and wnl for patient. Respirations easy and unlabored. Temperature wnl. Weight wnl for height. Appearance neat and clean. Appears in no acute distress.     Musculoskeletal:  Patient ambulation is stable with cane  Integumentary:  refer to wound characteristics and images   Psychiatric:  Judgment and insight intact. Alert and oriented times 3. No evidence of depression, anxiety, or agitation. Calm, cooperative, and communicative. Appropriate interactions and affect.  DIAGNOSTICS:     Lab Results   Component Value Date    BUN 22 03/07/2025    CREATSERUM 0.94 03/07/2025    ALB 4.4 03/07/2025    TP 8.9 (H) 02/12/2025    TP 8.4 (H) 02/12/2025       WOUND ASSESSMENT:     Wound 05/24/24 #1 Radiation therapy Breast Left (Active)   Date First Assessed/Time First Assessed:  05/24/24 0857    Wound Number (Wound Clinic Only): #1 Radiation therapy  Primary Wound Type: Other (comment)  Location: Breast  Wound Location Orientation: Left      Assessments 5/24/2024  8:59 AM 5/21/2025  8:45 AM   Wound Image        Drainage Amount Large None   Drainage Description Serous;Yellow --   Wound Length (cm) 15.5 cm 0 cm   Wound Width (cm) 19 cm 0 cm   Wound Surface Area (cm^2) 294.5 cm^2 0 cm^2   Wound Depth (cm) 0.1 cm 0 cm   Wound Volume (cm^3) 29.45 cm^3 0 cm^3   Wound Healing % -- 100   Margins Well-defined edges --   Non-staged Wound Description Full thickness Full thickness   Alfreda-wound Assessment Moist;Edema --   Wound Granulation Tissue Firm;Pink --   Wound Bed Granulation (%) 20 % --   Wound Bed Epithelium (%) 50 % 100 %   Wound Bed Slough (%) 30 % --   Wound Odor None --       No associated orders.       Wound 05/01/25 Chest Right;Upper (Active)   Date First Assessed/Time First Assessed: 05/01/25 1200   Location: Chest  Wound Location Orientation: Right;Upper  Wound Description (Comments): PAC removal site      Assessments 5/1/2025 12:00 PM 5/1/2025  1:00 PM   Site Assessment Unable to assess Unable to assess   Closure Steri-strips Steri-strips   Drainage Amount None None   Dressing Aquacel Aquacel   Dressing Changed New New   Dressing Status Clean;Dry;Intact Clean;Dry;Intact       No associated orders.          ASSESSMENT AND PLAN:    1. 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 Adverse effect of radiation, subsequent encounter    3. SLE (systemic lupus erythematosus related syndrome) (HCC)        Risks, benefits, and alternatives of current treatment plan discussed in detail.  Questions and concerns addressed. Red flags to RTC or ED reviewed.  Patient (or parent) agrees to plan.      NOTE TO PATIENT: The 21st Century Cures Act makes clinical notes like these available to patients in the interest of transparency. Clinical notes are medical  documents used by physicians and care providers to communicate with each other. These documents include medical language and terminology, abbreviations, and treatment information that may sound technical and at times possibly unfamiliar. In addition, at times, the verbiage may appear blunt or direct. These documents are one tool providers use to communicate relevant information and clinical opinions of the care providers in a way that allows common understanding of the clinical context.   I spent 20 minutes with the patient. This time included:    preparing to see the patient (eg, review notes and recent diagnostics),  seeing the patient, obtaining and/or reviewing separately obtained history, performing a medically appropriate examination and/or evaluation, counseling and educating the patient, documenting in the record,  DISCHARGE:      Patient Instructions   Patient discharge and wound care instructions  Alina Aceves  5/21/2025     Discharge from clinic    Follow-up as needed.   Anita Smallwood FNP-C, CWCN-AP, CFCN, CSWS, WCC, DWC  5/21/2025

## 2025-05-21 ENCOUNTER — OFFICE VISIT (OUTPATIENT)
Dept: WOUND CARE | Facility: HOSPITAL | Age: 45
End: 2025-05-21
Attending: NURSE PRACTITIONER
Payer: MEDICAID

## 2025-05-21 ENCOUNTER — OFFICE VISIT (OUTPATIENT)
Age: 45
End: 2025-05-21
Attending: INTERNAL MEDICINE
Payer: MEDICAID

## 2025-05-21 VITALS
SYSTOLIC BLOOD PRESSURE: 118 MMHG | HEART RATE: 84 BPM | TEMPERATURE: 98 F | RESPIRATION RATE: 16 BRPM | DIASTOLIC BLOOD PRESSURE: 82 MMHG

## 2025-05-21 DIAGNOSIS — L98.492 NON-PRESSURE CHRONIC ULCER OF SKIN OF OTHER SITES WITH FAT LAYER EXPOSED (HCC): Primary | ICD-10-CM

## 2025-05-21 DIAGNOSIS — D47.2 MGUS (MONOCLONAL GAMMOPATHY OF UNKNOWN SIGNIFICANCE): ICD-10-CM

## 2025-05-21 DIAGNOSIS — C50.919 MALIGNANT NEOPLASM OF FEMALE BREAST (HCC): ICD-10-CM

## 2025-05-21 DIAGNOSIS — M32.9 SLE (SYSTEMIC LUPUS ERYTHEMATOSUS RELATED SYNDROME) (HCC): ICD-10-CM

## 2025-05-21 DIAGNOSIS — C50.812 MALIGNANT NEOPLASM OF OVERLAPPING SITES OF LEFT BREAST IN FEMALE, ESTROGEN RECEPTOR NEGATIVE (HCC): Primary | ICD-10-CM

## 2025-05-21 DIAGNOSIS — T66.XXXD ADVERSE EFFECT OF RADIATION, SUBSEQUENT ENCOUNTER: ICD-10-CM

## 2025-05-21 DIAGNOSIS — Z17.1 MALIGNANT NEOPLASM OF OVERLAPPING SITES OF LEFT BREAST IN FEMALE, ESTROGEN RECEPTOR NEGATIVE (HCC): Primary | ICD-10-CM

## 2025-05-21 LAB
ALBUMIN SERPL-MCNC: 4.6 G/DL (ref 3.2–4.8)
ALBUMIN/GLOB SERPL: 1.2 {RATIO} (ref 1–2)
ALP LIVER SERPL-CCNC: 140 U/L (ref 37–98)
ALT SERPL-CCNC: 21 U/L (ref 10–49)
ANION GAP SERPL CALC-SCNC: 11 MMOL/L (ref 0–18)
AST SERPL-CCNC: 26 U/L (ref ?–34)
BASOPHILS # BLD AUTO: 0.01 X10(3) UL (ref 0–0.2)
BASOPHILS NFR BLD AUTO: 0.2 %
BILIRUB SERPL-MCNC: 0.3 MG/DL (ref 0.3–1.2)
BUN BLD-MCNC: 16 MG/DL (ref 9–23)
CALCIUM BLD-MCNC: 9.9 MG/DL (ref 8.7–10.6)
CHLORIDE SERPL-SCNC: 111 MMOL/L (ref 98–112)
CO2 SERPL-SCNC: 21 MMOL/L (ref 21–32)
CREAT BLD-MCNC: 0.94 MG/DL (ref 0.55–1.02)
EGFRCR SERPLBLD CKD-EPI 2021: 77 ML/MIN/1.73M2 (ref 60–?)
EOSINOPHIL # BLD AUTO: 0.15 X10(3) UL (ref 0–0.7)
EOSINOPHIL NFR BLD AUTO: 3.3 %
ERYTHROCYTE [DISTWIDTH] IN BLOOD BY AUTOMATED COUNT: 13.2 %
GLOBULIN PLAS-MCNC: 4 G/DL (ref 2–3.5)
GLUCOSE BLD-MCNC: 92 MG/DL (ref 70–99)
HCT VFR BLD AUTO: 34.9 % (ref 35–48)
HGB BLD-MCNC: 11.4 G/DL (ref 12–16)
IMM GRANULOCYTES # BLD AUTO: 0.01 X10(3) UL (ref 0–1)
IMM GRANULOCYTES NFR BLD: 0.2 %
LDH SERPL L TO P-CCNC: 168 U/L (ref 120–246)
LYMPHOCYTES # BLD AUTO: 0.99 X10(3) UL (ref 1–4)
LYMPHOCYTES NFR BLD AUTO: 22.1 %
MCH RBC QN AUTO: 29.6 PG (ref 26–34)
MCHC RBC AUTO-ENTMCNC: 32.7 G/DL (ref 31–37)
MCV RBC AUTO: 90.6 FL (ref 80–100)
MONOCYTES # BLD AUTO: 0.58 X10(3) UL (ref 0.1–1)
MONOCYTES NFR BLD AUTO: 12.9 %
NEUTROPHILS # BLD AUTO: 2.74 X10 (3) UL (ref 1.5–7.7)
NEUTROPHILS # BLD AUTO: 2.74 X10(3) UL (ref 1.5–7.7)
NEUTROPHILS NFR BLD AUTO: 61.3 %
OSMOLALITY SERPL CALC.SUM OF ELEC: 297 MOSM/KG (ref 275–295)
PLATELET # BLD AUTO: 255 10(3)UL (ref 150–450)
POTASSIUM SERPL-SCNC: 3.2 MMOL/L (ref 3.5–5.1)
PROT SERPL-MCNC: 8.6 G/DL (ref 5.7–8.2)
RBC # BLD AUTO: 3.85 X10(6)UL (ref 3.8–5.3)
SODIUM SERPL-SCNC: 143 MMOL/L (ref 136–145)
WBC # BLD AUTO: 4.5 X10(3) UL (ref 4–11)

## 2025-05-21 PROCEDURE — 99213 OFFICE O/P EST LOW 20 MIN: CPT | Performed by: NURSE PRACTITIONER

## 2025-05-21 NOTE — PROGRESS NOTES
Patient here for 3 month f/u and labs for breast cancer. Patient states that the wound on her left breast has resolved and her last wound care appt was yesterday. Patient completed a mammogram on 4/22 and has breast MRI scheduled for September 2025. Patient has a neurology consult scheduled for July 2025. Patient followed up with her rheumatologist. Patient denies fever, dyspnea, chest pain or pain.     Education Record    Learner:  Patient    Disease / Diagnosis: breast cancer    Barriers / Limitations:  None   Comments:    Method:  Discussion   Comments:    General Topics:  Infection, Medication, Side effects and symptom management, and Plan of care reviewed   Comments:    Outcome:  Shows understanding   Comments:

## 2025-05-21 NOTE — PATIENT INSTRUCTIONS
Patient discharge and wound care instructions  Alina Aceves  5/21/2025     Discharge from clinic    Follow-up as needed.

## 2025-05-21 NOTE — PROGRESS NOTES
Cancer Center Progress Note    Problem List:      Problem List[1]      History of Present Illness  Alina Aceves is a 44 year old female with triple negative breast cancer who presents for routine follow-up.    Diagnosed with left breast cancer in April 2023, she underwent neoadjuvant chemotherapy with Adriamycin, cyclophosphamide, and paclitaxel. A lumpectomy in November 2023 revealed two millimeters of residual invasive disease. She started capecitabine, which was associated with severe mucositis. Her last mammogram on April 22, 2025, showed benign appearing lumpectomy changes with no mass or distortion, and a six-month follow-up was recommended. She is scheduled for an MRI in September.    She has a history of systemic lupus erythematosus, congestive heart failure, and monoclonal gammopathy of undetermined significance. She is at a small risk for multiple myeloma and has been following up on this condition. A prior PET scan before chemotherapy showed a mildly hypermetabolic left breast lesion and an enlarged left axillary node, but no distant metastasis. The axillary node biopsy was negative, attributed to lupus.    Her wound from radiation treatment has healed. She experiences lower back pain, which was evaluated with x-rays showing age-related changes. She has two lipomas on her back and has been undergoing physical therapy, progressing from using a walker to a cane. She also reports pain in her left breast and under her rib cage.    Her current medications include potassium, which she missed for two days due to refill issues. Recent blood work shows improving hemoglobin levels and normal kidney function, but a mild elevation in alkaline phosphatase. She continues to follow up every three months for her MGUS protein levels and other health concerns.    No new pains or breathing problems. Reports lower back pain and pain in the left breast and under the rib cage.      Review of Systems:   Constitutional:  Negative for anorexia, fevers, chills, night sweats and weight loss.  Eyes: Negative for visual disturbance, irritation and redness.  Respiratory: Negative for cough, hemoptysis, chest pain, or dyspnea.  Cardiovascular: Negative for angina, orthopnea or palpitations.  Gastrointestinal: Negative for nausea, vomiting, change in bowel habits, diarrhea, constipation and abdominal pain.  Integument/breast: Negative for rash, skin lesions, and pruritus.  Hematologic/lymphatic: Negative for easy bruising, bleeding, and lymphadenopathy.  Psychiatric: The patient's mood was calm and appropriate for this visit.  The pertinent positives and negatives were described. All other systems were negative.    PMH/PSH:  Past Medical History[2]    Past Surgical History[3]    Family History Reviewed:  Family History[4]    Allergies:     Allergies[5]    Medications:  Current Medications[6]       Vital Signs:      Height: --  Weight: --  BSA (Calculated - sq m): --  Pulse: 84 (05/21 0846)  BP: 118/82 (05/21 0846)  Temp: 98 °F (36.7 °C) (05/21 0846)  Do Not Use - Resp Rate: --  SpO2: --      Performance Status:  ECOG 1: Restricted in physically strenuous activity but ambulatory and able to carry out work of a light or sedentary nature.     Physical Examination:      Constitutional: Patient is alert and oriented x 3, not in acute distress.  Eyes: Anicteric sclera, pink conjunctiva.  HEENT:  Oropharynx is clear. Neck is supple.  Respiratory: Clear to auscultation and percussion. No rales.  No wheezes.  Cardiovascular: Regular rate and rhythm. No murmurs.  Gastrointestinal: Soft, non tender with good bowel sounds.  Musculoskeletal: No edema. No calf tenderness.  Skin: No suspicious skin lesion, no rash, no ulceration.  Lymphatics: There is no palpable lymphadenopathy throughout in the cervical, supraclavicular, or axillary regions.  Psychiatric: The patient's mood is calm and appropriate for this visit.         Results reviewed at this visit:      Lab Results   Component Value Date    WBC 4.5 05/21/2025    RBC 3.85 05/21/2025    HGB 11.4 (L) 05/21/2025    HCT 34.9 (L) 05/21/2025    MCV 90.6 05/21/2025    MCH 29.6 05/21/2025    MCHC 32.7 05/21/2025    RDW 13.2 05/21/2025    .0 05/21/2025     Lab Results   Component Value Date     05/21/2025    K 3.2 (L) 05/21/2025     05/21/2025    CO2 21.0 05/21/2025    BUN 16 05/21/2025    CREATSERUM 0.94 05/21/2025    GLU 92 05/21/2025    CA 9.9 05/21/2025    ALKPHO 140 (H) 05/21/2025    ALT 21 05/21/2025    AST 26 05/21/2025    BILT 0.3 05/21/2025    ALB 4.6 05/21/2025    TP 8.6 (H) 05/21/2025     Lab Component   Component Value Date/Time     05/21/2025 0918        Results  LABS  Hb: 11.4  Hb: 10.7 (04/2025)  Hb: 10.8 (03/2025)  PLT: Within normal limits  K: 3.2  AST: Within normal limits  ALT: Within normal limits  Alkaline phosphatase: Mild elevation  M spike: Low level (02/2025)    RADIOLOGY  Bilateral mammogram: Benign-appearing lumpectomy changes and treatment changes. No mass, no distortion. (04/22/2025)  PET scan: Small mildly hypermetabolic left breast lesion. Enlarged left axillary node. No scintigraphic evidence elsewhere. Inguinal node SUV 1.3.  X-ray of the ankle: Normal age-related changes. (04/28/2025)  MRI of the liver: Cyst on the liver. (12/2024)         Assessment & Plan  Triple Negative Breast Cancer  Diagnosed with left breast cancer in April 2023. Underwent neoadjuvant chemotherapy with Adriamycin, cyclophosphamide, and paclitaxel, followed by lumpectomy in November 2023 with 2mm residual invasive disease. Currently on capecitabine, continued despite severe mucositis. Recent mammogram on April 22 showed benign lumpectomy changes, no mass or distortion. She has a 3% risk of developing another breast cancer over five years compared to 1.6% in the general population. Discussed imaging options: mammograms and MRIs for early detection of new breast cancer. The patient has had  intermittent skeletal pains. She has both a monoclonal gammopathy and high risk breast cancer. We will get a repeat PET scan to compare to the pre-treatment PET scan.  - Order PET scan as requested.  - Continue follow-up with Dr. Perdomo, including MRI in September.  - Consider focused imaging if new symptoms arise.    Anemia  Hemoglobin improved to 11.4 from previous levels of 10.7 and 10.8.    Hypokalemia  Potassium level is 3.2 due to missed doses from refill issues.  - Ensure potassium prescription is refilled and taken regularly.    MGUS  Monitoring protein levels for stability. Previous low-level M spike noted in February.  - Continue monitoring MGUS protein levels every three months.    Congestive Heart Failure  Continue cardiology management    Systemic Lupus Erythematosus (SLE)  Continue rheumatology management.    Follow-up  Continued follow-up every three months due to ongoing recovery and monitoring needs.  - Schedule follow-up appointment in three months.  - Order labs prior to next appointment to monitor MGUS protein levels and other relevant parameters.           The following individual(s) verbally consented to be recorded using ambient AI listening technology and understand that they can each withdraw their consent to this listening technology at any point by asking the clinician to turn off or pause the recording:    Patient name: Alina Aceves      Ravindra Rouse MD            [1]   Patient Active Problem List  Diagnosis    Breast abscess    Hypokalemia    Hyponatremia    Arthralgia of left forearm    BRCA gene mutation negative in female    Anemia    Metabolic acidosis    Community acquired pneumonia, unspecified laterality    Weakness generalized    Anemia, unspecified type    Malignant neoplasm of female breast (HCC)    Bilateral pleural effusion    SLE (systemic lupus erythematosus related syndrome) (HCC)    Neuropathy    Sjogren's syndrome (HCC)    Proteinuria    REJI (acute kidney injury)     SIADH (syndrome of inappropriate ADH production) (HCC)    Cardiomyopathy (HCC)    Elevated liver function tests    HFrEF (heart failure with reduced ejection fraction) (HCC)    Other forms of systemic lupus erythematosus (HCC)    Weakness of both lower extremities    Stage 3 chronic kidney disease (HCC)    Interstitial nephritis    Cardiorenal syndrome    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)    Acute cholecystitis    Encephalopathy acute    Fever, unspecified fever cause    Congestive heart failure, unspecified HF chronicity, unspecified heart failure type (HCC)    Elevated troponin    Pneumonia due to infectious organism    Azotemia    Hyperglycemia    Thrombocytopenia   [2]   Past Medical History:   Congestive heart disease (HCC)    Gastritis    Left Breast Cancer [facility]BC    Neoadjuvant ddAC/weekly Taxol    Neuropathy    Personal history of antineoplastic chemotherapy    Sjogren's disease (HCC)    SLE   [3]   Past Surgical History:  Procedure Laterality Date    Breast surgery Left 2023     delivery only      Chemotherapy          Hc port a cath access      Lumpectomy left Left 2023    [facility]    Needle biopsy left Left 2023    3 sites US guided biopsy -[facility]    Needle biopsy right Right 10/2015    US guided biopsy - benign abcess    Radiation left Left    [4]   Family History  Problem Relation Age of Onset    Breast Cancer Self 42    Hypertension Mother     Cancer Other    [5]   Allergies  Allergen Reactions    Bactrim [Sulfamethoxazole W/Trimethoprim] RASH    Adhesive Tape RASH   [6]    Prenatal Vit-Fe Fumarate-FA (MULTI PRENATAL) 27-0.8 MG Oral Tab Take by mouth.      metoprolol succinate ER 50 MG Oral Tablet 24 Hr Take 1 tablet (50 mg total) by mouth in the morning.      Potassium Chloride ER 10 MEQ Oral Tab CR Take 1 tablet (10 mEq total) by mouth daily. 30 tablet 0    DULoxetine 30 MG Oral Cap DR Particles Take 1 capsule (30 mg total) by mouth in the morning. Takes one tablet.      zolpidem 10 MG  Oral Tab Take 1 tablet (10 mg total) by mouth nightly as needed for Sleep.

## 2025-05-21 NOTE — PROGRESS NOTES
.Weekly Wound Education Note    Teaching Provided To: Patient  Training Topics: Discharge instructions, Cleasing and general instructions, Skin care  Training Method: Explain/Verbal, Written  Training Response: Patient responds and understands            Wound is healed.  Patient encouraged to moisturize area and not let material rub on the area.  Discharged from this clinic.

## 2025-05-23 ENCOUNTER — OFFICE VISIT (OUTPATIENT)
Dept: PHYSICAL THERAPY | Facility: HOSPITAL | Age: 45
End: 2025-05-23
Attending: INTERNAL MEDICINE
Payer: MEDICAID

## 2025-05-23 PROCEDURE — 97110 THERAPEUTIC EXERCISES: CPT

## 2025-05-23 PROCEDURE — 97112 NEUROMUSCULAR REEDUCATION: CPT

## 2025-05-23 NOTE — PROGRESS NOTES
Patient: Alina Aceves (44 year old, female) Referring Provider:  Insurance:   Diagnosis: Malignant neoplasm of upper-outer quadrant of left female breast (HCC) (C50.412)  Anemia (D64.9)  Open wound of chest wall, left, subsequent encounter (S21.102D)  Foot drop (M21.379)  Neuropathy (G62.9) No ref. provider found  MEDICAID   Date of Surgery: N/a Next MD visit:  N/A   Precautions:  Cancer No data recorded Referral Information:    Date of Evaluation: Req: 0, Auth: 0, Exp:     No data recorded POC Auth Visits:  20       Today's Date   5/23/2025    Subjective  Pt is doing well today. Ankle is still kind of stiff and swollen.       Pain: pain not reported     Objective  See tx flow sheet for progressions: no loss of balance while completing obstacle course with cane, therapist SBA          Assessment  Pt able to continue to work on dynamic balance and strengthening in PT sessions. STS without UE support is challenging, however able to complete with cues for \"nose over toes\". Practiced activities to simulate gas and brakes in car. Pt able to press exercise ball with RLE with good force. Also practiced lifting foot to targets, progressing with two small wedges, pt does not catch foot during activity. Discussed pt practicing at home in her car while it is parked to make sure she can lift the foot between the pedals. Completed obstacle course with various activities with cane, no loss of balance, SBA provided from therapist. Pt would benefit from continued skilled intervention to improve strength and endurance to improve functional mobility and ability to drive for increased independence.    Goals (to be met in 20 visits)         Not Met Progress Toward Partially Met Met   Pt will demonstrate improved tandem stance to 5 sec or more to be able to safely negotiate narrow spaces such as the bathroom.  []  []  []  []    Pt will perform TUG in <18 seconds with least restrictive AD, demonstrating improved gait speed for  community ambulation. []  []  []  []    Pt will be able to perform STS with no UE support to be able to easily rise from chair without loss of balance. []  []  []  []    Pt will be able to squat to  light objects around the house without loss of stability. []  []  []  []    Pt will be able to ambulate 200 ft with least restrictive AD to be able to access multiple rooms in house safely  []  []  []  []    Pt will be independent and compliant with comprehensive HEP to maintain progress achieved in PT. []  []  []  []                                                Plan  Continue per plan of care.    Treatment Last 4 Visits  Treatment Day: 18 4/23/2025 4/30/2025 5/16/2025 5/23/2025   Neuro Treatment   Therapeutic Exercise Nustep Level 5 5 minutes   Nustep Lvl 5 5 mins  3 way hip YTB 2x8 B  Monster walks 2 laps YTB  Thoracic/lumbar stretch x5  ICE and compression education Nustep lvl 5 5 mins  Step up and Step downs off step 2x12  DLHR 2x10  Standing march with arm in air 2lb weight 1 UE support for balance 2x10 Nustep lvl 5 5 mins   STS with ball toss, 2x5       Neuro Re-Education NMES assisted ambulation with cane 2 laps  NMES assisted obstacle course with cane 4 laps  [facility]  4 laps  NMES assisted step ups 4 in step 2x12 Side stepping on airex 1 lap  Tandem walking on airex 2 laps  BB taps AP/ML 1 min each  Hurdles 2 laps ML Hurdles 2 laps  Taps AP/ML 1 min each  EC Romberg on airex 2 finger support 3x30 seconds  SLS 1x30 seconds each leg 2 finger support Tandem walking and side stepping on airex beams, 2 laps each  Semi tandem balance, weight pass 4#, 2x30 sec bilat  Simulating car pedals  - Pressing exercise ball, holding 5 sec, 2x10  - Seated tapping toes to targets (gas/break), added small wedges to second set  Obstacle course with cane: stepping over hurdles, 4 in step, weaving cones, uneven soft surface       Therapeutic Exercise Minutes 5 20 20 10   Neuro Re-Educ Minutes 34 25 25 35   Total Time  Of Timed Procedures 39 45 45 45   Total Time Of Service-Based Procedures 0 0 0 0   Total Treatment Time 39 45 45 45        HEP  Access Code: [email or web address] URL: [email or web address] Date: 03/31/2025 Prepared by: Trupti Salazar Exercises - Supine Ankle Pumps  - 1 x daily - 7 x weekly - 2 sets - 10 reps - Supine Ankle Inversion Eversion AROM  - 1 x daily - 7 x weekly - 2 sets - 10 reps - Sit to Stand with Counter Support  - 3 x daily - 7 x weekly - 1 sets - 3 reps - Gastroc Stretch with Foot at Wall  - 1 x daily - 7 x weekly - 3 sets - 20 hold - Single Leg Stance with Support  - 1 x daily - 7 x weekly - 3 sets - 15 hold - Supine Bridge  - 1 x daily - 7 x weekly - 3 sets - 8 reps       Charges  NR 2 TE 1

## 2025-05-27 LAB
ALBUMIN SERPL ELPH-MCNC: 4.06 G/DL (ref 3.75–5.21)
ALBUMIN/GLOB SERPL: 1.06 {RATIO} (ref 1–2)
ALPHA1 GLOB SERPL ELPH-MCNC: 0.28 G/DL (ref 0.19–0.46)
ALPHA2 GLOB SERPL ELPH-MCNC: 0.69 G/DL (ref 0.48–1.05)
B-GLOBULIN SERPL ELPH-MCNC: 1.44 G/DL (ref 0.68–1.23)
GAMMA GLOB SERPL ELPH-MCNC: 1.44 G/DL (ref 0.62–1.7)
KAPPA LC FREE SER-MCNC: 7.81 MG/DL (ref 0.33–1.94)
KAPPA LC FREE/LAMBDA FREE SER NEPH: 1.86 {RATIO} (ref 0.26–1.65)
LAMBDA LC FREE SERPL-MCNC: 4.21 MG/DL (ref 0.57–2.63)
M PROTEIN 1 SERPL ELPH-MCNC: 0.19 G/DL (ref ?–0)
M PROTEIN 2 SERPL ELPH-MCNC: 0.19 G/DL (ref ?–0)
PROT SERPL-MCNC: 7.9 G/DL (ref 5.7–8.2)

## 2025-06-06 ENCOUNTER — OFFICE VISIT (OUTPATIENT)
Dept: PHYSICAL THERAPY | Facility: HOSPITAL | Age: 45
End: 2025-06-06
Attending: INTERNAL MEDICINE
Payer: MEDICAID

## 2025-06-06 PROCEDURE — 97112 NEUROMUSCULAR REEDUCATION: CPT

## 2025-06-06 PROCEDURE — 97110 THERAPEUTIC EXERCISES: CPT

## 2025-06-06 NOTE — PROGRESS NOTES
Patient: Alina Aceves (44 year old, female) Referring Provider:  Insurance:   Diagnosis: Malignant neoplasm of upper-outer quadrant of left female breast (HCC) (C50.412)  Anemia (D64.9)  Open wound of chest wall, left, subsequent encounter (S21.102D)  Foot drop (M21.379)  Neuropathy (G62.9) No ref. provider found  [employer] MEDICA[employer]   Date of Surgery: N/a Next MD visit:  N/A   Precautions:  Cancer No data recorded Referral Information:    Date of Evaluation: Req: 0, Auth: 0, Exp:     No data recorded POC Auth Visits:  20       Today's Date   6/6/2025    Subjective  Pt is been walking a lot more due to her child's summer activities.       Pain: pain not reported     Objective  Pt able to side step and walk fwd/bkwd w/o cane during blaze pod activities with no LOB          Assessment  Utilized blaze pods for dynamic balance training today. Completed \"color catch\" activity with varying parameters. Pt able to successfully side step, walk fowards and backwards to hit randomly lit up pod while maintaining SLS. No loss of balance noted with activity, however pt is hesitant with stepping and completes slowly to avoid falling. Completed eccentric step downs from 4 in step for quad strength, increased difficulty noted on L side with extra UE support needed during activity.    Goals (to be met in 20 visits)         Not Met Progress Toward Partially Met Met   Pt will demonstrate improved tandem stance to 5 sec or more to be able to safely negotiate narrow spaces such as the bathroom.  []  []  []  []    Pt will perform TUG in <18 seconds with least restrictive AD, demonstrating improved gait speed for community ambulation. []  []  []  []    Pt will be able to perform STS with no UE support to be able to easily rise from chair without loss of balance. []  []  []  []    Pt will be able to squat to  light objects around the house without loss of stability. []  []  []  []    Pt will be able to ambulate 200 ft with least  restrictive AD to be able to access multiple rooms in house safely  []  []  []  []    Pt will be independent and compliant with comprehensive HEP to maintain progress achieved in PT. []  []  []  []                                                    Plan  Continue per plan of care.    Treatment Last 4 Visits  Treatment Day: 19 4/30/2025 5/16/2025 5/23/2025 6/6/2025   Neuro Treatment   Therapeutic Exercise Nustep Lvl 5 5 mins  3 way hip YTB 2x8 B  Monster walks 2 laps YTB  Thoracic/lumbar stretch x5  ICE and compression education Nustep lvl 5 5 mins  Step up and Step downs off step 2x12  DLHR 2x10  Standing march with arm in air 2lb weight 1 UE support for balance 2x10 Nustep lvl 5 5 mins   STS with ball toss, 2x5     Nustep lvl 5 5 mins   SL lat step over shanda in sitting, 2x10 bilat  Seated weighted ball kicks, x15 bilat  Side to side weighted ball rolls, x15 bilat   Eccentric step down from 4 in step, 2x5 bilat     Neuro Re-Education Side stepping on airex 1 lap  Tandem walking on airex 2 laps  BB taps AP/ML 1 min each  Hurdles 2 laps ML Hurdles 2 laps  Taps AP/ML 1 min each  EC Romberg on airex 2 finger support 3x30 seconds  SLS 1x30 seconds each leg 2 finger support Tandem walking and side stepping on airex beams, 2 laps each  Semi tandem balance, weight pass 4#, 2x30 sec bilat  Simulating car pedals  - Pressing exercise ball, holding 5 sec, 2x10  - Seated tapping toes to targets (gas/break), added small wedges to second set  Obstacle course with cane: stepping over hurdles, 4 in step, weaving cones, uneven soft surface     Blaze pods  - Color catch: facing blaze pods, 2x30 sec (cued pt to side step and alternate legs stepping on randomly lit pod) no UE  - Color catch, single leg taps, 2x30 sec bilat (pt has blaze pods on one side of body, tapping randomly lit pod with one leg and walking fwd/bkwd to get to pod) no UE  - Color catch in quadriped: for core stability, 2x30 sec bilat, cues for core  stability  NB on airex, pulling squigs off of mirror outside SILVERIO, multiple bouts     Therapeutic Exercise Minutes 20 20 10 17   Neuro Re-Educ Minutes 25 25 35 25   Total Time Of Timed Procedures 45 45 45 42   Total Time Of Service-Based Procedures 0 0 0 0   Total Treatment Time 45 45 45 42        HEP  Access Code: [email or web address] URL: [email or web address] Date: 03/31/2025 Prepared by: Trupti Salazar Exercises - Supine Ankle Pumps  - 1 x daily - 7 x weekly - 2 sets - 10 reps - Supine Ankle Inversion Eversion AROM  - 1 x daily - 7 x weekly - 2 sets - 10 reps - Sit to Stand with Counter Support  - 3 x daily - 7 x weekly - 1 sets - 3 reps - Gastroc Stretch with Foot at Wall  - 1 x daily - 7 x weekly - 3 sets - 20 hold - Single Leg Stance with Support  - 1 x daily - 7 x weekly - 3 sets - 15 hold - Supine Bridge  - 1 x daily - 7 x weekly - 3 sets - 8 reps       Charges  NR 2 TE 1

## 2025-06-13 ENCOUNTER — APPOINTMENT (OUTPATIENT)
Dept: PHYSICAL THERAPY | Facility: HOSPITAL | Age: 45
End: 2025-06-13
Attending: INTERNAL MEDICINE
Payer: MEDICAID

## 2025-06-13 PROCEDURE — 97110 THERAPEUTIC EXERCISES: CPT

## 2025-06-13 PROCEDURE — 97112 NEUROMUSCULAR REEDUCATION: CPT

## 2025-06-13 NOTE — PROGRESS NOTES
Patient: Alina Aceves (44 year old, female) Referring Provider:  Insurance:   Diagnosis: Malignant neoplasm of upper-outer quadrant of left female breast (HCC) (C50.412)  Anemia (D64.9)  Open wound of chest wall, left, subsequent encounter (S21.102D)  Foot drop (M21.379)  Neuropathy (G62.9) No ref. provider found  [employer] MEDICAID   Date of Surgery: N/a Next MD visit:  N/A   Precautions:  Cancer No data recorded Referral Information:    Date of Evaluation: Req: 0, Auth: 0, Exp:     No data recorded POC Auth Visits:  20     Progress Summary  Pt has attended 20 visits in Physical Therapy.      Today's Date   6/13/2025    Subjective  Pt reports she got a new brace on the shoe to help with ankle DF. She was practing the pedels of the car without the car on a few times. Today she was able to drive herself to therapy!       Pain: pain not reported     Objective                     Ankle/Foot    ROM MMT (-/5)    R L R L     PF     5 5     DF (L4)     2+ 4+     Inversion     4+ 4     Eversion     3+ 4+     Grt Toe Ext (L5)              Ankle ROM: full PROM bilaterally, limited ankle AROM R>L     Senior Balance Scale      Item Description Score (0 worst-4 best)     4 1. Sitting to standing  4 2. Standing unsupported   4 3. Sitting unsupported   4 4. Standing to sitting   4 5. Transfers   3 6. Standing with eyes closed   4 7. Standing with feet together   4 8. Reaching forward with outstretched arm 15 cm  4 9. Retrieving object from floor   4 10. Turning to look behind   4 11. Turning 360 degrees   4 12. Placing alternate foot on stool   2 13. Standing with one foot in front L foot behind  2 14. Standing on one foot L foot     Total 51/56 (less than 46/56 = fall risk)     Gait: pt ambulates on level ground with -- decreased heel strike RLE, decreased step length RLE, small base cane     Postural Control:  Romberg EO: level surface 30 sec    Romberg EC: level surface 15 sec  Tandem Stance: R back: 5 sec; L back: 10 sec;  Fall Risk: Yes  SLS: R: 2 sec; L: 3 sec; Fall Risk: Yes     Timed Up and Go (AD,time):   18.7 sec (cane); Fall Risk: Yes.   16.7 sec w/o cane Fall risk: yes    Dynamic Gait Index  Total  15/24  1. Gait level surface 2  2. Change in gait speed 2  3. Gait with horizontal head turns 3  4. Gait with vertical head turns 2  5. Gait and pivot turn 2  6. Step over obstacle 1  7. Step around obstacles 2  8. Steps 1    <=19/24 = predictive of falls      Assessment  Roseanne has attended 20 visits in outpatient physical therapy. She has had excellent participation in therapy thus far and is highly motivated to return her mobility as close as she can to prior level of function be be independent with IADL activities. Notably, Roseanne has been ambulating community distances with small base cane without loss of balance. She has improved ankle strength bilaterally to improve stability on uneven surfaces. She has also improved score on the Senior to level that is not at a fall risk. Assessed DGI without use of AD today, pt scores a 15/24, thus still at increased risk for falls with community ambulation. Due to ongoing limitations of requiring assistive device and overall decreased in functional mobility and endurance compared to PLOF, Roseanne would benefit from continued skilled PT intervention to improve safety and stability with ambulation and balance to return to independence with IADL activities.    Goals (to be met in 20 visits)         Not Met Progress Toward Partially Met Met   Pt will demonstrate improved tandem stance to 5 sec or more to be able to safely negotiate narrow spaces such as the bathroom without LOB []  []  [x]  []    Pt will perform TUG in <18 seconds with least restrictive AD, demonstrating improved gait speed for community ambulation. (Updated to 14 sec or less 6/13/2025) []  []  []  [x]    Pt will be able to perform STS with no UE support to be able to easily rise from chair without loss of balance. []  []  []  [x]     Pt will be able to squat to  light objects around the house without loss of stability. []  []  [x]  []    Pt will be able to ambulate 200 ft with least restrictive AD to be able to access multiple rooms in house safely  []  []  []  [x]    Pt will be independent and compliant with comprehensive HEP to maintain progress achieved in PT. []  []  []  [x]    NEW GOAL: Pt will improve score on DGI to 19/24 or more to decrease fall risk with community ambulation  X     NEW GOAL: Pt will ambulate 500 ft w/o AD to access doctor's office at prior level of function of mobility.   X                 Plan: Continue skilled Physical Therapy 1-2 x/week or a total of 25 visits over a 90 day period.        Patient/Family/Caregiver was advised of these findings, precautions, and treatment options and has agreed to actively participate in planning and for this course of care.    Thank you for your referral. If you have any questions, please contact me at Dept: [phone].    Sincerely,  Electronically signed by therapist: Trupti Salazar PT, DPT    Certification From: 6/13/2025  To:9/11/2025    Treatment Last 4 Visits  Treatment Day: 20 5/16/2025 5/23/2025 6/6/2025 6/13/2025   Neuro Treatment   Therapeutic Exercise Nustep lvl 5 5 mins  Step up and Step downs off step 2x12  DLHR 2x10  Standing march with arm in air 2lb weight 1 UE support for balance 2x10 Nustep lvl 5 5 mins   STS with ball toss, 2x5     Nustep lvl 5 5 mins   SL lat step over shanda in sitting, 2x10 bilat  Seated weighted ball kicks, x15 bilat  Side to side weighted ball rolls, x15 bilat   Eccentric step down from 4 in step, 2x5 bilat   Nustep lvl 5 5 mins   Shuttle press, #56, 2x10   SL press, #31, 2x10 bilat       Neuro Re-Education ML Hurdles 2 laps  Taps AP/ML 1 min each  EC Romberg on airex 2 finger support 3x30 seconds  SLS 1x30 seconds each leg 2 finger support Tandem walking and side stepping on airex beams, 2 laps each  Semi tandem balance, weight  pass 4#, 2x30 sec bilat  Simulating car pedals  - Pressing exercise ball, holding 5 sec, 2x10  - Seated tapping toes to targets (gas/break), added small wedges to second set  Obstacle course with cane: stepping over hurdles, 4 in step, weaving cones, uneven soft surface     Blaze pods  - Color catch: facing blaze pods, 2x30 sec (cued pt to side step and alternate legs stepping on randomly lit pod) no UE  - Color catch, single leg taps, 2x30 sec bilat (pt has blaze pods on one side of body, tapping randomly lit pod with one leg and walking fwd/bkwd to get to pod) no UE  - Color catch in quadriped: for core stability, 2x30 sec bilat, cues for core stability  NB on airex, pulling squigs off of mirror outside SILVERIO, multiple bouts   Reassessment of balance testing in objective   Therapeutic Exercise Minutes 20 10 17 15   Neuro Re-Educ Minutes 25 35 25 30   Total Time Of Timed Procedures 45 45 42 45   Total Time Of Service-Based Procedures 0 0 0 0   Total Treatment Time 45 45 42 45        HEP  Access Code: [email or web address] URL: [email or web address] Date: 03/31/2025 Prepared by: Trupti Salazar Exercises - Supine Ankle Pumps  - 1 x daily - 7 x weekly - 2 sets - 10 reps - Supine Ankle Inversion Eversion AROM  - 1 x daily - 7 x weekly - 2 sets - 10 reps - Sit to Stand with Counter Support  - 3 x daily - 7 x weekly - 1 sets - 3 reps - Gastroc Stretch with Foot at Wall  - 1 x daily - 7 x weekly - 3 sets - 20 hold - Single Leg Stance with Support  - 1 x daily - 7 x weekly - 3 sets - 15 hold - Supine Bridge  - 1 x daily - 7 x weekly - 3 sets - 8 reps       Charges  NR 2 TE 1

## 2025-06-20 ENCOUNTER — APPOINTMENT (OUTPATIENT)
Dept: PHYSICAL THERAPY | Facility: HOSPITAL | Age: 45
End: 2025-06-20
Attending: INTERNAL MEDICINE
Payer: MEDICAID

## 2025-06-24 ENCOUNTER — TELEPHONE (OUTPATIENT)
Age: 45
End: 2025-06-24

## 2025-06-24 DIAGNOSIS — M32.9 SLE (SYSTEMIC LUPUS ERYTHEMATOSUS RELATED SYNDROME) (HCC): ICD-10-CM

## 2025-06-24 DIAGNOSIS — Z17.1 MALIGNANT NEOPLASM OF OVERLAPPING SITES OF LEFT BREAST IN FEMALE, ESTROGEN RECEPTOR NEGATIVE (HCC): Primary | ICD-10-CM

## 2025-06-24 DIAGNOSIS — D47.2 MGUS (MONOCLONAL GAMMOPATHY OF UNKNOWN SIGNIFICANCE): ICD-10-CM

## 2025-06-24 DIAGNOSIS — C50.812 MALIGNANT NEOPLASM OF OVERLAPPING SITES OF LEFT BREAST IN FEMALE, ESTROGEN RECEPTOR NEGATIVE (HCC): Primary | ICD-10-CM

## 2025-06-24 NOTE — TELEPHONE ENCOUNTER
Left voicemail regarding CT scan order placed. Awaiting call back. Will send [employer] message.

## 2025-06-27 ENCOUNTER — OFFICE VISIT (OUTPATIENT)
Dept: PHYSICAL THERAPY | Facility: HOSPITAL | Age: 45
End: 2025-06-27
Attending: INTERNAL MEDICINE
Payer: MEDICAID

## 2025-06-27 PROCEDURE — 97112 NEUROMUSCULAR REEDUCATION: CPT

## 2025-06-27 PROCEDURE — 97110 THERAPEUTIC EXERCISES: CPT

## 2025-06-27 NOTE — PROGRESS NOTES
Patient: Alina Aceves (45 year old, female) Referring Provider:  Insurance:   Diagnosis: Malignant neoplasm of upper-outer quadrant of left female breast (HCC) (C50.412)  Anemia (D64.9)  Open wound of chest wall, left, subsequent encounter (S21.102D)  Foot drop (M21.379)  Neuropathy (G62.9) Akin Stalin  [facility] MEDICAID   Date of Surgery: N/a Next MD visit:  N/A   Precautions:  Cancer No data recorded Referral Information:    Date of Evaluation: Req: 10, Auth: 8, Exp: 8/12/2025    No data recorded POC Auth Visits:  10       Today's Date   6/27/2025    Subjective  Pt reports she is feeling well today. She has been having some more flare ups of her autoimmune issues. Feels it in her knees. She still has been trying to walk more with her daughter.       Pain: pain not reported     Objective  See tx flow sheet        Assessment  Pt able to use Pycno gait  treadmill for improving equal step length. Step length set to 60 and speed at 1.0 mph. Pt has good stability with exercise and maintains step length ~75% of time. Utilized NMES for ankle DF on R side. Strong contraction palpated at proximal anterior tibialis insertion with NMES. Pt would benefit from continued skilled intervention to achieve goals set and improve functional mobility/strength.    Goals (to be met in 30 visits)         Not Met Progress Toward Partially Met Met   Pt will demonstrate improved tandem stance to 5 sec or more to be able to safely negotiate narrow spaces such as the bathroom without LOB []  []  [x]  []    Pt will perform TUG in <18 seconds with least restrictive AD, demonstrating improved gait speed for community ambulation. (Updated to 14 sec or less 6/13/2025) []  []  []  [x]    Pt will be able to perform STS with no UE support to be able to easily rise from chair without loss of balance. []  []  []  [x]    Pt will be able to squat to  light objects around the house without loss of stability. []  []  [x]  []    Pt will  be able to ambulate 200 ft with least restrictive AD to be able to access multiple rooms in house safely  []  []  []  [x]    Pt will be independent and compliant with comprehensive HEP to maintain progress achieved in PT. []  []  []  [x]    NEW GOAL: Pt will improve score on DGI to 19/24 or more to decrease fall risk with community ambulation  X     NEW GOAL: Pt will ambulate 500 ft w/o AD to access doctor's office at prior level of function of mobility.   X                Plan  Continue per plan of care.    Treatment Last 4 Visits  Treatment Day: 21 5/23/2025 6/6/2025 6/13/2025 6/27/2025   Neuro Treatment   Therapeutic Exercise Nustep lvl 5 5 mins   STS with ball toss, 2x5     Nustep lvl 5 5 mins   SL lat step over shanda in sitting, 2x10 bilat  Seated weighted ball kicks, x15 bilat  Side to side weighted ball rolls, x15 bilat   Eccentric step down from 4 in step, 2x5 bilat   Nustep lvl 5 5 mins   Shuttle press, #56, 2x10   SL press, #31, 2x10 bilat     Long sitting calf stretch w/ strap, multiple bouts  Sidelying clamshells red band, 2x10 bilat  SLR w/ TA contraction, 2x10 bilat    Neuro Re-Education Tandem walking and side stepping on airex beams, 2 laps each  Semi tandem balance, weight pass 4#, 2x30 sec bilat  Simulating car pedals  - Pressing exercise ball, holding 5 sec, 2x10  - Seated tapping toes to targets (gas/break), added small wedges to second set  Obstacle course with cane: stepping over hurdles, 4 in step, weaving cones, uneven soft surface     Blaze pods  - Color catch: facing blaze pods, 2x30 sec (cued pt to side step and alternate legs stepping on randomly lit pod) no UE  - Color catch, single leg taps, 2x30 sec bilat (pt has blaze pods on one side of body, tapping randomly lit pod with one leg and walking fwd/bkwd to get to pod) no UE  - Color catch in quadriped: for core stability, 2x30 sec bilat, cues for core stability  NB on airex, pulling squigs off of mirror outside SILVERIO, multiple  bouts   Reassessment of balance testing in objective Walking on biodex treadmill with gait   NMES for ankle DF on R  - eccentric lowering, multiple bouts  - EC bilat ankle pumps, 2x10    Therapeutic Exercise Minutes 10 17 15 20   Neuro Re-Educ Minutes 35 25 30 25   Total Time Of Timed Procedures 45 42 45 45   Total Time Of Service-Based Procedures 0 0 0 0   Total Treatment Time 45 42 45 45        HEP  Access Code: [email or web address] URL: [email or web address]/ Date: 03/31/2025 Prepared by: Trupti Salazar Exercises - Supine Ankle Pumps  - 1 x daily - 7 x weekly - 2 sets - 10 reps - Supine Ankle Inversion Eversion AROM  - 1 x daily - 7 x weekly - 2 sets - 10 reps - Sit to Stand with Counter Support  - 3 x daily - 7 x weekly - 1 sets - 3 reps - Gastroc Stretch with Foot at Wall  - 1 x daily - 7 x weekly - 3 sets - 20 hold - Single Leg Stance with Support  - 1 x daily - 7 x weekly - 3 sets - 15 hold - Supine Bridge  - 1 x daily - 7 x weekly - 3 sets - 8 reps       Charges  TE 1 NR 2

## 2025-07-01 ENCOUNTER — LAB ENCOUNTER (OUTPATIENT)
Dept: LAB | Age: 45
End: 2025-07-01
Attending: Other
Payer: MEDICAID

## 2025-07-01 ENCOUNTER — OFFICE VISIT (OUTPATIENT)
Dept: NEUROLOGY | Facility: CLINIC | Age: 45
End: 2025-07-01
Payer: MEDICAID

## 2025-07-01 VITALS
DIASTOLIC BLOOD PRESSURE: 59 MMHG | WEIGHT: 131 LBS | HEART RATE: 84 BPM | BODY MASS INDEX: 22 KG/M2 | RESPIRATION RATE: 16 BRPM | SYSTOLIC BLOOD PRESSURE: 101 MMHG

## 2025-07-01 DIAGNOSIS — G62.9 NEUROPATHY: Primary | ICD-10-CM

## 2025-07-01 DIAGNOSIS — G62.9 NEUROPATHY: ICD-10-CM

## 2025-07-01 DIAGNOSIS — M21.371 RIGHT FOOT DROP: ICD-10-CM

## 2025-07-01 LAB
TSI SER-ACNC: 1.03 UIU/ML (ref 0.55–4.78)
VIT B12 SERPL-MCNC: 1395 PG/ML (ref 211–911)

## 2025-07-01 PROCEDURE — 84443 ASSAY THYROID STIM HORMONE: CPT

## 2025-07-01 PROCEDURE — 86038 ANTINUCLEAR ANTIBODIES: CPT

## 2025-07-01 PROCEDURE — 86235 NUCLEAR ANTIGEN ANTIBODY: CPT

## 2025-07-01 PROCEDURE — 86225 DNA ANTIBODY NATIVE: CPT

## 2025-07-01 PROCEDURE — 36415 COLL VENOUS BLD VENIPUNCTURE: CPT

## 2025-07-01 PROCEDURE — 84207 ASSAY OF VITAMIN B-6: CPT

## 2025-07-01 PROCEDURE — 85652 RBC SED RATE AUTOMATED: CPT

## 2025-07-01 PROCEDURE — 84446 ASSAY OF VITAMIN E: CPT

## 2025-07-01 PROCEDURE — 82607 VITAMIN B-12: CPT

## 2025-07-01 RX ORDER — POTASSIUM CITRATE 15 MEQ/1
1 TABLET, EXTENDED RELEASE ORAL DAILY
COMMUNITY

## 2025-07-01 NOTE — PATIENT INSTRUCTIONS
After your visit at the [facility] today,  please direct any follow up questions or medication needs to the staff in our [place] office so that your concerns may be promptly addressed.  We are available through [employer] or at the numbers below:    The phone number is:   [phone] option #1    The fax number is:  [phone]    Your pharmacy should also send any requests electronically to the [place] office.  Refill policies:    Allow 2-3 business days for refills; controlled substances may take longer.  Contact your pharmacy at least 5 days prior to running out of medication and have them send an electronic request or submit request through the “request refill” option in your [employer] account.  Refills are not addressed on weekends; covering physicians do not authorize routine medications on weekends.  No narcotics or controlled substances are refilled after noon on Fridays or by on call physicians.  By law, narcotics must be electronically prescribed.  A 30 day supply with no refills is the maximum allowed.  If your prescription is due for a refill, you may be due for a follow up appointment.  To best provide you care, patients receiving routine medications need to be seen at least once a year.  Patients receiving narcotic/controlled substance medications need to be seen at least once every 3 months.  In the event that your preferred pharmacy does not have the requested medication in stock (e.g. Backordered), it is your responsibility to find another pharmacy that has the requested medication available.  We will gladly send a new prescription to that pharmacy at your request.    Scheduling Tests:    If your physician has ordered radiology tests such as MRI or CT scans, please contact Central Scheduling at [phone] right away to schedule the test.  Once scheduled, the [facility] Centralized Referral Team will work with your insurance carrier to obtain pre-certification or prior authorization.   Depending on your insurance carrier, approval may take 3-10 days.  It is highly recommended patients assure they have received an authorization before having a test performed.  If test is done without insurance authorization, patient may be responsible for the entire amount billed.      Precertification and Prior Authorizations:  If your physician has recommended that you have a procedure or additional testing performed the [facility] Centralized Referral Team will contact your insurance carrier to obtain pre-certification or prior authorization.    You are strongly encouraged to contact your insurance carrier to verify that your procedure/test has been approved and is a COVERED benefit.  Although the [facility] Centralized Referral Team does its due diligence, the insurance carrier gives the disclaimer that \"Although the procedure is authorized, this does not guarantee payment.\"    Ultimately the patient is responsible for payment.   Thank you for your understanding in this matter.  Paperwork Completion:  If you require FMLA or disability paperwork for your recovery, please make sure to either drop it off or have it faxed to our office at [phone]. Be sure the form has your name and date of birth on it.  The form will be faxed to our Forms Department and they will complete it for you.  There is a 25$ fee for all forms that need to be filled out.  Please be aware there is a 10-14 day turnaround time.  You will need to sign a release of information (JIE) form if your paperwork does not come with one.  You may call the Forms Department with any questions at [phone].  Their fax number is [phone].

## 2025-07-01 NOTE — PROGRESS NOTES
Pt reports after chemo for breast cancer she developed neuropathy.  Reports left foot drop.  Notes numbness in left hand on last two fingers, has some in right hand as well.     Pt reports she has used Duloxetine, but stopped.  Gabapentin did not help with neuropathy.          The following individual(s) verbally consented to be recorded using ambient AI listening technology and understand that they can each withdraw their consent to this listening technology at any point by asking the clinician to turn off or pause the recording:    Patient name: Alina Aceves  Additional names:  Lavonne Cole

## 2025-07-01 NOTE — PROGRESS NOTES
Neurology H&P    Alina Aceves Patient Status:  No patient class for patient encounter    1980 MRN [id number]   Location [facility], [address] [place] [address] Attending No att. providers found   Hosp Day # 0 PCP HOLLY IBARRA     Subjective:  Alina Aceves is a(n) 45 year old female.  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    She has been experiencing right foot drop since the summer of 2023, attributed to neuropathy following chemotherapy for breast cancer. She has progressed from using a walker to a cane through physical therapy and uses an ankle brace as well    Neuropathy symptoms include numbness and tingling in her hands and both legs from the knees down, beginning around 2023 post-chemotherapy. She has experienced multiple falls and sprained her ankles and toes due to weakness and instability. EMG testing has been performed in the LUE to confrim an L ulnar neuropathy.    She was previously on gabapentin and duloxetine (60 mg) for pain management but has weaned off these medications. She experiences occasional pain flare-ups, particularly in her feet and ankles, but is currently managing without medication.    Her medical history includes breast cancer treatment, which she believes contributed to her current neuropathy. She also has a history of vasculitis and monoclonal gammopathy, which may be related to her symptoms.    Imaging studies, including an MRI of her lumbar spine in 2023, some multilevel degenerative disc disease and bulging however no significant canal or foraminal stenosis.    No back pain radiating to legs or neck pain radiating to arms. Reports numbness and tingling in hands and legs, weakness in feet, and occasional pain flare-ups.     Current Medications:  Current Medications[1]    Problem List:  Problem List[2]    PMHx:  Past Medical  History[3]    PSHx:  Past Surgical History[4]    SocHx:  Short Social Hx on File[5]    Family History:  Family History[6]        ROS:  10 point ROS completed and was negative, except for pertinent positive and negatives stated in subjective.    Objective/Physical Exam:    Vital Signs:  Blood pressure 101/59, pulse 84, resp. rate 16, weight 131 lb (59.4 kg), last menstrual period 08/01/2023, not currently breastfeeding.    Gen: Awake and in no apparent distress  HEENT: moist mucus membranes  Neck: Supple  Cardiovascular: Regular rate and rhythm, no murmur  Pulm: CTAB  GI: non-tender, normal bowel sounds  Skin: normal, dry  Extremities: No clubbing or cyanosis      Neurologic:   Physical Exam    MENTAL STATUS: alert, ox3, normal attention, language and fund of knowledge.      CRANIAL NERVES II to XII: PERRLA, no ptosis or diplopia, EOM intact, facial sensation intact, strong eye closure, face is symmetric, no dysarthria, tongue midline,  no tongue fasciculations or atrophy, strong shoulder shrug.    MOTOR EXAMINATION:   MSK:  RUE: Delt:  5/5, Bi: 5/5, Tri: 5/5, : 5/5   LUE: Delt:  5/5, Bi: 5/5, Tri: 5/5, : 5/5   RLE: HF: 5/5, KE: 5/5, KF: 5/5, DF: 0/5, PF: 5/5, Ankle eversion 0/5, Ankle inversion 5/5  LLE:  HF: 5/5, KE: 5/5, KF: 5/5, DF: 3/5, PF: 5/5, Ankle eversion 3/5, Ankle inversion 5/5  Normal Tone  No Fasiculations      SENSORY EXAMINATION:  UE: intact to light touch, pinprick reduced in fingers and dorsum of hand  LE: intact to light touch, pinprick reduced to upper shins or just below the knees, vibration sense reduced to shins    COORDINATION:  No dysmetria, or intention tremors     REFLEXES: 2+ at biceps, 2+ brachioradialis, absent at patella     GAIT: Uses cane for balance, able to ambulate without the cane, right foot drop and slapping/steppage gait on right           Labs:       Imaging:  MRI L spine 2023  CONCLUSION:       1. Mild enhancement involving likely hemangioma in the L1 vertebral body  is noted.  No enhancing lesions are otherwise identified.      2. Multiple disc bulges throughout the lumbar spine are noted.  No significant spinal canal or neural foraminal stenosis is identified.      3. Markedly heterogeneous marrow signal may be related to red marrow conversion.  This can be seen with anemia.  Please correlate with appropriate labs.        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 Symptoms include numbness and tingling in hands and legs.   - Order EMG to assess neuropathy degree.  - Order blood tests for vitamin B6, B12, thyroid function, and connective tissue panel.  - Continue physical therapy.  - Hold off on neuropathy medications. She has no discomfort or pain at this time    Right foot drop  Right foot drop likely due to chemotherapy-induced neuropathy. Physical therapy ongoing.   - Continue physical therapy.  - Consider ankle-foot orthotic.  - Order EMG to assess for neuropathy           Patrice Moscoso, DO  Neurology       [1]   Current Outpatient Medications   Medication Sig Dispense Refill    Potassium Citrate ER 15 MEQ (1620 MG) Oral Tab CR Take 1 tablet by mouth daily.      Prenatal Vit-Fe Fumarate-FA (MULTI PRENATAL) 27-0.8 MG Oral Tab Take by mouth.      metoprolol succinate ER 50 MG Oral Tablet 24 Hr Take 1 tablet (50 mg total) by mouth in the morning.      zolpidem 10 MG Oral Tab Take 1 tablet (10 mg total) by mouth nightly as needed for Sleep.     [2]   Patient Active Problem List  Diagnosis    Breast abscess    Hypokalemia    Hyponatremia    Arthralgia of left forearm    BRCA gene mutation negative in female    Anemia    Metabolic acidosis    Community acquired pneumonia, unspecified laterality    Weakness generalized    Anemia, unspecified type    Malignant neoplasm of female breast (HCC)    Bilateral pleural effusion    SLE (systemic lupus erythematosus related syndrome) ([facility])     Neuropathy    Sjogren's syndrome (HCC)    Proteinuria    REJI (acute kidney injury)    SIADH (syndrome of inappropriate ADH production) (HCC)    Cardiomyopathy (HCC)    Elevated liver function tests    HFrEF (heart failure with reduced ejection fraction) (HCC)    Other forms of systemic lupus erythematosus (HCC)    Weakness of both lower extremities    Stage 3 chronic kidney disease (HCC)    Interstitial nephritis    Cardiorenal syndrome    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)    Acute cholecystitis    Encephalopathy acute    Fever, unspecified fever cause    Congestive heart failure, unspecified HF chronicity, unspecified heart failure type (HCC)    Elevated troponin    Pneumonia due to infectious organism    Azotemia    Hyperglycemia    Thrombocytopenia    Right foot drop   [3]   Past Medical History:   Congestive heart disease (HCC)    Gastritis    Left Breast Cancer TNBC    Neoadjuvant ddAC/weekly Taxol    Neuropathy    Personal history of antineoplastic chemotherapy    Sjogren's disease (HCC)    SLE   [4]   Past Surgical History:  Procedure Laterality Date    Breast surgery Left 2023     delivery only      Chemotherapy          Hc port a cath access      Lumpectomy left Left 2023    [facility]    Needle biopsy left Left 2023    3 sites US guided biopsy -[facility]    Needle biopsy right Right 10/2015    US guided biopsy - benign abcess    Radiation left Left    [5]   Social History  Socioeconomic History    Marital status:     Number of children: 2   Tobacco Use    Smoking status: Former     Current packs/day: 0.00     Types: Cigarettes     Quit date:      Years since quitting: 15.5     Passive exposure: Past    Smokeless tobacco: Never    Tobacco comments:     social smoker   Vaping Use    Vaping status: Never Used   Substance and Sexual Activity    Alcohol use: Not Currently    Drug use: Never   Other Topics Concern    Exercise No     Comment: Limited    Seat Belt No    Special Diet No     Stress Concern Yes     Comment: Personal    Weight Concern No    Caffeine Concern Yes     Comment: coffee     Social Drivers of Health     Food Insecurity: Patient Declined (4/7/2025)    Received from [facility]    Hunger Vital Sign     Worried About Running Out of Food in the Last Year: Patient declined     Ran Out of Food in the Last Year: Patient declined   Transportation Needs: Patient Declined (4/7/2025)    [facility] from [facility]    [facility]     Lack of Transportation (Medical): Patient declined     Lack of Transportation (Non-Medical): Patient declined   Recent Concern: Transportation Needs - Unmet Transportation Needs (1/9/2025)    [facility]     Lack of Transportation (Medical): Yes     Lack of Transportation (Non-Medical): Patient declined   Housing Stability: High Risk (4/7/2025)    Received from [facility]    Housing Stability Vital Sign     Unable to Pay for Housing in the Last Year: Yes   [6]   Family History  Problem Relation Age of Onset    Breast Cancer Self 42    Hypertension Mother     Cancer Other

## 2025-07-02 ENCOUNTER — OFFICE VISIT (OUTPATIENT)
Dept: PHYSICAL THERAPY | Facility: HOSPITAL | Age: 45
End: 2025-07-02
Attending: INTERNAL MEDICINE
Payer: MEDICAID

## 2025-07-02 LAB
CENTROMERE IGG SER-ACNC: <0.4 U/ML (ref ?–7)
DSDNA IGG SERPL IA-ACNC: 1.2 IU/ML (ref ?–10)
ENA AB SER QL IA: 28 UG/L (ref ?–0.7)
ENA AB SER QL IA: POSITIVE
ENA JO1 AB SER IA-ACNC: <0.3 U/ML (ref ?–7)
ENA RNP IGG SER IA-ACNC: 1.6 U/ML (ref ?–7)
ENA SCL70 IGG SER IA-ACNC: 0.8 U/ML (ref ?–7)
ENA SM IGG SER IA-ACNC: 0.8 U/ML (ref ?–7)
ENA SS-A IGG SER IA-ACNC: >240 U/ML (ref ?–7)
ENA SS-B IGG SER IA-ACNC: 1.6 U/ML (ref ?–7)
ERYTHROCYTE [SEDIMENTATION RATE] IN BLOOD: 75 MM/HR (ref 0–20)
U1 SNRNP IGG SER IA-ACNC: 1.1 U/ML (ref ?–5)

## 2025-07-02 PROCEDURE — 97110 THERAPEUTIC EXERCISES: CPT

## 2025-07-02 NOTE — PROGRESS NOTES
Patient: Alina Aceves (45 year old, female) Referring Provider:  Insurance:   Diagnosis: Malignant neoplasm of upper-outer quadrant of left female breast (HCC) (C50.412)  Anemia (D64.9)  Open wound of chest wall, left, subsequent encounter (S21.102D)  Foot drop (M21.379)  Neuropathy (G62.9) Akin Stalin  [facility] MEDICAID   Date of Surgery: N/a Next MD visit:  N/A   Precautions:  Cancer No data recorded Referral Information:    Date of Evaluation: Req: 10, Auth: 8, Exp: 8/12/2025    No data recorded POC Auth Visits:  10       Today's Date   7/2/2025    Subjective  Pt is doing well, has been busy with activities for her child. Saw neurologist earlier this week as well for follow up.       Pain: pain not reported     Objective  Decreased eccentric quad control on LLE with step downs from 4 in step            Assessment  Pt able to continue with BLE strength training. Min cues needed for shuttle press to decrease bilat knee valgus with activity. Pt demonstrates good control with eccentric step downs from 4 in box on R side, however LLE pt relies on UE support to assist with control. Continue per plan of care to improve walking endurance and functional mobility. Next session, consider 6MWT.    Goals (to be met in 30 visits)         Not Met Progress Toward Partially Met Met   Pt will demonstrate improved tandem stance to 5 sec or more to be able to safely negotiate narrow spaces such as the bathroom without LOB []  []  [x]  []    Pt will perform TUG in <18 seconds with least restrictive AD, demonstrating improved gait speed for community ambulation. (Updated to 14 sec or less 6/13/2025) []  []  []  [x]    Pt will be able to perform STS with no UE support to be able to easily rise from chair without loss of balance. []  []  []  [x]    Pt will be able to squat to  light objects around the house without loss of stability. []  []  [x]  []    Pt will be able to ambulate 200 ft with least restrictive AD to be able  to access multiple rooms in house safely  []  []  []  [x]    Pt will be independent and compliant with comprehensive HEP to maintain progress achieved in PT. []  []  []  [x]    NEW GOAL: Pt will improve score on DGI to 19/24 or more to decrease fall risk with community ambulation  X     NEW GOAL: Pt will ambulate 500 ft w/o AD to access doctor's office at prior level of function of mobility.   X                    Plan  Continue per plan of care.    Treatment Last 4 Visits  Treatment Day: 22 6/6/2025 6/13/2025 6/27/2025 7/2/2025   Neuro Treatment   Therapeutic Exercise Nustep lvl 5 5 mins   SL lat step over shanda in sitting, 2x10 bilat  Seated weighted ball kicks, x15 bilat  Side to side weighted ball rolls, x15 bilat   Eccentric step down from 4 in step, 2x5 bilat   Nustep lvl 5 5 mins   Shuttle press, #56, 2x10   SL press, #31, 2x10 bilat     Long sitting calf stretch w/ strap, multiple bouts  Sidelying clamshells red band, 2x10 bilat  SLR w/ TA contraction, 2x10 bilat  Walking on biodex treadmill with gait , 60 step length, 1.0 mph, 6 min  Shuttle press, #56, 2x10   SL press, #31, 2x10 bilat   DLHR, 2x10   Hip 3 way, yellow band, x10 bilat  Fwd step down from 4 in step, x10 bilat  Lateral step overs to 4 in step, x10 bilat     Neuro Re-Education Blaze pods  - Color catch: facing blaze pods, 2x30 sec (cued pt to side step and alternate legs stepping on randomly lit pod) no UE  - Color catch, single leg taps, 2x30 sec bilat (pt has blaze pods on one side of body, tapping randomly lit pod with one leg and walking fwd/bkwd to get to pod) no UE  - Color catch in quadriped: for core stability, 2x30 sec bilat, cues for core stability  NB on airex, pulling squigs off of mirror outside SILVERIO, multiple bouts   Reassessment of balance testing in objective Walking on biodex treadmill with gait   NMES for ankle DF on R  - eccentric lowering, multiple bouts  - EC bilat ankle pumps, 2x10     Therapeutic  Exercise Minutes 17 15 20 40   Neuro Re-Educ Minutes 25 30 25    Total Time Of Timed Procedures 42 45 45 40   Total Time Of Service-Based Procedures 0 0 0 0   Total Treatment Time 42 45 45 40        HEP  Access Code: [email or web address] URL: [email or web address] Date: 03/31/2025 Prepared by: Trupti Salazar Exercises - Supine Ankle Pumps  - 1 x daily - 7 x weekly - 2 sets - 10 reps - Supine Ankle Inversion Eversion AROM  - 1 x daily - 7 x weekly - 2 sets - 10 reps - Sit to Stand with Counter Support  - 3 x daily - 7 x weekly - 1 sets - 3 reps - Gastroc Stretch with Foot at Wall  - 1 x daily - 7 x weekly - 3 sets - 20 hold - Single Leg Stance with Support  - 1 x daily - 7 x weekly - 3 sets - 15 hold - Supine Bridge  - 1 x daily - 7 x weekly - 3 sets - 8 reps       Charges  TE 3

## 2025-07-07 ENCOUNTER — HOSPITAL ENCOUNTER (OUTPATIENT)
Dept: CT IMAGING | Facility: HOSPITAL | Age: 45
Discharge: HOME | End: 2025-07-07
Attending: INTERNAL MEDICINE
Payer: MEDICAID

## 2025-07-07 DIAGNOSIS — D47.2 MGUS (MONOCLONAL GAMMOPATHY OF UNKNOWN SIGNIFICANCE): ICD-10-CM

## 2025-07-07 DIAGNOSIS — Z17.1 MALIGNANT NEOPLASM OF OVERLAPPING SITES OF LEFT BREAST IN FEMALE, ESTROGEN RECEPTOR NEGATIVE (HCC): ICD-10-CM

## 2025-07-07 DIAGNOSIS — C50.812 MALIGNANT NEOPLASM OF OVERLAPPING SITES OF LEFT BREAST IN FEMALE, ESTROGEN RECEPTOR NEGATIVE (HCC): ICD-10-CM

## 2025-07-07 DIAGNOSIS — M32.9 SLE (SYSTEMIC LUPUS ERYTHEMATOSUS RELATED SYNDROME) (HCC): ICD-10-CM

## 2025-07-07 LAB
CREAT BLD-MCNC: 0.9 MG/DL (ref 0.55–1.02)
EGFRCR SERPLBLD CKD-EPI 2021: 80 ML/MIN/1.73M2 (ref 60–?)

## 2025-07-07 PROCEDURE — 82565 ASSAY OF CREATININE: CPT

## 2025-07-07 PROCEDURE — 74177 CT ABD & PELVIS W/CONTRAST: CPT | Performed by: INTERNAL MEDICINE

## 2025-07-07 PROCEDURE — 71260 CT THORAX DX C+: CPT | Performed by: INTERNAL MEDICINE

## 2025-07-08 LAB
VIT E ALPHA TOCO: 13.3 MG/L
VIT E GAMMA TOCO: 1.5 MG/L
VITAMIN B6: 57.5 UG/L

## 2025-07-11 ENCOUNTER — OFFICE VISIT (OUTPATIENT)
Dept: PHYSICAL THERAPY | Facility: HOSPITAL | Age: 45
End: 2025-07-11
Attending: INTERNAL MEDICINE
Payer: MEDICAID

## 2025-07-11 PROCEDURE — 97110 THERAPEUTIC EXERCISES: CPT

## 2025-07-11 NOTE — PROGRESS NOTES
Patient: Alina Aceves (45 year old, female) Referring Provider:  Insurance:   Diagnosis: Malignant neoplasm of upper-outer quadrant of left female breast (HCC) (C50.412)  Anemia (D64.9)  Open wound of chest wall, left, subsequent encounter (S21.102D)  Foot drop (M21.379)  Neuropathy (G62.9) Akin Stalin  [facility] MEDICAID   Date of Surgery: N/a Next MD visit:  N/A   Precautions:  Cancer No data recorded Referral Information:    Date of Evaluation: Req: 10, Auth: 8, Exp: 8/12/2025    No data recorded POC Auth Visits:  10       Today's Date   7/11/2025    Subjective  Pt has been well. Going to [place] soon to visit her family. Feet are still flaring up from autoimmune conditions and feel stiffer.       Pain: pain not reported     Objective  See tx flow sheet; able to add additional weight with DLP and SLP with no dynamic valgus noted          Assessment  Pt has improved step length with biodex treadmill today. Able to use audiovisual cuing to increase step length as needed. With shuttle press, pt able to increase weight both with double leg and single leg with no dynamic valgus noted. Pt has overall improved activity tolerance with only one seated rest break taken and brief standing rest breaks. Pt on wait list for additional appointments, however scheduled for visits following upcoming trip to [place]. Will continue to reassess and work towards improving functional mobility for community ambulation and decreased fall risk.    Goals (to be met in 30 visits)         Not Met Progress Toward Partially Met Met   Pt will demonstrate improved tandem stance to 5 sec or more to be able to safely negotiate narrow spaces such as the bathroom without LOB []  []  [x]  []    Pt will perform TUG in <18 seconds with least restrictive AD, demonstrating improved gait speed for community ambulation. (Updated to 14 sec or less 6/13/2025) []  []  []  [x]    Pt will be able to perform STS with no UE support to be able to easily rise from  chair without loss of balance. []  []  []  [x]    Pt will be able to squat to  light objects around the house without loss of stability. []  []  [x]  []    Pt will be able to ambulate 200 ft with least restrictive AD to be able to access multiple rooms in house safely  []  []  []  [x]    Pt will be independent and compliant with comprehensive HEP to maintain progress achieved in PT. []  []  []  [x]    NEW GOAL: Pt will improve score on DGI to 19/24 or more to decrease fall risk with community ambulation  X     NEW GOAL: Pt will ambulate 500 ft w/o AD to access doctor's office at prior level of function of mobility.   X                        Plan  Continue per plan of care.    Treatment Last 4 Visits  Treatment Day: 4       6/13/2025 6/27/2025 7/2/2025 7/11/2025   Neuro Treatment   Therapeutic Exercise Nustep lvl 5 5 mins   Shuttle press, #56, 2x10   SL press, #31, 2x10 bilat     Long sitting calf stretch w/ strap, multiple bouts  Sidelying clamshells red band, 2x10 bilat  SLR w/ TA contraction, 2x10 bilat  Walking on biodex treadmill with gait , 60 step length, 1.0 mph, 6 min  Shuttle press, #56, 2x10   SL press, #31, 2x10 bilat   DLHR, 2x10   Hip 3 way, yellow band, x10 bilat  Fwd step down from 4 in step, x10 bilat  Lateral step overs to 4 in step, x10 bilat   Walking on biodex treadmill with gait , 60 step length, 1.0 mph, 6 min   Calf stretch on stand board, multiple bouts  Soleus stretch on slant board, multiple bouts bilat  A/P and M/L taps on board, x2 min  Shuttle press, #62, 2x10   SL press, #37, 2x10 bilat   Fwd step down from 4 in step, 2x10 bilat   Lateral step overs to 4 in step, x10 bilat    Neuro Re-Education Reassessment of balance testing in objective Walking on biodex treadmill with gait   NMES for ankle DF on R  - eccentric lowering, multiple bouts  - EC bilat ankle pumps, 2x10      Therapeutic Exercise Minutes 15 20 40 40   Neuro Re-Educ Minutes 30 25     Total Time  Of Timed Procedures 45 45 40 40   Total Time Of Service-Based Procedures 0 0 0 0   Total Treatment Time 45 45 40 40        HEP  Access Code: [email or web address] URL: [email or web address] Date: 03/31/2025 Prepared by: Trupti Salazar Exercises - Supine Ankle Pumps  - 1 x daily - 7 x weekly - 2 sets - 10 reps - Supine Ankle Inversion Eversion AROM  - 1 x daily - 7 x weekly - 2 sets - 10 reps - Sit to Stand with Counter Support  - 3 x daily - 7 x weekly - 1 sets - 3 reps - Gastroc Stretch with Foot at Wall  - 1 x daily - 7 x weekly - 3 sets - 20 hold - Single Leg Stance with Support  - 1 x daily - 7 x weekly - 3 sets - 15 hold - Supine Bridge  - 1 x daily - 7 x weekly - 3 sets - 8 reps       Charges  TE 3

## 2025-07-16 ENCOUNTER — OFFICE VISIT (OUTPATIENT)
Dept: PHYSICAL THERAPY | Facility: HOSPITAL | Age: 45
End: 2025-07-16
Attending: INTERNAL MEDICINE
Payer: MEDICAID

## 2025-07-16 PROCEDURE — 97112 NEUROMUSCULAR REEDUCATION: CPT

## 2025-07-16 PROCEDURE — 97110 THERAPEUTIC EXERCISES: CPT

## 2025-07-16 NOTE — PROGRESS NOTES
Patient: Alina Aceves (45 year old, female) Referring Provider:  Insurance:   Diagnosis: Malignant neoplasm of upper-outer quadrant of left female breast (HCC) (C50.412)  Anemia (D64.9)  Open wound of chest wall, left, subsequent encounter (S21.102D)  Foot drop (M21.379)  Neuropathy (G62.9) Akin Stalin  [facility] MEDICAID   Date of Surgery: N/a Next MD visit:  N/A   Precautions:  Cancer No data recorded Referral Information:    Date of Evaluation: Req: 10, Auth: 8, Exp: 8/12/2025    No data recorded POC Auth Visits:  10       Today's Date   7/16/2025    Subjective  Pt is doing well today, legs feel more fatigued easily with the heat.       Pain: pain not reported     Objective  6MWT: 652 ft w/ cane, one brief standing break for water          Assessment  Pt able to complete 6MWT to assess walking endurance for community mobility. Pt completes with small base cane and only one brief standing break for water. Ambulates 652 ft, which is about half of the normal of healthy adults (400-700m). Will continue to reassess this for functional strength to improve community access and independence. Pt also able to use [employer] balance  with light UE support, good weight shifting noted, however pt reports exercise is fatiguing.    Goals (to be met in 30 visits)         Not Met Progress Toward Partially Met Met   Pt will demonstrate improved tandem stance to 5 sec or more to be able to safely negotiate narrow spaces such as the bathroom without LOB []  []  [x]  []    Pt will perform TUG in <18 seconds with least restrictive AD, demonstrating improved gait speed for community ambulation. (Updated to 14 sec or less 6/13/2025) []  []  []  [x]    Pt will be able to perform STS with no UE support to be able to easily rise from chair without loss of balance. []  []  []  [x]    Pt will be able to squat to  light objects around the house without loss of stability. []  []  [x]  []    Pt will be able to ambulate 200 ft  with least restrictive AD to be able to access multiple rooms in house safely  []  []  []  [x]    Pt will be independent and compliant with comprehensive HEP to maintain progress achieved in PT. []  []  []  [x]    NEW GOAL: Pt will improve score on DGI to 19/24 or more to decrease fall risk with community ambulation  X     NEW GOAL: Pt will ambulate 500 ft w/o AD to access doctor's office at prior level of function of mobility.   X     NEW GOAL: Pt will increase 6MWT distance by 100 ft or more to show improved functional walking endurance and strength.                               Plan  Continue per plan of care.    Treatment Last 4 Visits  Treatment Day: 5 6/27/2025 7/2/2025 7/11/2025 7/16/2025   Neuro Treatment   Therapeutic Exercise Long sitting calf stretch w/ strap, multiple bouts  Sidelying clamshells red band, 2x10 bilat  SLR w/ TA contraction, 2x10 bilat  Walking on biodex treadmill with gait , 60 step length, 1.0 mph, 6 min  Shuttle press, #56, 2x10   SL press, #31, 2x10 bilat   DLHR, 2x10   Hip 3 way, yellow band, x10 bilat  Fwd step down from 4 in step, x10 bilat  Lateral step overs to 4 in step, x10 bilat   Walking on biodex treadmill with gait , 60 step length, 1.0 mph, 6 min   Calf stretch on stand board, multiple bouts  Soleus stretch on slant board, multiple bouts bilat  A/P and M/L taps on board, x2 min  Shuttle press, #62, 2x10   SL press, #37, 2x10 bilat   Fwd step down from 4 in step, 2x10 bilat   Lateral step overs to 4 in step, x10 bilat  6MWT for community mobility endurance (~5 min rest following)  Shuttle press, #62, 2x10   SL press, #37, x10 bilat      Neuro Re-Education Walking on biodex treadmill with gait   NMES for ankle DF on R  - eccentric lowering, multiple bouts  - EC bilat ankle pumps, 2x10    8bit balance   - Maze control, levels 8/6, BUE support - fading as able  - Limits of stability, level 7 BUE-fading UE support  - Random control, level 6,  2 min light UE support   Therapeutic Exercise Minutes 20 40 40 25   Neuro Re-Educ Minutes 25   15   Total Time Of Timed Procedures 45 40 40 40   Total Time Of Service-Based Procedures 0 0 0 0   Total Treatment Time 45 40 40 40        HEP  Access Code: [email or web address] URL: [email or web address] Date: 03/31/2025 Prepared by: Trupti Salazar Exercises - Supine Ankle Pumps  - 1 x daily - 7 x weekly - 2 sets - 10 reps - Supine Ankle Inversion Eversion AROM  - 1 x daily - 7 x weekly - 2 sets - 10 reps - Sit to Stand with Counter Support  - 3 x daily - 7 x weekly - 1 sets - 3 reps - Gastroc Stretch with Foot at Wall  - 1 x daily - 7 x weekly - 3 sets - 20 hold - Single Leg Stance with Support  - 1 x daily - 7 x weekly - 3 sets - 15 hold - Supine Bridge  - 1 x daily - 7 x weekly - 3 sets - 8 reps       Charges  TE 2 NR 1

## 2025-07-17 ENCOUNTER — LAB ENCOUNTER (OUTPATIENT)
Dept: LAB | Facility: HOSPITAL | Age: 45
End: 2025-07-17
Attending: INTERNAL MEDICINE
Payer: MEDICAID

## 2025-07-17 DIAGNOSIS — M32.19 OTHER SYSTEMIC LUPUS ERYTHEMATOSUS WITH OTHER ORGAN INVOLVEMENT (HCC): Primary | ICD-10-CM

## 2025-07-17 LAB
ALBUMIN SERPL-MCNC: 4.6 G/DL (ref 3.2–4.8)
ALBUMIN/GLOB SERPL: 1.2 {RATIO} (ref 1–2)
ALP LIVER SERPL-CCNC: 138 U/L (ref 37–98)
ALT SERPL-CCNC: 18 U/L (ref 10–49)
ANION GAP SERPL CALC-SCNC: 7 MMOL/L (ref 0–18)
AST SERPL-CCNC: 26 U/L (ref ?–34)
BASOPHILS # BLD AUTO: 0.01 X10(3) UL (ref 0–0.2)
BASOPHILS NFR BLD AUTO: 0.2 %
BILIRUB SERPL-MCNC: 0.3 MG/DL (ref 0.3–1.2)
BUN BLD-MCNC: 15 MG/DL (ref 9–23)
C3 SERPL-MCNC: 100.6 MG/DL (ref 90–170)
C4 SERPL-MCNC: <1.5 MG/DL (ref 12–36)
CALCIUM BLD-MCNC: 9.6 MG/DL (ref 8.7–10.6)
CHLORIDE SERPL-SCNC: 109 MMOL/L (ref 98–112)
CO2 SERPL-SCNC: 25 MMOL/L (ref 21–32)
CREAT BLD-MCNC: 1.03 MG/DL (ref 0.55–1.02)
CREAT UR-SCNC: 25.4 MG/DL
EGFRCR SERPLBLD CKD-EPI 2021: 68 ML/MIN/1.73M2 (ref 60–?)
EOSINOPHIL # BLD AUTO: 0.15 X10(3) UL (ref 0–0.7)
EOSINOPHIL NFR BLD AUTO: 3.4 %
ERYTHROCYTE [DISTWIDTH] IN BLOOD BY AUTOMATED COUNT: 13.4 %
FASTING STATUS PATIENT QL REPORTED: NO
GLOBULIN PLAS-MCNC: 3.9 G/DL (ref 2–3.5)
GLUCOSE BLD-MCNC: 87 MG/DL (ref 70–99)
HCT VFR BLD AUTO: 33.8 % (ref 35–48)
HGB BLD-MCNC: 11.3 G/DL (ref 12–16)
IMM GRANULOCYTES # BLD AUTO: 0.02 X10(3) UL (ref 0–1)
IMM GRANULOCYTES NFR BLD: 0.4 %
LYMPHOCYTES # BLD AUTO: 1.29 X10(3) UL (ref 1–4)
LYMPHOCYTES NFR BLD AUTO: 28.9 %
MCH RBC QN AUTO: 30.6 PG (ref 26–34)
MCHC RBC AUTO-ENTMCNC: 33.4 G/DL (ref 31–37)
MCV RBC AUTO: 91.6 FL (ref 80–100)
MONOCYTES # BLD AUTO: 0.5 X10(3) UL (ref 0.1–1)
MONOCYTES NFR BLD AUTO: 11.2 %
NEUTROPHILS # BLD AUTO: 2.49 X10 (3) UL (ref 1.5–7.7)
NEUTROPHILS # BLD AUTO: 2.49 X10(3) UL (ref 1.5–7.7)
NEUTROPHILS NFR BLD AUTO: 55.9 %
OSMOLALITY SERPL CALC.SUM OF ELEC: 292 MOSM/KG (ref 275–295)
PLATELET # BLD AUTO: 272 10(3)UL (ref 150–450)
POTASSIUM SERPL-SCNC: 3.8 MMOL/L (ref 3.5–5.1)
PROT SERPL-MCNC: 8.5 G/DL (ref 5.7–8.2)
PROT UR-MCNC: 24.6 MG/DL (ref ?–14)
RBC # BLD AUTO: 3.69 X10(6)UL (ref 3.8–5.3)
SODIUM SERPL-SCNC: 141 MMOL/L (ref 136–145)
WBC # BLD AUTO: 4.5 X10(3) UL (ref 4–11)

## 2025-07-17 PROCEDURE — 86225 DNA ANTIBODY NATIVE: CPT

## 2025-07-17 PROCEDURE — 86160 COMPLEMENT ANTIGEN: CPT

## 2025-07-17 PROCEDURE — 80053 COMPREHEN METABOLIC PANEL: CPT

## 2025-07-17 PROCEDURE — 85025 COMPLETE CBC W/AUTO DIFF WBC: CPT

## 2025-07-17 PROCEDURE — 82570 ASSAY OF URINE CREATININE: CPT

## 2025-07-17 PROCEDURE — 84156 ASSAY OF PROTEIN URINE: CPT

## 2025-07-17 PROCEDURE — 36415 COLL VENOUS BLD VENIPUNCTURE: CPT

## 2025-07-18 LAB — DSDNA AB TITR SER: <10 {TITER}

## 2025-07-29 ENCOUNTER — APPOINTMENT (OUTPATIENT)
Dept: SURGERY | Age: 45
End: 2025-07-29
Attending: SURGERY

## 2025-08-01 ENCOUNTER — APPOINTMENT (OUTPATIENT)
Dept: PHYSICAL THERAPY | Facility: HOSPITAL | Age: 45
End: 2025-08-01
Attending: INTERNAL MEDICINE

## 2025-08-08 ENCOUNTER — OFFICE VISIT (OUTPATIENT)
Dept: PHYSICAL THERAPY | Facility: HOSPITAL | Age: 45
End: 2025-08-08
Attending: INTERNAL MEDICINE

## 2025-08-08 PROCEDURE — 97110 THERAPEUTIC EXERCISES: CPT

## 2025-08-08 PROCEDURE — 97112 NEUROMUSCULAR REEDUCATION: CPT

## 2025-08-15 ENCOUNTER — OFFICE VISIT (OUTPATIENT)
Dept: PHYSICAL THERAPY | Facility: HOSPITAL | Age: 45
End: 2025-08-15
Attending: INTERNAL MEDICINE

## 2025-08-15 PROCEDURE — 97110 THERAPEUTIC EXERCISES: CPT

## 2025-08-15 PROCEDURE — 97112 NEUROMUSCULAR REEDUCATION: CPT

## 2025-08-21 ENCOUNTER — OFFICE VISIT (OUTPATIENT)
Dept: PHYSICAL THERAPY | Facility: HOSPITAL | Age: 45
End: 2025-08-21
Attending: INTERNAL MEDICINE

## 2025-08-21 PROCEDURE — 97112 NEUROMUSCULAR REEDUCATION: CPT

## 2025-08-21 PROCEDURE — 97110 THERAPEUTIC EXERCISES: CPT

## 2025-08-22 ENCOUNTER — NURSE ONLY (OUTPATIENT)
Facility: LOCATION | Age: 45
End: 2025-08-22
Attending: INTERNAL MEDICINE

## 2025-08-22 ENCOUNTER — OFFICE VISIT (OUTPATIENT)
Facility: LOCATION | Age: 45
End: 2025-08-22
Attending: INTERNAL MEDICINE

## 2025-08-22 VITALS
SYSTOLIC BLOOD PRESSURE: 107 MMHG | DIASTOLIC BLOOD PRESSURE: 73 MMHG | RESPIRATION RATE: 16 BRPM | BODY MASS INDEX: 22.4 KG/M2 | HEIGHT: 64.02 IN | OXYGEN SATURATION: 98 % | WEIGHT: 131.19 LBS | HEART RATE: 81 BPM

## 2025-08-22 DIAGNOSIS — G62.9 NEUROPATHY: ICD-10-CM

## 2025-08-22 DIAGNOSIS — C50.812 MALIGNANT NEOPLASM OF OVERLAPPING SITES OF LEFT BREAST IN FEMALE, ESTROGEN RECEPTOR NEGATIVE (HCC): ICD-10-CM

## 2025-08-22 DIAGNOSIS — D63.8 ANEMIA, CHRONIC DISEASE: ICD-10-CM

## 2025-08-22 DIAGNOSIS — D47.2 MGUS (MONOCLONAL GAMMOPATHY OF UNKNOWN SIGNIFICANCE): ICD-10-CM

## 2025-08-22 DIAGNOSIS — Z17.1 MALIGNANT NEOPLASM OF OVERLAPPING SITES OF LEFT BREAST IN FEMALE, ESTROGEN RECEPTOR NEGATIVE (HCC): ICD-10-CM

## 2025-08-22 DIAGNOSIS — Z17.1 MALIGNANT NEOPLASM OF OVERLAPPING SITES OF LEFT BREAST IN FEMALE, ESTROGEN RECEPTOR NEGATIVE (HCC): Primary | ICD-10-CM

## 2025-08-22 DIAGNOSIS — C50.812 MALIGNANT NEOPLASM OF OVERLAPPING SITES OF LEFT BREAST IN FEMALE, ESTROGEN RECEPTOR NEGATIVE (HCC): Primary | ICD-10-CM

## 2025-08-22 DIAGNOSIS — M32.9 SLE (SYSTEMIC LUPUS ERYTHEMATOSUS RELATED SYNDROME) (HCC): ICD-10-CM

## 2025-08-22 LAB
ALBUMIN SERPL-MCNC: 4.6 G/DL (ref 3.2–4.8)
ALBUMIN/GLOB SERPL: 1.2 (ref 1–2)
ALP LIVER SERPL-CCNC: 116 U/L (ref 37–98)
ALT SERPL-CCNC: 19 U/L (ref 10–49)
ANION GAP SERPL CALC-SCNC: 12 MMOL/L (ref 0–18)
AST SERPL-CCNC: 24 U/L (ref ?–34)
BASOPHILS # BLD AUTO: 0.01 X10(3) UL (ref 0–0.2)
BASOPHILS NFR BLD AUTO: 0.2 %
BILIRUB SERPL-MCNC: 0.3 MG/DL (ref 0.3–1.2)
BUN BLD-MCNC: 16 MG/DL (ref 9–23)
CALCIUM BLD-MCNC: 10.2 MG/DL (ref 8.7–10.6)
CHLORIDE SERPL-SCNC: 114 MMOL/L (ref 98–112)
CO2 SERPL-SCNC: 19 MMOL/L (ref 21–32)
CREAT BLD-MCNC: 1.08 MG/DL (ref 0.55–1.02)
EGFRCR SERPLBLD CKD-EPI 2021: 65 ML/MIN/1.73M2 (ref 60–?)
EOSINOPHIL # BLD AUTO: 0.2 X10(3) UL (ref 0–0.7)
EOSINOPHIL NFR BLD AUTO: 5 %
ERYTHROCYTE [DISTWIDTH] IN BLOOD BY AUTOMATED COUNT: 13 %
GLOBULIN PLAS-MCNC: 3.9 G/DL (ref 2–3.5)
GLUCOSE BLD-MCNC: 94 MG/DL (ref 70–99)
HCT VFR BLD AUTO: 35 % (ref 35–48)
HGB BLD-MCNC: 12 G/DL (ref 12–16)
IGA SERPL-MCNC: 718.3 MG/DL (ref 40–350)
IGM SERPL-MCNC: 130.5 MG/DL (ref 50–300)
IMM GRANULOCYTES # BLD AUTO: 0.01 X10(3) UL (ref 0–1)
IMM GRANULOCYTES NFR BLD: 0.2 %
IMMUNOGLOBULIN PNL SER-MCNC: 1763 MG/DL (ref 650–1600)
LDH SERPL L TO P-CCNC: 166 U/L (ref 120–246)
LYMPHOCYTES # BLD AUTO: 1.36 X10(3) UL (ref 1–4)
LYMPHOCYTES NFR BLD AUTO: 33.7 %
MCH RBC QN AUTO: 29.9 PG (ref 26–34)
MCHC RBC AUTO-ENTMCNC: 34.3 G/DL (ref 31–37)
MCV RBC AUTO: 87.3 FL (ref 80–100)
MONOCYTES # BLD AUTO: 0.55 X10(3) UL (ref 0.1–1)
MONOCYTES NFR BLD AUTO: 13.6 %
NEUTROPHILS # BLD AUTO: 1.91 X10 (3) UL (ref 1.5–7.7)
NEUTROPHILS # BLD AUTO: 1.91 X10(3) UL (ref 1.5–7.7)
NEUTROPHILS NFR BLD AUTO: 47.3 %
OSMOLALITY SERPL CALC.SUM OF ELEC: 301 MOSM/KG (ref 275–295)
PLATELET # BLD AUTO: 273 10(3)UL (ref 150–450)
POTASSIUM SERPL-SCNC: 3.5 MMOL/L (ref 3.5–5.1)
PROT SERPL-MCNC: 8.5 G/DL (ref 5.7–8.2)
RBC # BLD AUTO: 4.01 X10(6)UL (ref 3.8–5.3)
SODIUM SERPL-SCNC: 145 MMOL/L (ref 136–145)
WBC # BLD AUTO: 4 X10(3) UL (ref 4–11)

## 2025-08-22 RX ORDER — HYDROXYCHLOROQUINE SULFATE 200 MG/1
TABLET, FILM COATED ORAL
COMMUNITY
Start: 2025-08-03

## 2025-08-25 ENCOUNTER — TELEPHONE (OUTPATIENT)
Dept: ORTHOPEDICS CLINIC | Facility: CLINIC | Age: 45
End: 2025-08-25

## 2025-08-26 ENCOUNTER — TELEPHONE (OUTPATIENT)
Dept: ORTHOPEDICS CLINIC | Facility: CLINIC | Age: 45
End: 2025-08-26

## 2025-08-26 DIAGNOSIS — S89.92XA LEFT KNEE INJURY, INITIAL ENCOUNTER: Primary | ICD-10-CM

## 2025-08-27 ENCOUNTER — APPOINTMENT (OUTPATIENT)
Dept: PHYSICAL THERAPY | Facility: HOSPITAL | Age: 45
End: 2025-08-27
Attending: INTERNAL MEDICINE

## 2025-08-28 ENCOUNTER — APPOINTMENT (OUTPATIENT)
Dept: PHYSICAL THERAPY | Facility: HOSPITAL | Age: 45
End: 2025-08-28
Attending: INTERNAL MEDICINE

## (undated) DEVICE — SLEEVE COMPR M KNEE LEN SGL USE KENDALL SCD

## (undated) DEVICE — SINGLE USE BIOPSY VALVE MAJ-210: Brand: SINGLE USE BIOPSY VALVE (STERILE)

## (undated) DEVICE — 40580 - THE PINK PAD - ADVANCED TRENDELENBURG POSITIONING KIT: Brand: 40580 - THE PINK PAD - ADVANCED TRENDELENBURG POSITIONING KIT

## (undated) DEVICE — MEDI-VAC SUCTION HANDLE REGULAR CAPACITY: Brand: CARDINAL HEALTH

## (undated) DEVICE — TROCAR: Brand: KII SHIELDED BLADED ACCESS SYSTEM

## (undated) DEVICE — SUTURE MCRYL SZ 4-0 L18IN ABSRB UD L19MM PS-2

## (undated) DEVICE — 1200CC GUARDIAN II: Brand: GUARDIAN

## (undated) DEVICE — STERILE POLYISOPRENE POWDER-FREE SURGICAL GLOVES WITH EMOLLIENT COATING: Brand: PROTEXIS

## (undated) DEVICE — SUTURE VCRL SZ 3-0 L27IN ABSRB UD L26MM SH

## (undated) DEVICE — L-HOOK CAUTERY PROBE TIP, DISPOSABLE: Brand: RENEW

## (undated) DEVICE — ENDOPATH ULTRA VERESS INSUFFLATION NEEDLES WITH LUER LOCK CONNECTORS: Brand: ENDOPATH

## (undated) DEVICE — 10FT COMBINED O2 DELIVERY/CO2 MONITORING. FILTER WITH MICROSTREAM TYPE LUER: Brand: DUAL ADULT NASAL CANNULA

## (undated) DEVICE — GLOVE,SURG,SENSICARE,ALOE,LF,PF,7: Brand: MEDLINE

## (undated) DEVICE — SOLUTION IRRIG 1000ML 0.9% NACL USP BTL

## (undated) DEVICE — LIGHT HANDLE

## (undated) DEVICE — 60 ML SYRINGE REGULAR TIP: Brand: MONOJECT

## (undated) DEVICE — STERIS KITS

## (undated) DEVICE — MINI ENDOCUT SCISSOR TIP, DISPOSABLE: Brand: RENEW

## (undated) DEVICE — STERILE POLYISOPRENE POWDER-FREE SURGICAL GLOVES: Brand: PROTEXIS

## (undated) DEVICE — SYRINGE MED 10ML SLIP TIP CLR BRL TAPR PLUNG

## (undated) DEVICE — ADHESIVE SKIN TOP FOR WND CLSR DERMBND ADV

## (undated) DEVICE — GRABBER GRASPER TIP, DISPOSABLE: Brand: RENEW

## (undated) DEVICE — TRADITIONAL MARYLAND DISSECTOR TIP, DISPOSABLE: Brand: RENEW

## (undated) DEVICE — SINGLE USE SUCTION VALVE MAJ-209: Brand: SINGLE USE SUCTION VALVE (STERILE)

## (undated) DEVICE — 3M™ RED DOT™ MONITORING ELECTRODE WITH FOAM TAPE AND STICKY GEL, 50/BAG, 20/CASE, 72/PLT 2570: Brand: RED DOT™

## (undated) DEVICE — DISPOSABLE LAPAROSCOPIC CLIP APPLIER WITH 20 CLIPS.: Brand: EPIX® UNIVERSAL CLIP APPLIER

## (undated) DEVICE — TRAP,MUCUS SPECIMEN, 80CC: Brand: MEDLINE

## (undated) DEVICE — POUCH SPECIMEN WIRE 6X3 250ML

## (undated) DEVICE — MEDI-VAC NON-CONDUCTIVE SUCTION TUBING: Brand: CARDINAL HEALTH

## (undated) DEVICE — LAP CHOLE/APPY CDS-LF: Brand: MEDLINE INDUSTRIES, INC.

## (undated) DEVICE — TROCAR: Brand: KII FIOS FIRST ENTRY

## (undated) DEVICE — TROCAR: Brand: KII® SLEEVE

## (undated) DEVICE — SUTURE VCRL SZ 0 L54IN ABSRB UD POLYGLACTIN

## (undated) NOTE — LETTER
[address]  [address]  Authorization for Surgical Operation and Procedure     Date:___________                                                                                                         Time:__________  I hereby authorize Thoracentesis guided right.  , my physician and his/her assistants (if applicable), which may include medical students, residents, and/or fellows, to perform the following surgical operation/ procedure and administer such anesthesia as may be determined necessary by my physician:  Operation/Procedure name (s)  on Alina Aceves   2.   I recognize that during the surgical operation/procedure, unforeseen conditions may necessitate additional or different procedures than those listed above.  I, therefore, further authorize and request that the above-named surgeon, assistants, or designees perform such procedures as are, in their judgment, necessary and desirable.    3.   My surgeon/physician has discussed prior to my surgery the potential benefits, risks and side effects of this procedure; the likelihood of achieving goals; and potential problems that might occur during recuperation.  They also discussed reasonable alternatives to the procedure, including risks, benefits, and side effects related to the alternatives and risks related to not receiving this procedure.  I have had all my questions answered and I acknowledge that no guarantee has been made as to the result that may be obtained.    4.   Should the need arise during my operation/procedure, which includes change of level of care prior to discharge, I also consent to the administration of blood and/or blood products.  Further, I understand that despite careful testing and screening of blood or blood products by collecting agencies, I may still be subject to ill effects as a result of receiving a blood transfusion and/or blood products.  The following are some, but not all, of the potential  risks that can occur: fever and allergic reactions, hemolytic reactions, transmission of diseases such as Hepatitis, AIDS and Cytomegalovirus (CMV) and fluid overload.  In the event that I wish to have an autologous transfusion of my own blood, or a directed donor transfusion, I will discuss this with my physician.  Check only if Refusing Blood or Blood Products  I understand refusal of blood or blood products as deemed necessary by my physician may have serious consequences to my condition to include possible death. I hereby assume responsibility for my refusal and release the hospital, its personnel, and my physicians from any responsibility for the consequences of my refusal.          o  Refuse      5.   I authorize the use of any specimen, organs, tissues, body parts or foreign objects that may be removed from my body during the operation/procedure for diagnosis, research or teaching purposes and their subsequent disposal by hospital authorities.  I also authorize the release of specimen test results and/or written reports to my treating physician on the hospital medical staff or other referring or consulting physicians involved in my care, at the discretion of the Pathologist or my treating physician.    6.   I consent to the photographing or videotaping of the operations or procedures to be performed, including appropriate portions of my body for medical, scientific, or educational purposes, provided my identity is not revealed by the pictures or by descriptive texts accompanying them.  If the procedure has been photographed/videotaped, the surgeon will obtain the original picture, image, videotape or CD.  The hospital will not be responsible for storage, release or maintenance of the picture, image, tape or CD.    7.   I consent to the presence of a  or observers in the operating room as deemed necessary by my physician or their designees.    8.   I recognize that in the event my procedure  results in extended X-Ray/fluoroscopy time, I may develop a skin reaction.    9. If I have a Do Not Attempt Resuscitation (DNAR) order in place, that status will be suspended while in the operating room, procedural suite, and during the recovery period unless otherwise explicitly stated by me (or a person authorized to consent on my behalf). The surgeon or my attending physician will determine when the applicable recovery period ends for purposes of reinstating the DNAR order.  10. Patients having a sterilization procedure: I understand that if the procedure is successful the results will be permanent and it will therefore be impossible for me to inseminate, conceive, or bear children.  I also understand that the procedure is intended to result in sterility, although the result has not been guaranteed.   11. I acknowledge that my physician has explained sedation/analgesia administration to me including the risk and benefits I consent to the administration of sedation/analgesia as may be necessary or desirable in the judgment of my physician.    I CERTIFY THAT I HAVE READ AND FULLY UNDERSTAND THE ABOVE CONSENT TO OPERATION and/or OTHER PROCEDURE.    _________________________________________  __________________________________  Signature of Patient     Signature of Responsible Person         ___________________________________         Printed Name of Responsible Person           _________________________________                 Relationship to Patient  _________________________________________  ______________________________  Signature of Witness          Date  Time      Patient Name: Alina Aceves     : 1980                 Printed: 2023     Medical Record #: [id number]                     Page 1 [address]  [address]    Consent for Anesthesia    I, Alina Aceves agree to be cared for by an anesthesiologist, who is  specially trained to monitor me and give me medicine to put me to sleep or keep me comfortable during my procedure    I understand that my anesthesiologist is not an employee or agent of [facility] or [employer]. He or she works for [employer].    As the patient asking for anesthesia services, I agree to:  Allow the anesthesiologist (anesthesia doctor) to give me medicine and do additional procedures as necessary. Some examples are: Starting or using an “IV” to give me medicine, fluids or blood during my procedure, and having a breathing tube placed to help me breathe when I’m asleep (intubation). In the event that my heart stops working properly, I understand that my anesthesiologist will make every effort to sustain my life, unless otherwise directed by [facility] Do Not Resuscitate documents.  Tell my anesthesia doctor before my procedure:  If I am pregnant.  The last time that I ate or drank.  All of the medicines I take (including prescriptions, herbal supplements, and pills I can buy without a prescription (including street drugs/illegal medications). Failure to inform my anesthesiologist about these medicines may increase my risk of anesthetic complications.  If I am allergic to anything or have had a reaction to anesthesia before.  I understand how the anesthesia medicine will help me (benefits).  I understand that with any type of anesthesia medicine there are risks:  The most common risks are: nausea, vomiting, sore throat, muscle soreness, damage to my eyes, mouth, or teeth (from breathing tube placement).  Rare risks include: remembering what happened during my procedure, allergic reactions to medications, injury to my airway, heart, lungs, vision, nerves, or muscles and in extremely rare instances death.  My doctor has explained to me other choices available to me for my care (alternatives).  Pregnant Patients (“epidural”):  I understand that the risks of having  an epidural (medicine given into my back to help control pain during labor), include itching, low blood pressure, difficulty urinating, headache or slowing of the baby’s heart. Very rare risks include infection, bleeding, seizure, irregular heart rhythms and nerve injury.  Regional Anesthesia (“spinal”, “epidural”, & “nerve blocks”):  I understand that rare but potential complications include headache, bleeding, infection, seizure, irregular heart rhythms, and nerve injury.    I can change my mind about having anesthesia services at any time before I get the medicine.    _____________________________________________________________________________  Patient (or Representative) Signature/Relationship to Patient  Date   Time    _____________________________________________________________________________   Name (if used)    Language/Organization   Time    _____________________________________________________________________________  Anesthesiologist Signature     Date   Time  I have discussed the procedure and information above with the patient (or patient’s representative) and answered their questions. The patient or their representative has agreed to have anesthesia services.    _____________________________________________________________________________  Witness        Date   Time  I have verified that the signature is that of the patient or patient’s representative, and that it was signed before the procedure  Patient Name: Alina Aceves     : 1980                 Printed: 2023     Medical Record #: [id number]                     Page 2 of 2

## (undated) NOTE — LETTER
[address]  [address]  Authorization for Surgical Operation and Procedure     Date:___________                                                                                                         Time:__________  I hereby authorize Surgeon(s):  Dejah Min MD, my physician and his/her assistants (if applicable), which may include medical students, residents, and/or fellows, to perform the following surgical operation/ procedure and administer such anesthesia as may be determined necessary by my physician:  Operation/Procedure name (s) Procedure(s):  LAPAROSCOPIC CHOLECYSTECTOMY WITH INTRAOPERATIVE CHOLANGIOGRAM on Alina Aceves   2.   I recognize that during the surgical operation/procedure, unforeseen conditions may necessitate additional or different procedures than those listed above.  I, therefore, further authorize and request that the above-named surgeon, assistants, or designees perform such procedures as are, in their judgment, necessary and desirable.    3.   My surgeon/physician has discussed prior to my surgery the potential benefits, risks and side effects of this procedure; the likelihood of achieving goals; and potential problems that might occur during recuperation.  They also discussed reasonable alternatives to the procedure, including risks, benefits, and side effects related to the alternatives and risks related to not receiving this procedure.  I have had all my questions answered and I acknowledge that no guarantee has been made as to the result that may be obtained.    4.   Should the need arise during my operation/procedure, which includes change of level of care prior to discharge, I also consent to the administration of blood and/or blood products.  Further, I understand that despite careful testing and screening of blood or blood products by collecting agencies, I may still be subject to ill effects as a result of receiving a blood  transfusion and/or blood products.  The following are some, but not all, of the potential risks that can occur: fever and allergic reactions, hemolytic reactions, transmission of diseases such as Hepatitis, AIDS and Cytomegalovirus (CMV) and fluid overload.  In the event that I wish to have an autologous transfusion of my own blood, or a directed donor transfusion, I will discuss this with my physician.  Check only if Refusing Blood or Blood Products  I understand refusal of blood or blood products as deemed necessary by my physician may have serious consequences to my condition to include possible death. I hereby assume responsibility for my refusal and release the hospital, its personnel, and my physicians from any responsibility for the consequences of my refusal.          o  Refuse      5.   I authorize the use of any specimen, organs, tissues, body parts or foreign objects that may be removed from my body during the operation/procedure for diagnosis, research or teaching purposes and their subsequent disposal by hospital authorities.  I also authorize the release of specimen test results and/or written reports to my treating physician on the hospital medical staff or other referring or consulting physicians involved in my care, at the discretion of the Pathologist or my treating physician.    6.   I consent to the photographing or videotaping of the operations or procedures to be performed, including appropriate portions of my body for medical, scientific, or educational purposes, provided my identity is not revealed by the pictures or by descriptive texts accompanying them.  If the procedure has been photographed/videotaped, the surgeon will obtain the original picture, image, videotape or CD.  The hospital will not be responsible for storage, release or maintenance of the picture, image, tape or CD.    7.   I consent to the presence of a  or observers in the operating room as deemed  necessary by my physician or their designees.    8.   I recognize that in the event my procedure results in extended X-Ray/fluoroscopy time, I may develop a skin reaction.    9. If I have a Do Not Attempt Resuscitation (DNAR) order in place, that status will be suspended while in the operating room, procedural suite, and during the recovery period unless otherwise explicitly stated by me (or a person authorized to consent on my behalf). The surgeon or my attending physician will determine when the applicable recovery period ends for purposes of reinstating the DNAR order.  10. Patients having a sterilization procedure: I understand that if the procedure is successful the results will be permanent and it will therefore be impossible for me to inseminate, conceive, or bear children.  I also understand that the procedure is intended to result in sterility, although the result has not been guaranteed.   11. I acknowledge that my physician has explained sedation/analgesia administration to me including the risk and benefits I consent to the administration of sedation/analgesia as may be necessary or desirable in the judgment of my physician.    I CERTIFY THAT I HAVE READ AND FULLY UNDERSTAND THE ABOVE CONSENT TO OPERATION and/or OTHER PROCEDURE.    _________________________________________  __________________________________  Signature of Patient     Signature of Responsible Person         ___________________________________         Printed Name of Responsible Person           _________________________________                 Relationship to Patient  _________________________________________  ______________________________  Signature of Witness          Date  Time      Patient Name: Alina Aceves     : 1980                 Printed: 2023     Medical Record #: [id number]                     Page 1 of 2                                    [address]   [address]    Consent for Anesthesia    IAlina agree to be cared for by an anesthesiologist, who is specially trained to monitor me and give me medicine to put me to sleep or keep me comfortable during my procedure    I understand that my anesthesiologist is not an employee or agent of [facility] or [employer]. He or she works for [employer] [facility] [employer].    As the patient asking for anesthesia services, I agree to:  Allow the anesthesiologist (anesthesia doctor) to give me medicine and do additional procedures as necessary. Some examples are: Starting or using an “IV” to give me medicine, fluids or blood during my procedure, and having a breathing tube placed to help me breathe when I’m asleep (intubation). In the event that my heart stops working properly, I understand that my anesthesiologist will make every effort to sustain my life, unless otherwise directed by [facility] Do Not Resuscitate documents.  Tell my anesthesia doctor before my procedure:  If I am pregnant.  The last time that I ate or drank.  All of the medicines I take (including prescriptions, herbal supplements, and pills I can buy without a prescription (including street drugs/illegal medications). Failure to inform my anesthesiologist about these medicines may increase my risk of anesthetic complications.  If I am allergic to anything or have had a reaction to anesthesia before.  I understand how the anesthesia medicine will help me (benefits).  I understand that with any type of anesthesia medicine there are risks:  The most common risks are: nausea, vomiting, sore throat, muscle soreness, damage to my eyes, mouth, or teeth (from breathing tube placement).  Rare risks include: remembering what happened during my procedure, allergic reactions to medications, injury to my airway, heart, lungs, vision, nerves, or muscles and in extremely rare instances death.  My doctor has explained to me other choices available  to me for my care (alternatives).  Pregnant Patients (“epidural”):  I understand that the risks of having an epidural (medicine given into my back to help control pain during labor), include itching, low blood pressure, difficulty urinating, headache or slowing of the baby’s heart. Very rare risks include infection, bleeding, seizure, irregular heart rhythms and nerve injury.  Regional Anesthesia (“spinal”, “epidural”, & “nerve blocks”):  I understand that rare but potential complications include headache, bleeding, infection, seizure, irregular heart rhythms, and nerve injury.    I can change my mind about having anesthesia services at any time before I get the medicine.    _____________________________________________________________________________  Patient (or Representative) Signature/Relationship to Patient  Date   Time    _____________________________________________________________________________   Name (if used)    Language/Organization   Time    _____________________________________________________________________________  Anesthesiologist Signature     Date   Time  I have discussed the procedure and information above with the patient (or patient’s representative) and answered their questions. The patient or their representative has agreed to have anesthesia services.    _____________________________________________________________________________  Witness        Date   Time  I have verified that the signature is that of the patient or patient’s representative, and that it was signed before the procedure  Patient Name: Alina Aceves     : 1980                 Printed: 2023     Medical Record #: [id number]                     Page 2 of 2

## (undated) NOTE — LETTER
Date: 9/6/2024  Patient name: Alina Aceves  YOB: 1980  Medical Record Number: [id number]  Primary Coverage: Payor: [facility] POS / Plan: [facility] POS/MCNP / Product Type: POS /   Secondary Coverage:   Insurance ID: [id number]  Patient Address: [address] [place] [id number]  Telephone Information:   Home Phone [phone]   Mobile [phone]         Encounter Date: 9/6/2024  Provider: CHAD Johns  Diagnosis:   ICD-10-CM   1. 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 Adverse effect of radiation, subsequent encounter  T66.XXXD   3. SLE (systemic lupus erythematosus related syndrome) ([facility])  M32.9   4. Malignant neoplasm of left breast in female, estrogen receptor negative, unspecified site of breast ([facility])  C50.912    Z17.1       Progress Note:  CHIEF COMPLAINT:     Chief Complaint   Patient presents with    Wound Care     Patient arrives for follow up visit. Dressings changed today. Reports increase in pain to breast wound.     HPI:   Information obtained from Patient and chart  5-24-24 INITIAL:  43 year old female with Past medical history invasive ductal carcinoma of left breast (dr kaiser camara) currently undergoing radiation therapy (dr kobi bran), leukocytoclastic vasculitis (treated with clobetasol ointment by dr. Walton) iron deficiency anemia, HFrEF ([facility]), lupus, Sjogren's syndrome.  Patient was recently admitted for oral sores and n/v and elctrolyte imbalance and hypokalemia.    Earlier this week she was seen in urgent care for uti symptoms and hematuria. She was treated with cefadroxil and recommended to see primary md. dr bran recommended patient to start yary, she states she is not regular with it. Patient has seen nutrition/dietician.  Patient is active with residential [facility].  Earlier this week she had to cancel physical therapy due to wound pain.  on 5-11 patient was started on potassium citrate x 10  days and patient was to repeat labs, which she did on 5-21 but her potassium was still 2.9. she states she is taking the potassium.  I will udpate ariane palm and I asked the patient to contact her as they may want her to increase her potassium.  Patient states that initially she was utilizing aquaphor but then developed an itchy red rash, so now has just been leaving it dry or using aveeno. She is able to tolerate very minimal cleansing of the area.  I discussed with her how to do it as tolerated in the shower with anasept.  Will utilize hydrogel cool sheets, patient to return next week. There is not any s/s of infection    8-15-24 patient returns. I have not seen her in the last 3 mo. Noted [facility] oncology msw note from July that was assisting her with supportive resources and financial assistance. She is  and has an 9 yo at home and 19 yo away at college, her sister lives 2 hours away and remaining family lives out of state, mom is in [place].  She saw dr bran (radiation oncology) in June 2024 and it was documented that she had remaining areas of open lesions to the breast as well as open lesions due to vasculitis to her bilateral lower extremities.  She recently was admitted from 7-21 to 7-24 for hypokalemia. She has a f/u with dr. Gant sept 13.  She has been dressing areas with over the counter abx ointment. She has a let flank scattered ulcerations in a dermatome pattern that patient states started as vessicles, she was treated with valtrax by urgent care.  She also indeed has lower extremity ulcerations on the right knee and left lateral tibia.  The breast wound has deterorated and is significantly deeper with necrosis. No malodor.  There was a small vessel clip that was spitting that I removed with a forceps. We discussed this small clip could be the reason the wound opened up, but I am also concerned about recurrence.  Will have her dress with honey gauze to the breast and hydrogel sheets to all  other areas (flank and legs). Will order her dressings. Patient states her appetite is getting better lately. No acute s/s of infection.    8-29-24 patient returns.  She did see surgeon, dr pinedo, and mri of the breast (scheduled for sept 16), her appointment with dr camara is sept 13.  I did reach out to dr pinedo via [email or web address] chat.  Per patient dr. Pinedo d/w her taking her to the operating room to clean the wound and biopsy.  I let her know that I would agree with this as patient is too sensitive to do in clinic debridements.  She has been dressing the wound with honey gauze. We ordered dressings for her lower extremity wounds (hydrogel) received, she did receive them.  The breast wound has 3 spitting staples again.  Will have patient pack with vashe 2-3 x a day and I will see her again before her mri to remove any further potential spitting clips.  Patient states she is eating better.  We discussed keeping her skin on lower legs moisturized.     9-6-24 patient returns.  Patient has been dressing the breast wound with vashe and the flank/abdomen with hydrocolloid.  The flank/abdomen is improved. The breast wound is  with less necrosis, patient states less drainage, no malodor.  Her legs remain resolved. She states she is using the yary.  Her mri is the 16th and dr camara the 13th.  She will f/u with dr bill after mri.  There are not any surgical clips in the wound bed today.  Even with less necrosis it still remains significant and she does not tolerate debridement.  I informed her that once she gets the mri and plan can be determined with dr. Bill, we can see her back after debridement. I mentioned the wound vac as an option after debridement. Patient to schedule an appointment for 1 month and she can move it sooner/later as needed. Continue with current poc at this time.    MEDICATIONS:     Current Outpatient Medications:     Potassium Chloride ER 10 MEQ Oral Tab CR, Take 1 tablet (10 mEq total) by mouth  daily., Disp: 30 tablet, Rfl: 0    HYDROcodone-acetaminophen 5-325 MG Oral Tab, Take 1 tablet by mouth every 6 (six) hours as needed for Pain. (Patient not taking: Reported on 8/15/2024), Disp: 10 tablet, Rfl: 0    benzonatate 200 MG Oral Cap, Take 1 capsule (200 mg total) by mouth 3 (three) times daily as needed for cough. (Patient not taking: Reported on 8/15/2024), Disp: 30 capsule, Rfl: 0    prochlorperazine (COMPAZINE) 10 mg tablet, Take 1 tablet (10 mg total) by mouth every 6 (six) hours as needed for Nausea or vomiting. (Patient not taking: Reported on 8/15/2024), Disp: , Rfl:     metoprolol succinate ER 50 MG Oral Tablet 24 Hr, Take 1 tablet (50 mg total) by mouth 2x Daily(Beta Blocker)., Disp: 180 tablet, Rfl: 3    ondansetron 4 MG Oral Tablet Dispersible, Take 1 tablet (4 mg total) by mouth every 4 (four) hours as needed for Nausea. (Patient not taking: Reported on 8/15/2024), Disp: 10 tablet, Rfl: 0    DULoxetine 30 MG Oral Cap DR Particles, Take 2 capsules (60 mg total) by mouth daily., Disp: , Rfl:     lidocaine-prilocaine 2.5-2.5 % External Cream, APPLY SMALL AMOUNT OVER PORT PRIOR TO ACCESS, Disp: , Rfl:     zolpidem 10 MG Oral Tab, Take 1 tablet (10 mg total) by mouth nightly as needed for Sleep., Disp: , Rfl:   ALLERGIES:     Allergies   Allergen Reactions    Bactrim [Sulfamethoxazole W/Trimethoprim] RASH    Adhesive Tape RASH      REVIEW OF SYSTEMS:   This information was obtained from the patient/family and chart.    See HPI for pertinent positives, otherwise 10 pt ROS negative.  HISTORY:   Past medical, surgical, family and social history updated where appropriate.      PHYSICAL EXAM:     Vitals:    09/06/24 1100   BP: 99/71   Pulse: 92   Resp: 14   Temp: 97.2 °F (36.2 °C)       Estimated body mass index is 14.5 kg/m² as calculated from the following:    Height as of 7/21/24: 64\".    Weight as of 7/21/24: 84 lb 8 oz (38.3 kg).   POC Glucose   Date Value Ref Range Status   12/19/2023 72 70 - 99  mg/dL Final   12/18/2023 125 (H) 70 - 99 mg/dL Final   12/18/2023 84 70 - 99 mg/dL Final       Vital signs reviewed.Appears stated age, well groomed.    Constitutional:  Bp wnl. Pulse Regular and wnl for patient. Respirations easy and unlabored. Temperature wnl. Patient is very thin in appearance. Appearance neat and clean. Appears in no acute distress.     Musculoskeletal:  Patient ambulation is stable  Integumentary:  refer to wound characteristics and images   Psychiatric:  Judgment and insight intact. Alert and oriented times 3. No evidence of depression, anxiety, or agitation. Calm, cooperative, and communicative. Appropriate interactions and affect.  DIAGNOSTICS:     Lab Results   Component Value Date    BUN 32 (H) 07/22/2024    CREATSERUM 0.69 07/22/2024    ALB 3.1 (L) 07/21/2024    TP 6.6 07/21/2024       WOUND ASSESSMENT:     Wound 05/24/24 #1 Radiation therapy Breast Left (Active)   Date First Assessed/Time First Assessed: 05/24/24 0857    Wound Number (Wound Clinic Only): #1 Radiation therapy  Primary Wound Type: Other (comment)  Location: Breast  Wound Location Orientation: Left      Assessments 5/24/2024  8:59 AM 9/6/2024  2:39 PM   Wound Image        Drainage Amount Large Moderate   Drainage Description Serous;Yellow Yellow;Serous   Wound Length (cm) 15.5 cm 1.9 cm   Wound Width (cm) 19 cm 1.1 cm   Wound Surface Area (cm^2) 294.5 cm^2 2.09 cm^2   Wound Depth (cm) 0.1 cm 1 cm   Wound Volume (cm^3) 29.45 cm^3 2.09 cm^3   Wound Healing % -- 93   Margins Well-defined edges Well-defined edges   Non-staged Wound Description Full thickness Full thickness   Alfreda-wound Assessment Moist;Edema Fragile;Pink   Wound Granulation Tissue Firm;Pink Firm;Pink;Pale Grey   Wound Bed Granulation (%) 20 % 50 %   Wound Bed Epithelium (%) 50 % 10 %   Wound Bed Slough (%) 30 % 40 %   Wound Odor None None   Shape -- clustered       No associated orders.       Wound 08/15/24 #2 Flank Left;Anterior;Posterior (Active)   Date First  Assessed/Time First Assessed: 08/15/24 1406    Wound Number (Wound Clinic Only): #2  Primary Wound Type: (c) Other (comment)  Location: Flank  Wound Location Orientation: Left;Anterior;Posterior      Assessments 8/15/2024  2:07 PM 9/6/2024  2:43 PM   Wound Image         Closure Not approximated --   Drainage Amount Scant Scant   Drainage Description Serous;Yellow Serous;Yellow   Wound Length (cm) 2.5 cm 1.5 cm   Wound Width (cm) 32.1 cm 32.5 cm   Wound Surface Area (cm^2) 80.25 cm^2 48.75 cm^2   Wound Depth (cm) 0.2 cm 0.1 cm   Wound Volume (cm^3) 16.05 cm^3 4.875 cm^3   Wound Healing % -- 70   Margins -- Well-defined edges   Non-staged Wound Description -- Full thickness   Alfreda-wound Assessment Painful;Pink;Fragile Pink;Fragile;Dry   Wound Granulation Tissue Pink;Spongy Pink;Spongy   Wound Bed Granulation (%) 20 % 25 %   Wound Bed Epithelium (%) 50 % 75 %   Wound Bed Slough (%) 30 % --   Wound Odor None None   Tunneling? No No   Undermining? No No   Sinus Tracts? No No       No associated orders.          ASSESSMENT AND PLAN:    1. 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 Adverse effect of radiation, subsequent encounter    3. SLE (systemic lupus erythematosus related syndrome) ([facility]CC)    4. Malignant neoplasm of left breast in female, estrogen receptor negative, unspecified site of breast ([facility])              Risks, benefits, and alternatives of current treatment plan discussed in detail.  Questions and concerns addressed. Red flags to RTC or ED reviewed.  Patient (or parent) agrees to plan.      NOTE TO PATIENT: The 21st Century Cures Act makes clinical notes like these available to patients in the interest of transparency. Clinical notes are medical documents used by physicians and care providers to communicate with each other. These documents include medical language and terminology, abbreviations, and treatment information that may sound technical and at times possibly  unfamiliar. In addition, at times, the verbiage may appear blunt or direct. These documents are one tool providers use to communicate relevant information and clinical opinions of the care providers in a way that allows common understanding of the clinical context.   I spent 25 minutes with the patient. This time included:    preparing to see the patient (eg, review notes and recent diagnostics),  seeing the patient, obtaining and/or reviewing separately obtained history, performing a medically appropriate examination and/or evaluation, counseling and educating the patient, documenting in the record,   DISCHARGE:      Patient Instructions   Please return: 4 weeks (call if you need sooner appointment)    Dr. Gant September 13  MRI Sept 16      Patient discharge and wound care instructions  Alina Aceves  9/6/2024         Left breast:  moisten a 2x2 gauze with VASHE, pack into wound and cover     3 x a day  Left side:  THE HYDROcolloid SHEET DRESSING.   Lower extremities: moisturize multiple times a day    Nutrition and blood sugar control:  Focus on the following:  Protein: Meats, beans, eggs, milk and yogurt particularly Greek yogurt), tofu, soy nuts, soy protein products (Follow the protein handout in your welcome folder)  Vitamin C: Citrus fruits and juices, strawberries, tomatoes, tomato juice, peppers, baked potatoes, spinach, broccoli, cauliflower, [place] sprouts, cabbage  Vitamin A: Dark green, leafy vegetables, orange or yellow vegetables, cantaloupe, fortified dairy products, liver, fortified cereals  Zinc: Fortified cereals, red meats, seafood  Consider supplementing with Vitor by [employer]. It can be purchased on amazon, [employer] website, or local pharmacy may be able to order it for you.  (These are essential branch chain amino acids that help with tissue building and wound healing).   When your blood sugar is consistently elevated greater than 180 your body can't heal or fight infection.      Concerns:  Signs of infection may include the following:  Increase in redness  Red \"streaks\" from wound  Increase in swelling  Fever  Unusual odor  Change in the amount of wound drainage     Should you experience any significant changes in your wound(s) or have any questions regarding your home care instructions please contact the [facility] @ [phone] If after regular business hours, please call your family doctor or local emergency room. The treatment plan has been discussed at length between you and your provider. Follow all instructions carefully, it is very important. If you do not follow all instructions you are at risk of your wound not healing, infection, possible loss of limb and even loss of life.          Anita Smallwood FNP-C, CWCN-AP, CFCN, CSWS, WCC, DWC  9/6/2024         .Weekly Wound Education Note    Teaching Provided To: Patient  Training Topics: Discharge instructions;Dressing;Cleasing and general instructions;Test/procedures  Training Method: Explain/Verbal;Written  Training Response: Patient responds and understands            MRI ordered for 9/16/24.  Continue VASHE wet to dry to breast daily and cover with dry dressing.  Thin duoderm to flank.  Supplies ordered this visit.    Wound Treatment Orders:  No orders of the defined types were placed in this encounter.    *** Help Text ***     This SmartLink retrieves the last documented value for LDA assessment data, retrieved by either LDA type or by LDA group ID.     This SmartLink should be used in a SmartText or SmartPhrase. If one is not available, please contact your .           Wound Information/Order:  Wound Number: {Wound Number:9774}  Product:{Wound product:9788}  Dispense: {Wound Dressing Dispense:9782}  Dressing Frequency:{Wound Dressing Frequency:9781}    Was a Debridement performed: {wound debridemnt yes,no:29585}    Compression Stockings ordered: {Wound compression ordered:9790}    Notes:  ***    Additional wound: {Wound additional yes, no:9780}

## (undated) NOTE — MR AVS SNAPSHOT
After Visit Summary   12/7/2021    Bill Mendoza   MRN: [id number]           Visit Information     Date & Time  12/7/2021  1:00 PM Provider  Quoc Ocasio, [address] Department [facility] Neurodiagnostics Dept.  Phone  [phone] [email or web address]         No text in SmartText       No text in SmartText

## (undated) NOTE — LETTER
Patient Name: Alina Aceves  YOB: 1980          MRN :  [id number]  Date:  4/11/2025  Referring Physician:  No ref. provider found    ProgressSummary  Pt has attended 12 visits in Physical Therapy.       Objective  Pt was able to completed modified 2 point gait pattern with cane. See tx sheet.      Posture observation: Pt has been standing with upright standing posture and less slouching.   Endurance: Less rest breaks have been taken during sessions, and rest breaks have been taken in standing, as opposed to sitting.   ,          Ankle/Foot    ROM MMT (-/5)    R L R L     PF   4 5     DF (L4)   2+ 4-     Inversion   3+ 4     Eversion   3 4-     Grt Toe Ext (L5)          Ankle ROM: full PROM bilaterally, limited ankle AROM R>L            Balance and Functional Mobility:  Mobility / Transfers Level of Assistance   Bed Mobility    Supine --> Sit    Sit --> Supine    Sit --> Stand MOD I   Stand --> Sit MOD I   Chair --> Chair MOD I        Gait: pt ambulates on level ground with -- steppage gait on RLE with hurry cane and 2 point modified gait pattern, no LOB, SBA from therapist for safety      Assessment  Pt had great participation within treatment. She walked with a 2 point modified gait pattern with a hurry cane with more ease during the session. Her speed also increased throughout the session. During her time in therapy, the pt has made numerous improvements. Her strength has seen improvements and some movements of her ankle have become smoother. Additionally, she has become less fatigued. In prior sessions she needed to take more sitting rest breaks which would take up to a minute. Now, she is able to take a 20 second rest break while standing, showing her endurance to treatment has improved. Her posture while navigating her walker has also improved, she stands up right with eyes forward compared to prior where she had a hunched trunk and would watch her feet to ensure they wouldn't catch on anything.  Recently she has been practicing in therapy with a hurry cane and has gained confidence in doing so and has not had any LOB. However, skilled physical therapy is still required to treat numerous deficits. She still does not have sufficient DF and eversion strength in her R ankle. This lack of DF causes her to still compensate in her hip to clear the foot from catching. She additionally still needs SBA from PT to ensure she is being safe while ambulating with a cane. The pt is also still a fall risk due to her balance, strength and ambulation deficits, which requires further intervention from physical therapy to potentially prevent falls in the future.    Goals (to be met in 12 visits)         Not Met Progress Toward Partially Met Met   Pt will demonstrate improved tandem stance to 5 sec or more to be able to safely negotiate narrow spaces such as the bathroom.  []  [x]  []  []    Pt will perform TUG in <18 seconds with least restrictive AD, demonstrating improved gait speed for community ambulation. []  [x]  []  []    Pt will be able to perform STS with no UE support to be able to easily rise from chair without loss of balance. []  [x]  []  []    Pt will be able to squat to  light objects around the house without loss of stability. []  [x]  []  []    Pt will be able to ambulate 200 ft with least restrictive AD to be able to access multiple rooms in house safely  []  [x]  []  []    Pt will be independent and compliant with comprehensive HEP to maintain progress achieved in PT. []  []  [x]  []                   Treatment Last 4 Visits  Treatment Day: 12 4/2/2025 4/7/2025 4/9/2025 4/11/2025   Neuro Treatment   Therapeutic Exercise Supine Bridges 2x12  B Hip flexion over weight 2x12 Supine Bridges 2x12  B 4 Way Ankle 1x15  R DF and Eversion AAROM/AROM 1x10  SLR 2x10  DLHR in parallel bars and shuttle 3x12   Nustep Level 5 5 minutes   Nustep 5 mins 5 load  B 4 way ankle YTB 1x15  R AAROM DF and Eversion  1x15  Walking with hurry cane 100ft  DLHR on shuttle 2x12 #25lb  B SLHR on shuttle 2x12 #13lb     Neuro Re-Education R NMES assisted inversion and eversion YTB 3x15  R NMES assisted DF numerous bouts B Toe tap over shanda AP and ML focus on DF and PF 2x15  B AP Toe Taps on Cone 2x15   NMES Assisted AROM DF   NMES Assisted AAROM DF Alternating Toe taps on cone 3x30 seconds  B Step ups onto sand dune 2x30 seconds  Tandem Walking on airex 2 laps   Gait Training   Modified 2 point gait pattern with cane 2 laps  Cane sizing    Therapeutic Exercise Minutes 12 32 5 29   Neuro Re-Educ Minutes 30 13 24 15   Gait Training Minutes   10    Total Time Of Timed Procedures 42 45 39 44   Total Time Of Service-Based Procedures 0 0 0 0   Total Treatment Time 42 45 39 44        HEP  Access Code: [email or web address] URL: [email or web address] Date: 03/31/2025 Prepared by: Trupti Salazar Exercises - Supine Ankle Pumps  - 1 x daily - 7 x weekly - 2 sets - 10 reps - Supine Ankle Inversion Eversion AROM  - 1 x daily - 7 x weekly - 2 sets - 10 reps - Sit to Stand with Counter Support  - 3 x daily - 7 x weekly - 1 sets - 3 reps - Gastroc Stretch with Foot at Wall  - 1 x daily - 7 x weekly - 3 sets - 20 hold - Single Leg Stance with Support  - 1 x daily - 7 x weekly - 3 sets - 15 hold - Supine Bridge  - 1 x daily - 7 x weekly - 3 sets - 8 reps       Charges  2 TE, 1 NR         LEFS Score  LEFS Score: (Patient-Rptd) 47.5 % (4/7/2025 12:33 PM)    This treatment was provided by CHRISTOS Martinez under the direct and constant direction and supervision of a licensed therapist, who provided consultation regarding skilled judgements, treatment, and assessment of patient care.       Plan: Continue skilled Physical Therapy 1-2 x/week or a total of 20 visits over a 90 day period.     Patient/Family/Caregiver was advised of these findings, precautions, and treatment options and has agreed to actively participate in planning and for this course  of care.    Thank you for your referral. If you have any questions, please contact me at Dept: [phone].    Sincerely,  Electronically signed by therapist: Brittany Caldwell SPT and Trupti Salazar PT, DPT  Please co-sign or sign and return this letter via fax as soon as possible to [phone].   I certify the need for these services furnished under this plan of treatment and while under my care.    X___________________________________________________ Date____________________    Certification From: 4/11/2025  To:7/10/2025              21st Century Cures Act Notice to Patient: Medical documents like this are made available to patients in the interest of transparency. However, be advised this is a medical document and it is intended as [id number] communication between your medical providers. This medical document may contain abbreviations, assessments, medical data, and results or other terms that are unfamiliar. Medical documents are intended to carry relevant information, facts as evident, and the clinical opinion of the practitioner. As such, this medical document may be written in language that appears blunt or direct. You are encouraged to contact your medical provider and/or [facility] Patient Experience if you have any questions about this medical document.

## (undated) NOTE — LETTER
Date: 8/29/2024  Patient name: Alina Aceves  YOB: 1980  Medical Record Number: [id number]  Primary Coverage: Payor: [facility] POS / [facility] / Product Type: POS /   Secondary Coverage:   Insurance ID: [id number]  Patient Address: [address] [place] [address]  Telephone Information:   Home Phone [phone]   Mobile [phone]         Encounter Date: 8/29/2024  Provider: CHAD Johns  Diagnosis:     ICD-10-CM   1. 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 Adverse effect of radiation, subsequent encounter  T66.XXXD   3. SLE (systemic lupus erythematosus related syndrome) ([facility])  M32.9   4. Malignant neoplasm of left breast in female, estrogen receptor negative, unspecified site of breast ([facility])  C50.912    Z17.1   5. Non-pressure chronic ulcer of left lower leg with fat layer exposed ([facility])  L97.922   6. Leukocytoclastic vasculitis ([facility])  M31.0       Progress Note:  CHIEF COMPLAINT:     Chief Complaint   Patient presents with    Wound Care     Patients is here for a follow up. Patients denies any concern      HPI:   Information obtained from Patient and chart  5-24-24 INITIAL:  43 year old female with Past medical history invasive ductal carcinoma of left breast (dr kaiser camara) currently undergoing radiation therapy (dr kobi bran), leukocytoclastic vasculitis (treated with clobetasol ointment by dr. Walton) iron deficiency anemia, HFrEF ([facility]), lupus, Sjogren's syndrome.  Patient was recently admitted for oral sores and n/v and elctrolyte imbalance and hypokalemia.    Earlier this week she was seen in urgent care for uti symptoms and hematuria. She was treated with cefadroxil and recommended to see primary md. dr bran recommended patient to start yary, she states she is not regular with it. Patient has seen nutrition/dietician.  Patient is active with [facility].  Earlier this week she had to  cancel physical therapy due to wound pain.  on 5-11 patient was started on potassium citrate x 10 days and patient was to repeat labs, which she did on 5-21 but her potassium was still 2.9. she states she is taking the potassium.  I will udpate ariane palm and I asked the patient to contact her as they may want her to increase her potassium.  Patient states that initially she was utilizing aquaphor but then developed an itchy red rash, so now has just been leaving it dry or using aveeno. She is able to tolerate very minimal cleansing of the area.  I discussed with her how to do it as tolerated in the shower with anasept.  Will utilize hydrogel cool sheets, patient to return next week. There is not any s/s of infection    8-15-24 patient returns. I have not seen her in the last 3 mo. Noted [facility] oncology msw note from July that was assisting her with supportive resources and financial assistance. She is  and has an 7 yo at home and 19 yo away at college, her sister lives 2 hours away and remaining family lives out of state, mom is in [place].  She saw dr bran (radiation oncology) in June 2024 and it was documented that she had remaining areas of open lesions to the breast as well as open lesions due to vasculitis to her bilateral lower extremities.  She recently was admitted from 7-21 to 7-24 for hypokalemia. She has a f/u with dr. Gant sept 13.  She has been dressing areas with over the counter abx ointment. She has a let flank scattered ulcerations in a dermatome pattern that patient states started as vessicles, she was treated with valtrax by urgent care.  She also indeed has lower extremity ulcerations on the right knee and left lateral tibia.  The breast wound has deterorated and is significantly deeper with necrosis. No malodor.  There was a small vessel clip that was spitting that I removed with a forceps. We discussed this small clip could be the reason the wound opened up, but I am also concerned  about recurrence.  Will have her dress with honey gauze to the breast and hydrogel sheets to all other areas (flank and legs). Will order her dressings. Patient states her appetite is getting better lately. No acute s/s of infection.    8-29-24 patient returns.  She did see surgeon, dr velasco, and mri of the breast (scheduled for sept 16), her appointment with dr camara is sept 13.  I did reach out to dr velasco via [employer] chat.  Per patient dr. Velacso d/w her taking her to the operating room to clean the wound and biopsy.  I let her know that I would agree with this as patient is too sensitive to do in clinic debridements.  She has been dressing the wound with honey gauze. We ordered dressings for her lower extremity wounds (hydrogel) received, she did receive them.  The breast wound has 3 spitting staples again.  Will have patient pack with vashe 2-3 x a day and I will see her again before her mri to remove any further potential spitting clips.  Patient states she is eating better.  We discussed keeping her skin on lower legs moisturized.     MEDICATIONS:     Current Outpatient Medications:     Potassium Chloride ER 10 MEQ Oral Tab CR, Take 1 tablet (10 mEq total) by mouth daily., Disp: 30 tablet, Rfl: 0    HYDROcodone-acetaminophen 5-325 MG Oral Tab, Take 1 tablet by mouth every 6 (six) hours as needed for Pain. (Patient not taking: Reported on 8/15/2024), Disp: 10 tablet, Rfl: 0    benzonatate 200 MG Oral Cap, Take 1 capsule (200 mg total) by mouth 3 (three) times daily as needed for cough. (Patient not taking: Reported on 8/15/2024), Disp: 30 capsule, Rfl: 0    prochlorperazine (COMPAZINE) 10 mg tablet, Take 1 tablet (10 mg total) by mouth every 6 (six) hours as needed for Nausea or vomiting. (Patient not taking: Reported on 8/15/2024), Disp: , Rfl:     metoprolol succinate ER 50 MG Oral Tablet 24 Hr, Take 1 tablet (50 mg total) by mouth 2x Daily(Beta Blocker)., Disp: 180 tablet, Rfl: 3    ondansetron 4 MG Oral Tablet  Dispersible, Take 1 tablet (4 mg total) by mouth every 4 (four) hours as needed for Nausea. (Patient not taking: Reported on 8/15/2024), Disp: 10 tablet, Rfl: 0    DULoxetine 30 MG Oral Cap DR Particles, Take 2 capsules (60 mg total) by mouth daily., Disp: , Rfl:     lidocaine-prilocaine 2.5-2.5 % External Cream, APPLY SMALL AMOUNT OVER PORT PRIOR TO ACCESS, Disp: , Rfl:     zolpidem 10 MG Oral Tab, Take 1 tablet (10 mg total) by mouth nightly as needed for Sleep., Disp: , Rfl:   ALLERGIES:     Allergies   Allergen Reactions    Bactrim [Sulfamethoxazole W/Trimethoprim] RASH    Adhesive Tape RASH      REVIEW OF SYSTEMS:   This information was obtained from the patient/family and chart.    See HPI for pertinent positives, otherwise 10 pt ROS negative.  HISTORY:   Past medical, surgical, family and social history updated where appropriate.      PHYSICAL EXAM:     Vitals:    08/29/24 1047   BP: 107/68   Pulse: 103   Resp: 16   Temp: 97.9 °F (36.6 °C)     Estimated body mass index is 14.5 kg/m² as calculated from the following:    Height as of 7/21/24: 64\".    Weight as of 7/21/24: 84 lb 8 oz (38.3 kg).   POC Glucose   Date Value Ref Range Status   12/19/2023 72 70 - 99 mg/dL Final   12/18/2023 125 (H) 70 - 99 mg/dL Final   12/18/2023 84 70 - 99 mg/dL Final       Vital signs reviewed.Appears stated age, well groomed.    Constitutional:  Bp wnl. Pulse Regular and wnl for patient. Respirations easy and unlabored. Temperature wnl. Patient is very thin in appearance. Appearance neat and clean. Appears in no acute distress.   Lower extremities  DP/PT palpable bilaterally.  Extremities free of varicosities, edema, ble with petechia on lower legs and feet. Capillary refill < 3 seconds. Digits are warm. toenails are wnl for color, thickness and hygeine. Skin hydration wnl. + hairgrowth on legs.     Musculoskeletal:  Patient ambulation is stable  Integumentary:  refer to wound characteristics and images   Psychiatric:  Judgment  and insight intact. Alert and oriented times 3. No evidence of depression, anxiety, or agitation. Calm, cooperative, and communicative. Appropriate interactions and affect.  DIAGNOSTICS:     Lab Results   Component Value Date    BUN 32 (H) 07/22/2024    CREATSERUM 0.69 07/22/2024    ALB 3.1 (L) 07/21/2024    TP 6.6 07/21/2024       WOUND ASSESSMENT:     Wound 05/24/24 #1 Radiation therapy Breast Left (Active)   Date First Assessed/Time First Assessed: 05/24/24 0857    Wound Number (Wound Clinic Only): #1 Radiation therapy  Primary Wound Type: Other (comment)  Location: Breast  Wound Location Orientation: Left      Assessments 5/24/2024  8:59 AM 8/29/2024 10:52 AM   Wound Image        Drainage Amount Large Scant   Drainage Description Serous;Yellow Serous;Yellow   Wound Length (cm) 15.5 cm 1.1 cm   Wound Width (cm) 19 cm 6.1 cm   Wound Surface Area (cm^2) 294.5 cm^2 6.71 cm^2   Wound Depth (cm) 0.1 cm 0.6 cm   Wound Volume (cm^3) 29.45 cm^3 4.026 cm^3   Wound Healing % -- 86   Margins Well-defined edges Well-defined edges   Non-staged Wound Description Full thickness Full thickness   Alfreda-wound Assessment Moist;Edema Fragile;Pink   Wound Granulation Tissue Firm;Pink Firm;Pink   Wound Bed Granulation (%) 20 % 5 %   Wound Bed Epithelium (%) 50 % 45 %   Wound Bed Slough (%) 30 % 50 %   Wound Odor None None   Shape -- clustered       No associated orders.       Wound 08/15/24 #2 Flank Left;Anterior;Posterior (Active)   Date First Assessed/Time First Assessed: 08/15/24 1406    Wound Number (Wound Clinic Only): #2  Primary Wound Type: (c) Other (comment)  Location: Flank  Wound Location Orientation: Left;Anterior;Posterior      Assessments 8/15/2024  2:07 PM 8/29/2024 10:55 AM   Wound Image          Closure Not approximated --   Drainage Amount Scant Scant   Drainage Description Serous;Yellow Serosanguineous   Wound Length (cm) 2.5 cm 2.5 cm   Wound Width (cm) 32.1 cm 33 cm   Wound Surface Area (cm^2) 80.25 cm^2 82.5 cm^2    Wound Depth (cm) 0.2 cm 0.1 cm   Wound Volume (cm^3) 16.05 cm^3 8.25 cm^3   Wound Healing % -- 49   Margins -- Well-defined edges   Non-staged Wound Description -- Full thickness   Alfreda-wound Assessment Painful;Pink;Fragile Pink;Fragile   Wound Granulation Tissue Pink;Spongy Firm;Red   Wound Bed Granulation (%) 20 % 30 %   Wound Bed Epithelium (%) 50 % 60 %   Wound Bed Slough (%) 30 % 10 %   Wound Odor None None   Tunneling? No --   Undermining? No --   Sinus Tracts? No --       No associated orders.       Wound 08/15/24 #3 Knee Right (Active)   Date First Assessed/Time First Assessed: 08/15/24 1417    Wound Number (Wound Clinic Only): #3  Primary Wound Type: Auto-immune  Location: Knee  Wound Location Orientation: Right      Assessments 8/15/2024  2:20 PM 8/29/2024 10:51 AM   Wound Image       Drainage Amount Unable to assess Unable to assess   Wound Length (cm) 5 cm 0.6 cm   Wound Width (cm) 0.9 cm 0.2 cm   Wound Surface Area (cm^2) 4.5 cm^2 0.12 cm^2   Wound Depth (cm) 0.1 cm 0 cm   Wound Volume (cm^3) 0.45 cm^3 0 cm^3   Wound Healing % -- 100   Margins -- Well-defined edges   Non-staged Wound Description -- Full thickness   Alfreda-wound Assessment Dry;Blanchable erythema Dry   Wound Granulation Tissue Firm --   Wound Bed Granulation (%) 5 % --   Wound Bed Epithelium (%) 30 % --   Wound Odor -- None   Shape Clustered, 65% scab 100% scabbed   Tunneling? No --   Undermining? No --       No associated orders.       Wound 08/15/24 #4 Leg Left;Anterior (Active)   Date First Assessed/Time First Assessed: 08/15/24 1418    Wound Number (Wound Clinic Only): #4  Primary Wound Type: Auto-immune  Location: Leg  Wound Location Orientation: Left;Anterior      Assessments 8/15/2024  2:21 PM 8/29/2024 10:49 AM   Wound Image        Drainage Amount Unable to assess Unable to assess   Wound Length (cm) 21.5 cm 18.5 cm   Wound Width (cm) 0.9 cm 0.2 cm   Wound Surface Area (cm^2) 19.35 cm^2 3.7 cm^2   Wound Depth (cm) 0.2 cm --    Wound Volume (cm^3) 3.87 cm^3 --   Margins Well-defined edges Well-defined edges   Non-staged Wound Description Full thickness Full thickness   Alfreda-wound Assessment Dry;Clean Clean   Wound Granulation Tissue Red;Firm --   Wound Bed Granulation (%) 20 % --   Wound Bed Epithelium (%) 60 % 80 %   Wound Odor -- None   Shape 20% scab 20% scab   Tunneling? No --   Undermining? No --   Sinus Tracts? No --       No associated orders.          ASSESSMENT AND PLAN:    1. 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 Adverse effect of radiation, subsequent encounter    3. SLE (systemic lupus erythematosus related syndrome) ([facility])    4. Malignant neoplasm of left breast in female, estrogen receptor negative, unspecified site of breast ([facility])    5. 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 Leukocytoclastic vasculitis ([facility])            Risks, benefits, and alternatives of current treatment plan discussed in detail.  Questions and concerns addressed. Red flags to RTC or ED reviewed.  Patient (or parent) agrees to plan.      NOTE TO PATIENT: The 21st Century Cures Act makes clinical notes like these available to patients in the interest of transparency. Clinical notes are medical documents used by physicians and care providers to communicate with each other. These documents include medical language and terminology, abbreviations, and treatment information that may sound technical and at times possibly unfamiliar. In addition, at times, the verbiage may appear blunt or direct. These documents are one tool providers use to communicate relevant information and clinical opinions of the care providers in a way that allows common understanding of the clinical context.   I spent 40 minutes with the patient. This time included:    preparing to see the patient (eg, review notes and recent diagnostics),  seeing the patient, obtaining and/or reviewing separately  obtained history, performing a medically appropriate examination and/or evaluation, counseling and educating the patient, documenting in the record,   DISCHARGE:      Patient Instructions   Please return: 1-1.5 weeks (any day of the week)    Dr. Gant September 13  MRI Sept 16      Patient discharge and wound care instructions  Alina Aceves  8/29/2024       Left breast:  moisten a 2x2 gauze with VASHE, pack into wound and cover 3 x a day  Left side:  THE HYDROcolloid SHEET DRESSING.   Lower extremities: moisturize multiple times a day    Nutrition and blood sugar control:  Focus on the following:  Protein: Meats, beans, eggs, milk and yogurt particularly Greek yogurt), tofu, soy nuts, soy protein products (Follow the protein handout in your welcome folder)  Vitamin C: Citrus fruits and juices, strawberries, tomatoes, tomato juice, peppers, baked potatoes, spinach, broccoli, cauliflower, [place] sprouts, cabbage  Vitamin A: Dark green, leafy vegetables, orange or yellow vegetables, cantaloupe, fortified dairy products, liver, fortified cereals  Zinc: Fortified cereals, red meats, seafood  Consider supplementing with Vitor by [employer]. It can be purchased on amazon, [employer] website, or local pharmacy may be able to order it for you.  (These are essential branch chain amino acids that help with tissue building and wound healing).   When your blood sugar is consistently elevated greater than 180 your body can't heal or fight infection.     Concerns:  Signs of infection may include the following:  Increase in redness  Red \"streaks\" from wound  Increase in swelling  Fever  Unusual odor  Change in the amount of wound drainage     Should you experience any significant changes in your wound(s) or have any questions regarding your home care instructions please contact the wound center [facility] @ [phone] If after regular business hours, please call your family doctor or local emergency room. The treatment  plan has been discussed at length between you and your provider. Follow all instructions carefully, it is very important. If you do not follow all instructions you are at risk of your wound not healing, infection, possible loss of limb and even loss of life.            Anita Smallwood FNP-C, CWCN-AP, CFCN, CSWS, WCC, DWC  8/29/2024         .Weekly Wound Education Note    Teaching Provided To: Patient  Training Topics: Dressing;Cleasing and general instructions;Discharge instructions  Training Method: Explain/Verbal;Written  Training Response: Patient responds and understands        Notes: Per provider, leg wounds healed. Pt is to keep area moisturized. Flank wounds improving, Continue thin duoderm to areas. Breast wound stable. Dressing  changed to vashe moist 2x2 gauze, and bordered foam. Extra supplies sent with patient.        Wound Treatment Orders:  No orders of the defined types were placed in this encounter.        Wound Information/Order:  Wound Number: 2  Product:Other Thin Duoderm  Dispense: 30 days  Dressing Frequency:Change dressing 3x per week    Was a Debridement performed: Yes, Debridement type: mechanical    Compression Stockings ordered: No    Notes: Dispense as written    Additional wound: No

## (undated) NOTE — LETTER
Date: 9/6/2024  Patient name: Alina Aceves  YOB: 1980  Medical Record Number: [id number]  Primary Coverage: Payor: [facility] POS / Plan: [facility] POS/MCNP / Product Type: POS /   Secondary Coverage:   Insurance ID: [id number]  Patient Address: [address] [place] [id number]  Telephone Information:   Home Phone [phone]   Mobile [phone]         Encounter Date: 9/6/2024  Provider: CHAD Johns  Diagnosis:     ICD-10-CM   1. 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 Adverse effect of radiation, subsequent encounter  T66.XXXD   3. SLE (systemic lupus erythematosus related syndrome) ([facility])  M32.9   4. Malignant neoplasm of left breast in female, estrogen receptor negative, unspecified site of breast ([facility])  C50.912    Z17.1       Progress Note:  CHIEF COMPLAINT:     Chief Complaint   Patient presents with    Wound Care     Patient arrives for follow up visit. Dressings changed today. Reports increase in pain to breast wound.     HPI:   Information obtained from Patient and chart  5-24-24 INITIAL:  43 year old female with Past medical history invasive ductal carcinoma of left breast (dr kaiser camara) currently undergoing radiation therapy (dr kobi bran), leukocytoclastic vasculitis (treated with clobetasol ointment by dr. Walton) iron deficiency anemia, HFrEF ([facility]), lupus, Sjogren's syndrome.  Patient was recently admitted for oral sores and n/v and elctrolyte imbalance and hypokalemia.    Earlier this week she was seen in urgent care for uti symptoms and hematuria. She was treated with cefadroxil and recommended to see primary md. dr bran recommended patient to start yary, she states she is not regular with it. Patient has seen nutrition/dietician.  Patient is active with residential [facility].  Earlier this week she had to cancel physical therapy due to wound pain.  on 5-11 patient was started on potassium citrate x 10  days and patient was to repeat labs, which she did on 5-21 but her potassium was still 2.9. she states she is taking the potassium.  I will udpate ariane palm and I asked the patient to contact her as they may want her to increase her potassium.  Patient states that initially she was utilizing aquaphor but then developed an itchy red rash, so now has just been leaving it dry or using aveeno. She is able to tolerate very minimal cleansing of the area.  I discussed with her how to do it as tolerated in the shower with anasept.  Will utilize hydrogel cool sheets, patient to return next week. There is not any s/s of infection    8-15-24 patient returns. I have not seen her in the last 3 mo. Noted [facility] oncology msw note from July that was assisting her with supportive resources and financial assistance. She is  and has an 9 yo at home and 19 yo away at college, her sister lives 2 hours away and remaining family lives out of state, mom is in [place].  She saw dr bran (radiation oncology) in June 2024 and it was documented that she had remaining areas of open lesions to the breast as well as open lesions due to vasculitis to her bilateral lower extremities.  She recently was admitted from 7-21 to 7-24 for hypokalemia. She has a f/u with dr. Gant sept 13.  She has been dressing areas with over the counter abx ointment. She has a let flank scattered ulcerations in a dermatome pattern that patient states started as vessicles, she was treated with valtrax by urgent care.  She also indeed has lower extremity ulcerations on the right knee and left lateral tibia.  The breast wound has deterorated and is significantly deeper with necrosis. No malodor.  There was a small vessel clip that was spitting that I removed with a forceps. We discussed this small clip could be the reason the wound opened up, but I am also concerned about recurrence.  Will have her dress with honey gauze to the breast and hydrogel sheets to all  other areas (flank and legs). Will order her dressings. Patient states her appetite is getting better lately. No acute s/s of infection.    8-29-24 patient returns.  She did see surgeon, dr pinedo, and mri of the breast (scheduled for sept 16), her appointment with dr camara is sept 13.  I did reach out to dr pinedo via [employer] chat.  Per patient dr. Pinedo d/w her taking her to the operating room to clean the wound and biopsy.  I let her know that I would agree with this as patient is too sensitive to do in clinic debridements.  She has been dressing the wound with honey gauze. We ordered dressings for her lower extremity wounds (hydrogel) received, she did receive them.  The breast wound has 3 spitting staples again.  Will have patient pack with vashe 2-3 x a day and I will see her again before her mri to remove any further potential spitting clips.  Patient states she is eating better.  We discussed keeping her skin on lower legs moisturized.     9-6-24 patient returns.  Patient has been dressing the breast wound with vashe and the flank/abdomen with hydrocolloid.  The flank/abdomen is improved. The breast wound is  with less necrosis, patient states less drainage, no malodor.  Her legs remain resolved. She states she is using the yary.  Her mri is the 16th and dr camara the 13th.  She will f/u with dr bill after mri.  There are not any surgical clips in the wound bed today.  Even with less necrosis it still remains significant and she does not tolerate debridement.  I informed her that once she gets the mri and plan can be determined with dr. Bill, we can see her back after debridement. I mentioned the wound vac as an option after debridement. Patient to schedule an appointment for 1 month and she can move it sooner/later as needed. Continue with current poc at this time.    MEDICATIONS:     Current Outpatient Medications:     Potassium Chloride ER 10 MEQ Oral Tab CR, Take 1 tablet (10 mEq total) by mouth  daily., Disp: 30 tablet, Rfl: 0    HYDROcodone-acetaminophen 5-325 MG Oral Tab, Take 1 tablet by mouth every 6 (six) hours as needed for Pain. (Patient not taking: Reported on 8/15/2024), Disp: 10 tablet, Rfl: 0    benzonatate 200 MG Oral Cap, Take 1 capsule (200 mg total) by mouth 3 (three) times daily as needed for cough. (Patient not taking: Reported on 8/15/2024), Disp: 30 capsule, Rfl: 0    prochlorperazine (COMPAZINE) 10 mg tablet, Take 1 tablet (10 mg total) by mouth every 6 (six) hours as needed for Nausea or vomiting. (Patient not taking: Reported on 8/15/2024), Disp: , Rfl:     metoprolol succinate ER 50 MG Oral Tablet 24 Hr, Take 1 tablet (50 mg total) by mouth 2x Daily(Beta Blocker)., Disp: 180 tablet, Rfl: 3    ondansetron 4 MG Oral Tablet Dispersible, Take 1 tablet (4 mg total) by mouth every 4 (four) hours as needed for Nausea. (Patient not taking: Reported on 8/15/2024), Disp: 10 tablet, Rfl: 0    DULoxetine 30 MG Oral Cap DR Particles, Take 2 capsules (60 mg total) by mouth daily., Disp: , Rfl:     lidocaine-prilocaine 2.5-2.5 % External Cream, APPLY SMALL AMOUNT OVER PORT PRIOR TO ACCESS, Disp: , Rfl:     zolpidem 10 MG Oral Tab, Take 1 tablet (10 mg total) by mouth nightly as needed for Sleep., Disp: , Rfl:   ALLERGIES:     Allergies   Allergen Reactions    Bactrim [Sulfamethoxazole W/Trimethoprim] RASH    Adhesive Tape RASH      REVIEW OF SYSTEMS:   This information was obtained from the patient/family and chart.    See HPI for pertinent positives, otherwise 10 pt ROS negative.  HISTORY:   Past medical, surgical, family and social history updated where appropriate.      PHYSICAL EXAM:     Vitals:    09/06/24 1100   BP: 99/71   Pulse: 92   Resp: 14   Temp: 97.2 °F (36.2 °C)       Estimated body mass index is 14.5 kg/m² as calculated from the following:    Height as of 7/21/24: 64\".    Weight as of 7/21/24: 84 lb 8 oz (38.3 kg).   POC Glucose   Date Value Ref Range Status   12/19/2023 72 70 - 99  mg/dL Final   12/18/2023 125 (H) 70 - 99 mg/dL Final   12/18/2023 84 70 - 99 mg/dL Final       Vital signs reviewed.Appears stated age, well groomed.    Constitutional:  Bp wnl. Pulse Regular and wnl for patient. Respirations easy and unlabored. Temperature wnl. Patient is very thin in appearance. Appearance neat and clean. Appears in no acute distress.     Musculoskeletal:  Patient ambulation is stable  Integumentary:  refer to wound characteristics and images   Psychiatric:  Judgment and insight intact. Alert and oriented times 3. No evidence of depression, anxiety, or agitation. Calm, cooperative, and communicative. Appropriate interactions and affect.  DIAGNOSTICS:     Lab Results   Component Value Date    BUN 32 (H) 07/22/2024    CREATSERUM 0.69 07/22/2024    ALB 3.1 (L) 07/21/2024    TP 6.6 07/21/2024       WOUND ASSESSMENT:     Wound 05/24/24 #1 Radiation therapy Breast Left (Active)   Date First Assessed/Time First Assessed: 05/24/24 0857    Wound Number (Wound Clinic Only): #1 Radiation therapy  Primary Wound Type: Other (comment)  Location: Breast  Wound Location Orientation: Left      Assessments 5/24/2024  8:59 AM 9/6/2024  2:39 PM   Wound Image        Drainage Amount Large Moderate   Drainage Description Serous;Yellow Yellow;Serous   Wound Length (cm) 15.5 cm 1.9 cm   Wound Width (cm) 19 cm 1.1 cm   Wound Surface Area (cm^2) 294.5 cm^2 2.09 cm^2   Wound Depth (cm) 0.1 cm 1 cm   Wound Volume (cm^3) 29.45 cm^3 2.09 cm^3   Wound Healing % -- 93   Margins Well-defined edges Well-defined edges   Non-staged Wound Description Full thickness Full thickness   Alfreda-wound Assessment Moist;Edema Fragile;Pink   Wound Granulation Tissue Firm;Pink Firm;Pink;Pale Grey   Wound Bed Granulation (%) 20 % 50 %   Wound Bed Epithelium (%) 50 % 10 %   Wound Bed Slough (%) 30 % 40 %   Wound Odor None None   Shape -- clustered       No associated orders.       Wound 08/15/24 #2 Flank Left;Anterior;Posterior (Active)   Date First  Assessed/Time First Assessed: 08/15/24 1406    Wound Number (Wound Clinic Only): #2  Primary Wound Type: (c) Other (comment)  Location: Flank  Wound Location Orientation: Left;Anterior;Posterior      Assessments 8/15/2024  2:07 PM 9/6/2024  2:43 PM   Wound Image         Closure Not approximated --   Drainage Amount Scant Scant   Drainage Description Serous;Yellow Serous;Yellow   Wound Length (cm) 2.5 cm 1.5 cm   Wound Width (cm) 32.1 cm 32.5 cm   Wound Surface Area (cm^2) 80.25 cm^2 48.75 cm^2   Wound Depth (cm) 0.2 cm 0.1 cm   Wound Volume (cm^3) 16.05 cm^3 4.875 cm^3   Wound Healing % -- 70   Margins -- Well-defined edges   Non-staged Wound Description -- Full thickness   Alfreda-wound Assessment Painful;Pink;Fragile Pink;Fragile;Dry   Wound Granulation Tissue Pink;Spongy Pink;Spongy   Wound Bed Granulation (%) 20 % 25 %   Wound Bed Epithelium (%) 50 % 75 %   Wound Bed Slough (%) 30 % --   Wound Odor None None   Tunneling? No No   Undermining? No No   Sinus Tracts? No No       No associated orders.          ASSESSMENT AND PLAN:    1. 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 Adverse effect of radiation, subsequent encounter    3. SLE (systemic lupus erythematosus related syndrome) ([facility])    4. Malignant neoplasm of left breast in female, estrogen receptor negative, unspecified site of breast ([facility])              Risks, benefits, and alternatives of current treatment plan discussed in detail.  Questions and concerns addressed. Red flags to RTC or ED reviewed.  Patient (or parent) agrees to plan.      NOTE TO PATIENT: The 21st Century Cures Act makes clinical notes like these available to patients in the interest of transparency. Clinical notes are medical documents used by physicians and care providers to communicate with each other. These documents include medical language and terminology, abbreviations, and treatment information that may sound technical and at times possibly  unfamiliar. In addition, at times, the verbiage may appear blunt or direct. These documents are one tool providers use to communicate relevant information and clinical opinions of the care providers in a way that allows common understanding of the clinical context.   I spent 25 minutes with the patient. This time included:    preparing to see the patient (eg, review notes and recent diagnostics),  seeing the patient, obtaining and/or reviewing separately obtained history, performing a medically appropriate examination and/or evaluation, counseling and educating the patient, documenting in the record,   DISCHARGE:      Patient Instructions   Please return: 4 weeks (call if you need sooner appointment)    Dr. Gant September 13  MRI Sept 16      Patient discharge and wound care instructions  Alina Aceves  9/6/2024         Left breast:  moisten a 2x2 gauze with VASHE, pack into wound and cover     3 x a day  Left side:  THE HYDROcolloid SHEET DRESSING.   Lower extremities: moisturize multiple times a day    Nutrition and blood sugar control:  Focus on the following:  Protein: Meats, beans, eggs, milk and yogurt particularly Greek yogurt), tofu, soy nuts, soy protein products (Follow the protein handout in your welcome folder)  Vitamin C: Citrus fruits and juices, strawberries, tomatoes, tomato juice, peppers, baked potatoes, spinach, broccoli, cauliflower, [place] sprouts, cabbage  Vitamin A: Dark green, leafy vegetables, orange or yellow vegetables, cantaloupe, fortified dairy products, liver, fortified cereals  Zinc: Fortified cereals, red meats, seafood  Consider supplementing with V[employer]. It can be purchased on amazon, [employer] website, or local pharmacy may be able to order it for you.  (These are essential branch chain amino acids that help with tissue building and wound healing).   When your blood sugar is consistently elevated greater than 180 your body can't heal or fight infection.      Concerns:  Signs of infection may include the following:  Increase in redness  Red \"streaks\" from wound  Increase in swelling  Fever  Unusual odor  Change in the amount of wound drainage     Should you experience any significant changes in your wound(s) or have any questions regarding your home care instructions please contact the wound center [facility] @ [phone] If after regular business hours, please call your family doctor or local emergency room. The treatment plan has been discussed at length between you and your provider. Follow all instructions carefully, it is very important. If you do not follow all instructions you are at risk of your wound not healing, infection, possible loss of limb and even loss of life.          Anita Smallwood FNP-C, CWCN-AP, CFCN, CSWS, WCC, DWC  9/6/2024         .Weekly Wound Education Note    Teaching Provided To: Patient  Training Topics: Discharge instructions;Dressing;Cleasing and general instructions;Test/procedures  Training Method: Explain/Verbal;Written  Training Response: Patient responds and understands            MRI ordered for 9/16/24.  Continue VASHE wet to dry to breast daily and cover with dry dressing.  Thin duoderm to flank.  Supplies ordered this visit.    Wound Treatment Orders:  No orders of the defined types were placed in this encounter.          Wound Information/Order:  Product:Other 4x4 border foam, silicone tape  Dispense: 30 days  Dressing Frequency:Change dressing 3x per week    Was a Debridement performed: Yes, Debridement type: mechanical      Notes: 4x4 border foam, silicone tape    Additional wound: No

## (undated) NOTE — LETTER
Date: 8/15/2024  Patient name: Alina Aceves  YOB: 1980  Medical Record Number: [id number]  Primary Coverage: Payor: [facility] POS / Plan: [employer] POS/MCNP / Product Type: POS /   Secondary Coverage:   Insurance ID: [id number]  Patient Address: [address] [place] [address]  Telephone Information:   Home Phone [phone]   Mobile [phone]         Encounter Date: 8/15/2024  Provider: CHAD Johns  Diagnosis:     ICD-10-CM   1. 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 Adverse effect of radiation, subsequent encounter  T66.XXXD   3. SLE (systemic lupus erythematosus related syndrome) ([facility])  M32.9   4. Malignant neoplasm of left breast in female, estrogen receptor negative, unspecified site of breast ([facility])  C50.912    Z17.1   5. 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 Leukocytoclastic vasculitis ([facility])  M31.0       Progress Note:  .Weekly Wound Education Note    Teaching Provided To: Patient  Training Topics: Dressing;Cleasing and general instructions;Discharge instructions  Training Method: Explain/Verbal;Written  Training Response: Patient responds and understands        Notes: New wounds to left flank, left leg and right knee. Start hydrogel sheets to new wounds. Honey gel, honey gauze, and silicone vac drape applied in clinic. Dressing changed to honey gauze and bordered foam to breast wound. Will order supplies this visit.        CHIEF COMPLAINT:     Chief Complaint   Patient presents with    Wound Recheck     Pt here for follow up arrives with hydrocolloid dressing to wound. New wounds noted below breast to back, started as blisters, believes it was shingles She has been applying antibiotic ointment and telfa pads     HPI:   Information obtained from Patient and chart  5-24-24 INITIAL:  43 year old female with Past medical history invasive ductal carcinoma of left breast (  [facility]) currently undergoing radiation therapy (dr kobi bran), leukocytoclastic vasculitis (treated with clobetasol ointment by dr. Walton) iron deficiency anemia, HFrEF ([facility]), lupus, Sjogren's syndrome.  Patient was recently admitted for oral sores and n/v and elctrolyte imbalance and hypokalemia.    Earlier this week she was seen in urgent care for uti symptoms and hematuria. She was treated with cefadroxil and recommended to see primary md. dr bran recommended patient to start yary, she states she is not regular with it. Patient has seen nutrition/dietician.  Patient is active with [facility].  Earlier this week she had to cancel physical therapy due to wound pain.  on 5-11 patient was started on potassium citrate x 10 days and patient was to repeat labs, which she did on 5-21 but her potassium was still 2.9. she states she is taking the potassium.  I will udpate ariane néstor and I asked the patient to contact her as they may want her to increase her potassium.  Patient states that initially she was utilizing aquaphor but then developed an itchy red rash, so now has just been leaving it dry or using aveeno. She is able to tolerate very minimal cleansing of the area.  I discussed with her how to do it as tolerated in the shower with anasept.  Will utilize hydrogel cool sheets, patient to return next week. There is not any s/s of infection    8-15-24 patient returns. I have not seen her in the last 3 mo. Noted [facility] oncology msw note from July that was assisting her with supportive resources and financial assistance. She is  and has an 9 yo at home and 19 yo away at college, her sister lives 2 hours away and remaining family lives out of state, mom is in [place].  She saw dr bran (radiation oncology) in June 2024 and it was documented that she had remaining areas of open lesions to the breast as well as open lesions due to vasculitis to her bilateral lower extremities.  She  recently was admitted from 7-21 to 7-24 for hypokalemia. She has a f/u with dr. Gant sept 13.  She has been dressing areas with over the counter abx ointment. She has a let flank scattered ulcerations in a dermatome pattern that patient states started as vessicles, she was treated with valtrax by urgent care.  She also indeed has lower extremity ulcerations on the right knee and left lateral tibia.  The breast wound has deterorated and is significantly deeper with necrosis. No malodor.  There was a small vessel clip that was spitting that I removed with a forceps. We discussed this small clip could be the reason the wound opened up, but I am also concerned about recurrence.  Will have her dress with honey gauze to the breast and hydrogel sheets to all other areas (flank and legs). Will order her dressings. Patient states her appetite is getting better lately. No acute s/s of infection.    MEDICATIONS:     Current Outpatient Medications:     Potassium Chloride ER 10 MEQ Oral Tab CR, Take 1 tablet (10 mEq total) by mouth daily., Disp: 30 tablet, Rfl: 0    metoprolol succinate ER 50 MG Oral Tablet 24 Hr, Take 1 tablet (50 mg total) by mouth 2x Daily(Beta Blocker)., Disp: 180 tablet, Rfl: 3    DULoxetine 30 MG Oral Cap DR Particles, Take 2 capsules (60 mg total) by mouth daily., Disp: , Rfl:     HYDROcodone-acetaminophen 5-325 MG Oral Tab, Take 1 tablet by mouth every 6 (six) hours as needed for Pain. (Patient not taking: Reported on 8/15/2024), Disp: 10 tablet, Rfl: 0    benzonatate 200 MG Oral Cap, Take 1 capsule (200 mg total) by mouth 3 (three) times daily as needed for cough. (Patient not taking: Reported on 8/15/2024), Disp: 30 capsule, Rfl: 0    prochlorperazine (COMPAZINE) 10 mg tablet, Take 1 tablet (10 mg total) by mouth every 6 (six) hours as needed for Nausea or vomiting. (Patient not taking: Reported on 8/15/2024), Disp: , Rfl:     ondansetron 4 MG Oral Tablet Dispersible, Take 1 tablet (4 mg total) by  mouth every 4 (four) hours as needed for Nausea. (Patient not taking: Reported on 8/15/2024), Disp: 10 tablet, Rfl: 0    lidocaine-prilocaine 2.5-2.5 % External Cream, APPLY SMALL AMOUNT OVER PORT PRIOR TO ACCESS, Disp: , Rfl:     zolpidem 10 MG Oral Tab, Take 1 tablet (10 mg total) by mouth nightly as needed for Sleep., Disp: , Rfl:   ALLERGIES:     Allergies   Allergen Reactions    Bactrim [Sulfamethoxazole W/Trimethoprim] RASH    Adhesive Tape RASH      REVIEW OF SYSTEMS:   This information was obtained from the patient/family and chart.    See HPI for pertinent positives, otherwise 10 pt ROS negative.  HISTORY:   Past medical, surgical, family and social history updated where appropriate.      PHYSICAL EXAM:     Vitals:    08/15/24 1100   BP: 119/81   Pulse: 91   Resp: 14   Temp: 97.7 °F (36.5 °C)        Estimated body mass index is 14.5 kg/m² as calculated from the following:    Height as of 7/21/24: 64\".    Weight as of 7/21/24: 84 lb 8 oz (38.3 kg).   POC Glucose   Date Value Ref Range Status   12/19/2023 72 70 - 99 mg/dL Final   12/18/2023 125 (H) 70 - 99 mg/dL Final   12/18/2023 84 70 - 99 mg/dL Final       Vital signs reviewed.Appears stated age, well groomed.    Constitutional:  Bp wnl. Pulse Regular and wnl for patient. Respirations easy and unlabored. Temperature wnl. Patient is very thin in appearance. Appearance neat and clean. Appears in no acute distress.   Lower extremities  DP/PT palpable bilaterally with strong pulsatile signals at the dp/pt/distal hallux.  Extremities free of varicosities, edema. Capillary refill < 3 seconds. Digits are warm. toenails are wnl for color, thickness and hygeine. Skin hydration wnl. + hairgrowth on legs.     Musculoskeletal:  Patient ambulation is stable  Integumentary:  refer to wound characteristics and images   Psychiatric:  Judgment and insight intact. Alert and oriented times 3. No evidence of depression, anxiety, or agitation. Calm, cooperative, and  communicative. Appropriate interactions and affect.  DIAGNOSTICS:     Lab Results   Component Value Date    BUN 32 (H) 07/22/2024    CREATSERUM 0.69 07/22/2024    ALB 3.1 (L) 07/21/2024    TP 6.6 07/21/2024       WOUND ASSESSMENT:     Wound 05/24/24 #1 Radiation therapy Breast Left (Active)   Date First Assessed/Time First Assessed: 05/24/24 0857    Wound Number (Wound Clinic Only): #1 Radiation therapy  Primary Wound Type: Other (comment)  Location: Breast  Wound Location Orientation: Left      Assessments 5/24/2024  8:59 AM 8/15/2024  2:00 PM   Wound Image        Drainage Amount Large Scant   Drainage Description Serous;Yellow Serous;Yellow   Wound Length (cm) 15.5 cm 7.1 cm   Wound Width (cm) 19 cm 1.4 cm   Wound Surface Area (cm^2) 294.5 cm^2 9.94 cm^2   Wound Depth (cm) 0.1 cm 1 cm   Wound Volume (cm^3) 29.45 cm^3 9.94 cm^3   Wound Healing % -- 66   Margins Well-defined edges Well-defined edges   Non-staged Wound Description Full thickness Full thickness   Alfreda-wound Assessment Moist;Edema Excoriated;Fragile   Wound Granulation Tissue Firm;Pink Pink;Firm   Wound Bed Granulation (%) 20 % 5 %   Wound Bed Epithelium (%) 50 % 40 %   Wound Bed Slough (%) 30 % 55 %   Wound Odor None None   Shape -- clustered       No associated orders.       Wound 08/15/24 #2 Flank Left;Anterior;Posterior (Active)   Date First Assessed/Time First Assessed: 08/15/24 1406    Wound Number (Wound Clinic Only): #2  Primary Wound Type: (c) Other (comment)  Location: Flank  Wound Location Orientation: Left;Anterior;Posterior      Assessments 8/15/2024  2:07 PM   Wound Image      Closure Not approximated   Drainage Amount Scant   Drainage Description Serous;Yellow   Wound Length (cm) 2.5 cm   Wound Width (cm) 32.1 cm   Wound Surface Area (cm^2) 80.25 cm^2   Wound Depth (cm) 0.2 cm   Wound Volume (cm^3) 16.05 cm^3   Alfreda-wound Assessment Painful;Pink;Fragile   Wound Granulation Tissue Pink;Spongy   Wound Bed Granulation (%) 20 %   Wound Bed  Epithelium (%) 50 %   Wound Bed Slough (%) 30 %   Wound Odor None   Tunneling? No   Undermining? No   Sinus Tracts? No       No associated orders.       Wound 08/15/24 #3 Knee Right (Active)   Date First Assessed/Time First Assessed: 08/15/24 1417    Wound Number (Wound Clinic Only): #3  Primary Wound Type: Auto-immune  Location: Knee  Wound Location Orientation: Right      Assessments 8/15/2024  2:20 PM   Wound Image     Drainage Amount Unable to assess   Wound Length (cm) 5 cm   Wound Width (cm) 0.9 cm   Wound Surface Area (cm^2) 4.5 cm^2   Wound Depth (cm) 0.1 cm   Wound Volume (cm^3) 0.45 cm^3   Alfreda-wound Assessment Dry;Blanchable erythema   Wound Granulation Tissue Firm   Wound Bed Granulation (%) 5 %   Wound Bed Epithelium (%) 30 %   Shape Clustered, 65% scab   Tunneling? No   Undermining? No       No associated orders.       Wound 08/15/24 #4 Leg Left;Anterior (Active)   Date First Assessed/Time First Assessed: 08/15/24 1418    Wound Number (Wound Clinic Only): #4  Primary Wound Type: Auto-immune  Location: Leg  Wound Location Orientation: Left;Anterior      Assessments 8/15/2024  2:21 PM   Wound Image      Drainage Amount Unable to assess   Wound Length (cm) 21.5 cm   Wound Width (cm) 0.9 cm   Wound Surface Area (cm^2) 19.35 cm^2   Wound Depth (cm) 0.2 cm   Wound Volume (cm^3) 3.87 cm^3   Margins Well-defined edges   Non-staged Wound Description Full thickness   Alfreda-wound Assessment Dry;Clean   Wound Granulation Tissue Red;Firm   Wound Bed Granulation (%) 20 %   Wound Bed Epithelium (%) 60 %   Shape 20% scab   Tunneling? No   Undermining? No   Sinus Tracts? No       No associated orders.          ASSESSMENT AND PLAN:    1. 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 Adverse effect of radiation, subsequent encounter    3. SLE (systemic lupus erythematosus related syndrome) ([facility])    4. Malignant neoplasm of left breast in female, estrogen receptor negative,  unspecified site of breast ([facility])    5. 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 Leukocytoclastic vasculitis ([facility])          Risks, benefits, and alternatives of current treatment plan discussed in detail.  Questions and concerns addressed. Red flags to RTC or ED reviewed.  Patient (or parent) agrees to plan.      NOTE TO PATIENT: The 21st Century Cures Act makes clinical notes like these available to patients in the interest of transparency. Clinical notes are medical documents used by physicians and care providers to communicate with each other. These documents include medical language and terminology, abbreviations, and treatment information that may sound technical and at times possibly unfamiliar. In addition, at times, the verbiage may appear blunt or direct. These documents are one tool providers use to communicate relevant information and clinical opinions of the care providers in a way that allows common understanding of the clinical context.   I spent 50 minutes with the patient. This time included:    preparing to see the patient (eg, review notes and recent diagnostics),  seeing the patient, obtaining and/or reviewing separately obtained history, performing a medically appropriate examination and/or evaluation, counseling and educating the patient, documenting in the record, communication with dr bill and dr camara  DISCHARGE:      Patient Instructions   Please return:2 week-call if you don't receive the dressings    Check in with Dr. Bill and  Dr. Camara regarding your breast wound opening  Check in with pharmacy regarding the pill to increase your appetite    Patient discharge and wound care instructions  Alina Aceves  8/15/2024        Soak the area with ANASEPT gauze.    You may shower and cleanse area with mild soap and water, try to \"scrub\" lightly with a gauze to remove the build up of draiange  rinse wound with ANASEPT cleanser,   dab dry with gauze and  apply     Left breast:  apply a piece of honey gauze into the wound and cover with bordered silicone foam  Left side and lower extremities:  THE HYDROGEL SHEET DRESSING.  (If you like this dressing please let us know and we can order more for you)    Nutrition and blood sugar control:  Focus on the following:  Protein: Meats, beans, eggs, milk and yogurt particularly Greek yogurt), tofu, soy nuts, soy protein products (Follow the protein handout in your welcome folder)  Vitamin C: Citrus fruits and juices, strawberries, tomatoes, tomato juice, peppers, baked potatoes, spinach, broccoli, cauliflower, [place] sprouts, cabbage  Vitamin A: Dark green, leafy vegetables, orange or yellow vegetables, cantaloupe, fortified dairy products, liver, fortified cereals  Zinc: Fortified cereals, red meats, seafood  Consider supplementing with Vitor by [employer]. It can be purchased on amazon, [employer] website, or local pharmacy may be able to order it for you.  (These are essential branch chain amino acids that help with tissue building and wound healing).   When your blood sugar is consistently elevated greater than 180 your body can't heal or fight infection.     Concerns:  Signs of infection may include the following:  Increase in redness  Red \"streaks\" from wound  Increase in swelling  Fever  Unusual odor  Change in the amount of wound drainage     Should you experience any significant changes in your wound(s) or have any questions regarding your home care instructions please contact the [facility] center [facility] @ [phone] If after regular business hours, please call your family doctor or local emergency room. The treatment plan has been discussed at length between you and your provider. Follow all instructions carefully, it is very important. If you do not follow all instructions you are at risk of your wound not healing, infection, possible loss of limb and even loss of life.              Anita DOWNSP-C,  KERI-AP, CFCN, CSWS, WCC, DWC  8/15/2024         Wound Treatment Orders:  No orders of the defined types were placed in this encounter.          Wound Information/Order:  Wound Number: 1   Product:Other Silicone bordered foam dressings  Dispense: 30 days  Dressing Frequency:Change dressing 3x per week    Was a Debridement performed: Yes, Debridement type: mechanical    Compression Stockings ordered: No    Additional wound: Yes         Wound Number: 2-4  Product: Simpurity Hydrogel Absorptive Sheet dressings   Dispense: 30 days  Dressing Frequency:Change dressing 3x per week    Was a Debridement performed: Yes, Debridement type: mechanical    Compression Stockings ordered: No    Notes: Dispense as written, dispense enough for each wound.       Additional wound: No

## (undated) NOTE — LETTER
Date: 10/30/2024  Patient name: Alina Aceves  YOB: 1980  Medical Record Number: [id number]  Primary Coverage: Payor: [facility] POS / Plan: [facility] POS/MCNP / Product Type: POS /   Secondary Coverage:   Insurance ID: [id number]  Patient Address: [address] [place] [address]  Telephone Information:   Home Phone [phone]   Mobile [phone]         Encounter Date: 10/30/2024  Provider: CHAD Johns  Diagnosis:     ICD-10-CM   1. 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 Adverse effect of radiation, subsequent encounter  T66.XXXD   3. SLE (systemic lupus erythematosus related syndrome) ([facility])  M32.9   4. Malignant neoplasm of left breast in female, estrogen receptor negative, unspecified site of breast ([facility])  C50.912    Z17.1       Progress Note:  CHIEF COMPLAINT:     Chief Complaint   Patient presents with    Wound Care     Patients is here for a follow up. Patients stated no issue at this moment      HPI:   Information obtained from Patient and chart  5-24-24 INITIAL:  43 year old female with Past medical history invasive ductal carcinoma of left breast (dr kaiser camara) currently undergoing radiation therapy (dr kobi bran), leukocytoclastic vasculitis (treated with clobetasol ointment by dr. Walton) iron deficiency anemia, HFrEF ([facility]), lupus, Sjogren's syndrome.  Patient was recently admitted for oral sores and n/v and elctrolyte imbalance and hypokalemia.    Earlier this week she was seen in urgent care for uti symptoms and hematuria. She was treated with cefadroxil and recommended to see primary md. dr bran recommended patient to start yary, she states she is not regular with it. Patient has seen nutrition/dietician.  Patient is active with residential [facility].  Earlier this week she had to cancel physical therapy due to wound pain.  on 5-11 patient was started on potassium citrate x 10 days and patient was to  repeat labs, which she did on 5-21 but her potassium was still 2.9. she states she is taking the potassium.  I will udpate ariane palm and I asked the patient to contact her as they may want her to increase her potassium.  Patient states that initially she was utilizing aquaphor but then developed an itchy red rash, so now has just been leaving it dry or using aveeno. She is able to tolerate very minimal cleansing of the area.  I discussed with her how to do it as tolerated in the shower with anasept.  Will utilize hydrogel cool sheets, patient to return next week. There is not any s/s of infection    8-15-24 patient returns. I have not seen her in the last 3 mo. Noted [facility] oncology msw note from July that was assisting her with supportive resources and financial assistance. She is  and has an 7 yo at home and 19 yo away at college, her sister lives 2 hours away and remaining family lives out of state, mom is in [place].  She saw dr bran (radiation oncology) in June 2024 and it was documented that she had remaining areas of open lesions to the breast as well as open lesions due to vasculitis to her bilateral lower extremities.  She recently was admitted from 7-21 to 7-24 for hypokalemia. She has a f/u with dr. Gant sept 13.  She has been dressing areas with over the counter abx ointment. She has a let flank scattered ulcerations in a dermatome pattern that patient states started as vessicles, she was treated with valtrax by urgent care.  She also indeed has lower extremity ulcerations on the right knee and left lateral tibia.  The breast wound has deterorated and is significantly deeper with necrosis. No malodor.  There was a small vessel clip that was spitting that I removed with a forceps. We discussed this small clip could be the reason the wound opened up, but I am also concerned about recurrence.  Will have her dress with honey gauze to the breast and hydrogel sheets to all other areas (flank and  legs). Will order her dressings. Patient states her appetite is getting better lately. No acute s/s of infection.    8-29-24 patient returns.  She did see surgeon, dr pinedo, and mri of the breast (scheduled for sept 16), her appointment with dr camara is sept 13.  I did reach out to dr pinedo via [employer] chat.  Per patient dr. Pinedo d/w her taking her to the operating room to clean the wound and biopsy.  I let her know that I would agree with this as patient is too sensitive to do in clinic debridements.  She has been dressing the wound with honey gauze. We ordered dressings for her lower extremity wounds (hydrogel) received, she did receive them.  The breast wound has 3 spitting staples again.  Will have patient pack with vashe 2-3 x a day and I will see her again before her mri to remove any further potential spitting clips.  Patient states she is eating better.  We discussed keeping her skin on lower legs moisturized.     9-6-24 patient returns.  Patient has been dressing the breast wound with vashe and the flank/abdomen with hydrocolloid.  The flank/abdomen is improved. The breast wound is  with less necrosis, patient states less drainage, no malodor.  Her legs remain resolved. She states she is using the yary.  Her mri is the 16th and dr camara the 13th.  She will f/u with dr bill after mri.  There are not any surgical clips in the wound bed today.  Even with less necrosis it still remains significant and she does not tolerate debridement.  I informed her that once she gets the mri and plan can be determined with dr. Bill, we can see her back after debridement. I mentioned the wound vac as an option after debridement. Patient to schedule an appointment for 1 month and she can move it sooner/later as needed. Continue with current poc at this time.    10-9-24 patient returns. She saw dr camara/oncology, She followed up with cardiology and her metoprolol was reduced and she is to schedule an echo.  She had  her mri and  dr bill let her know that know suspicious findings were noted, just inflammation.  She saw surgeon on 9-30-24. she jhas continued to dress the wound with vashe wtd. The wound is improved. There is a small staple trying to spit again in the base of the wound however I am unable to lift it to remove it.  The wound is slightly dry, less necrosis. We discussed switching back to honey.  Her shingles areas are improved, but dry, no c/o pain. Patient has new areas open on her left lower leg that she states she pulled the skin and she started bleeding.  We also discussed Hbo it's advantages, time commitment, how it works. Writtne information given to patient and she will consider it. No s/s of infection.    10-30-24 Patient returns. Her lower extremity wounds and the left lateral side are dry, but essentially resolved we discussed the importance of moisturization and not pulling on dry skin. she has been dressing the breast wound with honey gel pad (she ran out of honey alginate).  There is not any s/s of infection, the breast wound is still painful to touch. We discussed the mri in relation to \"infection/inflammation\".  Continue with the honey alginate to the breast wound. She prefers the honey gel sheet as the cover dressing instead of a border dressing as she states her skin does better with that than the border silicones.    MEDICATIONS:     Current Outpatient Medications:     Potassium Chloride ER 10 MEQ Oral Tab CR, Take 1 tablet (10 mEq total) by mouth daily., Disp: 30 tablet, Rfl: 0    HYDROcodone-acetaminophen 5-325 MG Oral Tab, Take 1 tablet by mouth every 6 (six) hours as needed for Pain. (Patient not taking: Reported on 8/15/2024), Disp: 10 tablet, Rfl: 0    benzonatate 200 MG Oral Cap, Take 1 capsule (200 mg total) by mouth 3 (three) times daily as needed for cough. (Patient not taking: Reported on 8/15/2024), Disp: 30 capsule, Rfl: 0    prochlorperazine (COMPAZINE) 10 mg tablet, Take 1 tablet  (10 mg total) by mouth every 6 (six) hours as needed for Nausea or vomiting. (Patient not taking: Reported on 8/15/2024), Disp: , Rfl:     metoprolol succinate ER 50 MG Oral Tablet 24 Hr, Take 1 tablet (50 mg total) by mouth 2x Daily(Beta Blocker)., Disp: 180 tablet, Rfl: 3    ondansetron 4 MG Oral Tablet Dispersible, Take 1 tablet (4 mg total) by mouth every 4 (four) hours as needed for Nausea. (Patient not taking: Reported on 8/15/2024), Disp: 10 tablet, Rfl: 0    DULoxetine 30 MG Oral Cap DR Particles, Take 2 capsules (60 mg total) by mouth daily., Disp: , Rfl:     lidocaine-prilocaine 2.5-2.5 % External Cream, APPLY SMALL AMOUNT OVER PORT PRIOR TO ACCESS, Disp: , Rfl:     zolpidem 10 MG Oral Tab, Take 1 tablet (10 mg total) by mouth nightly as needed for Sleep., Disp: , Rfl:   ALLERGIES:     Allergies   Allergen Reactions    Bactrim [Sulfamethoxazole W/Trimethoprim] RASH    Adhesive Tape RASH      REVIEW OF SYSTEMS:   This information was obtained from the patient/family and chart.    See HPI for pertinent positives, otherwise 10 pt ROS negative.  HISTORY:   Past medical, surgical, family and social history updated where appropriate.      PHYSICAL EXAM:     Vitals:    10/30/24 1129   BP: 111/76   Pulse: 90   Resp: 16   Temp: 97.8 °F (36.6 °C)           Estimated body mass index is 14.5 kg/m² as calculated from the following:    Height as of 7/21/24: 64\".    Weight as of 7/21/24: 84 lb 8 oz (38.3 kg).   POC Glucose   Date Value Ref Range Status   12/19/2023 72 70 - 99 mg/dL Final   12/18/2023 125 (H) 70 - 99 mg/dL Final   12/18/2023 84 70 - 99 mg/dL Final       Vital signs reviewed.Appears stated age, well groomed.    Constitutional:  Bp wnl. Pulse Regular and wnl for patient. Respirations easy and unlabored. Temperature wnl. Patient is very thin in appearance. Appearance neat and clean. Appears in no acute distress.     Musculoskeletal:  Patient ambulation is stable  Integumentary:  refer to wound  characteristics and images   Psychiatric:  Judgment and insight intact. Alert and oriented times 3. No evidence of depression, anxiety, or agitation. Calm, cooperative, and communicative. Appropriate interactions and affect.  DIAGNOSTICS:     Lab Results   Component Value Date    BUN 32 (H) 07/22/2024    CREATSERUM 0.69 07/22/2024    ALB 3.1 (L) 07/21/2024    TP 6.6 07/21/2024       WOUND ASSESSMENT:     Wound 05/24/24 #1 Radiation therapy Breast Left (Active)   Date First Assessed/Time First Assessed: 05/24/24 0857    Wound Number (Wound Clinic Only): #1 Radiation therapy  Primary Wound Type: Other (comment)  Location: Breast  Wound Location Orientation: Left      Assessments 5/24/2024  8:59 AM 10/30/2024 11:33 AM   Wound Image        Drainage Amount Large Scant   Drainage Description Serous;Yellow Serous;Clear   Wound Length (cm) 15.5 cm 1.6 cm   Wound Width (cm) 19 cm 1 cm   Wound Surface Area (cm^2) 294.5 cm^2 1.6 cm^2   Wound Depth (cm) 0.1 cm 1 cm   Wound Volume (cm^3) 29.45 cm^3 1.6 cm^3   Wound Healing % -- 95   Margins Well-defined edges Well-defined edges   Non-staged Wound Description Full thickness Full thickness   Alfreda-wound Assessment Moist;Edema Pink;Moist   Wound Granulation Tissue Firm;Pink Pale Grey;Pink;Firm   Wound Bed Granulation (%) 20 % 100 %   Wound Bed Epithelium (%) 50 % --   Wound Bed Slough (%) 30 % --   Wound Odor None None       No associated orders.       Wound 08/15/24 #2 Flank Left;Anterior;Posterior (Active)   Date First Assessed/Time First Assessed: 08/15/24 1406    Wound Number (Wound Clinic Only): #2  Primary Wound Type: (c) Other (comment)  Location: Flank  Wound Location Orientation: Left;Anterior;Posterior      Assessments 8/15/2024  2:07 PM 10/30/2024 11:32 AM   Wound Image        Closure Not approximated --   Drainage Amount Scant Unable to assess   Drainage Description Serous;Yellow --   Wound Length (cm) 2.5 cm 0.3 cm   Wound Width (cm) 32.1 cm 0.4 cm   Wound Surface Area  (cm^2) 80.25 cm^2 0.12 cm^2   Wound Depth (cm) 0.2 cm 0 cm   Wound Volume (cm^3) 16.05 cm^3 0 cm^3   Wound Healing % -- 100   Margins -- Well-defined edges   Non-staged Wound Description -- Full thickness   Alfreda-wound Assessment Painful;Pink;Fragile Dry   Wound Granulation Tissue Pink;Spongy --   Wound Bed Granulation (%) 20 % --   Wound Bed Epithelium (%) 50 % --   Wound Bed Slough (%) 30 % --   Wound Odor None None   Shape -- 100% scabbed   Tunneling? No --   Undermining? No --   Sinus Tracts? No --       No associated orders.       Wound 10/09/24 #3 Leg Left;Anterior (Active)   Date First Assessed/Time First Assessed: 10/09/24 0856    Wound Number (Wound Clinic Only): #3  Primary Wound Type: Vasculitis  Location: Leg  Wound Location Orientation: Left;Anterior      Assessments 10/9/2024  8:57 AM 10/30/2024 11:30 AM   Wound Image        Drainage Amount Scant Unable to assess   Drainage Description Serosanguineous --   Treatments Compression --   Wound Length (cm) 12.5 cm 0.4 cm   Wound Width (cm) 4 cm 0.4 cm   Wound Surface Area (cm^2) 50 cm^2 0.16 cm^2   Wound Depth (cm) 0.1 cm 0.1 cm   Wound Volume (cm^3) 5 cm^3 0.016 cm^3   Wound Healing % -- 100   Margins Well-defined edges Well-defined edges   Non-staged Wound Description Full thickness Full thickness   Alfreda-wound Assessment Clean;Dry Dry;Edema   Wound Granulation Tissue Red;Firm --   Wound Bed Granulation (%) 25 % --   Wound Bed Epithelium (%) 75 % --   Wound Odor None None   Shape -- 100% scabbed   Tunneling? No --   Undermining? No --   Sinus Tracts? No --       No associated orders.       Compression Wrap 10/09/24 Leg Anterior;Left (Active)   Placement Date/Time: 10/09/24 0950   Location: Leg  Wound Location Orientation: Anterior;Left      Assessments 10/9/2024  9:50 AM   Response to Treatment Well tolerated   Compression Layers Multilayer   Compression Product Type Unna Boot   Dressing Applied Yes   Compression Wrap Location Toes to Knee   Compression  Wrap Status Dry;Clean       No associated orders.          ASSESSMENT AND PLAN:    1. 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 Adverse effect of radiation, subsequent encounter    3. SLE (systemic lupus erythematosus related syndrome) ([facility])    4. Malignant neoplasm of left breast in female, estrogen receptor negative, unspecified site of breast ([facility])        Risks, benefits, and alternatives of current treatment plan discussed in detail.  Questions and concerns addressed. Red flags to RTC or ED reviewed.  Patient (or parent) agrees to plan.      NOTE TO PATIENT: The 21st Century Cures Act makes clinical notes like these available to patients in the interest of transparency. Clinical notes are medical documents used by physicians and care providers to communicate with each other. These documents include medical language and terminology, abbreviations, and treatment information that may sound technical and at times possibly unfamiliar. In addition, at times, the verbiage may appear blunt or direct. These documents are one tool providers use to communicate relevant information and clinical opinions of the care providers in a way that allows common understanding of the clinical context.   I [id number] with the patient. This time included:    preparing to see the patient (eg, review notes and recent diagnostics),  seeing the patient, obtaining and/or reviewing separately obtained history, performing a medically appropriate examination and/or evaluation, counseling and educating the patient, documenting in the record,  DISCHARGE:      Patient Instructions   Please return:2-3 weeks        Patient discharge and wound care instructions  Alina Aceves  10/30/2024      Soak the area with ANASEPT gauze.    You may shower and cleanse area with mild soap and water, try to \"scrub\" lightly with a gauze to remove the build up of draiange  rinse wound with ANASEPT cleanser,   dab dry  with gauze and apply     Left breast:  apply a piece of honey alginate into the wound and cover with honey hydrogel sheet   Left side and lower extremities:  moisturize, moisturize, moisturize    Nutrition and blood sugar control:  Focus on the following:  Protein: Meats, beans, eggs, milk and yogurt particularly Greek yogurt), tofu, soy nuts, soy protein products (Follow the protein handout in your welcome folder)  Vitamin C: Citrus fruits and juices, strawberries, tomatoes, tomato juice, peppers, baked potatoes, spinach, broccoli, cauliflower, [place] sprouts, cabbage  Vitamin A: Dark green, leafy vegetables, orange or yellow vegetables, cantaloupe, fortified dairy products, liver, fortified cereals  Zinc: Fortified cereals, red meats, seafood  Consider supplementing with Vitor by [employer]. It can be purchased on amazon, [employer] website, or local pharmacy may be able to order it for you.  (These are essential branch chain amino acids that help with tissue building and wound healing).   When your blood sugar is consistently elevated greater than 180 your body can't heal or fight infection.     Concerns:  Signs of infection may include the following:  Increase in redness  Red \"streaks\" from wound  Increase in swelling  Fever  Unusual odor  Change in the amount of wound drainage     Should you experience any significant changes in your wound(s) or have any questions regarding your home care instructions please contact the wound center [facility] @ [phone] If after regular business hours, please call your family doctor or local emergency room. The treatment plan has been discussed at length between you and your provider. Follow all instructions carefully, it is very important. If you do not follow all instructions you are at risk of your wound not healing, infection, possible loss of limb and even loss of life.          Anita Smallwood FNP-C, CWCN-AP, CFCN, CSWS, WCC, DWC  10/30/2024         .Weekly Wound  Education Note    Teaching Provided To: Patient  Training Topics: Discharge instructions;Dressing;Cleasing and general instructions  Training Method: Explain/Verbal;Written  Training Response: Patient responds and understands            Patient to moisturize legs and flank area daily, wounds are healed.  Honey alginate to wound, cover with border foam.  Supplies ordered this visit.    Wound Treatment Orders:  No orders of the defined types were placed in this encounter.          Wound Information/Order:  Product:Other gauze loaves, saline bullets, honey hydrocolloid, medium gloves  Dispense: 30 days  Dressing Frequency:Change dressing every other day and/or as needed    Was a Debridement performed: Yes, Debridement type: mechanical    Compression Stockings ordered: No    Notes: Other gauze loaves, saline bullets, honey hydrocolloid, medium gloves.  Dispense as written.

## (undated) NOTE — ED AVS SNAPSHOT
Mario Christiansen   MRN: [id number]    Department:  [facility] Emergency Department   Date of Visit:  3/4/2020           Disclosure     Insurance plans vary and the physician(s) referred by the ER may not be covered by your plan.  Please contact your tell this physician (or your personal doctor if your instructions are to return to your personal doctor) about any new or lasting problems. The primary care or specialist physician will see patients referred from the [facility] Emergency Department.  Wing [place]

## (undated) NOTE — LETTER
[address]  [address]  Consent for Procedure/Sedation  Date: 12/28/2023         Time: 0800    I hereby authorize Dr. Lee, my physician and his/her assistants (if applicable), which may include medical students, residents, and/or fellows, to perform the following surgical operation/ procedure and administer such anesthesia as may be determined necessary by my physician:  Operation/Procedure name (s)  Cardiac Catheterization, Left Ventricular Cineangiography, Bilateral Selective Coronary Angiography and/or Right Heart Catheterization; possible Percutaneous Transluminal Coronary Angioplasty, Coronary Atherectomy, Coronary Stent, Intracoronary Thrombolytic therapy, Antiplatelet therapy and/or Intravascular Ultrasound on Alina Aceves   2.   I recognize that during the surgical operation/procedure, unforeseen conditions may necessitate additional or different procedures than those listed above.  I, therefore, further authorize and request that the above-named surgeon, assistants, or designees perform such procedures as are, in their judgment, necessary and desirable.    3.   My surgeon/physician has discussed prior to my surgery the potential benefits, risks and side effects of this procedure; the likelihood of achieving goals; and potential problems that might occur during recuperation.  They also discussed reasonable alternatives to the procedure, including risks, benefits, and side effects related to the alternatives and risks related to not receiving this procedure.  I have had all my questions answered and I acknowledge that no guarantee has been made as to the result that may be obtained.    4.   Should the need arise during my operation/procedure, which includes change of level of care prior to discharge, I also consent to the administration of blood and/or blood products.  Further, I understand that despite careful testing and screening of blood or blood products by  collecting agencies, I may still be subject to ill effects as a result of receiving a blood transfusion and/or blood products.  The following are some, but not all, of the potential risks that can occur: fever and allergic reactions, hemolytic reactions, transmission of diseases such as Hepatitis, AIDS and Cytomegalovirus (CMV) and fluid overload.  In the event that I wish to have an autologous transfusion of my own blood, or a directed donor transfusion, I will discuss this with my physician.  Check only if Refusing Blood or Blood Products  I understand refusal of blood or blood products as deemed necessary by my physician may have serious consequences to my condition to include possible death. I hereby assume responsibility for my refusal and release the hospital, its personnel, and my physicians from any responsibility for the consequences of my refusal.          o  Refuse      5.   I authorize the use of any specimen, organs, tissues, body parts or foreign objects that may be removed from my body during the operation/procedure for diagnosis, research or teaching purposes and their subsequent disposal by hospital authorities.  I also authorize the release of specimen test results and/or written reports to my treating physician on the hospital medical staff or other referring or consulting physicians involved in my care, at the discretion of the Pathologist or my treating physician.    6.   I consent to the photographing or videotaping of the operations or procedures to be performed, including appropriate portions of my body for medical, scientific, or educational purposes, provided my identity is not revealed by the pictures or by descriptive texts accompanying them.  If the procedure has been photographed/videotaped, the surgeon will obtain the original picture, image, videotape or CD.  The hospital will not be responsible for storage, release or maintenance of the picture, image, tape or CD.    7.   I consent  to the presence of a  or observers in the operating room as deemed necessary by my physician or their designees.    8.   I recognize that in the event my procedure results in extended X-Ray/fluoroscopy time, I may develop a skin reaction.    9. If I have a Do Not Attempt Resuscitation (DNAR) order in place, that status will be suspended while in the operating room, procedural suite, and during the recovery period unless otherwise explicitly stated by me (or a person authorized to consent on my behalf). The surgeon or my attending physician will determine when the applicable recovery period ends for purposes of reinstating the DNAR order.  10. Patients having a sterilization procedure: I understand that if the procedure is successful the results will be permanent and it will therefore be impossible for me to inseminate, conceive, or bear children.  I also understand that the procedure is intended to result in sterility, although the result has not been guaranteed.   11. I acknowledge that my physician has explained sedation/analgesia administration to me including the risk and benefits I consent to the administration of sedation/analgesia as may be necessary or desirable in the judgment of my physician.    I CERTIFY THAT I HAVE READ AND FULLY UNDERSTAND THE ABOVE CONSENT TO OPERATION and/or OTHER PROCEDURE.        ____________________________________       _________________________________      ______________________________  Signature of Patient         Signature of Responsible Person        Printed Name of Responsible Person    ____________________________________      _________________________________      ______________________________       Signature of Witness          Relationship to Patient                       Date                                       Time  Patient Name: Alina Aceves     : 1980                 Printed: 2023      Medical Record #: [id number]                       Page 1 of 2

## (undated) NOTE — LETTER
[facility]  [address] [facility] [address]  [phone]    Blood Transfusion Consent    In the course of your treatment, it may become necessary to administer a transfusion of blood or blood components. This form provides basic information concerning this procedure and, if signed by you, authorizes its administration. By signing this form, you agree that all of your questions about the administration of blood or blood products have been answered by the ordering medical professional or designee.    Description of Procedure  Blood is introduced into one of your veins, commonly in the arm, using a sterilized disposable needle. The amount of blood transfused, and whether the transfusion will be of blood or blood components is a judgement the physician will make based on your particular needs.    Risks  The transfusion is a common procedure of low risk.  MINOR AND TEMPORARY REACTIONS ARE NOT UNCOMMON, including a slight bruise, swelling or local reaction in the area where the needle pierces your skin, or a nonserious reaction to the transfused material itself, including headache, fever or mild skin reaction, such as rash.  Serious reactions are possible, though very unlikely, and include severe allergic reaction (shock) and destruction (hemolysis) of transfused blood cells.  Infectious diseases which are known to be transmitted by blood transfusion include certain types of viral Hepatitis(liver infection from a virus), Human Immunodeficiency Virus (HIV-1,2) infection, a viral infection known to cause Acquired Immunodeficiency Syndrome (AIDS), as well as certain other bacterial, viral, and parasitic diseases. While a minimal risk of acquiring an infectious disease from transfused blood exists, in accordance with the Federal and State law, all due care has been taken in donor selection and testing to avoid transmission of disease.    Alternatives  If loss of blood poses serious threats during your  treatment, THERE IS NO EFFECTIVE ALTERNATIVE TO BLOOD TRANSFUSION. However, if you have any further questions on this matter, your provider will fully explain the alternatives to you if it has not already been done.    I, ______________________________, have read/had read to me the above. I understand the matters bearing on the decision whether or not to authorize a transfusion of blood or blood components. I have no questions which have not been answered to my full satisfaction. I hereby consent to such transfusion as my physician may deem necessary or advisable in the course of my treatment.    ______________________________________________                    ___________________________  (Signature of Patient or Responsible party in case of minor,                 (Printed Name of Patient or incompetent, or unconscious patient)              Responsible Party)    ___________________________               _____________________  (Relationship to Patient if not self)                                    (Date and Time)    __________________________                                                           ______________________              (Signature of Witness)               (Printed Name of Witness)     Language line ()    Telephone/Verbal/Video Consent    __________________________                     ____________________  (Signature of 2nd Witness           (Printed Name of 2nd  Telephone/Verbal/Video Consent)           Witness)    Patient Name: Alina Aceves     : 1980                 Printed: 2024     Medical Record #: [id number]      Rev: 2023

## (undated) NOTE — LETTER
[address]  [address]  Authorization for Surgical Operation and Procedure     Date:___________                                                                                                         Time:__________  I hereby authorize Surgeon(s):  Ashley Tapia MD, my physician and his/her assistants (if applicable), which may include medical students, residents, and/or fellows, to perform the following surgical operation/ procedure and administer such anesthesia as may be determined necessary by my physician:  Operation/Procedure name (s) Procedure(s):  BRONCHOSCOPY WITH Bronchoalveolar lavage on Alina Aceves   2.   I recognize that during the surgical operation/procedure, unforeseen conditions may necessitate additional or different procedures than those listed above.  I, therefore, further authorize and request that the above-named surgeon, assistants, or designees perform such procedures as are, in their judgment, necessary and desirable.    3.   My surgeon/physician has discussed prior to my surgery the potential benefits, risks and side effects of this procedure; the likelihood of achieving goals; and potential problems that might occur during recuperation.  They also discussed reasonable alternatives to the procedure, including risks, benefits, and side effects related to the alternatives and risks related to not receiving this procedure.  I have had all my questions answered and I acknowledge that no guarantee has been made as to the result that may be obtained.    4.   Should the need arise during my operation/procedure, which includes change of level of care prior to discharge, I also consent to the administration of blood and/or blood products.  Further, I understand that despite careful testing and screening of blood or blood products by collecting agencies, I may still be subject to ill effects as a result of receiving a blood transfusion and/or blood  products.  The following are some, but not all, of the potential risks that can occur: fever and allergic reactions, hemolytic reactions, transmission of diseases such as Hepatitis, AIDS and Cytomegalovirus (CMV) and fluid overload.  In the event that I wish to have an autologous transfusion of my own blood, or a directed donor transfusion, I will discuss this with my physician.  Check only if Refusing Blood or Blood Products  I understand refusal of blood or blood products as deemed necessary by my physician may have serious consequences to my condition to include possible death. I hereby assume responsibility for my refusal and release the hospital, its personnel, and my physicians from any responsibility for the consequences of my refusal.          o  Refuse      5.   I authorize the use of any specimen, organs, tissues, body parts or foreign objects that may be removed from my body during the operation/procedure for diagnosis, research or teaching purposes and their subsequent disposal by hospital authorities.  I also authorize the release of specimen test results and/or written reports to my treating physician on the hospital medical staff or other referring or consulting physicians involved in my care, at the discretion of the Pathologist or my treating physician.    6.   I consent to the photographing or videotaping of the operations or procedures to be performed, including appropriate portions of my body for medical, scientific, or educational purposes, provided my identity is not revealed by the pictures or by descriptive texts accompanying them.  If the procedure has been photographed/videotaped, the surgeon will obtain the original picture, image, videotape or CD.  The hospital will not be responsible for storage, release or maintenance of the picture, image, tape or CD.    7.   I consent to the presence of a  or observers in the operating room as deemed necessary by my physician or  their designees.    8.   I recognize that in the event my procedure results in extended X-Ray/fluoroscopy time, I may develop a skin reaction.    9. If I have a Do Not Attempt Resuscitation (DNAR) order in place, that status will be suspended while in the operating room, procedural suite, and during the recovery period unless otherwise explicitly stated by me (or a person authorized to consent on my behalf). The surgeon or my attending physician will determine when the applicable recovery period ends for purposes of reinstating the DNAR order.  10. Patients having a sterilization procedure: I understand that if the procedure is successful the results will be permanent and it will therefore be impossible for me to inseminate, conceive, or bear children.  I also understand that the procedure is intended to result in sterility, although the result has not been guaranteed.   11. I acknowledge that my physician has explained sedation/analgesia administration to me including the risk and benefits I consent to the administration of sedation/analgesia as may be necessary or desirable in the judgment of my physician.    I CERTIFY THAT I HAVE READ AND FULLY UNDERSTAND THE ABOVE CONSENT TO OPERATION and/or OTHER PROCEDURE.    _________________________________________  __________________________________  Signature of Patient     Signature of Responsible Person         ___________________________________         Printed Name of Responsible Person           _________________________________                 Relationship to Patient  _________________________________________  ______________________________  Signature of Witness          Date  Time      Patient Name: Alina Aceves     : 1980                 Printed: 2023     Medical Record #: [id number]                     Page [address] of [address] [place] [address]  [id number]    Consent for Anesthesia    I,  Alina Aceves agree to be cared for by an anesthesiologist, who is specially trained to monitor me and give me medicine to put me to sleep or keep me comfortable during my procedure    I understand that my anesthesiologist is not an employee or agent of [facility] or [employer]. He or she works for [employer].    As the patient asking for anesthesia services, I agree to:  Allow the anesthesiologist (anesthesia doctor) to give me medicine and do additional procedures as necessary. Some examples are: Starting or using an “IV” to give me medicine, fluids or blood during my procedure, and having a breathing tube placed to help me breathe when I’m asleep (intubation). In the event that my heart stops working properly, I understand that my anesthesiologist will make every effort to sustain my life, unless otherwise directed by [facility] Do Not Resuscitate documents.  Tell my anesthesia doctor before my procedure:  If I am pregnant.  The last time that I ate or drank.  All of the medicines I take (including prescriptions, herbal supplements, and pills I can buy without a prescription (including street drugs/illegal medications). Failure to inform my anesthesiologist about these medicines may increase my risk of anesthetic complications.  If I am allergic to anything or have had a reaction to anesthesia before.  I understand how the anesthesia medicine will help me (benefits).  I understand that with any type of anesthesia medicine there are risks:  The most common risks are: nausea, vomiting, sore throat, muscle soreness, damage to my eyes, mouth, or teeth (from breathing tube placement).  Rare risks include: remembering what happened during my procedure, allergic reactions to medications, injury to my airway, heart, lungs, vision, nerves, or muscles and in extremely rare instances death.  My doctor has explained to me other choices available to me for my care  (alternatives).  Pregnant Patients (“epidural”):  I understand that the risks of having an epidural (medicine given into my back to help control pain during labor), include itching, low blood pressure, difficulty urinating, headache or slowing of the baby’s heart. Very rare risks include infection, bleeding, seizure, irregular heart rhythms and nerve injury.  Regional Anesthesia (“spinal”, “epidural”, & “nerve blocks”):  I understand that rare but potential complications include headache, bleeding, infection, seizure, irregular heart rhythms, and nerve injury.    I can change my mind about having anesthesia services at any time before I get the medicine.    _____________________________________________________________________________  Patient (or Representative) Signature/Relationship to Patient  Date   Time    _____________________________________________________________________________   Name (if used)    Language/Organization   Time    _____________________________________________________________________________  Anesthesiologist Signature     Date   Time  I have discussed the procedure and information above with the patient (or patient’s representative) and answered their questions. The patient or their representative has agreed to have anesthesia services.    _____________________________________________________________________________  Witness        Date   Time  I have verified that the signature is that of the patient or patient’s representative, and that it was signed before the procedure  Patient Name: Alina Aceves     : 1980                 Printed: 2023     Medical Record #: [id number]                     Page 2 of 2

## (undated) NOTE — LETTER
Date: 10/9/2024  Patient name: Alina Aceves  YOB: 1980  Medical Record Number: [id number]  Primary Coverage: Payor: [facility] POS / Plan: [facility] POS/MCNP / Product Type: POS /   Secondary Coverage:   Insurance ID: [id number]  Patient Address: [address] [place] [address]  Telephone Information:   Home Phone [phone]   Mobile [phone]         Encounter Date: 10/9/2024  Provider: CHAD Johns  Diagnosis:     ICD-10-CM   1. 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 Adverse effect of radiation, subsequent encounter  T66.XXXD   3. 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 SLE (systemic lupus erythematosus related syndrome) ([facility])  M32.9   5. Leukocytoclastic vasculitis ([facility])  M31.0       Progress Note:  CHIEF COMPLAINT:     Chief Complaint   Patient presents with    Wound Care     Patient arrives for a wound care appointment. Patient arrives with vashe soaked gauze to the breast wound. Patient has band aids to new leg wounds. Patient states she has mild pain. There is nothing on the flank wound. Patient recently had an MRI and the doctor stated there is some necrosis to the breast wound.      HPI:   Information obtained from Patient and chart  5-24-24 INITIAL:  43 year old female with Past medical history invasive ductal carcinoma of left breast (dr kaiser camara) currently undergoing radiation therapy (dr kobi bran), leukocytoclastic vasculitis (treated with clobetasol ointment by dr. Walton) iron deficiency anemia, HFrEF ([facility]), lupus, Sjogren's syndrome.  Patient was recently admitted for oral sores and n/v and elctrolyte imbalance and hypokalemia.    Earlier this week she was seen in urgent care for uti symptoms and hematuria. She was treated with cefadroxil and recommended to see primary md. dr bran recommended patient to start yary, she states she  is not regular with it. Patient has seen nutrition/dietician.  Patient is active with residential [facility].  Earlier this week she had to cancel physical therapy due to wound pain.  on 5-11 patient was started on potassium citrate x 10 days and patient was to repeat labs, which she did on 5-21 but her potassium was still 2.9. she states she is taking the potassium.  I will udpate ariane palm and I asked the patient to contact her as they may want her to increase her potassium.  Patient states that initially she was utilizing aquaphor but then developed an itchy red rash, so now has just been leaving it dry or using aveeno. She is able to tolerate very minimal cleansing of the area.  I discussed with her how to do it as tolerated in the shower with anasept.  Will utilize hydrogel cool sheets, patient to return next week. There is not any s/s of infection    8-15-24 patient returns. I have not seen her in the last 3 mo. Noted [facility] oncology msw note from July that was assisting her with supportive resources and financial assistance. She is  and has an 9 yo at home and 19 yo away at college, her sister lives 2 hours away and remaining family lives out of state, mom is in [place].  She saw dr bran (radiation oncology) in June 2024 and it was documented that she had remaining areas of open lesions to the breast as well as open lesions due to vasculitis to her bilateral lower extremities.  She recently was admitted from 7-21 to 7-24 for hypokalemia. She has a f/u with dr. Gant sept 13.  She has been dressing areas with over the counter abx ointment. She has a let flank scattered ulcerations in a dermatome pattern that patient states started as vessicles, she was treated with valtrax by urgent care.  She also indeed has lower extremity ulcerations on the right knee and left lateral tibia.  The breast wound has deterorated and is significantly deeper with necrosis. No malodor.  There was a small vessel clip that was  spitting that I removed with a forceps. We discussed this small clip could be the reason the wound opened up, but I am also concerned about recurrence.  Will have her dress with honey gauze to the breast and hydrogel sheets to all other areas (flank and legs). Will order her dressings. Patient states her appetite is getting better lately. No acute s/s of infection.    8-29-24 patient returns.  She did see surgeon, dr pinedo, and mri of the breast (scheduled for sept 16), her appointment with dr camara is sept 13.  I did reach out to dr pinedo via [employer] chat.  Per patient dr. Pinedo d/w her taking her to the operating room to clean the wound and biopsy.  I let her know that I would agree with this as patient is too sensitive to do in clinic debridements.  She has been dressing the wound with honey gauze. We ordered dressings for her lower extremity wounds (hydrogel) received, she did receive them.  The breast wound has 3 spitting staples again.  Will have patient pack with vashe 2-3 x a day and I will see her again before her mri to remove any further potential spitting clips.  Patient states she is eating better.  We discussed keeping her skin on lower legs moisturized.     9-6-24 patient returns.  Patient has been dressing the breast wound with vashe and the flank/abdomen with hydrocolloid.  The flank/abdomen is improved. The breast wound is  with less necrosis, patient states less drainage, no malodor.  Her legs remain resolved. She states she is using the ayry.  Her mri is the 16th and dr camara the 13th.  She will f/u with dr bill after mri.  There are not any surgical clips in the wound bed today.  Even with less necrosis it still remains significant and she does not tolerate debridement.  I informed her that once she gets the mri and plan can be determined with dr. Bill, we can see her back after debridement. I mentioned the wound vac as an option after debridement. Patient to schedule an appointment for  1 month and she can move it sooner/later as needed. Continue with current poc at this time.    10-9-24 patient returns. She saw dr camara/oncology, She followed up with cardiology and her metoprolol was reduced and she is to schedule an echo.  She had her mri and  dr bill let her know that know suspicious findings were noted, just inflammation.  She saw surgeon on 9-30-24. she jhas continued to dress the wound with vashe wtd. The wound is improved. There is a small staple trying to spit again in the base of the wound however I am unable to lift it to remove it.  The wound is slightly dry, less necrosis. We discussed switching back to honey.  Her shingles areas are improved, but dry, no c/o pain. Patient has new areas open on her left lower leg that she states she pulled the skin and she started bleeding.  We also discussed Hbo it's advantages, time commitment, how it works. Writtne information given to patient and she will consider it. No s/s of infection.    MEDICATIONS:     Current Outpatient Medications:     Potassium Chloride ER 10 MEQ Oral Tab CR, Take 1 tablet (10 mEq total) by mouth daily., Disp: 30 tablet, Rfl: 0    HYDROcodone-acetaminophen 5-325 MG Oral Tab, Take 1 tablet by mouth every 6 (six) hours as needed for Pain. (Patient not taking: Reported on 8/15/2024), Disp: 10 tablet, Rfl: 0    benzonatate 200 MG Oral Cap, Take 1 capsule (200 mg total) by mouth 3 (three) times daily as needed for cough. (Patient not taking: Reported on 8/15/2024), Disp: 30 capsule, Rfl: 0    prochlorperazine (COMPAZINE) 10 mg tablet, Take 1 tablet (10 mg total) by mouth every 6 (six) hours as needed for Nausea or vomiting. (Patient not taking: Reported on 8/15/2024), Disp: , Rfl:     metoprolol succinate ER 50 MG Oral Tablet 24 Hr, Take 1 tablet (50 mg total) by mouth 2x Daily(Beta Blocker)., Disp: 180 tablet, Rfl: 3    ondansetron 4 MG Oral Tablet Dispersible, Take 1 tablet (4 mg total) by mouth every 4 (four) hours as  needed for Nausea. (Patient not taking: Reported on 8/15/2024), Disp: 10 tablet, Rfl: 0    DULoxetine 30 MG Oral Cap DR Particles, Take 2 capsules (60 mg total) by mouth daily., Disp: , Rfl:     lidocaine-prilocaine 2.5-2.5 % External Cream, APPLY SMALL AMOUNT OVER PORT PRIOR TO ACCESS, Disp: , Rfl:     zolpidem 10 MG Oral Tab, Take 1 tablet (10 mg total) by mouth nightly as needed for Sleep., Disp: , Rfl:   ALLERGIES:     Allergies   Allergen Reactions    Bactrim [Sulfamethoxazole W/Trimethoprim] RASH    Adhesive Tape RASH      REVIEW OF SYSTEMS:   This information was obtained from the patient/family and chart.    See HPI for pertinent positives, otherwise 10 pt ROS negative.  HISTORY:   Past medical, surgical, family and social history updated where appropriate.      PHYSICAL EXAM:     Vitals:    10/09/24 0902   BP: 115/77   Pulse: 98   Resp: 16   Temp: 98 °F (36.7 °C)         Estimated body mass index is 14.5 kg/m² as calculated from the following:    Height as of 7/21/24: 64\".    Weight as of 7/21/24: 84 lb 8 oz (38.3 kg).   POC Glucose   Date Value Ref Range Status   12/19/2023 72 70 - 99 mg/dL Final   12/18/2023 125 (H) 70 - 99 mg/dL Final   12/18/2023 84 70 - 99 mg/dL Final       Vital signs reviewed.Appears stated age, well groomed.    Constitutional:  Bp wnl. Pulse Regular and wnl for patient. Respirations easy and unlabored. Temperature wnl. Patient is very thin in appearance. Appearance neat and clean. Appears in no acute distress.    Left lower extremity:  DP Palpable, digits warm, toenails wnl for color, length, thickness and hygiene, + hair growth. Purpura noted on anterior ankle and dorsal forefoot.  Musculoskeletal:  Patient ambulation is stable  Integumentary:  refer to wound characteristics and images   Psychiatric:  Judgment and insight intact. Alert and oriented times 3. No evidence of depression, anxiety, or agitation. Calm, cooperative, and communicative. Appropriate interactions and  affect.  DIAGNOSTICS:     Lab Results   Component Value Date    BUN 32 (H) 07/22/2024    CREATSERUM 0.69 07/22/2024    ALB 3.1 (L) 07/21/2024    TP 6.6 07/21/2024       WOUND ASSESSMENT:     Wound 05/24/24 #1 Radiation therapy Breast Left (Active)   Date First Assessed/Time First Assessed: 05/24/24 0857    Wound Number (Wound Clinic Only): #1 Radiation therapy  Primary Wound Type: Other (comment)  Location: Breast  Wound Location Orientation: Left      Assessments 5/24/2024  8:59 AM 10/9/2024  8:47 AM   Wound Image         Drainage Amount Large None   Drainage Description Serous;Yellow --   Wound Length (cm) 15.5 cm 1.5 cm   Wound Width (cm) 19 cm 1.9 cm   Wound Surface Area (cm^2) 294.5 cm^2 2.85 cm^2   Wound Depth (cm) 0.1 cm 1.1 cm   Wound Volume (cm^3) 29.45 cm^3 3.135 cm^3   Wound Healing % -- 89   Margins Well-defined edges Well-defined edges   Non-staged Wound Description Full thickness Full thickness   Alfreda-wound Assessment Moist;Edema Fragile;Pink;Dry;Excoriated   Wound Granulation Tissue Firm;Pink Pink;Firm   Wound Bed Granulation (%) 20 % 60 %   Wound Bed Epithelium (%) 50 % --   Wound Bed Slough (%) 30 % 40 %   Wound Odor None None   Tunneling? -- No   Undermining? -- No   Sinus Tracts? -- No       No associated orders.       Wound 08/15/24 #2 Flank Left;Anterior;Posterior (Active)   Date First Assessed/Time First Assessed: 08/15/24 1406    Wound Number (Wound Clinic Only): #2  Primary Wound Type: (c) Other (comment)  Location: Flank  Wound Location Orientation: Left;Anterior;Posterior      Assessments 8/15/2024  2:07 PM 10/9/2024  8:51 AM   Wound Image          Closure Not approximated --   Drainage Amount Scant Unable to assess   Drainage Description Serous;Yellow --   Wound Length (cm) 2.5 cm 0.7 cm   Wound Width (cm) 32.1 cm 0.5 cm   Wound Surface Area (cm^2) 80.25 cm^2 0.35 cm^2   Wound Depth (cm) 0.2 cm 0.1 cm   Wound Volume (cm^3) 16.05 cm^3 0.035 cm^3   Wound Healing % -- 100   Margins --  Well-defined edges   Non-staged Wound Description -- Full thickness   Alfreda-wound Assessment Painful;Pink;Fragile Dry;Fragile   Wound Granulation Tissue Pink;Spongy Pink;Firm   Wound Bed Granulation (%) 20 % 100 %   Wound Bed Epithelium (%) 50 % --   Wound Bed Slough (%) 30 % --   Wound Odor None None   Tunneling? No No   Undermining? No No   Sinus Tracts? No No       No associated orders.       Wound 10/09/24 #3 Leg Left;Anterior (Active)   Date First Assessed/Time First Assessed: 10/09/24 0856    Wound Number (Wound Clinic Only): #3  Primary Wound Type: Vasculitis  Location: Leg  Wound Location Orientation: Left;Anterior      Assessments 10/9/2024  8:57 AM   Wound Image      Drainage Amount Scant   Drainage Description Serosanguineous   Wound Length (cm) 12.5 cm   Wound Width (cm) 4 cm   Wound Surface Area (cm^2) 50 cm^2   Wound Depth (cm) 0.1 cm   Wound Volume (cm^3) 5 cm^3   Margins Well-defined edges   Non-staged Wound Description Full thickness   Alfreda-wound Assessment Clean;Dry   Wound Granulation Tissue Red;Firm   Wound Bed Granulation (%) 25 %   Wound Bed Epithelium (%) 75 %   Wound Odor None   Tunneling? No   Undermining? No   Sinus Tracts? No       No associated orders.          ASSESSMENT AND PLAN:    1. 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 Adverse effect of radiation, subsequent encounter    3. 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 SLE (systemic lupus erythematosus related syndrome) (HCC)    5. Leukocytoclastic vasculitis (HCC)      Risks, benefits, and alternatives of current treatment plan discussed in detail.  Questions and concerns addressed. Red flags to RTC or ED reviewed.  Patient (or parent) agrees to plan.      NOTE TO PATIENT: The 21st Century Cures Act makes clinical notes like these available to patients in the interest of transparency. Clinical notes are medical documents used by physicians  and care providers to communicate with each other. These documents include medical language and terminology, abbreviations, and treatment information that may sound technical and at times possibly unfamiliar. In addition, at times, the verbiage may appear blunt or direct. These documents are one tool providers use to communicate relevant information and clinical opinions of the care providers in a way that allows common understanding of the clinical context.   I spent40 minutes with the patient. This time included:    preparing to see the patient (eg, review notes and recent diagnostics),  seeing the patient, obtaining and/or reviewing separately obtained history, performing a medically appropriate examination and/or evaluation, counseling and educating the patient, documenting in the record, new wounds to legs, discussion about hbo  DISCHARGE:      Patient Instructions   Please return:2-3 weeks        Patient discharge and wound care instructions  Alina Aceves  10/9/2024          Soak the area with ANASEPT gauze.    You may shower and cleanse area with mild soap and water, try to \"scrub\" lightly with a gauze to remove the build up of draiange  rinse wound with ANASEPT cleanser,   dab dry with gauze and apply     Left breast:  apply a piece of honey alginate into the wound and cover with bordered silicone foam  Left side and lower extremities:  THE HYDROGEL SHEET DRESSING. Will order more for you    20-30mmhg calamine wrap (remove in 1 week)    Nutrition and blood sugar control:  Focus on the following:  Protein: Meats, beans, eggs, milk and yogurt particularly Greek yogurt), tofu, soy nuts, soy protein products (Follow the protein handout in your welcome folder)  Vitamin C: Citrus fruits and juices, strawberries, tomatoes, tomato juice, peppers, baked potatoes, spinach, broccoli, cauliflower, Martin sprouts, cabbage  Vitamin A: Dark green, leafy vegetables, orange or yellow vegetables, cantaloupe, fortified  dairy products, liver, fortified cereals  Zinc: Fortified cereals, red meats, seafood  Consider supplementing with Vitor by [employer]. It can be purchased on amazon, [employer] website, or local pharmacy may be able to order it for you.  (These are essential branch chain amino acids that help with tissue building and wound healing).   When your blood sugar is consistently elevated greater than 180 your body can't heal or fight infection.     Concerns:  Signs of infection may include the following:  Increase in redness  Red \"streaks\" from wound  Increase in swelling  Fever  Unusual odor  Change in the amount of wound drainage     Should you experience any significant changes in your wound(s) or have any questions regarding your home care instructions please contact the wound center [facility] @ [phone] If after regular business hours, please call your family doctor or local emergency room. The treatment plan has been discussed at length between you and your provider. Follow all instructions carefully, it is very important. If you do not follow all instructions you are at risk of your wound not healing, infection, possible loss of limb and even loss of life.            Anita Smallwood FNP-C, CWCN-AP, CFCN, CSWS, WCC, DWC  10/9/2024         .Weekly Wound Education Note    Teaching Provided To: Patient  Training Topics: Discharge instructions;Dressing;Edema control;Compression;Cleasing and general instructions  Training Method: Explain/Verbal;Written  Training Response: Patient responds and understands            Honey alginate to breast wound, change every other day and cover with dry dressing.  Hydrogel sheets to flank, may use Aquaphor to healing areas.  Oil emulsion sheets to new leg wounds, calamine unna boot 20-30mmHg.  Patient will remove wrap in a week.  Supplies ordered from [employer].    Wound Treatment Orders:  No orders of the defined types were placed in this encounter.          Wound  Information/Order:  Product:Other hydrogel sheet 4x4  Dispense: 30 days  Dressing Frequency:Change dressing daily and/or PRN    Was a Debridement performed: Yes, Debridement type: mechanical    Compression Stockings ordered: No    Notes: Hydrogel sheets 4x4    Additional wound: No

## (undated) NOTE — LETTER
Date: 5/24/2024  Patient name: Alina Aceves  YOB: 1980  Medical Record Number: [id number]  Primary Coverage: Payor: [facility] POS / Plan: [facility] POS/MCNP / Product Type: POS /   Secondary Coverage:   Insurance ID: [id number]  Patient Address: [address] [place] [id number]  Telephone Information:   Home Phone [phone]   Mobile [phone]         Encounter Date: 5/24/2024  Provider: CHAD Johns  Diagnosis:     ICD-10-CM   1. SLE (systemic lupus erythematosus related syndrome) ([facility])  M32.9   2. Adverse effect of radiation, subsequent encounter  T66.XXXD   3. Malignant neoplasm of left breast in female, estrogen receptor negative, unspecified site of breast ([facility])  C50.912    Z17.1       Progress Note:  CHIEF COMPLAINT:     Chief Complaint   Patient presents with    Wound Care     Patients is here for a follow up. Patients stated the wound is from radiation therapy. Her last last radiation therapy was April 18, 2024. Dressing veno lotion on the wound and ace-wrap. She has a walker and a wheelchair at home      HPI:   Information obtained from Patient and chart  5-24-24 INITIAL:  43 year old female with Past medical history invasive ductal carcinoma of left breast (dr [facility]) currently undergoing radiation therapy (dr kobi bran), leukocytoclastic vasculitis (treated with clobetasol ointment by dr. Walton) iron deficiency anemia, HFrEF ([facility]), lupus, Sjogren's syndrome.  Patient was recently admitted for oral sores and n/v and elctrolyte imbalance and hypokalemia.    Earlier this week she was seen in urgent care for uti symptoms and hematuria. She was treated with cefadroxil and recommended to see primary md. dr bran recommended patient to start yary, she states she is not regular with it. Patient has seen nutrition/dietician.  Patient is active with residential [facility].  Earlier this week she had to cancel physical therapy due to wound pain.  on 5-11 patient was  started on potassium citrate x 10 days and patient was to repeat labs, which she did on 5-21 but her potassium was still 2.9. she states she is taking the potassium.  I will udpate ariane palm and I asked the patient to contact her as they may want her to increase her potassium.  Patient states that initially she was utilizing aquaphor but then developed an itchy red rash, so now has just been leaving it dry or using aveeno. She is able to tolerate very minimal cleansing of the area.  I discussed with her how to do it as tolerated in the shower with anasept.  Will utilize hydrogel cool sheets, patient to return next week. There is not any s/s of infection    MEDICATIONS:     Current Outpatient Medications:     torsemide 10 MG Oral Tab, Take 1 tablet (10 mg total) by mouth daily. PRN, Disp: , Rfl:     HYDROcodone-acetaminophen 5-325 MG Oral Tab, Take 1 tablet by mouth every 6 (six) hours as needed for Pain., Disp: 10 tablet, Rfl: 0    benzonatate 200 MG Oral Cap, Take 1 capsule (200 mg total) by mouth 3 (three) times daily as needed for cough., Disp: 30 capsule, Rfl: 0    prochlorperazine (COMPAZINE) 10 mg tablet, Take 1 tablet (10 mg total) by mouth every 6 (six) hours as needed for Nausea or vomiting., Disp: , Rfl:     metoprolol succinate ER 50 MG Oral Tablet 24 Hr, Take 1 tablet (50 mg total) by mouth 2x Daily(Beta Blocker)., Disp: 180 tablet, Rfl: 3    ondansetron 4 MG Oral Tablet Dispersible, Take 1 tablet (4 mg total) by mouth every 4 (four) hours as needed for Nausea., Disp: 10 tablet, Rfl: 0    Potassium Citrate ER 15 MEQ (1620 MG) Oral Tab CR, Take 1 tablet by mouth in the morning, at noon, and at bedtime., Disp: , Rfl:     DULoxetine 30 MG Oral Cap DR Particles, Take 2 capsules (60 mg total) by mouth daily., Disp: , Rfl:     lidocaine-prilocaine 2.5-2.5 % External Cream, APPLY SMALL AMOUNT OVER PORT PRIOR TO ACCESS, Disp: , Rfl:     zolpidem 10 MG Oral Tab, Take 1 tablet (10 mg total) by mouth nightly as  needed for Sleep., Disp: , Rfl:   ALLERGIES:     Allergies   Allergen Reactions    Bactrim [Sulfamethoxazole W/Trimethoprim] RASH    Adhesive Tape RASH      REVIEW OF SYSTEMS:   This information was obtained from the patient/family and chart.    See HPI for pertinent positives, otherwise 10 pt ROS negative.  HISTORY:     Past Medical History:    Breast cancer (HCC)    Congestive heart disease (HCC)    Gastritis    Lupus (HCC)    Neuropathy    Personal history of antineoplastic chemotherapy    Sjogren's disease (HCC)     Past Surgical History:   Procedure Laterality Date    Breast surgery Left      delivery only        Social History     Socioeconomic History    Marital status:    Tobacco Use    Smoking status: Former     Current packs/day: 0.00     Types: Cigarettes     Quit date:      Years since quittin.4    Smokeless tobacco: Never    Tobacco comments:     social smoker   Vaping Use    Vaping status: Never Used   Substance and Sexual Activity    Alcohol use: Not Currently    Drug use: Never     Social Determinants of Health     Food Insecurity: No Food Insecurity (4/10/2024)    Food Insecurity     Food Insecurity: Never true   Transportation Needs: No Transportation Needs (4/10/2024)    Transportation Needs     Lack of Transportation: No   Housing Stability: Low Risk  (4/10/2024)    Housing Stability     Housing Instability: No     PHYSICAL EXAM:     Vitals:    24 0903   BP: (!) 126/91   Pulse: 90   Resp: 14   Temp: 98 °F (36.7 °C)   Weight: 114 lb (51.7 kg)   Height: 64\"      Estimated body mass index is 19.57 kg/m² as calculated from the following:    Height as of this encounter: 64\".    Weight as of this encounter: 114 lb (51.7 kg).   POC Glucose   Date Value Ref Range Status   2023 72 70 - 99 mg/dL Final   2023 125 (H) 70 - 99 mg/dL Final   2023 84 70 - 99 mg/dL Final       Vital signs reviewed.Appears stated age, well groomed.    Constitutional:  Bp slightly  elevated, but wnl for patient's trend. Pulse Regular and wnl for patient. Respirations easy and unlabored. Temperature wnl. Weight normal for height. Appearance neat and clean. Appears in no acute distress. Well nourished and well developed.    Respiratory: Respiratory effort is easy and symmetric bilaterally. Rate is normal at rest and on room air.  Bilateral breath sounds are clear and equal w/ no wheezes, rales or rhonchi.    Cardiovascular:  Heart rhythm and rate tachy, without murmur or gallop.     Musculoskeletal:  Patient is in wheelchair propelled by others, able to transfer with assist  Integumentary:  refer to wound characteristics and images   Psychiatric:  Judgment and insight intact. Alert and oriented times 3. No evidence of depression, anxiety, or agitation. Calm, cooperative, and communicative. Appropriate interactions and affect.  DIAGNOSTICS:     Lab Results   Component Value Date    BUN 10 04/11/2024    CREATSERUM 0.48 (L) 04/11/2024    ALB 2.2 (L) 04/11/2024    TP 5.1 (L) 04/11/2024       WOUND ASSESSMENT:     Wound 05/24/24 #1 Radiation therapy Breast Left (Active)   Date First Assessed/Time First Assessed: 05/24/24 0857    Wound Number (Wound Clinic Only): #1 Radiation therapy  Primary Wound Type: Other (comment)  Location: Breast  Wound Location Orientation: Left      Assessments 5/24/2024  8:59 AM   Wound Image      Drainage Amount Large   Drainage Description Serous;Yellow   Wound Length (cm) 15.5 cm   Wound Width (cm) 19 cm   Wound Surface Area (cm^2) 294.5 cm^2   Wound Depth (cm) 0.1 cm   Wound Volume (cm^3) 29.45 cm^3   Margins Well-defined edges   Non-staged Wound Description Full thickness   Alfreda-wound Assessment Moist;Edema   Wound Granulation Tissue Firm;Pink   Wound Bed Granulation (%) 20 %   Wound Bed Epithelium (%) 50 %   Wound Bed Slough (%) 30 %   Wound Odor None       No associated orders.          ASSESSMENT AND PLAN:    1. Adverse effect of radiation, subsequent  encounter    2. SLE (systemic lupus erythematosus related syndrome) (HCC)    3. Malignant neoplasm of left breast in female, estrogen receptor negative, unspecified site of breast (HCC)        Discussed with patient the aspects of wound healing including:  wound bed optimization including moist wound healing, removal of necrosis, bioburden control, monitoring for infection, and finally the patient as a whole including nutrition and increased protein intake    Risks, benefits, and alternatives of current treatment plan discussed in detail.  Questions and concerns addressed. Red flags to RTC or ED reviewed.  Patient (or parent) agrees to plan.      NOTE TO PATIENT: The 21st Century Cures Act makes clinical notes like these available to patients in the interest of transparency. Clinical notes are medical documents used by physicians and care providers to communicate with each other. These documents include medical language and terminology, abbreviations, and treatment information that may sound technical and at times possibly unfamiliar. In addition, at times, the verbiage may appear blunt or direct. These documents are one tool providers use to communicate relevant information and clinical opinions of the care providers in a way that allows common understanding of the clinical context.   Patient is new to clinic, has been seen by [facility] providers previously.   I spent   40   minutes with the patient. This time included:    preparing to see the patient (eg, review notes and recent diagnostics),  seeing the patient, obtaining and/or reviewing separately obtained history, performing a medically appropriate examination and/or evaluation, counseling and educating the patient, documenting in the record   DISCHARGE:      Patient Instructions   Please return: 1 week    Check in with Aubree Putnam or Dr. Gant regarding your potassium still being low.  Check in with pharmacy regarding the pill to increase your appetite    Patient  discharge and wound care instructions  Alina Aceves  5/24/2024     Soak the area with ANASEPT gauze.    You may shower and cleanse area with mild soap and water, try to \"scrub\" lightly with a gauze to remove the build up of draiange  rinse wound with ANASEPT cleanser,   dab dry with gauze and apply THE HYDROGEL SHEET DRESSING.  (If you like this dressing please let us know and we can order more for you)    Nutrition and blood sugar control:  Focus on the following:  Protein: Meats, beans, eggs, milk and yogurt particularly Greek yogurt), tofu, soy nuts, soy protein products (Follow the protein handout in your welcome folder)  Vitamin C: Citrus fruits and juices, strawberries, tomatoes, tomato juice, peppers, baked potatoes, spinach, broccoli, cauliflower, [place] sprouts, cabbage  Vitamin A: Dark green, leafy vegetables, orange or yellow vegetables, cantaloupe, fortified dairy products, liver, fortified cereals  Zinc: Fortified cereals, red meats, seafood  Consider supplementing with Vitor by [employer]. It can be purchased on amazon, [employer] website, or local pharmacy may be able to order it for you.  (These are essential branch chain amino acids that help with tissue building and wound healing).   When your blood sugar is consistently elevated greater than 180 your body can't heal or fight infection.     Concerns:  Signs of infection may include the following:  Increase in redness  Red \"streaks\" from wound  Increase in swelling  Fever  Unusual odor  Change in the amount of wound drainage     Should you experience any significant changes in your wound(s) or have any questions regarding your home care instructions please contact the [facility] @ [phone] If after regular business hours, please call your family doctor or local emergency room. The treatment plan has been discussed at length between you and your provider. Follow all instructions carefully, it is very important. If you do not  follow all instructions you are at risk of your wound not healing, infection, possible loss of limb and even loss of life.                Anita Smallwood FNP-C, CWCN-AP, CFCN, CSWS, WCC, DWC  5/24/2024         .Weekly Wound Education Note    Teaching Provided To: Patient  Training Topics: Dressing;Cleasing and general instructions;Discharge instructions  Training Method: Explain/Verbal;Written  Training Response: Patient responds and understands        Notes: Inital visit for left breast wound. Start sample simpurity hydrogel absorbative sheet. Pt is to change dressing daily.        Wound Treatment Orders:  No orders of the defined types were placed in this encounter.        Wound Information/Order:  Wound Number: All wounds  Product:Other Simpurity Hydrogel Absorptive Sheet dressings  Dispense: 15 days  Dressing Frequency:Change dressing daily and/or PRN    Was a Debridement performed: Yes, Debridement type: mechanical    Compression Stockings ordered: No    Notes: Dispense as written    Additional wound: No